# Patient Record
Sex: FEMALE | Race: WHITE | NOT HISPANIC OR LATINO | Employment: FULL TIME | URBAN - METROPOLITAN AREA
[De-identification: names, ages, dates, MRNs, and addresses within clinical notes are randomized per-mention and may not be internally consistent; named-entity substitution may affect disease eponyms.]

---

## 2017-01-02 ENCOUNTER — OFFICE VISIT (OUTPATIENT)
Dept: FAMILY MEDICINE | Facility: CLINIC | Age: 32
End: 2017-01-02
Payer: COMMERCIAL

## 2017-01-02 VITALS
HEART RATE: 83 BPM | DIASTOLIC BLOOD PRESSURE: 73 MMHG | SYSTOLIC BLOOD PRESSURE: 105 MMHG | WEIGHT: 170 LBS | OXYGEN SATURATION: 98 % | TEMPERATURE: 97.5 F | HEIGHT: 67 IN | BODY MASS INDEX: 26.68 KG/M2

## 2017-01-02 DIAGNOSIS — M54.41 ACUTE MIDLINE LOW BACK PAIN WITH RIGHT-SIDED SCIATICA: Primary | ICD-10-CM

## 2017-01-02 PROCEDURE — 99213 OFFICE O/P EST LOW 20 MIN: CPT | Performed by: FAMILY MEDICINE

## 2017-01-02 RX ORDER — CYCLOBENZAPRINE HCL 10 MG
5-10 TABLET ORAL 3 TIMES DAILY PRN
Qty: 30 TABLET | Refills: 1 | Status: SHIPPED | OUTPATIENT
Start: 2017-01-02 | End: 2019-05-31

## 2017-01-02 RX ORDER — TRAMADOL HYDROCHLORIDE 50 MG/1
50-100 TABLET ORAL EVERY 6 HOURS PRN
Qty: 30 TABLET | Refills: 0 | Status: SHIPPED | OUTPATIENT
Start: 2017-01-02 | End: 2018-10-26

## 2017-01-02 ASSESSMENT — PAIN SCALES - GENERAL: PAINLEVEL: MODERATE PAIN (5)

## 2017-01-02 NOTE — PROGRESS NOTES
"  SUBJECTIVE:                                                    Aleida Weinberg is a 31 year old female who presents to clinic today for the following health issues:    Back Pain      Duration: Approximately 2 weeks but acutely worse for 5 days        Specific cause: turning/bending    Description:   Location of pain: low back right  Character of pain: sharp, cramping and constant  Pain radiation:radiates into the right buttocks  New numbness or weakness in legs, not attributed to pain:  no     Intensity: Currently 8/10    History:   Pain interferes with job: Not applicable  History of back problems: recurrent self limited episodes of low back pain in the past  Any previous MRI or X-rays: Yes- at [facility].  Date 2009  Sees a specialist for back pain:  No  Therapies tried without relief: acetaminophen (Tylenol), activity and NSAIDs    Alleviating factors:   Improved by: heat      Precipitating factors:  Worsened by: Lifting and Bending    Functional and Psychosocial Screen (Sue STarT Back):      Not performed today   Accompanying Signs & Symptoms:  Risk of Fracture:  Corticosteroid use  Risk of Cauda Equina:  None  Risk of Infection:  None  Risk of Cancer:  None  Risk of Ankylosing Spondylitis:  Onset at age <35, male, AND morning back stiffness. no              First noticed acute flare of chronic back pain the weekend before Christmas; then last Thursday was bending and twisting and felt it \"seize up.\"   Takes PRN prednisone due to SLE, as prescribed by rheumatologist.     Problem list and histories reviewed & adjusted, as indicated.  Additional history: as documented    Patient Active Problem List   Diagnosis     Other kyphoscoliosis and scoliosis     Hypertrophic and atrophic condition of skin     Viral warts     Routine postpartum follow-up     CARDIOVASCULAR SCREENING; LDL GOAL LESS THAN 160     Intermittent asthma     Anxiety     Intractable menstrual migraine without status migrainosus     Vitamin " "D deficiency     Inflammatory polyarthropathy (H)     Chronic back pain     Raynaud's phenomenon without gangrene     Systemic lupus erythematosus (H)     High risk medications (not anticoagulants) long-term use (Plaquenil and MTX)     Dry eyes, bilateral     Past Surgical History   Procedure Laterality Date     Surgical history of -        PE Tubes      section         Social History   Substance Use Topics     Smoking status: Former Smoker -- 0.50 packs/day for 2.5 years     Types: Cigarettes     Quit date: 2005     Smokeless tobacco: Never Used     Alcohol Use: Yes      Comment: rarely - 1 monthly     Family History   Problem Relation Age of Onset     HEART DISEASE Maternal Uncle      MI's      HEART DISEASE Maternal Grandfather      Bypass, Heart Disease     Hypertension Paternal Grandmother      Respiratory Maternal Grandfather      asthma     Respiratory Other      cousin on mothers side, asthma     CANCER Paternal Grandmother      lymph nodes     Alcohol/Drug Maternal Uncle      Alcohol/Drug Other      bother great grandparents on mother's side     C.A.D. Paternal Grandfather      DIABETES No family hx of      Breast Cancer No family hx of      Cancer - colorectal No family hx of            ROS:  Constitutional, HEENT, cardiovascular, pulmonary, gi and gu systems are negative, except as otherwise noted.    OBJECTIVE:                                                    /73 mmHg  Pulse 83  Temp(Src) 97.5  F (36.4  C) (Oral)  Ht 5' 7\" (1.702 m)  Wt 170 lb (77.111 kg)  BMI 26.62 kg/m2  SpO2 98%  LMP 2016 (Approximate)  Breastfeeding? No  Body mass index is 26.62 kg/(m^2).  GENERAL: alert, no distress and over weight  EYES: Eyes grossly normal to inspection, PERRL and conjunctivae and sclerae normal  RESP: lungs clear to auscultation - no rales, rhonchi or wheezes  CV: regular rate and rhythm, normal S1 S2, no S3 or S4, no murmur, click or rub, no peripheral edema and peripheral " pulses strong  MS: no gross musculoskeletal defects noted, no edema  SKIN: no suspicious lesions or rashes  Comprehensive back pain exam:  Tenderness of right sided paraspinous muscles, Pain limits the following motions: flexion, right lateral bending, right twisting, Lower extremity strength functional and equal on both sides, Lower extremity reflexes within normal limits bilaterally, Lower extremity sensation normal and equal on both sides and Straight leg positive on  right, indicating possible ipsilateral radiculopathy  PSYCH: mentation appears normal, affect normal/bright    Diagnostic Test Results:  none      ASSESSMENT/PLAN:                                                        ICD-10-CM    1. Acute midline low back pain with right-sided sciatica M54.41 traMADol (ULTRAM) 50 MG tablet     cyclobenzaprine (FLEXERIL) 10 MG tablet     Discussed course of symptoms, and that it may take 10-14 days to feel better.   Gentle activity encouraged.   Will prescribe short course of tramadol and Flexeril for pain, told her they would likely make her tired, especially if taken together.   return to clinic in 10-14 days if not improving, or if there are any worsening symptoms in the interim.     See Patient Instructions    Lauren A. Engelmann, MD  [facility]

## 2017-01-02 NOTE — PATIENT INSTRUCTIONS
Understanding Sciatica    Sciatica is a type of leg pain. It is often due to pressure on a nerve in your low back that connects to the sciatic nerve. This pressure may be caused by a damaged disk, by abnormal bone growth, or other less common disorders. You may feel pain, burning, tingling, or numbness that shoots down your leg.    Pressure from a damaged disk  Constant wear and tear on a disk can cause it to weaken. Part of the disk may push outward (herniate). Part of the disk may then press on nearby nerves. If this nerve leads to the sciatic nerve, you may feel pain down your leg.    Pressure from bone  A disk can wear out and thin with age. This can cause the vertebrae above and below the disk to touch, putting pressure on a nerve. Abnormal bone growths called bone spurs can also form where the bones rub together. These can narrow the spinal canal and press on nerves.       7537-6661 [employer]. [address] [place] [address], [place] [address]. All rights reserved. This information is not intended as a substitute for professional medical care. Always follow your healthcare professional's instructions.

## 2017-01-02 NOTE — NURSING NOTE
"Chief Complaint   Patient presents with     Back Pain       Initial /73 mmHg  Pulse 83  Temp(Src) 97.5  F (36.4  C) (Oral)  Ht 5' 7\" (1.702 m)  Wt 170 lb (77.111 kg)  BMI 26.62 kg/m2  SpO2 98%  LMP 12/18/2016 (Approximate)  Breastfeeding? No Estimated body mass index is 26.62 kg/(m^2) as calculated from the following:    Height as of this encounter: 5' 7\" (1.702 m).    Weight as of this encounter: 170 lb (77.111 kg).  BP completed using cuff size: nicole Chahal MA      "

## 2017-01-02 NOTE — MR AVS SNAPSHOT
After Visit Summary   1/2/2017    Aleida Weinberg    MRN: [id number]           Patient Information     Date Of Birth          1985        Visit Information        Provider Department      1/2/2017 9:00 AM Engelmann, Lauren Anneliese, MD [facility]        Today's Diagnoses     Acute midline low back pain with right-sided sciatica    -  1       Care Instructions         Understanding Sciatica    Sciatica is a type of leg pain. It is often due to pressure on a nerve in your low back that connects to the sciatic nerve. This pressure may be caused by a damaged disk, by abnormal bone growth, or other less common disorders. You may feel pain, burning, tingling, or numbness that shoots down your leg.    Pressure from a damaged disk  Constant wear and tear on a disk can cause it to weaken. Part of the disk may push outward (herniate). Part of the disk may then press on nearby nerves. If this nerve leads to the sciatic nerve, you may feel pain down your leg.    Pressure from bone  A disk can wear out and thin with age. This can cause the vertebrae above and below the disk to touch, putting pressure on a nerve. Abnormal bone growths called bone spurs can also form where the bones rub together. These can narrow the spinal canal and press on nerves.       9886-3069 [employer]. [address] [place] [address]. All rights reserved. This information is not intended as a substitute for professional medical care. Always follow your healthcare professional's instructions.              Follow-ups after your visit        Your next 10 appointments already scheduled     Jose J 10, 2017 10:00 AM   Office Visit with Willow Roque MD   [facility] ([facility]    [address] [place] [id number] [phone]           Bring a current list of meds and any records pertaining to this visit.  For Physicals,  "please bring immunization records and any forms needing to be filled out.  Please arrive 10 minutes early to complete paperwork.            Jan 20, 2017  8:00 AM   Return Visit with Lavon Light MD   [facility] ([facility])    [address]  [place] [id number] [phone]              Who to contact     If you have questions or need follow up information about today's clinic visit or your schedule please contact [facility] directly at [phone].  Normal or non-critical lab and imaging results will be communicated to you by [employer], letter or phone within 4 business days after the clinic has received the results. If you do not hear from us within 7 days, please contact the clinic through [employer] or phone. If you have a critical or abnormal lab result, we will notify you by phone as soon as possible.  Submit refill requests through [employer] or call your pharmacy and they will forward the refill request to us. Please allow 3 business days for your refill to be completed.          Additional Information About Your Visit        [employer] Information     [employer] gives you secure access to your electronic health record. If you see a primary care provider, you can also send messages to your care team and make appointments. If you have questions, please call your primary care clinic.  If you do not have a primary care provider, please call [phone] and they will assist you.        Your Vitals Were     Pulse Temperature Height    83 97.5  F (36.4  C) (Oral) 5' 7\" (1.702 m)    BMI (Body Mass Index) Pulse Oximetry Last Period    26.62 kg/m2 98% 12/18/2016 (Approximate)    Breastfeeding?          No         Blood Pressure from Last 3 Encounters:   01/02/17 105/73   11/22/16 118/80   10/07/16 126/72    Weight from Last 3 Encounters:   01/02/17 170 lb (77.111 kg)   11/22/16 169 lb 3.2 oz (76.749 kg)   10/26/16 168 lb 12.8 oz (76.567 kg)              Today, you " had the following     No orders found for display         Today's Medication Changes          These changes are accurate as of: 1/2/17  9:23 AM.  If you have any questions, ask your nurse or doctor.               Start taking these medicines.        Dose/Directions    cyclobenzaprine 10 MG tablet   Commonly known as:  FLEXERIL   Used for:  Acute midline low back pain with right-sided sciatica   Started by:  Engelmann, Lauren Anneliese, MD        Dose:  5-10 mg   Take 0.5-1 tablets (5-10 mg) by mouth 3 times daily as needed for muscle spasms   Quantity:  30 tablet   Refills:  1       traMADol 50 MG tablet   Commonly known as:  ULTRAM   Used for:  Acute midline low back pain with right-sided sciatica   Started by:  Engelmann, Lauren Anneliese, MD        Dose:   mg   Take 1-2 tablets ( mg) by mouth every 6 hours as needed for pain maximum 8 tablet(s) per day   Quantity:  30 tablet   Refills:  0         These medicines have changed or have updated prescriptions.        Dose/Directions    omeprazole 40 MG capsule   Commonly known as:  priLOSEC   This may have changed:    - when to take this  - reasons to take this  - additional instructions   Used for:  Abdominal pain, epigastric        Dose:  40 mg   Take 1 capsule (40 mg) by mouth daily Take 30-60 minutes before a meal.   Quantity:  30 capsule   Refills:  0            Where to get your medicines      These medications were sent to [facility] - [place], [place] - [address]  [address], [place] [address]     Phone:  [phone]    - cyclobenzaprine 10 MG tablet      Some of these will need a paper prescription and others can be bought over the counter.  Ask your nurse if you have questions.     Bring a paper prescription for each of these medications    - traMADol 50 MG tablet             Primary Care Provider Office Phone # Fax #    Tatum Dove PA-C [phone]       [place]  "[address] [address]        Thank you!     Thank you for choosing [facility]  for your care. Our goal is always to provide you with excellent care. Hearing back from our patients is one way we can continue to improve our services. Please take a few minutes to complete the written survey that you may receive in the mail after your visit with us. Thank you!             Your Updated Medication List - Protect others around you: Learn how to safely use, store and throw away your medicines at www.disposemymeds.org.          This list is accurate as of: 1/2/17  9:23 AM.  Always use your most recent med list.                   Brand Name Dispense Instructions for use    albuterol 108 (90 BASE) MCG/ACT Inhaler    PROAIR HFA/PROVENTIL HFA/VENTOLIN HFA    1 Inhaler    Inhale 2 puffs into the lungs every 6 hours as needed for shortness of breath / dyspnea       amLODIPine 5 MG tablet    NORVASC    30 tablet    Take 1 tablet (5 mg) by mouth daily       cyclobenzaprine 10 MG tablet    FLEXERIL    30 tablet    Take 0.5-1 tablets (5-10 mg) by mouth 3 times daily as needed for muscle spasms       folic acid 1 MG tablet    FOLVITE    100 tablet    Take 1 tablet (1 mg) by mouth daily       hydroxychloroquine 200 MG tablet    PLAQUENIL    180 tablet    Take 2 tablets (400 mg) by mouth daily       Insulin Syringe-Needle U-100 27G X 1/2\" 1 ML Misc    BD insulin syringe    10 each    Syringe/needles for methotrexate administration; once weekly.       LORazepam 0.5 MG tablet    ATIVAN    10 tablet    Take 1 tablet (0.5 mg) by mouth every 8 hours as needed for anxiety       methotrexate 50 MG/2ML injection CHEMO     4 vial    Inject 0.8 mLs (20 mg) Subcutaneous every 7 days       norgestrel-ethinyl estradiol 0.3-30 MG-MCG per tablet    LO/OVRAL (28)    84 tablet    Take 1 tablet by mouth daily       omeprazole 40 MG capsule    priLOSEC    30 capsule    Take 1 capsule (40 mg) " by mouth daily Take 30-60 minutes before a meal.       predniSONE 5 MG tablet    DELTASONE    90 tablet    Take 1 tablet (5 mg) by mouth daily as needed for lupus       traMADol 50 MG tablet    ULTRAM    30 tablet    Take 1-2 tablets ( mg) by mouth every 6 hours as needed for pain maximum 8 tablet(s) per day       vitamin D 2000 UNITS tablet     100 tablet    Take 2,000 Units by mouth daily

## 2017-01-10 ENCOUNTER — OFFICE VISIT (OUTPATIENT)
Dept: OBGYN | Facility: CLINIC | Age: 32
End: 2017-01-10
Payer: COMMERCIAL

## 2017-01-10 VITALS
WEIGHT: 167.2 LBS | TEMPERATURE: 98.3 F | SYSTOLIC BLOOD PRESSURE: 108 MMHG | HEIGHT: 67 IN | DIASTOLIC BLOOD PRESSURE: 84 MMHG | BODY MASS INDEX: 26.24 KG/M2

## 2017-01-10 DIAGNOSIS — Z30.431 IUD CHECK UP: Primary | ICD-10-CM

## 2017-01-10 PROCEDURE — 99213 OFFICE O/P EST LOW 20 MIN: CPT | Performed by: OBSTETRICS & GYNECOLOGY

## 2017-01-10 NOTE — NURSING NOTE
"Chief Complaint   Patient presents with     IUD     placement check.        Initial Ht 5' 7\" (1.702 m)  LMP 2016 (Approximate) Estimated body mass index is 26.62 kg/(m^2) as calculated from the following:    Height as of this encounter: 5' 7\" (1.702 m).    Weight as of 17: 170 lb (77.111 kg).  BP completed using cuff size: regular        The following HM Due: NONE      The following patient reported/Care Every where data was sent to:  P ABSTRACT QUALITY INITIATIVES [[id number]]       n/a and patient has appointment for today               "

## 2017-01-10 NOTE — PROGRESS NOTES
"Aleida Weinberg is 32 year old female here for an IUD check.  She had a mirena  IUD placed about one -two months ago, without complication.  Since then she has been doing well. Had some irregular bleeding but not bad.   Thinks she is done having children and is considering a tubal ligation after this IUD.   Recent dx of lupus.      OBJECTIVE:  /84 mmHg  Temp(Src) 98.3  F (36.8  C) (Oral)  Ht 5' 7\" (1.702 m)  Wt 167 lb 3.2 oz (75.841 kg)  BMI 26.18 kg/m2  LMP 12/18/2016 (Approximate)  Breastfeeding? No   normal respiratory and cardiovascular effort     Pelvic:  EG - normal adult female.  BUS - within normal limits.  Vagina - well rugated, no discharge.  Cervix - no lesions, no CMT.  Uterus - firm, normal size and nontender.  Adnexae - no masses or tenderness.  RV - deferred.  IUD string noted at the cervix about 1 cm long.  One string noted.     ASSESSMENT:  IUD well placed    PLAN:  return to clinic when due for next annual physical exam  or with any concerns in regards to her IUD.  discussed bleeding pattern and what to expect with this IUD.     Willow Roque MD     "

## 2017-01-20 ENCOUNTER — OFFICE VISIT (OUTPATIENT)
Dept: RHEUMATOLOGY | Facility: CLINIC | Age: 32
End: 2017-01-20
Payer: COMMERCIAL

## 2017-01-20 VITALS
BODY MASS INDEX: 25.96 KG/M2 | WEIGHT: 165.4 LBS | HEART RATE: 89 BPM | DIASTOLIC BLOOD PRESSURE: 78 MMHG | OXYGEN SATURATION: 97 % | HEIGHT: 67 IN | SYSTOLIC BLOOD PRESSURE: 125 MMHG

## 2017-01-20 DIAGNOSIS — M32.9 SYSTEMIC LUPUS ERYTHEMATOSUS (H): Primary | ICD-10-CM

## 2017-01-20 DIAGNOSIS — I73.00 RAYNAUD'S PHENOMENON WITHOUT GANGRENE: ICD-10-CM

## 2017-01-20 DIAGNOSIS — E55.9 VITAMIN D DEFICIENCY: ICD-10-CM

## 2017-01-20 DIAGNOSIS — Z79.899 HIGH RISK MEDICATIONS (NOT ANTICOAGULANTS) LONG-TERM USE: ICD-10-CM

## 2017-01-20 LAB
ALBUMIN SERPL-MCNC: 4.2 G/DL (ref 3.4–5)
ALBUMIN UR-MCNC: ABNORMAL MG/DL
ALP SERPL-CCNC: 47 U/L (ref 40–150)
ALT SERPL W P-5'-P-CCNC: 28 U/L (ref 0–50)
ANION GAP SERPL CALCULATED.3IONS-SCNC: 7 MMOL/L (ref 3–14)
APPEARANCE UR: CLEAR
AST SERPL W P-5'-P-CCNC: 22 U/L (ref 0–45)
BACTERIA #/AREA URNS HPF: ABNORMAL /HPF
BASOPHILS # BLD AUTO: 0 10E9/L (ref 0–0.2)
BASOPHILS NFR BLD AUTO: 0.3 %
BILIRUB SERPL-MCNC: 0.8 MG/DL (ref 0.2–1.3)
BILIRUB UR QL STRIP: NEGATIVE
BUN SERPL-MCNC: 10 MG/DL (ref 7–30)
C3 SERPL-MCNC: 93 MG/DL (ref 76–169)
C4 SERPL-MCNC: 16 MG/DL (ref 15–50)
CALCIUM SERPL-MCNC: 9.3 MG/DL (ref 8.5–10.1)
CHLORIDE SERPL-SCNC: 107 MMOL/L (ref 94–109)
CK SERPL-CCNC: 67 U/L (ref 30–225)
CO2 SERPL-SCNC: 29 MMOL/L (ref 20–32)
COLOR UR AUTO: YELLOW
CREAT SERPL-MCNC: 0.8 MG/DL (ref 0.52–1.04)
CRP SERPL-MCNC: 3.6 MG/L (ref 0–8)
DIFFERENTIAL METHOD BLD: ABNORMAL
EOSINOPHIL # BLD AUTO: 0.1 10E9/L (ref 0–0.7)
EOSINOPHIL NFR BLD AUTO: 1.3 %
ERYTHROCYTE [DISTWIDTH] IN BLOOD BY AUTOMATED COUNT: 12.7 % (ref 10–15)
ERYTHROCYTE [SEDIMENTATION RATE] IN BLOOD BY WESTERGREN METHOD: 7 MM/H (ref 0–20)
GFR SERPL CREATININE-BSD FRML MDRD: 84 ML/MIN/1.7M2
GLUCOSE SERPL-MCNC: 79 MG/DL (ref 70–99)
GLUCOSE UR STRIP-MCNC: NEGATIVE MG/DL
HCT VFR BLD AUTO: 42.2 % (ref 35–47)
HGB BLD-MCNC: 14.5 G/DL (ref 11.7–15.7)
HGB UR QL STRIP: NEGATIVE
KETONES UR STRIP-MCNC: NEGATIVE MG/DL
LEUKOCYTE ESTERASE UR QL STRIP: NEGATIVE
LYMPHOCYTES # BLD AUTO: 1.1 10E9/L (ref 0.8–5.3)
LYMPHOCYTES NFR BLD AUTO: 27.6 %
MCH RBC QN AUTO: 32.2 PG (ref 26.5–33)
MCHC RBC AUTO-ENTMCNC: 34.4 G/DL (ref 31.5–36.5)
MCV RBC AUTO: 94 FL (ref 78–100)
MONOCYTES # BLD AUTO: 0.5 10E9/L (ref 0–1.3)
MONOCYTES NFR BLD AUTO: 13.4 %
MUCOUS THREADS #/AREA URNS LPF: PRESENT /LPF
NEUTROPHILS # BLD AUTO: 2.2 10E9/L (ref 1.6–8.3)
NEUTROPHILS NFR BLD AUTO: 57.4 %
NITRATE UR QL: NEGATIVE
NON-SQ EPI CELLS #/AREA URNS LPF: ABNORMAL /LPF
PH UR STRIP: 6 PH (ref 5–7)
PLATELET # BLD AUTO: 239 10E9/L (ref 150–450)
POTASSIUM SERPL-SCNC: 4.3 MMOL/L (ref 3.4–5.3)
PROT SERPL-MCNC: 7.5 G/DL (ref 6.8–8.8)
PROT UR-MCNC: 0.37 G/L
PROT/CREAT 24H UR: 0.13 G/G CR (ref 0–0.2)
RBC # BLD AUTO: 4.5 10E12/L (ref 3.8–5.2)
RBC #/AREA URNS AUTO: ABNORMAL /HPF (ref 0–2)
SODIUM SERPL-SCNC: 143 MMOL/L (ref 133–144)
SP GR UR STRIP: >1.03 (ref 1–1.03)
URN SPEC COLLECT METH UR: ABNORMAL
UROBILINOGEN UR STRIP-ACNC: 0.2 EU/DL (ref 0.2–1)
WBC # BLD AUTO: 3.9 10E9/L (ref 4–11)
WBC #/AREA URNS AUTO: ABNORMAL /HPF (ref 0–2)

## 2017-01-20 PROCEDURE — 80053 COMPREHEN METABOLIC PANEL: CPT | Performed by: INTERNAL MEDICINE

## 2017-01-20 PROCEDURE — 81001 URINALYSIS AUTO W/SCOPE: CPT | Performed by: INTERNAL MEDICINE

## 2017-01-20 PROCEDURE — 84156 ASSAY OF PROTEIN URINE: CPT | Performed by: INTERNAL MEDICINE

## 2017-01-20 PROCEDURE — 85025 COMPLETE CBC W/AUTO DIFF WBC: CPT | Performed by: INTERNAL MEDICINE

## 2017-01-20 PROCEDURE — 82550 ASSAY OF CK (CPK): CPT | Performed by: INTERNAL MEDICINE

## 2017-01-20 PROCEDURE — 86160 COMPLEMENT ANTIGEN: CPT | Performed by: INTERNAL MEDICINE

## 2017-01-20 PROCEDURE — 86140 C-REACTIVE PROTEIN: CPT | Performed by: INTERNAL MEDICINE

## 2017-01-20 PROCEDURE — 36415 COLL VENOUS BLD VENIPUNCTURE: CPT | Performed by: INTERNAL MEDICINE

## 2017-01-20 PROCEDURE — 99214 OFFICE O/P EST MOD 30 MIN: CPT | Performed by: INTERNAL MEDICINE

## 2017-01-20 PROCEDURE — 86225 DNA ANTIBODY NATIVE: CPT | Performed by: INTERNAL MEDICINE

## 2017-01-20 PROCEDURE — 85652 RBC SED RATE AUTOMATED: CPT | Performed by: INTERNAL MEDICINE

## 2017-01-20 RX ORDER — AMLODIPINE BESYLATE 10 MG/1
10 TABLET ORAL DAILY
Qty: 30 TABLET | Refills: 3 | Status: SHIPPED | OUTPATIENT
Start: 2017-01-20 | End: 2017-04-21

## 2017-01-20 RX ORDER — SULFASALAZINE 500 MG/1
TABLET, DELAYED RELEASE ORAL
Qty: 120 TABLET | Refills: 3 | Status: SHIPPED | OUTPATIENT
Start: 2017-01-20 | End: 2017-02-21

## 2017-01-20 NOTE — NURSING NOTE
"Chief Complaint   Patient presents with     Systemic Lupus Erythematous     Patient states her hands and feet still bother her. Has had back problems       Initial /78 mmHg  Pulse 89  Ht 1.702 m (5' 7\")  Wt 75.025 kg (165 lb 6.4 oz)  BMI 25.90 kg/m2  SpO2 97%  LMP 12/18/2016 (Approximate) Estimated body mass index is 25.9 kg/(m^2) as calculated from the following:    Height as of this encounter: 1.702 m (5' 7\").    Weight as of this encounter: 75.025 kg (165 lb 6.4 oz).  BP completed using cuff size: regular         RAPID3 (0-30) Cumulative Score  11.8          RAPID3 Weighted Score (divide #4 by 3 and that is the weighted score)  3.93           "

## 2017-01-20 NOTE — PROGRESS NOTES
Rheumatology Clinic Visit      Aleida Weinberg MRN# [id number]   YOB: 1985 Age: 32 year old      Date of visit: 1/20/17   PCP: Tatum Dove     Chief Complaint   Patient presents with:  Systemic Lupus Erythematous      Assessment and Plan   1. Systemic lupus erythematosus (CHANELL >[id number] speckled, RNP+, Sm+, Scl-70+, hypocomplementemia, photosensitivity, arthritis, fatigue, Raynaud's phenomenon):  Scl-70+ but no skin tightening at this time.  Symptoms are much improved with methotrexate (doses above 15 mg weekly cause stomach upset so changed PO to SQ) and Plaquenil, but still with synovitis so will add SSZ today.   - Continue methotrexate 20 mg SQ once weekly  - Continue folic acid 1 mg daily  - Continue hydroxychloroquine 400mg daily (last eye exam done 5/20/2016)  - Continue prednisone 5mg daily PRN (rarely used per patient)  - Start sulfasalazine 500 mg twice daily ×7 days, then 1000 mg twice daily thereafter  - Labs today and one week prior to her next rheumatology clinic visit: CBC, CMP, ESR, CRP, CK, C3, C4, dsDNA, UA, Uprotein:creatinine  - Labs monthly [id number]: CBC, Cr, Hepatic Panel     # Sulfasalazine Risks and Benefits: The risks and benefits of sulfasalazine were discussed in detail and the patient verbalized understanding.  The risks discussed include, but are not limited to, the risk for hypersensitivity, anaphylaxis, anaphylactoid reactions, infections, bone marrow suppression,  hepatotoxicity, nausea, vomiting, and GI upset.   I encouraged reviewing the package insert and asking any questions about the medication.      2. Raynaud's Phenomenon: Cold avoidance was insufficient for controlling her symptoms and therefore amlodipine 5mg daily was started with some improvement.  Therefore, will increase amlodipine to 10mg daily today. Risks and side effects of amlodipine were re-reviewed.   - Increase amlodipine 5 mg daily     3. Chronic Back Pain: History of scoliosis. Degenerative  findings on previously reviewed MRIs from 2000 and 2009. This has not changed for many years and does not worsen when her peripheral joint symptoms worsen. Had one episode of sciatica that resolved with chiropractic treatment. If worsening in the future, may consider repeat MRI.     4. Vitamin D deficiency: Currently on vitamin D 2000 units daily.    - Continue vitamin D 2000 units daily  - Labs 2-3 days prior to her next rheumatology clinic visit: Vitamin D    Ms. Weinberg verbalized agreement with and understanding of the rational for the diagnosis and treatment plan.  All questions were answered to best of my ability and the patient's satisfaction. Ms. Weinberg was advised to contact the clinic with any questions that may arise after the clinic visit.      Thank you for involving me in the care of the patient    Return to clinic: 3 months      HPI   Aleida Digna Weinberg is a 32 year old female with medical history significant for intermittent asthma, anxiety, migraines, vitamin D deficiency, and systemic lupus erythematosus who presents for follow-up of SLE.     Today, Ms. Weinberg reports that she continues to have joint pain and morning stiffness for about 90 minutes. Pain and stiffness improves with activity and worsen with inactivity. Positive gelling phenomenon. Joints primarily involved include her MCPs, PIPs, wrists, elbows. Some difficulty still with opening jars and turning doorknobs, but improved since the last clinic visit. Raynaud's phenomenon is improved since starting amlodipine 5 mg daily; no lower extremity edema or lightheadedness/dizziness associated with amlodipine. She still has significant Raynaud's phenomenon symptoms though. She had one episode of acute onset sciatica with radiation down her right leg that resolved with 1 dose of Flexeril and chiropractic manipulation. She has prednisone to use for her inflammatory arthritis but has not needed it since her previous clinic visit; she tells me  that she will only use it if her pain is severe.    Denies fevers, chills, nausea, vomiting, constipation, diarrhea. No abdominal pain. No chest pain/pressure, palpitations, or shortness of breath. No oral or nasal sores. No neck pain. No LE swelling.  Has photosensitivity but no photophobia.  Mild dry eyes and dry mouth; she uses artificial tears as needed.  No eye pain or redness. Denies history of serositis. Denies history of DVT, pulmonary embolism, or miscarriage.    Tobacco: Occasional cigarette  EtOH: Once monthly  Drugs: None  Occupation: Works at [employer], a lot of typing    ROS   GEN: No fevers, chills, night sweats, or weight change  SKIN: See HPI. No hair thinning.  HEENT: Has a history of migraines, but no headache today. No change in vision, taste, or hearing. No epistaxis. No oral or nasal ulcers.  CV: No chest pain, pressure, palpitations, or dyspnea on exertion.  PULM: No SOB, wheeze, cough.  GI: No change in appetite. No nausea, vomiting, constipation, diarrhea. No blood in stool. No abdominal pain.  : No blood in urine.  MSK: See HPI.  NEURO: See history of present illness  ENDO: No heat/cold intolerance.  EXT: See history of present illness    Active Problem List     Patient Active Problem List   Diagnosis     Other kyphoscoliosis and scoliosis     Hypertrophic and atrophic condition of skin     Viral warts     Routine postpartum follow-up     CARDIOVASCULAR SCREENING; LDL GOAL LESS THAN 160     Intermittent asthma     Anxiety     Intractable menstrual migraine without status migrainosus     Vitamin D deficiency     Inflammatory polyarthropathy (H)     Chronic back pain     Raynaud's phenomenon without gangrene     Systemic lupus erythematosus (H)     High risk medications (not anticoagulants) long-term use (Plaquenil and MTX)     Dry eyes, bilateral     Past Medical History     Past Medical History   Diagnosis Date     Mild intermittent asthma      Other kyphoscoliosis and scoliosis       "upper back curvature and disc degeneration in lower back.     Past Surgical History     Past Surgical History   Procedure Laterality Date     Surgical history of -        PE Tubes      section       Allergy     Allergies   Allergen Reactions     No Known Drug Allergies      Current Medication List     Current Outpatient Prescriptions   Medication Sig     traMADol (ULTRAM) 50 MG tablet Take 1-2 tablets ( mg) by mouth every 6 hours as needed for pain maximum 8 tablet(s) per day     cyclobenzaprine (FLEXERIL) 10 MG tablet Take 0.5-1 tablets (5-10 mg) by mouth 3 times daily as needed for muscle spasms     amLODIPine (NORVASC) 5 MG tablet Take 1 tablet (5 mg) by mouth daily     Cholecalciferol (VITAMIN D) 2000 UNITS tablet Take 2,000 Units by mouth daily     hydroxychloroquine (PLAQUENIL) 200 MG tablet Take 2 tablets (400 mg) by mouth daily     predniSONE (DELTASONE) 5 MG tablet Take 1 tablet (5 mg) by mouth daily as needed for lupus     Insulin Syringe-Needle U-100 (BD INSULIN SYRINGE) 27G X 1/2\" 1 ML MISC Syringe/needles for methotrexate administration; once weekly.     methotrexate 50 MG/2ML injection Inject 0.8 mLs (20 mg) Subcutaneous every 7 days     LORazepam (ATIVAN) 0.5 MG tablet Take 1 tablet (0.5 mg) by mouth every 8 hours as needed for anxiety     albuterol (PROAIR HFA, PROVENTIL HFA, VENTOLIN HFA) 108 (90 BASE) MCG/ACT inhaler Inhale 2 puffs into the lungs every 6 hours as needed for shortness of breath / dyspnea     norgestrel-ethinyl estradiol (LO/OVRAL, 28,) 0.3-30 MG-MCG per tablet Take 1 tablet by mouth daily     folic acid (FOLVITE) 1 MG tablet Take 1 tablet (1 mg) by mouth daily     omeprazole (PRILOSEC) 40 MG capsule Take 1 capsule (40 mg) by mouth daily Take 30-60 minutes before a meal. (Patient taking differently: Take 40 mg by mouth daily as needed Take 30-60 minutes before a meal.)     No current facility-administered medications for this visit.         Social History   See " "HPI    Family History     Family History   Problem Relation Age of Onset     HEART DISEASE Maternal Uncle      MI's      HEART DISEASE Maternal Grandfather      Bypass, Heart Disease     Hypertension Paternal Grandmother      Respiratory Maternal Grandfather      asthma     Respiratory Other      cousin on mothers side, asthma     CANCER Paternal Grandmother      lymph nodes     Alcohol/Drug Maternal Uncle      Alcohol/Drug Other      bother great grandparents on mother's side     C.A.D. Paternal Grandfather      DIABETES No family hx of      Breast Cancer No family hx of      Cancer - colorectal No family hx of      Denies family history of autoimmune disease    Physical Exam     Temp Readings from Last 3 Encounters:   01/10/17 98.3  F (36.8  C) Oral   01/02/17 97.5  F (36.4  C) Oral   11/22/16 97.9  F (36.6  C) Oral     BP Readings from Last 5 Encounters:   01/10/17 108/84   01/02/17 105/73   11/22/16 118/80   10/07/16 126/72   08/31/16 128/70     Pulse Readings from Last 1 Encounters:   01/02/17 83     Resp Readings from Last 1 Encounters:   04/22/16 16     Estimated body mass index is 26.18 kg/(m^2) as calculated from the following:    Height as of 1/10/17: 1.702 m (5' 7\").    Weight as of 1/10/17: 75.841 kg (167 lb 3.2 oz).    GEN: NAD  HEENT: MMM. No oral lesions. Anicteric, noninjected sclera  CV: S1, S2. RRR. No m/r/g.  PULM: CTA bilaterally. No w/c.  MSK: Tenderness palpation of the bilateral 2nd-5th PIPs with subtle synovial swelling.  MCPs, DIPs, wrists, and elbows without swelling or tenderness to palpation.  Bilateral shoulders nontender to palpation without swelling. Bilateral hips nontender to direct palpation. Knees, ankles, and feet without tenderness palpation or swelling. Negative MTP squeeze.   NEURO: UE and LE strengths 5/5 and equal bilaterally.   SKIN: No rash; normal fingertip pulp; no evidence of previous infarcts to the distal fingertips. Tattoos present  EXT: No LE edema  PSYCH: Alert. " Appropriate.    Labs / Imaging (select studies)   RF/CCP  Recent Labs   Lab Test  04/22/16   0902  04/01/16   0910   CCPIGG  1   --    RHF   --   <20     CHANELL/RNP/Sm/SSA/SSB/dsDNA  Recent Labs   Lab Test  10/26/16   0919  07/22/16   0916  04/22/16   0902  04/01/16   0910   VALERIE   --    --    --   12.4*   ANAIGG   --    --   >1:56464  Reference range: <1:40  (Note)  Speckled pattern.  INTERPRETIVE INFORMATION: CHANELL by IFA, IgG  Anti-nuclear antibodies (CHANELL) are seen in a variety of  systemic rheumatic diseases and are determined by indirect  fluorescence assay (IFA) using HEp-2 substrate with an  IgG-specific conjugate. CHANELL titers less than or equal to  1:80 have variable relevance while titers greater than or  equal to 1:160 are considered clinically significant. These  antibodies may precede clinical disease onset; however,  healthy individuals and those with advanced age have been  reported to be positive for CHANELL. When observed, one of the  five basic patterns is reported: homogeneous,  peripheral/rim, speckled, centromere, or nucleolar. If  cytoplasmic fluorescence is observed, it is noted. IFA  methodology is subjective and has occasionally been shown  to lack sensitivity for anti-SSA/Ro antibodies.  Negative results do not necessarily rule out the presence  of SSc. If clinical suspicion remains, consi vicki further  testing for U3-RNP, PM/Scl, or Th/To antibodies associated  with SSc.  Performed by [facility],  [address] [place] [id number] [phone]  [email or web address], Twin Rodriguez MD, Lab. Director     --    RNPIGG   --    --   >8.0  Positive   Antibody index (AI) values reflect qualitative changes in antibody   concentration that cannot be directly associated with clinical condition or   disease state.  *   --    SMIGG   --    --   1.5*   --    SSAIGG   --    --   <0.2  Negative   Antibody index (AI) values reflect qualitative changes in antibody   concentration that cannot be directly associated  with clinical condition or   disease state.     --    SSBIGG   --    --   <0.2  Negative   Antibody index (AI) values reflect qualitative changes in antibody   concentration that cannot be directly associated with clinical condition or   disease state.     --    SCLIGG   --    --   3.1*   --    F1XPCXD  110  115  94   --    X2BWGDA  16  17  13*   --      Recent Labs   Lab Test  10/26/16   0919  07/22/16   0916  04/22/16   0902   DNA  1  1  1     Antiphospholipid Antibodies  Recent Labs   Lab Test  04/22/16   0902   B2GPG  0.9   B2GPM  <0.9  Negative     CARG  <15.0  Interpretation:  Negative     JOANN  <12.5  Interpretation:  Negative     LUPINT  Negative  (Note)  COMMENTS:  The INR is normal.  APTT ratio is normal.  DRVVT Screen ratio is normal.  Thrombin time is normal.  NEGATIVE TEST; A LUPUS ANTICOAGULANT WAS NOT DETECTED IN THIS  SPECIMEN WITHIN THE LIMITS OF THE TESTING REPERTOIRE.  If the clinical picture is strongly suggestive of an antiphospholipid  syndrome, recommend anticardiolipin and beta-2-glycoprotein (IgG and  IgM) antibody tests.  Isabella Olson M.D.  [phone]  4/25/2016    INR =  1.05    Reference range: 0.86-1.14  Thrombin Time= 15.4    Reference range: 13.0-19.0 sec    APTT:       Ratio  Patient  =  0.92  1:2 Mix  =  N/A  Reference:  Negative: Less than or equal to 1.16  Positive: Greater than or equal to 1.17     DILUTE LISA VIPER VENOM TEST:  Screen Ratio = 0.95   Normal is less than 1.21         CBC  Recent Labs   Lab Test  12/27/16   1643  11/28/16   1655  10/26/16   0919   WBC  4.0  3.8*  5.1   RBC  4.40  4.50  4.78   HGB  14.4  14.7  15.4   HCT  40.0  41.1  44.5   MCV  91  91  93   RDW  12.8  12.6  12.7   PLT  258  283  246   MCH  32.7  32.7  32.2   MCHC  36.0  35.8  34.6   NEUTROPHIL  43.7  63.5  59.3   LYMPH  42.3  20.2  29.5   MONOCYTE  13.0  14.9  10.2   EOSINOPHIL  0.5  1.1  0.8   BASOPHIL  0.5  0.3  0.2   ANEU  1.8  2.4  3.0   ALYM  1.7  0.8  1.5   AMANDA  0.5  0.6  0.5    AEOS  0.0  0.0  0.0   ABAS  0.0  0.0  0.0     CMP  Recent Labs   Lab Test  12/27/16   1643  11/28/16   1655  10/26/16   0919  07/22/16   0916   01/29/16   0919   NA   --    --   139  138   --   139   POTASSIUM   --    --   4.3  4.2   --   4.0   CHLORIDE   --    --   109  107   --   108   CO2   --    --   24  24   --   25   ANIONGAP   --    --   6  7   --   6   GLC   --    --   85  76   --   82   BUN   --    --   8  10   --   10   CR  0.74  0.69  0.74  0.77   < >  0.78   GFRESTIMATED  >90  Non  GFR Calc    >90  Non  GFR Calc    >90  Non  GFR Calc    88   < >  87   GFRESTBLACK  >90   GFR Calc    >90   GFR Calc    >90   GFR Calc    >90   GFR Calc     < >  >90   GFR Calc     SRINIVAS   --    --   9.3  8.6   --   8.8   BILITOTAL  0.4  0.6  0.5  0.5   < >  0.4   ALBUMIN  4.2  4.2  4.0  3.8   < >  4.1   PROTTOTAL  7.7  7.9  7.5  7.6   < >  7.0   ALKPHOS  50  63  50  48   < >  50   AST  25  33  26  17   < >  17   ALT  33  45  29  21   < >  28    < > = values in this interval not displayed.     Calcium/VitaminD  Recent Labs   Lab Test  10/26/16   0919  07/22/16   0916  04/01/16   0910  01/29/16 0919   SRINIVAS  9.3  8.6   --   8.8   VITDT  23   --   <13  Season, race, dietary intake, and treatment affect the concentration of   25-hydroxy-Vitamin D. Values may decrease during winter months and increase   during summer months. Values 20-29 ug/L may indicate Vitamin D insufficiency   and values <20 ug/L may indicate Vitamin D deficiency.   Vitamin D determination is routinely performed by an immunoassay specific for   25 hydroxyvitamin D3.  If an individual is on vitamin D2 (ergocalciferol)   supplementation, please specify 25 OH vitamin D2 and D3 level determination by   LCMSMS test VITD23.  *   --      ESR/CRP  Recent Labs   Lab Test  10/26/16   0919  07/22/16   0916  04/01/16   0910   SED  7  8  12   CRP   9.5*  7.8  5.3     CK/Aldolase  Recent Labs   Lab Test  10/26/16   0919  07/22/16   0916  04/22/16   0902   CKT  88  62  45   ALDOLASE   --    --   4.5     TSH/T4  Recent Labs   Lab Test  04/01/16   0910  01/20/09   1035   TSH  1.57  0.82     Lipid Panel  Recent Labs   Lab Test  07/10/15   0814  09/13/13   0706  01/20/09   1035   CHOL  149  139  151   TRIG  45  57  47   HDL  48*  52  49*   LDL  92  76  93   VLDL  9  11  9   CHOLHDLRATIO  3.1  2.7  3.1     Hepatitis B  Recent Labs   Lab Test  04/22/16   0902   HBCAB  Nonreactive   HEPBANG  Nonreactive     Hepatitis C  Recent Labs   Lab Test  04/22/16   0902   HCVAB  Nonreactive   Assay performance characteristics have not been established for newborns,   infants, and children       Lyme ab screening  Recent Labs   Lab Test  04/01/16   0910   LYMEGM  0.12     HIV Screening  Recent Labs   Lab Test  04/22/16   0902   HIAGAB  Nonreactive   HIV-1 p24 Ag & HIV-1/HIV-2 Ab Not Detected       UA  Recent Labs   Lab Test  10/26/16   0904  07/22/16   0912  04/22/16   0852   COLOR  Yellow  Yellow  Yellow   APPEARANCE  Clear  Clear  Clear   URINEGLC  Negative  Negative  Negative   URINEBILI  Negative  Negative  Negative   SG  >1.030  >1.030  >1.030   URINEPH  6.0  6.0  5.5   PROTEIN  Negative  Negative  Negative   UROBILINOGEN  0.2  0.2  0.2   NITRITE  Negative  Negative  Negative   UBLD  Negative  Negative  Negative   LEUKEST  Negative  Negative  Negative   WBCU  2-5*  O - 2  O - 2   RBCU  O - 2  O - 2  O - 2   SQUAMOUSEPI  Moderate*  Few  Few   BACTERIA  Few*   --    --    MUCOUS   --   Present*   --      Urine Microscopic  Recent Labs   Lab Test  10/26/16   0904  07/22/16   0912  04/22/16   0852   WBCU  2-5*  O - 2  O - 2   RBCU  O - 2  O - 2  O - 2   SQUAMOUSEPI  Moderate*  Few  Few   BACTERIA  Few*   --    --    MUCOUS   --   Present*   --      Urine Protein  Recent Labs   Lab Test  10/26/16   0904  07/22/16   0912  04/22/16   0852   UTP  0.23  0.40  0.16   UTPG  0.09  0.18   0.06     Immunization History     Immunization History   Administered Date(s) Administered     DPT [birth year], 01/15/1988, 06/15/1991     Hepatitis B 06/13/1999, 08/20/1999, 02/05/2000     Human Papilloma Virus 02/25/2010, 02/04/2011     Influenza (IIV3) 11/07/2011     Influenza Vaccine IM 3yrs+ 4 Valent IIV4 10/11/2016     MMR 05/15/1987, 09/15/1991     Mantoux 07/15/1987, 01/19/2005     Meningococcal (Menomune ) 08/09/2004     OPV 1985, 1985, 1985, 01/15/1988, 09/15/1991     TDAP (ADACEL AGES 11-64) 01/21/2009, 02/25/2010          Chart documentation done in part with Dragon Voice recognition Software. Although reviewed after completion, some word and grammatical error may remain.    Lavon Light MD

## 2017-01-20 NOTE — MR AVS SNAPSHOT
After Visit Summary   1/20/2017    Aleida Weinberg    MRN: [id number]           Patient Information     Date Of Birth          1985        Visit Information        Provider Department      1/20/2017 8:00 AM Lavon Light MD [facility]        Today's Diagnoses     Systemic lupus erythematosus (H)    -  1     Raynaud's phenomenon without gangrene         High risk medications (not anticoagulants) long-term use            Follow-ups after your visit        Your next 10 appointments already scheduled     Feb 20, 2017  5:00 PM   LAB with NE LAB   [address] [address]   [phone]           Patient must bring picture ID.  Patient should be prepared to give a urine specimen  Please do not eat 10-12 hours before your appointment if you are coming in fasting for labs on lipids, cholesterol, or glucose (sugar).  Pregnant women should follow their Care Team instructions. Water with medications is okay. Do not drink coffee or other fluids.   If you have concerns about taking  your medications, please ask at office or if scheduling via Coupay, send a message by clicking on Secure Messaging, Message Your Care Team.            Mar 20, 2017  5:00 PM   LAB with NE LAB   [facility] ([address] [address]   [phone]           Patient must bring picture ID.  Patient should be prepared to give a urine specimen  Please do not eat 10-12 hours before your appointment if you are coming in fasting for labs on lipids, cholesterol, or glucose (sugar).  Pregnant women should follow their Care Team instructions. Water with medications is okay. Do not drink coffee or other fluids.   If you have concerns about taking  your medications, please ask at office or if scheduling via Coupay, send a message by clicking on Secure  Messaging, Message Your Care Team.            Apr 13, 2017  5:00 PM   LAB with NE LAB   [facility]    [address] [facility] [address] [phone]           Patient must bring picture ID.  Patient should be prepared to give a urine specimen  Please do not eat 10-12 hours before your appointment if you are coming in fasting for labs on lipids, cholesterol, or glucose (sugar).  Pregnant women should follow their Care Team instructions. Water with medications is okay. Do not drink coffee or other fluids.   If you have concerns about taking  your medications, please ask at office or if scheduling via [email or web address], send a message by clicking on Secure Messaging, Message Your Care Team.            Apr 21, 2017  7:40 AM   Return Visit with Lavon Light MD   [facility]    [address] [place] [address]  [place] [address]   [phone]              Future tests that were ordered for you today     Open Standing Orders        Priority Remaining Interval Expires Ordered    CBC with platelets differential Routine 2/2 Every 4 Weeks 7/19/2017 1/20/2017    Creatinine Routine 2/2 Every 4 Weeks 7/19/2017 1/20/2017    Hepatic panel Routine 2/2 Every 4 Weeks 7/19/2017 1/20/2017          Open Future Orders        Priority Expected Expires Ordered    CBC with platelets differential Routine 4/13/2017 5/6/2017 1/20/2017    CK total Routine 4/13/2017 5/6/2017 1/20/2017    Complement C3 Routine 4/13/2017 5/6/2017 1/20/2017    DNA double stranded antibodies Routine 4/13/2017 5/6/2017 1/20/2017    CRP inflammation Routine 4/13/2017 5/6/2017 1/20/2017    Comprehensive metabolic panel Routine 4/13/2017 5/6/2017 1/20/2017    Complement C4 Routine 4/13/2017 5/6/2017 1/20/2017    Erythrocyte sedimentation rate auto Routine 4/13/2017 5/6/2017 1/20/2017    Protein  random urine Routine 4/13/2017 5/6/2017 1/20/2017    UA with Microscopic reflex to  "Culture Routine 4/13/2017 5/6/2017 1/20/2017            Who to contact     If you have questions or need follow up information about today's clinic visit or your schedule please contact [facility] directly at [phone].  Normal or non-critical lab and imaging results will be communicated to you by MyChart, letter or phone within 4 business days after the clinic has received the results. If you do not hear from us within 7 days, please contact the clinic through Buttercoinhart or phone. If you have a critical or abnormal lab result, we will notify you by phone as soon as possible.  Submit refill requests through CircuitLab or call your pharmacy and they will forward the refill request to us. Please allow 3 business days for your refill to be completed.          Additional Information About Your Visit        Buttercoinhart Information     CircuitLab gives you secure access to your electronic health record. If you see a primary care provider, you can also send messages to your care team and make appointments. If you have questions, please call your primary care clinic.  If you do not have a primary care provider, please call [phone] and they will assist you.        Care EveryWhere ID     This is your Care EveryWhere ID. This could be used by other organizations to access your [facility] medical records  [id number]        Your Vitals Were     Pulse Height BMI (Body Mass Index) Pulse Oximetry Last Period       89 1.702 m (5' 7\") 25.90 kg/m2 97% 12/18/2016 (Approximate)        Blood Pressure from Last 3 Encounters:   01/20/17 125/78   01/10/17 108/84   01/02/17 105/73    Weight from Last 3 Encounters:   01/20/17 75.025 kg (165 lb 6.4 oz)   01/10/17 75.841 kg (167 lb 3.2 oz)   01/02/17 77.111 kg (170 lb)              We Performed the Following     CBC with platelets differential     CK total     Complement C3     Complement C4     Comprehensive metabolic panel     CRP inflammation     DNA double stranded antibodies     " Erythrocyte sedimentation rate auto     Protein  random urine     UA with Microscopic reflex to Culture          Today's Medication Changes          These changes are accurate as of: 1/20/17  8:27 AM.  If you have any questions, ask your nurse or doctor.               Start taking these medicines.        Dose/Directions    sulfaSALAzine  MG EC tablet   Commonly known as:  AZULFIDINE EN   Used for:  Systemic lupus erythematosus (H)   Started by:  Lavon Light MD        500mg BID for 7 days, then increase to 1000mg BID and continue 1000mg BID thereafter.   Quantity:  120 tablet   Refills:  3         These medicines have changed or have updated prescriptions.        Dose/Directions    amLODIPine 10 MG tablet   Commonly known as:  NORVASC   This may have changed:    - medication strength  - how much to take   Used for:  Raynaud's phenomenon without gangrene   Changed by:  Lavon Light MD        Dose:  10 mg   Take 1 tablet (10 mg) by mouth daily   Quantity:  30 tablet   Refills:  3       * methotrexate 50 MG/2ML injection CHEMO   This may have changed:  Another medication with the same name was added. Make sure you understand how and when to take each.   Used for:  Systemic lupus erythematosus (H), High risk medications (not anticoagulants) long-term use   Changed by:  Lavon Light MD        Dose:  20 mg   Inject 0.8 mLs (20 mg) Subcutaneous every 7 days   Quantity:  4 vial   Refills:  3       * methotrexate sodium 50 MG/2ML Soln   This may have changed:  You were already taking a medication with the same name, and this prescription was added. Make sure you understand how and when to take each.   Used for:  Systemic lupus erythematosus (H)   Changed by:  Lavon Light MD        Dose:  20 mg   Inject 0.8 mLs (20 mg) as directed every 7 days   Quantity:  4 vial   Refills:  3       omeprazole 40 MG capsule   Commonly known as:  priLOSEC   This may have changed:    - when to take this  - reasons to take  this  - additional instructions   Used for:  Abdominal pain, epigastric        Dose:  40 mg   Take 1 capsule (40 mg) by mouth daily Take 30-60 minutes before a meal.   Quantity:  30 capsule   Refills:  0       * Notice:  This list has 2 medication(s) that are the same as other medications prescribed for you. Read the directions carefully, and ask your doctor or other care provider to review them with you.         Where to get your medicines      These medications were sent to [facility] [place], [place] - [address] [place] [address].  [address] [place] [address]., [facility] [address]     Phone:  [phone]    - amLODIPine 10 MG tablet  - methotrexate sodium 50 MG/2ML Soln  - sulfaSALAzine  MG EC tablet             Primary Care Provider Office Phone # Fax #    Tatum Dove PA-C [phone]       [facility] [address] [place] [id number]        Thank you!     Thank you for choosing [facility]  for your care. Our goal is always to provide you with excellent care. Hearing back from our patients is one way we can continue to improve our services. Please take a few minutes to complete the written survey that you may receive in the mail after your visit with us. Thank you!             Your Updated Medication List - Protect others around you: Learn how to safely use, store and throw away your medicines at www.disposemymeds.org.          This list is accurate as of: 1/20/17  8:27 AM.  Always use your most recent med list.                   Brand Name Dispense Instructions for use    albuterol 108 (90 BASE) MCG/ACT Inhaler    PROAIR HFA/PROVENTIL HFA/VENTOLIN HFA    1 Inhaler    Inhale 2 puffs into the lungs every 6 hours as needed for shortness of breath / dyspnea       amLODIPine 10 MG tablet    NORVASC    30 tablet    Take 1 tablet (10 mg) by mouth daily       cyclobenzaprine 10 MG tablet    FLEXERIL    30 tablet    Take 0.5-1 tablets (5-10  "mg) by mouth 3 times daily as needed for muscle spasms       folic acid 1 MG tablet    FOLVITE    100 tablet    Take 1 tablet (1 mg) by mouth daily       hydroxychloroquine 200 MG tablet    PLAQUENIL    180 tablet    Take 2 tablets (400 mg) by mouth daily       Insulin Syringe-Needle U-100 27G X 1/2\" 1 ML Misc    BD insulin syringe    10 each    Syringe/needles for methotrexate administration; once weekly.       LORazepam 0.5 MG tablet    ATIVAN    10 tablet    Take 1 tablet (0.5 mg) by mouth every 8 hours as needed for anxiety       * methotrexate 50 MG/2ML injection CHEMO     4 vial    Inject 0.8 mLs (20 mg) Subcutaneous every 7 days       * methotrexate sodium 50 MG/2ML Soln     4 vial    Inject 0.8 mLs (20 mg) as directed every 7 days       omeprazole 40 MG capsule    priLOSEC    30 capsule    Take 1 capsule (40 mg) by mouth daily Take 30-60 minutes before a meal.       predniSONE 5 MG tablet    DELTASONE    90 tablet    Take 1 tablet (5 mg) by mouth daily as needed for lupus       sulfaSALAzine  MG EC tablet    AZULFIDINE EN    120 tablet    500mg BID for 7 days, then increase to 1000mg BID and continue 1000mg BID thereafter.       traMADol 50 MG tablet    ULTRAM    30 tablet    Take 1-2 tablets ( mg) by mouth every 6 hours as needed for pain maximum 8 tablet(s) per day       vitamin D 2000 UNITS tablet     100 tablet    Take 2,000 Units by mouth daily       * Notice:  This list has 2 medication(s) that are the same as other medications prescribed for you. Read the directions carefully, and ask your doctor or other care provider to review them with you.      "

## 2017-01-22 LAB — DSDNA AB SER-ACNC: 1 IU/ML

## 2017-02-09 ENCOUNTER — TELEPHONE (OUTPATIENT)
Dept: FAMILY MEDICINE | Facility: CLINIC | Age: 32
End: 2017-02-09

## 2017-02-09 ENCOUNTER — OFFICE VISIT (OUTPATIENT)
Dept: FAMILY MEDICINE | Facility: CLINIC | Age: 32
End: 2017-02-09
Payer: COMMERCIAL

## 2017-02-09 VITALS
TEMPERATURE: 98.1 F | DIASTOLIC BLOOD PRESSURE: 64 MMHG | WEIGHT: 166 LBS | BODY MASS INDEX: 26.06 KG/M2 | SYSTOLIC BLOOD PRESSURE: 98 MMHG | HEART RATE: 88 BPM | HEIGHT: 67 IN

## 2017-02-09 DIAGNOSIS — Z02.89 ENCOUNTER FOR COMPLETION OF FORM WITH PATIENT: Primary | ICD-10-CM

## 2017-02-09 LAB
CHOLEST SERPL-MCNC: 159 MG/DL
GLUCOSE SERPL-MCNC: 77 MG/DL (ref 70–99)
HDLC SERPL-MCNC: 42 MG/DL
LDLC SERPL CALC-MCNC: 106 MG/DL
NONHDLC SERPL-MCNC: 117 MG/DL
TRIGL SERPL-MCNC: 56 MG/DL

## 2017-02-09 PROCEDURE — 36415 COLL VENOUS BLD VENIPUNCTURE: CPT | Performed by: FAMILY MEDICINE

## 2017-02-09 PROCEDURE — 99213 OFFICE O/P EST LOW 20 MIN: CPT | Performed by: FAMILY MEDICINE

## 2017-02-09 PROCEDURE — 80061 LIPID PANEL: CPT | Performed by: FAMILY MEDICINE

## 2017-02-09 PROCEDURE — 82947 ASSAY GLUCOSE BLOOD QUANT: CPT | Performed by: FAMILY MEDICINE

## 2017-02-09 ASSESSMENT — ANXIETY QUESTIONNAIRES
3. WORRYING TOO MUCH ABOUT DIFFERENT THINGS: SEVERAL DAYS
5. BEING SO RESTLESS THAT IT IS HARD TO SIT STILL: NOT AT ALL
GAD7 TOTAL SCORE: 2
1. FEELING NERVOUS, ANXIOUS, OR ON EDGE: SEVERAL DAYS
6. BECOMING EASILY ANNOYED OR IRRITABLE: NOT AT ALL
2. NOT BEING ABLE TO STOP OR CONTROL WORRYING: NOT AT ALL
7. FEELING AFRAID AS IF SOMETHING AWFUL MIGHT HAPPEN: NOT AT ALL

## 2017-02-09 ASSESSMENT — PATIENT HEALTH QUESTIONNAIRE - PHQ9: 5. POOR APPETITE OR OVEREATING: NOT AT ALL

## 2017-02-09 NOTE — TELEPHONE ENCOUNTER
Patient was seen in clinic today for biometric screening.    Form is in MA folder.  Please watch for labs to be complete.    Nani Mea, Certified Medical Assistant (AAMA)

## 2017-02-09 NOTE — MR AVS SNAPSHOT
After Visit Summary   2/9/2017    Aleida Weinberg    MRN: [id number]           Patient Information     Date Of Birth          1985        Visit Information        Provider Department      2/9/2017 6:40 AM Latoya Miranda,  [facility]         Follow-ups after your visit        Your next 10 appointments already scheduled     Feb 20, 2017  5:00 PM   LAB with NE LAB   [facility] ([address] [address]   [phone]           Patient must bring picture ID.  Patient should be prepared to give a urine specimen  Please do not eat 10-12 hours before your appointment if you are coming in fasting for labs on lipids, cholesterol, or glucose (sugar).  Pregnant women should follow their Care Team instructions. Water with medications is okay. Do not drink coffee or other fluids.   If you have concerns about taking  your medications, please ask at office or if scheduling via BitLeap, send a message by clicking on Secure Messaging, Message Your Care Team.            Mar 20, 2017  5:00 PM   LAB with NE LAB   [facility] ([facility]    [address] [place] [address]   [phone]           Patient must bring picture ID.  Patient should be prepared to give a urine specimen  Please do not eat 10-12 hours before your appointment if you are coming in fasting for labs on lipids, cholesterol, or glucose (sugar).  Pregnant women should follow their Care Team instructions. Water with medications is okay. Do not drink coffee or other fluids.   If you have concerns about taking  your medications, please ask at office or if scheduling via BitLeap, send a message by clicking on Secure Messaging, Message Your Care Team.            Apr 13, 2017  5:00 PM   LAB with NE LAB   [facility] ([address]  [facility] [address] [phone]           Patient must bring picture ID.  Patient should be prepared to give a urine specimen  Please do not eat 10-12 hours before your appointment if you are coming in fasting for labs on lipids, cholesterol, or glucose (sugar).  Pregnant women should follow their Care Team instructions. Water with medications is okay. Do not drink coffee or other fluids.   If you have concerns about taking  your medications, please ask at office or if scheduling via Snootlab, send a message by clicking on Secure Messaging, Message Your Care Team.            Apr 21, 2017  7:40 AM   Return Visit with Lavon Light MD   [facility]    [address] [facility] [address] [phone]              Who to contact     If you have questions or need follow up information about today's clinic visit or your schedule please contact [facility] directly at [phone].  Normal or non-critical lab and imaging results will be communicated to you by Sporting Mouthhart, letter or phone within 4 business days after the clinic has received the results. If you do not hear from us within 7 days, please contact the clinic through Snootlab or phone. If you have a critical or abnormal lab result, we will notify you by phone as soon as possible.  Submit refill requests through Snootlab or call your pharmacy and they will forward the refill request to us. Please allow 3 business days for your refill to be completed.          Additional Information About Your Visit        Snootlab Information     Snootlab gives you secure access to your electronic health record. If you see a primary care provider, you can also send messages to your care team and make appointments. If you have questions, please call your primary care clinic.  If you do not have a primary care provider, please call [phone] and they will assist you.        Care EveryWhere ID     This is your Care  "EveryWhere ID. This could be used by other organizations to access your [facility] medical records  [id number]        Your Vitals Were     Pulse Temperature Height BMI (Body Mass Index) Last Period       88 98.1  F (36.7  C) (Oral) 5' 7\" (1.702 m) 25.99 kg/m2 12/20/2016        Blood Pressure from Last 3 Encounters:   02/09/17 98/64   01/20/17 125/78   01/10/17 108/84    Weight from Last 3 Encounters:   02/09/17 166 lb (75.297 kg)   01/20/17 165 lb 6.4 oz (75.025 kg)   01/10/17 167 lb 3.2 oz (75.841 kg)              Today, you had the following     No orders found for display         Today's Medication Changes          These changes are accurate as of: 2/9/17  7:19 AM.  If you have any questions, ask your nurse or doctor.               These medicines have changed or have updated prescriptions.        Dose/Directions    omeprazole 40 MG capsule   Commonly known as:  priLOSEC   This may have changed:    - when to take this  - reasons to take this  - additional instructions   Used for:  Abdominal pain, epigastric        Dose:  40 mg   Take 1 capsule (40 mg) by mouth daily Take 30-60 minutes before a meal.   Quantity:  30 capsule   Refills:  0                Primary Care Provider Office Phone # Fax #    Tatum Dove PA-C [phone]       [address] [address]        Thank you!     Thank you for choosing [facility]  for your care. Our goal is always to provide you with excellent care. Hearing back from our patients is one way we can continue to improve our services. Please take a few minutes to complete the written survey that you may receive in the mail after your visit with us. Thank you!             Your Updated Medication List - Protect others around you: Learn how to safely use, store and throw away your medicines at www.disposemymeds.org.          This list is accurate as of: 2/9/17  7:19 AM.  Always use your most recent med " "list.                   Brand Name Dispense Instructions for use    albuterol 108 (90 BASE) MCG/ACT Inhaler    PROAIR HFA/PROVENTIL HFA/VENTOLIN HFA    1 Inhaler    Inhale 2 puffs into the lungs every 6 hours as needed for shortness of breath / dyspnea       amLODIPine 10 MG tablet    NORVASC    30 tablet    Take 1 tablet (10 mg) by mouth daily       cyclobenzaprine 10 MG tablet    FLEXERIL    30 tablet    Take 0.5-1 tablets (5-10 mg) by mouth 3 times daily as needed for muscle spasms       folic acid 1 MG tablet    FOLVITE    100 tablet    Take 1 tablet (1 mg) by mouth daily       hydroxychloroquine 200 MG tablet    PLAQUENIL    180 tablet    Take 2 tablets (400 mg) by mouth daily       Insulin Syringe-Needle U-100 27G X 1/2\" 1 ML Misc    BD insulin syringe    10 each    Syringe/needles for methotrexate administration; once weekly.       LORazepam 0.5 MG tablet    ATIVAN    10 tablet    Take 1 tablet (0.5 mg) by mouth every 8 hours as needed for anxiety       * methotrexate 50 MG/2ML injection CHEMO     4 vial    Inject 0.8 mLs (20 mg) Subcutaneous every 7 days       * methotrexate sodium 50 MG/2ML Soln     4 vial    Inject 0.8 mLs (20 mg) as directed every 7 days       MIRENA (52 MG) 20 MCG/24HR IUD   Generic drug:  levonorgestrel      1 each by Intrauterine route once       omeprazole 40 MG capsule    priLOSEC    30 capsule    Take 1 capsule (40 mg) by mouth daily Take 30-60 minutes before a meal.       predniSONE 5 MG tablet    DELTASONE    90 tablet    Take 1 tablet (5 mg) by mouth daily as needed for lupus       sulfaSALAzine  MG EC tablet    AZULFIDINE EN    120 tablet    500mg BID for 7 days, then increase to 1000mg BID and continue 1000mg BID thereafter.       traMADol 50 MG tablet    ULTRAM    30 tablet    Take 1-2 tablets ( mg) by mouth every 6 hours as needed for pain maximum 8 tablet(s) per day       vitamin D 2000 UNITS tablet     100 tablet    Take 2,000 Units by mouth daily       * " Notice:  This list has 2 medication(s) that are the same as other medications prescribed for you. Read the directions carefully, and ask your doctor or other care provider to review them with you.

## 2017-02-09 NOTE — PROGRESS NOTES
"  SUBJECTIVE:                                                    Aleida Weinberg is a 32 year old female who presents to clinic today for the following health issues:      Patient is here to have biometric forms completed, and lab work.  She denies any concerns today.  She has inflamatory arthritis and sees a rheumatologist.  She says her HDL was too low last year.    Her BP is low due to being on norvasc for her raynauds.         Problem list and histories reviewed & adjusted, as indicated.  Additional history: as documented    BP Readings from Last 3 Encounters:   02/09/17 98/64   01/20/17 125/78   01/10/17 108/84    Wt Readings from Last 3 Encounters:   02/09/17 166 lb (75.297 kg)   01/20/17 165 lb 6.4 oz (75.025 kg)   01/10/17 167 lb 3.2 oz (75.841 kg)                    ROS:  Constitutional, HEENT, cardiovascular, pulmonary, gi and gu systems are negative, except as otherwise noted.    OBJECTIVE:                                                    BP 98/64 mmHg  Pulse 88  Temp(Src) 98.1  F (36.7  C) (Oral)  Ht 5' 7\" (1.702 m)  Wt 166 lb (75.297 kg)  BMI 25.99 kg/m2  LMP 12/20/2016  Body mass index is 25.99 kg/(m^2).  GENERAL: healthy, alert and no distress  HENT: normal cephalic/atraumatic, oropharynx clear and oral mucous membranes moist  NECK: no adenopathy, no asymmetry, masses, or scars and thyroid normal to palpation  RESP: lungs clear to auscultation - no rales, rhonchi or wheezes  CV: regular rate and rhythm, normal S1 S2, no S3 or S4, no murmur, click or rub, no peripheral edema and peripheral pulses strong  PSYCH: mentation appears normal, affect normal/bright    Diagnostic Test Results:  none      ASSESSMENT/PLAN:                                                        ICD-10-CM    1. Encounter for completion of form with patient Z02.89 Lipid Profile with reflex to direct LDL     Glucose     A telephone encounter was started and will be completed when we get the results from todays labs. "     FUTURE APPOINTMENTS:       - Follow-up for annual visit or as needed    Latoya Miranda DO  [facility]

## 2017-02-10 ASSESSMENT — ANXIETY QUESTIONNAIRES: GAD7 TOTAL SCORE: 2

## 2017-02-10 ASSESSMENT — PATIENT HEALTH QUESTIONNAIRE - PHQ9: SUM OF ALL RESPONSES TO PHQ QUESTIONS 1-9: 0

## 2017-02-10 ASSESSMENT — ASTHMA QUESTIONNAIRES: ACT_TOTALSCORE: 25

## 2017-02-13 ENCOUNTER — MYC MEDICAL ADVICE (OUTPATIENT)
Dept: FAMILY MEDICINE | Facility: CLINIC | Age: 32
End: 2017-02-13

## 2017-02-13 NOTE — TELEPHONE ENCOUNTER
I have this form and have completed it and faxed it just now to [employer], fax #[phone]    *I sent the patient a [employer] message also that this has been faxed.    Bee Daigle, CMA

## 2017-02-13 NOTE — TELEPHONE ENCOUNTER
See 2/9 TE, forms in MA folder.    No forms in folder, MA's any ideas where this form is?    Bernadine Caldwell,

## 2017-02-13 NOTE — TELEPHONE ENCOUNTER
I have the form.  I have completed this form and faxed it to:  [phone] ([employer]).    Bee Daigle, CMA

## 2017-02-20 DIAGNOSIS — Z79.899 HIGH RISK MEDICATIONS (NOT ANTICOAGULANTS) LONG-TERM USE: ICD-10-CM

## 2017-02-20 DIAGNOSIS — M32.9 SYSTEMIC LUPUS ERYTHEMATOSUS (H): ICD-10-CM

## 2017-02-20 DIAGNOSIS — E55.9 VITAMIN D DEFICIENCY: ICD-10-CM

## 2017-02-20 LAB
BASOPHILS # BLD AUTO: 0 10E9/L (ref 0–0.2)
BASOPHILS NFR BLD AUTO: 0.3 %
DIFFERENTIAL METHOD BLD: ABNORMAL
EOSINOPHIL # BLD AUTO: 0 10E9/L (ref 0–0.7)
EOSINOPHIL NFR BLD AUTO: 0.5 %
ERYTHROCYTE [DISTWIDTH] IN BLOOD BY AUTOMATED COUNT: 13.1 % (ref 10–15)
HCT VFR BLD AUTO: 38.8 % (ref 35–47)
HGB BLD-MCNC: 13.8 G/DL (ref 11.7–15.7)
LYMPHOCYTES # BLD AUTO: 0.7 10E9/L (ref 0.8–5.3)
LYMPHOCYTES NFR BLD AUTO: 17.1 %
MCH RBC QN AUTO: 32.9 PG (ref 26.5–33)
MCHC RBC AUTO-ENTMCNC: 35.6 G/DL (ref 31.5–36.5)
MCV RBC AUTO: 93 FL (ref 78–100)
MONOCYTES # BLD AUTO: 0.5 10E9/L (ref 0–1.3)
MONOCYTES NFR BLD AUTO: 11.7 %
NEUTROPHILS # BLD AUTO: 2.7 10E9/L (ref 1.6–8.3)
NEUTROPHILS NFR BLD AUTO: 70.4 %
PLATELET # BLD AUTO: 210 10E9/L (ref 150–450)
RBC # BLD AUTO: 4.19 10E12/L (ref 3.8–5.2)
WBC # BLD AUTO: 3.9 10E9/L (ref 4–11)

## 2017-02-20 PROCEDURE — 36415 COLL VENOUS BLD VENIPUNCTURE: CPT | Performed by: INTERNAL MEDICINE

## 2017-02-20 PROCEDURE — 82306 VITAMIN D 25 HYDROXY: CPT | Performed by: INTERNAL MEDICINE

## 2017-02-20 PROCEDURE — 85025 COMPLETE CBC W/AUTO DIFF WBC: CPT | Performed by: INTERNAL MEDICINE

## 2017-02-20 PROCEDURE — 82565 ASSAY OF CREATININE: CPT | Performed by: INTERNAL MEDICINE

## 2017-02-20 PROCEDURE — 80076 HEPATIC FUNCTION PANEL: CPT | Performed by: INTERNAL MEDICINE

## 2017-02-21 ENCOUNTER — MYC MEDICAL ADVICE (OUTPATIENT)
Dept: RHEUMATOLOGY | Facility: CLINIC | Age: 32
End: 2017-02-21

## 2017-02-21 DIAGNOSIS — M32.9 SYSTEMIC LUPUS ERYTHEMATOSUS (H): ICD-10-CM

## 2017-02-21 LAB
ALBUMIN SERPL-MCNC: 4.3 G/DL (ref 3.4–5)
ALP SERPL-CCNC: 55 U/L (ref 40–150)
ALT SERPL W P-5'-P-CCNC: 67 U/L (ref 0–50)
AST SERPL W P-5'-P-CCNC: 46 U/L (ref 0–45)
BILIRUB DIRECT SERPL-MCNC: <0.1 MG/DL (ref 0–0.2)
BILIRUB SERPL-MCNC: 0.4 MG/DL (ref 0.2–1.3)
CREAT SERPL-MCNC: 0.68 MG/DL (ref 0.52–1.04)
DEPRECATED CALCIDIOL+CALCIFEROL SERPL-MC: 33 UG/L (ref 20–75)
GFR SERPL CREATININE-BSD FRML MDRD: NORMAL ML/MIN/1.7M2
PROT SERPL-MCNC: 7.1 G/DL (ref 6.8–8.8)

## 2017-02-21 RX ORDER — SULFASALAZINE 500 MG/1
500 TABLET, DELAYED RELEASE ORAL 2 TIMES DAILY
Qty: 60 TABLET | Refills: 1 | Status: SHIPPED | OUTPATIENT
Start: 2017-02-21 | End: 2017-03-21

## 2017-02-21 NOTE — PROGRESS NOTES
"CapLinkedt message sent:  \"Ms. Weinberg,    Since the addition of sulfasalazine, your liver enzymes, ALT and AST, have increased. Therefore, reduce sulfasalazine to 500 mg twice daily.    Please let me know if you have any questions.    Sincerely,  Lavon Light MD  2/21/2017 5:13 PM\""

## 2017-02-23 ENCOUNTER — MYC MEDICAL ADVICE (OUTPATIENT)
Dept: RHEUMATOLOGY | Facility: CLINIC | Age: 32
End: 2017-02-23

## 2017-03-07 ENCOUNTER — MYC MEDICAL ADVICE (OUTPATIENT)
Dept: FAMILY MEDICINE | Facility: CLINIC | Age: 32
End: 2017-03-07

## 2017-03-07 NOTE — TELEPHONE ENCOUNTER
Forms were dropped off and placed in the doctors box behind the .    Ruth Vinson  Patient Representative

## 2017-03-08 NOTE — TELEPHONE ENCOUNTER
Forms received from patient for Tatum Dove PA-C.  Placed in Tatum Dove PA-C MA folder for review prior to being given to provider for signature.  Please fax forms to [phone] after completion.    Bernadine Caldwell,

## 2017-03-09 ENCOUNTER — MYC MEDICAL ADVICE (OUTPATIENT)
Dept: FAMILY MEDICINE | Facility: CLINIC | Age: 32
End: 2017-03-09

## 2017-03-09 NOTE — TELEPHONE ENCOUNTER
Printed out original form that we sent on 2/13/17 (patients information IS on the form) and refaxed it to [employer], fax #[phone] (twice).    Will send patient MyChart message also that it was refaxed.    Bee Daigle, CMA

## 2017-03-20 DIAGNOSIS — M32.9 SYSTEMIC LUPUS ERYTHEMATOSUS (H): ICD-10-CM

## 2017-03-20 DIAGNOSIS — Z79.899 HIGH RISK MEDICATIONS (NOT ANTICOAGULANTS) LONG-TERM USE: ICD-10-CM

## 2017-03-20 LAB
BASOPHILS # BLD AUTO: 0 10E9/L (ref 0–0.2)
BASOPHILS NFR BLD AUTO: 0.3 %
DIFFERENTIAL METHOD BLD: ABNORMAL
EOSINOPHIL # BLD AUTO: 0 10E9/L (ref 0–0.7)
EOSINOPHIL NFR BLD AUTO: 1 %
ERYTHROCYTE [DISTWIDTH] IN BLOOD BY AUTOMATED COUNT: 12.9 % (ref 10–15)
HCT VFR BLD AUTO: 38.9 % (ref 35–47)
HGB BLD-MCNC: 13.9 G/DL (ref 11.7–15.7)
LYMPHOCYTES # BLD AUTO: 0.7 10E9/L (ref 0.8–5.3)
LYMPHOCYTES NFR BLD AUTO: 18.2 %
MCH RBC QN AUTO: 33 PG (ref 26.5–33)
MCHC RBC AUTO-ENTMCNC: 35.7 G/DL (ref 31.5–36.5)
MCV RBC AUTO: 92 FL (ref 78–100)
MONOCYTES # BLD AUTO: 0.5 10E9/L (ref 0–1.3)
MONOCYTES NFR BLD AUTO: 12.1 %
NEUTROPHILS # BLD AUTO: 2.7 10E9/L (ref 1.6–8.3)
NEUTROPHILS NFR BLD AUTO: 68.4 %
PLATELET # BLD AUTO: 218 10E9/L (ref 150–450)
RBC # BLD AUTO: 4.21 10E12/L (ref 3.8–5.2)
WBC # BLD AUTO: 3.9 10E9/L (ref 4–11)

## 2017-03-20 PROCEDURE — 36415 COLL VENOUS BLD VENIPUNCTURE: CPT | Performed by: INTERNAL MEDICINE

## 2017-03-20 PROCEDURE — 85025 COMPLETE CBC W/AUTO DIFF WBC: CPT | Performed by: INTERNAL MEDICINE

## 2017-03-20 PROCEDURE — 82565 ASSAY OF CREATININE: CPT | Performed by: INTERNAL MEDICINE

## 2017-03-20 PROCEDURE — 80076 HEPATIC FUNCTION PANEL: CPT | Performed by: INTERNAL MEDICINE

## 2017-03-21 DIAGNOSIS — R79.89 LFT ELEVATION: Primary | ICD-10-CM

## 2017-03-21 LAB
ALBUMIN SERPL-MCNC: 4.6 G/DL (ref 3.4–5)
ALP SERPL-CCNC: 52 U/L (ref 40–150)
ALT SERPL W P-5'-P-CCNC: 53 U/L (ref 0–50)
AST SERPL W P-5'-P-CCNC: 38 U/L (ref 0–45)
BILIRUB DIRECT SERPL-MCNC: 0.1 MG/DL (ref 0–0.2)
BILIRUB SERPL-MCNC: 0.5 MG/DL (ref 0.2–1.3)
CREAT SERPL-MCNC: 0.69 MG/DL (ref 0.52–1.04)
GFR SERPL CREATININE-BSD FRML MDRD: NORMAL ML/MIN/1.7M2
PROT SERPL-MCNC: 7.6 G/DL (ref 6.8–8.8)

## 2017-03-21 NOTE — PROGRESS NOTES
"MyChart message sent:  \"Ms. Weinberg,    Your liver enzymes, ALT and AST, have improved but have not returned to your baseline yet. Therefore, please stop sulfasalazine. We can discuss alternative treatments at your scheduled follow-up clinic visit, but I could see you sooner if you would like.    Sincerely,  Lavon Light MD  3/21/2017 5:14 PM\""

## 2017-04-13 DIAGNOSIS — M32.9 SYSTEMIC LUPUS ERYTHEMATOSUS (H): ICD-10-CM

## 2017-04-13 LAB
ALBUMIN UR-MCNC: NEGATIVE MG/DL
APPEARANCE UR: CLEAR
BILIRUB UR QL STRIP: NEGATIVE
COLOR UR AUTO: YELLOW
ERYTHROCYTE [SEDIMENTATION RATE] IN BLOOD BY WESTERGREN METHOD: 8 MM/H (ref 0–20)
GLUCOSE UR STRIP-MCNC: NEGATIVE MG/DL
HGB UR QL STRIP: NEGATIVE
KETONES UR STRIP-MCNC: NEGATIVE MG/DL
LEUKOCYTE ESTERASE UR QL STRIP: NEGATIVE
NITRATE UR QL: NEGATIVE
NON-SQ EPI CELLS #/AREA URNS LPF: NORMAL /LPF
PH UR STRIP: 7 PH (ref 5–7)
RBC #/AREA URNS AUTO: NORMAL /HPF (ref 0–2)
SP GR UR STRIP: 1.01 (ref 1–1.03)
URN SPEC COLLECT METH UR: NORMAL
UROBILINOGEN UR STRIP-ACNC: 0.2 EU/DL (ref 0.2–1)
WBC #/AREA URNS AUTO: NORMAL /HPF (ref 0–2)

## 2017-04-13 PROCEDURE — 84156 ASSAY OF PROTEIN URINE: CPT | Performed by: INTERNAL MEDICINE

## 2017-04-13 PROCEDURE — 86160 COMPLEMENT ANTIGEN: CPT | Performed by: INTERNAL MEDICINE

## 2017-04-13 PROCEDURE — 80053 COMPREHEN METABOLIC PANEL: CPT | Performed by: INTERNAL MEDICINE

## 2017-04-13 PROCEDURE — 81001 URINALYSIS AUTO W/SCOPE: CPT | Performed by: INTERNAL MEDICINE

## 2017-04-13 PROCEDURE — 85652 RBC SED RATE AUTOMATED: CPT | Performed by: INTERNAL MEDICINE

## 2017-04-13 PROCEDURE — 86225 DNA ANTIBODY NATIVE: CPT | Performed by: INTERNAL MEDICINE

## 2017-04-13 PROCEDURE — 85025 COMPLETE CBC W/AUTO DIFF WBC: CPT | Performed by: INTERNAL MEDICINE

## 2017-04-13 PROCEDURE — 82550 ASSAY OF CK (CPK): CPT | Performed by: INTERNAL MEDICINE

## 2017-04-13 PROCEDURE — 86140 C-REACTIVE PROTEIN: CPT | Performed by: INTERNAL MEDICINE

## 2017-04-13 PROCEDURE — 36415 COLL VENOUS BLD VENIPUNCTURE: CPT | Performed by: INTERNAL MEDICINE

## 2017-04-14 LAB
ALBUMIN SERPL-MCNC: 4.7 G/DL (ref 3.4–5)
ALP SERPL-CCNC: 51 U/L (ref 40–150)
ALT SERPL W P-5'-P-CCNC: 49 U/L (ref 0–50)
ANION GAP SERPL CALCULATED.3IONS-SCNC: 10 MMOL/L (ref 3–14)
AST SERPL W P-5'-P-CCNC: 32 U/L (ref 0–45)
BASOPHILS # BLD AUTO: 0 10E9/L (ref 0–0.2)
BASOPHILS NFR BLD AUTO: 0.4 %
BILIRUB SERPL-MCNC: 0.6 MG/DL (ref 0.2–1.3)
BUN SERPL-MCNC: 12 MG/DL (ref 7–30)
CALCIUM SERPL-MCNC: 9.4 MG/DL (ref 8.5–10.1)
CHLORIDE SERPL-SCNC: 105 MMOL/L (ref 94–109)
CK SERPL-CCNC: 63 U/L (ref 30–225)
CO2 SERPL-SCNC: 23 MMOL/L (ref 20–32)
CREAT SERPL-MCNC: 0.69 MG/DL (ref 0.52–1.04)
CREAT UR-MCNC: 32 MG/DL
CRP SERPL-MCNC: <2.9 MG/L (ref 0–8)
DIFFERENTIAL METHOD BLD: ABNORMAL
EOSINOPHIL # BLD AUTO: 0 10E9/L (ref 0–0.7)
EOSINOPHIL NFR BLD AUTO: 0.8 %
ERYTHROCYTE [DISTWIDTH] IN BLOOD BY AUTOMATED COUNT: 12.5 % (ref 10–15)
GFR SERPL CREATININE-BSD FRML MDRD: NORMAL ML/MIN/1.7M2
GLUCOSE SERPL-MCNC: 76 MG/DL (ref 70–99)
HCT VFR BLD AUTO: 38.4 % (ref 35–47)
HGB BLD-MCNC: 13.8 G/DL (ref 11.7–15.7)
LYMPHOCYTES # BLD AUTO: 1.5 10E9/L (ref 0.8–5.3)
LYMPHOCYTES NFR BLD AUTO: 30.2 %
MCH RBC QN AUTO: 33.2 PG (ref 26.5–33)
MCHC RBC AUTO-ENTMCNC: 35.9 G/DL (ref 31.5–36.5)
MCV RBC AUTO: 92 FL (ref 78–100)
MONOCYTES # BLD AUTO: 0.6 10E9/L (ref 0–1.3)
MONOCYTES NFR BLD AUTO: 11.5 %
NEUTROPHILS # BLD AUTO: 2.8 10E9/L (ref 1.6–8.3)
NEUTROPHILS NFR BLD AUTO: 57.1 %
PLATELET # BLD AUTO: 243 10E9/L (ref 150–450)
POTASSIUM SERPL-SCNC: 3.9 MMOL/L (ref 3.4–5.3)
PROT SERPL-MCNC: 7.7 G/DL (ref 6.8–8.8)
PROT UR-MCNC: 0.08 G/L
PROT/CREAT 24H UR: 0.26 G/G CR (ref 0–0.2)
RBC # BLD AUTO: 4.16 10E12/L (ref 3.8–5.2)
SODIUM SERPL-SCNC: 138 MMOL/L (ref 133–144)
WBC # BLD AUTO: 5 10E9/L (ref 4–11)

## 2017-04-17 LAB
C3 SERPL-MCNC: 87 MG/DL (ref 76–169)
C4 SERPL-MCNC: 16 MG/DL (ref 15–50)

## 2017-04-18 LAB — DSDNA AB SER-ACNC: 2 IU/ML

## 2017-04-21 ENCOUNTER — TELEPHONE (OUTPATIENT)
Dept: LAB | Facility: CLINIC | Age: 32
End: 2017-04-21

## 2017-04-21 ENCOUNTER — OFFICE VISIT (OUTPATIENT)
Dept: RHEUMATOLOGY | Facility: CLINIC | Age: 32
End: 2017-04-21
Payer: COMMERCIAL

## 2017-04-21 VITALS
WEIGHT: 159.4 LBS | TEMPERATURE: 96.7 F | BODY MASS INDEX: 24.97 KG/M2 | SYSTOLIC BLOOD PRESSURE: 127 MMHG | HEART RATE: 82 BPM | OXYGEN SATURATION: 97 % | DIASTOLIC BLOOD PRESSURE: 57 MMHG

## 2017-04-21 DIAGNOSIS — M32.9 SYSTEMIC LUPUS ERYTHEMATOSUS (H): Primary | ICD-10-CM

## 2017-04-21 DIAGNOSIS — Z79.899 HIGH RISK MEDICATIONS (NOT ANTICOAGULANTS) LONG-TERM USE: ICD-10-CM

## 2017-04-21 DIAGNOSIS — I73.00 RAYNAUD'S PHENOMENON WITHOUT GANGRENE: ICD-10-CM

## 2017-04-21 LAB — TPMT ENZYME ACTIVITY PHENOTYPE: NORMAL

## 2017-04-21 PROCEDURE — 82542 COL CHROMOTOGRAPHY QUAL/QUAN: CPT | Mod: 90 | Performed by: INTERNAL MEDICINE

## 2017-04-21 PROCEDURE — 99214 OFFICE O/P EST MOD 30 MIN: CPT | Performed by: INTERNAL MEDICINE

## 2017-04-21 PROCEDURE — 82657 ENZYME CELL ACTIVITY: CPT | Mod: 90 | Performed by: INTERNAL MEDICINE

## 2017-04-21 PROCEDURE — 36415 COLL VENOUS BLD VENIPUNCTURE: CPT | Performed by: INTERNAL MEDICINE

## 2017-04-21 PROCEDURE — 99000 SPECIMEN HANDLING OFFICE-LAB: CPT | Performed by: INTERNAL MEDICINE

## 2017-04-21 RX ORDER — AMLODIPINE BESYLATE 10 MG/1
10 TABLET ORAL DAILY
Qty: 90 TABLET | Refills: 1 | Status: SHIPPED | OUTPATIENT
Start: 2017-04-21 | End: 2017-10-20

## 2017-04-21 RX ORDER — AZATHIOPRINE 50 MG/1
50 TABLET ORAL DAILY
Qty: 14 TABLET | Refills: 0 | Status: SHIPPED | OUTPATIENT
Start: 2017-04-21 | End: 2017-05-04

## 2017-04-21 NOTE — MR AVS SNAPSHOT
After Visit Summary   4/21/2017    Aleida Weinberg    MRN: [id number]           Patient Information     Date Of Birth          1985        Visit Information        Provider Department      4/21/2017 7:40 AM Lavon Light MD [facility] [place]        Today's Diagnoses     Systemic lupus erythematosus (H)    -  1    Raynaud's phenomenon without gangrene           Follow-ups after your visit        Your next 10 appointments already scheduled     Apr 28, 2017  3:45 PM CDT   LAB with [facility]   [address] [address]   [phone]           Patient must bring picture ID.  Patient should be prepared to give a urine specimen  Please do not eat 10-12 hours before your appointment if you are coming in fasting for labs on lipids, cholesterol, or glucose (sugar).  Pregnant women should follow their Care Team instructions. Water with medications is okay. Do not drink coffee or other fluids.   If you have concerns about taking  your medications, please ask at office or if scheduling via [employer], send a message by clicking on Secure Messaging, Message Your Care Team.            May 05, 2017  7:15 AM CDT   LAB with [facility]   [facility] ([address] [address]   [phone]           Patient must bring picture ID.  Patient should be prepared to give a urine specimen  Please do not eat 10-12 hours before your appointment if you are coming in fasting for labs on lipids, cholesterol, or glucose (sugar).  Pregnant women should follow their Care Team instructions. Water with medications is okay. Do not drink coffee or other fluids.   If you have concerns about taking  your medications, please ask at office or if scheduling via [employer], send a message by clicking on Secure Messaging, Message Your Care Team.            Jun 01, 2017  5:00  PM CDT   [facility] with [facility]   [facility]    [address] [place] [address]   [phone]           Patient must bring picture ID.  Patient should be prepared to give a urine specimen  Please do not eat 10-12 hours before your appointment if you are coming in fasting for labs on lipids, cholesterol, or glucose (sugar).  Pregnant women should follow their Care Team instructions. Water with medications is okay. Do not drink coffee or other fluids.   If you have concerns about taking  your medications, please ask at office or if scheduling via [employer], send a message by clicking on Secure Messaging, Message Your Care Team.            Jul 13, 2017  5:00 PM CDT   LAB with [facility]   [facility] ([address] [address]   [phone]           Patient must bring picture ID.  Patient should be prepared to give a urine specimen  Please do not eat 10-12 hours before your appointment if you are coming in fasting for labs on lipids, cholesterol, or glucose (sugar).  Pregnant women should follow their Care Team instructions. Water with medications is okay. Do not drink coffee or other fluids.   If you have concerns about taking  your medications, please ask at office or if scheduling via [employer], send a message by clicking on Secure Messaging, Message Your Care Team.            Jul 21, 2017  7:40 AM CDT   Return Visit with Lavon Light MD   [facility] [place] ([facility])    [address]  [place] [address]   [phone]              Future tests that were ordered for you today     Open Future Orders        Priority Expected Expires Ordered    CBC with platelets differential Routine 7/10/2017 8/11/2017 4/21/2017    CK total Routine 7/10/2017 8/11/2017 4/21/2017    Complement C3 Routine 7/10/2017 8/11/2017 4/21/2017    DNA double stranded antibodies  Routine 7/10/2017 8/11/2017 4/21/2017    CRP inflammation Routine 7/10/2017 8/11/2017 4/21/2017    Comprehensive metabolic panel Routine 7/10/2017 8/11/2017 4/21/2017    Complement C4 Routine 7/10/2017 8/11/2017 4/21/2017    Erythrocyte sedimentation rate auto Routine 7/10/2017 8/11/2017 4/21/2017    UA with Microscopic reflex to Culture Routine 7/10/2017 8/11/2017 4/21/2017    Protein  random urine Routine 7/10/2017 8/11/2017 4/21/2017            Who to contact     If you have questions or need follow up information about today's clinic visit or your schedule please contact [facility] directly at [phone].  Normal or non-critical lab and imaging results will be communicated to you by MyChart, letter or phone within 4 business days after the clinic has received the results. If you do not hear from us within 7 days, please contact the clinic through [employer] or phone. If you have a critical or abnormal lab result, we will notify you by phone as soon as possible.  Submit refill requests through [employer]ca or call your pharmacy and they will forward the refill request to us. Please allow 3 business days for your refill to be completed.          Additional Information About Your Visit        [employer]ca Information     [employer] gives you secure access to your electronic health record. If you see a primary care provider, you can also send messages to your care team and make appointments. If you have questions, please call your primary care clinic.  If you do not have a primary care provider, please call [phone] and they will assist you.        Care EveryWhere ID     This is your Care EveryWhere ID. This could be used by other organizations to access your [facility] medical records  [id number]        Your Vitals Were     Pulse Temperature Pulse Oximetry BMI (Body Mass Index)          82 96.7  F (35.9  C) (Oral) 97% 24.97 kg/m2         Blood Pressure from Last 3 Encounters:   04/21/17 127/57   02/09/17 98/64  "  01/20/17 125/78    Weight from Last 3 Encounters:   04/21/17 72.3 kg (159 lb 6.4 oz)   02/09/17 75.3 kg (166 lb)   01/20/17 75 kg (165 lb 6.4 oz)              We Performed the Following     TPMT enzyme and phenotype          Today's Medication Changes          These changes are accurate as of: 4/21/17  8:12 AM.  If you have any questions, ask your nurse or doctor.               Start taking these medicines.        Dose/Directions    azaTHIOprine 50 MG tablet   Commonly known as:  IMURAN   Used for:  Systemic lupus erythematosus (H)   Started by:  Lavon Light MD        Dose:  50 mg   Take 1 tablet (50 mg) by mouth daily   Quantity:  14 tablet   Refills:  0         These medicines have changed or have updated prescriptions.        Dose/Directions    omeprazole 40 MG capsule   Commonly known as:  priLOSEC   This may have changed:    - when to take this  - reasons to take this  - additional instructions   Used for:  Abdominal pain, epigastric        Dose:  40 mg   Take 1 capsule (40 mg) by mouth daily Take 30-60 minutes before a meal.   Quantity:  30 capsule   Refills:  0         Stop taking these medicines if you haven't already. Please contact your care team if you have questions.     folic acid 1 MG tablet   Commonly known as:  FOLVITE   Stopped by:  Lavon Light MD           Insulin Syringe-Needle U-100 27G X 1/2\" 1 ML Misc   Commonly known as:  BD insulin syringe   Stopped by:  Lavon Light MD           methotrexate 50 MG/2ML injection CHEMO   Stopped by:  Lavon Light MD           methotrexate sodium 50 MG/2ML Soln   Stopped by:  Lavon Light MD                Where to get your medicines      These medications were sent to [facility] - [place], [place] - [address]  [address] [facility] [address]     Phone:  [phone]     amLODIPine 10 MG tablet    azaTHIOprine 50 MG tablet                Primary Care Provider Office Phone # Fax #    Tatum Logan " ZAK Dove [phone]       [facility] [address] [address]        Thank you!     Thank you for choosing [facility]  for your care. Our goal is always to provide you with excellent care. Hearing back from our patients is one way we can continue to improve our services. Please take a few minutes to complete the written survey that you may receive in the mail after your visit with us. Thank you!             Your Updated Medication List - Protect others around you: Learn how to safely use, store and throw away your medicines at www.disposemymeds.org.          This list is accurate as of: 4/21/17  8:12 AM.  Always use your most recent med list.                   Brand Name Dispense Instructions for use    albuterol 108 (90 BASE) MCG/ACT Inhaler    PROAIR HFA/PROVENTIL HFA/VENTOLIN HFA    1 Inhaler    Inhale 2 puffs into the lungs every 6 hours as needed for shortness of breath / dyspnea       amLODIPine 10 MG tablet    NORVASC    90 tablet    Take 1 tablet (10 mg) by mouth daily       azaTHIOprine 50 MG tablet    IMURAN    14 tablet    Take 1 tablet (50 mg) by mouth daily       cyclobenzaprine 10 MG tablet    FLEXERIL    30 tablet    Take 0.5-1 tablets (5-10 mg) by mouth 3 times daily as needed for muscle spasms       hydroxychloroquine 200 MG tablet    PLAQUENIL    180 tablet    Take 2 tablets (400 mg) by mouth daily       LORazepam 0.5 MG tablet    ATIVAN    10 tablet    Take 1 tablet (0.5 mg) by mouth every 8 hours as needed for anxiety       MIRENA (52 MG) 20 MCG/24HR IUD   Generic drug:  levonorgestrel      1 each by Intrauterine route once       omeprazole 40 MG capsule    priLOSEC    30 capsule    Take 1 capsule (40 mg) by mouth daily Take 30-60 minutes before a meal.       predniSONE 5 MG tablet    DELTASONE    90 tablet    Take 1 tablet (5 mg) by mouth daily as needed for lupus       traMADol 50 MG tablet    ULTRAM    30 tablet    Take 1-2  tablets ( mg) by mouth every 6 hours as needed for pain maximum 8 tablet(s) per day       vitamin D 2000 UNITS tablet     100 tablet    Take 2,000 Units by mouth daily

## 2017-04-21 NOTE — TELEPHONE ENCOUNTER
This patient has a lab only appointment on 05/05/2017 for  labs from [place]... They are dated to be drawn around July 10th. Are these the labs we should be drawing in May and not waiting until July? Thanks for the clarification. gary

## 2017-04-21 NOTE — NURSING NOTE
"Chief Complaint   Patient presents with     Consult     pt states she is not sleeping very well, her hands and bodyache pain has been increasing over last couple of weeks.       Initial /57  Pulse 82  Temp 96.7  F (35.9  C) (Oral)  Wt 72.3 kg (159 lb 6.4 oz)  SpO2 97%  BMI 24.97 kg/m2 Estimated body mass index is 24.97 kg/(m^2) as calculated from the following:    Height as of 2/9/17: 1.702 m (5' 7\").    Weight as of this encounter: 72.3 kg (159 lb 6.4 oz).  BP completed using cuff size: regular  Deloris Tuttle MA           RAPID3 (0-30) Cumulative Score  17.3          RAPID3 Weighted Score (divide #4 by 3 and that is the weighted score)  5.8         "

## 2017-04-21 NOTE — Clinical Note
Tatum, SLE with arthritis active now.  Sulfasalazine caused LFT elevations, so trying azathioprine now.  She has trouble falling asleep and has worsening body aches; I recommended that she use the cyclobenzaprine 10mg qhs that may help with both; she plans to speak with you about her sleep as well. Thanks, Lavon

## 2017-04-21 NOTE — PROGRESS NOTES
Rheumatology Clinic Visit      Aleida Weinberg MRN# [id number]   YOB: 1985 Age: 32 year old      Date of visit: 4/21/17   PCP: Tatum Dove     Chief Complaint   Patient presents with:  Consult: pt states she is not sleeping very well, her hands and bodyache pain has been increasing over last couple of weeks.      Assessment and Plan   1. Systemic lupus erythematosus (CHANELL >1:44039 speckled, RNP+, Sm+, Scl-70+, hypocomplementemia, photosensitivity, arthritis, fatigue, Raynaud's phenomenon):  Scl-70+ but no skin tightening at this time.  Symptoms are much improved with methotrexate (doses above 15 mg weekly cause stomach upset so changed PO to SQ) and Plaquenil, but still with synovitis on exam.  SSZ was added last time but caused LFT elevations even when on 500mg BID (she never increased the dose as previously instructed, so even on the 500mg BID dose she had LFT elevations).  Given persistent symptoms, we discussed changing MTX to AZA or leflunomide, or adding Benlysta to MTX.  She prefers to avoid infusions at this time.  Will therefore stop MTX and start AZA.   - Discontinue methotrexate 20 mg SQ once weekly  - Discontinue folic acid 1 mg daily  - Continue hydroxychloroquine 400mg daily (last eye exam done 5/20/2016)  - Continue prednisone 5mg daily PRN (rarely used per patient)  - Remove sulfasalazine from the medication list (was stopped prior to appointment)  - Start azathioprine 50mg daily; plan to increase to 100mg daily depending on TPMT results.  - Labs today: TPMT  - Labs in 2 weeks, 4 weeks, and 8 weeks: CBC, Cr, Hepatic Panel  - Labs 1 week prior to the next rheumatology clinic visit: CBC, CMP, ESR, CRP, CK, C3, C4, dsDNA, UA, Uprotein:creatinine    # Azathioprine (Imuran) Risks and Benefits.  The risks and benefits of azathioprine were discussed in detail and the patient verbalized understanding.  The risks discussed include, but are not limited to, the risk for hypersensitivity,  anaphylaxis, anaphylactoid reactions, the increased risk for malignancy, leukopenia, thrombocytopenia, anemia, hepatotoxicity (including increases in alkaline phosphatase, bilirubin, and transaminases), myalgia, infection, and fever.  People with genetic deficiency of TPMT are more sensitive to the myelosuppressive effects.  Myelosuppressive effects may occur more often if concurrently taking a xanthine oxidase inhibitor such as allopurinol.  I encouraged reviewing the package insert and asking any questions about the medication.     2. Raynaud's Phenomenon: Cold avoidance was insufficient for controlling her symptoms and therefore amlodipine was started with partial improvement at 5mg daily and much better control at 10mg daily.    - Continue Amlodipine 10mg daily during colder months    3. Chronic Back Pain: History of scoliosis. Degenerative findings on previously reviewed MRIs from 2000 and 2009. This has not changed for many years and does not worsen when her peripheral joint symptoms worsen. Had one episode of sciatica that resolved with chiropractic treatment.     4. Vitamin D deficiency: Currently on vitamin D 2000 units daily.    - Continue vitamin D 2000 units daily    5. Poor sleep: difficulty falling asleep and worsening body aches.  I recommended that she discuss with her PCP.  She has cyclobenzaprine and could try taking 10mg qhs that may help with the achiness and help with sleep.    Ms. Weinberg verbalized agreement with and understanding of the rational for the diagnosis and treatment plan.  All questions were answered to best of my ability and the patient's satisfaction. Ms. Weinberg was advised to contact the clinic with any questions that may arise after the clinic visit.      Thank you for involving me in the care of the patient    Return to clinic: 3 months      HPI   Aleida Navarretevasquez Weinberg is a 32 year old female with medical history significant for intermittent asthma, anxiety, migraines,  vitamin D deficiency, and systemic lupus erythematosus who presents for follow-up of SLE.     Today, Ms. Weinberg reports that she stopped SSZ because of the LFT elevation but was only on 500mg BID at the highest dose because she forgot to increase it.  Issue right now is joint pain, similar to when presenting last time and was the reason for adding SSZ.  Some difficulty opening jars and turning doorknobs.  Joints primarily involved include her hands, wrists, and elbows.  Gelling phenomenon+.  Raynaud's well controlled with amlodipine but not requiring it in the warmer weather; she plans to restart next fall/winter.  Rarely uses prednisone.  Poor sleep because she has difficulty falling asleep.     Denies fevers, chills, nausea, vomiting, constipation, diarrhea. No abdominal pain. No chest pain/pressure, palpitations, or shortness of breath. No oral or nasal sores. No neck pain. No LE swelling.  Has photosensitivity but no photophobia.  Mild dry eyes and dry mouth; she uses artificial tears as needed.  No eye pain or redness. Denies history of serositis. Denies history of DVT, pulmonary embolism, or miscarriage.    Tobacco: Occasional cigarette  EtOH: Once monthly  Drugs: None  Occupation: Works at [employer], a lot of [employer]    ROS   GEN: No fevers, chills, night sweats, or weight change  SKIN: See HPI. No hair thinning.  HEENT: Has a history of migraines, but no headache today. No change in vision, taste, or hearing. No epistaxis. No oral or nasal ulcers.  CV: No chest pain, pressure, palpitations, or dyspnea on exertion.  PULM: No SOB, wheeze, cough.  GI: No change in appetite. No nausea, vomiting, constipation, diarrhea. No blood in stool. No abdominal pain.  : No blood in urine.  MSK: See HPI.  NEURO: See history of present illness  ENDO: No heat/cold intolerance.  EXT: See history of present illness    Active Problem List     Patient Active Problem List   Diagnosis     Other kyphoscoliosis and scoliosis      "Hypertrophic and atrophic condition of skin     Viral warts     Routine postpartum follow-up     CARDIOVASCULAR SCREENING; LDL GOAL LESS THAN 160     Intermittent asthma     Anxiety     Intractable menstrual migraine without status migrainosus     Vitamin D deficiency     Inflammatory polyarthropathy (H)     Chronic back pain     Raynaud's phenomenon without gangrene     Systemic lupus erythematosus (H)     High risk medications (not anticoagulants) long-term use (Plaquenil and MTX)     Dry eyes, bilateral     Past Medical History     Past Medical History:   Diagnosis Date     Mild intermittent asthma      Other kyphoscoliosis and scoliosis     upper back curvature and disc degeneration in lower back.     Past Surgical History     Past Surgical History:   Procedure Laterality Date      SECTION       SURGICAL HISTORY OF -       PE Tubes     Allergy     Allergies   Allergen Reactions     No Known Drug Allergies      Current Medication List     Current Outpatient Prescriptions   Medication Sig     levonorgestrel (MIRENA, 52 MG,) 20 MCG/24HR IUD 1 each by Intrauterine route once     Cholecalciferol (VITAMIN D) 2000 UNITS tablet Take 2,000 Units by mouth daily     amLODIPine (NORVASC) 10 MG tablet Take 1 tablet (10 mg) by mouth daily     methotrexate sodium 50 MG/2ML SOLN Inject 0.8 mLs (20 mg) as directed every 7 days     traMADol (ULTRAM) 50 MG tablet Take 1-2 tablets ( mg) by mouth every 6 hours as needed for pain maximum 8 tablet(s) per day     cyclobenzaprine (FLEXERIL) 10 MG tablet Take 0.5-1 tablets (5-10 mg) by mouth 3 times daily as needed for muscle spasms     hydroxychloroquine (PLAQUENIL) 200 MG tablet Take 2 tablets (400 mg) by mouth daily     predniSONE (DELTASONE) 5 MG tablet Take 1 tablet (5 mg) by mouth daily as needed for lupus     Insulin Syringe-Needle U-100 (BD INSULIN SYRINGE) 27G X 1/2\" 1 ML MISC Syringe/needles for methotrexate administration; once weekly.     methotrexate 50 " "MG/2ML injection Inject 0.8 mLs (20 mg) Subcutaneous every 7 days     LORazepam (ATIVAN) 0.5 MG tablet Take 1 tablet (0.5 mg) by mouth every 8 hours as needed for anxiety     albuterol (PROAIR HFA, PROVENTIL HFA, VENTOLIN HFA) 108 (90 BASE) MCG/ACT inhaler Inhale 2 puffs into the lungs every 6 hours as needed for shortness of breath / dyspnea     folic acid (FOLVITE) 1 MG tablet Take 1 tablet (1 mg) by mouth daily     omeprazole (PRILOSEC) 40 MG capsule Take 1 capsule (40 mg) by mouth daily Take 30-60 minutes before a meal. (Patient taking differently: Take 40 mg by mouth daily as needed Take 30-60 minutes before a meal.)     No current facility-administered medications for this visit.          Social History   See HPI    Family History     Family History   Problem Relation Age of Onset     HEART DISEASE Maternal Grandfather      Bypass, Heart Disease     Respiratory Maternal Grandfather      asthma     C.A.D. Paternal Grandfather      HEART DISEASE Maternal Uncle      MI's      Hypertension Paternal Grandmother      CANCER Paternal Grandmother      lymph nodes     Respiratory Other      cousin on mothers side, asthma     Alcohol/Drug Maternal Uncle      Alcohol/Drug Other      bother great grandparents on mother's side     DIABETES No family hx of      Breast Cancer No family hx of      Cancer - colorectal No family hx of      Denies family history of autoimmune disease    Physical Exam     Temp Readings from Last 3 Encounters:   04/21/17 96.7  F (35.9  C) (Oral)   02/09/17 98.1  F (36.7  C) (Oral)   01/10/17 98.3  F (36.8  C) (Oral)     BP Readings from Last 5 Encounters:   04/21/17 127/57   02/09/17 98/64   01/20/17 125/78   01/10/17 108/84   01/02/17 105/73     Pulse Readings from Last 1 Encounters:   04/21/17 82     Resp Readings from Last 1 Encounters:   04/22/16 16     Estimated body mass index is 24.97 kg/(m^2) as calculated from the following:    Height as of 2/9/17: 1.702 m (5' 7\").    Weight as of this " encounter: 72.3 kg (159 lb 6.4 oz).    GEN: NAD  HEENT: MMM. No oral lesions. Anicteric, noninjected sclera  CV: S1, S2. RRR. No m/r/g.  PULM: CTA bilaterally. No w/c.  MSK: Tenderness palpation of the bilateral 2nd-5th PIPs with subtle synovial swelling.  MCPs, DIPs, wrists, and elbows without swelling or tenderness to palpation.  Bilateral shoulders nontender to palpation without swelling. Bilateral hips nontender to direct palpation. Knees, ankles, and feet without tenderness palpation or swelling. Negative MTP squeeze.   NEURO: UE and LE strengths 5/5 and equal bilaterally.   SKIN: No rash; normal fingertip pulp; no evidence of previous infarcts to the distal fingertips. Tattoos present  EXT: No LE edema  PSYCH: Alert. Appropriate.    Labs / Imaging (select studies)   RF/CCP  Recent Labs   Lab Test  04/22/16   0902  04/01/16   0910   CCPIGG  1   --    RHF   --   <20     CHANELL/RNP/Sm/SSA/SSB/dsDNA  Recent Labs   Lab Test  04/13/17   1650  01/20/17   0828  10/26/16   0919   04/22/16   0902  04/01/16   0910   VALERIE   --    --    --    --    --   12.4*   ANAIGG   --    --    --    --   >1:60729  Reference range: <1:40  (Note)  Speckled pattern.  INTERPRETIVE INFORMATION: CHANELL by IFA, IgG  Anti-nuclear antibodies (CHANELL) are seen in a variety of  systemic rheumatic diseases and are determined by indirect  fluorescence assay (IFA) using HEp-2 substrate with an  IgG-specific conjugate. CHANELL titers less than or equal to  1:80 have variable relevance while titers greater than or  equal to 1:160 are considered clinically significant. These  antibodies may precede clinical disease onset; however,  healthy individuals and those with advanced age have been  reported to be positive for CHANELL. When observed, one of the  five basic patterns is reported: homogeneous,  peripheral/rim, speckled, centromere, or nucleolar. If  cytoplasmic fluorescence is observed, it is noted. IFA  methodology is subjective and has occasionally been  shown  to lack sensitivity for anti-SSA/Ro antibodies.  Negative results do not necessarily rule out the presence  of SSc. If clinical suspicion remains, consi vicki further  testing for U3-RNP, PM/Scl, or Th/To antibodies associated  with SSc.  Performed by [facility],  [facility] [place] [address] [phone]  [email or web address], Twin Rodriguez MD, Lab. Director     --    RNPIGG   --    --    --    --   >8.0  Positive   Antibody index (AI) values reflect qualitative changes in antibody   concentration that cannot be directly associated with clinical condition or   disease state.  *   --    SMIGG   --    --    --    --   1.5*   --    SSAIGG   --    --    --    --   <0.2  Negative   Antibody index (AI) values reflect qualitative changes in antibody   concentration that cannot be directly associated with clinical condition or   disease state.     --    SSBIGG   --    --    --    --   <0.2  Negative   Antibody index (AI) values reflect qualitative changes in antibody   concentration that cannot be directly associated with clinical condition or   disease state.     --    SCLIGG   --    --    --    --   3.1*   --    F2UGDUN  87  93  110   < >  94   --    [id number]  16  16  16   < >  13*   --     < > = values in this interval not displayed.     Recent Labs   Lab Test  04/13/17   1650  01/20/17   0828  10/26/16   0919  07/22/16   0916  04/22/16   0902   DNA  2  1  1  1  1     RNA Polymerase III Antibody  No results for input(s): RNAPG in the last 52876 hours.  Histone Antibody  No results for input(s): HSTO in the last 13382 hours.  Send-out Labs  No results for input(s): SRESLT, STSTNM, STSTCD, SSPTYP in the last 95023 hours.  Antiphospholipid Antibodies  Recent Labs   Lab Test  04/22/16   0902   B2GPG  0.9   B2GPM  <0.9  Negative     CARG  <15.0  Interpretation:  Negative     JOANN  <12.5  Interpretation:  Negative     LUPINT  Negative  (Note)  COMMENTS:  The INR is normal.  APTT ratio is normal.  DRVVT Screen ratio  is normal.  Thrombin time is normal.  NEGATIVE TEST; A LUPUS ANTICOAGULANT WAS NOT DETECTED IN THIS  SPECIMEN WITHIN THE LIMITS OF THE TESTING REPERTOIRE.  If the clinical picture is strongly suggestive of an antiphospholipid  syndrome, recommend anticardiolipin and beta-2-glycoprotein (IgG and  IgM) antibody tests.  Isabella Olson M.D.  [phone]  4/25/2016    INR =  1.05    Reference range: 0.86-1.14  Thrombin Time= 15.4    Reference range: 13.0-19.0 sec    APTT:       Ratio  Patient  =  0.92  1:2 Mix  =  N/A  Reference:  Negative: Less than or equal to 1.16  Positive: Greater than or equal to 1.17     DILUTE LISA VIPER VENOM TEST:  Screen Ratio = 0.95   Normal is less than 1.21         CBC  Recent Labs   Lab Test  04/13/17 1650 03/20/17   1636 02/20/17   1640   WBC  5.0  3.9*  3.9*   RBC  4.16  4.21  4.19   HGB  13.8  13.9  13.8   HCT  38.4  38.9  38.8   MCV  92  92  93   RDW  12.5  12.9  13.1   PLT  243  218  210   MCH  33.2*  33.0  32.9   MCHC  35.9  35.7  35.6   NEUTROPHIL  57.1  68.4  70.4   LYMPH  30.2  18.2  17.1   MONOCYTE  11.5  12.1  11.7   EOSINOPHIL  0.8  1.0  0.5   BASOPHIL  0.4  0.3  0.3   ANEU  2.8  2.7  2.7   ALYM  1.5  0.7*  0.7*   AMANDA  0.6  0.5  0.5   AEOS  0.0  0.0  0.0   ABAS  0.0  0.0  0.0     CMP  Recent Labs   Lab Test  04/13/17   1650 03/20/17   1636  02/20/17   1640  02/09/17   0721  01/20/17   0828   10/26/16   0919   NA  138   --    --    --   143   --   139   POTASSIUM  3.9   --    --    --   4.3   --   4.3   CHLORIDE  105   --    --    --   107   --   109   CO2  23   --    --    --   29   --   24   ANIONGAP  10   --    --    --   7   --   6   GLC  76   --    --   77  79   --   85   BUN  12   --    --    --   10   --   8   CR  0.69  0.69  0.68   --   0.80   < >  0.74   GFRESTIMATED  >90  Non  GFR Calc    >90  Non  GFR Calc    >90  Non  GFR Calc     --   84   < >  >90  Non  GFR Calc     GFRESTBLACK   >90   GFR Calc    >90   GFR Calc    >90   GFR Calc     --   >90   GFR Calc     < >  >90   GFR Calc     SRINIVAS  9.4   --    --    --   9.3   --   9.3   BILITOTAL  0.6  0.5  0.4   --   0.8   < >  0.5   ALBUMIN  4.7  4.6  4.3   --   4.2   < >  4.0   PROTTOTAL  7.7  7.6  7.1   --   7.5   < >  7.5   ALKPHOS  51  52  55   --   47   < >  50   AST  32  38  46*   --   22   < >  26   ALT  49  53*  67*   --   28   < >  29    < > = values in this interval not displayed.       Calcium/VitaminD  Recent Labs   Lab Test  04/13/17 1650 02/20/17   1640  01/20/17 0828  10/26/16   0919 04/01/16   0910   SRINIVAS  9.4   --   9.3  9.3   < >   --    VITDT   --   33   --   23   --   <13  Season, race, dietary intake, and treatment affect the concentration of   25-hydroxy-Vitamin D. Values may decrease during winter months and increase   during summer months. Values 20-29 ug/L may indicate Vitamin D insufficiency   and values <20 ug/L may indicate Vitamin D deficiency.   Vitamin D determination is routinely performed by an immunoassay specific for   25 hydroxyvitamin D3.  If an individual is on vitamin D2 (ergocalciferol)   supplementation, please specify 25 OH vitamin D2 and D3 level determination by   LCMSMS test VITD23.  *    < > = values in this interval not displayed.     ESR/CRP  Recent Labs   Lab Test  04/13/17 1650 01/20/17   0828  10/26/16   0919   SED  8  7  7   CRP  <2.9  3.6  9.5*     CK/Aldolase  Recent Labs   Lab Test  04/13/17 1650 01/20/17   0828  10/26/16   0919   04/22/16   0902   CKT  63  67  88   < >  45   ALDOLASE   --    --    --    --   4.5    < > = values in this interval not displayed.     TSH/T4  Recent Labs   Lab Test  04/01/16   0910   TSH  1.57     Lipid Panel  Recent Labs   Lab Test  02/09/17   0721  07/10/15   0814  09/13/13   0706   CHOL  159  149  139   TRIG  56  45  57   HDL  42*  48*  52   LDL  106*  92  76   VLDL   --   9   11   CHOLHDLRATIO   --   3.1  2.7   NHDL  117   --    --      Hepatitis B  Recent Labs   Lab Test  04/22/16   0902   HBCAB  Nonreactive   HEPBANG  Nonreactive     Hepatitis C  Recent Labs   Lab Test  04/22/16   0902   HCVAB  Nonreactive   Assay performance characteristics have not been established for newborns,   infants, and children       Lyme ab screening  Recent Labs   Lab Test  04/01/16   0910   LYMEGM  0.12     HIV Screening  Recent Labs   Lab Test  04/22/16   0902   HIAGAB  Nonreactive   HIV-1 p24 Ag & HIV-1/HIV-2 Ab Not Detected       UA  Recent Labs   Lab Test  04/13/17   1650  01/20/17   0827  10/26/16   0904  07/22/16   0912   COLOR  Yellow  Yellow  Yellow  Yellow   APPEARANCE  Clear  Clear  Clear  Clear   URINEGLC  Negative  Negative  Negative  Negative   URINEBILI  Negative  Negative  Negative  Negative   SG  1.015  >1.030  >1.030  >1.030   URINEPH  7.0  6.0  6.0  6.0   PROTEIN  Negative  Trace*  Negative  Negative   UROBILINOGEN  0.2  0.2  0.2  0.2   NITRITE  Negative  Negative  Negative  Negative   UBLD  Negative  Negative  Negative  Negative   LEUKEST  Negative  Negative  Negative  Negative   WBCU  O - 2  O - 2  2-5*  O - 2   RBCU  O - 2  O - 2  O - 2  O - 2   SQUAMOUSEPI  Few  Few  Moderate*  Few   BACTERIA   --   Few*  Few*   --    MUCOUS   --   Present*   --   Present*     Urine Microscopic  Recent Labs   Lab Test  04/13/17   1650  01/20/17   0827  10/26/16   0904  07/22/16   0912   WBCU  O - 2  O - 2  2-5*  O - 2   RBCU  O - 2  O - 2  O - 2  O - 2   SQUAMOUSEPI  Few  Few  Moderate*  Few   BACTERIA   --   Few*  Few*   --    MUCOUS   --   Present*   --   Present*     Urine Protein  Recent Labs   Lab Test  04/13/17   1650  01/20/17   0827  10/26/16   0904   UTP  0.08  0.37  0.23   UTPG  0.26*  0.13  0.09   UCRR  32   --    --      Immunization History     Immunization History   Administered Date(s) Administered     DPT [birth year], 01/15/1988, 06/15/1991     Hepatitis B  06/13/1999, 08/20/1999, 02/05/2000     Human Papilloma Virus 02/25/2010, 02/04/2011     Influenza (IIV3) 11/07/2011     Influenza Vaccine IM 3yrs+ 4 Valent IIV4 10/11/2016     MMR 05/15/1987, 09/15/1991     Mantoux 07/15/1987, 01/19/2005     Meningococcal (Menomune ) 08/09/2004     OPV 1985, 1985, 1985, 01/15/1988, 09/15/1991     TDAP Vaccine (Adacel) 01/21/2009, 02/25/2010          Chart documentation done in part with Dragon Voice recognition Software. Although reviewed after completion, some word and grammatical error may remain.    Lavon Light MD

## 2017-04-27 LAB
RESULT: NORMAL
SEND OUTS MISC TEST CODE: NORMAL
SEND OUTS MISC TEST SPECIMEN: NORMAL
TEST NAME: NORMAL

## 2017-05-01 DIAGNOSIS — Z79.899 HIGH RISK MEDICATIONS (NOT ANTICOAGULANTS) LONG-TERM USE: ICD-10-CM

## 2017-05-01 DIAGNOSIS — M32.9 SYSTEMIC LUPUS ERYTHEMATOSUS (H): ICD-10-CM

## 2017-05-01 LAB
BASOPHILS # BLD AUTO: 0 10E9/L (ref 0–0.2)
BASOPHILS NFR BLD AUTO: 0.4 %
DIFFERENTIAL METHOD BLD: ABNORMAL
EOSINOPHIL # BLD AUTO: 0 10E9/L (ref 0–0.7)
EOSINOPHIL NFR BLD AUTO: 0.8 %
ERYTHROCYTE [DISTWIDTH] IN BLOOD BY AUTOMATED COUNT: 12.4 % (ref 10–15)
HCT VFR BLD AUTO: 38.6 % (ref 35–47)
HGB BLD-MCNC: 13.8 G/DL (ref 11.7–15.7)
LYMPHOCYTES # BLD AUTO: 1.5 10E9/L (ref 0.8–5.3)
LYMPHOCYTES NFR BLD AUTO: 27.7 %
MCH RBC QN AUTO: 33.5 PG (ref 26.5–33)
MCHC RBC AUTO-ENTMCNC: 35.8 G/DL (ref 31.5–36.5)
MCV RBC AUTO: 94 FL (ref 78–100)
MONOCYTES # BLD AUTO: 0.6 10E9/L (ref 0–1.3)
MONOCYTES NFR BLD AUTO: 11.1 %
NEUTROPHILS # BLD AUTO: 3.2 10E9/L (ref 1.6–8.3)
NEUTROPHILS NFR BLD AUTO: 60 %
PLATELET # BLD AUTO: 242 10E9/L (ref 150–450)
RBC # BLD AUTO: 4.12 10E12/L (ref 3.8–5.2)
WBC # BLD AUTO: 5.2 10E9/L (ref 4–11)

## 2017-05-01 PROCEDURE — 85025 COMPLETE CBC W/AUTO DIFF WBC: CPT | Performed by: INTERNAL MEDICINE

## 2017-05-01 PROCEDURE — 36415 COLL VENOUS BLD VENIPUNCTURE: CPT | Performed by: INTERNAL MEDICINE

## 2017-05-01 PROCEDURE — 82565 ASSAY OF CREATININE: CPT | Performed by: INTERNAL MEDICINE

## 2017-05-01 PROCEDURE — 80076 HEPATIC FUNCTION PANEL: CPT | Performed by: INTERNAL MEDICINE

## 2017-05-02 LAB
ALBUMIN SERPL-MCNC: 4.4 G/DL (ref 3.4–5)
ALP SERPL-CCNC: 45 U/L (ref 40–150)
ALT SERPL W P-5'-P-CCNC: 23 U/L (ref 0–50)
AST SERPL W P-5'-P-CCNC: 19 U/L (ref 0–45)
BILIRUB DIRECT SERPL-MCNC: 0.2 MG/DL (ref 0–0.2)
BILIRUB SERPL-MCNC: 0.6 MG/DL (ref 0.2–1.3)
CREAT SERPL-MCNC: 0.62 MG/DL (ref 0.52–1.04)
GFR SERPL CREATININE-BSD FRML MDRD: NORMAL ML/MIN/1.7M2
PROT SERPL-MCNC: 7.3 G/DL (ref 6.8–8.8)

## 2017-05-04 DIAGNOSIS — M32.9 SYSTEMIC LUPUS ERYTHEMATOSUS (H): ICD-10-CM

## 2017-05-04 RX ORDER — AZATHIOPRINE 50 MG/1
100 TABLET ORAL DAILY
Qty: 60 TABLET | Refills: 2 | Status: SHIPPED | OUTPATIENT
Start: 2017-05-04 | End: 2017-07-21

## 2017-06-01 DIAGNOSIS — M32.9 SYSTEMIC LUPUS ERYTHEMATOSUS (H): ICD-10-CM

## 2017-06-01 DIAGNOSIS — Z79.899 HIGH RISK MEDICATIONS (NOT ANTICOAGULANTS) LONG-TERM USE: ICD-10-CM

## 2017-06-01 LAB
BASOPHILS # BLD AUTO: 0 10E9/L (ref 0–0.2)
BASOPHILS NFR BLD AUTO: 0.6 %
DIFFERENTIAL METHOD BLD: NORMAL
EOSINOPHIL # BLD AUTO: 0.1 10E9/L (ref 0–0.7)
EOSINOPHIL NFR BLD AUTO: 1.3 %
ERYTHROCYTE [DISTWIDTH] IN BLOOD BY AUTOMATED COUNT: 11.6 % (ref 10–15)
HCT VFR BLD AUTO: 39.1 % (ref 35–47)
HGB BLD-MCNC: 13.8 G/DL (ref 11.7–15.7)
LYMPHOCYTES # BLD AUTO: 1.4 10E9/L (ref 0.8–5.3)
LYMPHOCYTES NFR BLD AUTO: 30.4 %
MCH RBC QN AUTO: 33 PG (ref 26.5–33)
MCHC RBC AUTO-ENTMCNC: 35.3 G/DL (ref 31.5–36.5)
MCV RBC AUTO: 94 FL (ref 78–100)
MONOCYTES # BLD AUTO: 0.6 10E9/L (ref 0–1.3)
MONOCYTES NFR BLD AUTO: 11.6 %
NEUTROPHILS # BLD AUTO: 2.7 10E9/L (ref 1.6–8.3)
NEUTROPHILS NFR BLD AUTO: 56.1 %
PLATELET # BLD AUTO: 261 10E9/L (ref 150–450)
RBC # BLD AUTO: 4.18 10E12/L (ref 3.8–5.2)
WBC # BLD AUTO: 4.7 10E9/L (ref 4–11)

## 2017-06-01 PROCEDURE — 85025 COMPLETE CBC W/AUTO DIFF WBC: CPT | Performed by: INTERNAL MEDICINE

## 2017-06-01 PROCEDURE — 82565 ASSAY OF CREATININE: CPT | Performed by: INTERNAL MEDICINE

## 2017-06-01 PROCEDURE — 36415 COLL VENOUS BLD VENIPUNCTURE: CPT | Performed by: INTERNAL MEDICINE

## 2017-06-01 PROCEDURE — 80076 HEPATIC FUNCTION PANEL: CPT | Performed by: INTERNAL MEDICINE

## 2017-06-02 LAB
ALBUMIN SERPL-MCNC: 4.5 G/DL (ref 3.4–5)
ALP SERPL-CCNC: 44 U/L (ref 40–150)
ALT SERPL W P-5'-P-CCNC: 26 U/L (ref 0–50)
AST SERPL W P-5'-P-CCNC: 22 U/L (ref 0–45)
BILIRUB DIRECT SERPL-MCNC: 0.1 MG/DL (ref 0–0.2)
BILIRUB SERPL-MCNC: 0.5 MG/DL (ref 0.2–1.3)
CREAT SERPL-MCNC: 0.7 MG/DL (ref 0.52–1.04)
GFR SERPL CREATININE-BSD FRML MDRD: NORMAL ML/MIN/1.7M2
PROT SERPL-MCNC: 7.5 G/DL (ref 6.8–8.8)

## 2017-07-17 DIAGNOSIS — M32.9 SYSTEMIC LUPUS ERYTHEMATOSUS (H): ICD-10-CM

## 2017-07-17 LAB
ALBUMIN UR-MCNC: NEGATIVE MG/DL
APPEARANCE UR: CLEAR
BASOPHILS # BLD AUTO: 0 10E9/L (ref 0–0.2)
BASOPHILS NFR BLD AUTO: 0.4 %
BILIRUB UR QL STRIP: NEGATIVE
COLOR UR AUTO: YELLOW
DIFFERENTIAL METHOD BLD: NORMAL
EOSINOPHIL # BLD AUTO: 0 10E9/L (ref 0–0.7)
EOSINOPHIL NFR BLD AUTO: 0.8 %
ERYTHROCYTE [DISTWIDTH] IN BLOOD BY AUTOMATED COUNT: 12.4 % (ref 10–15)
ERYTHROCYTE [SEDIMENTATION RATE] IN BLOOD BY WESTERGREN METHOD: 9 MM/H (ref 0–20)
GLUCOSE UR STRIP-MCNC: NEGATIVE MG/DL
HCT VFR BLD AUTO: 39 % (ref 35–47)
HGB BLD-MCNC: 13.9 G/DL (ref 11.7–15.7)
HGB UR QL STRIP: NEGATIVE
KETONES UR STRIP-MCNC: NEGATIVE MG/DL
LEUKOCYTE ESTERASE UR QL STRIP: NEGATIVE
LYMPHOCYTES # BLD AUTO: 1.3 10E9/L (ref 0.8–5.3)
LYMPHOCYTES NFR BLD AUTO: 27.3 %
MCH RBC QN AUTO: 32.8 PG (ref 26.5–33)
MCHC RBC AUTO-ENTMCNC: 35.6 G/DL (ref 31.5–36.5)
MCV RBC AUTO: 92 FL (ref 78–100)
MONOCYTES # BLD AUTO: 0.5 10E9/L (ref 0–1.3)
MONOCYTES NFR BLD AUTO: 10.7 %
NEUTROPHILS # BLD AUTO: 2.9 10E9/L (ref 1.6–8.3)
NEUTROPHILS NFR BLD AUTO: 60.8 %
NITRATE UR QL: NEGATIVE
PH UR STRIP: 7 PH (ref 5–7)
PLATELET # BLD AUTO: 267 10E9/L (ref 150–450)
RBC # BLD AUTO: 4.24 10E12/L (ref 3.8–5.2)
RBC #/AREA URNS AUTO: NORMAL /HPF (ref 0–2)
SP GR UR STRIP: 1.01 (ref 1–1.03)
URN SPEC COLLECT METH UR: NORMAL
UROBILINOGEN UR STRIP-ACNC: 1 EU/DL (ref 0.2–1)
WBC # BLD AUTO: 4.8 10E9/L (ref 4–11)
WBC #/AREA URNS AUTO: NORMAL /HPF (ref 0–2)

## 2017-07-17 PROCEDURE — 80053 COMPREHEN METABOLIC PANEL: CPT | Performed by: INTERNAL MEDICINE

## 2017-07-17 PROCEDURE — 82550 ASSAY OF CK (CPK): CPT | Performed by: INTERNAL MEDICINE

## 2017-07-17 PROCEDURE — 86140 C-REACTIVE PROTEIN: CPT | Performed by: INTERNAL MEDICINE

## 2017-07-17 PROCEDURE — 36415 COLL VENOUS BLD VENIPUNCTURE: CPT | Performed by: INTERNAL MEDICINE

## 2017-07-17 PROCEDURE — 86225 DNA ANTIBODY NATIVE: CPT | Performed by: INTERNAL MEDICINE

## 2017-07-17 PROCEDURE — 85652 RBC SED RATE AUTOMATED: CPT | Performed by: INTERNAL MEDICINE

## 2017-07-17 PROCEDURE — 84156 ASSAY OF PROTEIN URINE: CPT | Performed by: INTERNAL MEDICINE

## 2017-07-17 PROCEDURE — 85025 COMPLETE CBC W/AUTO DIFF WBC: CPT | Performed by: INTERNAL MEDICINE

## 2017-07-17 PROCEDURE — 86160 COMPLEMENT ANTIGEN: CPT | Performed by: INTERNAL MEDICINE

## 2017-07-17 PROCEDURE — 81001 URINALYSIS AUTO W/SCOPE: CPT | Performed by: INTERNAL MEDICINE

## 2017-07-18 LAB
ALBUMIN SERPL-MCNC: 4.5 G/DL (ref 3.4–5)
ALP SERPL-CCNC: 43 U/L (ref 40–150)
ALT SERPL W P-5'-P-CCNC: 21 U/L (ref 0–50)
ANION GAP SERPL CALCULATED.3IONS-SCNC: 10 MMOL/L (ref 3–14)
AST SERPL W P-5'-P-CCNC: 22 U/L (ref 0–45)
BILIRUB SERPL-MCNC: 0.6 MG/DL (ref 0.2–1.3)
BUN SERPL-MCNC: 10 MG/DL (ref 7–30)
C3 SERPL-MCNC: 85 MG/DL (ref 76–169)
C4 SERPL-MCNC: 14 MG/DL (ref 15–50)
CALCIUM SERPL-MCNC: 9.3 MG/DL (ref 8.5–10.1)
CHLORIDE SERPL-SCNC: 107 MMOL/L (ref 94–109)
CK SERPL-CCNC: 82 U/L (ref 30–225)
CO2 SERPL-SCNC: 21 MMOL/L (ref 20–32)
CREAT SERPL-MCNC: 0.6 MG/DL (ref 0.52–1.04)
CREAT UR-MCNC: 54 MG/DL
CRP SERPL-MCNC: <2.9 MG/L (ref 0–8)
GFR SERPL CREATININE-BSD FRML MDRD: NORMAL ML/MIN/1.7M2
GLUCOSE SERPL-MCNC: 82 MG/DL (ref 70–99)
POTASSIUM SERPL-SCNC: 4 MMOL/L (ref 3.4–5.3)
PROT SERPL-MCNC: 7.6 G/DL (ref 6.8–8.8)
PROT UR-MCNC: 0.11 G/L
PROT/CREAT 24H UR: 0.2 G/G CR (ref 0–0.2)
SODIUM SERPL-SCNC: 138 MMOL/L (ref 133–144)

## 2017-07-19 LAB — DSDNA AB SER-ACNC: 1 IU/ML

## 2017-07-21 ENCOUNTER — OFFICE VISIT (OUTPATIENT)
Dept: RHEUMATOLOGY | Facility: CLINIC | Age: 32
End: 2017-07-21
Payer: COMMERCIAL

## 2017-07-21 VITALS
OXYGEN SATURATION: 96 % | WEIGHT: 157.4 LBS | DIASTOLIC BLOOD PRESSURE: 72 MMHG | SYSTOLIC BLOOD PRESSURE: 118 MMHG | HEIGHT: 67 IN | HEART RATE: 73 BPM | TEMPERATURE: 96.3 F | BODY MASS INDEX: 24.71 KG/M2

## 2017-07-21 DIAGNOSIS — Z79.899 HIGH RISK MEDICATIONS (NOT ANTICOAGULANTS) LONG-TERM USE: ICD-10-CM

## 2017-07-21 DIAGNOSIS — M32.19 OTHER SYSTEMIC LUPUS ERYTHEMATOSUS WITH OTHER ORGAN INVOLVEMENT (H): Primary | ICD-10-CM

## 2017-07-21 DIAGNOSIS — E55.9 VITAMIN D DEFICIENCY: ICD-10-CM

## 2017-07-21 PROCEDURE — 99213 OFFICE O/P EST LOW 20 MIN: CPT | Performed by: INTERNAL MEDICINE

## 2017-07-21 RX ORDER — HYDROXYCHLOROQUINE SULFATE 200 MG/1
400 TABLET, FILM COATED ORAL DAILY
Qty: 180 TABLET | Refills: 3 | Status: SHIPPED | OUTPATIENT
Start: 2017-07-21 | End: 2017-10-20

## 2017-07-21 RX ORDER — AZATHIOPRINE 50 MG/1
150 TABLET ORAL DAILY
Qty: 270 TABLET | Refills: 1 | Status: SHIPPED | OUTPATIENT
Start: 2017-07-21 | End: 2017-10-20

## 2017-07-21 NOTE — PATIENT INSTRUCTIONS
Dr. Light s [facility]  Locations Clinic Hours Telephone Number   Emily [place]  [address]  [place] [address]     Wednesday: 7:20AM - 4:00PM  Thursday:     7:20AM - 4:00PM     Friday:          7:20AM - 11:00AM       To schedule an appointment with  Dr. Light,  please contact  Specialty [phone]       Emily Delong  [address]  [place] [address]       Monday:       7:20AM - 4:00PM      Emily Diaz  [address]  [place] [address]       Tuesday:      7:20AM - 4:00PM          Thank you!    Carissa Frye CMA

## 2017-07-21 NOTE — MR AVS SNAPSHOT
After Visit Summary   2017    Aleida Weinberg    MRN: [id number]           Patient Information     Date Of Birth          1985        Visit Information        Provider Department      2017 7:40 AM Lavon Light MD [facility]        Today's Diagnoses     Other systemic lupus erythematosus with other organ involvement (H)    -  1    High risk medications (not anticoagulants) long-term use          Care Instructions      Dr. Light s Rheumatology Clinics  Locations Clinic Hours Telephone Number   [facility]  [address] [place] [address]     Wednesday: 7:20AM - 4:00PM  Thursday:     7:20AM - 4:00PM     Friday:          7:20AM - 11:00AM       To schedule an appointment with  Dr. Light,  please contact  Specialty [phone]       [facility] [place]  [address]  [place] [address]       Monday:       7:20AM - 4:00PM      [facility]  [address] [place] [address]       Tuesday:      7:20AM - 4:00PM          Thank you!    Carissa Frye CMA              Follow-ups after your visit        Your next 10 appointments already scheduled     Aug 21, 2017  5:00 PM CDT   LAB with NE LAB   [facility]    [address] [place] [id number] [phone]           Patient must bring picture ID.  Patient should be prepared to give a urine specimen  Please do not eat 10-12 hours before your appointment if you are coming in fasting for labs on lipids, cholesterol, or glucose (sugar).  Pregnant women should follow their Care Team instructions. Water with medications is okay. Do not drink coffee or other fluids.   If you have concerns about taking  your medications, please ask at office or if scheduling via Bilibot, send a message by clicking on Secure Messaging, Message Your Care Team.            Sep 21, 2017  5:00 PM CDT   LAB with NE LAB    [facility] ([facility])    [address] [place] [id number]   [phone]           Patient must bring picture ID.  Patient should be prepared to give a urine specimen  Please do not eat 10-12 hours before your appointment if you are coming in fasting for labs on lipids, cholesterol, or glucose (sugar).  Pregnant women should follow their Care Team instructions. Water with medications is okay. Do not drink coffee or other fluids.   If you have concerns about taking  your medications, please ask at office or if scheduling via [email or web address], send a message by clicking on Secure Messaging, Message Your Care Team.            Oct 12, 2017  5:00 PM CDT   LAB with NE LAB   [facility] ([address] [address]   [phone]           Patient must bring picture ID.  Patient should be prepared to give a urine specimen  Please do not eat 10-12 hours before your appointment if you are coming in fasting for labs on lipids, cholesterol, or glucose (sugar).  Pregnant women should follow their Care Team instructions. Water with medications is okay. Do not drink coffee or other fluids.   If you have concerns about taking  your medications, please ask at office or if scheduling via [email or web address], send a message by clicking on Secure Messaging, Message Your Care Team.            Oct 20, 2017  7:40 AM CDT   Return Visit with Lavon Light MD   [facility] [place] ([facility])    [address]  [place] [address]   [phone]              Future tests that were ordered for you today     Open Standing Orders        Priority Remaining Interval Expires Ordered    CBC with platelets differential Routine 2/2 Every 4 Weeks 1/17/2018 7/21/2017    Creatinine Routine 2/2 Every 4 Weeks 1/17/2018 7/21/2017    Hepatic panel Routine 2/2 Every 4 Weeks 1/17/2018 7/21/2017          Open Future  Orders        Priority Expected Expires Ordered    Thiopurine Methyltransferase RBC Routine 8/15/2017 10/19/2017 7/21/2017    CBC with platelets differential Routine 10/12/2017 11/11/2017 7/21/2017    CK total Routine 10/12/2017 11/11/2017 7/21/2017    Complement C3 Routine 10/12/2017 11/11/2017 7/21/2017    DNA double stranded antibodies Routine 10/12/2017 11/11/2017 7/21/2017    CRP inflammation Routine 10/12/2017 11/11/2017 7/21/2017    Comprehensive metabolic panel Routine 10/12/2017 11/11/2017 7/21/2017    Complement C4 Routine 10/12/2017 11/11/2017 7/21/2017    Erythrocyte sedimentation rate auto Routine 10/12/2017 11/11/2017 7/21/2017    Protein  random urine Routine 10/12/2017 11/11/2017 7/21/2017    UA with Microscopic reflex to Culture Routine 10/12/2017 11/11/2017 7/21/2017            Who to contact     If you have questions or need follow up information about today's clinic visit or your schedule please contact [facility] directly at [phone].  Normal or non-critical lab and imaging results will be communicated to you by [employer], letter or phone within 4 business days after the clinic has received the results. If you do not hear from us within 7 days, please contact the clinic through [employer] or phone. If you have a critical or abnormal lab result, we will notify you by phone as soon as possible.  Submit refill requests through [employer]aru or call your pharmacy and they will forward the refill request to us. Please allow 3 business days for your refill to be completed.          Additional Information About Your Visit        [employer] Information     [employer]maru gives you secure access to your electronic health record. If you see a primary care provider, you can also send messages to your care team and make appointments. If you have questions, please call your primary care clinic.  If you do not have a primary care provider, please call [phone] and they will assist you.        Care EveryWhere  "ID     This is your Care EveryWhere ID. This could be used by other organizations to access your [place] medical records  [id number]        Your Vitals Were     Pulse Temperature Height Pulse Oximetry BMI (Body Mass Index)       73 96.3  F (35.7  C) (Oral) 1.702 m (5' 7\") 96% 24.65 kg/m2        Blood Pressure from Last 3 Encounters:   04/21/17 127/57   02/09/17 98/64   01/20/17 125/78    Weight from Last 3 Encounters:   07/21/17 71.4 kg (157 lb 6.4 oz)   04/21/17 72.3 kg (159 lb 6.4 oz)   02/09/17 75.3 kg (166 lb)                 Today's Medication Changes          These changes are accurate as of: 7/21/17  7:55 AM.  If you have any questions, ask your nurse or doctor.               These medicines have changed or have updated prescriptions.        Dose/Directions    azaTHIOprine 50 MG tablet   Commonly known as:  IMURAN   This may have changed:  how much to take   Used for:  Other systemic lupus erythematosus with other organ involvement (H)   Changed by:  Lavon Light MD        Dose:  150 mg   Take 3 tablets (150 mg) by mouth daily   Quantity:  270 tablet   Refills:  1            Where to get your medicines      These medications were sent to [place] [facility] [place] [address]     Phone:  [phone]     azaTHIOprine 50 MG tablet    hydroxychloroquine 200 MG tablet                Primary Care Provider Office Phone # Fax #    Tatum Dove PA-C [phone]       [place] [id number]        Equal Access to Services     CACHORRO MADRIGAL AH: Hadii catalina dewey Soharjeet, waaxda luqadaha, qaybta kaalmada adeegyada, mira concepcion. So [facility] [phone].    ATENCIÓN: Si habla español, tiene a rey disposición servicios gratuitos de asistencia lingüística. Llame al [phone].    We comply with applicable federal civil rights laws and " [place] laws. We do not discriminate on the basis of race, color, national origin, age, disability sex, sexual orientation or gender identity.            Thank you!     Thank you for choosing [facility] [place]  for your care. Our goal is always to provide you with excellent care. Hearing back from our patients is one way we can continue to improve our services. Please take a few minutes to complete the written survey that you may receive in the mail after your visit with us. Thank you!             Your Updated Medication List - Protect others around you: Learn how to safely use, store and throw away your medicines at www.disposemymeds.org.          This list is accurate as of: 7/21/17  7:55 AM.  Always use your most recent med list.                   Brand Name Dispense Instructions for use Diagnosis    albuterol 108 (90 BASE) MCG/ACT Inhaler    PROAIR HFA/PROVENTIL HFA/VENTOLIN HFA    1 Inhaler    Inhale 2 puffs into the lungs every 6 hours as needed for shortness of breath / dyspnea    Intermittent asthma, uncomplicated       amLODIPine 10 MG tablet    NORVASC    90 tablet    Take 1 tablet (10 mg) by mouth daily    Raynaud's phenomenon without gangrene       azaTHIOprine 50 MG tablet    IMURAN    270 tablet    Take 3 tablets (150 mg) by mouth daily    Other systemic lupus erythematosus with other organ involvement (H)       cyclobenzaprine 10 MG tablet    FLEXERIL    30 tablet    Take 0.5-1 tablets (5-10 mg) by mouth 3 times daily as needed for muscle spasms    Acute midline low back pain with right-sided sciatica       hydroxychloroquine 200 MG tablet    PLAQUENIL    180 tablet    Take 2 tablets (400 mg) by mouth daily    Other systemic lupus erythematosus with other organ involvement (H)       LORazepam 0.5 MG tablet    ATIVAN    10 tablet    Take 1 tablet (0.5 mg) by mouth every 8 hours as needed for anxiety    Anxiety       MIRENA (52 MG) 20 MCG/24HR IUD   Generic drug:  levonorgestrel      1 each by  Intrauterine route once        omeprazole 40 MG capsule    priLOSEC    30 capsule    Take 1 capsule (40 mg) by mouth daily Take 30-60 minutes before a meal.    Abdominal pain, epigastric       predniSONE 5 MG tablet    DELTASONE    90 tablet    Take 1 tablet (5 mg) by mouth daily as needed for lupus    Systemic lupus erythematosus (H), High risk medications (not anticoagulants) long-term use       traMADol 50 MG tablet    ULTRAM    30 tablet    Take 1-2 tablets ( mg) by mouth every 6 hours as needed for pain maximum 8 tablet(s) per day    Acute midline low back pain with right-sided sciatica       vitamin D 2000 UNITS tablet     100 tablet    Take 2,000 Units by mouth daily    Vitamin D deficiency

## 2017-07-21 NOTE — PROGRESS NOTES
Rheumatology Clinic Visit      Aleida Weinberg MRN# [id number]   YOB: 1985 Age: 32 year old      Date of visit: 7/21/17   PCP: Tatum Dove     Chief Complaint   Patient presents with:  Systemic Lupus Erythematous: patient states she has some stiffness      Assessment and Plan   1. Systemic lupus erythematosus (CHANELL >1:19851 speckled, RNP+, Sm+, Scl-70+, hypocomplementemia, photosensitivity, arthritis, fatigue, Raynaud's phenomenon):  Scl-70+ she lacks features of scleroderma at this time. Previously on MTX 20mg SQ weekly (GI upset with PO doses above 15mg, partially effective) and SSZ (LFT elevations).  Currently on AZA 100mg daily and HCQ 400mg daily.  Given persistent synovitis on exam previously, MTX was changed to AZA.    TPMT intermediate, slightly hemolized. She is tolerating azathioprine well but still has mild synovitis and therefore will increase azathioprine today. Recheck TPMT.  May consider MMF or Benlysta in the future. She was not interested in Benlysta at this time because it is an infusion. Slight reduction of C3 and C4 since the previous labs. Labs reviewed with the patient today.  - Continue hydroxychloroquine 400mg daily (last eye exam done 5/20/2016)  - Continue prednisone 5mg daily PRN (rarely used per patient)  - Increase azathioprine to 150mg daily (2.1 mg/kg)  - Labs monthly ×2 months: CBC, creatinine, hepatic panel  - Lab with the next blood draw: TPMT  - Labs 1 week prior to the next rheumatology clinic visit: CBC, CMP, ESR, CRP, CK, C3, C4, dsDNA, UA, Uprotein:creatinine    2. Raynaud's Phenomenon: Cold avoidance was insufficient for controlling her symptoms and therefore amlodipine was started with partial improvement at 5mg daily and much better control at 10mg daily.    - Continue Amlodipine 10mg daily during colder months    3. Chronic Back Pain: History of scoliosis. Degenerative findings on previously reviewed MRIs from 2000 and 2009. This has not changed for  many years and does not worsen when her peripheral joint symptoms worsen. Had one episode of sciatica that resolved with chiropractic treatment.     4. Vitamin D deficiency: Currently on vitamin D 2000 units daily.    - Continue vitamin D 2000 units daily    Ms. Weinberg verbalized agreement with and understanding of the rational for the diagnosis and treatment plan.  All questions were answered to best of my ability and the patient's satisfaction. Ms. Weinberg was advised to contact the clinic with any questions that may arise after the clinic visit.      Thank you for involving me in the care of the patient    Return to clinic: 3 months      HPI   Aleida Digna Weinberg is a 32 year old female with medical history significant for intermittent asthma, anxiety, migraines, vitamin D deficiency, and systemic lupus erythematosus who presents for follow-up of SLE.     Today, Ms. Weinberg reports that she has not had much change in her symptoms since changing from methotrexate to azathioprine. She continues to have some pain in her hands that is worse in the morning and improves with activity. Positive gelling phenomenon that resolves quickly. Morning stiffness but no more than 10-20 minutes. She reports that she is able to do everything that she needs to do during the day. She also reports that she is way better on these medications than she was prior to treatment and is very happy with that. Raynaud's phenomenon is well controlled with amlodipine that is only used during colder weather. Poor sleep because of difficulty initiating sleep; she tried using cyclobenzaprine at night with some improvement but has not been doing it consistently.    Denies fevers, chills, nausea, vomiting, constipation, diarrhea. No abdominal pain. No chest pain/pressure, palpitations, or shortness of breath. No oral or nasal sores. No neck pain. No LE swelling.  Has photosensitivity but no photophobia.  Mild dry eyes and dry mouth; she uses  artificial tears as needed.  No eye pain or redness. Denies history of serositis. Denies history of DVT, pulmonary embolism, or miscarriage.    Tobacco: Occasional cigarette  EtOH: Once monthly  Drugs: None  Occupation: Works at [employer], a lot of typing per patient    ROS   GEN: No fevers, chills, night sweats, or weight change  SKIN: See HPI. No hair thinning.  HEENT: Has a history of migraines, but no headache today. No change in vision, taste, or hearing. No epistaxis. No oral or nasal ulcers.  CV: No chest pain, pressure, palpitations, or dyspnea on exertion.  PULM: No SOB, wheeze, cough.  GI: No change in appetite. No nausea, vomiting, constipation, diarrhea. No blood in stool. No abdominal pain.  : No blood in urine.  MSK: See HPI.  NEURO: See history of present illness  ENDO: No heat/cold intolerance.  EXT: See history of present illness    Active Problem List     Patient Active Problem List   Diagnosis     Other kyphoscoliosis and scoliosis     Hypertrophic and atrophic condition of skin     Viral warts     Routine postpartum follow-up     CARDIOVASCULAR SCREENING; LDL GOAL LESS THAN 160     Intermittent asthma     Anxiety     Intractable menstrual migraine without status migrainosus     Vitamin D deficiency     Inflammatory polyarthropathy (H)     Chronic back pain     Raynaud's phenomenon without gangrene     Systemic lupus erythematosus (H)     High risk medications (not anticoagulants) long-term use (Plaquenil and MTX)     Dry eyes, bilateral     Past Medical History     Past Medical History:   Diagnosis Date     Mild intermittent asthma      Other kyphoscoliosis and scoliosis     upper back curvature and disc degeneration in lower back.     Past Surgical History     Past Surgical History:   Procedure Laterality Date      SECTION       SURGICAL HISTORY OF -       PE Tubes     Allergy     Allergies   Allergen Reactions     No Known Drug Allergies      Current Medication List     Current  Outpatient Prescriptions   Medication Sig     azaTHIOprine (IMURAN) 50 MG tablet Take 3 tablets (150 mg) by mouth daily     hydroxychloroquine (PLAQUENIL) 200 MG tablet Take 2 tablets (400 mg) by mouth daily     levonorgestrel (MIRENA, 52 MG,) 20 MCG/24HR IUD 1 each by Intrauterine route once     Cholecalciferol (VITAMIN D) 2000 UNITS tablet Take 2,000 Units by mouth daily     traMADol (ULTRAM) 50 MG tablet Take 1-2 tablets ( mg) by mouth every 6 hours as needed for pain maximum 8 tablet(s) per day     cyclobenzaprine (FLEXERIL) 10 MG tablet Take 0.5-1 tablets (5-10 mg) by mouth 3 times daily as needed for muscle spasms     predniSONE (DELTASONE) 5 MG tablet Take 1 tablet (5 mg) by mouth daily as needed for lupus     LORazepam (ATIVAN) 0.5 MG tablet Take 1 tablet (0.5 mg) by mouth every 8 hours as needed for anxiety     albuterol (PROAIR HFA, PROVENTIL HFA, VENTOLIN HFA) 108 (90 BASE) MCG/ACT inhaler Inhale 2 puffs into the lungs every 6 hours as needed for shortness of breath / dyspnea     [DISCONTINUED] azaTHIOprine (IMURAN) 50 MG tablet Take 2 tablets (100 mg) by mouth daily     amLODIPine (NORVASC) 10 MG tablet Take 1 tablet (10 mg) by mouth daily (Patient not taking: Reported on 7/21/2017)     omeprazole (PRILOSEC) 40 MG capsule Take 1 capsule (40 mg) by mouth daily Take 30-60 minutes before a meal. (Patient not taking: Reported on 7/21/2017)     No current facility-administered medications for this visit.          Social History   See HPI    Family History     Family History   Problem Relation Age of Onset     HEART DISEASE Maternal Grandfather      Bypass, Heart Disease     Respiratory Maternal Grandfather      asthma     C.A.D. Paternal Grandfather      HEART DISEASE Maternal Uncle      MI's      Hypertension Paternal Grandmother      CANCER Paternal Grandmother      lymph nodes     Respiratory Other      cousin on mothers side, asthma     Alcohol/Drug Maternal Uncle      Alcohol/Drug Other      bother  "great grandparents on mother's side     DIABETES No family hx of      Breast Cancer No family hx of      Cancer - colorectal No family hx of      Denies family history of autoimmune disease    Physical Exam     Temp Readings from Last 3 Encounters:   07/21/17 96.3  F (35.7  C) (Oral)   04/21/17 96.7  F (35.9  C) (Oral)   02/09/17 98.1  F (36.7  C) (Oral)     BP Readings from Last 5 Encounters:   07/21/17 118/72   04/21/17 127/57   02/09/17 98/64   01/20/17 125/78   01/10/17 108/84     Pulse Readings from Last 1 Encounters:   07/21/17 73     Resp Readings from Last 1 Encounters:   04/22/16 16     Estimated body mass index is 24.65 kg/(m^2) as calculated from the following:    Height as of this encounter: 1.702 m (5' 7\").    Weight as of this encounter: 71.4 kg (157 lb 6.4 oz).    GEN: NAD  HEENT: MMM. No oral lesions. Anicteric, noninjected sclera  CV: S1, S2. RRR. No m/r/g.  PULM: CTA bilaterally. No w/c.  MSK: Tenderness palpation of the bilateral 2nd-5th PIPs with subtle synovial swelling.  MCPs, DIPs, wrists, and elbows without swelling or tenderness to palpation.  Bilateral shoulders nontender to palpation and without swelling. Bilateral hips nontender to direct palpation. Knees, ankles, and feet without tenderness palpation or swelling. Negative MTP squeeze.   NEURO: UE and LE strengths 5/5 and equal bilaterally.   SKIN: No rash; normal fingertip pulp; no evidence of previous infarcts to the distal fingertips. Tattoos present  EXT: No LE edema  PSYCH: Alert. Appropriate.    Labs / Imaging (select studies)   RF/CCP  Recent Labs   Lab Test  04/22/16   0902  04/01/16   0910   CCPIGG  1   --    RHF   --   <20     CHANELL/RNP/Sm/SSA/SSB  Recent Labs   Lab Test  04/22/16   0902  04/01/16   0910   VALERIE   --   12.4*   ANAIGG  >1:18279  Reference range: <1:40  (Note)  Speckled pattern.  INTERPRETIVE INFORMATION: CHANELL by IFA, IgG  Anti-nuclear antibodies (CHANELL) are seen in a variety of  systemic rheumatic diseases and are " determined by indirect  fluorescence assay (IFA) using HEp-2 substrate with an  IgG-specific conjugate. CHANELL titers less than or equal to  1:80 have variable relevance while titers greater than or  equal to 1:160 are considered clinically significant. These  antibodies may precede clinical disease onset; however,  healthy individuals and those with advanced age have been  reported to be positive for CHANELL. When observed, one of the  five basic patterns is reported: homogeneous,  peripheral/rim, speckled, centromere, or nucleolar. If  cytoplasmic fluorescence is observed, it is noted. IFA  methodology is subjective and has occasionally been shown  to lack sensitivity for anti-SSA/Ro antibodies.  Negative results do not necessarily rule out the presence  of SSc. If clinical suspicion remains, consi vicki further  testing for U3-RNP, PM/Scl, or Th/To antibodies associated  with SSc.  Performed by [facility],  [address] [place] [id number] [phone]  [email or web address], Twin Rodriguez MD, Lab. Director     --    RNPIGG  >8.0  Positive   Antibody index (AI) values reflect qualitative changes in antibody   concentration that cannot be directly associated with clinical condition or   disease state.  *   --    SMIGG  1.5*   --    SSAIGG  <0.2  Negative   Antibody index (AI) values reflect qualitative changes in antibody   concentration that cannot be directly associated with clinical condition or   disease state.     --    SSBIGG  <0.2  Negative   Antibody index (AI) values reflect qualitative changes in antibody   concentration that cannot be directly associated with clinical condition or   disease state.     --    SCLIGG  3.1*   --      dsDNA  Recent Labs   Lab Test  07/17/17 1707 04/13/17   1650  01/20/17   0828  10/26/16   0919  07/22/16   0916  04/22/16   0902   DNA  1  2  1  1  1  1     C3/C4  Recent Labs   Lab Test  07/17/17 1707 04/13/17   1650  01/20/17   0828  10/26/16   0919  07/22/16   0916   04/22/16   0902   T5AORIO  85  87  93  110  115  94   [id number]  14*  16  16  16  17  13*     Send-out Labs  Recent Labs   Lab Test  04/21/17   0813   SRESLT  SEE NOTE  (Note)  Test name                    Result Flag  Units  RefIntvl  ------------------------------------------------------------  Thiopurine Methyltransferase                                23.2 L      U/mL 24.0-44.0  Sample slightly hemolyzed. Please interpret the results  with caution.  INTERPRETIVE INFORMATION: Thiopurine Methyltransferase, RBC  Normal TPMT activity:  24.0-44.0 U/mL................Individuals are predicted to  be at low risk of bone marrow toxicity (myelosuppression)  as a consequence of standard thiopurine therapy; no dose  adjustment is recommended.  Intermediate TPMT activity:  17.0-23.9 U/mL................Individuals are predicted to  be at intermediate risk of bone marrow toxicity  (myelosuppression) as a consequence of standard thiopurine  therapy; a dose reduction and therapeutic drug management  is recommended.  Low TPMT activity:  less than 17.0 U/mL...........Individuals are predicted to  be at high risk of bone marrow toxicity (myelosuppression)   as a consequence of standard thiopurine dosing. It is  recommended to avoid the use of thiopurine drugs.  High TPMT activity:  greater than 44.0 U/mL........Individuals are not predicted  to be at risk for bone marrow toxicity (myelosuppression)  as a consequence of standard thiopurine dosing, but may be  at risk for therapeutic failure due to excessive  inactivation of thiopurine drugs. Individuals may require  higher than the normal standard dose. Therapeutic drug  management is recommended.  The TPMT, RBC assay is used as a screen to detect  individuals with low and intermediate TPMT activity who may  be at risk for myelosuppression when exposed to standard  doses of thiopurines, including azathioprine (Imuran) and  6-mercaptopurine (Purinethol). TPMT is the  primary  metabolic route for inactivation of thiopurine drugs in the  bone marrow. When TPMT activity is low, it is predicted  that proportionately more 6-mercaptopurine can be converted  into the cytotoxic 6-thioguanine nucle otides that  accumulate in the bone marrow causing excessive toxicity.  The activity of TPMT is measured by the nanomoles of  6-methylmercaptopurine (inactive metabolite) produced per 1  mL of packed red blood cells, (U/mL).    TPMT phenotype testing does not replace the need for  clinical monitoring of patients treated with thiopurine  drugs. Genotype for TPMT cannot be inferred from TPMT  activity (phenotype). Phenotype testing should not be  requested for patients currently treated with thiopurine  drugs. Current TPMT phenotype may not reflect future TPMT  phenotype, particularly in patients who received blood  transfusion within 30-60 days of testing.  TPMT enzyme  activity can be inhibited by several drugs such as:  naproxen (Aleve), ibuprofen (Advil, Motrin), ketoprofen  (Orudis), furosemide (Lasix), sulfasalazine (Azulfidine),  mesalamine (Asacol), olsalazine (Dipentum), mefenamic acid  (Ponstel), thiazide diuretics, and benzoic acid inhibitors.  TPMT inhibitors may contribute t o falsely low results;  patients should abstain from these drugs for at least 48  hours prior to TPMT testing. Falsely low results may also  occur as a result of inappropriate specimen handling and  hemolysis.  Test developed and characteristics determined by [facility]. See Compliance Statement B: www.[facility].com/CS  Performed by [facility],  [address] [place] [id number] [phone]  [email or web address], Lionel Ferreira MD, Lab. Director     STSTNM  Thiopurine Methyltransferase, RBC   STSTCD  52984   SSPTYP  Whole blood, EDTA anticoagulant     Antiphospholipid Antibodies  Recent Labs   Lab Test  04/22/16   0902   B2GPG  0.9   B2GPM  <0.9  Negative     CARG  <15.0  Interpretation:  Negative     JOANN   <12.5  Interpretation:  Negative     LUPINT  Negative  (Note)  COMMENTS:  The INR is normal.  APTT ratio is normal.  DRVVT Screen ratio is normal.  Thrombin time is normal.  NEGATIVE TEST; A LUPUS ANTICOAGULANT WAS NOT DETECTED IN THIS  SPECIMEN WITHIN THE LIMITS OF THE TESTING REPERTOIRE.  If the clinical picture is strongly suggestive of an antiphospholipid  syndrome, recommend anticardiolipin and beta-2-glycoprotein (IgG and  IgM) antibody tests.  Isabella Olson M.D.  [phone]  4/25/2016    INR =  1.05    Reference range: 0.86-1.14  Thrombin Time= 15.4    Reference range: 13.0-19.0 sec    APTT:       Ratio  Patient  =  0.92  1:2 Mix  =  N/A  Reference:  Negative: Less than or equal to 1.16  Positive: Greater than or equal to 1.17     DILUTE LISA VIPER VENOM TEST:  Screen Ratio = 0.95   Normal is less than 1.21         CBC  Recent Labs   Lab Test  07/17/17 1707 06/01/17 1651 05/01/17 1712   WBC  4.8  4.7  5.2   RBC  4.24  4.18  4.12   HGB  13.9  13.8  13.8   HCT  39.0  39.1  38.6   MCV  92  94  94   RDW  12.4  11.6  12.4   PLT  267  261  242   MCH  32.8  33.0  33.5*   MCHC  35.6  35.3  35.8   NEUTROPHIL  60.8  56.1  60.0   LYMPH  27.3  30.4  27.7   MONOCYTE  10.7  11.6  11.1   EOSINOPHIL  0.8  1.3  0.8   BASOPHIL  0.4  0.6  0.4   ANEU  2.9  2.7  3.2   ALYM  1.3  1.4  1.5   AMANDA  0.5  0.6  0.6   AEOS  0.0  0.1  0.0   ABAS  0.0  0.0  0.0     CMP  Recent Labs   Lab Test  07/17/17 1707 06/01/17 1651 05/01/17 1712 04/13/17   1650 02/09/17   0721  01/20/17   0828   10/26/16   0919  07/22/16   0916   NA  138   --    --   138   --    --   143   --   139  138   POTASSIUM  4.0   --    --   3.9   --    --   4.3   --   4.3  4.2   CHLORIDE  107   --    --   105   --    --   107   --   109  107   CO2  21   --    --   23   --    --   29   --   24  24   ANIONGAP  10   --    --   10   --    --   7   --   6  7   GLC  82   --    --   76   --   77  79   --   85  76   BUN  10   --    --   12   --    --    10   --   8  10   CR  0.60  0.70  0.62  0.69   < >   --   0.80   < >  0.74  0.77   GFRESTIMATED  >90  Non  GFR Calc    >90  Non  GFR Calc    >90  Non  GFR Calc    >90  Non  GFR Calc     < >   --   84   < >  >90  Non  GFR Calc    88   GFRESTBLACK  >90   GFR Calc    >90   GFR Calc    >90   GFR Calc    >90   GFR Calc     < >   --   >90   GFR Calc     < >  >90   GFR Calc    >90   GFR Calc     SRINIVAS  9.3   --    --   9.4   --    --   9.3   --   9.3  8.6   BILITOTAL  0.6  0.5  0.6  0.6   < >   --   0.8   < >  0.5  0.5   ALBUMIN  4.5  4.5  4.4  4.7   < >   --   4.2   < >  4.0  3.8   PROTTOTAL  7.6  7.5  7.3  7.7   < >   --   7.5   < >  7.5  7.6   ALKPHOS  43  44  45  51   < >   --   47   < >  50  48   AST  22  22  19  32   < >   --   22   < >  26  17   ALT  21  26  23  49   < >   --   28   < >  29  21    < > = values in this interval not displayed.     Calcium/VitaminD  Recent Labs   Lab Test  07/17/17   1707  04/13/17   1650  02/20/17   1640  01/20/17   0828  10/26/16   0919   04/01/16   0910   SRINIVAS  9.3  9.4   --   9.3  9.3   < >   --    VITDT   --    --   33   --   23   --   <13  Season, race, dietary intake, and treatment affect the concentration of   25-hydroxy-Vitamin D. Values may decrease during winter months and increase   during summer months. Values 20-29 ug/L may indicate Vitamin D insufficiency   and values <20 ug/L may indicate Vitamin D deficiency.   Vitamin D determination is routinely performed by an immunoassay specific for   25 hydroxyvitamin D3.  If an individual is on vitamin D2 (ergocalciferol)   supplementation, please specify 25 OH vitamin D2 and D3 level determination by   LCMSMS test VITD23.  *    < > = values in this interval not displayed.     ESR/CRP  Recent Labs   Lab Test  07/17/17   1707  04/13/17   1653   01/20/17   0828   SED  9  8  7   CRP  <2.9  <2.9  3.6     CK/Aldolase  Recent Labs   Lab Test  07/17/17   1707  04/13/17   1650  01/20/17   0828   04/22/16   0902   CKT  82  63  67   < >  45   ALDOLASE   --    --    --    --   4.5    < > = values in this interval not displayed.     TSH/T4  Recent Labs   Lab Test  04/01/16   0910   TSH  1.57     Lipid Panel  Recent Labs   Lab Test  02/09/17   0721  07/10/15   0814  09/13/13   0706   CHOL  159  149  139   TRIG  56  45  57   HDL  42*  48*  52   LDL  106*  92  76   VLDL   --   9  11   CHOLHDLRATIO   --   3.1  2.7   NHDL  117   --    --      Hepatitis B  Recent Labs   Lab Test  04/22/16   0902   HBCAB  Nonreactive   HEPBANG  Nonreactive     Hepatitis C  Recent Labs   Lab Test  04/22/16   0902   HCVAB  Nonreactive   Assay performance characteristics have not been established for newborns,   infants, and children       Lyme ab screening  Recent Labs   Lab Test  04/01/16   0910   LYMEGM  0.12     HIV Screening  Recent Labs   Lab Test  04/22/16   0902   HIAGAB  Nonreactive   HIV-1 p24 Ag & HIV-1/HIV-2 Ab Not Detected       UA  Recent Labs   Lab Test  07/17/17   1713  04/13/17   1650  01/20/17   0827  10/26/16   0904  07/22/16   0912   COLOR  Yellow  Yellow  Yellow  Yellow  Yellow   APPEARANCE  Clear  Clear  Clear  Clear  Clear   URINEGLC  Negative  Negative  Negative  Negative  Negative   URINEBILI  Negative  Negative  Negative  Negative  Negative   SG  1.015  1.015  >1.030  >1.030  >1.030   URINEPH  7.0  7.0  6.0  6.0  6.0   PROTEIN  Negative  Negative  Trace*  Negative  Negative   UROBILINOGEN  1.0  0.2  0.2  0.2  0.2   NITRITE  Negative  Negative  Negative  Negative  Negative   UBLD  Negative  Negative  Negative  Negative  Negative   LEUKEST  Negative  Negative  Negative  Negative  Negative   WBCU  O - 2  O - 2  O - 2  2-5*  O - 2   RBCU  O - 2  O - 2  O - 2  O - 2  O - 2   SQUAMOUSEPI   --   Few  Few  Moderate*  Few   BACTERIA   --    --   Few*  Few*   --    MUCOUS   --     --   Present*   --   Present*     Urine Microscopic  Recent Labs   Lab Test  07/17/17   1713  04/13/17   1650  01/20/17   0827  10/26/16   0904  07/22/16   0912   WBCU  O - 2  O - 2  O - 2  2-5*  O - 2   RBCU  O - 2  O - 2  O - 2  O - 2  O - 2   SQUAMOUSEPI   --   Few  Few  Moderate*  Few   BACTERIA   --    --   Few*  Few*   --    MUCOUS   --    --   Present*   --   Present*     Urine Protein  Recent Labs   Lab Test  07/17/17   1713  04/13/17   1650  01/20/17   0827   UTP  0.11  0.08  0.37   UTPG  0.20  0.26*  0.13   UCRR  54  32   --      Immunization History     Immunization History   Administered Date(s) Administered     DPT 1985, 1985, 1985, 01/15/1988, 06/15/1991     HPVQuadrivalent 02/25/2010, 02/04/2011     HepB-Peds 06/13/1999, 08/20/1999, 02/05/2000     Influenza (IIV3) 11/07/2011     Influenza Vaccine IM 3yrs+ 4 Valent IIV4 10/11/2016     MMR 05/15/1987, 09/15/1991     Mantoux 07/15/1987, 01/19/2005     Meningococcal (Menomune ) 08/09/2004     OPV 1985, 1985, 1985, 01/15/1988, 09/15/1991     TDAP Vaccine (Adacel) 01/21/2009, 02/25/2010          Chart documentation done in part with Dragon Voice recognition Software. Although reviewed after completion, some word and grammatical error may remain.    Lavon Light MD

## 2017-07-21 NOTE — NURSING NOTE
"Chief Complaint   Patient presents with     Systemic Lupus Erythematous     patient states she has some stiffness       Initial Pulse 73  Temp 96.3  F (35.7  C) (Oral)  Ht 1.702 m (5' 7\")  Wt 71.4 kg (157 lb 6.4 oz)  SpO2 96%  BMI 24.65 kg/m2 Estimated body mass index is 24.65 kg/(m^2) as calculated from the following:    Height as of this encounter: 1.702 m (5' 7\").    Weight as of this encounter: 71.4 kg (157 lb 6.4 oz).  BP completed using cuff size: regular         RAPID3 (0-30) Cumulative Score  12.5          RAPID3 Weighted Score (divide #4 by 3 and that is the weighted score)  4.17       Blood pressure done after visit 118/72.  Carissa Frye CMA  7/21/2017 7:59 AM    "

## 2017-08-21 DIAGNOSIS — M32.19 OTHER SYSTEMIC LUPUS ERYTHEMATOSUS WITH OTHER ORGAN INVOLVEMENT (H): ICD-10-CM

## 2017-08-21 DIAGNOSIS — Z79.899 HIGH RISK MEDICATIONS (NOT ANTICOAGULANTS) LONG-TERM USE: ICD-10-CM

## 2017-08-21 LAB
BASOPHILS # BLD AUTO: 0 10E9/L (ref 0–0.2)
BASOPHILS NFR BLD AUTO: 0.3 %
DIFFERENTIAL METHOD BLD: ABNORMAL
EOSINOPHIL # BLD AUTO: 0 10E9/L (ref 0–0.7)
EOSINOPHIL NFR BLD AUTO: 0.5 %
ERYTHROCYTE [DISTWIDTH] IN BLOOD BY AUTOMATED COUNT: 13.4 % (ref 10–15)
HCT VFR BLD AUTO: 37.6 % (ref 35–47)
HGB BLD-MCNC: 13.3 G/DL (ref 11.7–15.7)
LYMPHOCYTES # BLD AUTO: 1.1 10E9/L (ref 0.8–5.3)
LYMPHOCYTES NFR BLD AUTO: 26.8 %
MCH RBC QN AUTO: 34 PG (ref 26.5–33)
MCHC RBC AUTO-ENTMCNC: 35.4 G/DL (ref 31.5–36.5)
MCV RBC AUTO: 96 FL (ref 78–100)
MONOCYTES # BLD AUTO: 0.5 10E9/L (ref 0–1.3)
MONOCYTES NFR BLD AUTO: 11.9 %
NEUTROPHILS # BLD AUTO: 2.4 10E9/L (ref 1.6–8.3)
NEUTROPHILS NFR BLD AUTO: 60.5 %
PLATELET # BLD AUTO: 286 10E9/L (ref 150–450)
RBC # BLD AUTO: 3.91 10E12/L (ref 3.8–5.2)
WBC # BLD AUTO: 4 10E9/L (ref 4–11)

## 2017-08-21 PROCEDURE — 82657 ENZYME CELL ACTIVITY: CPT | Mod: 90 | Performed by: INTERNAL MEDICINE

## 2017-08-21 PROCEDURE — 82565 ASSAY OF CREATININE: CPT | Performed by: INTERNAL MEDICINE

## 2017-08-21 PROCEDURE — 85025 COMPLETE CBC W/AUTO DIFF WBC: CPT | Performed by: INTERNAL MEDICINE

## 2017-08-21 PROCEDURE — 80076 HEPATIC FUNCTION PANEL: CPT | Performed by: INTERNAL MEDICINE

## 2017-08-21 PROCEDURE — 36415 COLL VENOUS BLD VENIPUNCTURE: CPT | Performed by: INTERNAL MEDICINE

## 2017-08-21 PROCEDURE — 99000 SPECIMEN HANDLING OFFICE-LAB: CPT | Performed by: INTERNAL MEDICINE

## 2017-08-22 LAB
ALBUMIN SERPL-MCNC: 4.4 G/DL (ref 3.4–5)
ALP SERPL-CCNC: 40 U/L (ref 40–150)
ALT SERPL W P-5'-P-CCNC: 17 U/L (ref 0–50)
AST SERPL W P-5'-P-CCNC: 19 U/L (ref 0–45)
BILIRUB DIRECT SERPL-MCNC: 0.2 MG/DL (ref 0–0.2)
BILIRUB SERPL-MCNC: 0.9 MG/DL (ref 0.2–1.3)
CREAT SERPL-MCNC: 0.62 MG/DL (ref 0.52–1.04)
GFR SERPL CREATININE-BSD FRML MDRD: >90 ML/MIN/1.7M2
PROT SERPL-MCNC: 7.4 G/DL (ref 6.8–8.8)

## 2017-08-23 LAB — TPMT BLD-CCNC: 25.2 U/ML (ref 24–44)

## 2017-09-08 ENCOUNTER — OFFICE VISIT (OUTPATIENT)
Dept: OPHTHALMOLOGY | Facility: CLINIC | Age: 32
End: 2017-09-08
Payer: COMMERCIAL

## 2017-09-08 DIAGNOSIS — H04.123 DRY EYES, BILATERAL: ICD-10-CM

## 2017-09-08 DIAGNOSIS — M32.9 SYSTEMIC LUPUS ERYTHEMATOSUS, UNSPECIFIED SLE TYPE, UNSPECIFIED ORGAN INVOLVEMENT STATUS (H): ICD-10-CM

## 2017-09-08 DIAGNOSIS — Z79.899 HIGH RISK MEDICATIONS (NOT ANTICOAGULANTS) LONG-TERM USE: Primary | ICD-10-CM

## 2017-09-08 PROCEDURE — 92083 EXTENDED VISUAL FIELD XM: CPT | Performed by: STUDENT IN AN ORGANIZED HEALTH CARE EDUCATION/TRAINING PROGRAM

## 2017-09-08 PROCEDURE — 92012 INTRM OPH EXAM EST PATIENT: CPT | Performed by: STUDENT IN AN ORGANIZED HEALTH CARE EDUCATION/TRAINING PROGRAM

## 2017-09-08 ASSESSMENT — VISUAL ACUITY
CORRECTION_TYPE: CONTACTS
METHOD: SNELLEN - LINEAR
OD_CC: 20/20
OS_CC: 20/20
OS_CC+: -1

## 2017-09-08 NOTE — MR AVS SNAPSHOT
After Visit Summary   9/8/2017    Aleida Weinberg    MRN: [id number]           Patient Information     Date Of Birth          1985        Visit Information        Provider Department      9/8/2017 8:15 AM Anne Frias MD [facility]        Today's Diagnoses     High risk medications (not anticoagulants) long-term use (Plaquenil)    -  1    Systemic lupus erythematosus, unspecified SLE type, unspecified organ involvement status (H)        Dry eyes, bilateral          Care Instructions    Continue monitoring for Plaquenil retinal toxicity    Anne Frias MD  [phone]            Follow-ups after your visit        Follow-up notes from your care team     Return in about 1 year (around 9/8/2018) for Plaquenil 10-2 HVF and OCT.      Your next 10 appointments already scheduled     Sep 21, 2017  5:00 PM CDT   LAB with [facility]   [address] [address] [phone]           Patient must bring picture ID. Patient should be prepared to give a urine specimen  Please do not eat 10-12 hours before your appointment if you are coming in fasting for labs on lipids, cholesterol, or glucose (sugar). Pregnant women should follow their Care Team instructions. Water with medications is okay. Do not drink coffee or other fluids. If you have concerns about taking  your medications, please ask at office or if scheduling via [facility], send a message by clicking on Secure Messaging, Message Your Care Team.            Oct 12, 2017  5:00 PM CDT   LAB with [facility]   [facility] ([address] [id number] [phone]           Patient must bring picture ID. Patient should be prepared to give a urine specimen  Please do not eat 10-12 hours before your appointment if you are coming in fasting for labs on lipids, cholesterol,  or glucose (sugar). Pregnant women should follow their Care Team instructions. Water with medications is okay. Do not drink coffee or other fluids. If you have concerns about taking  your medications, please ask at office or if scheduling via [employer], send a message by clicking on Secure Messaging, Message Your Care Team.            Oct 13, 2017  7:40 AM CDT   [facility]    [address] [facility] [address] [phone]            Oct 20, 2017  7:40 AM CDT   Return Visit with Lavon Light MD   [facility] ([facility]    [address] [facility] [address] [phone]              Who to contact     If you have questions or need follow up information about today's clinic visit or your schedule please contact [facility] directly at [phone].  Normal or non-critical lab and imaging results will be communicated to you by [employer], letter or phone within 4 business days after the clinic has received the results. If you do not hear from us within 7 days, please contact the clinic through [email or web address] or phone. If you have a critical or abnormal lab result, we will notify you by phone as soon as possible.  Submit refill requests through [employer] or call your pharmacy and they will forward the refill request to us. Please allow 3 business days for your refill to be completed.          Additional Information About Your Visit        [employer] Information     [employer] gives you secure access to your electronic health record. If you see a primary care provider, you can also send messages to your care team and make appointments. If you have questions, please call your primary care clinic.  If you do not have a primary care provider, please call [phone] and they will assist you.        Care EveryWhere ID     This is your Care EveryWhere ID. This could be used by other  organizations to access your [facility] medical records  [id number]         Blood Pressure from Last 3 Encounters:   07/21/17 118/72   04/21/17 127/57   02/09/17 98/64    Weight from Last 3 Encounters:   07/21/17 71.4 kg (157 lb 6.4 oz)   04/21/17 72.3 kg (159 lb 6.4 oz)   02/09/17 75.3 kg (166 lb)              Today, you had the following     No orders found for display       Primary Care Provider Office Phone # Fax #    Tatum Dove PA-C [phone]       [address] [facility] [address]        Equal Access to Services     CACHORRO MADRIGAL : Dallas Bee, walinda shin, qajessicata melvaalmada jody, mira concepcion. So [facility] [phone].    ATENCIÓN: Si habla español, tiene a rey disposición servicios gratuitos de asistencia lingüística. Llame al [phone].    We comply with applicable federal civil rights laws and [place] laws. We do not discriminate on the basis of race, color, national origin, age, disability sex, sexual orientation or gender identity.            Thank you!     Thank you for choosing [facility]  for your care. Our goal is always to provide you with excellent care. Hearing back from our patients is one way we can continue to improve our services. Please take a few minutes to complete the written survey that you may receive in the mail after your visit with us. Thank you!             Your Updated Medication List - Protect others around you: Learn how to safely use, store and throw away your medicines at www.disposemymeds.org.          This list is accurate as of: 9/8/17  9:36 AM.  Always use your most recent med list.                   Brand Name Dispense Instructions for use Diagnosis    albuterol 108 (90 BASE) MCG/ACT Inhaler    PROAIR HFA/PROVENTIL HFA/VENTOLIN HFA    1 Inhaler    Inhale 2 puffs into the lungs every 6 hours as needed for shortness of breath / dyspnea    Intermittent asthma, uncomplicated        amLODIPine 10 MG tablet    NORVASC    90 tablet    Take 1 tablet (10 mg) by mouth daily    Raynaud's phenomenon without gangrene       azaTHIOprine 50 MG tablet    IMURAN    270 tablet    Take 3 tablets (150 mg) by mouth daily    Other systemic lupus erythematosus with other organ involvement (H)       cyclobenzaprine 10 MG tablet    FLEXERIL    30 tablet    Take 0.5-1 tablets (5-10 mg) by mouth 3 times daily as needed for muscle spasms    Acute midline low back pain with right-sided sciatica       hydroxychloroquine 200 MG tablet    PLAQUENIL    180 tablet    Take 2 tablets (400 mg) by mouth daily    Other systemic lupus erythematosus with other organ involvement (H)       LORazepam 0.5 MG tablet    ATIVAN    10 tablet    Take 1 tablet (0.5 mg) by mouth every 8 hours as needed for anxiety    Anxiety       MIRENA (52 MG) 20 MCG/24HR IUD   Generic drug:  levonorgestrel      1 each by Intrauterine route once        omeprazole 40 MG capsule    priLOSEC    30 capsule    Take 1 capsule (40 mg) by mouth daily Take 30-60 minutes before a meal.    Abdominal pain, epigastric       predniSONE 5 MG tablet    DELTASONE    90 tablet    Take 1 tablet (5 mg) by mouth daily as needed for lupus    Systemic lupus erythematosus (H), High risk medications (not anticoagulants) long-term use       traMADol 50 MG tablet    ULTRAM    30 tablet    Take 1-2 tablets ( mg) by mouth every 6 hours as needed for pain maximum 8 tablet(s) per day    Acute midline low back pain with right-sided sciatica       vitamin D 2000 UNITS tablet     100 tablet    Take 2,000 Units by mouth daily    Vitamin D deficiency

## 2017-09-08 NOTE — PROGRESS NOTES
Current Eye Medications:  none     Subjective:  Here for Plaquenil tests. No issues with vision or vision changes. No eye pain. Starting 2nd year of Plaquenil now.     Objective:  See Ophthalmology Exam.      Assessment:  Aleida Weinberg is a 32 year old female who presents with:   Encounter Diagnoses   Name Primary?     High risk medications (not anticoagulants) long-term use (Plaquenil) Inman visual field (HVF) and OCT within normal limits both eyes.      Systemic lupus erythematosus, unspecified SLE type, unspecified organ involvement status (H)      Dry eyes, bilateral        Plan:  Continue monitoring for Plaquenil retinal toxicity    Anne Frias MD  [phone]

## 2017-09-21 DIAGNOSIS — M32.19 OTHER SYSTEMIC LUPUS ERYTHEMATOSUS WITH OTHER ORGAN INVOLVEMENT (H): ICD-10-CM

## 2017-09-21 DIAGNOSIS — Z79.899 HIGH RISK MEDICATIONS (NOT ANTICOAGULANTS) LONG-TERM USE: ICD-10-CM

## 2017-09-21 LAB
BASOPHILS # BLD AUTO: 0 10E9/L (ref 0–0.2)
BASOPHILS NFR BLD AUTO: 0.8 %
DIFFERENTIAL METHOD BLD: ABNORMAL
EOSINOPHIL # BLD AUTO: 0 10E9/L (ref 0–0.7)
EOSINOPHIL NFR BLD AUTO: 0.8 %
ERYTHROCYTE [DISTWIDTH] IN BLOOD BY AUTOMATED COUNT: 13.5 % (ref 10–15)
HCT VFR BLD AUTO: 36.2 % (ref 35–47)
HGB BLD-MCNC: 12.8 G/DL (ref 11.7–15.7)
LYMPHOCYTES # BLD AUTO: 1.1 10E9/L (ref 0.8–5.3)
LYMPHOCYTES NFR BLD AUTO: 27.8 %
MCH RBC QN AUTO: 34.2 PG (ref 26.5–33)
MCHC RBC AUTO-ENTMCNC: 35.4 G/DL (ref 31.5–36.5)
MCV RBC AUTO: 97 FL (ref 78–100)
MONOCYTES # BLD AUTO: 0.5 10E9/L (ref 0–1.3)
MONOCYTES NFR BLD AUTO: 11.9 %
NEUTROPHILS # BLD AUTO: 2.3 10E9/L (ref 1.6–8.3)
NEUTROPHILS NFR BLD AUTO: 58.7 %
PLATELET # BLD AUTO: 306 10E9/L (ref 150–450)
RBC # BLD AUTO: 3.74 10E12/L (ref 3.8–5.2)
WBC # BLD AUTO: 3.9 10E9/L (ref 4–11)

## 2017-09-21 PROCEDURE — 36415 COLL VENOUS BLD VENIPUNCTURE: CPT | Performed by: INTERNAL MEDICINE

## 2017-09-21 PROCEDURE — 82565 ASSAY OF CREATININE: CPT | Performed by: INTERNAL MEDICINE

## 2017-09-21 PROCEDURE — 80076 HEPATIC FUNCTION PANEL: CPT | Performed by: INTERNAL MEDICINE

## 2017-09-21 PROCEDURE — 85025 COMPLETE CBC W/AUTO DIFF WBC: CPT | Performed by: INTERNAL MEDICINE

## 2017-09-21 ASSESSMENT — SLIT LAMP EXAM - LIDS
COMMENTS: NORMAL
COMMENTS: NORMAL

## 2017-09-21 ASSESSMENT — EXTERNAL EXAM - RIGHT EYE: OD_EXAM: NORMAL

## 2017-09-21 ASSESSMENT — EXTERNAL EXAM - LEFT EYE: OS_EXAM: NORMAL

## 2017-09-22 LAB
ALBUMIN SERPL-MCNC: 4.3 G/DL (ref 3.4–5)
ALP SERPL-CCNC: 45 U/L (ref 40–150)
ALT SERPL W P-5'-P-CCNC: 21 U/L (ref 0–50)
AST SERPL W P-5'-P-CCNC: 16 U/L (ref 0–45)
BILIRUB DIRECT SERPL-MCNC: 0.1 MG/DL (ref 0–0.2)
BILIRUB SERPL-MCNC: 0.5 MG/DL (ref 0.2–1.3)
CREAT SERPL-MCNC: 0.74 MG/DL (ref 0.52–1.04)
GFR SERPL CREATININE-BSD FRML MDRD: >90 ML/MIN/1.7M2
PROT SERPL-MCNC: 7.5 G/DL (ref 6.8–8.8)

## 2017-10-10 ENCOUNTER — OFFICE VISIT (OUTPATIENT)
Dept: FAMILY MEDICINE | Facility: CLINIC | Age: 32
End: 2017-10-10
Payer: COMMERCIAL

## 2017-10-10 ENCOUNTER — NURSE TRIAGE (OUTPATIENT)
Dept: NURSING | Facility: CLINIC | Age: 32
End: 2017-10-10

## 2017-10-10 VITALS
OXYGEN SATURATION: 99 % | DIASTOLIC BLOOD PRESSURE: 80 MMHG | HEIGHT: 67 IN | SYSTOLIC BLOOD PRESSURE: 132 MMHG | HEART RATE: 74 BPM | TEMPERATURE: 97.5 F | WEIGHT: 152.2 LBS | BODY MASS INDEX: 23.89 KG/M2

## 2017-10-10 DIAGNOSIS — B37.31 YEAST INFECTION OF THE VAGINA: Primary | ICD-10-CM

## 2017-10-10 DIAGNOSIS — J45.20 MILD INTERMITTENT ASTHMA WITHOUT COMPLICATION: ICD-10-CM

## 2017-10-10 LAB
SPECIMEN SOURCE: ABNORMAL
WET PREP SPEC: ABNORMAL

## 2017-10-10 PROCEDURE — 87210 SMEAR WET MOUNT SALINE/INK: CPT | Performed by: PHYSICIAN ASSISTANT

## 2017-10-10 PROCEDURE — 99213 OFFICE O/P EST LOW 20 MIN: CPT | Performed by: PHYSICIAN ASSISTANT

## 2017-10-10 RX ORDER — FLUCONAZOLE 150 MG/1
150 TABLET ORAL ONCE
Qty: 2 TABLET | Refills: 0 | Status: SHIPPED | OUTPATIENT
Start: 2017-10-10 | End: 2017-10-10

## 2017-10-10 NOTE — PROGRESS NOTES
SUBJECTIVE:   Aleida Weinberg is a 32 year old female who presents to clinic today for the following health issues:      Vaginal Symptoms  Onset:  night     Description:  Vaginal Discharge: white creamy   Itching (Pruritis): YES  Burning sensation:  no   Odor: no     Accompanying Signs & Symptoms:  Pain with Urination: YES  Abdominal Pain: no   Fever: no     History:   Sexually active: YES  New Partner: no   Possibility of Pregnancy:  No    Precipitating factors:   Recent Antibiotic Use: no     Alleviating factors:      Therapies Tried and outcome: None       Problem list and histories reviewed & adjusted, as indicated.  Additional history: as documented    Patient Active Problem List   Diagnosis     Other kyphoscoliosis and scoliosis     Hypertrophic and atrophic condition of skin     Viral warts     Routine postpartum follow-up     CARDIOVASCULAR SCREENING; LDL GOAL LESS THAN 160     Intermittent asthma     Anxiety     Intractable menstrual migraine without status migrainosus     Vitamin D deficiency     Inflammatory polyarthropathy (H)     Chronic back pain     Raynaud's phenomenon without gangrene     Systemic lupus erythematosus (H)     High risk medications (not anticoagulants) long-term use (Plaquenil and MTX)     Dry eyes, bilateral     Past Surgical History:   Procedure Laterality Date      SECTION       SURGICAL HISTORY OF -       PE Tubes       Social History   Substance Use Topics     Smoking status: Former Smoker     Packs/day: 0.50     Years: 2.50     Types: Cigarettes     Quit date: 2005     Smokeless tobacco: Never Used     Alcohol use 0.0 oz/week     0 Standard drinks or equivalent per week      Comment: rarely - 1 monthly     Family History   Problem Relation Age of Onset     HEART DISEASE Maternal Grandfather      Bypass, Heart Disease     Respiratory Maternal Grandfather      asthma     C.A.D. Paternal Grandfather      HEART DISEASE Maternal Uncle      MI's       Hypertension Paternal Grandmother      CANCER Paternal Grandmother      lymph nodes     Respiratory Other      cousin on mothers side, asthma     Alcohol/Drug Maternal Uncle      Alcohol/Drug Other      bother great grandparents on mother's side     DIABETES No family hx of      Breast Cancer No family hx of      Cancer - colorectal No family hx of          Current Outpatient Prescriptions   Medication Sig Dispense Refill     fluconazole (DIFLUCAN) 150 MG tablet Take 1 tablet (150 mg) by mouth once for 1 dose May repeat in 3 days as needed if symptoms are not fully resolved 2 tablet 0     azaTHIOprine (IMURAN) 50 MG tablet Take 3 tablets (150 mg) by mouth daily 270 tablet 1     hydroxychloroquine (PLAQUENIL) 200 MG tablet Take 2 tablets (400 mg) by mouth daily 180 tablet 3     amLODIPine (NORVASC) 10 MG tablet Take 1 tablet (10 mg) by mouth daily 90 tablet 1     levonorgestrel (MIRENA, 52 MG,) 20 MCG/24HR IUD 1 each by Intrauterine route once       Cholecalciferol (VITAMIN D) 2000 UNITS tablet Take 2,000 Units by mouth daily 100 tablet 3     traMADol (ULTRAM) 50 MG tablet Take 1-2 tablets ( mg) by mouth every 6 hours as needed for pain maximum 8 tablet(s) per day 30 tablet 0     cyclobenzaprine (FLEXERIL) 10 MG tablet Take 0.5-1 tablets (5-10 mg) by mouth 3 times daily as needed for muscle spasms 30 tablet 1     predniSONE (DELTASONE) 5 MG tablet Take 1 tablet (5 mg) by mouth daily as needed for lupus 90 tablet 0     LORazepam (ATIVAN) 0.5 MG tablet Take 1 tablet (0.5 mg) by mouth every 8 hours as needed for anxiety 10 tablet 0     albuterol (PROAIR HFA, PROVENTIL HFA, VENTOLIN HFA) 108 (90 BASE) MCG/ACT inhaler Inhale 2 puffs into the lungs every 6 hours as needed for shortness of breath / dyspnea 1 Inhaler 1     omeprazole (PRILOSEC) 40 MG capsule Take 1 capsule (40 mg) by mouth daily Take 30-60 minutes before a meal. (Patient not taking: Reported on 10/10/2017) 30 capsule 0     Allergies   Allergen  "Reactions     No Known Drug Allergies          Reviewed and updated as needed this visit by clinical staff  Tobacco  Allergies  Meds  Med Hx  Surg Hx  Fam Hx  Soc Hx      Reviewed and updated as needed this visit by Provider         ROS:  Constitutional, HEENT, cardiovascular, pulmonary, gi and gu systems are negative, except as otherwise noted.      OBJECTIVE:   /80 (BP Location: Left arm, Patient Position: Sitting, Cuff Size: Adult Regular)  Pulse 74  Temp 97.5  F (36.4  C) (Oral)  Ht 1.702 m (5' 7\")  Wt 69 kg (152 lb 3.2 oz)  LMP 09/25/2017  SpO2 99%  BMI 23.84 kg/m2  Body mass index is 23.84 kg/(m^2).  GENERAL: healthy, alert and no distress  EYES: Eyes grossly normal to inspection,  conjunctivae and sclerae normal  HENT: normal cephalic/atraumatic, face is symmetrical,   RESP: no difficulty breathing, no use of accessory muscles observed.   CV: no peripheral edema and color normal, no parasternal heaves visualized.   MS: no gross musculoskeletal defects noted, no edema  SKIN: no suspicious lesions or rashes  NEURO: Normal strength and tone, mentation intact and speech normal  PSYCH: mentation appears normal, affect normal/bright      Diagnostic Test Results:  Results for orders placed or performed in visit on 10/10/17 (from the past 24 hour(s))   Wet prep   Result Value Ref Range    Specimen Description Vagina     Wet Prep Yeast seen (A)     Wet Prep No clue cells seen     Wet Prep No Trichomonas seen        ASSESSMENT/PLAN:       ICD-10-CM    1. Yeast infection of the vagina B37.3 fluconazole (DIFLUCAN) 150 MG tablet   2. Mild intermittent asthma without complication J45.20       1.Diflucan 150 mg once, may repeat in 3 days as needed     2. Stable.   Continue Albuterol 2 puffs every 6 hours as needed     Ramandeep Reid PA-C  [facility]  "

## 2017-10-10 NOTE — PATIENT INSTRUCTIONS
Diflucan 150 mg once, may repeat in 3 days as needed   Vaginal Infection: Yeast (Candidiasis)  Yeast infection occurs when yeast in the vagina increase and attacks the vaginal tissues. Yeast is a type of fungus. These infections are often caused by a type of yeast called Candida albicans. Other species of yeast can also cause infections. Factors that may make infection more likely include recent antibiotic use, douching, or increased sex. Yeast infections are more common in women who have diabetes, or are obese or pregnant, or have a weak immune system.  Symptoms of yeast infection    Clumpy or thin, white discharge, which may look like cottage cheese    No odor or minimal odor    Severe vaginal itching or burning    Burning with urination    Swelling, redness of vulva    Pain during sex  Treating yeast infection  Yeast infection is treated with a vaginal antifungal cream. In some cases, antifungal pills are prescribed instead. During treatment:    Finish all of your medicine, even if your symptoms go away.    Apply the cream before going to bed. Lie flat after applying so that it doesn't drip out.    Do not douche or use tampons.    Don't rely on a diaphragm or condoms, since the cream may weaken them.    Avoid intercourse if advised by your healthcare provider.     Should I treat a yeast infection myself?  Discuss with your healthcare provider whether you should use over-the-counter medicines to treat a yeast infection. Self-treatment may depend on whether:    You've had a yeast infection in the past.    You're at risk for STDs.  Call your healthcare provider if symptoms do not go away or come back after treatment.   Date Last Reviewed: 3/1/2017    8401-2565 [employer] [address] [place] [address], [place] [address]. All rights reserved. This information is not intended as a substitute for professional medical care. Always follow your healthcare professional's instructions.

## 2017-10-10 NOTE — TELEPHONE ENCOUNTER
"Seen in clinic today, prescribed Diflucan. Took the medication 3 hours ago, now has an itchy face and red nose.   Denies any breathing difficulty, hives or any other symptoms.   Is going to take Benadryl now.   Also was around a cat this afternoon and is allergic to cats.  Has taken Diflucan in the past without difficulty    Protocol and care advise reviewed.  Advised to wash her face and hands.  Advised to call back if further questions.    Reason for Disposition    [1] Took antihistamine (e.g., Benadryl) by mouth AND [2] no symptoms now    Additional Information    Negative: Drug overdose and nurse unable to answer question    Negative: Caller requesting information not related to medicine    Negative: Caller requesting a prescription for Strep throat and has a positive culture result    Negative: Rash while taking a medication or within 3 days of stopping it    Negative: Immunization reaction suspected    Negative: [1] Asthma and [2] having symptoms of asthma (cough, wheezing, etc)    Negative: MORE THAN A DOUBLE DOSE of a prescription or over-the-counter (OTC) drug    Negative: [1] DOUBLE DOSE (an extra dose or lesser amount) of over-the-counter (OTC) drug AND [2] any symptoms (e.g., dizziness, nausea, pain, sleepiness)    Negative: [1] DOUBLE DOSE (an extra dose or lesser amount) of prescription drug AND [2] any symptoms (e.g., dizziness, nausea, pain, sleepiness)    Negative: Took another person's prescription drug    Negative: [1] DOUBLE DOSE (an extra dose or lesser amount) of prescription drug AND [2] NO symptoms (Exception: a double dose of antibiotics)    Negative: Diabetes drug error or overdose (e.g., insulin or extra dose)    Negative: [1] Request for URGENT new prescription or refill of \"essential\" medication (i.e., likelihood of harm to patient if not taken) AND [2] triager unable to fill per unit policy    Negative: [1] Prescription not at pharmacy AND [2] was prescribed today by PCP    Negative: " Pharmacy calling with prescription questions and triager unable to answer question    Negative: Caller has URGENT medication question about med that PCP prescribed and triager unable to answer question    Negative: [1] Life-threatening reaction in the past to similar substance (e.g., food, insect bite/sting, medication, etc.) AND [2] < 2 hours since exposure    Negative: Wheezing, stridor, hoarseness, or difficulty breathing    Negative: [1] Tightness in the chest or throat AND [2] begins within 2 hours of exposure to allergic substance    Negative: Difficulty swallowing, drooling or slurred speech    Negative: Difficult to awaken or acting confused (disoriented, slurred speech)    Negative: Unresponsive, passed out or very weak    Negative: Other symptom of severe allergic reaction (Exception: Hives or facial swelling alone)    Negative: Sounds like a life-threatening emergency to the triager    Negative: [1] Widespread hives AND [2] onset > 2 hours after exposure to high-risk allergen (e.g., sting, nuts, 1st dose of antibiotic)    Negative: [1] Widespread itching AND [2] onset > 2 hours after exposure to high-risk allergen (e.g., sting, nuts, 1st dose of antibiotic)    Negative: [1] Face swelling AND [2] onset > 2 hours after exposure to high-risk allergen (e.g., sting, nuts, 1st dose of antibiotic)    Negative: [1] Widespread hives, itching or facial swelling AND [2] onset < 2 hours of exposure to high-risk allergen (e.g., sting, nuts, 1st dose of antibiotic)    Negative: [1] Vomiting or abdominal cramps AND [2] onset < 2 hours of exposure to high-risk allergen  (e.g., sting, nuts, 1st dose of antibiotic)    Negative: [1] Gave epinephrine shot AND [2] no symptoms now    Negative: [1] Gave asthma inhaler or neb AND [2] no symptoms now    Protocols used: ANAPHYLAXIS-ADULT-, MEDICATION QUESTION CALL-ADULT-

## 2017-10-10 NOTE — NURSING NOTE
"Chief Complaint   Patient presents with     Vaginal Problem       Initial /80 (BP Location: Left arm, Patient Position: Sitting, Cuff Size: Adult Regular)  Pulse 74  Temp 97.5  F (36.4  C) (Oral)  Ht 1.702 m (5' 7\")  Wt 69 kg (152 lb 3.2 oz)  LMP 09/25/2017  SpO2 99%  BMI 23.84 kg/m2 Estimated body mass index is 23.84 kg/(m^2) as calculated from the following:    Height as of this encounter: 1.702 m (5' 7\").    Weight as of this encounter: 69 kg (152 lb 3.2 oz).  Medication Reconciliation: complete   Julee Blas MA      "

## 2017-10-10 NOTE — LETTER
My Asthma Action Plan  Name: Aleida Weinberg   YOB: 1985  Date: 10/10/2017   My doctor: Ramandeep Reid PA-C   My clinic: [facility]        My Control Medicine: None  My Rescue Medicine: Albuterol (Proair/Ventolin/Proventil) inhaler 2 puffs q 6 hrs prn   My Asthma Severity: intermittent  Avoid your asthma triggers: upper respiratory infections               GREEN ZONE   Good Control    I feel good    No cough or wheeze    Can work, sleep and play without asthma symptoms       Take your asthma control medicine every day.     1. If exercise triggers your asthma, take your rescue medication    15 minutes before exercise or sports, and    During exercise if you have asthma symptoms  2. Spacer to use with inhaler: If you have a spacer, make sure to use it with your inhaler             YELLOW ZONE Getting Worse  I have ANY of these:    I do not feel good    Cough or wheeze    Chest feels tight    Wake up at night   1. Keep taking your Green Zone medications  2. Start taking your rescue medicine:    every 20 minutes for up to 1 hour. Then every 4 hours for 24-48 hours.  3. If you stay in the Yellow Zone for more than 12-24 hours, contact your doctor.  4. If you do not return to the Green Zone in 12-24 hours or you get worse, start taking your oral steroid medicine if prescribed by your provider.           RED ZONE Medical Alert - Get Help  I have ANY of these:    I feel awful    Medicine is not helping    Breathing getting harder    Trouble walking or talking    Nose opens wide to breathe       1. Take your rescue medicine NOW  2. If your provider has prescribed an oral steroid medicine, start taking it NOW  3. Call your doctor NOW  4. If you are still in the Red Zone after 20 minutes and you have not reached your doctor:    Take your rescue medicine again and    Call 911 or go to the emergency room right away    See your regular doctor within 2 weeks of an Emergency  Room or Urgent Care visit for follow-up treatment.        Electronically signed by: Ramandeep Reid, October 10, 2017    Annual Reminders:  Meet with Asthma Educator,  Flu Shot in the Fall, consider Pneumonia Vaccination for patients with asthma (aged 19 and older).    Pharmacy:    [facility] [place], [place] - [address] [facility] - [place], [place] - [address]                    Asthma Triggers  How To Control Things That Make Your Asthma Worse    Triggers are things that make your asthma worse.  Look at the list below to help you find your triggers and what you can do about them.  You can help prevent asthma flare-ups by staying away from your triggers.      Trigger                                                          What you can do   Cigarette Smoke  Tobacco smoke can make asthma worse. Do not allow smoking in your home, car or around you.  Be sure no one smokes at a child s day care or school.  If you smoke, ask your health care provider for ways to help you quit.  Ask family members to quit too.  Ask your health care provider for a referral to Quit Plan to help you quit smoking, or call 0-996-443-PLAN.     Colds, Flu, Bronchitis  These are common triggers of asthma. Wash your hands often.  Don t touch your eyes, nose or mouth.  Get a flu shot every year.     Dust Mites  These are tiny bugs that live in cloth or carpet. They are too small to see. Wash sheets and blankets in hot water every week.   Encase pillows and mattress in dust mite proof covers.  Avoid having carpet if you can. If you have carpet, vacuum weekly.   Use a dust mask and HEPA vacuum.   Pollen and Outdoor Mold  Some people are allergic to trees, grass, or weed pollen, or molds. Try to keep your windows closed.  Limit time out doors when pollen count is high.   Ask you health care provider about taking medicine during allergy season.     Animal Dander  Some  people are allergic to skin flakes, urine or saliva from pets with fur or feathers. Keep pets with fur or feathers out of your home.    If you can t keep the pet outdoors, then keep the pet out of your bedroom.  Keep the bedroom door closed.  Keep pets off cloth furniture and away from stuffed toys.     Mice, Rats, and Cockroaches  Some people are allergic to the waste from these pests.   Cover food and garbage.  Clean up spills and food crumbs.  Store grease in the refrigerator.   Keep food out of the bedroom.   Indoor Mold  This can be a trigger if your home has high moisture. Fix leaking faucets, pipes, or other sources of water.   Clean moldy surfaces.  Dehumidify basement if it is damp and smelly.   Smoke, Strong Odors, and Sprays  These can reduce air quality. Stay away from strong odors and sprays, such as perfume, powder, hair spray, paints, smoke incense, paint, cleaning products, candles and new carpet.   Exercise or Sports  Some people with asthma have this trigger. Be active!  Ask your doctor about taking medicine before sports or exercise to prevent symptoms.    Warm up for 5-10 minutes before and after sports or exercise.     Other Triggers of Asthma  Cold air:  Cover your nose and mouth with a scarf.  Sometimes laughing or crying can be a trigger.  Some medicines and food can trigger asthma.

## 2017-10-10 NOTE — MR AVS SNAPSHOT
After Visit Summary   10/10/2017    Aleida Weinberg    MRN: [id number]           Patient Information     Date Of Birth          1985        Visit Information        Provider Department      10/10/2017 9:20 AM Ramandeep Reid PA-C [facility]        Today's Diagnoses     Vaginal discharge    -  1    Need for prophylactic vaccination and inoculation against influenza        Yeast infection of the vagina          Care Instructions    Diflucan 150 mg once, may repeat in 3 days as needed   Vaginal Infection: Yeast (Candidiasis)  Yeast infection occurs when yeast in the vagina increase and attacks the vaginal tissues. Yeast is a type of fungus. These infections are often caused by a type of yeast called Candida albicans. Other species of yeast can also cause infections. Factors that may make infection more likely include recent antibiotic use, douching, or increased sex. Yeast infections are more common in women who have diabetes, or are obese or pregnant, or have a weak immune system.  Symptoms of yeast infection    Clumpy or thin, white discharge, which may look like cottage cheese    No odor or minimal odor    Severe vaginal itching or burning    Burning with urination    Swelling, redness of vulva    Pain during sex  Treating yeast infection  Yeast infection is treated with a vaginal antifungal cream. In some cases, antifungal pills are prescribed instead. During treatment:    Finish all of your medicine, even if your symptoms go away.    Apply the cream before going to bed. Lie flat after applying so that it doesn't drip out.    Do not douche or use tampons.    Don't rely on a diaphragm or condoms, since the cream may weaken them.    Avoid intercourse if advised by your healthcare provider.     Should I treat a yeast infection myself?  Discuss with your healthcare provider whether you should use over-the-counter medicines to treat a yeast infection.  What drug   Self-treatment may depend on whether:    You've had a yeast infection in the past.    You're at risk for STDs.  Call your healthcare provider if symptoms do not go away or come back after treatment.   Date Last Reviewed: 3/1/2017    0799-1412 [employer]. [address] [place] [address]. All rights reserved. This information is not intended as a substitute for professional medical care. Always follow your healthcare professional's instructions.                Follow-ups after your visit        Your next 10 appointments already scheduled     Oct 12, 2017  5:00 PM CDT   [facility]   [facility]    [address] [place] [id number] [phone]           Patient must bring picture ID. Patient should be prepared to give a urine specimen  Please do not eat 10-12 hours before your appointment if you are coming in fasting for labs on lipids, cholesterol, or glucose (sugar). Pregnant women should follow their Care Team instructions. Water with medications is okay. Do not drink coffee or other fluids. If you have concerns about taking  your medications, please ask at office or if scheduling via [employer], send a message by clicking on Secure Messaging, Message Your Care Team.            Oct 13, 2017  7:40 AM CDT   PHYSICAL with Tatum Dove PA-C   [facility]    [address] [place] [id number] [phone]            Oct 20, 2017  7:40 AM CDT   Return Visit with Lavon Light MD   [facility] [place] ([facility] [place]    [address] [facility] [address]  [place] [id number] [phone]              Who to contact     If you have questions or need follow up information about today's clinic visit or your schedule please contact [facility] directly at [phone].  Normal or non-critical lab and imaging results will be  "communicated to you by [employer], letter or phone within 4 business days after the clinic has received the results. If you do not hear from us within 7 days, please contact the clinic through dineout or phone. If you have a critical or abnormal lab result, we will notify you by phone as soon as possible.  Submit refill requests through dineout or call your pharmacy and they will forward the refill request to us. Please allow 3 business days for your refill to be completed.          Additional Information About Your Visit        dineout Information     dineout gives you secure access to your electronic health record. If you see a primary care provider, you can also send messages to your care team and make appointments. If you have questions, please call your primary care clinic.  If you do not have a primary care provider, please call [phone] and they will assist you.        Care EveryWhere ID     This is your Care EveryWhere ID. This could be used by other organizations to access your [facility] medical records  [id number]        Your Vitals Were     Pulse Temperature Height Last Period Pulse Oximetry BMI (Body Mass Index)    74 97.5  F (36.4  C) (Oral) 1.702 m (5' 7\") 09/25/2017 99% 23.84 kg/m2       Blood Pressure from Last 3 Encounters:   10/10/17 132/80   07/21/17 118/72   04/21/17 127/57    Weight from Last 3 Encounters:   10/10/17 69 kg (152 lb 3.2 oz)   07/21/17 71.4 kg (157 lb 6.4 oz)   04/21/17 72.3 kg (159 lb 6.4 oz)              We Performed the Following     Wet prep          Today's Medication Changes          These changes are accurate as of: 10/10/17 10:02 AM.  If you have any questions, ask your nurse or doctor.               Start taking these medicines.        Dose/Directions    fluconazole 150 MG tablet   Commonly known as:  DIFLUCAN   Used for:  Yeast infection of the vagina   Started by:  Ramandeep Reid PA-C        Dose:  150 mg   Take 1 tablet (150 mg) by mouth once for 1 " dose May repeat in 3 days as needed if symptoms are not fully resolved   Quantity:  2 tablet   Refills:  0            Where to get your medicines      These medications were sent to [facility] - [place], [place] - [address]  [address], [place] [address]     Phone:  [phone]     fluconazole 150 MG tablet                Primary Care Provider Office Phone # Fax #    Tatum Doreen Dove PA-C [phone]       [facility] [address]        Equal Access to Services     [facility]: Hadii aad ku hadasho Soomaali, waaxda luqadaha, qaybta kaalmada adeegyada, waxay idiin hayaan zully hartleyararyan newell . So [facility] [phone].    ATENCIÓN: Si habla español, tiene a rey disposición servicios gratuitos de asistencia lingüística. [facility] [phone].    We comply with applicable federal civil rights laws and [place] laws. We do not discriminate on the basis of race, color, national origin, age, disability, sex, sexual orientation, or gender identity.            Thank you!     Thank you for choosing [facility]  for your care. Our goal is always to provide you with excellent care. Hearing back from our patients is one way we can continue to improve our services. Please take a few minutes to complete the written survey that you may receive in the mail after your visit with us. Thank you!             Your Updated Medication List - Protect others around you: Learn how to safely use, store and throw away your medicines at www.disposemymeds.org.          This list is accurate as of: 10/10/17 10:02 AM.  Always use your most recent med list.                   Brand Name Dispense Instructions for use Diagnosis    albuterol 108 (90 BASE) MCG/ACT Inhaler    PROAIR HFA/PROVENTIL HFA/VENTOLIN HFA    1 Inhaler    Inhale 2 puffs into the lungs every 6 hours as needed for shortness of breath / dyspnea    Intermittent asthma, uncomplicated        amLODIPine 10 MG tablet    NORVASC    90 tablet    Take 1 tablet (10 mg) by mouth daily    Raynaud's phenomenon without gangrene       azaTHIOprine 50 MG tablet    IMURAN    270 tablet    Take 3 tablets (150 mg) by mouth daily    Other systemic lupus erythematosus with other organ involvement (H)       cyclobenzaprine 10 MG tablet    FLEXERIL    30 tablet    Take 0.5-1 tablets (5-10 mg) by mouth 3 times daily as needed for muscle spasms    Acute midline low back pain with right-sided sciatica       fluconazole 150 MG tablet    DIFLUCAN    2 tablet    Take 1 tablet (150 mg) by mouth once for 1 dose May repeat in 3 days as needed if symptoms are not fully resolved    Yeast infection of the vagina       hydroxychloroquine 200 MG tablet    PLAQUENIL    180 tablet    Take 2 tablets (400 mg) by mouth daily    Other systemic lupus erythematosus with other organ involvement (H)       LORazepam 0.5 MG tablet    ATIVAN    10 tablet    Take 1 tablet (0.5 mg) by mouth every 8 hours as needed for anxiety    Anxiety       MIRENA (52 MG) 20 MCG/24HR IUD   Generic drug:  levonorgestrel      1 each by Intrauterine route once        omeprazole 40 MG capsule    priLOSEC    30 capsule    Take 1 capsule (40 mg) by mouth daily Take 30-60 minutes before a meal.    Abdominal pain, epigastric       predniSONE 5 MG tablet    DELTASONE    90 tablet    Take 1 tablet (5 mg) by mouth daily as needed for lupus    Systemic lupus erythematosus (H), High risk medications (not anticoagulants) long-term use       traMADol 50 MG tablet    ULTRAM    30 tablet    Take 1-2 tablets ( mg) by mouth every 6 hours as needed for pain maximum 8 tablet(s) per day    Acute midline low back pain with right-sided sciatica       vitamin D 2000 UNITS tablet     100 tablet    Take 2,000 Units by mouth daily    Vitamin D deficiency

## 2017-10-11 ENCOUNTER — MYC MEDICAL ADVICE (OUTPATIENT)
Dept: FAMILY MEDICINE | Facility: CLINIC | Age: 32
End: 2017-10-11

## 2017-10-11 ENCOUNTER — NURSE TRIAGE (OUTPATIENT)
Dept: NURSING | Facility: CLINIC | Age: 32
End: 2017-10-11

## 2017-10-11 ENCOUNTER — TELEPHONE (OUTPATIENT)
Dept: FAMILY MEDICINE | Facility: CLINIC | Age: 32
End: 2017-10-11

## 2017-10-11 ASSESSMENT — ASTHMA QUESTIONNAIRES: ACT_TOTALSCORE: 25

## 2017-10-11 NOTE — TELEPHONE ENCOUNTER
Spoke with patient - pt states she was told by [facility]NA RN to take Benedryl for hives on upper chest and face.    No swelling noted to lips, mouth, tongue or throat. Pt denies any difficulty swallowing or breathing.  As far as vaginal bleeding noted, pt states it is more of spotting, she only notices when wiping. Pt has Mirena so has not gotten her period for a while.   Pt states she is seeing her PCP on Friday 10/13 for annual physical.    Nursing Advice: Continue taking Benedryl every 4-6 hours or can take Claritin or Zyrtec once a day until sees PCP in 2 days.  If however her symptoms worsen or affect her mouth, throat, or breathing, pt should seek prompt medical attention.    For the vaginal spotting, does not seem worrisome but if bleeding worsens, see medical provider.  For vaginal discharge, Diflucan can take 3 days to see resolution of symptoms so because only 1 day since treatment, should give more time. Pt can discuss options on Friday if still having vaginal discharge  / if needs to add Diflucan to allergy list.    Offered to consult with provider when she is in clinic tomorrow but patient states she will speak with her PCP on Friday.  Advised If further questions/concerns or if symptoms worsen or new symptoms develop, call back clinic as soon as possible. After hours, there is a 24 hr nurse line available by calling main clinic phone #.    Pt verbalized understanding.      Natanael Meza RN

## 2017-10-11 NOTE — TELEPHONE ENCOUNTER
Aleida took Diflucan one dose.  Yesterday hives developed on face.  Aleida is taking Benadryl.  Today noticed vaginal bleeding.  Aleida still has vaginal discharge.    Aleida is requesting to speak with Ramandeep Hadley regarding this issue.  Please phone Aleida within two hours  At [phone].

## 2017-10-11 NOTE — TELEPHONE ENCOUNTER
Clinic Action Needed:  Yes, callback  [facility]NA Triage Call  Presenting Problem:    Aleida took Diflucan one dose.  Yesterday hives developed on face.  Aleida is taking Benadryl.  Today noticed vaginal bleeding.  Aleida still has vaginal discharge.    Aleida is requesting to speak with Ramandeep Hadley regarding this issue.  Please phone Aleida within two hours  At [phone].      Routed to:  RN Pool  Please be sure to close this encounter once this patient's issue/question has been addressed.    Aisha Lucas RN/[facility]

## 2017-10-12 DIAGNOSIS — M32.19 OTHER SYSTEMIC LUPUS ERYTHEMATOSUS WITH OTHER ORGAN INVOLVEMENT (H): ICD-10-CM

## 2017-10-12 DIAGNOSIS — Z79.899 HIGH RISK MEDICATIONS (NOT ANTICOAGULANTS) LONG-TERM USE: ICD-10-CM

## 2017-10-12 LAB
ALBUMIN UR-MCNC: NEGATIVE MG/DL
APPEARANCE UR: CLEAR
BACTERIA #/AREA URNS HPF: ABNORMAL /HPF
BASOPHILS # BLD AUTO: 0 10E9/L (ref 0–0.2)
BASOPHILS NFR BLD AUTO: 0.3 %
BILIRUB UR QL STRIP: NEGATIVE
COLOR UR AUTO: YELLOW
DIFFERENTIAL METHOD BLD: ABNORMAL
EOSINOPHIL # BLD AUTO: 0 10E9/L (ref 0–0.7)
EOSINOPHIL NFR BLD AUTO: 0.6 %
ERYTHROCYTE [DISTWIDTH] IN BLOOD BY AUTOMATED COUNT: 13.5 % (ref 10–15)
ERYTHROCYTE [SEDIMENTATION RATE] IN BLOOD BY WESTERGREN METHOD: 10 MM/H (ref 0–20)
GLUCOSE UR STRIP-MCNC: NEGATIVE MG/DL
HCT VFR BLD AUTO: 37.8 % (ref 35–47)
HGB BLD-MCNC: 13.5 G/DL (ref 11.7–15.7)
HGB UR QL STRIP: ABNORMAL
KETONES UR STRIP-MCNC: NEGATIVE MG/DL
LEUKOCYTE ESTERASE UR QL STRIP: ABNORMAL
LYMPHOCYTES # BLD AUTO: 1.5 10E9/L (ref 0.8–5.3)
LYMPHOCYTES NFR BLD AUTO: 22.8 %
MCH RBC QN AUTO: 35 PG (ref 26.5–33)
MCHC RBC AUTO-ENTMCNC: 35.7 G/DL (ref 31.5–36.5)
MCV RBC AUTO: 98 FL (ref 78–100)
MONOCYTES # BLD AUTO: 0.5 10E9/L (ref 0–1.3)
MONOCYTES NFR BLD AUTO: 7.4 %
NEUTROPHILS # BLD AUTO: 4.5 10E9/L (ref 1.6–8.3)
NEUTROPHILS NFR BLD AUTO: 68.9 %
NITRATE UR QL: NEGATIVE
NON-SQ EPI CELLS #/AREA URNS LPF: ABNORMAL /LPF
PH UR STRIP: 6.5 PH (ref 5–7)
PLATELET # BLD AUTO: 311 10E9/L (ref 150–450)
RBC # BLD AUTO: 3.86 10E12/L (ref 3.8–5.2)
RBC #/AREA URNS AUTO: ABNORMAL /HPF
SOURCE: ABNORMAL
SP GR UR STRIP: 1.01 (ref 1–1.03)
UROBILINOGEN UR STRIP-ACNC: 0.2 EU/DL (ref 0.2–1)
WBC # BLD AUTO: 6.5 10E9/L (ref 4–11)
WBC #/AREA URNS AUTO: ABNORMAL /HPF

## 2017-10-12 PROCEDURE — 85652 RBC SED RATE AUTOMATED: CPT | Performed by: INTERNAL MEDICINE

## 2017-10-12 PROCEDURE — 80053 COMPREHEN METABOLIC PANEL: CPT | Performed by: INTERNAL MEDICINE

## 2017-10-12 PROCEDURE — 36415 COLL VENOUS BLD VENIPUNCTURE: CPT | Performed by: INTERNAL MEDICINE

## 2017-10-12 PROCEDURE — 86225 DNA ANTIBODY NATIVE: CPT | Performed by: INTERNAL MEDICINE

## 2017-10-12 PROCEDURE — 81001 URINALYSIS AUTO W/SCOPE: CPT | Performed by: INTERNAL MEDICINE

## 2017-10-12 PROCEDURE — 86140 C-REACTIVE PROTEIN: CPT | Performed by: INTERNAL MEDICINE

## 2017-10-12 PROCEDURE — 82550 ASSAY OF CK (CPK): CPT | Performed by: INTERNAL MEDICINE

## 2017-10-12 PROCEDURE — 84156 ASSAY OF PROTEIN URINE: CPT | Performed by: INTERNAL MEDICINE

## 2017-10-12 PROCEDURE — 86160 COMPLEMENT ANTIGEN: CPT | Performed by: INTERNAL MEDICINE

## 2017-10-12 PROCEDURE — 85025 COMPLETE CBC W/AUTO DIFF WBC: CPT | Performed by: INTERNAL MEDICINE

## 2017-10-12 NOTE — TELEPHONE ENCOUNTER
Continue taking Benadryl until hives go away. May take 1-3 weeks. May stop benadryl once hives are resolved.   Use over the counter Monistat 7 days treatment for yeast infection now and in the future as needed .    Jill Reid PAC

## 2017-10-13 ENCOUNTER — OFFICE VISIT (OUTPATIENT)
Dept: FAMILY MEDICINE | Facility: CLINIC | Age: 32
End: 2017-10-13
Payer: COMMERCIAL

## 2017-10-13 VITALS
TEMPERATURE: 98.2 F | HEIGHT: 67 IN | DIASTOLIC BLOOD PRESSURE: 62 MMHG | SYSTOLIC BLOOD PRESSURE: 118 MMHG | HEART RATE: 68 BPM | BODY MASS INDEX: 23.86 KG/M2 | WEIGHT: 152 LBS

## 2017-10-13 DIAGNOSIS — M32.9 SYSTEMIC LUPUS ERYTHEMATOSUS, UNSPECIFIED SLE TYPE, UNSPECIFIED ORGAN INVOLVEMENT STATUS (H): ICD-10-CM

## 2017-10-13 DIAGNOSIS — G47.09 OTHER INSOMNIA: ICD-10-CM

## 2017-10-13 DIAGNOSIS — F41.9 ANXIETY: ICD-10-CM

## 2017-10-13 DIAGNOSIS — B37.31 YEAST INFECTION OF THE VAGINA: ICD-10-CM

## 2017-10-13 DIAGNOSIS — Z23 NEED FOR PROPHYLACTIC VACCINATION AND INOCULATION AGAINST INFLUENZA: ICD-10-CM

## 2017-10-13 DIAGNOSIS — L50.9 HIVES: ICD-10-CM

## 2017-10-13 DIAGNOSIS — Z00.01 ENCOUNTER FOR ROUTINE ADULT MEDICAL EXAM WITH ABNORMAL FINDINGS: Primary | ICD-10-CM

## 2017-10-13 DIAGNOSIS — J45.20 MILD INTERMITTENT ASTHMA WITHOUT COMPLICATION: ICD-10-CM

## 2017-10-13 LAB
ALBUMIN SERPL-MCNC: 4.5 G/DL (ref 3.4–5)
ALP SERPL-CCNC: 48 U/L (ref 40–150)
ALT SERPL W P-5'-P-CCNC: 23 U/L (ref 0–50)
ANION GAP SERPL CALCULATED.3IONS-SCNC: 11 MMOL/L (ref 3–14)
AST SERPL W P-5'-P-CCNC: 16 U/L (ref 0–45)
BILIRUB SERPL-MCNC: 0.7 MG/DL (ref 0.2–1.3)
BUN SERPL-MCNC: 10 MG/DL (ref 7–30)
CALCIUM SERPL-MCNC: 9.6 MG/DL (ref 8.5–10.1)
CHLORIDE SERPL-SCNC: 105 MMOL/L (ref 94–109)
CK SERPL-CCNC: 42 U/L (ref 30–225)
CO2 SERPL-SCNC: 22 MMOL/L (ref 20–32)
CREAT SERPL-MCNC: 0.62 MG/DL (ref 0.52–1.04)
CREAT UR-MCNC: 27 MG/DL
CRP SERPL-MCNC: <2.9 MG/L (ref 0–8)
GFR SERPL CREATININE-BSD FRML MDRD: >90 ML/MIN/1.7M2
GLUCOSE SERPL-MCNC: 74 MG/DL (ref 70–99)
POTASSIUM SERPL-SCNC: 3.8 MMOL/L (ref 3.4–5.3)
PROT SERPL-MCNC: 7.6 G/DL (ref 6.8–8.8)
PROT UR-MCNC: <0.05 G/L
PROT/CREAT 24H UR: NORMAL G/G CR (ref 0–0.2)
SODIUM SERPL-SCNC: 138 MMOL/L (ref 133–144)
SPECIMEN SOURCE: ABNORMAL
WET PREP SPEC: ABNORMAL

## 2017-10-13 PROCEDURE — 99395 PREV VISIT EST AGE 18-39: CPT | Mod: 25 | Performed by: PHYSICIAN ASSISTANT

## 2017-10-13 PROCEDURE — 90471 IMMUNIZATION ADMIN: CPT | Performed by: PHYSICIAN ASSISTANT

## 2017-10-13 PROCEDURE — 99213 OFFICE O/P EST LOW 20 MIN: CPT | Mod: 25 | Performed by: PHYSICIAN ASSISTANT

## 2017-10-13 PROCEDURE — 90686 IIV4 VACC NO PRSV 0.5 ML IM: CPT | Performed by: PHYSICIAN ASSISTANT

## 2017-10-13 PROCEDURE — 87210 SMEAR WET MOUNT SALINE/INK: CPT | Performed by: PHYSICIAN ASSISTANT

## 2017-10-13 RX ORDER — LORAZEPAM 0.5 MG/1
0.5 TABLET ORAL EVERY 8 HOURS PRN
Qty: 10 TABLET | Refills: 0 | Status: SHIPPED | OUTPATIENT
Start: 2017-10-13 | End: 2018-10-26

## 2017-10-13 RX ORDER — TRAZODONE HYDROCHLORIDE 50 MG/1
25 TABLET, FILM COATED ORAL
Qty: 30 TABLET | Refills: 0 | Status: SHIPPED | OUTPATIENT
Start: 2017-10-13 | End: 2019-05-31

## 2017-10-13 NOTE — PROGRESS NOTES
Injectable Influenza Immunization Documentation    1.  Is the person to be vaccinated sick today?   No    2. Does the person to be vaccinated have an allergy to a component   of the vaccine?   No    3. Has the person to be vaccinated ever had a serious reaction   to influenza vaccine in the past?   No    4. Has the person to be vaccinated ever had Guillain-Barré syndrome?   No    Form completed by Nani Mae, Certified Medical Assistant (AAMA)

## 2017-10-13 NOTE — PROGRESS NOTES
SUBJECTIVE:   CC: Aleida Weinberg is an 32 year old woman who presents for preventive health visit.     Physical   Annual:     Getting at least 3 servings of Calcium per day::  Yes    Bi-annual eye exam::  Yes    Dental care twice a year::  Yes    Sleep apnea or symptoms of sleep apnea::  None    Diet::  Regular (no restrictions)    Frequency of exercise::  None    Taking medications regularly::  Yes    Medication side effects::  None    Additional concerns today::  YES (allergic reaction to Diflucan - hives )      Had recent reaction to Diflucan. Was seen for a vaginal yeast infection earlier this week.  Was treated with Diflucan and developed hives to face and arms.  This has resolved with Benadryl but still having facial redness and skin is now peeling.  Vaginal symptoms do seem to be improved.  Has had some mild vaginal spotting.  Has Mirena in place. Would like this checked today.    Having problems falling asleep. Feels this is related to being uncomfortable, pain at times.  Denies anxiety.  Once she falls asleep, is able to stay asleep. Flexeril helpful times. Inquiring about Trazodone.    Has improved her sleep hygiene, has done meditation, etc.    Asthma Follow-Up    Was ACT completed today?  No      Respiratory symptoms:   Cough: No   Wheezing: No   Shortness of breath: No    Use of short- acting(rescue) inhaler: as needed    Taking controlled (daily) meds as prescribed: Yes    ER/UC visits or hospital admissions since last visit: none     Recent asthma triggers that patient is dealing with: None             Today's PHQ-2 Score:   PHQ-2 ( 1999 Pfizer) 10/13/2017   Q1: Little interest or pleasure in doing things 0   Q2: Feeling down, depressed or hopeless 0   PHQ-2 Score 0   Q1: Little interest or pleasure in doing things Not at all   Q2: Feeling down, depressed or hopeless Not at all   PHQ-2 Score 0       Abuse: Current or Past(Physical, Sexual or Emotional)- No  Do you feel safe in your  "environment - Yes    Social History   Substance Use Topics     Smoking status: Former Smoker     Packs/day: 0.50     Years: 2.50     Types: Cigarettes     Quit date: 11/11/2005     Smokeless tobacco: Never Used     Alcohol use 0.0 oz/week     0 Standard drinks or equivalent per week      Comment: rarely - 1 monthly     The patient does not drink >3 drinks per day nor >7 drinks per week.    Reviewed orders with patient.  Reviewed health maintenance and updated orders accordingly - Yes    Mammogram not appropriate for this patient based on age.    Pertinent mammograms are reviewed under the imaging tab.  History of abnormal Pap smear: NO - age 30-65 PAP every 5 years with negative HPV co-testing recommended    Reviewed and updated as needed this visit by clinical staff  Tobacco  Allergies  Meds  Med Hx  Surg Hx  Fam Hx  Soc Hx        Reviewed and updated as needed this visit by Provider              ROS:  C: NEGATIVE for fever, chills, change in weight  INTEGUMENTARY/SKIN: as above  E: NEGATIVE for vision changes or irritation  ENT: NEGATIVE for ear, mouth and throat problems  R: NEGATIVE for significant cough or SOB  B: NEGATIVE for masses, tenderness or discharge  CV: NEGATIVE for chest pain, palpitations or peripheral edema  GI: NEGATIVE for nausea, abdominal pain, heartburn, or change in bowel habits  : as above  M: NEGATIVE for significant arthralgias or myalgia  N: NEGATIVE for weakness, dizziness or paresthesias  P: NEGATIVE for changes in mood or affect     OBJECTIVE:   /62 (BP Location: Right arm, Cuff Size: Adult Regular)  Pulse 68  Temp 98.2  F (36.8  C) (Oral)  Ht 5' 7.25\" (1.708 m)  Wt 152 lb (68.9 kg)  LMP 09/25/2017  BMI 23.63 kg/m2  EXAM:  GENERAL: healthy, alert and no distress  EYES: Eyes grossly normal to inspection, PERRL and conjunctivae and sclerae normal  HENT: ear canals and TM's normal, nose and mouth without ulcers or lesions  NECK: no adenopathy, no asymmetry, masses, or " scars and thyroid normal to palpation  RESP: lungs clear to auscultation - no rales, rhonchi or wheezes  BREAST: normal without masses, tenderness or nipple discharge and no palpable axillary masses or adenopathy  CV: regular rate and rhythm, normal S1 S2, no S3 or S4, no murmur, click or rub, no peripheral edema and peripheral pulses strong  ABDOMEN: soft, nontender, no hepatosplenomegaly, no masses and bowel sounds normal   (female): normal female external genitalia, normal urethral meatus, vaginal mucosa pink, moist, well rugated, vaginal and cervical inflammation and erythema, white thick discharge present. Unable to locate IUD strings.  MS: no gross musculoskeletal defects noted, no edema  SKIN: no suspicious lesions or rashes  NEURO: Normal strength and tone, mentation intact and speech normal  PSYCH: mentation appears normal, affect normal/bright    ASSESSMENT/PLAN:   1. Encounter for routine adult medical exam with abnormal findings  Follow up pending above results. Encouraged healthy diet and regular exercise, monthy SBE, and yearly exams.    2. Hives  Resolving.  Pt will continue to monitor for now. Will follow up with Rheumatology to discuss whether or not this was related to her rheumatologic drugs or SLE.    3. Yeast infection of the vagina  Repeat wet prep.  Symptoms are improving.  Unable to locate IUD strings. Pt will return when less inflammation for recheck.  If unable to locate will order US.  - Wet prep    4. Systemic lupus erythematosus, unspecified SLE type, unspecified organ involvement status (H)  Stable, followed by Rheumatology    5. Other insomnia  Multifactorial.  Discussed good sleep hygiene.  Trial of below medication as directed with full discussion of risks, benefits, and possible adverse effects.  - traZODone (DESYREL) 50 MG tablet; Take 0.5 tablets (25 mg) by mouth nightly as needed for sleep  Dispense: 30 tablet; Refill: 0    6. Anxiety  Refills of below medications for stable  "chronic conditions.  - LORazepam (ATIVAN) 0.5 MG tablet; Take 1 tablet (0.5 mg) by mouth every 8 hours as needed for anxiety  Dispense: 10 tablet; Refill: 0    7. Mild intermittent asthma without complication  Stable, well controlled.    8. Need for prophylactic vaccination and inoculation against influenza  - FLU VAC, SPLIT VIRUS IM > 3 YO (QUADRIVALENT) [06448]  - Vaccine Administration, Initial [47741]      COUNSELING:  Reviewed preventive health counseling, as reflected in patient instructions         reports that she quit smoking about 11 years ago. Her smoking use included Cigarettes. She has a 1.25 pack-year smoking history. She has never used smokeless tobacco.    Estimated body mass index is 23.63 kg/(m^2) as calculated from the following:    Height as of this encounter: 5' 7.25\" (1.708 m).    Weight as of this encounter: 152 lb (68.9 kg).         Counseling Resources:  ATP IV Guidelines  Pooled Cohorts Equation Calculator  Breast Cancer Risk Calculator  FRAX Risk Assessment  ICSI Preventive Guidelines  Dietary Guidelines for Americans, 2010  USDA's MyPlate  ASA Prophylaxis  Lung CA Screening    Tatum Dove PA-C  [facility] for HPI/ROS submitted by the patient on 10/13/2017   PHQ-2 Score: 0    "

## 2017-10-13 NOTE — MR AVS SNAPSHOT
After Visit Summary   10/13/2017    Aleida Weinberg    MRN: [id number]           Patient Information     Date Of Birth          1985        Visit Information        Provider Department      10/13/2017 7:40 AM Tatum Dove PA-C [facility]        Today's Diagnoses     Encounter for routine adult medical exam with abnormal findings    -  1    Hives        Yeast infection of the vagina        Mild intermittent asthma without complication        Anxiety        Other insomnia        Need for prophylactic vaccination and inoculation against influenza          Care Instructions    Tatum or her team will be in touch with you with results of today's wet prep.  Trial of trazodone 25 mg (1/2 tablet) at night.  May need to try topical yeast medication - 3 or 7 day.      JJ Heller PA-C    Preventive Health Recommendations  Female Ages 26 - 39  Yearly exam:   See your health care provider every year in order to    Review health changes.     Discuss preventive care.      Review your medicines if you your doctor has prescribed any.    Until age 30: Get a Pap test every three years (more often if you have had an abnormal result).    After age 30: Talk to your doctor about whether you should have a Pap test every 3 years or have a Pap test with HPV screening every 5 years.   You do not need a Pap test if your uterus was removed (hysterectomy) and you have not had cancer.  You should be tested each year for STDs (sexually transmitted diseases), if you're at risk.   Talk to your provider about how often to have your cholesterol checked.  If you are at risk for diabetes, you should have a diabetes test (fasting glucose).  Shots: Get a flu shot each year. Get a tetanus shot every 10 years.   Nutrition:     Eat at least 5 servings of fruits and vegetables each day.    Eat whole-grain bread, whole-wheat pasta and brown rice instead of white grains and rice.    Talk to your  provider about Calcium and Vitamin D.     Lifestyle    Exercise at least 150 minutes a week (30 minutes a day, 5 days of the week). This will help you control your weight and prevent disease.    Limit alcohol to one drink per day.    No smoking.     Wear sunscreen to prevent skin cancer.    See your dentist every six months for an exam and cleaning.    [facility]   Discharged by : Nani GAUTHIER Certified Medical Assistant (AAMA)   Paper scripts provided to patient : 1   If you have any questions regarding to your visit please contact your care team:   [facility] Hours Telephone Number   ZAK [address] Dr., PA-C    7am-7pm Monday - Thursday   7am-5pm Fridays  [phone]   (Appointment scheduling available 24/7)   RN Line   [phone] option 2       What options do I have for visits at the clinic other than the traditional office visit?   To expand how we care for you, many of our providers are utilizing electronic visits (e-visits) and telephone visits, when medically appropriate, for interactions with their patients rather than a visit in the clinic. We also offer nurse visits for many medical concerns. Just like any other service, we will bill your insurance company for this type of visit based on time spent on the phone with your provider. Not all insurance companies cover these visits. Please check with your medical insurance if this type of visit is covered. You will be responsible for any charges that are not paid by your insurance.     E-visits via Certess: generally incur a $35.00 fee.     Telephone visits:   Time spent on the phone: *charged based on time that is spent on the phone in increments of 10 minutes. Estimated cost:   5-10 mins $30.00   11-20 mins. $59.00   21-30 mins. $85.00   Use Certess (secure email communication and access to your chart) to send your primary care provider a message or make an appointment. Ask  someone on your Team how to sign up for piSociety.   For a Price Quote for your services, please call our Consumer Price Line at [phone].   As always, Thank you for trusting us with your health care needs!                [facility]:    Scheduling Appointments  [place]  Call: [phone]    Jessica Marceloshila, [facility]  Call: [phone]    [facility]  Call: [phone]                    Follow-ups after your visit        Your next 10 appointments already scheduled     Oct 20, 2017  7:40 AM CDT   Return Visit with Lavon Light MD   [facility] ([facility]    [address] [facility] [address] [phone]              Who to contact     If you have questions or need follow up information about today's clinic visit or your schedule please contact [facility] directly at [phone].  Normal or non-critical lab and imaging results will be communicated to you by Playtohart, letter or phone within 4 business days after the clinic has received the results. If you do not hear from us within 7 days, please contact the clinic through Playtohart or phone. If you have a critical or abnormal lab result, we will notify you by phone as soon as possible.  Submit refill requests through piSociety or call your pharmacy and they will forward the refill request to us. Please allow 3 business days for your refill to be completed.          Additional Information About Your Visit        piSociety Information     piSociety gives you secure access to your electronic health record. If you see a primary care provider, you can also send messages to your care team and make appointments. If you have questions, please call your primary care clinic.  If you do not have a primary care provider, please call [phone] and they will assist you.        Care EveryWhere ID     This is your Care EveryWhere ID. This  "could be used by other organizations to access your [place] medical records  [id number]        Your Vitals Were     Pulse Temperature Height Last Period BMI (Body Mass Index)       68 98.2  F (36.8  C) (Oral) 5' 7.25\" (1.708 m) 09/25/2017 23.63 kg/m2        Blood Pressure from Last 3 Encounters:   10/13/17 118/62   10/10/17 132/80   07/21/17 118/72    Weight from Last 3 Encounters:   10/13/17 152 lb (68.9 kg)   10/10/17 152 lb 3.2 oz (69 kg)   07/21/17 157 lb 6.4 oz (71.4 kg)              We Performed the Following     Wet prep          Today's Medication Changes          These changes are accurate as of: 10/13/17  8:33 AM.  If you have any questions, ask your nurse or doctor.               Start taking these medicines.        Dose/Directions    traZODone 50 MG tablet   Commonly known as:  DESYREL   Used for:  Other insomnia   Started by:  Tatum Dove PA-C        Dose:  25 mg   Take 0.5 tablets (25 mg) by mouth nightly as needed for sleep   Quantity:  30 tablet   Refills:  0            Where to get your medicines      These medications were sent to [place] [facility] [address].  [address] [place] [address]     Phone:  [phone]     traZODone 50 MG tablet         Some of these will need a paper prescription and others can be bought over the counter.  Ask your nurse if you have questions.     Bring a paper prescription for each of these medications     LORazepam 0.5 MG tablet                Primary Care Provider Office Phone # Fax #    Tatum Dove PA-C [phone]       [address] [place], [place] [id number]        Equal Access to Services     CACHORRO MADRIGAL : Dallas Bee, walinda luqadaha, qaybta kaalmada adeegyada, mira concepcion. So [facility] [phone].    ATENCIÓN: Si habla español, tiene a rey disposición servicios gratuitos de asistencia lingüística. Llame al " [phone].    We comply with applicable federal civil rights laws and [place] laws. We do not discriminate on the basis of race, color, national origin, age, disability, sex, sexual orientation, or gender identity.            Thank you!     Thank you for choosing [facility]  for your care. Our goal is always to provide you with excellent care. Hearing back from our patients is one way we can continue to improve our services. Please take a few minutes to complete the written survey that you may receive in the mail after your visit with us. Thank you!             Your Updated Medication List - Protect others around you: Learn how to safely use, store and throw away your medicines at www.disposemymeds.org.          This list is accurate as of: 10/13/17  8:33 AM.  Always use your most recent med list.                   Brand Name Dispense Instructions for use Diagnosis    albuterol 108 (90 BASE) MCG/ACT Inhaler    PROAIR HFA/PROVENTIL HFA/VENTOLIN HFA    1 Inhaler    Inhale 2 puffs into the lungs every 6 hours as needed for shortness of breath / dyspnea    Intermittent asthma, uncomplicated       amLODIPine 10 MG tablet    NORVASC    90 tablet    Take 1 tablet (10 mg) by mouth daily    Raynaud's phenomenon without gangrene       azaTHIOprine 50 MG tablet    IMURAN    270 tablet    Take 3 tablets (150 mg) by mouth daily    Other systemic lupus erythematosus with other organ involvement (H)       cyclobenzaprine 10 MG tablet    FLEXERIL    30 tablet    Take 0.5-1 tablets (5-10 mg) by mouth 3 times daily as needed for muscle spasms    Acute midline low back pain with right-sided sciatica       hydroxychloroquine 200 MG tablet    PLAQUENIL    180 tablet    Take 2 tablets (400 mg) by mouth daily    Other systemic lupus erythematosus with other organ involvement (H)       LORazepam 0.5 MG tablet    ATIVAN    10 tablet    Take 1 tablet (0.5 mg) by mouth every 8 hours as needed for anxiety    Anxiety        MIRENA (52 MG) 20 MCG/24HR IUD   Generic drug:  levonorgestrel      1 each by Intrauterine route once        omeprazole 40 MG capsule    priLOSEC    30 capsule    Take 1 capsule (40 mg) by mouth daily Take 30-60 minutes before a meal.    Abdominal pain, epigastric       predniSONE 5 MG tablet    DELTASONE    90 tablet    Take 1 tablet (5 mg) by mouth daily as needed for lupus    Systemic lupus erythematosus (H), High risk medications (not anticoagulants) long-term use       traMADol 50 MG tablet    ULTRAM    30 tablet    Take 1-2 tablets ( mg) by mouth every 6 hours as needed for pain maximum 8 tablet(s) per day    Acute midline low back pain with right-sided sciatica       traZODone 50 MG tablet    DESYREL    30 tablet    Take 0.5 tablets (25 mg) by mouth nightly as needed for sleep    Other insomnia       vitamin D 2000 UNITS tablet     100 tablet    Take 2,000 Units by mouth daily    Vitamin D deficiency

## 2017-10-13 NOTE — NURSING NOTE
"Chief Complaint   Patient presents with     Physical     Flu Shot       Initial /62 (BP Location: Right arm, Cuff Size: Adult Regular)  Pulse 68  Temp 98.2  F (36.8  C) (Oral)  Ht 5' 7.25\" (1.708 m)  Wt 152 lb (68.9 kg)  LMP 09/25/2017  BMI 23.63 kg/m2 Estimated body mass index is 23.63 kg/(m^2) as calculated from the following:    Height as of this encounter: 5' 7.25\" (1.708 m).    Weight as of this encounter: 152 lb (68.9 kg).  Medication Reconciliation: complete     Grace Can CMA (AAMA)      "

## 2017-10-13 NOTE — PATIENT INSTRUCTIONS
Tatum or her team will be in touch with you with results of today's wet prep.  Trial of trazodone 25 mg (1/2 tablet) at night.  May need to try topical yeast medication - 3 or 7 day.    JJ Heller, PADesireeC    Preventive Health Recommendations  Female Ages 26 - 39  Yearly exam:   See your health care provider every year in order to    Review health changes.     Discuss preventive care.      Review your medicines if you your doctor has prescribed any.    Until age 30: Get a Pap test every three years (more often if you have had an abnormal result).    After age 30: Talk to your doctor about whether you should have a Pap test every 3 years or have a Pap test with HPV screening every 5 years.   You do not need a Pap test if your uterus was removed (hysterectomy) and you have not had cancer.  You should be tested each year for STDs (sexually transmitted diseases), if you're at risk.   Talk to your provider about how often to have your cholesterol checked.  If you are at risk for diabetes, you should have a diabetes test (fasting glucose).  Shots: Get a flu shot each year. Get a tetanus shot every 10 years.   Nutrition:     Eat at least 5 servings of fruits and vegetables each day.    Eat whole-grain bread, whole-wheat pasta and brown rice instead of white grains and rice.    Talk to your provider about Calcium and Vitamin D.     Lifestyle    Exercise at least 150 minutes a week (30 minutes a day, 5 days of the week). This will help you control your weight and prevent disease.    Limit alcohol to one drink per day.    No smoking.     Wear sunscreen to prevent skin cancer.    See your dentist every six months for an exam and cleaning.    [facility]   Discharged by : Nani GAUTHIER Certified Medical Assistant (AAMA)   Paper scripts provided to patient : 1   If you have any questions regarding to your visit please contact your care team:   [facility] [place] [facility] Hours Telephone Number   Dr. Latoya Miranda Dr.  ZAK Duenas PA-C    7am-7pm Monday - Thursday   7am-5pm Fridays  [phone]   (Appointment scheduling available 24/7)   RN Line   [phone] option 2       What options do I have for visits at the clinic other than the traditional office visit?   To expand how we care for you, many of our providers are utilizing electronic visits (e-visits) and telephone visits, when medically appropriate, for interactions with their patients rather than a visit in the clinic. We also offer nurse visits for many medical concerns. Just like any other service, we will bill your insurance company for this type of visit based on time spent on the phone with your provider. Not all insurance companies cover these visits. Please check with your medical insurance if this type of visit is covered. You will be responsible for any charges that are not paid by your insurance.     E-visits via Tapjoy: generally incur a $35.00 fee.     Telephone visits:   Time spent on the phone: *charged based on time that is spent on the phone in increments of 10 minutes. Estimated cost:   5-10 mins $30.00   11-20 mins. $59.00   21-30 mins. $85.00   Use Advanced Op[employer]mic Pharmahart (secure email communication and access to your chart) to send your primary care provider a message or make an appointment. Ask someone on your Team how to sign up for Tapjoy.   For a Price Quote for your services, please call our Consumer Price Line at [phone].   As always, Thank you for trusting us with your health care needs!                [facility]:    Scheduling Appointments  Prakash Delong [place]  Call: [phone]    Yudith Lopez, [facility]  Call: [phone]    [facility]  Call: [phone]

## 2017-10-16 LAB
C3 SERPL-MCNC: 70 MG/DL (ref 76–169)
C4 SERPL-MCNC: 14 MG/DL (ref 15–50)
DSDNA AB SER-ACNC: 2 IU/ML

## 2017-10-20 ENCOUNTER — OFFICE VISIT (OUTPATIENT)
Dept: RHEUMATOLOGY | Facility: CLINIC | Age: 32
End: 2017-10-20
Payer: COMMERCIAL

## 2017-10-20 VITALS
OXYGEN SATURATION: 98 % | HEART RATE: 80 BPM | RESPIRATION RATE: 16 BRPM | WEIGHT: 154 LBS | HEIGHT: 67 IN | TEMPERATURE: 97.6 F | BODY MASS INDEX: 24.17 KG/M2

## 2017-10-20 DIAGNOSIS — I73.00 RAYNAUD'S PHENOMENON WITHOUT GANGRENE: ICD-10-CM

## 2017-10-20 DIAGNOSIS — M32.19 OTHER SYSTEMIC LUPUS ERYTHEMATOSUS WITH OTHER ORGAN INVOLVEMENT (H): Primary | ICD-10-CM

## 2017-10-20 PROCEDURE — 99213 OFFICE O/P EST LOW 20 MIN: CPT | Performed by: INTERNAL MEDICINE

## 2017-10-20 RX ORDER — HYDROXYCHLOROQUINE SULFATE 200 MG/1
TABLET, FILM COATED ORAL
Qty: 135 TABLET | Refills: 1 | Status: SHIPPED | OUTPATIENT
Start: 2017-10-20 | End: 2018-01-25

## 2017-10-20 RX ORDER — AZATHIOPRINE 50 MG/1
150 TABLET ORAL DAILY
Qty: 270 TABLET | Refills: 1 | Status: SHIPPED | OUTPATIENT
Start: 2017-10-20 | End: 2018-01-25

## 2017-10-20 RX ORDER — AMLODIPINE BESYLATE 10 MG/1
10 TABLET ORAL DAILY
Qty: 90 TABLET | Refills: 1 | Status: SHIPPED | OUTPATIENT
Start: 2017-10-20 | End: 2019-04-12

## 2017-10-20 ASSESSMENT — PAIN SCALES - GENERAL: PAINLEVEL: MODERATE PAIN (5)

## 2017-10-20 NOTE — MR AVS SNAPSHOT
After Visit Summary   10/20/2017    Aleida Weinberg    MRN: [id number]           Patient Information     Date Of Birth          1985        Visit Information        Provider Department      10/20/2017 7:40 AM Lavon Light MD [facility] [place]        Today's Diagnoses     Other systemic lupus erythematosus with other organ involvement (H)    -  1    Raynaud's phenomenon without gangrene           Follow-ups after your visit        Your next 10 appointments already scheduled     Oct 25, 2017  1:40 PM CDT   [facility]   [facility] ([facility])    [address] [place] [id number] [phone]            Jan 18, 2018  5:00 PM CST   LAB with [place] [facility]   [facility] ([facility])    [address] [place] [id number] [phone]           Patient must bring picture ID. Patient should be prepared to give a urine specimen  Please do not eat 10-12 hours before your appointment if you are coming in fasting for labs on lipids, cholesterol, or glucose (sugar). Pregnant women should follow their Care Team instructions. Water with medications is okay. Do not drink coffee or other fluids. If you have concerns about taking  your medications, please ask at office or if scheduling via [facility], send a message by clicking on Secure Messaging, Message Your Care Team.            Jan 26, 2018  8:20 AM CST   Return Visit with Lavon Light MD   [facility] ([facility])    [address]  [place] [address]   [phone]              Future tests that were ordered for you today     Open Future Orders        Priority Expected Expires Ordered    CBC with platelets differential Routine 1/12/2018 2/10/2018 10/20/2017    CK total Routine 1/12/2018 2/10/2018 10/20/2017    Complement C3 Routine 1/12/2018 2/10/2018 10/20/2017  "   DNA double stranded antibodies Routine 1/12/2018 2/10/2018 10/20/2017    CRP inflammation Routine 1/12/2018 2/10/2018 10/20/2017    Comprehensive metabolic panel Routine 1/12/2018 2/10/2018 10/20/2017    Complement C4 Routine 1/12/2018 2/10/2018 10/20/2017    Erythrocyte sedimentation rate auto Routine 1/12/2018 2/10/2018 10/20/2017    Protein  random urine with Creat Ratio Routine 1/12/2018 2/10/2018 10/20/2017    UA with Microscopic reflex to Culture Routine 1/12/2018 2/10/2018 10/20/2017            Who to contact     If you have questions or need follow up information about today's clinic visit or your schedule please contact [facility] directly at [phone].  Normal or non-critical lab and imaging results will be communicated to you by MyChart, letter or phone within 4 business days after the clinic has received the results. If you do not hear from us within 7 days, please contact the clinic through [employer] or phone. If you have a critical or abnormal lab result, we will notify you by phone as soon as possible.  Submit refill requests through [employer] or call your pharmacy and they will forward the refill request to us. Please allow 3 business days for your refill to be completed.          Additional Information About Your Visit        [employer] Information     [employer] gives you secure access to your electronic health record. If you see a primary care provider, you can also send messages to your care team and make appointments. If you have questions, please call your primary care clinic.  If you do not have a primary care provider, please call [phone] and they will assist you.        Care EveryWhere ID     This is your Care EveryWhere ID. This could be used by other organizations to access your [facility] medical records  [id number]        Your Vitals Were     Pulse Temperature Respirations Height Last Period Pulse Oximetry    80 97.6  F (36.4  C) (Oral) 16 1.708 m (5' 7.25\") 09/25/2017 98% "    BMI (Body Mass Index)                   23.94 kg/m2            Blood Pressure from Last 3 Encounters:   10/13/17 118/62   10/10/17 132/80   07/21/17 118/72    Weight from Last 3 Encounters:   10/20/17 69.9 kg (154 lb)   10/13/17 68.9 kg (152 lb)   10/10/17 69 kg (152 lb 3.2 oz)                 Today's Medication Changes          These changes are accurate as of: 10/20/17  8:12 AM.  If you have any questions, ask your nurse or doctor.               These medicines have changed or have updated prescriptions.        Dose/Directions    hydroxychloroquine 200 MG tablet   Commonly known as:  PLAQUENIL   This may have changed:    - how much to take  - how to take this  - when to take this  - additional instructions   Used for:  Other systemic lupus erythematosus with other organ involvement (H)   Changed by:  Lavon Light MD        Hydroxychloroquine 200mg in the morning and 100mg in the evening.   Quantity:  135 tablet   Refills:  1            Where to get your medicines      These medications were sent to [facility] [address].  [address] [place] [address], [place] [address]     Phone:  [phone]     amLODIPine 10 MG tablet    azaTHIOprine 50 MG tablet    hydroxychloroquine 200 MG tablet                Primary Care Provider Office Phone # Fax #    Tatum Dove PA-C [phone]       [address] [place] [address]        Equal Access to Services     CACHORRO MADRIGAL AH: Hadii catalina silverioo Soharjeet, waaxda luqadaha, qaybta kaalmada adeegyada, mira concepcion. So [facility] [phone].    ATENCIÓN: Si habla español, tiene a rey disposición servicios gratuitos de asistencia lingüística. Llame al [phone].    We comply with applicable federal civil rights laws and [place] laws. We do not discriminate on the basis of race, color, national origin, age, disability, sex, sexual orientation, or gender identity.             Thank you!     Thank you for choosing [facility]  for your care. Our goal is always to provide you with excellent care. Hearing back from our patients is one way we can continue to improve our services. Please take a few minutes to complete the written survey that you may receive in the mail after your visit with us. Thank you!             Your Updated Medication List - Protect others around you: Learn how to safely use, store and throw away your medicines at www.disposemymeds.org.          This list is accurate as of: 10/20/17  8:12 AM.  Always use your most recent med list.                   Brand Name Dispense Instructions for use Diagnosis    albuterol 108 (90 BASE) MCG/ACT Inhaler    PROAIR HFA/PROVENTIL HFA/VENTOLIN HFA    1 Inhaler    Inhale 2 puffs into the lungs every 6 hours as needed for shortness of breath / dyspnea    Intermittent asthma, uncomplicated       amLODIPine 10 MG tablet    NORVASC    90 tablet    Take 1 tablet (10 mg) by mouth daily    Raynaud's phenomenon without gangrene       azaTHIOprine 50 MG tablet    IMURAN    270 tablet    Take 3 tablets (150 mg) by mouth daily    Other systemic lupus erythematosus with other organ involvement (H)       cyclobenzaprine 10 MG tablet    FLEXERIL    30 tablet    Take 0.5-1 tablets (5-10 mg) by mouth 3 times daily as needed for muscle spasms    Acute midline low back pain with right-sided sciatica       hydroxychloroquine 200 MG tablet    PLAQUENIL    135 tablet    Hydroxychloroquine 200mg in the morning and 100mg in the evening.    Other systemic lupus erythematosus with other organ involvement (H)       LORazepam 0.5 MG tablet    ATIVAN    10 tablet    Take 1 tablet (0.5 mg) by mouth every 8 hours as needed for anxiety    Anxiety       MIRENA (52 MG) 20 MCG/24HR IUD   Generic drug:  levonorgestrel      1 each by Intrauterine route once        omeprazole 40 MG capsule    priLOSEC    30 capsule    Take 1 capsule (40 mg) by mouth daily  Take 30-60 minutes before a meal.    Abdominal pain, epigastric       predniSONE 5 MG tablet    DELTASONE    90 tablet    Take 1 tablet (5 mg) by mouth daily as needed for lupus    Systemic lupus erythematosus (H), High risk medications (not anticoagulants) long-term use       traMADol 50 MG tablet    ULTRAM    30 tablet    Take 1-2 tablets ( mg) by mouth every 6 hours as needed for pain maximum 8 tablet(s) per day    Acute midline low back pain with right-sided sciatica       traZODone 50 MG tablet    DESYREL    30 tablet    Take 0.5 tablets (25 mg) by mouth nightly as needed for sleep    Other insomnia       vitamin D 2000 UNITS tablet     100 tablet    Take 2,000 Units by mouth daily    Vitamin D deficiency

## 2017-10-20 NOTE — PROGRESS NOTES
Rheumatology Clinic Visit      Aleida Weinberg MRN# [id number]   YOB: 1985 Age: 32 year old      Date of visit: 10/20/17   PCP: Tatum Dove     Chief Complaint   Patient presents with:  RECHECK: Systemic lupus erythematosus - 3 month f/u. Patient states she is doing ok. She has had some reactions to a couple medications that she has questions about.       Assessment and Plan   1. Systemic lupus erythematosus (CHANELL >1:01198 speckled, RNP+, Sm+, Scl-70+, hypocomplementemia, photosensitivity, arthritis, fatigue, Raynaud's phenomenon):  Dx'd 4/2016. Scl-70+; she lacks features of scleroderma at this time. Previously on MTX 20mg SQ weekly (GI upset with PO doses above 15mg, partially effective) and SSZ (LFT elevations).  Currently on AZA 150mg daily (synovitis when on 100mg daily) and HCQ 400mg daily. TPMT normal on 8/21/2017. Doing well on her current medication regimen.  May consider MMF or Benlysta in the future if needed. Labs reviewed with the patient today.  - Reduce hydroxychloroquine to 200mg in the morning and 100mg in the evening (last eye exam done 9/8/2017)  - Continue prednisone 5mg daily PRN (rarely used per patient)  - Continue azathioprine to 150mg daily   - Labs 1 week prior to the next rheumatology clinic visit: CBC, CMP, ESR, CRP, CK, C3, C4, dsDNA, UA, Uprotein:creatinine    2. Raynaud's Phenomenon: Cold avoidance was insufficient for controlling her symptoms and therefore amlodipine was started with partial improvement at 5mg daily and much better control at 10mg daily.  Not using now because of the warmer weather but she plans to start again now that we are entering the the cooler months  - Continue Amlodipine 10mg daily during colder months    3. Chronic Back Pain: History of scoliosis. Degenerative findings on previously reviewed MRIs from 2000 and 2009. This has not changed for many years and does not worsen when her peripheral joint symptoms worsen. Had one episode of  sciatica that resolved with chiropractic treatment.      4. Vitamin D deficiency: Currently on vitamin D 2000 units daily.    - Continue vitamin D 2000 units daily    5. Vaccinations: Vaccinations reviewed with Ms. Weinberg.  Risks and benefits of vaccinations were discussed.  - Influenza: up to date  - [id number]: patient plans to check with insurance about coverage, and plans to receive if covered  - [id number]: to receive at least 8 weeks after [id number] is administered    Ms. Weinberg verbalized agreement with and understanding of the rational for the diagnosis and treatment plan.  All questions were answered to best of my ability and the patient's satisfaction. Ms. Weinberg was advised to contact the clinic with any questions that may arise after the clinic visit.      Thank you for involving me in the care of the patient    Return to clinic: 3 months      HPI   Aleida Weinberg is a 32 year old female with medical history significant for intermittent asthma, anxiety, migraines, vitamin D deficiency, and systemic lupus erythematosus who presents for follow-up of SLE.     Today, Ms. Weinberg reports that she is doing much better with the AZA dose increase from 100mg daily to 150mg daily.  No more inflammatory arthritis symptoms.  Morning stiffness only occurs with weather changes, and is never more than 20 minutes when it does occur.  Some fatigue that still persists; she took a sleeping aide from her PCP that she says was too strong so she doesn't use it now.  Used prednisone twice since last seen.  Raynaud's phenomenon is well controlled with amlodipine in the colder months and she is thinking of restarting it now.      Denies fevers, chills, nausea, vomiting, constipation, diarrhea. No abdominal pain. No chest pain/pressure, palpitations, or shortness of breath. No oral or nasal sores. No neck pain. No LE swelling.  Has photosensitivity but no photophobia.  Mild dry eyes and dry mouth; she uses artificial  tears as needed.  No eye pain or redness. Denies history of serositis. Denies history of DVT, pulmonary embolism, or miscarriage.    Tobacco: Former Smoker  EtOH: Once monthly  Drugs: None  Occupation: Works at [employer], a lot of typing per patient    ROS   GEN: No fevers, chills, night sweats, or weight change  SKIN: See HPI.   HEENT: Has a history of migraines, but no headache today. No change in vision, taste, or hearing. No epistaxis. No oral or nasal ulcers.  CV: No chest pain, pressure, palpitations, or dyspnea on exertion.  PULM: No SOB, wheeze, cough.  GI:  No nausea, vomiting, constipation, diarrhea. No blood in stool. No abdominal pain.  : No blood in urine.  MSK: See HPI.  NEURO: See history of present illness  ENDO: No heat/cold intolerance.  EXT: See history of present illness    Active Problem List     Patient Active Problem List   Diagnosis     Other kyphoscoliosis and scoliosis     Hypertrophic and atrophic condition of skin     Viral warts     Routine postpartum follow-up     CARDIOVASCULAR SCREENING; LDL GOAL LESS THAN 160     Intermittent asthma     Anxiety     Intractable menstrual migraine without status migrainosus     Vitamin D deficiency     Inflammatory polyarthropathy (H)     Chronic back pain     Raynaud's phenomenon without gangrene     Systemic lupus erythematosus (H)     High risk medications (not anticoagulants) long-term use (Plaquenil and MTX)     Dry eyes, bilateral     Past Medical History     Past Medical History:   Diagnosis Date     Mild intermittent asthma      Other kyphoscoliosis and scoliosis     upper back curvature and disc degeneration in lower back.     Past Surgical History     Past Surgical History:   Procedure Laterality Date      SECTION       SURGICAL HISTORY OF -       PE Tubes     Allergy     Allergies   Allergen Reactions     No Known Drug Allergies      Current Medication List     Current Outpatient Prescriptions   Medication Sig     LORazepam  (ATIVAN) 0.5 MG tablet Take 1 tablet (0.5 mg) by mouth every 8 hours as needed for anxiety     traZODone (DESYREL) 50 MG tablet Take 0.5 tablets (25 mg) by mouth nightly as needed for sleep     azaTHIOprine (IMURAN) 50 MG tablet Take 3 tablets (150 mg) by mouth daily     hydroxychloroquine (PLAQUENIL) 200 MG tablet Take 2 tablets (400 mg) by mouth daily     amLODIPine (NORVASC) 10 MG tablet Take 1 tablet (10 mg) by mouth daily     levonorgestrel (MIRENA, 52 MG,) 20 MCG/24HR IUD 1 each by Intrauterine route once     Cholecalciferol (VITAMIN D) 2000 UNITS tablet Take 2,000 Units by mouth daily     traMADol (ULTRAM) 50 MG tablet Take 1-2 tablets ( mg) by mouth every 6 hours as needed for pain maximum 8 tablet(s) per day     cyclobenzaprine (FLEXERIL) 10 MG tablet Take 0.5-1 tablets (5-10 mg) by mouth 3 times daily as needed for muscle spasms     predniSONE (DELTASONE) 5 MG tablet Take 1 tablet (5 mg) by mouth daily as needed for lupus     albuterol (PROAIR HFA, PROVENTIL HFA, VENTOLIN HFA) 108 (90 BASE) MCG/ACT inhaler Inhale 2 puffs into the lungs every 6 hours as needed for shortness of breath / dyspnea     omeprazole (PRILOSEC) 40 MG capsule Take 1 capsule (40 mg) by mouth daily Take 30-60 minutes before a meal.     No current facility-administered medications for this visit.          Social History   See HPI    Family History     Family History   Problem Relation Age of Onset     HEART DISEASE Maternal Grandfather      Bypass, Heart Disease     Respiratory Maternal Grandfather      asthma     C.A.D. Paternal Grandfather      HEART DISEASE Maternal Uncle      MI's      Hypertension Paternal Grandmother      CANCER Paternal Grandmother      lymph nodes     Respiratory Other      cousin on mothers side, asthma     Alcohol/Drug Maternal Uncle      Alcohol/Drug Other      bother great grandparents on mother's side     DIABETES No family hx of      Breast Cancer No family hx of      Cancer - colorectal No family hx  "of      Denies family history of autoimmune disease    Physical Exam     Temp Readings from Last 3 Encounters:   10/20/17 97.6  F (36.4  C) (Oral)   10/13/17 98.2  F (36.8  C) (Oral)   10/10/17 97.5  F (36.4  C) (Oral)     BP Readings from Last 5 Encounters:   10/13/17 118/62   10/10/17 132/80   07/21/17 118/72   04/21/17 127/57   02/09/17 98/64     Pulse Readings from Last 1 Encounters:   10/20/17 80     Resp Readings from Last 1 Encounters:   10/20/17 16     Estimated body mass index is 23.94 kg/(m^2) as calculated from the following:    Height as of this encounter: 1.708 m (5' 7.25\").    Weight as of this encounter: 69.9 kg (154 lb).    GEN: NAD  HEENT: MMM. No oral lesions. Anicteric, noninjected sclera  CV: S1, S2. RRR. No m/r/g.  PULM: CTA bilaterally. No w/c.  MSK: MCPs, PIPs, DIPs, wrists, and elbows without swelling or tenderness to palpation.  Bilateral shoulders nontender to palpation and without swelling. Bilateral hips nontender to direct palpation. Knees, ankles, and feet without tenderness palpation or swelling. Negative MTP squeeze.   NEURO: UE and LE strengths 5/5 and equal bilaterally.   SKIN: No rash; normal fingertip pulp; no evidence of current or previous infarcts to the distal fingertips by visual inspection. Tattoos present  EXT: No LE edema  PSYCH: Alert. Appropriate.    Labs / Imaging (select studies)   RF/CCP  Recent Labs   Lab Test  04/22/16   0902  04/01/16   0910   CCPIGG  1   --    RHF   --   <20     CHANELL  Recent Labs   Lab Test  04/22/16   0902  04/01/16   0910   VALERIE   --   12.4*   ANAIGG  >1:95750  Reference range: <1:40  (Note)  Speckled pattern.  INTERPRETIVE INFORMATION: CHANELL by IFA, IgG  Anti-nuclear antibodies (CHANELL) are seen in a variety of  systemic rheumatic diseases and are determined by indirect  fluorescence assay (IFA) using HEp-2 substrate with an  IgG-specific conjugate. CHANELL titers less than or equal to  1:80 have variable relevance while titers greater than or  equal to " 1:160 are considered clinically significant. These  antibodies may precede clinical disease onset; however,  healthy individuals and those with advanced age have been  reported to be positive for CHANELL. When observed, one of the  five basic patterns is reported: homogeneous,  peripheral/rim, speckled, centromere, or nucleolar. If  cytoplasmic fluorescence is observed, it is noted. IFA  methodology is subjective and has occasionally been shown  to lack sensitivity for anti-SSA/Ro antibodies.  Negative results do not necessarily rule out the presence  of SSc. If clinical suspicion remains, consi vicki further  testing for U3-RNP, PM/Scl, or Th/To antibodies associated  with SSc.  Performed by [facility],  [address] [place] [id number] [phone]  [email or web address], Twin Rodriguez MD, Lab. Director     --      RNP/Sm/SSA/SSB  Recent Labs   Lab Test  04/22/16   0902   RNPIGG  >8.0  Positive   Antibody index (AI) values reflect qualitative changes in antibody   concentration that cannot be directly associated with clinical condition or   disease state.  *   SMIGG  1.5*   SSAIGG  <0.2  Negative   Antibody index (AI) values reflect qualitative changes in antibody   concentration that cannot be directly associated with clinical condition or   disease state.     SSBIGG  <0.2  Negative   Antibody index (AI) values reflect qualitative changes in antibody   concentration that cannot be directly associated with clinical condition or   disease state.     SCLIGG  3.1*     dsDNA  Recent Labs   Lab Test  10/12/17   1642  07/17/17   1707  04/13/17   1650  01/20/17   0828  10/26/16   0919  07/22/16   0916  04/22/16   0902   DNA  2  1  2  1  1  1  1     C3/C4  Recent Labs   Lab Test  10/12/17   1642 07/17/17   1707  04/13/17   1650  01/20/17   0828  10/26/16   0919  07/22/16   0916  04/22/16   0902   [id number]  70*  85  87  93  110  115  94   [id number]  14*  14*  16  16  16  17  13*     Send-out Labs  Recent Labs   Lab Test   04/21/17   0813   [facility]  SEE NOTE  (Note)  Test name                    Result Flag  Units  RefIntvl  ------------------------------------------------------------  Thiopurine Methyltransferase                                23.2 L      U/mL 24.0-44.0  Sample slightly hemolyzed. Please interpret the results  with caution.  INTERPRETIVE INFORMATION: Thiopurine Methyltransferase, RBC  Normal TPMT activity:  24.0-44.0 U/mL................Individuals are predicted to  be at low risk of bone marrow toxicity (myelosuppression)  as a consequence of standard thiopurine therapy; no dose  adjustment is recommended.  Intermediate TPMT activity:  17.0-23.9 U/mL................Individuals are predicted to  be at intermediate risk of bone marrow toxicity  (myelosuppression) as a consequence of standard thiopurine  therapy; a dose reduction and therapeutic drug management  is recommended.  Low TPMT activity:  less than 17.0 U/mL...........Individuals are predicted to  be at high risk of bone marrow toxicity (myelosuppression)   as a consequence of standard thiopurine dosing. It is  recommended to avoid the use of thiopurine drugs.  High TPMT activity:  greater than 44.0 U/mL........Individuals are not predicted  to be at risk for bone marrow toxicity (myelosuppression)  as a consequence of standard thiopurine dosing, but may be  at risk for therapeutic failure due to excessive  inactivation of thiopurine drugs. Individuals may require  higher than the normal standard dose. Therapeutic drug  management is recommended.  The TPMT, RBC assay is used as a screen to detect  individuals with low and intermediate TPMT activity who may  be at risk for myelosuppression when exposed to standard  doses of thiopurines, including azathioprine (Imuran) and  6-mercaptopurine (Purinethol). TPMT is the primary  metabolic route for inactivation of thiopurine drugs in the  bone marrow. When TPMT activity is low, it is predicted  that proportionately  more 6-mercaptopurine can be converted  into the cytotoxic 6-thioguanine nucle otides that  accumulate in the bone marrow causing excessive toxicity.  The activity of TPMT is measured by the nanomoles of  6-methylmercaptopurine (inactive metabolite) produced per 1  mL of packed red blood cells, (U/mL).    TPMT phenotype testing does not replace the need for  clinical monitoring of patients treated with thiopurine  drugs. Genotype for TPMT cannot be inferred from TPMT  activity (phenotype). Phenotype testing should not be  requested for patients currently treated with thiopurine  drugs. Current TPMT phenotype may not reflect future TPMT  phenotype, particularly in patients who received blood  transfusion within 30-60 days of testing.  TPMT enzyme  activity can be inhibited by several drugs such as:  naproxen (Aleve), ibuprofen (Advil, Motrin), ketoprofen  (Orudis), furosemide (Lasix), sulfasalazine (Azulfidine),  mesalamine (Asacol), olsalazine (Dipentum), mefenamic acid  (Ponstel), thiazide diuretics, and benzoic acid inhibitors.  TPMT inhibitors may contribute t o falsely low results;  patients should abstain from these drugs for at least 48  hours prior to TPMT testing. Falsely low results may also  occur as a result of inappropriate specimen handling and  hemolysis.  Test developed and characteristics determined by [facility]. See Compliance Statement B: [email or web address]  Performed by [facility],  [address] [place] [id number] [phone]  [email or web address], Lionel Ferreira MD, Lab. Director     STSTNM  Thiopurine Methyltransferase, RBC   STSTCD  45167   SSPTYP  Whole blood, EDTA anticoagulant     Antiphospholipid Antibodies  Recent Labs   Lab Test  04/22/16   0902   B2GPG  0.9   B2GPM  <0.9  Negative     CARG  <15.0  Interpretation:  Negative     JOANN  <12.5  Interpretation:  Negative     LUPINT  Negative  (Note)  COMMENTS:  The INR is normal.  APTT ratio is normal.  DRVVT Screen ratio is  normal.  Thrombin time is normal.  NEGATIVE TEST; A LUPUS ANTICOAGULANT WAS NOT DETECTED IN THIS  SPECIMEN WITHIN THE LIMITS OF THE TESTING REPERTOIRE.  If the clinical picture is strongly suggestive of an antiphospholipid  syndrome, recommend anticardiolipin and beta-2-glycoprotein (IgG and  IgM) antibody tests.  Isabella Olson M.D.  [phone]  4/25/2016    INR =  1.05    Reference range: 0.86-1.14  Thrombin Time= 15.4    Reference range: 13.0-19.0 sec    APTT:       Ratio  Patient  =  0.92  1:2 Mix  =  N/A  Reference:  Negative: Less than or equal to 1.16  Positive: Greater than or equal to 1.17     DILUTE LISA [facility] VENOM TEST:  Screen Ratio = 0.95   Normal is less than 1.21         CBC  Recent Labs   Lab Test  10/12/17   1642  09/21/17   1701  08/21/17   1654   WBC  6.5  3.9*  4.0   RBC  3.86  3.74*  3.91   HGB  13.5  12.8  13.3   HCT  37.8  36.2  37.6   MCV  98  97  96   RDW  13.5  13.5  13.4   PLT  311  306  286   MCH  35.0*  34.2*  34.0*   MCHC  35.7  35.4  35.4   NEUTROPHIL  68.9  58.7  60.5   LYMPH  22.8  27.8  26.8   MONOCYTE  7.4  11.9  11.9   EOSINOPHIL  0.6  0.8  0.5   BASOPHIL  0.3  0.8  0.3   ANEU  4.5  2.3  2.4   ALYM  1.5  1.1  1.1   AMANDA  0.5  0.5  0.5   AEOS  0.0  0.0  0.0   ABAS  0.0  0.0  0.0     CMP  Recent Labs   Lab Test  10/12/17   1642 09/21/17   1701 08/21/17   1654  07/17/17   1707 04/13/17   1650   02/09/17   0721  01/20/17   0828   10/26/16   0919   NA  138   --    --   138   --   138   --    --   143   --   139   POTASSIUM  3.8   --    --   4.0   --   3.9   --    --   4.3   --   4.3   CHLORIDE  105   --    --   107   --   105   --    --   107   --   109   CO2  22   --    --   21   --   23   --    --   29   --   24   ANIONGAP  11   --    --   10   --   10   --    --   7   --   6   GLC  74   --    --   82   --   76   --   77  79   --   85   BUN  10   --    --   10   --   12   --    --   10   --   8   CR  0.62  0.74  0.62  0.60   < >  0.69   < >   --   0.80   < >  0.74    GFRESTIMATED  >90  >90  >90  >90  Non African American GFR Calc     < >  >90  Non  GFR Calc     < >   --   84   < >  >90  Non  GFR Calc     GFRESTBLACK  >90  >90  >90  >90  African American GFR Calc     < >  >90   GFR Calc     < >   --   >90   GFR Calc     < >  >90   GFR Calc     SRINIVAS  9.6   --    --   9.3   --   9.4   --    --   9.3   --   9.3   BILITOTAL  0.7  0.5  0.9  0.6   < >  0.6   < >   --   0.8   < >  0.5   ALBUMIN  4.5  4.3  4.4  4.5   < >  4.7   < >   --   4.2   < >  4.0   PROTTOTAL  7.6  7.5  7.4  7.6   < >  7.7   < >   --   7.5   < >  7.5   ALKPHOS  48  45  40  43   < >  51   < >   --   47   < >  50   AST  16  16  19  22   < >  32   < >   --   22   < >  26   ALT  23  21  17  21   < >  49   < >   --   28   < >  29    < > = values in this interval not displayed.     Calcium/VitaminD  Recent Labs   Lab Test  10/12/17   1642  07/17/17   1707 04/13/17   1650  02/20/17   [facility]   10/26/16   0919   04/01/16   0910   SRINIVAS  9.6  9.3  9.4   --    < >  9.3   < >   --    VITDT   --    --    --   33   --   23   --   <13  Season, race, dietary intake, and treatment affect the concentration of   25-hydroxy-Vitamin D. Values may decrease during winter months and increase   during summer months. Values 20-29 ug/L may indicate Vitamin D insufficiency   and values <20 ug/L may indicate Vitamin D deficiency.   Vitamin D determination is routinely performed by an immunoassay specific for   25 hydroxyvitamin D3.  If an individual is on vitamin D2 (ergocalciferol)   supplementation, please specify 25 OH vitamin D2 and D3 level determination by   LCMSMS test VITD23.  *    < > = values in this interval not displayed.     ESR/CRP  Recent Labs   Lab Test  10/12/17   1642  07/17/17   1707 04/13/17   1650   SED  10  9  8   CRP  <2.9  <2.9  <2.9     CK/Aldolase  Recent Labs   Lab Test  10/12/17   1642  07/17/17   1707  04/13/17   1650   04/22/16   0902    CKT  42  82  63   < >  45   ALDOLASE   --    --    --    --   4.5    < > = values in this interval not displayed.     TSH/T4  Recent Labs   Lab Test  04/01/16   0910   TSH  1.57     Lipid Panel  Recent Labs   Lab Test  02/09/17   0721  07/10/15   0814  09/13/13   0706   CHOL  159  149  139   TRIG  56  45  57   HDL  42*  48*  52   LDL  106*  92  76   VLDL   --   9  11   CHOLHDLRATIO   --   3.1  2.7   NHDL  117   --    --      Hepatitis B  Recent Labs   Lab Test  04/22/16   0902   HBCAB  Nonreactive   HEPBANG  Nonreactive     Hepatitis C  Recent Labs   Lab Test  04/22/16   0902   HCVAB  Nonreactive   Assay performance characteristics have not been established for newborns,   infants, and children       Lyme ab screening  Recent Labs   Lab Test  04/01/16   0910   LYMEGM  0.12     HIV Screening  Recent Labs   Lab Test  04/22/16   0902   HIAGAB  Nonreactive   HIV-1 p24 Ag & HIV-1/HIV-2 Ab Not Detected       UA  Recent Labs   Lab Test  10/12/17   1643  07/17/17   1713  04/13/17   1650  01/20/17   0827  10/26/16   0904  07/22/16   0912   COLOR  Yellow  Yellow  Yellow  Yellow  Yellow  Yellow   APPEARANCE  Clear  Clear  Clear  Clear  Clear  Clear   URINEGLC  Negative  Negative  Negative  Negative  Negative  Negative   URINEBILI  Negative  Negative  Negative  Negative  Negative  Negative   SG  1.010  1.015  1.015  >1.030  >1.030  >1.030   URINEPH  6.5  7.0  7.0  6.0  6.0  6.0   PROTEIN  Negative  Negative  Negative  Trace*  Negative  Negative   UROBILINOGEN  0.2  1.0  0.2  0.2  0.2  0.2   NITRITE  Negative  Negative  Negative  Negative  Negative  Negative   UBLD  Trace*  Negative  Negative  Negative  Negative  Negative   LEUKEST  Trace*  Negative  Negative  Negative  Negative  Negative   WBCU  O - 2  O - 2  O - 2  O - 2  2-5*  O - 2   RBCU  O - 2  O - 2  O - 2  O - 2  O - 2  O - 2   SQUAMOUSEPI  Few   --   Few  Few  Moderate*  Few   BACTERIA  Few*   --    --   Few*  Few*   --    MUCOUS   --    --    --   Present*   --    Present*     Urine Microscopic  Recent Labs   Lab Test  10/12/17   1643  07/17/17   1713  04/13/17   1650  01/20/17   0827  10/26/16   0904  07/22/16   0912   WBCU  O - 2  O - 2  O - 2  O - 2  2-5*  O - 2   RBCU  O - 2  O - 2  O - 2  O - 2  O - 2  O - 2   SQUAMOUSEPI  Few   --   Few  Few  Moderate*  Few   BACTERIA  Few*   --    --   Few*  Few*   --    MUCOUS   --    --    --   Present*   --   Present*     Urine Protein  Recent Labs   Lab Test  10/12/17   1643  07/17/17   1713  04/13/17   1650   UTP  <0.05  0.11  0.08   UTPG  Unable to calculate due to low value  0.20  0.26*   UCRR  27  54  32     Immunization History     Immunization History   Administered Date(s) Administered     DPT 1985, 1985, 1985, 01/15/1988, 06/15/1991     HPV 02/25/2010, 02/04/2011     HepB 06/13/1999, 08/20/1999, 02/05/2000     Influenza (IIV3) 11/07/2011     Influenza Vaccine IM 3yrs+ 4 Valent IIV4 10/11/2016, 10/13/2017     MMR 05/15/1987, 09/15/1991     Mantoux 07/15/1987, 01/19/2005     Meningococcal (Menomune ) 08/09/2004     OPV, trivalent, live 1986, 1986, 1986, 01/15/1988, 09/15/1991     TDAP Vaccine (Adacel) 01/21/2009, 02/25/2010          Chart documentation done in part with Dragon Voice recognition Software. Although reviewed after completion, some word and grammatical error may remain.    Lavon Light MD

## 2017-10-20 NOTE — NURSING NOTE
"Chief Complaint   Patient presents with     RECHECK     Systemic lupus erythematosus - 3 month f/u. Patient states she is doing ok. She has had some reactions to a couple medications that she has questions about.        Initial Pulse 80  Temp 97.6  F (36.4  C) (Oral)  Resp 16  Ht 1.708 m (5' 7.25\")  Wt 69.9 kg (154 lb)  LMP 09/25/2017  SpO2 98%  BMI 23.94 kg/m2 Estimated body mass index is 23.94 kg/(m^2) as calculated from the following:    Height as of this encounter: 1.708 m (5' 7.25\").    Weight as of this encounter: 69.9 kg (154 lb).  Medication Reconciliation: complete   RAPID3 (0-30) Cumulative Score  13          RAPID3 Weighted Score (divide #4 by 3 and that is the weighted score)  4.33         "

## 2017-10-25 ENCOUNTER — OFFICE VISIT (OUTPATIENT)
Dept: FAMILY MEDICINE | Facility: CLINIC | Age: 32
End: 2017-10-25
Payer: COMMERCIAL

## 2017-10-25 VITALS
HEIGHT: 67 IN | SYSTOLIC BLOOD PRESSURE: 104 MMHG | BODY MASS INDEX: 24.01 KG/M2 | HEART RATE: 72 BPM | TEMPERATURE: 98.7 F | DIASTOLIC BLOOD PRESSURE: 76 MMHG | WEIGHT: 153 LBS

## 2017-10-25 DIAGNOSIS — T50.905D ADVERSE EFFECT OF DRUG, SUBSEQUENT ENCOUNTER: ICD-10-CM

## 2017-10-25 DIAGNOSIS — Z97.5 PRESENCE OF INTRAUTERINE CONTRACEPTIVE DEVICE: Primary | ICD-10-CM

## 2017-10-25 PROCEDURE — 99213 OFFICE O/P EST LOW 20 MIN: CPT | Performed by: PHYSICIAN ASSISTANT

## 2017-10-25 NOTE — NURSING NOTE
"Chief Complaint   Patient presents with     Follow Up For     yeast infection     RECHECK     IUD strings       Initial /76 (Cuff Size: Adult Regular)  Pulse 72  Temp 98.7  F (37.1  C) (Oral)  Ht 5' 7.25\" (1.708 m)  Wt 153 lb (69.4 kg)  LMP 10/22/2017 (Exact Date)  BMI 23.79 kg/m2 Estimated body mass index is 23.79 kg/(m^2) as calculated from the following:    Height as of this encounter: 5' 7.25\" (1.708 m).    Weight as of this encounter: 153 lb (69.4 kg).  Medication Reconciliation: complete   Nani Mae, Certified Medical Assistant (AAMA)     "

## 2017-10-25 NOTE — MR AVS SNAPSHOT
After Visit Summary   10/25/2017    Aleida Weinberg    MRN: [id number]           Patient Information     Date Of Birth          1985        Visit Information        Provider Department      10/25/2017 1:40 PM Tatum Dove PA-C [facility]        Today's Diagnoses     Presence of intrauterine contraceptive device    -  1    Adverse effect of drug, subsequent encounter          Care Instructions    IUD strings are visible!  Call to schedule an appointment with Allergist.    JJ Heller PA-C            Follow-ups after your visit        Additional Services     ALLERGY/ASTHMA ADULT REFERRAL       Your provider has referred you to: [facility]: [facility] [phone]  [email or web address]  Dr. Han or Dr. Cortez    Please be aware that coverage of these services is subject to the terms and limitations of your health insurance plan.  Call member services at your health plan with any benefit or coverage questions.      Please bring the following with you to your appointment:    (1) Any X-Rays, CTs or MRIs which have been performed.  Contact the facility where they were done to arrange for  prior to your scheduled appointment.    (2) List of current medications  (3) This referral request   (4) Any documents/labs given to you for this referral                  Your next 10 appointments already scheduled     Jan 18, 2018  5:00 PM CST   LAB with NE LAB   [facility] ([facility])    [address] [place] [id number] [phone]           Patient must bring picture ID. Patient should be prepared to give a urine specimen  Please do not eat 10-12 hours before your appointment if you are coming in fasting for labs on lipids, cholesterol, or glucose (sugar). Pregnant women should follow their Care Team instructions. Water with medications is okay. Do not drink coffee or  "other fluids. If you have concerns about taking  your medications, please ask at office or if scheduling via [employer], send a message by clicking on Secure Messaging, Message Your Care Team.            Jan 26, 2018  8:20 AM CST   Return Visit with Lavon Light MD   [facility] ([facility])    [address]  [place] [id number] [phone]              Who to contact     If you have questions or need follow up information about today's clinic visit or your schedule please contact [facility] directly at [phone].  Normal or non-critical lab and imaging results will be communicated to you by MyChart, letter or phone within 4 business days after the clinic has received the results. If you do not hear from us within 7 days, please contact the clinic through [employer] or phone. If you have a critical or abnormal lab result, we will notify you by phone as soon as possible.  Submit refill requests through [employer] or call your pharmacy and they will forward the refill request to us. Please allow 3 business days for your refill to be completed.          Additional Information About Your Visit        [employer] Information     [employer] gives you secure access to your electronic health record. If you see a primary care provider, you can also send messages to your care team and make appointments. If you have questions, please call your primary care clinic.  If you do not have a primary care provider, please call [phone] and they will assist you.        Care EveryWhere ID     This is your Care EveryWhere ID. This could be used by other organizations to access your [facility] medical records  [id number]        Your Vitals Were     Pulse Temperature Height Last Period BMI (Body Mass Index)       72 98.7  F (37.1  C) (Oral) 5' 7.25\" (1.708 m) 10/22/2017 (Exact Date) 23.79 kg/m2        Blood Pressure from Last 3 Encounters:   10/25/17 104/76   10/13/17 118/62   10/10/17 " 132/80    Weight from Last 3 Encounters:   10/25/17 153 lb (69.4 kg)   10/20/17 154 lb (69.9 kg)   10/13/17 152 lb (68.9 kg)              We Performed the Following     ALLERGY/ASTHMA ADULT REFERRAL        Primary Care Provider Office Phone # Fax #    Tatum Dove PA-C [phone] [address] [facility] [address]        Equal Access to Services     CACHORRO MADRIGAL : Hadii aad ku hadasho Soomaali, waaxda luqadaha, qaybta kaalmada adeegyada, waxay idiin hayaan adeeg kharash la'aan . So [facility] [place] [phone].    ATENCIÓN: Si habla español, tiene a rey disposición servicios gratuitos de asistencia lingüística. [facility] [phone].    We comply with applicable federal civil rights laws and [place] laws. We do not discriminate on the basis of race, color, national origin, age, disability, sex, sexual orientation, or gender identity.            Thank you!     Thank you for choosing [facility]  for your care. Our goal is always to provide you with excellent care. Hearing back from our patients is one way we can continue to improve our services. Please take a few minutes to complete the written survey that you may receive in the mail after your visit with us. Thank you!             Your Updated Medication List - Protect others around you: Learn how to safely use, store and throw away your medicines at www.disposemymeds.org.          This list is accurate as of: 10/25/17  2:40 PM.  Always use your most recent med list.                   Brand Name Dispense Instructions for use Diagnosis    albuterol 108 (90 BASE) MCG/ACT Inhaler    PROAIR HFA/PROVENTIL HFA/VENTOLIN HFA    1 Inhaler    Inhale 2 puffs into the lungs every 6 hours as needed for shortness of breath / dyspnea    Intermittent asthma, uncomplicated       amLODIPine 10 MG tablet    NORVASC    90 tablet    Take 1 tablet (10 mg) by mouth daily    Raynaud's phenomenon without gangrene       azaTHIOprine 50 MG tablet     IMURAN    270 tablet    Take 3 tablets (150 mg) by mouth daily    Other systemic lupus erythematosus with other organ involvement (H)       cyclobenzaprine 10 MG tablet    FLEXERIL    30 tablet    Take 0.5-1 tablets (5-10 mg) by mouth 3 times daily as needed for muscle spasms    Acute midline low back pain with right-sided sciatica       hydroxychloroquine 200 MG tablet    PLAQUENIL    135 tablet    Hydroxychloroquine 200mg in the morning and 100mg in the evening.    Other systemic lupus erythematosus with other organ involvement (H)       LORazepam 0.5 MG tablet    ATIVAN    10 tablet    Take 1 tablet (0.5 mg) by mouth every 8 hours as needed for anxiety    Anxiety       MIRENA (52 MG) 20 MCG/24HR IUD   Generic drug:  levonorgestrel      1 each by Intrauterine route once        omeprazole 40 MG capsule    priLOSEC    30 capsule    Take 1 capsule (40 mg) by mouth daily Take 30-60 minutes before a meal.    Abdominal pain, epigastric       predniSONE 5 MG tablet    DELTASONE    90 tablet    Take 1 tablet (5 mg) by mouth daily as needed for lupus    Systemic lupus erythematosus (H), High risk medications (not anticoagulants) long-term use       traMADol 50 MG tablet    ULTRAM    30 tablet    Take 1-2 tablets ( mg) by mouth every 6 hours as needed for pain maximum 8 tablet(s) per day    Acute midline low back pain with right-sided sciatica       traZODone 50 MG tablet    DESYREL    30 tablet    Take 0.5 tablets (25 mg) by mouth nightly as needed for sleep    Other insomnia       vitamin D 2000 UNITS tablet     100 tablet    Take 2,000 Units by mouth daily    Vitamin D deficiency

## 2017-10-25 NOTE — PROGRESS NOTES
"  SUBJECTIVE:   Aleida Weinberg is a 32 year old female who presents to clinic today for the following health issues:    Aleida was seen 2 weeks ago while being treated for a yeast infection.  She had a reaction to the Diflucan, but symptoms did resolve.  At the time of her visit, her cervix was quite inflamed and irritated and therefore her IUD strings were not visible.  She was asked to return after her symptoms resolved to see if strings were visible.  She is without any symptoms today.  She did f/u with Rheumatology to see if any of her new meds couldn't have interacted with the Diflucan.  He did not think so and recommended that she see Allergist.  Needs referral for this today.     -------------------------------------    Problem list and histories reviewed & adjusted, as indicated.  Additional history: as documented    Reviewed and updated as needed this visit by clinical staff  Tobacco  Allergies  Med Hx  Surg Hx  Fam Hx  Soc Hx      Reviewed and updated as needed this visit by Provider         ROS:  Constitutional, HEENT, cardiovascular, pulmonary, gi and gu systems are negative, except as otherwise noted.      OBJECTIVE:   /76 (Cuff Size: Adult Regular)  Pulse 72  Temp 98.7  F (37.1  C) (Oral)  Ht 5' 7.25\" (1.708 m)  Wt 153 lb (69.4 kg)  LMP 10/22/2017 (Exact Date)  BMI 23.79 kg/m2  Body mass index is 23.79 kg/(m^2).  GENERAL: healthy, alert and no distress  RESP: lungs clear to auscultation - no rales, rhonchi or wheezes  CV: regular rate and rhythm, normal S1 S2, no S3 or S4, no murmur, click or rub, no peripheral edema and peripheral pulses strong  ABDOMEN: soft, nontender, no hepatosplenomegaly, no masses and bowel sounds normal   (female): normal female external genitalia, normal urethral meatus, vaginal mucosa, normal cervix/adnexa/uterus without masses or discharge.  IUD strings are visualized.    Diagnostic Test Results:  none     ASSESSMENT/PLAN:   1. Presence of " intrauterine contraceptive device  Yeast infection resolved, strings visualized    2. Adverse effect of drug, subsequent encounter  Possible allergic reaction to fluconazole.  Referral provided.  - ALLERGY/ASTHMA ADULT REFERRAL    Patient Instructions   IUD strings are visible!  Call to schedule an appointment with Allergist.    JJ Heller PA-C Lindsey Kay Locken, PA-C  [facility]

## 2017-11-02 ENCOUNTER — OFFICE VISIT (OUTPATIENT)
Dept: ALLERGY | Facility: CLINIC | Age: 32
End: 2017-11-02
Payer: COMMERCIAL

## 2017-11-02 VITALS
BODY MASS INDEX: 23.82 KG/M2 | OXYGEN SATURATION: 99 % | HEART RATE: 84 BPM | DIASTOLIC BLOOD PRESSURE: 80 MMHG | WEIGHT: 153.2 LBS | SYSTOLIC BLOOD PRESSURE: 114 MMHG

## 2017-11-02 DIAGNOSIS — T50.905A ADVERSE EFFECT OF DRUG, INITIAL ENCOUNTER: Primary | ICD-10-CM

## 2017-11-02 PROCEDURE — 99203 OFFICE O/P NEW LOW 30 MIN: CPT | Performed by: ALLERGY & IMMUNOLOGY

## 2017-11-02 NOTE — PROGRESS NOTES
"Dear Tatum Dove PA-C,    Thank you for referring your patient Aleida Weinberg to the Allergy/Immunology Clinic. Aleida Weinberg was seen in the Allergy Clinic at [facility]. The following are my recommendations regarding her Adverse Reaction to Drug    1. Continue to avoid fluconazole  2. Will consider skin testing and/or oral challenge if documented procedure can be found in the medical literature with documentation of patient consent regarding potential risks of this procedure      Aleida Weinberg is a 32 year old White female being seen today in consultation for possible drug allergy. She was diagnosed with a vaginal yeast infection on 10/10/17 and treated with oral fluconazole. Aleida states she has taken this medication in the past without any problem. Within an hour of taking the medication Aleida reports that her face began to itch. Her symptoms intensified over the next 2 to 3 hours and she eventually took benadryl about 3.5 hours after taking the fluconazole. She reports developing hives the following day and describes the lesions as \"big, flat, and red.\" Aleida states the hives were present on her arms and lasted for about 1 week and then developed very dry skin. She does not have any current rash or pictures documenting her symptoms. Aleida denies other associated symptoms including lip, tongue, or throat swelling, difficulty swallowing, cough, difficulty breathing, chest pain, palpitations, nausea, vomiting, diarrhea, dizziness, or lightheadedness. She had no oral lesions or areas of skin desquamation. Aleida had not started any new medications around the time she took the fluconazole. About 2 weeks prior to taking the  Medication she had a cold but her acute URI symptoms had since resolved. Aleida reports that she was seen in urgent care in early September and was prescribed diflucan for a yeast infection at that time and had no problem with the " medication. She has taken it several times over the last few years with no adverse reaction.      Past Medical History:   Diagnosis Date     Mild intermittent asthma      Other kyphoscoliosis and scoliosis     upper back curvature and disc degeneration in lower back.     Family History   Problem Relation Age of Onset     HEART DISEASE Maternal Grandfather      Bypass, Heart Disease     Respiratory Maternal Grandfather      asthma     C.A.D. Paternal Grandfather      HEART DISEASE Maternal Uncle      MI's      Hypertension Paternal Grandmother      CANCER Paternal Grandmother      lymph nodes     Respiratory Other      cousin on mothers side, asthma     Alcohol/Drug Maternal Uncle      Alcohol/Drug Other      bother great grandparents on mother's side     DIABETES No family hx of      Breast Cancer No family hx of      Cancer - colorectal No family hx of      Past Surgical History:   Procedure Laterality Date      SECTION  2006     SURGICAL HISTORY OF -       PE Tubes       ENVIRONMENTAL HISTORY: [place] The home is heated with a forced air. They does have central air conditioning. The patient's bedroom is furnished with hard trice in bedroom and fabric window coverings.  Pets inside the house include None. There is not history of cockroach or mice infestation. There is/are 0 smokers in the house.  The house does not have a damp basement.     SOCIAL HISTORY:   Aleida is employed as a . She has missed 0 days of school/work. She lives with her daughter.    REVIEW OF SYSTEMS:  General: negative for weight gain. negative for weight loss. negative for changes in sleep.   Eyes: negative for itching. negative for redness. negative for tearing/watering.  Ears: negative for fullness. negative for hearing loss. negative for dizziness.   Nose: negative for snoring.negative for changes in smell. negative for drainage.   Throat: negative for hoarseness.  negative for sore throat. negative for trouble swallowing.   Lungs: negative for shortness of breath.negative for wheezing. negative for sputum production.   Cardiovascular: negative for chest pain. negative for swelling of ankles. negative for fast or irregular heartbeat.   Gastrointestinal: negative for nausea. negative for heartburn. negative for acid reflux.   Musculoskeletal: negative for joint pain. negative for joint stiffness. negative for joint swelling.   Neurologic: negative for seizures. negative for fainting. negative for weakness.   Psychiatric: negative for changes in mood. negative for anxiety.   Endocrine: positive  for cold intolerance. positive  for heat intolerance. negative for tremors.   Hematologic: positive  for easy bruising. positive  for easy bleeding.  Integumentary: positive  for rash. negative for scaling. negative for nail changes.       Current Outpatient Prescriptions:      hydroxychloroquine (PLAQUENIL) 200 MG tablet, Hydroxychloroquine 200mg in the morning and 100mg in the evening., Disp: 135 tablet, Rfl: 1     azaTHIOprine (IMURAN) 50 MG tablet, Take 3 tablets (150 mg) by mouth daily, Disp: 270 tablet, Rfl: 1     amLODIPine (NORVASC) 10 MG tablet, Take 1 tablet (10 mg) by mouth daily, Disp: 90 tablet, Rfl: 1     LORazepam (ATIVAN) 0.5 MG tablet, Take 1 tablet (0.5 mg) by mouth every 8 hours as needed for anxiety, Disp: 10 tablet, Rfl: 0     traZODone (DESYREL) 50 MG tablet, Take 0.5 tablets (25 mg) by mouth nightly as needed for sleep, Disp: 30 tablet, Rfl: 0     levonorgestrel (MIRENA, 52 MG,) 20 MCG/24HR IUD, 1 each by Intrauterine route once, Disp: , Rfl:      Cholecalciferol (VITAMIN D) 2000 UNITS tablet, Take 2,000 Units by mouth daily, Disp: 100 tablet, Rfl: 3     traMADol (ULTRAM) 50 MG tablet, Take 1-2 tablets ( mg) by mouth every 6 hours as needed for pain maximum 8 tablet(s) per day, Disp: 30 tablet, Rfl: 0     cyclobenzaprine (FLEXERIL) 10 MG tablet, Take 0.5-1  tablets (5-10 mg) by mouth 3 times daily as needed for muscle spasms, Disp: 30 tablet, Rfl: 1     predniSONE (DELTASONE) 5 MG tablet, Take 1 tablet (5 mg) by mouth daily as needed for lupus, Disp: 90 tablet, Rfl: 0     albuterol (PROAIR HFA, PROVENTIL HFA, VENTOLIN HFA) 108 (90 BASE) MCG/ACT inhaler, Inhale 2 puffs into the lungs every 6 hours as needed for shortness of breath / dyspnea, Disp: 1 Inhaler, Rfl: 1     omeprazole (PRILOSEC) 40 MG capsule, Take 1 capsule (40 mg) by mouth daily Take 30-60 minutes before a meal., Disp: 30 capsule, Rfl: 0  Immunization History   Administered Date(s) Administered     DPT [birth year], 01/15/1988, 06/15/1991     HPV 02/25/2010, 02/04/2011     HepB 06/13/1999, 08/20/1999, 02/05/2000     Influenza (IIV3) 11/07/2011     Influenza Vaccine IM 3yrs+ 4 Valent IIV4 10/11/2016, 10/13/2017     MMR 05/15/1987, 09/15/1991     Mantoux 07/15/1987, 01/19/2005     Meningococcal (Menomune ) 08/09/2004     OPV, trivalent, live 1985, 1985, 1985, 01/15/1988, 09/15/1991     TDAP Vaccine (Adacel) 01/21/2009, 02/25/2010     Allergies   Allergen Reactions     No Known Drug Allergies          EXAM:   /80  Pulse 84  Wt 69.5 kg (153 lb 3.2 oz)  LMP 10/22/2017 (Exact Date)  SpO2 99%  BMI 23.82 kg/m2  GENERAL APPEARANCE: alert, cooperative and not in distress  SKIN: no rashes, no lesions  HEAD: atraumatic, normocephalic  EYES: lids and lashes normal, conjunctivae and sclerae clear, pupils equal, round, reactive to light, EOM full and intact  ENT: no scars or lesions, nasal exam showed no discharge, swelling or lesions noted, otoscopy showed external auditory canals clear, tympanic membranes normal, tongue midline and normal, soft palate, uvula, and tonsils normal  NECK: no asymmetry, masses, or scars, supple without significant adenopathy  LUNGS: unlabored respirations, no intercostal retractions or accessory muscle use, clear to auscultation without  rales or wheezes  HEART: regular rate and rhythm without murmurs and normal S1 and S2  MUSCULOSKELETAL: no musculoskeletal defects are noted  NEURO: no focal deficits noted  PSYCH: does not appear depressed or anxious    WORKUP: None    ASSESSMENT/PLAN:  Aleida Weinberg is a 32 year old female here for evaluation of possible allergic reaction to fluconazole. She developed acute onset of itching followed by a rash after taking this medication 3 weeks ago. Aleida has previously tolerated this medication without any adverse reactions. The timing of her symptoms are consistent with an acute allergic reaction however features such as persistent, non-migratory rash are not consistent with an IgE mediated allergic reaction. Aleida was counseled that the safest course of action is avoidance of fluconazole as an allergic reaction cannot be ruled out. There are currently no available standardized protocols for testing or oral challenge to fluconazole. Aleida was counseled that for the vast majority of drugs, with the exception of penicillin, skin testing does not offer validated negative or positive predictive values. She expressed understanding of this however would like to be able to continue to use fluconazole in the future.    1. Continue to avoid fluconazole  2. Will consider skin testing and/or oral challenge if documented procedure can be found in the medical literature with documentation of patient consent regarding potential risks of this procedure      Ilia Han MD  Allergy/Immunology  [place], [place]      Chart documentation done in part with Dragon Voice Recognition Software. Although reviewed after completion, some word and grammatical errors may remain.

## 2017-11-02 NOTE — LETTER
"    11/2/2017        RE: Aleida Weinberg  [address] [place] [address]        Dear Tatum Dove PA-C,    Thank you for referring your patient Aleida Weinberg to the Allergy/Immunology Clinic. Aleida Weinberg was seen in the Allergy Clinic at [facility]. The following are my recommendations regarding her Adverse Reaction to Drug    1. Continue to avoid fluconazole  2. Will consider skin testing and/or oral challenge if documented procedure can be found in the medical literature with documentation of patient consent regarding potential risks of this procedure      Aleida Weinberg is a 32 year old White female being seen today in consultation for possible drug allergy. She was diagnosed with a vaginal yeast infection on 10/10/17 and treated with oral fluconazole. Aleida states she has taken this medication in the past without any problem. Within an hour of taking the medication Aleida reports that her face began to itch. Her symptoms intensified over the next 2 to 3 hours and she eventually took benadryl about 3.5 hours after taking the fluconazole. She reports developing hives the following day and describes the lesions as \"big, flat, and red.\" Aleida states the hives were present on her arms and lasted for about 1 week and then developed very dry skin. She does not have any current rash or pictures documenting her symptoms. Aleida denies other associated symptoms including lip, tongue, or throat swelling, difficulty swallowing, cough, difficulty breathing, chest pain, palpitations, nausea, vomiting, diarrhea, dizziness, or lightheadedness. She had no oral lesions or areas of skin desquamation. Aleida had not started any new medications around the time she took the fluconazole. About 2 weeks prior to taking the  Medication she had a cold but her acute URI symptoms had since resolved. Aleida reports that she was seen in urgent care in early " September and was prescribed diflucan for a yeast infection at that time and had no problem with the medication. She has taken it several times over the last few years with no adverse reaction.      Past Medical History:   Diagnosis Date     Mild intermittent asthma      Other kyphoscoliosis and scoliosis     upper back curvature and disc degeneration in lower back.     Family History   Problem Relation Age of Onset     HEART DISEASE Maternal Grandfather      Bypass, Heart Disease     Respiratory Maternal Grandfather      asthma     C.A.D. Paternal Grandfather      HEART DISEASE Maternal Uncle      MI's      Hypertension Paternal Grandmother      CANCER Paternal Grandmother      lymph nodes     Respiratory Other      cousin on mothers side, asthma     Alcohol/Drug Maternal Uncle      Alcohol/Drug Other      bother great grandparents on mother's side     DIABETES No family hx of      Breast Cancer No family hx of      Cancer - colorectal No family hx of      Past Surgical History:   Procedure Laterality Date      SECTION       SURGICAL HISTORY OF -       PE Tubes       ENVIRONMENTAL HISTORY: [place] The home is heated with a forced air. They does have central air conditioning. The patient's bedroom is furnished with hard trice in bedroom and fabric window coverings.  Pets inside the house include None. There is not history of cockroach or mice infestation. There is/are 0 smokers in the house.  The house does not have a damp basement.     SOCIAL HISTORY:   Aleida is employed as a . She has missed 0 days of school/work. She lives with her daughter.    REVIEW OF SYSTEMS:  General: negative for weight gain. negative for weight loss. negative for changes in sleep.   Eyes: negative for itching. negative for redness. negative for tearing/watering.  Ears: negative for fullness. negative for hearing loss. negative for dizziness.   Nose: negative for  snoring.negative for changes in smell. negative for drainage.   Throat: negative for hoarseness. negative for sore throat. negative for trouble swallowing.   Lungs: negative for shortness of breath.negative for wheezing. negative for sputum production.   Cardiovascular: negative for chest pain. negative for swelling of ankles. negative for fast or irregular heartbeat.   Gastrointestinal: negative for nausea. negative for heartburn. negative for acid reflux.   Musculoskeletal: negative for joint pain. negative for joint stiffness. negative for joint swelling.   Neurologic: negative for seizures. negative for fainting. negative for weakness.   Psychiatric: negative for changes in mood. negative for anxiety.   Endocrine: positive  for cold intolerance. positive  for heat intolerance. negative for tremors.   Hematologic: positive  for easy bruising. positive  for easy bleeding.  Integumentary: positive  for rash. negative for scaling. negative for nail changes.       Current Outpatient Prescriptions:      hydroxychloroquine (PLAQUENIL) 200 MG tablet, Hydroxychloroquine 200mg in the morning and 100mg in the evening., Disp: 135 tablet, Rfl: 1     azaTHIOprine (IMURAN) 50 MG tablet, Take 3 tablets (150 mg) by mouth daily, Disp: 270 tablet, Rfl: 1     amLODIPine (NORVASC) 10 MG tablet, Take 1 tablet (10 mg) by mouth daily, Disp: 90 tablet, Rfl: 1     LORazepam (ATIVAN) 0.5 MG tablet, Take 1 tablet (0.5 mg) by mouth every 8 hours as needed for anxiety, Disp: 10 tablet, Rfl: 0     traZODone (DESYREL) 50 MG tablet, Take 0.5 tablets (25 mg) by mouth nightly as needed for sleep, Disp: 30 tablet, Rfl: 0     levonorgestrel (MIRENA, 52 MG,) 20 MCG/24HR IUD, 1 each by Intrauterine route once, Disp: , Rfl:      Cholecalciferol (VITAMIN D) 2000 UNITS tablet, Take 2,000 Units by mouth daily, Disp: 100 tablet, Rfl: 3     traMADol (ULTRAM) 50 MG tablet, Take 1-2 tablets ( mg) by mouth every 6 hours as needed for pain maximum 8  tablet(s) per day, Disp: 30 tablet, Rfl: 0     cyclobenzaprine (FLEXERIL) 10 MG tablet, Take 0.5-1 tablets (5-10 mg) by mouth 3 times daily as needed for muscle spasms, Disp: 30 tablet, Rfl: 1     predniSONE (DELTASONE) 5 MG tablet, Take 1 tablet (5 mg) by mouth daily as needed for lupus, Disp: 90 tablet, Rfl: 0     albuterol (PROAIR HFA, PROVENTIL HFA, VENTOLIN HFA) 108 (90 BASE) MCG/ACT inhaler, Inhale 2 puffs into the lungs every 6 hours as needed for shortness of breath / dyspnea, Disp: 1 Inhaler, Rfl: 1     omeprazole (PRILOSEC) 40 MG capsule, Take 1 capsule (40 mg) by mouth daily Take 30-60 minutes before a meal., Disp: 30 capsule, Rfl: 0  Immunization History   Administered Date(s) Administered     DPT 1985, [birth year], 01/15/1988, 06/15/1991     HPV 02/25/2010, 02/04/2011     HepB 06/13/1999, 08/20/1999, 02/05/2000     Influenza (IIV3) 11/07/2011     Influenza Vaccine IM 3yrs+ 4 Valent IIV4 10/11/2016, 10/13/2017     MMR 05/15/1987, 09/15/1991     Mantoux 07/15/1987, 01/19/2005     Meningococcal (Menomune ) 08/09/2004     OPV, trivalent, live 1986, 1986, 1986, 01/15/1988, 09/15/1991     TDAP Vaccine (Adacel) 01/21/2009, 02/25/2010     Allergies   Allergen Reactions     No Known Drug Allergies          EXAM:   /80  Pulse 84  Wt 69.5 kg (153 lb 3.2 oz)  LMP 10/22/2017 (Exact Date)  SpO2 99%  BMI 23.82 kg/m2  GENERAL APPEARANCE: alert, cooperative and not in distress  SKIN: no rashes, no lesions  HEAD: atraumatic, normocephalic  EYES: lids and lashes normal, conjunctivae and sclerae clear, pupils equal, round, reactive to light, EOM full and intact  ENT: no scars or lesions, nasal exam showed no discharge, swelling or lesions noted, otoscopy showed external auditory canals clear, tympanic membranes normal, tongue midline and normal, soft palate, uvula, and tonsils normal  NECK: no asymmetry, masses, or scars, supple without significant adenopathy  LUNGS: unlabored  respirations, no intercostal retractions or accessory muscle use, clear to auscultation without rales or wheezes  HEART: regular rate and rhythm without murmurs and normal S1 and S2  MUSCULOSKELETAL: no musculoskeletal defects are noted  NEURO: no focal deficits noted  PSYCH: does not appear depressed or anxious    WORKUP: None    ASSESSMENT/PLAN:  Aleida Weinberg is a 32 year old female here for evaluation of possible allergic reaction to fluconazole. She developed acute onset of itching followed by a rash after taking this medication 3 weeks ago. Aleida has previously tolerated this medication without any adverse reactions. The timing of her symptoms are consistent with an acute allergic reaction however features such as persistent, non-migratory rash are not consistent with an IgE mediated allergic reaction. Aleida was counseled that the safest course of action is avoidance of fluconazole as an allergic reaction cannot be ruled out. There are currently no available standardized protocols for testing or oral challenge to fluconazole. Aleida was counseled that for the vast majority of drugs, with the exception of penicillin, skin testing does not offer validated negative or positive predictive values. She expressed understanding of this however would like to be able to continue to use fluconazole in the future.    1. Continue to avoid fluconazole  2. Will consider skin testing and/or oral challenge if documented procedure can be found in the medical literature with documentation of patient consent regarding potential risks of this procedure      Ilia Han MD  Allergy/Immunology  [place], [place]      Chart documentation done in part with Dragon Voice Recognition Software. Although reviewed after completion, some word and grammatical errors may remain.        Sincerely,        Ilia Han MD

## 2017-11-02 NOTE — NURSING NOTE
"Chief Complaint   Patient presents with     Consult     allergic reaction to Fluconazole. Hives on face that lasted days.       Initial /80  Pulse 84  Wt 69.5 kg (153 lb 3.2 oz)  LMP 10/22/2017 (Exact Date)  SpO2 99%  BMI 23.82 kg/m2 Estimated body mass index is 23.82 kg/(m^2) as calculated from the following:    Height as of 10/25/17: 1.708 m (5' 7.25\").    Weight as of this encounter: 69.5 kg (153 lb 3.2 oz).  Medication Reconciliation: complete   Deloris Tuttle MA.... 11:09 AM....11/2/2017      "

## 2017-11-02 NOTE — PATIENT INSTRUCTIONS
If you have any questions regarding your allergies, asthma, or what we discussed during your visit today please call the allergy clinic or contact us via Glance App.    [facility] ([place], [place]): [phone]  [facility]: [phone]

## 2017-11-02 NOTE — MR AVS SNAPSHOT
After Visit Summary   11/2/2017    Aleida Weinberg    MRN: [id number]           Patient Information     Date Of Birth          1985        Visit Information        Provider Department      11/2/2017 11:00 AM Ilia Han MD [facility] [place]        Today's Diagnoses     Adverse effect of drug, initial encounter    -  1      Care Instructions    If you have any questions regarding your allergies, asthma, or what we discussed during your visit today please call the allergy clinic or contact us via [employer] Marketh[employer].    [facility] ([place], [place]): [phone]  [facility] All[facility]: [phone]            Follow-ups after your visit        Your next 10 appointments already scheduled     Jan 18, 2018  5:00 PM CST   [facility] with [place] [facility]   [facility]    [address] [place] [id number] [phone]           Please do not eat 10-12 hours before your appointment if you are coming in fasting for labs on lipids, cholesterol, or glucose (sugar). This does not apply to pregnant women. Water, hot tea and black coffee (with nothing added) are okay. Do not drink other fluids, diet soda or chew gum.            Jan 26, 2018  8:20 AM CST   Return Visit with Lavon Light MD   [facility] [place] ([facility] [place])    [address] [place] [id number] [phone]              Who to contact     If you have questions or need follow up information about today's clinic visit or your schedule please contact [facility] directly at [phone].  Normal or non-critical lab and imaging results will be communicated to you by MyChart, letter or phone within 4 business days after the clinic has received the results. If you do not hear from us within 7 days, please contact the clinic through MyChart or phone. If you have a critical or abnormal lab result, we will notify you by phone as soon as  possible.  Submit refill requests through [employer]) or call your pharmacy and they will forward the refill request to us. Please allow 3 business days for your refill to be completed.          Additional Information About Your Visit        Tubishart Information     [employer] gives you secure access to your electronic health record. If you see a primary care provider, you can also send messages to your care team and make appointments. If you have questions, please call your primary care clinic.  If you do not have a primary care provider, please call [phone] and they will assist you.        Care EveryWhere ID     This is your Care EveryWhere ID. This could be used by other organizations to access your [facility] medical records  [id number]        Your Vitals Were     Pulse Last Period Pulse Oximetry BMI (Body Mass Index)          84 10/22/2017 (Exact Date) 99% 23.82 kg/m2         Blood Pressure from Last 3 Encounters:   11/02/17 114/80   10/25/17 104/76   10/13/17 118/62    Weight from Last 3 Encounters:   11/02/17 69.5 kg (153 lb 3.2 oz)   10/25/17 69.4 kg (153 lb)   10/20/17 69.9 kg (154 lb)              Today, you had the following     No orders found for display       Primary Care Provider Office Phone # Fax #    Tatum Dove PA-C [phone]       [facility] [facility] [address]        Equal Access to Services     GLOR[facility] : Hadii catalina ku hadasho Soomaali, waaxda luqadaha, qaybta kaalmada adeegyada, mira newell . So [facility] [phone].    ATENCIÓN: Si habla español, tiene a rey disposición servicios gratuitos de asistencia lingüística. Llame al [phone].    We comply with applicable federal civil rights laws and [place] laws. We do not discriminate on the basis of race, color, national origin, age, disability, sex, sexual orientation, or gender identity.            Thank you!     Thank you for choosing [facility]  for your care. Our  goal is always to provide you with excellent care. Hearing back from our patients is one way we can continue to improve our services. Please take a few minutes to complete the written survey that you may receive in the mail after your visit with us. Thank you!             Your Updated Medication List - Protect others around you: Learn how to safely use, store and throw away your medicines at www.disposemymeds.org.          This list is accurate as of: 11/2/17 11:59 PM.  Always use your most recent med list.                   Brand Name Dispense Instructions for use Diagnosis    albuterol 108 (90 BASE) MCG/ACT Inhaler    PROAIR HFA/PROVENTIL HFA/VENTOLIN HFA    1 Inhaler    Inhale 2 puffs into the lungs every 6 hours as needed for shortness of breath / dyspnea    Intermittent asthma, uncomplicated       amLODIPine 10 MG tablet    NORVASC    90 tablet    Take 1 tablet (10 mg) by mouth daily    Raynaud's phenomenon without gangrene       azaTHIOprine 50 MG tablet    IMURAN    270 tablet    Take 3 tablets (150 mg) by mouth daily    Other systemic lupus erythematosus with other organ involvement (H)       cyclobenzaprine 10 MG tablet    FLEXERIL    30 tablet    Take 0.5-1 tablets (5-10 mg) by mouth 3 times daily as needed for muscle spasms    Acute midline low back pain with right-sided sciatica       hydroxychloroquine 200 MG tablet    PLAQUENIL    135 tablet    Hydroxychloroquine 200mg in the morning and 100mg in the evening.    Other systemic lupus erythematosus with other organ involvement (H)       LORazepam 0.5 MG tablet    ATIVAN    10 tablet    Take 1 tablet (0.5 mg) by mouth every 8 hours as needed for anxiety    Anxiety       MIRENA (52 MG) 20 MCG/24HR IUD   Generic drug:  levonorgestrel      1 each by Intrauterine route once        omeprazole 40 MG capsule    priLOSEC    30 capsule    Take 1 capsule (40 mg) by mouth daily Take 30-60 minutes before a meal.    Abdominal pain, epigastric       predniSONE 5 MG  tablet    DELTASONE    90 tablet    Take 1 tablet (5 mg) by mouth daily as needed for lupus    Systemic lupus erythematosus (H), High risk medications (not anticoagulants) long-term use       traMADol 50 MG tablet    ULTRAM    30 tablet    Take 1-2 tablets ( mg) by mouth every 6 hours as needed for pain maximum 8 tablet(s) per day    Acute midline low back pain with right-sided sciatica       traZODone 50 MG tablet    DESYREL    30 tablet    Take 0.5 tablets (25 mg) by mouth nightly as needed for sleep    Other insomnia       vitamin D 2000 UNITS tablet     100 tablet    Take 2,000 Units by mouth daily    Vitamin D deficiency

## 2017-11-03 ASSESSMENT — ASTHMA QUESTIONNAIRES: ACT_TOTALSCORE: 25

## 2017-12-11 ENCOUNTER — OFFICE VISIT (OUTPATIENT)
Dept: FAMILY MEDICINE | Facility: CLINIC | Age: 32
End: 2017-12-11
Payer: COMMERCIAL

## 2017-12-11 VITALS
HEART RATE: 80 BPM | TEMPERATURE: 98.7 F | WEIGHT: 156 LBS | BODY MASS INDEX: 24.48 KG/M2 | DIASTOLIC BLOOD PRESSURE: 72 MMHG | SYSTOLIC BLOOD PRESSURE: 110 MMHG | HEIGHT: 67 IN

## 2017-12-11 DIAGNOSIS — B07.8 COMMON WART: Primary | ICD-10-CM

## 2017-12-11 PROCEDURE — 17110 DESTRUCTION B9 LES UP TO 14: CPT | Performed by: PHYSICIAN ASSISTANT

## 2017-12-11 NOTE — PROGRESS NOTES
"  SUBJECTIVE:   Aleida Weinberg is a 32 year old female who presents to clinic today for the following health issues:      Patient is here today to have a small bump that she has on her left knee evaluated.  Patient said that she cut herself shaving in July and it hasn't gone completely away yet. It is not painful, but she would like to ensure it isn't something that will be getting bigger. It is a raised area - so she is forced to shave around this now.   Has tried Vitamin E, antibacterial cream without improvement.  Hasn't had an issue with scars healing in the past. She has lupus and is on two immunosuppressants - azathioprine and hydroxychloroquine.  H/o plantar warts - as a child.     Problem list and histories reviewed & adjusted, as indicated.  Additional history: as documented    Reviewed and updated as needed this visit by clinical staff       Reviewed and updated as needed this visit by Provider         ROS:  Constitutional, HEENT, cardiovascular, pulmonary, gi and gu systems are negative, except as otherwise noted.      OBJECTIVE:   /72 (BP Location: Right arm, Cuff Size: Adult Regular)  Pulse 80  Temp 98.7  F (37.1  C) (Oral)  Ht 5' 7.25\" (1.708 m)  Wt 156 lb (70.8 kg)  BMI 24.25 kg/m2  Body mass index is 24.25 kg/(m^2).  GENERAL: healthy, alert and no distress  SKIN: 8 mm non erythematous papule.      Diagnostic Test Results:  none     ASSESSMENT/PLAN:       1. Common wart  Likely due to being on immunosuppressants. Treated with 3 freeze-thaw cycles of liquid nitrogen. Patient tolerated the procedure well. Informed of the typical healing process seen after the treatment and instructed to return to the clinic in two weeks if the wart has not completely gone away. She agrees with the plan.     RICCI PAREDES PA-C  [facility]  "

## 2017-12-11 NOTE — MR AVS SNAPSHOT
After Visit Summary   12/11/2017    Aleida Weinberg    MRN: [id number]           Patient Information     Date Of Birth          1985        Visit Information        Provider Department      12/11/2017 8:20 AM Foster Gupta PA-C [facility]         Follow-ups after your visit        Your next 10 appointments already scheduled     Jan 18, 2018  5:00 PM CST   LAB with NE LAB   [facility] ([facility]    [address] [place] [id number] [phone]           Please do not eat 10-12 hours before your appointment if you are coming in fasting for labs on lipids, cholesterol, or glucose (sugar). This does not apply to pregnant women. Water, hot tea and black coffee (with nothing added) are okay. Do not drink other fluids, diet soda or chew gum.            Jan 26, 2018  8:20 AM CST   Return Visit with Lavon Light MD   [facility] ([facility]    [address] [facility] [id number] [phone]              Who to contact     If you have questions or need follow up information about today's clinic visit or your schedule please contact [facility] directly at [phone].  Normal or non-critical lab and imaging results will be communicated to you by MyChart, letter or phone within 4 business days after the clinic has received the results. If you do not hear from us within 7 days, please contact the clinic through MyChart or phone. If you have a critical or abnormal lab result, we will notify you by phone as soon as possible.  Submit refill requests through [employer] or call your pharmacy and they will forward the refill request to us. Please allow 3 business days for your refill to be completed.          Additional Information About Your Visit        [employer] Information     [employer] gives you secure access to your electronic health record. If you see a primary  "care provider, you can also send messages to your care team and make appointments. If you have questions, please call your primary care clinic.  If you do not have a primary care provider, please call [phone] and they will assist you.        Care EveryWhere ID     This is your Care EveryWhere ID. This could be used by other organizations to access your [facility] medical records  [id number]        Your Vitals Were     Pulse Temperature Height BMI (Body Mass Index)          80 98.7  F (37.1  C) (Oral) 5' 7.25\" (1.708 m) 24.25 kg/m2         Blood Pressure from Last 3 Encounters:   12/11/17 110/72   11/02/17 114/80   10/25/17 104/76    Weight from Last 3 Encounters:   12/11/17 156 lb (70.8 kg)   11/02/17 153 lb 3.2 oz (69.5 kg)   10/25/17 153 lb (69.4 kg)              Today, you had the following     No orders found for display       Primary Care Provider Office Phone # Fax #    Tatum Dove PA-C [phone]       [address] [place] [facility] [address]        Equal Access to Services     CACHORRO MADRIGAL AH: Hadii aad ku hadasho Soomaali, waaxda luqadaha, qaybta kaalmada adeegyada, waxay lida haymauricion zully ivey la'daysi ah. So [facility] [phone].    ATENCIÓN: Si habla español, tiene a rey disposición servicios gratuitos de asistencia lingüística. Tiagoame al [phone].    We comply with applicable federal civil rights laws and [place] laws. We do not discriminate on the basis of race, color, national origin, age, disability, sex, sexual orientation, or gender identity.            Thank you!     Thank you for choosing [facility]  for your care. Our goal is always to provide you with excellent care. Hearing back from our patients is one way we can continue to improve our services. Please take a few minutes to complete the written survey that you may receive in the mail after your visit with us. Thank you!             Your Updated Medication List - Protect others around you: " Learn how to safely use, store and throw away your medicines at www.disposemymeds.org.          This list is accurate as of: 12/11/17  8:38 AM.  Always use your most recent med list.                   Brand Name Dispense Instructions for use Diagnosis    albuterol 108 (90 BASE) MCG/ACT Inhaler    PROAIR HFA/PROVENTIL HFA/VENTOLIN HFA    1 Inhaler    Inhale 2 puffs into the lungs every 6 hours as needed for shortness of breath / dyspnea    Intermittent asthma, uncomplicated       amLODIPine 10 MG tablet    NORVASC    90 tablet    Take 1 tablet (10 mg) by mouth daily    Raynaud's phenomenon without gangrene       azaTHIOprine 50 MG tablet    IMURAN    270 tablet    Take 3 tablets (150 mg) by mouth daily    Other systemic lupus erythematosus with other organ involvement (H)       cyclobenzaprine 10 MG tablet    FLEXERIL    30 tablet    Take 0.5-1 tablets (5-10 mg) by mouth 3 times daily as needed for muscle spasms    Acute midline low back pain with right-sided sciatica       hydroxychloroquine 200 MG tablet    PLAQUENIL    135 tablet    Hydroxychloroquine 200mg in the morning and 100mg in the evening.    Other systemic lupus erythematosus with other organ involvement (H)       LORazepam 0.5 MG tablet    ATIVAN    10 tablet    Take 1 tablet (0.5 mg) by mouth every 8 hours as needed for anxiety    Anxiety       MIRENA (52 MG) 20 MCG/24HR IUD   Generic drug:  levonorgestrel      1 each by Intrauterine route once        omeprazole 40 MG capsule    priLOSEC    30 capsule    Take 1 capsule (40 mg) by mouth daily Take 30-60 minutes before a meal.    Abdominal pain, epigastric       predniSONE 5 MG tablet    DELTASONE    90 tablet    Take 1 tablet (5 mg) by mouth daily as needed for lupus    Systemic lupus erythematosus (H), High risk medications (not anticoagulants) long-term use       traMADol 50 MG tablet    ULTRAM    30 tablet    Take 1-2 tablets ( mg) by mouth every 6 hours as needed for pain maximum 8 tablet(s)  per day    Acute midline low back pain with right-sided sciatica       traZODone 50 MG tablet    DESYREL    30 tablet    Take 0.5 tablets (25 mg) by mouth nightly as needed for sleep    Other insomnia       vitamin D 2000 UNITS tablet     100 tablet    Take 2,000 Units by mouth daily    Vitamin D deficiency

## 2017-12-27 ENCOUNTER — TRANSFERRED RECORDS (OUTPATIENT)
Dept: HEALTH INFORMATION MANAGEMENT | Facility: CLINIC | Age: 32
End: 2017-12-27

## 2017-12-28 ENCOUNTER — OFFICE VISIT (OUTPATIENT)
Dept: FAMILY MEDICINE | Facility: CLINIC | Age: 32
End: 2017-12-28
Payer: COMMERCIAL

## 2017-12-28 ENCOUNTER — RADIANT APPOINTMENT (OUTPATIENT)
Dept: GENERAL RADIOLOGY | Facility: CLINIC | Age: 32
End: 2017-12-28
Attending: FAMILY MEDICINE
Payer: COMMERCIAL

## 2017-12-28 VITALS
HEART RATE: 114 BPM | TEMPERATURE: 99.9 F | SYSTOLIC BLOOD PRESSURE: 113 MMHG | OXYGEN SATURATION: 98 % | BODY MASS INDEX: 24.01 KG/M2 | DIASTOLIC BLOOD PRESSURE: 71 MMHG | WEIGHT: 153 LBS | HEIGHT: 67 IN

## 2017-12-28 DIAGNOSIS — R82.90 ABNORMAL FINDING IN URINE: ICD-10-CM

## 2017-12-28 DIAGNOSIS — R10.11 ABDOMINAL PAIN, RIGHT UPPER QUADRANT: Primary | ICD-10-CM

## 2017-12-28 DIAGNOSIS — R10.11 ABDOMINAL PAIN, RIGHT UPPER QUADRANT: ICD-10-CM

## 2017-12-28 DIAGNOSIS — G44.209 ACUTE NON INTRACTABLE TENSION-TYPE HEADACHE: ICD-10-CM

## 2017-12-28 LAB
ALBUMIN UR-MCNC: 100 MG/DL
APPEARANCE UR: CLEAR
BACTERIA #/AREA URNS HPF: ABNORMAL /HPF
BASOPHILS # BLD AUTO: 0 10E9/L (ref 0–0.2)
BASOPHILS NFR BLD AUTO: 0 %
BILIRUB UR QL STRIP: ABNORMAL
COLOR UR AUTO: YELLOW
DIFFERENTIAL METHOD BLD: ABNORMAL
EOSINOPHIL # BLD AUTO: 0 10E9/L (ref 0–0.7)
EOSINOPHIL NFR BLD AUTO: 0 %
ERYTHROCYTE [DISTWIDTH] IN BLOOD BY AUTOMATED COUNT: 12.9 % (ref 10–15)
GLUCOSE UR STRIP-MCNC: NEGATIVE MG/DL
HCT VFR BLD AUTO: 35.7 % (ref 35–47)
HGB BLD-MCNC: 12.8 G/DL (ref 11.7–15.7)
HGB UR QL STRIP: NEGATIVE
KETONES UR STRIP-MCNC: >160 MG/DL
LEUKOCYTE ESTERASE UR QL STRIP: NEGATIVE
LYMPHOCYTES # BLD AUTO: 0.4 10E9/L (ref 0.8–5.3)
LYMPHOCYTES NFR BLD AUTO: 6 %
MCH RBC QN AUTO: 35.8 PG (ref 26.5–33)
MCHC RBC AUTO-ENTMCNC: 35.9 G/DL (ref 31.5–36.5)
MCV RBC AUTO: 100 FL (ref 78–100)
MONOCYTES # BLD AUTO: 0.3 10E9/L (ref 0–1.3)
MONOCYTES NFR BLD AUTO: 5.4 %
MUCOUS THREADS #/AREA URNS LPF: PRESENT /LPF
NEUTROPHILS # BLD AUTO: 5.4 10E9/L (ref 1.6–8.3)
NEUTROPHILS NFR BLD AUTO: 88.6 %
NITRATE UR QL: POSITIVE
NON-SQ EPI CELLS #/AREA URNS LPF: ABNORMAL /LPF
PH UR STRIP: 6 PH (ref 5–7)
PLATELET # BLD AUTO: 220 10E9/L (ref 150–450)
RBC # BLD AUTO: 3.58 10E12/L (ref 3.8–5.2)
RBC #/AREA URNS AUTO: ABNORMAL /HPF
SOURCE: ABNORMAL
SP GR UR STRIP: >1.03 (ref 1–1.03)
UROBILINOGEN UR STRIP-ACNC: 4 EU/DL (ref 0.2–1)
WBC # BLD AUTO: 6.1 10E9/L (ref 4–11)
WBC #/AREA URNS AUTO: ABNORMAL /HPF

## 2017-12-28 PROCEDURE — 81001 URINALYSIS AUTO W/SCOPE: CPT | Performed by: FAMILY MEDICINE

## 2017-12-28 PROCEDURE — 85025 COMPLETE CBC W/AUTO DIFF WBC: CPT | Performed by: FAMILY MEDICINE

## 2017-12-28 PROCEDURE — 99214 OFFICE O/P EST MOD 30 MIN: CPT | Performed by: FAMILY MEDICINE

## 2017-12-28 PROCEDURE — 80053 COMPREHEN METABOLIC PANEL: CPT | Performed by: FAMILY MEDICINE

## 2017-12-28 PROCEDURE — 36415 COLL VENOUS BLD VENIPUNCTURE: CPT | Performed by: FAMILY MEDICINE

## 2017-12-28 PROCEDURE — 87086 URINE CULTURE/COLONY COUNT: CPT | Performed by: FAMILY MEDICINE

## 2017-12-28 PROCEDURE — 74020 XR ABDOMEN 2 VW: CPT

## 2017-12-28 RX ORDER — CIPROFLOXACIN 500 MG/1
500 TABLET, FILM COATED ORAL 2 TIMES DAILY
Qty: 20 TABLET | Refills: 0 | Status: SHIPPED | OUTPATIENT
Start: 2017-12-28 | End: 2018-01-07

## 2017-12-28 RX ORDER — OXYCODONE AND ACETAMINOPHEN 5; 325 MG/1; MG/1
1 TABLET ORAL EVERY 6 HOURS PRN
COMMUNITY
Start: 2017-12-27 | End: 2018-10-26

## 2017-12-28 RX ORDER — PREDNISONE 20 MG/1
20 TABLET ORAL 2 TIMES DAILY
Qty: 10 TABLET | Refills: 0 | Status: SHIPPED | OUTPATIENT
Start: 2017-12-28 | End: 2018-01-02

## 2017-12-28 NOTE — MR AVS SNAPSHOT
After Visit Summary   12/28/2017    Aleida Weinberg    MRN: [id number]           Patient Information     Date Of Birth          1985        Visit Information        Provider Department      12/28/2017 1:00 PM Moisés Paiz MD [facility]        Today's Diagnoses     Abdominal pain, right upper quadrant    -  1    Abnormal finding in urine        Acute non intractable tension-type headache           Follow-ups after your visit        Your next 10 appointments already scheduled     Jan 18, 2018  5:00 PM CST   LAB with NE LAB   [facility] ([facility])    [address] [place] [id number] [phone]           Please do not eat 10-12 hours before your appointment if you are coming in fasting for labs on lipids, cholesterol, or glucose (sugar). This does not apply to pregnant women. Water, hot tea and black coffee (with nothing added) are okay. Do not drink other fluids, diet soda or chew gum.            Jan 25, 2018  8:00 AM CST   Return Visit with Lavon Light MD   [facility] ([facility])    [address] [place] [id number] [phone]              Who to contact     If you have questions or need follow up information about today's clinic visit or your schedule please contact [facility] directly at [phone].  Normal or non-critical lab and imaging results will be communicated to you by MyChart, letter or phone within 4 business days after the clinic has received the results. If you do not hear from us within 7 days, please contact the clinic through MyChart or phone. If you have a critical or abnormal lab result, we will notify you by phone as soon as possible.  Submit refill requests through [employer] or call your pharmacy and they will forward the refill request to us. Please allow 3 business days for your refill to be completed.           "Additional Information About Your Visit        [employer] Information     [employer] gives you secure access to your electronic health record. If you see a primary care provider, you can also send messages to your care team and make appointments. If you have questions, please call your primary care clinic.  If you do not have a primary care provider, please call [phone] and they will assist you.        Care EveryWhere ID     This is your Care EveryWhere ID. This could be used by other organizations to access your [facility] medical records  [id number]        Your Vitals Were     Pulse Temperature Height Pulse Oximetry Breastfeeding? BMI (Body Mass Index)    114 99.9  F (37.7  C) (Oral) 5' 7.25\" (1.708 m) 98% No 23.79 kg/m2       Blood Pressure from Last 3 Encounters:   12/28/17 113/71   12/11/17 110/72   11/02/17 114/80    Weight from Last 3 Encounters:   12/28/17 153 lb (69.4 kg)   12/11/17 156 lb (70.8 kg)   11/02/17 153 lb 3.2 oz (69.5 kg)              We Performed the Following     CBC with platelets and differential     Comprehensive metabolic panel (BMP + Alb, Alk Phos, ALT, AST, Total. Bili, TP)     UA with Microscopic reflex to Culture     Urine Culture Aerobic Bacterial          Today's Medication Changes          These changes are accurate as of: 12/28/17  2:34 PM.  If you have any questions, ask your nurse or doctor.               Start taking these medicines.        Dose/Directions    ciprofloxacin 500 MG tablet   Commonly known as:  CIPRO   Used for:  Abdominal pain, right upper quadrant   Started by:  Moisés Paiz MD        Dose:  500 mg   Take 1 tablet (500 mg) by mouth 2 times daily for 10 days   Quantity:  20 tablet   Refills:  0         These medicines have changed or have updated prescriptions.        Dose/Directions    * predniSONE 5 MG tablet   Commonly known as:  DELTASONE   This may have changed:  Another medication with the same name was added. Make sure you understand how and " when to take each.   Used for:  Systemic lupus erythematosus (H), High risk medications (not anticoagulants) long-term use   Changed by:  Lavon Light MD        Dose:  5 mg   Take 1 tablet (5 mg) by mouth daily as needed for lupus   Quantity:  90 tablet   Refills:  0       * predniSONE 20 MG tablet   Commonly known as:  DELTASONE   This may have changed:  You were already taking a medication with the same name, and this prescription was added. Make sure you understand how and when to take each.   Used for:  Acute non intractable tension-type headache   Changed by:  Mosiés Paiz MD        Dose:  20 mg   Take 1 tablet (20 mg) by mouth 2 times daily for 5 days   Quantity:  10 tablet   Refills:  0       * Notice:  This list has 2 medication(s) that are the same as other medications prescribed for you. Read the directions carefully, and ask your doctor or other care provider to review them with you.         Where to get your medicines      These medications were sent to [facility] [address].  [address] [place] [address]., [place] [address]     Phone:  [phone]     ciprofloxacin 500 MG tablet    predniSONE 20 MG tablet                Primary Care Provider Office Phone # Fax #    Tatum Dove PA-C [phone]       [address] [place] [address]        Equal Access to Services     CACHORRO MADRIGAL AH: [place] silverioo Soomaali, waaxda luqadaha, qaybta kaalmada adeegyada, mira concepcion. So [facility] [phone].    ATENCIÓN: Si habla español, tiene a rey disposición servicios gratuitos de asistencia lingüística. Llame al [phone].    We comply with applicable federal civil rights laws and [place] laws. We do not discriminate on the basis of race, color, national origin, age, disability, sex, sexual orientation, or gender identity.            Thank you!     Thank you for choosing [facility]  [facility]  for your care. Our goal is always to provide you with excellent care. Hearing back from our patients is one way we can continue to improve our services. Please take a few minutes to complete the written survey that you may receive in the mail after your visit with us. Thank you!             Your Updated Medication List - Protect others around you: Learn how to safely use, store and throw away your medicines at www.disposemymeds.org.          This list is accurate as of: 12/28/17  2:34 PM.  Always use your most recent med list.                   Brand Name Dispense Instructions for use Diagnosis    albuterol 108 (90 BASE) MCG/ACT Inhaler    PROAIR HFA/PROVENTIL HFA/VENTOLIN HFA    1 Inhaler    Inhale 2 puffs into the lungs every 6 hours as needed for shortness of breath / dyspnea    Intermittent asthma, uncomplicated       amLODIPine 10 MG tablet    NORVASC    90 tablet    Take 1 tablet (10 mg) by mouth daily    Raynaud's phenomenon without gangrene       azaTHIOprine 50 MG tablet    IMURAN    270 tablet    Take 3 tablets (150 mg) by mouth daily    Other systemic lupus erythematosus with other organ involvement (H)       ciprofloxacin 500 MG tablet    CIPRO    20 tablet    Take 1 tablet (500 mg) by mouth 2 times daily for 10 days    Abdominal pain, right upper quadrant       cyclobenzaprine 10 MG tablet    FLEXERIL    30 tablet    Take 0.5-1 tablets (5-10 mg) by mouth 3 times daily as needed for muscle spasms    Acute midline low back pain with right-sided sciatica       hydroxychloroquine 200 MG tablet    PLAQUENIL    135 tablet    Hydroxychloroquine 200mg in the morning and 100mg in the evening.    Other systemic lupus erythematosus with other organ involvement (H)       LORazepam 0.5 MG tablet    ATIVAN    10 tablet    Take 1 tablet (0.5 mg) by mouth every 8 hours as needed for anxiety    Anxiety       MIRENA (52 MG) 20 MCG/24HR IUD   Generic drug:  levonorgestrel      1 each by Intrauterine route  once        * omeprazole 40 MG capsule    priLOSEC    30 capsule    Take 1 capsule (40 mg) by mouth daily Take 30-60 minutes before a meal.    Abdominal pain, epigastric       * omeprazole 20 MG CR capsule    priLOSEC     Take 20 mg by mouth daily        oxyCODONE-acetaminophen 5-325 MG per tablet    PERCOCET     Take 1 tablet by mouth every 6 hours as needed        * predniSONE 5 MG tablet    DELTASONE    90 tablet    Take 1 tablet (5 mg) by mouth daily as needed for lupus    Systemic lupus erythematosus (H), High risk medications (not anticoagulants) long-term use       * predniSONE 20 MG tablet    DELTASONE    10 tablet    Take 1 tablet (20 mg) by mouth 2 times daily for 5 days    Acute non intractable tension-type headache       traMADol 50 MG tablet    ULTRAM    30 tablet    Take 1-2 tablets ( mg) by mouth every 6 hours as needed for pain maximum 8 tablet(s) per day    Acute midline low back pain with right-sided sciatica       traZODone 50 MG tablet    DESYREL    30 tablet    Take 0.5 tablets (25 mg) by mouth nightly as needed for sleep    Other insomnia       vitamin D 2000 UNITS tablet     100 tablet    Take 2,000 Units by mouth daily    Vitamin D deficiency       * Notice:  This list has 4 medication(s) that are the same as other medications prescribed for you. Read the directions carefully, and ask your doctor or other care provider to review them with you.

## 2017-12-28 NOTE — PROGRESS NOTES
SUBJECTIVE:   Aleida Weinberg is a 32 year old female who presents to clinic today for the following health issues:      ED/UC Followup:    Facility:  [facility]  Date of visit: 12/27/2017  Reason for visit: abdominal pain  Current Status: fever, headaches, body aches and abdominal pain      Patient is here today for emergency room follow-up.  She went to [facility] for abdominal pain that started on Sunday, December 24.  She went in on the 26th and spent the night in the emergency room and was discharged yesterday morning to home with potential diagnoses of gastritis.  Onset of her symptoms was on December 24.  She noted the onset of kind of a burning epigastric type pain.  It radiates towards the right upper and lower abdomen.  It does not change in character or severity with eating or drinking.  She has taken Prilosec in the past but not on a regular basis.  She has taken 1 or 2 doses since the onset of the discomfort and has not noticed any improvement.  Yesterday she had some nausea but no vomiting.  There was an ER notes about a chalky colored stool but she had a normal stool yesterday evening.  She continues to pass gas today.  She has had no vomiting.  Yesterday she woke up with a significant bifrontal headache.  Onset was gradual.  She has some associated sensitivity to light and sound but also reports a history of migraine and says the current headache is different.  She says it is very severe.  The abdominal discomfort continues and yesterday she also had the onset of fever up to 101 .  She is here today in the clinic with her mother.  Laboratory studies performed in the emergency room included a liver panel, lipase, basic metabolic panel, and CBC which were all essentially normal except for slight left shift noted on the CBC differential.  She had a right upper quadrant ultrasound done that she reports was quite tender but was read as normal.  CT scan of the abdomen and pelvis performed  with IV contrast was significant only for some adenopathy in the right iliac area.  Urine pregnancy test was negative and she has an IUD in place.    Patient has a history of lupus and inflammatory arthropathies.  She does endorse some joint pain at this time.  Otherwise her condition around this has been stable and she has not previously had bowel involvement with the lupus.  Laboratory studies and recent rheumatology note from October were reviewed and her course has been stable since that time.  She is on Plaquenil, prednisone, and azathioprine.        Problem list and histories reviewed & adjusted, as indicated.  Additional history: as documented    Patient Active Problem List   Diagnosis     Other kyphoscoliosis and scoliosis     Hypertrophic and atrophic condition of skin     Viral warts     Routine postpartum follow-up     CARDIOVASCULAR SCREENING; LDL GOAL LESS THAN 160     Intermittent asthma     Anxiety     Intractable menstrual migraine without status migrainosus     Vitamin D deficiency     Inflammatory polyarthropathy (H)     Chronic back pain     Raynaud's phenomenon without gangrene     Systemic lupus erythematosus (H)     High risk medications (not anticoagulants) long-term use (Plaquenil and MTX)     Dry eyes, bilateral     Past Surgical History:   Procedure Laterality Date      SECTION       SURGICAL HISTORY OF -       PE Tubes       Social History   Substance Use Topics     Smoking status: Former Smoker     Packs/day: 0.50     Years: 2.50     Types: Cigarettes     Quit date: 2005     Smokeless tobacco: Never Used     Alcohol use 0.0 oz/week     0 Standard drinks or equivalent per week      Comment: rarely - 1 monthly     Family History   Problem Relation Age of Onset     HEART DISEASE Maternal Grandfather      Bypass, Heart Disease     Respiratory Maternal Grandfather      asthma     C.A.D. Paternal Grandfather      HEART DISEASE Maternal Uncle      MI's      Hypertension  Paternal Grandmother      CANCER Paternal Grandmother      lymph nodes     Respiratory Other      cousin on mothers side, asthma     Alcohol/Drug Maternal Uncle      Alcohol/Drug Other      bother great grandparents on mother's side     DIABETES No family hx of      Breast Cancer No family hx of      Cancer - colorectal No family hx of          Current Outpatient Prescriptions   Medication Sig Dispense Refill     omeprazole (PRILOSEC) 20 MG CR capsule Take 20 mg by mouth daily       oxyCODONE-acetaminophen (PERCOCET) 5-325 MG per tablet Take 1 tablet by mouth every 6 hours as needed       hydroxychloroquine (PLAQUENIL) 200 MG tablet Hydroxychloroquine 200mg in the morning and 100mg in the evening. 135 tablet 1     azaTHIOprine (IMURAN) 50 MG tablet Take 3 tablets (150 mg) by mouth daily 270 tablet 1     amLODIPine (NORVASC) 10 MG tablet Take 1 tablet (10 mg) by mouth daily 90 tablet 1     LORazepam (ATIVAN) 0.5 MG tablet Take 1 tablet (0.5 mg) by mouth every 8 hours as needed for anxiety 10 tablet 0     traZODone (DESYREL) 50 MG tablet Take 0.5 tablets (25 mg) by mouth nightly as needed for sleep 30 tablet 0     levonorgestrel (MIRENA, 52 MG,) 20 MCG/24HR IUD 1 each by Intrauterine route once       Cholecalciferol (VITAMIN D) 2000 UNITS tablet Take 2,000 Units by mouth daily 100 tablet 3     traMADol (ULTRAM) 50 MG tablet Take 1-2 tablets ( mg) by mouth every 6 hours as needed for pain maximum 8 tablet(s) per day 30 tablet 0     cyclobenzaprine (FLEXERIL) 10 MG tablet Take 0.5-1 tablets (5-10 mg) by mouth 3 times daily as needed for muscle spasms 30 tablet 1     predniSONE (DELTASONE) 5 MG tablet Take 1 tablet (5 mg) by mouth daily as needed for lupus 90 tablet 0     albuterol (PROAIR HFA, PROVENTIL HFA, VENTOLIN HFA) 108 (90 BASE) MCG/ACT inhaler Inhale 2 puffs into the lungs every 6 hours as needed for shortness of breath / dyspnea 1 Inhaler 1     omeprazole (PRILOSEC) 40 MG capsule Take 1 capsule (40 mg) by  "mouth daily Take 30-60 minutes before a meal. 30 capsule 0     Allergies   Allergen Reactions     Fluconazole Rash     BP Readings from Last 3 Encounters:   12/28/17 113/71   12/11/17 110/72   11/02/17 114/80    Wt Readings from Last 3 Encounters:   12/28/17 153 lb (69.4 kg)   12/11/17 156 lb (70.8 kg)   11/02/17 153 lb 3.2 oz (69.5 kg)                        Reviewed and updated as needed this visit by clinical staffTobacco  Allergies  Meds  Problems  Med Hx  Surg Hx  Fam Hx  Soc Hx        Reviewed and updated as needed this visit by Provider  Tobacco  Meds  Problems  Med Hx  Surg Hx  Fam Hx  Soc Hx        ROS:  Constitutional, HEENT, cardiovascular, pulmonary, gi and gu systems are negative, except as otherwise noted.      OBJECTIVE:   /71  Pulse 114  Temp 99.9  F (37.7  C) (Oral)  Ht 5' 7.25\" (1.708 m)  Wt 153 lb (69.4 kg)  SpO2 98%  Breastfeeding? No  BMI 23.79 kg/m2  Body mass index is 23.79 kg/(m^2).  GENERAL: healthy, alert and uncomfortable  EYES: Eyes grossly normal to inspection, PERRL and conjunctivae and sclerae normal  EYES: fundi-normal  HENT: ear canals and TM's normal, nose and mouth without ulcers or lesions  NECK: no adenopathy, no asymmetry, masses, or scars and thyroid normal to palpation  RESP: lungs clear to auscultation - no rales, rhonchi or wheezes  CV: regular rate and rhythm, normal S1 S2, no S3 or S4, no murmur, click or rub, no peripheral edema and peripheral pulses strong  ABDOMEN: tenderness epigastric, RUQ and RLQ, no organomegaly or masses, bowel sounds normal, umbilicus normal and no scars, striae, dilated veins, rashes, or lesions  MS: no gross musculoskeletal defects noted, no edema  SKIN: no suspicious lesions or rashes  NEURO: Normal strength and tone, mentation intact and speech normal  PSYCH: mentation appears normal, affect normal/bright    Diagnostic Test Results:  Results for orders placed or performed in visit on 12/28/17 (from the past 24 " hour(s))   UA with Microscopic reflex to Culture   Result Value Ref Range    Color Urine Yellow     Appearance Urine Clear     Glucose Urine Negative NEG^Negative mg/dL    Bilirubin Urine Moderate (A) NEG^Negative    Ketones Urine >160 (A) NEG^Negative mg/dL    Specific Gravity Urine >1.030 1.003 - 1.035    pH Urine 6.0 5.0 - 7.0 pH    Protein Albumin Urine 100 (A) NEG^Negative mg/dL    Urobilinogen Urine 4.0 (H) 0.2 - 1.0 EU/dL    Nitrite Urine Positive (A) NEG^Negative    Blood Urine Negative NEG^Negative    Leukocyte Esterase Urine Negative NEG^Negative    Source Midstream Urine     WBC Urine O - 2 OTO2^O - 2 /HPF    RBC Urine O - 2 OTO2^O - 2 /HPF    Squamous Epithelial /LPF Urine Moderate (A) FEW^Few /LPF    Bacteria Urine Moderate (A) NEG^Negative /HPF    Mucous Urine Present (A) NEG^Negative /LPF   CBC with platelets and differential   Result Value Ref Range    WBC 6.1 4.0 - 11.0 10e9/L    RBC Count 3.58 (L) 3.8 - 5.2 10e12/L    Hemoglobin 12.8 11.7 - 15.7 g/dL    Hematocrit 35.7 35.0 - 47.0 %     78 - 100 fl    MCH 35.8 (H) 26.5 - 33.0 pg    MCHC 35.9 31.5 - 36.5 g/dL    RDW 12.9 10.0 - 15.0 %    Platelet Count 220 150 - 450 10e9/L    Diff Method Automated Method     % Neutrophils 88.6 %    % Lymphocytes 6.0 %    % Monocytes 5.4 %    % Eosinophils 0.0 %    % Basophils 0.0 %    Absolute Neutrophil 5.4 1.6 - 8.3 10e9/L    Absolute Lymphocytes 0.4 (L) 0.8 - 5.3 10e9/L    Absolute Monocytes 0.3 0.0 - 1.3 10e9/L    Absolute Eosinophils 0.0 0.0 - 0.7 10e9/L    Absolute Basophils 0.0 0.0 - 0.2 10e9/L     X ray - flat and upright films were done of the abdomen.  No free air noted.  Non specific bowel gas pattern noted.  IUD noted.    ASSESSMENT/PLAN:             1. Abdominal pain, right upper quadrant  Based on her constellation of symptoms at this time and findings on the urinalysis I am suspicious of a kidney infection on the right side.  I reviewed these concerns with the patient and her mother and they  are agreeable to treatment at this time.  Indications for short-term follow-up were discussed and the urine will be set up for a culture.  If she fails to improve with this treatment regimen she may need to seek emergency care again over the weekend.  - UA with Microscopic reflex to Culture  - CBC with platelets and differential  - Comprehensive metabolic panel (BMP + Alb, Alk Phos, ALT, AST, Total. Bili, TP)  - XR Abdomen 2 Views; Future  - ciprofloxacin (CIPRO) 500 MG tablet; Take 1 tablet (500 mg) by mouth 2 times daily for 10 days  Dispense: 20 tablet; Refill: 0    2. Abnormal finding in urine  As above.  - Urine Culture Aerobic Bacterial    3. Acute non intractable tension-type headache  She asked about something for the headache.  Her symptoms are migrainous in nature and she has a normal neurologic examination at this time so I issued a prescription for prednisone 20 mg daily for 5 days.  She can continue with as needed use of the Percocet at this time.  No refill was needed on that medication.  - predniSONE (DELTASONE) 20 MG tablet; Take 1 tablet (20 mg) by mouth 2 times daily for 5 days  Dispense: 10 tablet; Refill: 0    FUTURE APPOINTMENTS:       - Follow-up visit in 2-5 days if not improving.    Moisés Paiz MD  [facility]

## 2017-12-29 LAB
ALBUMIN SERPL-MCNC: 4 G/DL (ref 3.4–5)
ALP SERPL-CCNC: 52 U/L (ref 40–150)
ALT SERPL W P-5'-P-CCNC: 15 U/L (ref 0–50)
ANION GAP SERPL CALCULATED.3IONS-SCNC: 10 MMOL/L (ref 3–14)
AST SERPL W P-5'-P-CCNC: 14 U/L (ref 0–45)
BACTERIA SPEC CULT: NORMAL
BILIRUB SERPL-MCNC: 1 MG/DL (ref 0.2–1.3)
BUN SERPL-MCNC: 11 MG/DL (ref 7–30)
CALCIUM SERPL-MCNC: 9.4 MG/DL (ref 8.5–10.1)
CHLORIDE SERPL-SCNC: 99 MMOL/L (ref 94–109)
CO2 SERPL-SCNC: 24 MMOL/L (ref 20–32)
CREAT SERPL-MCNC: 0.57 MG/DL (ref 0.52–1.04)
GFR SERPL CREATININE-BSD FRML MDRD: >90 ML/MIN/1.7M2
GLUCOSE SERPL-MCNC: 96 MG/DL (ref 70–99)
POTASSIUM SERPL-SCNC: 4.2 MMOL/L (ref 3.4–5.3)
PROT SERPL-MCNC: 7.7 G/DL (ref 6.8–8.8)
SODIUM SERPL-SCNC: 133 MMOL/L (ref 133–144)
SPECIMEN SOURCE: NORMAL

## 2018-01-12 ENCOUNTER — TELEPHONE (OUTPATIENT)
Dept: FAMILY MEDICINE | Facility: CLINIC | Age: 33
End: 2018-01-12

## 2018-01-12 ENCOUNTER — OFFICE VISIT (OUTPATIENT)
Dept: FAMILY MEDICINE | Facility: CLINIC | Age: 33
End: 2018-01-12
Payer: COMMERCIAL

## 2018-01-12 VITALS
HEART RATE: 88 BPM | HEIGHT: 67 IN | TEMPERATURE: 98.2 F | BODY MASS INDEX: 23.86 KG/M2 | WEIGHT: 152 LBS | DIASTOLIC BLOOD PRESSURE: 64 MMHG | SYSTOLIC BLOOD PRESSURE: 110 MMHG

## 2018-01-12 DIAGNOSIS — R10.31 ABDOMINAL PAIN, RIGHT LOWER QUADRANT: Primary | ICD-10-CM

## 2018-01-12 DIAGNOSIS — J45.20 INTERMITTENT ASTHMA, UNCOMPLICATED: ICD-10-CM

## 2018-01-12 DIAGNOSIS — Z11.3 SCREEN FOR STD (SEXUALLY TRANSMITTED DISEASE): ICD-10-CM

## 2018-01-12 DIAGNOSIS — Z13.6 CARDIOVASCULAR SCREENING; LDL GOAL LESS THAN 160: ICD-10-CM

## 2018-01-12 DIAGNOSIS — Z13.1 SCREENING FOR DIABETES MELLITUS: ICD-10-CM

## 2018-01-12 LAB
ALBUMIN UR-MCNC: ABNORMAL MG/DL
APPEARANCE UR: CLEAR
BACTERIA #/AREA URNS HPF: ABNORMAL /HPF
BILIRUB UR QL STRIP: NEGATIVE
CHOLEST SERPL-MCNC: 157 MG/DL
COLOR UR AUTO: YELLOW
GLUCOSE SERPL-MCNC: 73 MG/DL (ref 70–99)
GLUCOSE UR STRIP-MCNC: NEGATIVE MG/DL
HCV AB SERPL QL IA: NONREACTIVE
HDLC SERPL-MCNC: 59 MG/DL
HGB UR QL STRIP: NEGATIVE
HIV 1+2 AB+HIV1 P24 AG SERPL QL IA: NONREACTIVE
KETONES UR STRIP-MCNC: NEGATIVE MG/DL
LDLC SERPL CALC-MCNC: 91 MG/DL
LEUKOCYTE ESTERASE UR QL STRIP: NEGATIVE
MUCOUS THREADS #/AREA URNS LPF: PRESENT /LPF
NITRATE UR QL: NEGATIVE
NON-SQ EPI CELLS #/AREA URNS LPF: ABNORMAL /LPF
NONHDLC SERPL-MCNC: 98 MG/DL
PH UR STRIP: 6.5 PH (ref 5–7)
RBC #/AREA URNS AUTO: ABNORMAL /HPF
SOURCE: ABNORMAL
SP GR UR STRIP: 1.02 (ref 1–1.03)
TRIGL SERPL-MCNC: 34 MG/DL
UROBILINOGEN UR STRIP-ACNC: 0.2 EU/DL (ref 0.2–1)
WBC #/AREA URNS AUTO: ABNORMAL /HPF

## 2018-01-12 PROCEDURE — 36415 COLL VENOUS BLD VENIPUNCTURE: CPT | Performed by: PHYSICIAN ASSISTANT

## 2018-01-12 PROCEDURE — 87389 HIV-1 AG W/HIV-1&-2 AB AG IA: CPT | Performed by: PHYSICIAN ASSISTANT

## 2018-01-12 PROCEDURE — 80061 LIPID PANEL: CPT | Performed by: PHYSICIAN ASSISTANT

## 2018-01-12 PROCEDURE — 99214 OFFICE O/P EST MOD 30 MIN: CPT | Performed by: PHYSICIAN ASSISTANT

## 2018-01-12 PROCEDURE — 81001 URINALYSIS AUTO W/SCOPE: CPT | Performed by: PHYSICIAN ASSISTANT

## 2018-01-12 PROCEDURE — 86803 HEPATITIS C AB TEST: CPT | Performed by: PHYSICIAN ASSISTANT

## 2018-01-12 PROCEDURE — 82947 ASSAY GLUCOSE BLOOD QUANT: CPT | Performed by: PHYSICIAN ASSISTANT

## 2018-01-12 PROCEDURE — 86780 TREPONEMA PALLIDUM: CPT | Performed by: PHYSICIAN ASSISTANT

## 2018-01-12 RX ORDER — ALBUTEROL SULFATE 90 UG/1
2 AEROSOL, METERED RESPIRATORY (INHALATION) EVERY 6 HOURS PRN
Qty: 1 INHALER | Refills: 1 | Status: SHIPPED | OUTPATIENT
Start: 2018-01-12 | End: 2019-05-31

## 2018-01-12 NOTE — PROGRESS NOTES
"   SUBJECTIVE:   CC: Aleida Weinberg is an 33 year old woman who presents for preventive health visit.     Physical   Annual:     Getting at least 3 servings of Calcium per day::  Yes    Bi-annual eye exam::  Yes    Dental care twice a year::  Yes    Sleep apnea or symptoms of sleep apnea::  None    Diet::  Regular (no restrictions)    Taking medications regularly::  Yes    Medication side effects::  None    Additional concerns today::  YES                  {additional problems to add (Optional):664072}    Today's PHQ-2 Score: PHQ-2 ( 1999 Pfizer) 1/12/2018   Q1: Little interest or pleasure in doing things 0   Q2: Feeling down, depressed or hopeless 0   PHQ-2 Score 0   Q1: Little interest or pleasure in doing things Not at all   Q2: Feeling down, depressed or hopeless Not at all   PHQ-2 Score 0       Abuse: Current or Past(Physical, Sexual or Emotional)- {YES/NO/NA:506196}  Do you feel safe in your environment - {YES/NO/NA:694436}    Social History   Substance Use Topics     Smoking status: Former Smoker     Packs/day: 0.50     Years: 2.50     Types: Cigarettes     Quit date: 11/11/2005     Smokeless tobacco: Never Used     Alcohol use 0.0 oz/week     0 Standard drinks or equivalent per week      Comment: rarely - 1 monthly     Alcohol Use 1/12/2018   If you drink alcohol, do you typically have greater than 3 drinks per day OR greater than 7 drinks per week?   No   No flowsheet data found.      Reviewed orders with patient.  Reviewed health maintenance and updated orders accordingly - {Yes/No:711614::\"Yes\"}  {Chronicprobdata (Optional):662857}    {Mammo Decision Support (Optional):361525}    Pertinent mammograms are reviewed under the imaging tab.  History of abnormal Pap smear: {PAP HX:926979}    Reviewed and updated as needed this visit by clinical staff       Reviewed and updated as needed this visit by Provider        {HISTORY OPTIONS (Optional):754431}    Review of Systems  {FEMALE PREVENTATIVE " "ROS:986897}     OBJECTIVE:   There were no vitals taken for this visit.  Physical Exam  {Exam Choices:045607}    ASSESSMENT/PLAN:   {Diag Picklist:875595}    COUNSELING:  {FEMALE COUNSELING MESSAGES:250555::\"Reviewed preventive health counseling, as reflected in patient instructions\"}    {BP Counseling- Complete if BP >= 120/80  (Optional):506348}     reports that she quit smoking about 12 years ago. Her smoking use included Cigarettes. She has a 1.25 pack-year smoking history. She has never used smokeless tobacco.  {Tobacco Cessation -- Complete if patient is a smoker (Optional):050917}  Estimated body mass index is 23.79 kg/(m^2) as calculated from the following:    Height as of 12/28/17: 5' 7.25\" (1.708 m).    Weight as of 12/28/17: 153 lb (69.4 kg).   {Weight Management Plan (ACO) Complete if BMI is abnormal-  Ages 18-64  BMI >24.9.  Age 65+ with BMI <23 or >30 (Optional):266346}    Counseling Resources:  ATP IV Guidelines  Pooled Cohorts Equation Calculator  Breast Cancer Risk Calculator  FRAX Risk Assessment  ICSI Preventive Guidelines  Dietary Guidelines for Americans, 2010  USDA's MyPlate  ASA Prophylaxis  Lung CA Screening    Tatum Dove PA-C  [facility]  Answers for HPI/ROS submitted by the patient on 1/12/2018   PHQ-2 Score: 0    "

## 2018-01-12 NOTE — PROGRESS NOTES
"  SUBJECTIVE:   Aleida Weinberg is a 33 year old female who presents to clinic today for the following health issues:    Patient is here to complete her biometric screening form for work. She was in for physical in October, but cholesterol was not drawn at that time.  Needs fasting cholesterol and glucose drawn.    Follow up on severe abdominal pain 2 weeks ago.  Was seen in ED, but symptoms persisted.  When in for follow up, was treated for suspected pyelonephritis and symptoms have resolved.  She is requesting repeat UA to make sure this is resolved. She is not having any symptoms.    Also requesting STD screening for HIV, syphilis and hepatitis.      -------------------------------------    Problem list and histories reviewed & adjusted, as indicated.  Additional history: as documented    Reviewed and updated as needed this visit by clinical staff       Reviewed and updated as needed this visit by Provider         ROS:  Constitutional, HEENT, cardiovascular, pulmonary, gi and gu systems are negative, except as otherwise noted.      OBJECTIVE:   /64 (BP Location: Right arm, Cuff Size: Adult Regular)  Pulse 88  Temp 98.2  F (36.8  C) (Oral)  Ht 5' 7.25\" (1.708 m)  Wt 152 lb (68.9 kg)  BMI 23.63 kg/m2  Body mass index is 23.63 kg/(m^2).  GENERAL: healthy, alert and no distress  RESP: lungs clear to auscultation - no rales, rhonchi or wheezes  CV: regular rate and rhythm, normal S1 S2, no S3 or S4, no murmur, click or rub, no peripheral edema and peripheral pulses strong  ABDOMEN: soft, nontender, no hepatosplenomegaly, no masses and bowel sounds normal  MS: no gross musculoskeletal defects noted, no edema  BACK: no CVA tenderness, no paralumbar tenderness    Diagnostic Test Results:  none     ASSESSMENT/PLAN:   1. Abdominal pain, right lower quadrant  Resolved.  Repeat UA to check for resolution  - UA with Microscopic reflex to Culture    2. Screen for STD (sexually transmitted disease)  - UA with " Microscopic reflex to Culture  - HIV Antigen Antibody Combo  - Hepatitis C antibody  - Anti Treponema    3. Intermittent asthma, uncomplicated  Refills of below medications for stable chronic conditions.  - albuterol (PROAIR HFA/PROVENTIL HFA/VENTOLIN HFA) 108 (90 BASE) MCG/ACT Inhaler; Inhale 2 puffs into the lungs every 6 hours as needed for shortness of breath / dyspnea  Dispense: 1 Inhaler; Refill: 1    4. Screening for diabetes mellitus  - Glucose    5. CARDIOVASCULAR SCREENING; LDL GOAL LESS THAN 160  - Lipid panel reflex to direct LDL Fasting      Patient Instructions   Tatum or her team will be in touch with you with results.  Forms will be faxed.    Have a great weekend!    JJ Heller, PADesireeC        Tatum Dove PA-C  [facility]

## 2018-01-12 NOTE — TELEPHONE ENCOUNTER
Patient brought in forms for biometric screening.      Forms placed in [facility]'s MA folder waiting for labs to be complete.    Nani Mae, Certified Medical Assistant (AAMA)

## 2018-01-12 NOTE — PATIENT INSTRUCTIONS
Tatum or her team will be in touch with you with results.  Forms will be faxed.    Have a great weekend!    JJ Heller, PADesireeC

## 2018-01-12 NOTE — MR AVS SNAPSHOT
After Visit Summary   1/12/2018    Aleida Weinberg    MRN: [id number]           Patient Information     Date Of Birth          1985        Visit Information        Provider Department      1/12/2018 7:40 AM Tatum Dove PA-C [facility]        Today's Diagnoses     Screen for STD (sexually transmitted disease)    -  1    Intermittent asthma, uncomplicated        CARDIOVASCULAR SCREENING; LDL GOAL LESS THAN 160        Screening for diabetes mellitus          Care Instructions    Tatum or her team will be in touch with you with results.  Forms will be faxed.    Have a great weekend!    JJ Heller PA-C            Follow-ups after your visit        Your next 10 appointments already scheduled     Jan 18, 2018  5:00 PM CST   LAB with NE LAB   [facility] ([facility]    [address] [place] [id number] [phone]           Please do not eat 10-12 hours before your appointment if you are coming in fasting for labs on lipids, cholesterol, or glucose (sugar). This does not apply to pregnant women. Water, hot tea and black coffee (with nothing added) are okay. Do not drink other fluids, diet soda or chew gum.            Jan 25, 2018  8:00 AM CST   Return Visit with Lavon Light MD   [facility] ([facility])    [address] [place] [address]  [place] [id number] [phone]              Who to contact     If you have questions or need follow up information about today's clinic visit or your schedule please contact [facility] directly at [phone].  Normal or non-critical lab and imaging results will be communicated to you by MyChart, letter or phone within 4 business days after the clinic has received the results. If you do not hear from us within 7 days, please contact the clinic through MyChart or phone. If you have a critical or abnormal lab result, we will  "notify you by phone as soon as possible.  Submit refill requests through [employer] or call your pharmacy and they will forward the refill request to us. Please allow 3 business days for your refill to be completed.          Additional Information About Your Visit        [employer] Information     [employer] gives you secure access to your electronic health record. If you see a primary care provider, you can also send messages to your care team and make appointments. If you have questions, please call your primary care clinic.  If you do not have a primary care provider, please call [phone] and they will assist you.        Care EveryWhere ID     This is your Care EveryWhere ID. This could be used by other organizations to access your [facility] medical records  [id number]        Your Vitals Were     Pulse Temperature Height BMI (Body Mass Index)          88 98.2  F (36.8  C) (Oral) 5' 7.25\" (1.708 m) 23.63 kg/m2         Blood Pressure from Last 3 Encounters:   01/12/18 110/64   12/28/17 113/71   12/11/17 110/72    Weight from Last 3 Encounters:   01/12/18 152 lb (68.9 kg)   12/28/17 153 lb (69.4 kg)   12/11/17 156 lb (70.8 kg)              We Performed the Following     Anti Treponema     Glucose     Hepatitis C antibody     HIV Antigen Antibody Combo     Lipid panel reflex to direct LDL Fasting     UA with Microscopic reflex to Culture          Where to get your medicines      These medications were sent to [facility] [address] [place] [address].  [address] [facility] [place] [id number]     Phone:  [phone]     albuterol 108 (90 BASE) MCG/ACT Inhaler          Primary Care Provider Office Phone # Fax #    Tatum Dove PA-C [phone]       [address] [facility] [address]        Equal Access to Services     CACHORRO MADRIGAL AH: Hadii aad ku hadasho Soomaali, waaxda luqadaha, qaybta kaalmada adeegyada, mira concepcion. So wa " [phone].    ATENCIÓN: Si paige lovelace, tiene a rey disposición servicios gratuitos de asistencia lingüística. Gabriela conde [phone].    We comply with applicable federal civil rights laws and [place] laws. We do not discriminate on the basis of race, color, national origin, age, disability, sex, sexual orientation, or gender identity.            Thank you!     Thank you for choosing [facility]  for your care. Our goal is always to provide you with excellent care. Hearing back from our patients is one way we can continue to improve our services. Please take a few minutes to complete the written survey that you may receive in the mail after your visit with us. Thank you!             Your Updated Medication List - Protect others around you: Learn how to safely use, store and throw away your medicines at www.disposemymeds.org.          This list is accurate as of: 1/12/18  8:16 AM.  Always use your most recent med list.                   Brand Name Dispense Instructions for use Diagnosis    albuterol 108 (90 BASE) MCG/ACT Inhaler    PROAIR HFA/PROVENTIL HFA/VENTOLIN HFA    1 Inhaler    Inhale 2 puffs into the lungs every 6 hours as needed for shortness of breath / dyspnea    Intermittent asthma, uncomplicated       amLODIPine 10 MG tablet    NORVASC    90 tablet    Take 1 tablet (10 mg) by mouth daily    Raynaud's phenomenon without gangrene       azaTHIOprine 50 MG tablet    IMURAN    270 tablet    Take 3 tablets (150 mg) by mouth daily    Other systemic lupus erythematosus with other organ involvement (H)       cyclobenzaprine 10 MG tablet    FLEXERIL    30 tablet    Take 0.5-1 tablets (5-10 mg) by mouth 3 times daily as needed for muscle spasms    Acute midline low back pain with right-sided sciatica       hydroxychloroquine 200 MG tablet    PLAQUENIL    135 tablet    Hydroxychloroquine 200mg in the morning and 100mg in the evening.    Other systemic lupus erythematosus with other organ  involvement (H)       LORazepam 0.5 MG tablet    ATIVAN    10 tablet    Take 1 tablet (0.5 mg) by mouth every 8 hours as needed for anxiety    Anxiety       MIRENA (52 MG) 20 MCG/24HR IUD   Generic drug:  levonorgestrel      1 each by Intrauterine route once        omeprazole 20 MG CR capsule    priLOSEC     Take 20 mg by mouth daily        oxyCODONE-acetaminophen 5-325 MG per tablet    PERCOCET     Take 1 tablet by mouth every 6 hours as needed        predniSONE 5 MG tablet    DELTASONE    90 tablet    Take 1 tablet (5 mg) by mouth daily as needed for lupus    Systemic lupus erythematosus (H), High risk medications (not anticoagulants) long-term use       traMADol 50 MG tablet    ULTRAM    30 tablet    Take 1-2 tablets ( mg) by mouth every 6 hours as needed for pain maximum 8 tablet(s) per day    Acute midline low back pain with right-sided sciatica       traZODone 50 MG tablet    DESYREL    30 tablet    Take 0.5 tablets (25 mg) by mouth nightly as needed for sleep    Other insomnia       vitamin D 2000 UNITS tablet     100 tablet    Take 2,000 Units by mouth daily    Vitamin D deficiency

## 2018-01-13 LAB — T PALLIDUM IGG+IGM SER QL: NEGATIVE

## 2018-01-17 ENCOUNTER — MYC MEDICAL ADVICE (OUTPATIENT)
Dept: FAMILY MEDICINE | Facility: CLINIC | Age: 33
End: 2018-01-17

## 2018-01-18 DIAGNOSIS — M32.19 OTHER SYSTEMIC LUPUS ERYTHEMATOSUS WITH OTHER ORGAN INVOLVEMENT (H): ICD-10-CM

## 2018-01-18 LAB
ALBUMIN UR-MCNC: NEGATIVE MG/DL
APPEARANCE UR: CLEAR
BASOPHILS # BLD AUTO: 0 10E9/L (ref 0–0.2)
BASOPHILS NFR BLD AUTO: 0.7 %
BILIRUB UR QL STRIP: NEGATIVE
COLOR UR AUTO: YELLOW
CREAT UR-MCNC: 50 MG/DL
DIFFERENTIAL METHOD BLD: ABNORMAL
EOSINOPHIL # BLD AUTO: 0 10E9/L (ref 0–0.7)
EOSINOPHIL NFR BLD AUTO: 1 %
ERYTHROCYTE [DISTWIDTH] IN BLOOD BY AUTOMATED COUNT: 13.2 % (ref 10–15)
ERYTHROCYTE [SEDIMENTATION RATE] IN BLOOD BY WESTERGREN METHOD: 15 MM/H (ref 0–20)
GLUCOSE UR STRIP-MCNC: NEGATIVE MG/DL
HCT VFR BLD AUTO: 36.8 % (ref 35–47)
HGB BLD-MCNC: 12.9 G/DL (ref 11.7–15.7)
HGB UR QL STRIP: NEGATIVE
KETONES UR STRIP-MCNC: NEGATIVE MG/DL
LEUKOCYTE ESTERASE UR QL STRIP: NEGATIVE
LYMPHOCYTES # BLD AUTO: 1.1 10E9/L (ref 0.8–5.3)
LYMPHOCYTES NFR BLD AUTO: 36.8 %
MCH RBC QN AUTO: 35.3 PG (ref 26.5–33)
MCHC RBC AUTO-ENTMCNC: 35.1 G/DL (ref 31.5–36.5)
MCV RBC AUTO: 101 FL (ref 78–100)
MONOCYTES # BLD AUTO: 0.4 10E9/L (ref 0–1.3)
MONOCYTES NFR BLD AUTO: 13.9 %
NEUTROPHILS # BLD AUTO: 1.4 10E9/L (ref 1.6–8.3)
NEUTROPHILS NFR BLD AUTO: 47.6 %
NITRATE UR QL: NEGATIVE
NON-SQ EPI CELLS #/AREA URNS LPF: NORMAL /LPF
PH UR STRIP: 7 PH (ref 5–7)
PLATELET # BLD AUTO: 255 10E9/L (ref 150–450)
PROT UR-MCNC: 0.12 G/L
PROT/CREAT 24H UR: 0.23 G/G CR (ref 0–0.2)
RBC # BLD AUTO: 3.65 10E12/L (ref 3.8–5.2)
RBC #/AREA URNS AUTO: NORMAL /HPF
SOURCE: NORMAL
SP GR UR STRIP: 1.02 (ref 1–1.03)
UROBILINOGEN UR STRIP-ACNC: 0.2 EU/DL (ref 0.2–1)
WBC # BLD AUTO: 3 10E9/L (ref 4–11)
WBC #/AREA URNS AUTO: NORMAL /HPF

## 2018-01-18 PROCEDURE — 86160 COMPLEMENT ANTIGEN: CPT | Performed by: INTERNAL MEDICINE

## 2018-01-18 PROCEDURE — 84156 ASSAY OF PROTEIN URINE: CPT | Performed by: INTERNAL MEDICINE

## 2018-01-18 PROCEDURE — 85025 COMPLETE CBC W/AUTO DIFF WBC: CPT | Performed by: INTERNAL MEDICINE

## 2018-01-18 PROCEDURE — 85652 RBC SED RATE AUTOMATED: CPT | Performed by: INTERNAL MEDICINE

## 2018-01-18 PROCEDURE — 86225 DNA ANTIBODY NATIVE: CPT | Performed by: INTERNAL MEDICINE

## 2018-01-18 PROCEDURE — 80053 COMPREHEN METABOLIC PANEL: CPT | Performed by: INTERNAL MEDICINE

## 2018-01-18 PROCEDURE — 36415 COLL VENOUS BLD VENIPUNCTURE: CPT | Performed by: INTERNAL MEDICINE

## 2018-01-18 PROCEDURE — 82550 ASSAY OF CK (CPK): CPT | Performed by: INTERNAL MEDICINE

## 2018-01-18 PROCEDURE — 81001 URINALYSIS AUTO W/SCOPE: CPT | Performed by: INTERNAL MEDICINE

## 2018-01-18 PROCEDURE — 86140 C-REACTIVE PROTEIN: CPT | Performed by: INTERNAL MEDICINE

## 2018-01-19 LAB
ALBUMIN SERPL-MCNC: 4.5 G/DL (ref 3.4–5)
ALP SERPL-CCNC: 45 U/L (ref 40–150)
ALT SERPL W P-5'-P-CCNC: 26 U/L (ref 0–50)
ANION GAP SERPL CALCULATED.3IONS-SCNC: 8 MMOL/L (ref 3–14)
AST SERPL W P-5'-P-CCNC: 24 U/L (ref 0–45)
BILIRUB SERPL-MCNC: 0.5 MG/DL (ref 0.2–1.3)
BUN SERPL-MCNC: 9 MG/DL (ref 7–30)
CALCIUM SERPL-MCNC: 8.9 MG/DL (ref 8.5–10.1)
CHLORIDE SERPL-SCNC: 107 MMOL/L (ref 94–109)
CK SERPL-CCNC: 47 U/L (ref 30–225)
CO2 SERPL-SCNC: 25 MMOL/L (ref 20–32)
CREAT SERPL-MCNC: 0.58 MG/DL (ref 0.52–1.04)
CRP SERPL-MCNC: <2.9 MG/L (ref 0–8)
GFR SERPL CREATININE-BSD FRML MDRD: >90 ML/MIN/1.7M2
GLUCOSE SERPL-MCNC: 74 MG/DL (ref 70–99)
POTASSIUM SERPL-SCNC: 3.8 MMOL/L (ref 3.4–5.3)
PROT SERPL-MCNC: 7.6 G/DL (ref 6.8–8.8)
SODIUM SERPL-SCNC: 140 MMOL/L (ref 133–144)

## 2018-01-21 LAB
C3 SERPL-MCNC: 76 MG/DL (ref 76–169)
C4 SERPL-MCNC: 14 MG/DL (ref 15–50)

## 2018-01-22 LAB — DSDNA AB SER-ACNC: 2 IU/ML

## 2018-01-25 ENCOUNTER — OFFICE VISIT (OUTPATIENT)
Dept: RHEUMATOLOGY | Facility: CLINIC | Age: 33
End: 2018-01-25
Payer: COMMERCIAL

## 2018-01-25 VITALS
WEIGHT: 152 LBS | RESPIRATION RATE: 16 BRPM | HEART RATE: 76 BPM | BODY MASS INDEX: 23.86 KG/M2 | TEMPERATURE: 97.4 F | DIASTOLIC BLOOD PRESSURE: 62 MMHG | HEIGHT: 67 IN | OXYGEN SATURATION: 97 % | SYSTOLIC BLOOD PRESSURE: 100 MMHG

## 2018-01-25 DIAGNOSIS — Z79.899 HIGH RISK MEDICATIONS (NOT ANTICOAGULANTS) LONG-TERM USE: ICD-10-CM

## 2018-01-25 DIAGNOSIS — I73.00 RAYNAUD'S PHENOMENON WITHOUT GANGRENE: ICD-10-CM

## 2018-01-25 DIAGNOSIS — M32.19 OTHER SYSTEMIC LUPUS ERYTHEMATOSUS WITH OTHER ORGAN INVOLVEMENT (H): Primary | ICD-10-CM

## 2018-01-25 DIAGNOSIS — E55.9 VITAMIN D DEFICIENCY: ICD-10-CM

## 2018-01-25 PROCEDURE — 99213 OFFICE O/P EST LOW 20 MIN: CPT | Performed by: INTERNAL MEDICINE

## 2018-01-25 RX ORDER — HYDROXYCHLOROQUINE SULFATE 200 MG/1
TABLET, FILM COATED ORAL
Qty: 135 TABLET | Refills: 2 | Status: SHIPPED | OUTPATIENT
Start: 2018-01-25 | End: 2018-08-30

## 2018-01-25 RX ORDER — CHOLECALCIFEROL (VITAMIN D3) 50 MCG
2000 TABLET ORAL DAILY
Qty: 100 TABLET | Refills: 3 | Status: SHIPPED | OUTPATIENT
Start: 2018-01-25 | End: 2020-04-17

## 2018-01-25 RX ORDER — AZATHIOPRINE 50 MG/1
150 TABLET ORAL DAILY
Qty: 270 TABLET | Refills: 2 | Status: SHIPPED | OUTPATIENT
Start: 2018-01-25 | End: 2018-08-30

## 2018-01-25 ASSESSMENT — PAIN SCALES - GENERAL: PAINLEVEL: MODERATE PAIN (4)

## 2018-01-25 NOTE — NURSING NOTE
"Chief Complaint   Patient presents with     RECHECK     pain in left foot       Initial /62  Pulse 76  Temp 97.4  F (36.3  C) (Oral)  Resp 16  Ht 1.705 m (5' 7.13\")  Wt 68.9 kg (152 lb)  SpO2 97%  BMI 23.72 kg/m2 Estimated body mass index is 23.72 kg/(m^2) as calculated from the following:    Height as of this encounter: 1.705 m (5' 7.13\").    Weight as of this encounter: 68.9 kg (152 lb).           RAPID3 (0-30) Cumulative Score  14.7          RAPID3 Weighted Score (divide #4 by 3 and that is the weighted score)  4.9         "

## 2018-01-25 NOTE — MR AVS SNAPSHOT
After Visit Summary   2018    Aleida Weinberg    MRN: [id number]           Patient Information     Date Of Birth          1985        Visit Information        Provider Department      2018 8:00 AM Lavon Light MD [facility] [place]        Today's Diagnoses     Other systemic lupus erythematosus with other organ involvement (H)    -  1    High risk medications (not anticoagulants) long-term use (Plaquenil and AZA)        Raynaud's phenomenon without gangrene        Vitamin D deficiency          Care Instructions    Dr. Light s Rheumat[facility] Clinics  Locations Clinic Hours Telephone Number     [place]  [address] [facility] [address]  [place] [address]     Wednesday: 7:20AM - 4:00PM  Thursday:     7:20AM - 4:00PM     Friday:          7:20AM - 11:00AM       To schedule an appointment with  Dr. Light,  please contact  Specialty [phone]       [facility]  [address]  [place] [address]       Monday:       7:20AM - 4:00PM        [facility]  [address] [facility] [place] [address]       Tuesday:      7:20AM - 4:00PM          Thank you!    Carissa Frye CMA              Follow-ups after your visit        Your next 10 appointments already scheduled     Feb 15, 2018  5:00 PM CST   LAB with NE LAB   [address] [id number] [phone]           Please do not eat 10-12 hours before your appointment if you are coming in fasting for labs on lipids, cholesterol, or glucose (sugar). This does not apply to pregnant women. Water, hot tea and black coffee (with nothing added) are okay. Do not drink other fluids, diet soda or chew gum.            2018  5:00 PM CDT   LAB with NE LAB   [facility]    [address] [place] [address] [phone]           Please do  not eat 10-12 hours before your appointment if you are coming in fasting for labs on lipids, cholesterol, or glucose (sugar). This does not apply to pregnant women. Water, hot tea and black coffee (with nothing added) are okay. Do not drink other fluids, diet soda or chew gum.            May 04, 2018  8:00 AM CDT   Return Visit with Lavon Light MD   [facility] [place] ([facility])    [address] [place] [address]  [place] [address]   [phone]              Future tests that were ordered for you today     Open Future Orders        Priority Expected Expires Ordered    CBC with platelets differential Routine 4/18/2018 5/4/2018 1/25/2018    CK total Routine 4/18/2018 5/4/2018 1/25/2018    Complement C3 Routine 4/18/2018 5/4/2018 1/25/2018    DNA double stranded antibodies Routine 4/18/2018 5/4/2018 1/25/2018    CRP inflammation Routine 4/18/2018 5/4/2018 1/25/2018    Comprehensive metabolic panel Routine 4/18/2018 5/4/2018 1/25/2018    Complement C4 Routine 4/18/2018 5/4/2018 1/25/2018    Erythrocyte sedimentation rate auto Routine 4/18/2018 5/4/2018 1/25/2018    Protein  random urine with Creat Ratio Routine 4/18/2018 5/4/2018 1/25/2018    UA with Microscopic reflex to Culture Routine 4/18/2018 5/4/2018 1/25/2018    CBC with platelets differential Routine 2/15/2018 2/23/2018 1/25/2018            Who to contact     If you have questions or need follow up information about today's clinic visit or your schedule please contact [facility] directly at [phone].  Normal or non-critical lab and imaging results will be communicated to you by MyChart, letter or phone within 4 business days after the clinic has received the results. If you do not hear from us within 7 days, please contact the clinic through MyChart or phone. If you have a critical or abnormal lab result, we will notify you by phone as soon as possible.  Submit refill requests through [employer] or call your pharmacy and they  "will forward the refill request to us. Please allow 3 business days for your refill to be completed.          Additional Information About Your Visit        [employer] Information     [employer] gives you secure access to your electronic health record. If you see a primary care provider, you can also send messages to your care team and make appointments. If you have questions, please call your primary care clinic.  If you do not have a primary care provider, please call [phone] and they will assist you.        Care EveryWhere ID     This is your Care EveryWhere ID. This could be used by other organizations to access your [place] medical records  [id number]        Your Vitals Were     Pulse Temperature Respirations Height Pulse Oximetry BMI (Body Mass Index)    76 97.4  F (36.3  C) (Oral) 16 1.705 m (5' 7.13\") 97% 23.72 kg/m2       Blood Pressure from Last 3 Encounters:   01/25/18 100/62   01/12/18 110/64   12/28/17 113/71    Weight from Last 3 Encounters:   01/25/18 68.9 kg (152 lb)   01/12/18 68.9 kg (152 lb)   12/28/17 69.4 kg (153 lb)                 Where to get your medicines      These medications were sent to [facility] [address].  [address] [place] [address]     Phone:  [phone]     azaTHIOprine 50 MG tablet    hydroxychloroquine 200 MG tablet    vitamin D 2000 UNITS tablet          Primary Care Provider Office Phone # Fax #    Tatum Dove PA-C [phone]       [address] [place] [address]        Equal Access to Services     CACHORRO MADRIGAL AH: Hadcony Bee, walinda shin, qacharline fryealmira pantoja. So [facility] [phone].    ATENCIÓN: Si habla español, tiene a rey disposición servicios gratuitos de asistencia lingüística. Gabriela conde [phone].    We comply with applicable federal civil rights laws and [place] laws. We do not discriminate on " the basis of race, color, national origin, age, disability, sex, sexual orientation, or gender identity.            Thank you!     Thank you for choosing [facility]  for your care. Our goal is always to provide you with excellent care. Hearing back from our patients is one way we can continue to improve our services. Please take a few minutes to complete the written survey that you may receive in the mail after your visit with us. Thank you!             Your Updated Medication List - Protect others around you: Learn how to safely use, store and throw away your medicines at www.disposemymeds.org.          This list is accurate as of 1/25/18  8:41 AM.  Always use your most recent med list.                   Brand Name Dispense Instructions for use Diagnosis    albuterol 108 (90 BASE) MCG/ACT Inhaler    PROAIR HFA/PROVENTIL HFA/VENTOLIN HFA    1 Inhaler    Inhale 2 puffs into the lungs every 6 hours as needed for shortness of breath / dyspnea    Intermittent asthma, uncomplicated       amLODIPine 10 MG tablet    NORVASC    90 tablet    Take 1 tablet (10 mg) by mouth daily    Raynaud's phenomenon without gangrene       azaTHIOprine 50 MG tablet    IMURAN    270 tablet    Take 3 tablets (150 mg) by mouth daily    Other systemic lupus erythematosus with other organ involvement (H), Vitamin D deficiency       cyclobenzaprine 10 MG tablet    FLEXERIL    30 tablet    Take 0.5-1 tablets (5-10 mg) by mouth 3 times daily as needed for muscle spasms    Acute midline low back pain with right-sided sciatica       hydroxychloroquine 200 MG tablet    PLAQUENIL    135 tablet    Hydroxychloroquine 200mg in the morning and 100mg in the evening.    Other systemic lupus erythematosus with other organ involvement (H)       LORazepam 0.5 MG tablet    ATIVAN    10 tablet    Take 1 tablet (0.5 mg) by mouth every 8 hours as needed for anxiety    Anxiety       MIRENA (52 MG) 20 MCG/24HR IUD   Generic drug:  levonorgestrel      1  each by Intrauterine route once        omeprazole 20 MG CR capsule    priLOSEC     Take 20 mg by mouth daily        oxyCODONE-acetaminophen 5-325 MG per tablet    PERCOCET     Take 1 tablet by mouth every 6 hours as needed        predniSONE 5 MG tablet    DELTASONE    90 tablet    Take 1 tablet (5 mg) by mouth daily as needed for lupus    Systemic lupus erythematosus (H), High risk medications (not anticoagulants) long-term use       traMADol 50 MG tablet    ULTRAM    30 tablet    Take 1-2 tablets ( mg) by mouth every 6 hours as needed for pain maximum 8 tablet(s) per day    Acute midline low back pain with right-sided sciatica       traZODone 50 MG tablet    DESYREL    30 tablet    Take 0.5 tablets (25 mg) by mouth nightly as needed for sleep    Other insomnia       vitamin D 2000 UNITS tablet     100 tablet    Take 2,000 Units by mouth daily    Vitamin D deficiency, Other systemic lupus erythematosus with other organ involvement (H)

## 2018-01-25 NOTE — PROGRESS NOTES
Rheumatology Clinic Visit      Aleida Weinberg MRN# [id number]   YOB: 1985 Age: 33 year old      Date of visit: 1/25/18   PCP: Tatum Dove     Chief Complaint   Patient presents with:  RECHECK: pain in left foot    Assessment and Plan   1. Systemic lupus erythematosus (CHANELL >1:29991 speckled, RNP+, Sm+, Scl-70+, hypocomplementemia, photosensitivity, arthritis, fatigue, Raynaud's phenomenon):  Dx'd 4/2016. Scl-70+; she lacks features of scleroderma at this time. Previously on MTX 20mg SQ weekly (GI upset with PO doses above 15mg, partially effective) and SSZ (LFT elevations).  Currently on AZA 150mg daily (synovitis when on 100mg daily) and HCQ 300mg daily. TPMT normal on 8/21/2017. Doing well on her current medication regimen.  May consider MMF or Benlysta in the future if needed. Labs reviewed with the patient today; WBC reduced and will recheck in 3-4 weeks. Not using prednisone.   - Continue hydroxychloroquine 200mg in the morning and 100mg in the evening (last eye exam done 9/8/2017)  - Continue azathioprine 150mg daily   - Labs 1 week prior to the next rheumatology clinic visit: CBC, CMP, ESR, CRP, CK, C3, C4, dsDNA, UA, Uprotein:creatinine  - Lab in 3-4 weeks: CBC     2. Raynaud's Phenomenon: Cold avoidance was insufficient for controlling her symptoms and therefore amlodipine was started. Currently on amlodipine 10mg daily and doing well; uses amlodipine during colder months.  with partial improvement at 5mg daily and much better control at 10mg daily.  Not using now because of the warmer weather but she plans to start again now that we are entering the the cooler months  - Continue Amlodipine 10mg daily during colder months    3. Chronic Back Pain: History of scoliosis. Degenerative findings on previously reviewed MRIs from 2000 and 2009. This has not changed for many years and does not worsen when her peripheral joint symptoms worsen. Had one episode of sciatica that resolved with  chiropractic treatment.      4. Vitamin D deficiency: Currently on vitamin D 2000 units daily.    - Continue vitamin D 2000 units daily    5. Vaccinations: Vaccinations reviewed with Ms. Weinberg.  Risks and benefits of vaccinations were discussed.  - Influenza: up to date  - [id number]: plans to receive at her PCP's office   - [id number]: to receive at least 8 weeks after [id number] is administered     6. Left metatarsalgia: we discussed appropriate shoes. Mechanical in nature.     Ms. Weinberg verbalized agreement with and understanding of the rational for the diagnosis and treatment plan.  All questions were answered to best of my ability and the patient's satisfaction. Ms. Weinberg was advised to contact the clinic with any questions that may arise after the clinic visit.      Thank you for involving me in the care of the patient    Return to clinic: 3 months      HPI   Aleida Digna Weinberg is a 33 year old female with medical history significant for intermittent asthma, anxiety, migraines, vitamin D deficiency, and systemic lupus erythematosus who presents for follow-up of SLE.     Today, Ms. Weinberg reports that she is doing well.  No morning stiffness.  Some fatigue.  Raynaud's phenomenon is well controlled.  Some pain on the plantar aspect of the left MTPs there is worse with more activity, improves with rest.      Denies fevers, chills, nausea, vomiting, constipation, diarrhea. No abdominal pain. No chest pain/pressure, palpitations, or shortness of breath. No oral or nasal sores. No neck pain. No LE swelling.  Has photosensitivity but no photophobia.  Mild dry eyes and dry mouth; she uses artificial tears as needed.  No eye pain or redness. Denies history of serositis. Denies history of DVT, pulmonary embolism, or miscarriage.    Tobacco: Former Smoker  EtOH: Once monthly  Drugs: None  Occupation: Works at [employer], a lot of typing per patient    ROS   GEN: No fevers, chills, night sweats, or weight  change  SKIN: See HPI.   HEENT: Has a history of migraines, but no headache today. No change in vision, taste, or hearing. No epistaxis. No oral or nasal ulcers.  CV: No chest pain, pressure, palpitations, or dyspnea on exertion.  PULM: No SOB, wheeze, cough.  GI:  No nausea, vomiting, constipation, diarrhea. No blood in stool. No abdominal pain.  : No blood in urine.  MSK: See HPI.  NEURO: See history of present illness  ENDO: No heat/cold intolerance.  EXT: See history of present illness    Active Problem List     Patient Active Problem List   Diagnosis     Other kyphoscoliosis and scoliosis     Hypertrophic and atrophic condition of skin     Viral warts     Routine postpartum follow-up     CARDIOVASCULAR SCREENING; LDL GOAL LESS THAN 160     Intermittent asthma     Anxiety     Intractable menstrual migraine without status migrainosus     Vitamin D deficiency     Inflammatory polyarthropathy (H)     Chronic back pain     Raynaud's phenomenon without gangrene     Systemic lupus erythematosus (H)     High risk medications (not anticoagulants) long-term use (Plaquenil and AZA)     Dry eyes, bilateral     Past Medical History     Past Medical History:   Diagnosis Date     Mild intermittent asthma      Other kyphoscoliosis and scoliosis     upper back curvature and disc degeneration in lower back.     Past Surgical History     Past Surgical History:   Procedure Laterality Date      SECTION  2006     SURGICAL HISTORY OF -       PE Tubes     Allergy     Allergies   Allergen Reactions     Fluconazole Rash     Current Medication List     Current Outpatient Prescriptions   Medication Sig     albuterol (PROAIR HFA/PROVENTIL HFA/VENTOLIN HFA) 108 (90 BASE) MCG/ACT Inhaler Inhale 2 puffs into the lungs every 6 hours as needed for shortness of breath / dyspnea     omeprazole (PRILOSEC) 20 MG CR capsule Take 20 mg by mouth daily     oxyCODONE-acetaminophen (PERCOCET) 5-325 MG per tablet Take 1 tablet by mouth every 6  hours as needed     hydroxychloroquine (PLAQUENIL) 200 MG tablet Hydroxychloroquine 200mg in the morning and 100mg in the evening.     azaTHIOprine (IMURAN) 50 MG tablet Take 3 tablets (150 mg) by mouth daily     amLODIPine (NORVASC) 10 MG tablet Take 1 tablet (10 mg) by mouth daily     LORazepam (ATIVAN) 0.5 MG tablet Take 1 tablet (0.5 mg) by mouth every 8 hours as needed for anxiety     traZODone (DESYREL) 50 MG tablet Take 0.5 tablets (25 mg) by mouth nightly as needed for sleep     levonorgestrel (MIRENA, 52 MG,) 20 MCG/24HR IUD 1 each by Intrauterine route once     Cholecalciferol (VITAMIN D) 2000 UNITS tablet Take 2,000 Units by mouth daily     traMADol (ULTRAM) 50 MG tablet Take 1-2 tablets ( mg) by mouth every 6 hours as needed for pain maximum 8 tablet(s) per day     cyclobenzaprine (FLEXERIL) 10 MG tablet Take 0.5-1 tablets (5-10 mg) by mouth 3 times daily as needed for muscle spasms     predniSONE (DELTASONE) 5 MG tablet Take 1 tablet (5 mg) by mouth daily as needed for lupus     No current facility-administered medications for this visit.          Social History   See HPI    Family History     Family History   Problem Relation Age of Onset     HEART DISEASE Maternal Grandfather      Bypass, Heart Disease     Respiratory Maternal Grandfather      asthma     C.A.D. Paternal Grandfather      HEART DISEASE Maternal Uncle      MI's      Hypertension Paternal Grandmother      CANCER Paternal Grandmother      lymph nodes     Respiratory Other      cousin on mothers side, asthma     Alcohol/Drug Maternal Uncle      Alcohol/Drug Other      bother great grandparents on mother's side     DIABETES No family hx of      Breast Cancer No family hx of      Cancer - colorectal No family hx of      Denies family history of autoimmune disease    Physical Exam     Temp Readings from Last 3 Encounters:   01/25/18 97.4  F (36.3  C) (Oral)   01/12/18 98.2  F (36.8  C) (Oral)   12/28/17 99.9  F (37.7  C) (Oral)     BP  "Readings from Last 5 Encounters:   01/25/18 100/62   01/12/18 110/64   12/28/17 113/71   12/11/17 110/72   11/02/17 114/80     Pulse Readings from Last 1 Encounters:   01/25/18 76     Resp Readings from Last 1 Encounters:   01/25/18 16     Estimated body mass index is 23.72 kg/(m^2) as calculated from the following:    Height as of this encounter: 1.705 m (5' 7.13\").    Weight as of this encounter: 68.9 kg (152 lb).    GEN: NAD  HEENT: MMM. No oral lesions. Anicteric, noninjected sclera  CV: S1, S2. RRR. No m/r/g.  PULM: CTA bilaterally. No w/c.  MSK: MCPs, PIPs, DIPs, wrists, and elbows without swelling or tenderness to palpation.  Bilateral shoulders nontender to palpation and without swelling. Bilateral hips nontender to direct palpation. Knees, ankles, and feet without tenderness palpation or swelling. Negative MTP squeeze.   NEURO: UE and LE strengths 5/5 and equal bilaterally.   SKIN: No rash; normal fingertip pulp; no evidence of current or previous infarcts to the distal fingertips by visual inspection. Tattoos present  EXT: No LE edema  PSYCH: Alert. Appropriate.    Labs / Imaging (select studies)   RF/CCP  Recent Labs   Lab Test  04/22/16   0902  04/01/16   0910   CCPIGG  1   --    RHF   --   <20     CHANELL  Recent Labs   Lab Test  04/22/16   0902  04/01/16   0910   VALERIE   --   12.4*   ANAIGG  >1:31733  Reference range: <1:40  (Note)  Speckled pattern.  INTERPRETIVE INFORMATION: CHANELL by IFA, IgG  Anti-nuclear antibodies (CHANELL) are seen in a variety of  systemic rheumatic diseases and are determined by indirect  fluorescence assay (IFA) using HEp-2 substrate with an  IgG-specific conjugate. CHANELL titers less than or equal to  1:80 have variable relevance while titers greater than or  equal to 1:160 are considered clinically significant. These  antibodies may precede clinical disease onset; however,  healthy individuals and those with advanced age have been  reported to be positive for CHANELL. When observed, one of " the  five basic patterns is reported: homogeneous,  peripheral/rim, speckled, centromere, or nucleolar. If  cytoplasmic fluorescence is observed, it is noted. IFA  methodology is subjective and has occasionally been shown  to lack sensitivity for anti-SSA/Ro antibodies.  Negative results do not necessarily rule out the presence  of SSc. If clinical suspicion remains, consi vicki further  testing for U3-RNP, PM/Scl, or Th/To antibodies associated  with SSc.  Performed by [facility],  [address] [place] [address],[place] [address] [phone]  [email or web address], Twin Rodriguez MD, Lab. Director     --      RNP/Sm/SSA/SSB  Recent Labs   Lab Test  01/12/18   0828  04/22/16   0902   RNPIGG   --   >8.0  Positive   Antibody index (AI) values reflect qualitative changes in antibody   concentration that cannot be directly associated with clinical condition or   disease state.  *   SMIGG   --   1.5*   SSAIGG   --   <0.2  Negative   Antibody index (AI) values reflect qualitative changes in antibody   concentration that cannot be directly associated with clinical condition or   disease state.     SSBIGG   --   <0.2  Negative   Antibody index (AI) values reflect qualitative changes in antibody   concentration that cannot be directly associated with clinical condition or   disease state.     SCLIGG   --   3.1*   TREPAB  Negative   --      dsDNA  Recent Labs   Lab Test  01/18/18   1639  10/12/17   1642  07/17/17   1707  04/13/17   1650  01/20/17   0828  10/26/16   0919  07/22/16   0916  04/22/16   0902   DNA  2  2  1  2  1  1  1  1     C3/C4  Recent Labs   Lab Test  01/18/18   1639  10/12/17   1642  07/17/17   1707  04/13/17   1650  01/20/17   0828  10/26/16   0919  07/22/16   0916  04/22/16   0902   B6IAITT  76  70*  85  87  93  110  115  94   [id number]  14*  14*  14*  16  16  16  17  13*     Send-out Labs  Recent Labs   Lab Test  04/21/17   0813   SRESLT  SEE NOTE  (Note)  Test name                    Result Flag  Units   RefIntvl  ------------------------------------------------------------  Thiopurine Methyltransferase                                23.2 L      U/mL 24.0-44.0  Sample slightly hemolyzed. Please interpret the results  with caution.  INTERPRETIVE INFORMATION: Thiopurine Methyltransferase, RBC  Normal TPMT activity:  24.0-44.0 U/mL................Individuals are predicted to  be at low risk of bone marrow toxicity (myelosuppression)  as a consequence of standard thiopurine therapy; no dose  adjustment is recommended.  Intermediate TPMT activity:  17.0-23.9 U/mL................Individuals are predicted to  be at intermediate risk of bone marrow toxicity  (myelosuppression) as a consequence of standard thiopurine  therapy; a dose reduction and therapeutic drug management  is recommended.  Low TPMT activity:  less than 17.0 U/mL...........Individuals are predicted to  be at high risk of bone marrow toxicity (myelosuppression)   as a consequence of standard thiopurine dosing. It is  recommended to avoid the use of thiopurine drugs.  High TPMT activity:  greater than 44.0 U/mL........Individuals are not predicted  to be at risk for bone marrow toxicity (myelosuppression)  as a consequence of standard thiopurine dosing, but may be  at risk for therapeutic failure due to excessive  inactivation of thiopurine drugs. Individuals may require  higher than the normal standard dose. Therapeutic drug  management is recommended.  The TPMT, RBC assay is used as a screen to detect  individuals with low and intermediate TPMT activity who may  be at risk for myelosuppression when exposed to standard  doses of thiopurines, including azathioprine (Imuran) and  6-mercaptopurine (Purinethol). TPMT is the primary  metabolic route for inactivation of thiopurine drugs in the  bone marrow. When TPMT activity is low, it is predicted  that proportionately more 6-mercaptopurine can be converted  into the cytotoxic 6-thioguanine nucle otides  that  accumulate in the bone marrow causing excessive toxicity.  The activity of TPMT is measured by the nanomoles of  6-methylmercaptopurine (inactive metabolite) produced per 1  mL of packed red blood cells, (U/mL).    TPMT phenotype testing does not replace the need for  clinical monitoring of patients treated with thiopurine  drugs. Genotype for TPMT cannot be inferred from TPMT  activity (phenotype). Phenotype testing should not be  requested for patients currently treated with thiopurine  drugs. Current TPMT phenotype may not reflect future TPMT  phenotype, particularly in patients who received blood  transfusion within 30-60 days of testing.  TPMT enzyme  activity can be inhibited by several drugs such as:  naproxen (Aleve), ibuprofen (Advil, Motrin), ketoprofen  (Orudis), furosemide (Lasix), sulfasalazine (Azulfidine),  mesalamine (Asacol), olsalazine (Dipentum), mefenamic acid  (Ponstel), thiazide diuretics, and benzoic acid inhibitors.  TPMT inhibitors may contribute t o falsely low results;  patients should abstain from these drugs for at least 48  hours prior to TPMT testing. Falsely low results may also  occur as a result of inappropriate specimen handling and  hemolysis.  Test developed and characteristics determined by [facility]. See Compliance Statement B: [email or web address].[email or web address]  Performed by [facility],  [address] [place] [id number] [phone]  [email or web address], Lionel Ferreira MD, Lab. Director     STSTNM  Thiopurine Methyltransferase, RBC   STSTCD  09036   SSPTYP  Whole blood, EDTA anticoagulant     Antiphospholipid Antibodies  Recent Labs   Lab Test  04/22/16   0902   B2GPG  0.9   B2GPM  <0.9  Negative     CARG  <15.0  Interpretation:  Negative     JOANN  <12.5  Interpretation:  Negative     LUPINT  Negative  (Note)  COMMENTS:  The INR is normal.  APTT ratio is normal.  DRVVT Screen ratio is normal.  Thrombin time is normal.  NEGATIVE TEST; A LUPUS ANTICOAGULANT WAS NOT DETECTED IN  THIS  SPECIMEN WITHIN THE LIMITS OF THE TESTING REPERTOIRE.  If the clinical picture is strongly suggestive of an antiphospholipid  syndrome, recommend anticardiolipin and beta-2-glycoprotein (IgG and  IgM) antibody tests.  Isabella Olson M.D.  [phone]  4/25/2016    INR =  1.05    Reference range: 0.86-1.14  Thrombin Time= 15.4    Reference range: 13.0-19.0 sec    APTT:       Ratio  Patient  =  0.92  1:2 Mix  =  N/A  Reference:  Negative: Less than or equal to 1.16  Positive: Greater than or equal to 1.17     DILUTE LISA VIPER VENOM TEST:  Screen Ratio = 0.95   Normal is less than 1.21         CBC  Recent Labs   Lab Test  01/18/18   1639 12/28/17   1330  10/12/17   1642   WBC  3.0*  6.1  6.5   RBC  3.65*  3.58*  3.86   HGB  12.9  12.8  13.5   HCT  36.8  35.7  37.8   MCV  101*  100  98   RDW  13.2  12.9  13.5   PLT  255  220  311   MCH  35.3*  35.8*  35.0*   MCHC  35.1  35.9  35.7   NEUTROPHIL  47.6  88.6  68.9   LYMPH  36.8  6.0  22.8   MONOCYTE  13.9  5.4  7.4   EOSINOPHIL  1.0  0.0  0.6   BASOPHIL  0.7  0.0  0.3   ANEU  1.4*  5.4  4.5   ALYM  1.1  0.4*  1.5   AMANDA  0.4  0.3  0.5   AEOS  0.0  0.0  0.0   ABAS  0.0  0.0  0.0     CMP  Recent Labs   Lab Test  01/18/18   1639  01/12/18   0828  12/28/17   1330  10/12/17   1642  09/21/17   1701   07/17/17   1707   04/13/17   1650   NA  140   --   133  138   --    --   138   --   138   POTASSIUM  3.8   --   4.2  3.8   --    --   4.0   --   3.9   CHLORIDE  107   --   99  105   --    --   107   --   105   CO2  25   --   24  22   --    --   21   --   23   ANIONGAP  8   --   10  11   --    --   10   --   10   GLC  74  73  96  74   --    --   82   --   76   BUN  9   --   11  10   --    --   10   --   12   CR  0.58   --   0.57  0.62  0.74   < >  0.60   < >  0.69   GFRESTIMATED  >90   --   >90  >90  >90   < >  >90  Non  GFR Calc     < >  >90  Non  GFR Calc     GFRESTBLACK  >90   --   >90  >90  >90   < >  >90   GFR  Calc     < >  >90   GFR Calc     SRINIVAS  8.9   --   9.4  9.6   --    --   9.3   --   9.4   BILITOTAL  0.5   --   1.0  0.7  0.5   < >  0.6   < >  0.6   ALBUMIN  4.5   --   4.0  4.5  4.3   < >  4.5   < >  4.7   PROTTOTAL  7.6   --   7.7  7.6  7.5   < >  7.6   < >  7.7   ALKPHOS  45   --   52  48  45   < >  43   < >  51   AST  24   --   14  16  16   < >  22   < >  32   ALT  26   --   15  23  21   < >  21   < >  49    < > = values in this interval not displayed.     Calcium/VitaminD  Recent Labs   Lab Test  01/18/18   1639  12/28/17   1330  10/12/17   1642   02/20/17   1640   10/26/16   0919   04/01/16   0910   SRINIVAS  8.9  9.4  9.6   < >   --    < >  9.3   < >   --    VITDT   --    --    --    --   33   --   23   --   <13  Season, race, dietary intake, and treatment affect the concentration of   25-hydroxy-Vitamin D. Values may decrease during winter months and increase   during summer months. Values 20-29 ug/L may indicate Vitamin D insufficiency   and values <20 ug/L may indicate Vitamin D deficiency.   Vitamin D determination is routinely performed by an immunoassay specific for   25 hydroxyvitamin D3.  If an individual is on vitamin D2 (ergocalciferol)   supplementation, please specify 25 OH vitamin D2 and D3 level determination by   LCMSMS test VITD23.  *    < > = values in this interval not displayed.     ESR/CRP  Recent Labs   Lab Test  01/18/18   1639  10/12/17   1642  07/17/17   1707   SED  15  10  9   CRP  <2.9  <2.9  <2.9     CK/Aldolase  Recent Labs   Lab Test  01/18/18   1639  10/12/17   1642  07/17/17   1707   04/22/16   0902   CKT  47  42  82   < >  45   ALDOLASE   --    --    --    --   4.5    < > = values in this interval not displayed.     TSH/T4  Recent Labs   Lab Test  04/01/16   0910   TSH  1.57     Hepatitis B  Recent Labs   Lab Test  04/22/16   0902   HBCAB  Nonreactive   HEPBANG  Nonreactive     Hepatitis C  Recent Labs   Lab Test  01/12/18   0828  04/22/16   0902   HCVAB  Nonreactive   Nonreactive   Assay performance characteristics have not been established for newborns,   infants, and children       Lyme ab screening  Recent Labs   Lab Test  04/01/16   0910   LYMEGM  0.12     HIV Screening  Recent Labs   Lab Test  01/12/18   0828  04/22/16   0902   HIAGAB  Nonreactive  Nonreactive   HIV-1 p24 Ag & HIV-1/HIV-2 Ab Not Detected       UA  Recent Labs   Lab Test  01/18/18   1640  01/12/18   0836  12/28/17   0145  10/12/17   1643   01/20/17   0827   COLOR  Yellow  Yellow  Yellow  Yellow   < >  Yellow   APPEARANCE  Clear  Clear  Clear  Clear   < >  Clear   URINEGLC  Negative  Negative  Negative  Negative   < >  Negative   URINEBILI  Negative  Negative  Moderate*  Negative   < >  Negative   SG  1.020  1.025  >1.030  1.010   < >  >1.030   URINEPH  7.0  6.5  6.0  6.5   < >  6.0   PROTEIN  Negative  Trace*  100*  Negative   < >  Trace*   UROBILINOGEN  0.2  0.2  4.0*  0.2   < >  0.2   NITRITE  Negative  Negative  Positive*  Negative   < >  Negative   UBLD  Negative  Negative  Negative  Trace*   < >  Negative   LEUKEST  Negative  Negative  Negative  Trace*   < >  Negative   WBCU  O - 2  O - 2  O - 2  O - 2   < >  O - 2   RBCU  O - 2  O - 2  O - 2  O - 2   < >  O - 2   SQUAMOUSEPI  Few  Moderate*  Moderate*  Few   < >  Few   BACTERIA   --   Moderate*  Moderate*  Few*   --   Few*   MUCOUS   --   Present*  Present*   --    --   Present*    < > = values in this interval not displayed.     Urine Microscopic  Recent Labs   Lab Test  01/18/18   1640  01/12/18   0836  12/28/17   0145  10/12/17   1643   01/20/17   0827   WBCU  O - 2  O - 2  O - 2  O - 2   < >  O - 2   RBCU  O - 2  O - 2  O - 2  O - 2   < >  O - 2   SQUAMOUSEPI  Few  Moderate*  Moderate*  Few   < >  Few   BACTERIA   --   Moderate*  Moderate*  Few*   --   Few*   MUCOUS   --   Present*  Present*   --    --   Present*    < > = values in this interval not displayed.     Urine Protein  Recent Labs   Lab Test  01/18/18   1640  10/12/17   1643  07/17/17    1713   UTP  0.12  <0.05  0.11   UTPG  0.23*  Unable to calculate due to low value  0.20   UCRR  50  27  54     Immunization History     Immunization History   Administered Date(s) Administered     HPV 02/25/2010, 02/04/2011     HepB 06/13/1999, 08/20/1999, 02/05/2000     Historical DTP/aP 1985, 1985, 1985, 01/15/1988, 06/15/1991     Influenza (IIV3) PF 11/07/2011     Influenza Vaccine IM 3yrs+ 4 Valent IIV4 10/11/2016, 10/13/2017     MMR 05/15/1987, 09/15/1991     Mantoux Tuberculin Skin Test 07/15/1987, 01/19/2005     Meningococcal (Menomune ) 08/09/2004     OPV, trivalent, live 1986, 1986, 1986, 01/15/1988, 09/15/1991     TDAP Vaccine (Adacel) 01/21/2009, 02/25/2010          Chart documentation done in part with Dragon Voice recognition Software. Although reviewed after completion, some word and grammatical error may remain.    Lavon Light MD

## 2018-01-25 NOTE — Clinical Note
Tatum Ms. Weinberg plans to get [id number] from your office - she didn't want to get it today because she was going to an event but would like to get it with your clinic that is closer to her home.  Usually the pharmacy is unable to give because of age.  Thanks, Lavon

## 2018-01-25 NOTE — PATIENT INSTRUCTIONS
Dr. Light s [facility] Clin[facility]  Locations Clinic Hours Telephone Number     [facility]  [address]  [place] [address]     Wednesday: 7:20AM - 4:00PM  Thursday:     7:20AM - 4:00PM     Friday:          7:20AM - 11:00AM       To schedule an appointment with  Dr. Light,  please contact  Specialty [phone]       [facility] [place]  [address]  [place] [address]       Monday:       7:20AM - 4:00PM        [facility]  [address]  [place] [address]       Tuesday:      7:20AM - 4:00PM          Thank you!    Carissa Frye CMA

## 2018-02-12 ENCOUNTER — MYC MEDICAL ADVICE (OUTPATIENT)
Dept: FAMILY MEDICINE | Facility: CLINIC | Age: 33
End: 2018-02-12

## 2018-02-12 DIAGNOSIS — M32.19 OTHER SYSTEMIC LUPUS ERYTHEMATOSUS WITH OTHER ORGAN INVOLVEMENT (H): Primary | ICD-10-CM

## 2018-02-13 NOTE — TELEPHONE ENCOUNTER
MyChart sent to patient.    Routed to [place] to place order for Pneumonia immunzation.    Nani Mae, Certified Medical Assistant (AAMA)

## 2018-02-15 DIAGNOSIS — M32.19 OTHER SYSTEMIC LUPUS ERYTHEMATOSUS WITH OTHER ORGAN INVOLVEMENT (H): ICD-10-CM

## 2018-02-15 LAB
BASOPHILS # BLD AUTO: 0 10E9/L (ref 0–0.2)
BASOPHILS NFR BLD AUTO: 0.5 %
DIFFERENTIAL METHOD BLD: ABNORMAL
EOSINOPHIL # BLD AUTO: 0 10E9/L (ref 0–0.7)
EOSINOPHIL NFR BLD AUTO: 0.5 %
ERYTHROCYTE [DISTWIDTH] IN BLOOD BY AUTOMATED COUNT: 13.3 % (ref 10–15)
HCT VFR BLD AUTO: 37.7 % (ref 35–47)
HGB BLD-MCNC: 13.5 G/DL (ref 11.7–15.7)
LYMPHOCYTES # BLD AUTO: 1.2 10E9/L (ref 0.8–5.3)
LYMPHOCYTES NFR BLD AUTO: 29.9 %
MCH RBC QN AUTO: 36.3 PG (ref 26.5–33)
MCHC RBC AUTO-ENTMCNC: 35.8 G/DL (ref 31.5–36.5)
MCV RBC AUTO: 101 FL (ref 78–100)
MONOCYTES # BLD AUTO: 0.4 10E9/L (ref 0–1.3)
MONOCYTES NFR BLD AUTO: 10.3 %
NEUTROPHILS # BLD AUTO: 2.4 10E9/L (ref 1.6–8.3)
NEUTROPHILS NFR BLD AUTO: 58.8 %
PLATELET # BLD AUTO: 243 10E9/L (ref 150–450)
RBC # BLD AUTO: 3.72 10E12/L (ref 3.8–5.2)
WBC # BLD AUTO: 4.1 10E9/L (ref 4–11)

## 2018-02-15 PROCEDURE — 36415 COLL VENOUS BLD VENIPUNCTURE: CPT | Performed by: INTERNAL MEDICINE

## 2018-02-15 PROCEDURE — 85025 COMPLETE CBC W/AUTO DIFF WBC: CPT | Performed by: INTERNAL MEDICINE

## 2018-02-20 NOTE — PROGRESS NOTES
"MyChart message sent:  \"Ms. Weinberg,    The white blood cell count (an neutrophil count) were improved and within the normal range on the repeat labs.    Sincerely,  Lavon Light MD  2/19/2018 9:22 PM\""

## 2018-04-26 DIAGNOSIS — M32.19 OTHER SYSTEMIC LUPUS ERYTHEMATOSUS WITH OTHER ORGAN INVOLVEMENT (H): ICD-10-CM

## 2018-04-26 LAB
ALBUMIN UR-MCNC: NEGATIVE MG/DL
APPEARANCE UR: CLEAR
BASOPHILS # BLD AUTO: 0 10E9/L (ref 0–0.2)
BASOPHILS NFR BLD AUTO: 0.3 %
BILIRUB UR QL STRIP: NEGATIVE
COLOR UR AUTO: YELLOW
CREAT UR-MCNC: 14 MG/DL
DIFFERENTIAL METHOD BLD: ABNORMAL
EOSINOPHIL # BLD AUTO: 0 10E9/L (ref 0–0.7)
EOSINOPHIL NFR BLD AUTO: 1.3 %
ERYTHROCYTE [DISTWIDTH] IN BLOOD BY AUTOMATED COUNT: 13.2 % (ref 10–15)
ERYTHROCYTE [SEDIMENTATION RATE] IN BLOOD BY WESTERGREN METHOD: 13 MM/H (ref 0–20)
GLUCOSE UR STRIP-MCNC: NEGATIVE MG/DL
HCT VFR BLD AUTO: 36.1 % (ref 35–47)
HGB BLD-MCNC: 13.7 G/DL (ref 11.7–15.7)
HGB UR QL STRIP: NEGATIVE
KETONES UR STRIP-MCNC: NEGATIVE MG/DL
LEUKOCYTE ESTERASE UR QL STRIP: NEGATIVE
LYMPHOCYTES # BLD AUTO: 0.8 10E9/L (ref 0.8–5.3)
LYMPHOCYTES NFR BLD AUTO: 26.4 %
MCH RBC QN AUTO: 37 PG (ref 26.5–33)
MCHC RBC AUTO-ENTMCNC: 38 G/DL (ref 31.5–36.5)
MCV RBC AUTO: 98 FL (ref 78–100)
MONOCYTES # BLD AUTO: 0.5 10E9/L (ref 0–1.3)
MONOCYTES NFR BLD AUTO: 14.2 %
NEUTROPHILS # BLD AUTO: 1.8 10E9/L (ref 1.6–8.3)
NEUTROPHILS NFR BLD AUTO: 57.8 %
NITRATE UR QL: NEGATIVE
PH UR STRIP: 7 PH (ref 5–7)
PLATELET # BLD AUTO: 222 10E9/L (ref 150–450)
PROT UR-MCNC: <0.05 G/L
PROT/CREAT 24H UR: NORMAL G/G CR (ref 0–0.2)
RBC # BLD AUTO: 3.7 10E12/L (ref 3.8–5.2)
RBC #/AREA URNS AUTO: NORMAL /HPF
SOURCE: NORMAL
SP GR UR STRIP: 1.01 (ref 1–1.03)
UROBILINOGEN UR STRIP-ACNC: 0.2 EU/DL (ref 0.2–1)
WBC # BLD AUTO: 3.2 10E9/L (ref 4–11)
WBC #/AREA URNS AUTO: NORMAL /HPF

## 2018-04-26 PROCEDURE — 80053 COMPREHEN METABOLIC PANEL: CPT | Performed by: INTERNAL MEDICINE

## 2018-04-26 PROCEDURE — 85652 RBC SED RATE AUTOMATED: CPT | Performed by: INTERNAL MEDICINE

## 2018-04-26 PROCEDURE — 81001 URINALYSIS AUTO W/SCOPE: CPT | Performed by: INTERNAL MEDICINE

## 2018-04-26 PROCEDURE — 86160 COMPLEMENT ANTIGEN: CPT | Performed by: INTERNAL MEDICINE

## 2018-04-26 PROCEDURE — 86225 DNA ANTIBODY NATIVE: CPT | Performed by: INTERNAL MEDICINE

## 2018-04-26 PROCEDURE — 36415 COLL VENOUS BLD VENIPUNCTURE: CPT | Performed by: INTERNAL MEDICINE

## 2018-04-26 PROCEDURE — 86140 C-REACTIVE PROTEIN: CPT | Performed by: INTERNAL MEDICINE

## 2018-04-26 PROCEDURE — 82550 ASSAY OF CK (CPK): CPT | Performed by: INTERNAL MEDICINE

## 2018-04-26 PROCEDURE — 85025 COMPLETE CBC W/AUTO DIFF WBC: CPT | Performed by: INTERNAL MEDICINE

## 2018-04-26 PROCEDURE — 84156 ASSAY OF PROTEIN URINE: CPT | Performed by: INTERNAL MEDICINE

## 2018-04-27 LAB
ALBUMIN SERPL-MCNC: 4.6 G/DL (ref 3.4–5)
ALP SERPL-CCNC: 42 U/L (ref 40–150)
ALT SERPL W P-5'-P-CCNC: 27 U/L (ref 0–50)
ANION GAP SERPL CALCULATED.3IONS-SCNC: 9 MMOL/L (ref 3–14)
AST SERPL W P-5'-P-CCNC: 21 U/L (ref 0–45)
BILIRUB SERPL-MCNC: 1.1 MG/DL (ref 0.2–1.3)
BUN SERPL-MCNC: 10 MG/DL (ref 7–30)
CALCIUM SERPL-MCNC: 9.3 MG/DL (ref 8.5–10.1)
CHLORIDE SERPL-SCNC: 106 MMOL/L (ref 94–109)
CK SERPL-CCNC: 51 U/L (ref 30–225)
CO2 SERPL-SCNC: 22 MMOL/L (ref 20–32)
CREAT SERPL-MCNC: 0.53 MG/DL (ref 0.52–1.04)
CRP SERPL-MCNC: <2.9 MG/L (ref 0–8)
GFR SERPL CREATININE-BSD FRML MDRD: >90 ML/MIN/1.7M2
GLUCOSE SERPL-MCNC: 67 MG/DL (ref 70–99)
POTASSIUM SERPL-SCNC: 3.9 MMOL/L (ref 3.4–5.3)
PROT SERPL-MCNC: 8 G/DL (ref 6.8–8.8)
SODIUM SERPL-SCNC: 137 MMOL/L (ref 133–144)

## 2018-04-30 LAB
C3 SERPL-MCNC: 71 MG/DL (ref 76–169)
C4 SERPL-MCNC: 14 MG/DL (ref 15–50)

## 2018-05-01 LAB — DSDNA AB SER-ACNC: 2 IU/ML

## 2018-05-04 ENCOUNTER — OFFICE VISIT (OUTPATIENT)
Dept: RHEUMATOLOGY | Facility: CLINIC | Age: 33
End: 2018-05-04
Payer: COMMERCIAL

## 2018-05-04 VITALS
HEIGHT: 67 IN | HEART RATE: 82 BPM | DIASTOLIC BLOOD PRESSURE: 77 MMHG | OXYGEN SATURATION: 95 % | WEIGHT: 153.4 LBS | RESPIRATION RATE: 14 BRPM | SYSTOLIC BLOOD PRESSURE: 115 MMHG | BODY MASS INDEX: 24.08 KG/M2

## 2018-05-04 DIAGNOSIS — R76.8 SCL-70 ANTIBODY POSITIVE: ICD-10-CM

## 2018-05-04 DIAGNOSIS — R76.8 ANTI-RNP ANTIBODIES PRESENT: ICD-10-CM

## 2018-05-04 DIAGNOSIS — M32.9 SYSTEMIC LUPUS ERYTHEMATOSUS, UNSPECIFIED SLE TYPE, UNSPECIFIED ORGAN INVOLVEMENT STATUS (H): Primary | ICD-10-CM

## 2018-05-04 DIAGNOSIS — Z23 NEED FOR PROPHYLACTIC VACCINATION AGAINST STREPTOCOCCUS PNEUMONIAE (PNEUMOCOCCUS): ICD-10-CM

## 2018-05-04 PROCEDURE — 90471 IMMUNIZATION ADMIN: CPT | Performed by: INTERNAL MEDICINE

## 2018-05-04 PROCEDURE — 99213 OFFICE O/P EST LOW 20 MIN: CPT | Mod: 25 | Performed by: INTERNAL MEDICINE

## 2018-05-04 PROCEDURE — 90670 PCV13 VACCINE IM: CPT | Performed by: INTERNAL MEDICINE

## 2018-05-04 NOTE — PROGRESS NOTES
Rheumatology Clinic Visit      Aleida Weinberg MRN# [id number]   YOB: 1985 Age: 33 year old      Date of visit: 5/04/18   PCP: Tatum Dove     Chief Complaint   Patient presents with:  Systemic Lupus Erythematous: Patient states she is doing ok.    Assessment and Plan   1. Systemic lupus erythematosus (CHANELL >1:59383 speckled, RNP+, Sm+, Scl-70+, hypocomplementemia, photosensitivity, malar rash, arthritis, fatigue, Raynaud's phenomenon):  Dx'd 4/2016. Scl-70+; she lacks features of scleroderma at this time; no myopathy by hx of labs. Previously on MTX 20mg SQ weekly (GI upset with PO doses above 15mg, partially effective) and SSZ (LFT elevations).  Currently on AZA 150mg daily (synovitis when on 100mg daily) and HCQ 300mg daily. TPMT normal on 8/21/2017. Doing well on her current medication regimen.  May consider MMF or Benlysta in the future if needed. Labs reviewed with the patient today; WBC reduced similar to previous labs; not getting frequent infections and ANC and ALC are okay.  Given positive antibodies will check echocardiogram for pulmonary hypertension screening.   - Continue hydroxychloroquine 300mg daily (last eye exam done 9/8/2017)  - Continue azathioprine 150mg daily   - Echocardiogram ordered  - Labs 1 week prior to the next rheumatology clinic visit: CBC, CMP, ESR, CRP, CK, C3, C4, dsDNA, UA, Uprotein:creatinine    2. Raynaud's Phenomenon: Cold avoidance was insufficient for controlling her symptoms and therefore amlodipine was started. Uses amlodipine 10mg daily during cold months only.   - Continue Amlodipine 10mg daily during colder months    3. Chronic Back Pain: History of scoliosis. Degenerative findings on previously reviewed MRIs from 2000 and 2009. This has not changed for many years and does not worsen when her peripheral joint symptoms worsen. Had one episode of sciatica that resolved with chiropractic treatment.      4. Vitamin D deficiency: Currently on vitamin D  2000 units daily.    - Continue vitamin D 2000 units daily    5.  Secondary Sjogren's syndrome: Mild dry and dry mouth that are treated infrequently with artificial tears and frequent sips of water.     6. Vaccinations: Vaccinations reviewed with Ms. Weinberg.  Risks and benefits of vaccinations were discussed.  - Influenza: encouraged yearly vaccination  - [id number]: will receive today  - [id number]: to receive at least 8 weeks after [id number] is administered     Ms. Weinberg verbalized agreement with and understanding of the rational for the diagnosis and treatment plan.  All questions were answered to best of my ability and the patient's satisfaction. Ms. Weinberg was advised to contact the clinic with any questions that may arise after the clinic visit.      Thank you for involving me in the care of the patient    Return to clinic: 3 months      HPI   Aleida Digna Weinberg is a 33 year old female with medical history significant for intermittent asthma, anxiety, migraines, vitamin D deficiency, and systemic lupus erythematosus who presents for follow-up of SLE.     Today, Ms. Weinberg reports that she is doing well.  Minimal joint pain in her PIPs only on occasion.  No morning stiffness.  No gelling phenomenon.  Raynaud's phenomenon is well controlled and she is not taking amlodipine now that it is warmer.  Some fatigue that she says is due to the stress at work; her boss and other coworkers are noted in the office so she is taking on more responsibilities at this time.  She went on vacation in [place] and did cover up to prevent sun exposure but did have periods of increased fatigue and feeling warm or cold.      Denies fevers, chills, nausea, vomiting, constipation, diarrhea. No abdominal pain. No chest pain/pressure, palpitations, or shortness of breath. No oral or nasal sores. No neck pain. No LE swelling.  Has photosensitivity but no photophobia.  Mild dry eyes and dry mouth; she uses artificial tears as needed.   No eye pain or redness. Denies history of serositis. Denies history of DVT, pulmonary embolism, or miscarriage.    Tobacco: Former Smoker  EtOH: Once monthly  Drugs: None  Occupation: Works at [employer], a lot of typing per patient    ROS   GEN: No fevers, chills, night sweats, or weight change  SKIN: See HPI.   HEENT: Has a history of migraines, but no headache today. No change in vision, taste, or hearing. No epistaxis. No oral or nasal ulcers.  CV: No chest pain, pressure, palpitations, or dyspnea on exertion.  PULM: No SOB, wheeze, cough.  GI:  No nausea, vomiting, constipation, diarrhea. No blood in stool. No abdominal pain.  : No blood in urine.  MSK: See HPI.  NEURO: See history of present illness  ENDO: No heat/cold intolerance.  EXT: See history of present illness    Active Problem List     Patient Active Problem List   Diagnosis     Other kyphoscoliosis and scoliosis     Hypertrophic and atrophic condition of skin     Viral warts     Routine postpartum follow-up     CARDIOVASCULAR SCREENING; LDL GOAL LESS THAN 160     Intermittent asthma     Anxiety     Intractable menstrual migraine without status migrainosus     Vitamin D deficiency     Inflammatory polyarthropathy (H)     Chronic back pain     Raynaud's phenomenon without gangrene     Systemic lupus erythematosus (H)     High risk medications (not anticoagulants) long-term use (Plaquenil and AZA)     Dry eyes, bilateral     Past Medical History     Past Medical History:   Diagnosis Date     Mild intermittent asthma      Other kyphoscoliosis and scoliosis     upper back curvature and disc degeneration in lower back.     Past Surgical History     Past Surgical History:   Procedure Laterality Date      SECTION  2006     SURGICAL HISTORY OF -       PE Tubes     Allergy     Allergies   Allergen Reactions     Fluconazole Rash     Current Medication List     Current Outpatient Prescriptions   Medication Sig     albuterol (PROAIR HFA/PROVENTIL  HFA/VENTOLIN HFA) 108 (90 BASE) MCG/ACT Inhaler Inhale 2 puffs into the lungs every 6 hours as needed for shortness of breath / dyspnea     amLODIPine (NORVASC) 10 MG tablet Take 1 tablet (10 mg) by mouth daily     azaTHIOprine (IMURAN) 50 MG tablet Take 3 tablets (150 mg) by mouth daily     Cholecalciferol (VITAMIN D) 2000 UNITS tablet Take 2,000 Units by mouth daily     cyclobenzaprine (FLEXERIL) 10 MG tablet Take 0.5-1 tablets (5-10 mg) by mouth 3 times daily as needed for muscle spasms     hydroxychloroquine (PLAQUENIL) 200 MG tablet Hydroxychloroquine 200mg in the morning and 100mg in the evening.     levonorgestrel (MIRENA, 52 MG,) 20 MCG/24HR IUD 1 each by Intrauterine route once     LORazepam (ATIVAN) 0.5 MG tablet Take 1 tablet (0.5 mg) by mouth every 8 hours as needed for anxiety     omeprazole (PRILOSEC) 20 MG CR capsule Take 20 mg by mouth daily     oxyCODONE-acetaminophen (PERCOCET) 5-325 MG per tablet Take 1 tablet by mouth every 6 hours as needed     predniSONE (DELTASONE) 5 MG tablet Take 1 tablet (5 mg) by mouth daily as needed for lupus     traMADol (ULTRAM) 50 MG tablet Take 1-2 tablets ( mg) by mouth every 6 hours as needed for pain maximum 8 tablet(s) per day     traZODone (DESYREL) 50 MG tablet Take 0.5 tablets (25 mg) by mouth nightly as needed for sleep     No current facility-administered medications for this visit.          Social History   See HPI    Family History     Family History   Problem Relation Age of Onset     HEART DISEASE Maternal Grandfather      Bypass, Heart Disease     Respiratory Maternal Grandfather      asthma     C.A.D. Paternal Grandfather      HEART DISEASE Maternal Uncle      MI's      Hypertension Paternal Grandmother      CANCER Paternal Grandmother      lymph nodes     Respiratory Other      cousin on mothers side, asthma     Alcohol/Drug Maternal Uncle      Alcohol/Drug Other      bother great grandparents on mother's side     DIABETES No family hx of       "Breast Cancer No family hx of      Cancer - colorectal No family hx of      Denies family history of autoimmune disease    Physical Exam     Temp Readings from Last 3 Encounters:   01/25/18 97.4  F (36.3  C) (Oral)   01/12/18 98.2  F (36.8  C) (Oral)   12/28/17 99.9  F (37.7  C) (Oral)     BP Readings from Last 5 Encounters:   05/04/18 115/77   01/25/18 100/62   01/12/18 110/64   12/28/17 113/71   12/11/17 110/72     Pulse Readings from Last 1 Encounters:   05/04/18 82     Resp Readings from Last 1 Encounters:   05/04/18 14     Estimated body mass index is 23.93 kg/(m^2) as calculated from the following:    Height as of this encounter: 1.705 m (5' 7.13\").    Weight as of this encounter: 69.6 kg (153 lb 6.4 oz).    GEN: NAD  HEENT: MMM. No oral lesions. Anicteric, noninjected sclera  CV: S1, S2. RRR. No m/r/g.  PULM: CTA bilaterally. No w/c.  MSK: MCPs, PIPs, DIPs, wrists, elbows, shoulders, knees, ankles, and MTPs without swelling or tenderness to palpation.  Bilateral hips nontender to palpation.   NEURO: UE and LE strengths 5/5 and equal bilaterally.   SKIN: Mild facial erythema in a malar distribution; normal fingertip pulp; no evidence of current or previous infarcts to the distal fingertips by visual inspection. Tattoos present  EXT: No LE edema  PSYCH: Alert. Appropriate.    Labs / Imaging (select studies)   RF/CCP  Recent Labs   Lab Test  04/22/16   0902  04/01/16   0910   CCPIGG  1   --    RHF   --   <20     CHANELL  Recent Labs   Lab Test  04/22/16   0902  04/01/16   0910   VALERIE   --   12.4*   ANAIGG  >1:95602  Reference range: <1:40  (Note)  Speckled pattern.  INTERPRETIVE INFORMATION: CHANELL by IFA, IgG  Anti-nuclear antibodies (CHANELL) are seen in a variety of  systemic rheumatic diseases and are determined by indirect  fluorescence assay (IFA) using HEp-2 substrate with an  IgG-specific conjugate. CHANELL titers less than or equal to  1:80 have variable relevance while titers greater than or  equal to 1:160 are " considered clinically significant. These  antibodies may precede clinical disease onset; however,  healthy individuals and those with advanced age have been  reported to be positive for CHANELL. When observed, one of the  five basic patterns is reported: homogeneous,  peripheral/rim, speckled, centromere, or nucleolar. If  cytoplasmic fluorescence is observed, it is noted. IFA  methodology is subjective and has occasionally been shown  to lack sensitivity for anti-SSA/Ro antibodies.  Negative results do not necessarily rule out the presence  of SSc. If clinical suspicion remains, consi vicki further  testing for U3-RNP, PM/Scl, or Th/To antibodies associated  with SSc.  Performed by [facility],  [address] [place] [id number] [phone]  [email or web address], Twin Rodriguez MD, Lab. Director     --      RNP/Sm/SSA/SSB  Recent Labs   Lab Test  01/12/18   0828 04/22/16   0902   RNPIGG   --   >8.0  Positive   Antibody index (AI) values reflect qualitative changes in antibody   concentration that cannot be directly associated with clinical condition or   disease state.  *   SMIGG   --   1.5*   SSAIGG   --   <0.2  Negative   Antibody index (AI) values reflect qualitative changes in antibody   concentration that cannot be directly associated with clinical condition or   disease state.     SSBIGG   --   <0.2  Negative   Antibody index (AI) values reflect qualitative changes in antibody   concentration that cannot be directly associated with clinical condition or   disease state.     SCLIGG   --   3.1*   TREPAB  Negative   --      dsDNA  Recent Labs   Lab Test  04/26/18 1648 01/18/18   1639  10/12/17   1642 07/17/17   1707 04/13/17   1650  01/20/17   0828  10/26/16   0919  07/22/16   0916  04/22/16   0902   DNA  2  2  2  1  2  1  1  1  1     C3/C4  Recent Labs   Lab Test  04/26/18 1648 01/18/18   1639  10/12/17   1642  07/17/17   1707 04/13/17   1650  01/20/17   0828  10/26/16   0919  07/22/16   0916  04/22/16    0902   M1XCJVU  71*  76  70*  85  87  93  110  115  94   D2XMCXH  14*  14*  14*  14*  16  16  16  17  13*     Send-out Labs  Recent Labs   Lab Test  04/21/17   0813   [facility]  SEE NOTE  (Note)  Test name                    Result Flag  Units  RefIntvl  ------------------------------------------------------------  Thiopurine Methyltransferase                                23.2 L      U/mL 24.0-44.0  Sample slightly hemolyzed. Please interpret the results  with caution.  INTERPRETIVE INFORMATION: Thiopurine Methyltransferase, RBC  Normal TPMT activity:  24.0-44.0 U/mL................Individuals are predicted to  be at low risk of bone marrow toxicity (myelosuppression)  as a consequence of standard thiopurine therapy; no dose  adjustment is recommended.  Intermediate TPMT activity:  17.0-23.9 U/mL................Individuals are predicted to  be at intermediate risk of bone marrow toxicity  (myelosuppression) as a consequence of standard thiopurine  therapy; a dose reduction and therapeutic drug management  is recommended.  Low TPMT activity:  less than 17.0 U/mL...........Individuals are predicted to  be at high risk of bone marrow toxicity (myelosuppression)   as a consequence of standard thiopurine dosing. It is  recommended to avoid the use of thiopurine drugs.  High TPMT activity:  greater than 44.0 U/mL........Individuals are not predicted  to be at risk for bone marrow toxicity (myelosuppression)  as a consequence of standard thiopurine dosing, but may be  at risk for therapeutic failure due to excessive  inactivation of thiopurine drugs. Individuals may require  higher than the normal standard dose. Therapeutic drug  management is recommended.  The TPMT, RBC assay is used as a screen to detect  individuals with low and intermediate TPMT activity who may  be at risk for myelosuppression when exposed to standard  doses of thiopurines, including azathioprine (Imuran) and  6-mercaptopurine (Purinethol). TPMT is the  primary  metabolic route for inactivation of thiopurine drugs in the  bone marrow. When TPMT activity is low, it is predicted  that proportionately more 6-mercaptopurine can be converted  into the cytotoxic 6-thioguanine nucle otides that  accumulate in the bone marrow causing excessive toxicity.  The activity of TPMT is measured by the nanomoles of  6-methylmercaptopurine (inactive metabolite) produced per 1  mL of packed red blood cells, (U/mL).    TPMT phenotype testing does not replace the need for  clinical monitoring of patients treated with thiopurine  drugs. Genotype for TPMT cannot be inferred from TPMT  activity (phenotype). Phenotype testing should not be  requested for patients currently treated with thiopurine  drugs. Current TPMT phenotype may not reflect future TPMT  phenotype, particularly in patients who received blood  transfusion within 30-60 days of testing.  TPMT enzyme  activity can be inhibited by several drugs such as:  naproxen (Aleve), ibuprofen (Advil, Motrin), ketoprofen  (Orudis), furosemide (Lasix), sulfasalazine (Azulfidine),  mesalamine (Asacol), olsalazine (Dipentum), mefenamic acid  (Ponstel), thiazide diuretics, and benzoic acid inhibitors.  TPMT inhibitors may contribute t o falsely low results;  patients should abstain from these drugs for at least 48  hours prior to TPMT testing. Falsely low results may also  occur as a result of inappropriate specimen handling and  hemolysis.  Test developed and characteristics determined by [facility]. See Compliance Statement B: [email or web address]  Performed by [facility],  [address] [place] [id number] [phone]  [email or web address], Lionel Ferreira MD, Lab. Director     STSTNM  Thiopurine Methyltransferase, RBC   STSTCD  55959   SSPTYP  Whole blood, EDTA anticoagulant     Antiphospholipid Antibodies  Recent Labs   Lab Test  04/22/16   0902   B2GPG  0.9   B2GPM  <0.9  Negative     CARG  <15.0  Interpretation:  Negative     JOANN   <12.5  Interpretation:  Negative     LUPINT  Negative  (Note)  COMMENTS:  The INR is normal.  APTT ratio is normal.  DRVVT Screen ratio is normal.  Thrombin time is normal.  NEGATIVE TEST; A LUPUS ANTICOAGULANT WAS NOT DETECTED IN THIS  SPECIMEN WITHIN THE LIMITS OF THE TESTING REPERTOIRE.  If the clinical picture is strongly suggestive of an antiphospholipid  syndrome, recommend anticardiolipin and beta-2-glycoprotein (IgG and  IgM) antibody tests.  Isabella Olson M.D.  [phone]  4/25/2016    INR =  1.05    Reference range: 0.86-1.14  Thrombin Time= 15.4    Reference range: 13.0-19.0 sec    APTT:       Ratio  Patient  =  0.92  1:2 Mix  =  N/A  Reference:  Negative: Less than or equal to 1.16  Positive: Greater than or equal to 1.17     DILUTE LISA VIPER VENOM TEST:  Screen Ratio = 0.95   Normal is less than 1.21         CBC  Recent Labs   Lab Test  04/26/18   1648  02/15/18   1653  01/18/18   1639   WBC  3.2*  4.1  3.0*   RBC  3.70*  3.72*  3.65*   HGB  13.7  13.5  12.9   HCT  36.1  37.7  36.8   MCV  98  101*  101*   RDW  13.2  13.3  13.2   PLT  222  243  255   MCH  37.0*  36.3*  35.3*   MCHC  38.0*  35.8  35.1   NEUTROPHIL  57.8  58.8  47.6   LYMPH  26.4  29.9  36.8   MONOCYTE  14.2  10.3  13.9   EOSINOPHIL  1.3  0.5  1.0   BASOPHIL  0.3  0.5  0.7   ANEU  1.8  2.4  1.4*   ALYM  0.8  1.2  1.1   AMANDA  0.5  0.4  0.4   AEOS  0.0  0.0  0.0   ABAS  0.0  0.0  0.0     CMP  Recent Labs   Lab Test  04/26/18   1648  01/18/18   1639  01/12/18   0828  12/28/17   1330  10/12/17   1642   07/17/17   1707   NA  137  140   --   133  138   --   138   POTASSIUM  3.9  3.8   --   4.2  3.8   --   4.0   CHLORIDE  106  107   --   99  105   --   107   CO2  22  25   --   24  22   --   21   ANIONGAP  9  8   --   10  11   --   10   GLC  67*  74  73  96  74   --   82   BUN  10  9   --   11  10   --   10   CR  0.53  0.58   --   0.57  0.62   < >  0.60   GFRESTIMATED  >90  >90   --   >90  >90   < >  >90  Non  GFR  Calc     GFRESTBLACK  >90  >90   --   >90  >90   < >  >90   GFR Calc     SRINIVAS  9.3  8.9   --   9.4  9.6   --   9.3   BILITOTAL  1.1  0.5   --   1.0  0.7   < >  0.6   ALBUMIN  4.6  4.5   --   4.0  4.5   < >  4.5   PROTTOTAL  8.0  7.6   --   7.7  7.6   < >  7.6   ALKPHOS  42  45   --   52  48   < >  43   AST  21  24   --   14  16   < >  22   ALT  27  26   --   15  23   < >  21    < > = values in this interval not displayed.     Calcium/VitaminD  Recent Labs   Lab Test  04/26/18   1648  01/18/18   1639  12/28/17   1330   02/20/17   1640   10/26/16   0919   04/01/16   0910   SRINIVAS  9.3  8.9  9.4   < >   --    < >  9.3   < >   --    VITDT   --    --    --    --   33   --   23   --   <13  Season, race, dietary intake, and treatment affect the concentration of   25-hydroxy-Vitamin D. Values may decrease during winter months and increase   during summer months. Values 20-29 ug/L may indicate Vitamin D insufficiency   and values <20 ug/L may indicate Vitamin D deficiency.   Vitamin D determination is routinely performed by an immunoassay specific for   25 hydroxyvitamin D3.  If an individual is on vitamin D2 (ergocalciferol)   supplementation, please specify 25 OH vitamin D2 and D3 level determination by   LCMSMS test VITD23.  *    < > = values in this interval not displayed.     ESR/CRP  Recent Labs   Lab Test  04/26/18   1648  01/18/18   1639  10/12/17   1642   SED  13  15  10   CRP  <2.9  <2.9  <2.9     CK/Aldolase  Recent Labs   Lab Test  04/26/18   1648  01/18/18   1639  10/12/17   1642 04/22/16   0902   CKT  51  47  42   < >  45   ALDOLASE   --    --    --    --   4.5    < > = values in this interval not displayed.     TSH/T4  Recent Labs   Lab Test  04/01/16   0910   TSH  1.57     Hepatitis B  Recent Labs   Lab Test  04/22/16   0902   HBCAB  Nonreactive   HEPBANG  Nonreactive     Hepatitis C  Recent Labs   Lab Test  01/12/18   0828  04/22/16   0902   HCVAB  Nonreactive  Nonreactive   Assay performance  characteristics have not been established for newborns,   infants, and children       Lyme ab screening  Recent Labs   Lab Test  04/01/16   0910   LYMEGM  0.12     HIV Screening  Recent Labs   Lab Test  01/12/18   0828  04/22/16   0902   HIAGAB  Nonreactive  Nonreactive   HIV-1 p24 Ag & HIV-1/HIV-2 Ab Not Detected       UA  Recent Labs   Lab Test  04/26/18   1648  01/18/18   1640  01/12/18   0836  12/28/17   0145  10/12/17   1643   01/20/17   0827   COLOR  Yellow  Yellow  Yellow  Yellow  Yellow   < >  Yellow   APPEARANCE  Clear  Clear  Clear  Clear  Clear   < >  Clear   URINEGLC  Negative  Negative  Negative  Negative  Negative   < >  Negative   URINEBILI  Negative  Negative  Negative  Moderate*  Negative   < >  Negative   SG  1.010  1.020  1.025  >1.030  1.010   < >  >1.030   URINEPH  7.0  7.0  6.5  6.0  6.5   < >  6.0   PROTEIN  Negative  Negative  Trace*  100*  Negative   < >  Trace*   UROBILINOGEN  0.2  0.2  0.2  4.0*  0.2   < >  0.2   NITRITE  Negative  Negative  Negative  Positive*  Negative   < >  Negative   UBLD  Negative  Negative  Negative  Negative  Trace*   < >  Negative   LEUKEST  Negative  Negative  Negative  Negative  Trace*   < >  Negative   WBCU  0 - 5  O - 2  O - 2  O - 2  O - 2   < >  O - 2   RBCU  O - 2  O - 2  O - 2  O - 2  O - 2   < >  O - 2   SQUAMOUSEPI   --   Few  Moderate*  Moderate*  Few   < >  Few   BACTERIA   --    --   Moderate*  Moderate*  Few*   --   Few*   MUCOUS   --    --   Present*  Present*   --    --   Present*    < > = values in this interval not displayed.     Urine Microscopic  Recent Labs   Lab Test  04/26/18 1648 01/18/18   1640  01/12/18   0836  12/28/17   0145  10/12/17   1643   01/20/17   0827   WBCU  0 - 5  O - 2  O - 2  O - 2  O - 2   < >  O - 2   RBCU  O - 2  O - 2  O - 2  O - 2  O - 2   < >  O - 2   SQUAMOUSEPI   --   Few  Moderate*  Moderate*  Few   < >  Few   BACTERIA   --    --   Moderate*  Moderate*  Few*   --   Few*   MUCOUS   --    --   Present*  Present*    --    --   Present*    < > = values in this interval not displayed.     Urine Protein  Recent Labs   Lab Test  04/26/18   1648  01/18/18   1640  10/12/17   1643   UTP  <0.05  0.12  <0.05   UTPG  Unable to calculate due to low value  0.23*  Unable to calculate due to low value   UCRR  14  50  27     Immunization History     Immunization History   Administered Date(s) Administered     HPV 02/25/2010, 02/04/2011     HepB 06/13/1999, 08/20/1999, 02/05/2000     Historical DTP/aP 1985, 1985, 1985, 01/15/1988, 06/15/1991     Influenza (IIV3) PF 11/07/2011     Influenza Vaccine IM 3yrs+ 4 Valent IIV4 10/11/2016, 10/13/2017     MMR 05/15/1987, 09/15/1991     Mantoux Tuberculin Skin Test 07/15/1987, 01/19/2005     Meningococcal (Menomune ) 08/09/2004     OPV, trivalent, live 1986, 1986, 1986, 01/15/1988, 09/15/1991     TDAP Vaccine (Adacel) 01/21/2009, 02/25/2010          Chart documentation done in part with Dragon Voice recognition Software. Although reviewed after completion, some word and grammatical error may remain.    Lavon Light MD

## 2018-05-04 NOTE — MR AVS SNAPSHOT
After Visit Summary   2018    Aleida Weinberg    MRN: [id number]           Patient Information     Date Of Birth          1985        Visit Information        Provider Department      2018 8:00 AM Lavon Light MD [facility]        Today's Diagnoses     Systemic lupus erythematosus, unspecified SLE type, unspecified organ involvement status (H)    -  1    Anti-RNP antibodies present        SCL-70 antibody positive        Need for prophylactic vaccination against Streptococcus pneumoniae (pneumococcus)          Care Instructions    Clinics that do Echocardiograms    [facility]: [phone]  [facility]: [phone]  [facility]: [phone]  [facility] ([place]): [phone]  [facility]: [phone]  [facility]: [phone]  [facility]): [phone]  [facility] [phone]    Rheumatology    Dr. Lavon Delong [facility]   (Monday)  [address]  [place] [address]       [facility]   (Tuesday)  [address] [place] [address]    [facility]   (Wed., Thurs., and Friday)  [address] [facility] [place] [address]    Phone number: [phone]  Thank you for choosing [place].  Carissa Frye CMA            Follow-ups after your visit        Your next 10 appointments already scheduled     Aug 23, 2018  4:30 PM CDT   LAB with NE LAB   [facility] ([facility])    [address] [place] [id number] [phone]           Please do not eat 10-12 hours before your appointment if you are coming in fasting for labs on lipids, cholesterol, or glucose (sugar). This does not apply to pregnant women. Water, hot tea and black coffee (with nothing added) are okay. Do not drink other fluids, diet soda or chew gum.            Aug 30, 2018  7:20 AM CDT   Return Visit with MD Eyad Pr[facility] [facility] ([place]  "[facility] [place]    [address] [facility]  [place] [id number] [phone]              Future tests that were ordered for you today     Open Future Orders        Priority Expected Expires Ordered    Echocardiogram Routine  5/4/2019 5/4/2018            Who to contact     If you have questions or need follow up information about today's clinic visit or your schedule please contact [facility] [place] [facility] directly at [phone].  Normal or non-critical lab and imaging results will be communicated to you by [employer] labshart, letter or phone within 4 business days after the clinic has received the results. If you do not hear from us within 7 days, please contact the clinic through [employer] or phone. If you have a critical or abnormal lab result, we will notify you by phone as soon as possible.  Submit refill requests through [employer] or call your pharmacy and they will forward the refill request to us. Please allow 3 business days for your refill to be completed.          Additional Information About Your Visit        [employer] Information     [employer] gives you secure access to your electronic health record. If you see a primary care provider, you can also send messages to your care team and make appointments. If you have questions, please call your primary care clinic.  If you do not have a primary care provider, please call [phone] and they will assist you.        Care EveryWhere ID     This is your Care EveryWhere ID. This could be used by other organizations to access your [place] medical records  [id number]        Your Vitals Were     Pulse Respirations Height Pulse Oximetry BMI (Body Mass Index)       82 14 1.705 m (5' 7.13\") 95% 23.93 kg/m2        Blood Pressure from Last 3 Encounters:   05/04/18 115/77   01/25/18 100/62   01/12/18 110/64    Weight from Last 3 Encounters:   05/04/18 69.6 kg (153 lb 6.4 oz)   01/25/18 68.9 kg (152 lb)   01/12/18 68.9 kg (152 lb)              We Performed the Following     " PNEUMOCOCCAL CONJ VACCINE 13 VALENT IM     VACCINE ADMINISTRATION, INITIAL        Primary Care Provider Office Phone # Fax #    Tatum Doreen Dove PA-C [phone]       [facility] [address]        Equal Access to Services     CACHORRO MADRIGAL : Hadii catalina dickerson hadmaryo Soomaali, waaxda luqadaha, qaybta kaalmada adeegyada, mira zuriin haymauricion zully sharlaryan concepcion. So [facility] [phone].    ATENCIÓN: Si habla español, tiene a rey disposición servicios gratuitos de asistencia lingüística. Llame al [phone].    We comply with applicable federal civil rights laws and [place] laws. We do not discriminate on the basis of race, color, national origin, age, disability, sex, sexual orientation, or gender identity.            Thank you!     Thank you for choosing [facility]  for your care. Our goal is always to provide you with excellent care. Hearing back from our patients is one way we can continue to improve our services. Please take a few minutes to complete the written survey that you may receive in the mail after your visit with us. Thank you!             Your Updated Medication List - Protect others around you: Learn how to safely use, store and throw away your medicines at www.disposemymeds.org.          This list is accurate as of 5/4/18  8:21 AM.  Always use your most recent med list.                   Brand Name Dispense Instructions for use Diagnosis    albuterol 108 (90 Base) MCG/ACT Inhaler    PROAIR HFA/PROVENTIL HFA/VENTOLIN HFA    1 Inhaler    Inhale 2 puffs into the lungs every 6 hours as needed for shortness of breath / dyspnea    Intermittent asthma, uncomplicated       amLODIPine 10 MG tablet    NORVASC    90 tablet    Take 1 tablet (10 mg) by mouth daily    Raynaud's phenomenon without gangrene       azaTHIOprine 50 MG tablet    IMURAN    270 tablet    Take 3 tablets (150 mg) by mouth daily    Other systemic lupus erythematosus with other organ involvement  (H), Vitamin D deficiency       cyclobenzaprine 10 MG tablet    FLEXERIL    30 tablet    Take 0.5-1 tablets (5-10 mg) by mouth 3 times daily as needed for muscle spasms    Acute midline low back pain with right-sided sciatica       hydroxychloroquine 200 MG tablet    PLAQUENIL    135 tablet    Hydroxychloroquine 200mg in the morning and 100mg in the evening.    Other systemic lupus erythematosus with other organ involvement (H)       LORazepam 0.5 MG tablet    ATIVAN    10 tablet    Take 1 tablet (0.5 mg) by mouth every 8 hours as needed for anxiety    Anxiety       MIRENA (52 MG) 20 MCG/24HR IUD   Generic drug:  levonorgestrel      1 each by Intrauterine route once        omeprazole 20 MG CR capsule    priLOSEC     Take 20 mg by mouth daily        oxyCODONE-acetaminophen 5-325 MG per tablet    PERCOCET     Take 1 tablet by mouth every 6 hours as needed        predniSONE 5 MG tablet    DELTASONE    90 tablet    Take 1 tablet (5 mg) by mouth daily as needed for lupus    Systemic lupus erythematosus (H), High risk medications (not anticoagulants) long-term use       traMADol 50 MG tablet    ULTRAM    30 tablet    Take 1-2 tablets ( mg) by mouth every 6 hours as needed for pain maximum 8 tablet(s) per day    Acute midline low back pain with right-sided sciatica       traZODone 50 MG tablet    DESYREL    30 tablet    Take 0.5 tablets (25 mg) by mouth nightly as needed for sleep    Other insomnia       vitamin D 2000 units tablet     100 tablet    Take 2,000 Units by mouth daily    Vitamin D deficiency, Other systemic lupus erythematosus with other organ involvement (H)

## 2018-05-04 NOTE — NURSING NOTE
Screening Questionnaire for Adult Immunization    Are you sick today?   No   Do you have allergies to medications, food, a vaccine component or latex?   No   Have you ever had a serious reaction after receiving a vaccination?   No   Do you have a long-term health problem with heart disease, lung disease, asthma, kidney disease, metabolic disease (e.g. diabetes), anemia, or other blood disorder?   No   Do you have cancer, leukemia, HIV/AIDS, or any other immune system problem?   No   In the past 3 months, have you taken medications that affect  your immune system, such as prednisone, other steroids, or anticancer drugs; drugs for the treatment of rheumatoid arthritis, Crohn s disease, or psoriasis; or have you had radiation treatments?   No   Have you had a seizure, or a brain or other nervous system problem?   No   During the past year, have you received a transfusion of blood or blood     products, or been given immune (gamma) globulin or antiviral drug?   No   For women: Are you pregnant or is there a chance you could become        pregnant during the next month?   No   Have you received any vaccinations in the past 4 weeks?   No     Immunization questionnaire answers were all negative.        Per orders of Dr. Light, injection of Prevnar 13 given by Carissa Frye. Patient instructed to remain in clinic for 15 minutes afterwards, and to report any adverse reaction to me immediately.       Screening performed by Carissa Frye on 5/4/2018 at 8:15 AM.

## 2018-05-04 NOTE — NURSING NOTE
"Chief Complaint   Patient presents with     Systemic Lupus Erythematous     Patient states she is doing ok.       Initial /77  Pulse 82  Resp 14  Ht 1.705 m (5' 7.13\")  Wt 69.6 kg (153 lb 6.4 oz)  SpO2 95%  BMI 23.93 kg/m2 Estimated body mass index is 23.93 kg/(m^2) as calculated from the following:    Height as of this encounter: 1.705 m (5' 7.13\").    Weight as of this encounter: 69.6 kg (153 lb 6.4 oz).  BP completed using cuff size: regular         RAPID3 (0-30) Cumulative Score  14.2          RAPID3 Weighted Score (divide #4 by 3 and that is the weighted score)  4.73         "

## 2018-05-11 ENCOUNTER — MYC MEDICAL ADVICE (OUTPATIENT)
Dept: RHEUMATOLOGY | Facility: CLINIC | Age: 33
End: 2018-05-11

## 2018-05-11 ENCOUNTER — RADIANT APPOINTMENT (OUTPATIENT)
Dept: CARDIOLOGY | Facility: CLINIC | Age: 33
End: 2018-05-11
Attending: INTERNAL MEDICINE
Payer: COMMERCIAL

## 2018-05-11 DIAGNOSIS — M32.9 SYSTEMIC LUPUS ERYTHEMATOSUS, UNSPECIFIED SLE TYPE, UNSPECIFIED ORGAN INVOLVEMENT STATUS (H): ICD-10-CM

## 2018-05-11 DIAGNOSIS — R76.8 SCL-70 ANTIBODY POSITIVE: ICD-10-CM

## 2018-05-11 DIAGNOSIS — R76.8 ANTI-RNP ANTIBODIES PRESENT: ICD-10-CM

## 2018-05-11 PROCEDURE — 93306 TTE W/DOPPLER COMPLETE: CPT | Performed by: INTERNAL MEDICINE

## 2018-05-11 NOTE — NURSING NOTE
Patient presents today for resting echo ordered by MD.   Echo Tech provided patient education.    Echo technician completed resting echo. Data transferred to [facility] for final interpretation through [employer].   Patient education provided about cardiology interpretation and primary provider will be notified of results.    Negin Funez RDCS

## 2018-08-23 DIAGNOSIS — M32.9 SYSTEMIC LUPUS ERYTHEMATOSUS, UNSPECIFIED SLE TYPE, UNSPECIFIED ORGAN INVOLVEMENT STATUS (H): ICD-10-CM

## 2018-08-23 LAB
ALBUMIN UR-MCNC: NEGATIVE MG/DL
APPEARANCE UR: CLEAR
BASOPHILS # BLD AUTO: 0 10E9/L (ref 0–0.2)
BASOPHILS NFR BLD AUTO: 0.4 %
BILIRUB UR QL STRIP: NEGATIVE
COLOR UR AUTO: YELLOW
CREAT UR-MCNC: 23 MG/DL
DIFFERENTIAL METHOD BLD: ABNORMAL
EOSINOPHIL # BLD AUTO: 0 10E9/L (ref 0–0.7)
EOSINOPHIL NFR BLD AUTO: 0.7 %
ERYTHROCYTE [DISTWIDTH] IN BLOOD BY AUTOMATED COUNT: 13 % (ref 10–15)
ERYTHROCYTE [SEDIMENTATION RATE] IN BLOOD BY WESTERGREN METHOD: 11 MM/H (ref 0–20)
GLUCOSE UR STRIP-MCNC: NEGATIVE MG/DL
HCT VFR BLD AUTO: 36.5 % (ref 35–47)
HGB BLD-MCNC: 13.1 G/DL (ref 11.7–15.7)
HGB UR QL STRIP: NEGATIVE
KETONES UR STRIP-MCNC: NEGATIVE MG/DL
LEUKOCYTE ESTERASE UR QL STRIP: NEGATIVE
LYMPHOCYTES # BLD AUTO: 0.7 10E9/L (ref 0.8–5.3)
LYMPHOCYTES NFR BLD AUTO: 24.9 %
MCH RBC QN AUTO: 36.7 PG (ref 26.5–33)
MCHC RBC AUTO-ENTMCNC: 35.9 G/DL (ref 31.5–36.5)
MCV RBC AUTO: 102 FL (ref 78–100)
MONOCYTES # BLD AUTO: 0.3 10E9/L (ref 0–1.3)
MONOCYTES NFR BLD AUTO: 10.1 %
NEUTROPHILS # BLD AUTO: 1.8 10E9/L (ref 1.6–8.3)
NEUTROPHILS NFR BLD AUTO: 63.9 %
NITRATE UR QL: NEGATIVE
PH UR STRIP: 7.5 PH (ref 5–7)
PLATELET # BLD AUTO: 235 10E9/L (ref 150–450)
PROT UR-MCNC: <0.05 G/L
PROT/CREAT 24H UR: NORMAL G/G CR (ref 0–0.2)
RBC # BLD AUTO: 3.57 10E12/L (ref 3.8–5.2)
SOURCE: ABNORMAL
SP GR UR STRIP: 1.01 (ref 1–1.03)
UROBILINOGEN UR STRIP-ACNC: 0.2 EU/DL (ref 0.2–1)
WBC # BLD AUTO: 2.8 10E9/L (ref 4–11)

## 2018-08-23 PROCEDURE — 84156 ASSAY OF PROTEIN URINE: CPT | Performed by: INTERNAL MEDICINE

## 2018-08-23 PROCEDURE — 86140 C-REACTIVE PROTEIN: CPT | Performed by: INTERNAL MEDICINE

## 2018-08-23 PROCEDURE — 86225 DNA ANTIBODY NATIVE: CPT | Performed by: INTERNAL MEDICINE

## 2018-08-23 PROCEDURE — 82550 ASSAY OF CK (CPK): CPT | Performed by: INTERNAL MEDICINE

## 2018-08-23 PROCEDURE — 81003 URINALYSIS AUTO W/O SCOPE: CPT | Performed by: INTERNAL MEDICINE

## 2018-08-23 PROCEDURE — 36415 COLL VENOUS BLD VENIPUNCTURE: CPT | Performed by: INTERNAL MEDICINE

## 2018-08-23 PROCEDURE — 80053 COMPREHEN METABOLIC PANEL: CPT | Performed by: INTERNAL MEDICINE

## 2018-08-23 PROCEDURE — 85652 RBC SED RATE AUTOMATED: CPT | Performed by: INTERNAL MEDICINE

## 2018-08-23 PROCEDURE — 86160 COMPLEMENT ANTIGEN: CPT | Performed by: INTERNAL MEDICINE

## 2018-08-23 PROCEDURE — 85025 COMPLETE CBC W/AUTO DIFF WBC: CPT | Performed by: INTERNAL MEDICINE

## 2018-08-24 LAB
ALBUMIN SERPL-MCNC: 4.6 G/DL (ref 3.4–5)
ALP SERPL-CCNC: 45 U/L (ref 40–150)
ALT SERPL W P-5'-P-CCNC: 31 U/L (ref 0–50)
ANION GAP SERPL CALCULATED.3IONS-SCNC: 8 MMOL/L (ref 3–14)
AST SERPL W P-5'-P-CCNC: 24 U/L (ref 0–45)
BILIRUB SERPL-MCNC: 1.2 MG/DL (ref 0.2–1.3)
BUN SERPL-MCNC: 8 MG/DL (ref 7–30)
CALCIUM SERPL-MCNC: 9 MG/DL (ref 8.5–10.1)
CHLORIDE SERPL-SCNC: 104 MMOL/L (ref 94–109)
CK SERPL-CCNC: 54 U/L (ref 30–225)
CO2 SERPL-SCNC: 25 MMOL/L (ref 20–32)
CREAT SERPL-MCNC: 0.65 MG/DL (ref 0.52–1.04)
CRP SERPL-MCNC: <2.9 MG/L (ref 0–8)
GFR SERPL CREATININE-BSD FRML MDRD: >90 ML/MIN/1.7M2
GLUCOSE SERPL-MCNC: 79 MG/DL (ref 70–99)
POTASSIUM SERPL-SCNC: 3.9 MMOL/L (ref 3.4–5.3)
PROT SERPL-MCNC: 7.5 G/DL (ref 6.8–8.8)
SODIUM SERPL-SCNC: 137 MMOL/L (ref 133–144)

## 2018-08-27 LAB
C3 SERPL-MCNC: 66 MG/DL (ref 76–169)
C4 SERPL-MCNC: 13 MG/DL (ref 15–50)
DSDNA AB SER-ACNC: 2 IU/ML

## 2018-08-30 ENCOUNTER — OFFICE VISIT (OUTPATIENT)
Dept: RHEUMATOLOGY | Facility: CLINIC | Age: 33
End: 2018-08-30
Payer: COMMERCIAL

## 2018-08-30 VITALS
HEART RATE: 84 BPM | BODY MASS INDEX: 24.52 KG/M2 | WEIGHT: 156.2 LBS | DIASTOLIC BLOOD PRESSURE: 74 MMHG | RESPIRATION RATE: 16 BRPM | HEIGHT: 67 IN | OXYGEN SATURATION: 99 % | SYSTOLIC BLOOD PRESSURE: 114 MMHG

## 2018-08-30 DIAGNOSIS — M32.19 OTHER SYSTEMIC LUPUS ERYTHEMATOSUS WITH OTHER ORGAN INVOLVEMENT (H): Primary | ICD-10-CM

## 2018-08-30 DIAGNOSIS — Z23 NEED FOR PROPHYLACTIC VACCINATION AGAINST STREPTOCOCCUS PNEUMONIAE (PNEUMOCOCCUS): ICD-10-CM

## 2018-08-30 DIAGNOSIS — E55.9 VITAMIN D DEFICIENCY: ICD-10-CM

## 2018-08-30 PROCEDURE — 99213 OFFICE O/P EST LOW 20 MIN: CPT | Performed by: INTERNAL MEDICINE

## 2018-08-30 PROCEDURE — 90732 PPSV23 VACC 2 YRS+ SUBQ/IM: CPT | Performed by: INTERNAL MEDICINE

## 2018-08-30 PROCEDURE — 90471 IMMUNIZATION ADMIN: CPT | Performed by: INTERNAL MEDICINE

## 2018-08-30 RX ORDER — HYDROXYCHLOROQUINE SULFATE 200 MG/1
TABLET, FILM COATED ORAL
Qty: 135 TABLET | Refills: 1 | Status: SHIPPED | OUTPATIENT
Start: 2018-08-30 | End: 2018-12-07

## 2018-08-30 RX ORDER — METRONIDAZOLE 7.5 MG/G
GEL VAGINAL
COMMUNITY
Start: 2018-08-26 | End: 2018-10-26

## 2018-08-30 RX ORDER — AZATHIOPRINE 50 MG/1
100 TABLET ORAL DAILY
Qty: 180 TABLET | Refills: 1 | Status: SHIPPED | OUTPATIENT
Start: 2018-08-30 | End: 2018-12-07

## 2018-08-30 NOTE — PATIENT INSTRUCTIONS
Rheumatology    Dr. Lavon Light         [place] [facility]   (Monday)  [address]  [place] [address]       [facility]   (Tuesday)  [address]  [place] [address]    [facility]   (Wed., Thurs., and Friday)  [address] [place] [address]    Phone number: [phone]  Thank you for choosing [place].  Carissa Frye CMA

## 2018-08-30 NOTE — MR AVS SNAPSHOT
After Visit Summary   8/30/2018    Aleida Weinberg    MRN: [id number]           Patient Information     Date Of Birth          1985        Visit Information        Provider Department      8/30/2018 7:20 AM [facility]        Today's Diagnoses     Other systemic lupus erythematosus with other organ involvement (H)    -  1    Vitamin D deficiency          Care Instructions    Rheumatology    Dr. Lavon Delong [facility]   (Monday)  [address]  [place] [address]       [facility]   (Tuesday)  [address] [place] [address]    [facility]   (Wed., Thurs., and Friday)  [address] [facility]  [place] [address]    Phone number: [phone]  Thank you for choosing [place].  Carissa Frye CMA          Follow-ups after your visit        Your next 10 appointments already scheduled     Oct 26, 2018  7:40 AM CDT   PHYSICAL with Tatum Dove PA-C   [facility]    [address] [place] [id number] [phone]            Nov 30, 2018  7:15 AM CST   LAB with NE LAB   [facility] ([facility])    [address] [place] [id number] [phone]           Please do not eat 10-12 hours before your appointment if you are coming in fasting for labs on lipids, cholesterol, or glucose (sugar). This does not apply to pregnant women. Water, hot tea and black coffee (with nothing added) are okay. Do not drink other fluids, diet soda or chew gum.            Jan 11, 2019  8:00 AM CST   Return Visit with Lavon Light MD   [facility])    [address] [facility] [id number] [phone]              Future tests that were ordered for you today     Open Future Orders        Priority Expected Expires Ordered    Complement C3 Routine 11/23/2018 12/21/2018  8/30/2018    DNA double stranded antibodies Routine 11/23/2018 12/21/2018 8/30/2018    CRP inflammation Routine 11/23/2018 12/21/2018 8/30/2018    Comprehensive metabolic panel Routine 11/23/2018 12/21/2018 8/30/2018    Complement C4 Routine 11/23/2018 12/21/2018 8/30/2018    Erythrocyte sedimentation rate auto Routine 11/23/2018 12/21/2018 8/30/2018    Protein  random urine with Creat Ratio Routine 11/23/2018 12/21/2018 8/30/2018    UA reflex to Microscopic and Culture Routine 11/23/2018 12/21/2018 8/30/2018    CBC with platelets differential Routine 11/23/2018 12/21/2018 8/30/2018    CK total Routine 11/23/2018 12/21/2018 8/30/2018            Who to contact     If you have questions or need follow up information about today's clinic visit or your schedule please contact [facility] directly at [phone].  Normal or non-critical lab and imaging results will be communicated to you by [employer], letter or phone within 4 business days after the clinic has received the results. If you do not hear from us within 7 days, please contact the clinic through [employer] or phone. If you have a critical or abnormal lab result, we will notify you by phone as soon as possible.  Submit refill requests through [employer] or call your pharmacy and they will forward the refill request to us. Please allow 3 business days for your refill to be completed.          Additional Information About Your Visit        [employer] Information     [employer] gives you secure access to your electronic health record. If you see a primary care provider, you can also send messages to your care team and make appointments. If you have questions, please call your primary care clinic.  If you do not have a primary care provider, please call [phone] and they will assist you.        Care EveryWhere ID     This is your Care EveryWhere ID. This could be used by other organizations to access your [place] medical records  [id number]        Your  "Vitals Were     Pulse Respirations Height Pulse Oximetry BMI (Body Mass Index)       84 16 1.705 m (5' 7.13\") 99% 24.37 kg/m2        Blood Pressure from Last 3 Encounters:   08/30/18 114/74   05/04/18 115/77   01/25/18 100/62    Weight from Last 3 Encounters:   08/30/18 70.9 kg (156 lb 3.2 oz)   05/04/18 69.6 kg (153 lb 6.4 oz)   01/25/18 68.9 kg (152 lb)                 Today's Medication Changes          These changes are accurate as of 8/30/18  7:56 AM.  If you have any questions, ask your nurse or doctor.               These medicines have changed or have updated prescriptions.        Dose/Directions    azaTHIOprine 50 MG tablet   Commonly known as:  IMURAN   This may have changed:  how much to take   Used for:  Other systemic lupus erythematosus with other organ involvement (H)   Changed by:  Lavon Light MD        Dose:  100 mg   Take 2 tablets (100 mg) by mouth daily   Quantity:  180 tablet   Refills:  1       hydroxychloroquine 200 MG tablet   Commonly known as:  PLAQUENIL   This may have changed:  additional instructions   Used for:  Other systemic lupus erythematosus with other organ involvement (H)   Changed by:  Lavon Light MD        Hydroxychloroquine 200mg daily; and an additional 200mg every other day.   Quantity:  135 tablet   Refills:  1            Where to get your medicines      These medications were sent to [facility] [address].  [address] [place] [address], [place] [id number]     Phone:  [phone]     azaTHIOprine 50 MG tablet    hydroxychloroquine 200 MG tablet                Primary Care Provider Office Phone # Fax #    Tatum Dove PA-C [phone]       [address] [place] [address]        Equal Access to Services     CACHORRO MADRIGAL AH: Dallas Bee, dalia shin, qaybta kaalmira pantoja. So [facility] [phone].    ATENCIÓN: Si paige lovelace, " tiene a rey disposición servicios gratuitos de asistencia lingüística. Gabriela conde [phone].    We comply with applicable federal civil rights laws and [place] laws. We do not discriminate on the basis of race, color, national origin, age, disability, sex, sexual orientation, or gender identity.            Thank you!     Thank you for choosing [facility]  for your care. Our goal is always to provide you with excellent care. Hearing back from our patients is one way we can continue to improve our services. Please take a few minutes to complete the written survey that you may receive in the mail after your visit with us. Thank you!             Your Updated Medication List - Protect others around you: Learn how to safely use, store and throw away your medicines at www.disposemymeds.org.          This list is accurate as of 8/30/18  7:56 AM.  Always use your most recent med list.                   Brand Name Dispense Instructions for use Diagnosis    albuterol 108 (90 Base) MCG/ACT inhaler    PROAIR HFA/PROVENTIL HFA/VENTOLIN HFA    1 Inhaler    Inhale 2 puffs into the lungs every 6 hours as needed for shortness of breath / dyspnea    Intermittent asthma, uncomplicated       amLODIPine 10 MG tablet    NORVASC    90 tablet    Take 1 tablet (10 mg) by mouth daily    Raynaud's phenomenon without gangrene       azaTHIOprine 50 MG tablet    IMURAN    180 tablet    Take 2 tablets (100 mg) by mouth daily    Other systemic lupus erythematosus with other organ involvement (H)       cyclobenzaprine 10 MG tablet    FLEXERIL    30 tablet    Take 0.5-1 tablets (5-10 mg) by mouth 3 times daily as needed for muscle spasms    Acute midline low back pain with right-sided sciatica       hydroxychloroquine 200 MG tablet    PLAQUENIL    135 tablet    Hydroxychloroquine 200mg daily; and an additional 200mg every other day.    Other systemic lupus erythematosus with other organ involvement (H)       LORazepam 0.5 MG tablet     ATIVAN    10 tablet    Take 1 tablet (0.5 mg) by mouth every 8 hours as needed for anxiety    Anxiety       metroNIDAZOLE 0.75 % vaginal gel    METROGEL          MIRENA (52 MG) 20 MCG/24HR IUD   Generic drug:  levonorgestrel      1 each by Intrauterine route once        omeprazole 20 MG CR capsule    priLOSEC     Take 20 mg by mouth daily        oxyCODONE-acetaminophen 5-325 MG per tablet    PERCOCET     Take 1 tablet by mouth every 6 hours as needed        predniSONE 5 MG tablet    DELTASONE    90 tablet    Take 1 tablet (5 mg) by mouth daily as needed for lupus    Systemic lupus erythematosus (H), High risk medications (not anticoagulants) long-term use       traMADol 50 MG tablet    ULTRAM    30 tablet    Take 1-2 tablets ( mg) by mouth every 6 hours as needed for pain maximum 8 tablet(s) per day    Acute midline low back pain with right-sided sciatica       traZODone 50 MG tablet    DESYREL    30 tablet    Take 0.5 tablets (25 mg) by mouth nightly as needed for sleep    Other insomnia       vitamin D 2000 units tablet     100 tablet    Take 2,000 Units by mouth daily    Vitamin D deficiency, Other systemic lupus erythematosus with other organ involvement (H)

## 2018-08-30 NOTE — PROGRESS NOTES
Rheumatology Clinic Visit      Aleida Weinberg MRN# [id number]   YOB: 1985 Age: 33 year old      Date of visit: 8/30/18   PCP: Tatum Dove     Chief Complaint   Patient presents with:  Systemic Lupus Erythematous: patient has been very tired, has had 2 infections lately.    Assessment and Plan   1. Systemic lupus erythematosus (CHANELL >1:69814 speckled, RNP+, Sm+, Scl-70+, hypocomplementemia, photosensitivity, malar rash, arthritis, fatigue, Raynaud's phenomenon):  Dx'd 4/2016. Scl-70+; she lacks features of scleroderma at this time; no myopathy by hx of labs. 5/11/2018 echo without evidence of pulmonary hypertension. Previously on MTX 20mg SQ weekly (GI upset with PO doses above 15mg, partially effective) and SSZ (LFT elevations).  Currently on AZA 150mg daily (synovitis when on 100mg daily) and HCQ 300mg daily. TPMT normal on 8/21/2017. Bacterial vaginosis and blepharitis and reduced WBC so will reduce AZA.  If arthritis symptoms return then will change AZA to MMF.  Also discussed option of benlysta.    - Continue hydroxychloroquine 300mg daily (last eye exam done 9/8/2017)  - Reduce azathioprine to 100mg daily   - Labs in 3months: CBC, CMP, ESR, CRP, CK, C3, C4, dsDNA, UA, Uprotein:creatinine    # Mycophenolate Mofetil and Mycophenolic Acid Risks and Benefits: The risks and benefits were discussed in detail and the patient verbalized understanding.  The risks discussed include, but are not limited to, the risk for hypersensitivity, anaphylaxis, anaphylactoid reactions, infections, bone marrow suppression, renal toxicity, hepatotoxicity, malignancy, nausea, vomiting, and GI upset.  Pregnancy prevention and planning was discussed; it is recommended that women of childbearing potential use reliable contraception during therapy. I encouraged reviewing the package insert and asking any questions about the medication.      2. Raynaud's Phenomenon: Cold avoidance was insufficient for controlling  her symptoms and therefore amlodipine was started. Uses amlodipine 10mg daily during cold months only.   - Continue Amlodipine 10mg daily during colder months    3. Chronic Back Pain: History of scoliosis. Degenerative findings on previously reviewed MRIs from 2000 and 2009. This has not changed for many years and does not worsen when her peripheral joint symptoms worsen. Had one episode of sciatica that resolved with chiropractic treatment.      4. Vitamin D deficiency: Currently on vitamin D 2000 units daily.    - Continue vitamin D 2000 units daily    5.  Secondary Sjogren's syndrome: Mild dry and dry mouth that are treated infrequently with artificial tears and frequent sips of water.     6. Vaccinations: Vaccinations reviewed with Ms. Weinberg.  Risks and benefits of vaccinations were discussed.  - Influenza: encouraged yearly vaccination  - [id number]: up to date  - [id number]: will receive today     Ms. Weinberg verbalized agreement with and understanding of the rational for the diagnosis and treatment plan.  All questions were answered to best of my ability and the patient's satisfaction. Ms. Weinberg was advised to contact the clinic with any questions that may arise after the clinic visit.      Thank you for involving me in the care of the patient    Return to clinic: 3-4 months      HPI   Aleida Digna Weinberg is a 33 year old female with medical history significant for intermittent asthma, anxiety, migraines, vitamin D deficiency, and systemic lupus erythematosus who presents for follow-up of SLE.     Today, Ms. Weinberg reports that she's had 2 infections since last seen - bacterial vaginosis and blepharitis.  Fatigue persists; previously though to be from stress at work where she is taking on more responsibilities.  Raynaud's phenomenon is well controlled and she is not taking amlodipine now that it is warmer. No joint pain or swelling.     Denies fevers, chills, nausea, vomiting, constipation, diarrhea. No  abdominal pain. No chest pain/pressure, palpitations, or shortness of breath. No oral or nasal sores. No neck pain. No LE swelling.  Has photosensitivity but no photophobia.  Mild dry eyes and dry mouth; she uses artificial tears as needed.  No eye pain or redness. Denies history of serositis. Denies history of DVT, pulmonary embolism, or miscarriage.    Tobacco: quit smoking in 2005  EtOH: Once monthly  Drugs: None  Occupation: Works at [employer], a lot of typing per patient    ROS   GEN: No fevers, chills, night sweats, or weight change  SKIN: See HPI.   HEENT: Has a history of migraines, but no headache today. No change in vision, taste, or hearing. No epistaxis. No oral or nasal ulcers.  CV: No chest pain, pressure, palpitations, or dyspnea on exertion.  PULM: No SOB, wheeze, cough.  GI:  No nausea, vomiting, constipation, diarrhea. No blood in stool. No abdominal pain.  : No blood in urine.  MSK: See HPI.  NEURO: See history of present illness  ENDO: No heat/cold intolerance.  EXT: See history of present illness    Active Problem List     Patient Active Problem List   Diagnosis     Other kyphoscoliosis and scoliosis     Hypertrophic and atrophic condition of skin     Viral warts     Routine postpartum follow-up     CARDIOVASCULAR SCREENING; LDL GOAL LESS THAN 160     Intermittent asthma     Anxiety     Intractable menstrual migraine without status migrainosus     Vitamin D deficiency     Inflammatory polyarthropathy (H)     Chronic back pain     Raynaud's phenomenon without gangrene     Systemic lupus erythematosus (H)     High risk medications (not anticoagulants) long-term use (Plaquenil and AZA)     Dry eyes, bilateral     Past Medical History     Past Medical History:   Diagnosis Date     Mild intermittent asthma      Other kyphoscoliosis and scoliosis     upper back curvature and disc degeneration in lower back.     Past Surgical History     Past Surgical History:   Procedure Laterality Date       SECTION  2006     SURGICAL HISTORY OF -       PE Tubes     Allergy     Allergies   Allergen Reactions     Fluconazole Rash     Current Medication List     Current Outpatient Prescriptions   Medication Sig     albuterol (PROAIR HFA/PROVENTIL HFA/VENTOLIN HFA) 108 (90 BASE) MCG/ACT Inhaler Inhale 2 puffs into the lungs every 6 hours as needed for shortness of breath / dyspnea     amLODIPine (NORVASC) 10 MG tablet Take 1 tablet (10 mg) by mouth daily     azaTHIOprine (IMURAN) 50 MG tablet Take 3 tablets (150 mg) by mouth daily     Cholecalciferol (VITAMIN D) 2000 UNITS tablet Take 2,000 Units by mouth daily     cyclobenzaprine (FLEXERIL) 10 MG tablet Take 0.5-1 tablets (5-10 mg) by mouth 3 times daily as needed for muscle spasms     hydroxychloroquine (PLAQUENIL) 200 MG tablet Hydroxychloroquine 200mg in the morning and 100mg in the evening.     levonorgestrel (MIRENA, 52 MG,) 20 MCG/24HR IUD 1 each by Intrauterine route once     LORazepam (ATIVAN) 0.5 MG tablet Take 1 tablet (0.5 mg) by mouth every 8 hours as needed for anxiety     metroNIDAZOLE (METROGEL) 0.75 % vaginal gel      traZODone (DESYREL) 50 MG tablet Take 0.5 tablets (25 mg) by mouth nightly as needed for sleep     omeprazole (PRILOSEC) 20 MG CR capsule Take 20 mg by mouth daily     oxyCODONE-acetaminophen (PERCOCET) 5-325 MG per tablet Take 1 tablet by mouth every 6 hours as needed     predniSONE (DELTASONE) 5 MG tablet Take 1 tablet (5 mg) by mouth daily as needed for lupus (Patient not taking: Reported on 2018)     traMADol (ULTRAM) 50 MG tablet Take 1-2 tablets ( mg) by mouth every 6 hours as needed for pain maximum 8 tablet(s) per day (Patient not taking: Reported on 2018)     No current facility-administered medications for this visit.          Social History   See HPI    Family History     Family History   Problem Relation Age of Onset     HEART DISEASE Maternal Grandfather      Bypass, Heart Disease     Respiratory Maternal  "Grandfather      asthma     C.A.D. Paternal Grandfather      HEART DISEASE Maternal Uncle      MI's      Hypertension Paternal Grandmother      Cancer Paternal Grandmother      lymph nodes     Respiratory Other      cousin on mothers side, asthma     Alcohol/Drug Maternal Uncle      Alcohol/Drug Other      bother great grandparents on mother's side     Diabetes No family hx of      Breast Cancer No family hx of      Cancer - colorectal No family hx of      Denies family history of autoimmune disease    Physical Exam     Temp Readings from Last 3 Encounters:   01/25/18 97.4  F (36.3  C) (Oral)   01/12/18 98.2  F (36.8  C) (Oral)   12/28/17 99.9  F (37.7  C) (Oral)     BP Readings from Last 5 Encounters:   08/30/18 114/74   05/04/18 115/77   01/25/18 100/62   01/12/18 110/64   12/28/17 113/71     Pulse Readings from Last 1 Encounters:   08/30/18 84     Resp Readings from Last 1 Encounters:   08/30/18 16     Estimated body mass index is 24.37 kg/(m^2) as calculated from the following:    Height as of this encounter: 1.705 m (5' 7.13\").    Weight as of this encounter: 70.9 kg (156 lb 3.2 oz).    GEN: NAD  HEENT: MMM. No oral lesions. Anicteric, noninjected sclera  CV: S1, S2. RRR. No m/r/g.  PULM: CTA bilaterally. No w/c.  MSK: MCPs, PIPs, DIPs, wrists, elbows, shoulders, knees, ankles, and MTPs without swelling or tenderness to palpation.  Hips nontender to palpation.   NEURO: UE and LE strengths 5/5 and equal bilaterally.   SKIN: Normal fingertip pulp; no evidence of current or previous infarcts to the distal fingertips by visual inspection. Tattoos present  EXT: No LE edema  PSYCH: Alert. Appropriate.    Labs / Imaging (select studies)   RF/CCP  Recent Labs   Lab Test  04/22/16   0902  04/01/16   0910   CCPIGG  1   --    RHF   --   <20     CHANELL  Recent Labs   Lab Test  04/22/16   0902  04/01/16   0910   VALERIE   --   12.4*   ANAIGG  >1:45570  Reference range: <1:40  (Note)  Speckled pattern.  INTERPRETIVE INFORMATION: " CHANELL by IFA, IgG  Anti-nuclear antibodies (CHANELL) are seen in a variety of  systemic rheumatic diseases and are determined by indirect  fluorescence assay (IFA) using HEp-2 substrate with an  IgG-specific conjugate. CHANELL titers less than or equal to  1:80 have variable relevance while titers greater than or  equal to 1:160 are considered clinically significant. These  antibodies may precede clinical disease onset; however,  healthy individuals and those with advanced age have been  reported to be positive for CHANELL. When observed, one of the  five basic patterns is reported: homogeneous,  peripheral/rim, speckled, centromere, or nucleolar. If  cytoplasmic fluorescence is observed, it is noted. IFA  methodology is subjective and has occasionally been shown  to lack sensitivity for anti-SSA/Ro antibodies.  Negative results do not necessarily rule out the presence  of SSc. If clinical suspicion remains, consi vicki further  testing for U3-RNP, PM/Scl, or Th/To antibodies associated  with SSc.  Performed by [facility],  [address] [place] [id number] [phone]  [email or web address], Twin Rodriguez MD, Lab. Director     --      RNP/Sm/SSA/SSB  Recent Labs   Lab Test  01/12/18   0828  04/22/16   0902   RNPIGG   --   >8.0  Positive   Antibody index (AI) values reflect qualitative changes in antibody   concentration that cannot be directly associated with clinical condition or   disease state.  *   SMIGG   --   1.5*   SSAIGG   --   <0.2  Negative   Antibody index (AI) values reflect qualitative changes in antibody   concentration that cannot be directly associated with clinical condition or   disease state.     SSBIGG   --   <0.2  Negative   Antibody index (AI) values reflect qualitative changes in antibody   concentration that cannot be directly associated with clinical condition or   disease state.     SCLIGG   --   3.1*   TREPAB  Negative   --      dsDNA  Recent Labs   Lab Test  08/23/18   1637  04/26/18   1648   01/18/18   1639  10/12/17   1642 07/17/17   1707 04/13/17   1650 01/20/17   0828  10/26/16   0919  07/22/16   0916   DNA  2  2  2  2  1  2  1  1  1     C3/C4  Recent Labs   Lab Test  08/23/18   1637 04/26/18   1648  01/18/18   1639  10/12/17   1642 07/17/17   1707 04/13/17 1650 01/20/17   0828  10/26/16   0919  07/22/16   0916   G9URSEH  66*  71*  76  70*  85  87  93  110  115   [id number]  13*  14*  14*  14*  14*  16  16  16  17     Send-out Labs  Recent Labs   Lab Test  04/21/17 0813   SRESLT  SEE NOTE  (Note)  Test name                    Result Flag  Units  RefIntvl  ------------------------------------------------------------  Thiopurine Methyltransferase                                23.2 L      U/mL 24.0-44.0  Sample slightly hemolyzed. Please interpret the results  with caution.  INTERPRETIVE INFORMATION: Thiopurine Methyltransferase, RBC  Normal TPMT activity:  24.0-44.0 U/mL................Individuals are predicted to  be at low risk of bone marrow toxicity (myelosuppression)  as a consequence of standard thiopurine therapy; no dose  adjustment is recommended.  Intermediate TPMT activity:  17.0-23.9 U/mL................Individuals are predicted to  be at intermediate risk of bone marrow toxicity  (myelosuppression) as a consequence of standard thiopurine  therapy; a dose reduction and therapeutic drug management  is recommended.  Low TPMT activity:  less than 17.0 U/mL...........Individuals are predicted to  be at high risk of bone marrow toxicity (myelosuppression)   as a consequence of standard thiopurine dosing. It is  recommended to avoid the use of thiopurine drugs.  High TPMT activity:  greater than 44.0 U/mL........Individuals are not predicted  to be at risk for bone marrow toxicity (myelosuppression)  as a consequence of standard thiopurine dosing, but may be  at risk for therapeutic failure due to excessive  inactivation of thiopurine drugs. Individuals may require  higher than the  normal standard dose. Therapeutic drug  management is recommended.  The TPMT, RBC assay is used as a screen to detect  individuals with low and intermediate TPMT activity who may  be at risk for myelosuppression when exposed to standard  doses of thiopurines, including azathioprine (Imuran) and  6-mercaptopurine (Purinethol). TPMT is the primary  metabolic route for inactivation of thiopurine drugs in the  bone marrow. When TPMT activity is low, it is predicted  that proportionately more 6-mercaptopurine can be converted  into the cytotoxic 6-thioguanine nucle otides that  accumulate in the bone marrow causing excessive toxicity.  The activity of TPMT is measured by the nanomoles of  6-methylmercaptopurine (inactive metabolite) produced per 1  mL of packed red blood cells, (U/mL).    TPMT phenotype testing does not replace the need for  clinical monitoring of patients treated with thiopurine  drugs. Genotype for TPMT cannot be inferred from TPMT  activity (phenotype). Phenotype testing should not be  requested for patients currently treated with thiopurine  drugs. Current TPMT phenotype may not reflect future TPMT  phenotype, particularly in patients who received blood  transfusion within 30-60 days of testing.  TPMT enzyme  activity can be inhibited by several drugs such as:  naproxen (Aleve), ibuprofen (Advil, Motrin), ketoprofen  (Orudis), furosemide (Lasix), sulfasalazine (Azulfidine),  mesalamine (Asacol), olsalazine (Dipentum), mefenamic acid  (Ponstel), thiazide diuretics, and benzoic acid inhibitors.  TPMT inhibitors may contribute t o falsely low results;  patients should abstain from these drugs for at least 48  hours prior to TPMT testing. Falsely low results may also  occur as a result of inappropriate specimen handling and  hemolysis.  Test developed and characteristics determined by [facility]. See Compliance Statement B: [email or web address]  Performed by [facility],  [facility] [place] [address] [phone]  [email or web address], Lionel Ferreira MD, Lab. Director     [facility]  Thiopurine Methyltransferase, RBC   STSTCD  28903   SSPTYP  Whole blood, EDTA anticoagulant     Antiphospholipid Antibodies  Recent Labs   Lab Test  04/22/16   0902   B2GPG  0.9   B2GPM  <0.9  Negative     CARG  <15.0  Interpretation:  Negative     JOANN  <12.5  Interpretation:  Negative     LUPINT  Negative  (Note)  COMMENTS:  The INR is normal.  APTT ratio is normal.  DRVVT Screen ratio is normal.  Thrombin time is normal.  NEGATIVE TEST; A LUPUS ANTICOAGULANT WAS NOT DETECTED IN THIS  SPECIMEN WITHIN THE LIMITS OF THE TESTING REPERTOIRE.  If the clinical picture is strongly suggestive of an antiphospholipid  syndrome, recommend anticardiolipin and beta-2-glycoprotein (IgG and  IgM) antibody tests.  Isabella Olson M.D.  [phone]  4/25/2016    INR =  1.05    Reference range: 0.86-1.14  Thrombin Time= 15.4    Reference range: 13.0-19.0 sec    APTT:       Ratio  Patient  =  0.92  1:2 Mix  =  N/A  Reference:  Negative: Less than or equal to 1.16  Positive: Greater than or equal to 1.17     DILUTE LISA VIPER VENOM TEST:  Screen Ratio = 0.95   Normal is less than 1.21         CBC  Recent Labs   Lab Test  08/23/18   1637  04/26/18   1648  02/15/18   1653   WBC  2.8*  3.2*  4.1   RBC  3.57*  3.70*  3.72*   HGB  13.1  13.7  13.5   HCT  36.5  36.1  37.7   MCV  102*  98  101*   RDW  13.0  13.2  13.3   PLT  235  222  243   MCH  36.7*  37.0*  36.3*   MCHC  35.9  38.0*  35.8   NEUTROPHIL  63.9  57.8  58.8   LYMPH  24.9  26.4  29.9   MONOCYTE  10.1  14.2  10.3   EOSINOPHIL  0.7  1.3  0.5   BASOPHIL  0.4  0.3  0.5   ANEU  1.8  1.8  2.4   ALYM  0.7*  0.8  1.2   AMANDA  0.3  0.5  0.4   AEOS  0.0  0.0  0.0   ABAS  0.0  0.0  0.0     CMP  Recent Labs   Lab Test  08/23/18   1637  04/26/18   1648  01/18/18   1639  01/12/18   0828  12/28/17   1330  10/12/17   1642   NA  137  137  140   --   133  138   POTASSIUM  3.9  3.9  3.8   --   4.2   3.8   CHLORIDE  104  106  107   --   99  105   CO2  25  22  25   --   24  22   ANIONGAP  8  9  8   --   10  11   GLC  79  67*  74  73  96  74   BUN  8  10  9   --   11  10   CR  0.65  0.53  0.58   --   0.57  0.62   GFRESTIMATED  >90  >90  >90   --   >90  >90   GFRESTBLACK  >90  >90  >90   --   >90  >90   SRINIVAS  9.0  9.3  8.9   --   9.4  9.6   BILITOTAL  1.2  1.1  0.5   --   1.0  0.7   ALBUMIN  4.6  4.6  4.5   --   4.0  4.5   PROTTOTAL  7.5  8.0  7.6   --   7.7  7.6   ALKPHOS  45  42  45   --   52  48   AST  24  21  24   --   14  16   ALT  31  27  26   --   15  23       Calcium/VitaminD  Recent Labs   Lab Test  08/23/18 1637 04/26/18 1648 01/18/18   1639 02/20/17   1640   10/26/16   0919   04/01/16   0910   SRINVIAS  9.0  9.3  8.9   < >   --    < >  9.3   < >   --    VITDT   --    --    --    --   33   --   23   --   <13  Season, race, dietary intake, and treatment affect the concentration of   25-hydroxy-Vitamin D. Values may decrease during winter months and increase   during summer months. Values 20-29 ug/L may indicate Vitamin D insufficiency   and values <20 ug/L may indicate Vitamin D deficiency.   Vitamin D determination is routinely performed by an immunoassay specific for   25 hydroxyvitamin D3.  If an individual is on vitamin D2 (ergocalciferol)   supplementation, please specify 25 OH vitamin D2 and D3 level determination by   LCMSMS test VITD23.  *    < > = values in this interval not displayed.     ESR/CRP  Recent Labs   Lab Test  08/23/18 1637 04/26/18   1648  01/18/18   1639   SED  11  13  15   CRP  <2.9  <2.9  <2.9     CK/Aldolase  Recent Labs   Lab Test  08/23/18 1637 04/26/18   1648 01/18/18   1639   04/22/16   0902   CKT  54  51  47   < >  45   ALDOLASE   --    --    --    --   4.5    < > = values in this interval not displayed.     TSH/T4  Recent Labs   Lab Test  04/01/16   0910   TSH  1.57     Lipid Panel  Recent Labs   Lab Test  01/12/18   0828  02/09/17   0721  07/10/15   0814   09/13/13   0706   CHOL  157  159  149  139   TRIG  34  56  45  57   HDL  59  42*  48*  52   LDL  91  106*  92  76   VLDL   --    --   9  11   CHOLHDLRATIO   --    --   3.1  2.7   NHDL  98  117   --    --      Hepatitis B  Recent Labs   Lab Test  04/22/16   0902   HBCAB  Nonreactive   HEPBANG  Nonreactive     Hepatitis C  Recent Labs   Lab Test  01/12/18   0828  04/22/16   0902   HCVAB  Nonreactive  Nonreactive   Assay performance characteristics have not been established for newborns,   infants, and children       Lyme ab screening  Recent Labs   Lab Test  04/01/16   0910   LYMEGM  0.12     HIV Screening  Recent Labs   Lab Test  01/12/18   0828  04/22/16   0902   HIAGAB  Nonreactive  Nonreactive   HIV-1 p24 Ag & HIV-1/HIV-2 Ab Not Detected       UA  Recent Labs   Lab Test  08/23/18   1638  04/26/18   1648  01/18/18   1640  01/12/18   0836  12/28/17   0145  10/12/17   1643   01/20/17   0827   COLOR  Yellow  Yellow  Yellow  Yellow  Yellow  Yellow   < >  Yellow   APPEARANCE  Clear  Clear  Clear  Clear  Clear  Clear   < >  Clear   URINEGLC  Negative  Negative  Negative  Negative  Negative  Negative   < >  Negative   URINEBILI  Negative  Negative  Negative  Negative  Moderate*  Negative   < >  Negative   SG  1.010  1.010  1.020  1.025  >1.030  1.010   < >  >1.030   URINEPH  7.5*  7.0  7.0  6.5  6.0  6.5   < >  6.0   PROTEIN  Negative  Negative  Negative  Trace*  100*  Negative   < >  Trace*   UROBILINOGEN  0.2  0.2  0.2  0.2  4.0*  0.2   < >  0.2   NITRITE  Negative  Negative  Negative  Negative  Positive*  Negative   < >  Negative   UBLD  Negative  Negative  Negative  Negative  Negative  Trace*   < >  Negative   LEUKEST  Negative  Negative  Negative  Negative  Negative  Trace*   < >  Negative   WBCU   --   0 - 5  O - 2  O - 2  O - 2  O - 2   < >  O - 2   RBCU   --   O - 2  O - 2  O - 2  O - 2  O - 2   < >  O - 2   SQUAMOUSEPI   --    --   Few  Moderate*  Moderate*  Few   < >  Few   BACTERIA   --    --    --   Moderate*   Moderate*  Few*   --   Few*   MUCOUS   --    --    --   Present*  Present*   --    --   Present*    < > = values in this interval not displayed.     Urine Microscopic  Recent Labs   Lab Test  04/26/18   1648  01/18/18   1640  01/12/18   0836  12/28/17   0145  10/12/17   1643   01/20/17   0827   WBCU  0 - 5  O - 2  O - 2  O - 2  O - 2   < >  O - 2   RBCU  O - 2  O - 2  O - 2  O - 2  O - 2   < >  O - 2   SQUAMOUSEPI   --   Few  Moderate*  Moderate*  Few   < >  Few   BACTERIA   --    --   Moderate*  Moderate*  Few*   --   Few*   MUCOUS   --    --   Present*  Present*   --    --   Present*    < > = values in this interval not displayed.     Urine Protein  Recent Labs   Lab Test  08/23/18   1638  04/26/18   1648  01/18/18   1640   UTP  <0.05  <0.05  0.12   UTPG  Unable to calculate due to low value  Unable to calculate due to low value  0.23*   UCRR  23  14  50     Immunization History     Immunization History   Administered Date(s) Administered     HPV 02/25/2010, 02/04/2011     HepB 06/13/1999, 08/20/1999, 02/05/2000     Historical DTP/aP 1986, 1985, 1986, 01/15/1988, 06/15/1991     Influenza (IIV3) PF 11/07/2011     Influenza Vaccine IM 3yrs+ 4 Valent IIV4 10/11/2016, 10/13/2017     MMR 05/15/1987, 09/15/1991     Mantoux Tuberculin Skin Test 07/15/1987, 01/19/2005     Meningococcal (Menomune ) 08/09/2004     OPV, trivalent, live 1986, 1986, 1986, 01/15/1988, 09/15/1991     Pneumo Conj 13-V (2010&after) 05/04/2018     TDAP Vaccine (Adacel) 01/21/2009, 02/25/2010          Chart documentation done in part with Dragon Voice recognition Software. Although reviewed after completion, some word and grammatical error may remain.    Lavon Light MD

## 2018-08-30 NOTE — NURSING NOTE
Screening Questionnaire for Adult Immunization    Are you sick today?   No   Do you have allergies to medications, food, a vaccine component or latex?   Yes   Have you ever had a serious reaction after receiving a vaccination?   No   Do you have a long-term health problem with heart disease, lung disease, asthma, kidney disease, metabolic disease (e.g. diabetes), anemia, or other blood disorder?   No   Do you have cancer, leukemia, HIV/AIDS, or any other immune system problem?   No   In the past 3 months, have you taken medications that affect  your immune system, such as prednisone, other steroids, or anticancer drugs; drugs for the treatment of rheumatoid arthritis, Crohn s disease, or psoriasis; or have you had radiation treatments?   Yes   Have you had a seizure, or a brain or other nervous system problem?   No   During the past year, have you received a transfusion of blood or blood     products, or been given immune (gamma) globulin or antiviral drug?   No   For women: Are you pregnant or is there a chance you could become        pregnant during the next month?   No   Have you received any vaccinations in the past 4 weeks?   No     Immunization questionnaire was positive for at least one answer.  Notified Dr. Light.        Per orders of Dr. Light, injection of Pneumovax 23 given by Carissa Frye. Patient instructed to remain in clinic for 15 minutes afterwards, and to report any adverse reaction to me immediately.       Screening performed by Carissa Frye on 8/30/2018 at 8:08 AM.

## 2018-10-26 ENCOUNTER — OFFICE VISIT (OUTPATIENT)
Dept: FAMILY MEDICINE | Facility: CLINIC | Age: 33
End: 2018-10-26
Payer: COMMERCIAL

## 2018-10-26 VITALS
HEART RATE: 80 BPM | DIASTOLIC BLOOD PRESSURE: 68 MMHG | HEIGHT: 67 IN | TEMPERATURE: 98.2 F | BODY MASS INDEX: 24.33 KG/M2 | WEIGHT: 155 LBS | SYSTOLIC BLOOD PRESSURE: 106 MMHG

## 2018-10-26 DIAGNOSIS — M41.9 SCOLIOSIS OF THORACIC SPINE, UNSPECIFIED SCOLIOSIS TYPE: ICD-10-CM

## 2018-10-26 DIAGNOSIS — G89.29 CHRONIC MIDLINE LOW BACK PAIN WITH RIGHT-SIDED SCIATICA: ICD-10-CM

## 2018-10-26 DIAGNOSIS — F41.9 ANXIETY: ICD-10-CM

## 2018-10-26 DIAGNOSIS — M32.19 OTHER SYSTEMIC LUPUS ERYTHEMATOSUS WITH OTHER ORGAN INVOLVEMENT (H): ICD-10-CM

## 2018-10-26 DIAGNOSIS — M54.41 CHRONIC MIDLINE LOW BACK PAIN WITH RIGHT-SIDED SCIATICA: ICD-10-CM

## 2018-10-26 DIAGNOSIS — Z23 NEED FOR PROPHYLACTIC VACCINATION AND INOCULATION AGAINST INFLUENZA: ICD-10-CM

## 2018-10-26 DIAGNOSIS — Z00.01 ENCOUNTER FOR ROUTINE ADULT MEDICAL EXAM WITH ABNORMAL FINDINGS: Primary | ICD-10-CM

## 2018-10-26 DIAGNOSIS — Z11.3 SCREEN FOR STD (SEXUALLY TRANSMITTED DISEASE): ICD-10-CM

## 2018-10-26 LAB — T PALLIDUM AB SER QL: NONREACTIVE

## 2018-10-26 PROCEDURE — 90686 IIV4 VACC NO PRSV 0.5 ML IM: CPT | Performed by: PHYSICIAN ASSISTANT

## 2018-10-26 PROCEDURE — 99214 OFFICE O/P EST MOD 30 MIN: CPT | Mod: 25 | Performed by: PHYSICIAN ASSISTANT

## 2018-10-26 PROCEDURE — 99395 PREV VISIT EST AGE 18-39: CPT | Mod: 25 | Performed by: PHYSICIAN ASSISTANT

## 2018-10-26 PROCEDURE — 87389 HIV-1 AG W/HIV-1&-2 AB AG IA: CPT | Performed by: PHYSICIAN ASSISTANT

## 2018-10-26 PROCEDURE — 86780 TREPONEMA PALLIDUM: CPT | Performed by: PHYSICIAN ASSISTANT

## 2018-10-26 PROCEDURE — 36415 COLL VENOUS BLD VENIPUNCTURE: CPT | Performed by: PHYSICIAN ASSISTANT

## 2018-10-26 PROCEDURE — 90471 IMMUNIZATION ADMIN: CPT | Performed by: PHYSICIAN ASSISTANT

## 2018-10-26 PROCEDURE — 86803 HEPATITIS C AB TEST: CPT | Performed by: PHYSICIAN ASSISTANT

## 2018-10-26 RX ORDER — HYDROXYZINE HYDROCHLORIDE 25 MG/1
12.5-5 TABLET, FILM COATED ORAL EVERY 6 HOURS PRN
Qty: 30 TABLET | Refills: 1 | Status: SHIPPED | OUTPATIENT
Start: 2018-10-26 | End: 2019-05-31

## 2018-10-26 RX ORDER — LORAZEPAM 0.5 MG/1
0.5 TABLET ORAL EVERY 8 HOURS PRN
Qty: 15 TABLET | Refills: 0 | Status: SHIPPED | OUTPATIENT
Start: 2018-10-26 | End: 2019-05-31

## 2018-10-26 ASSESSMENT — ENCOUNTER SYMPTOMS
CONSTIPATION: 0
NERVOUS/ANXIOUS: 1
FREQUENCY: 0
COUGH: 0
DIZZINESS: 0
CHILLS: 0
DIARRHEA: 0
HEMATURIA: 0
FEVER: 0
EYE PAIN: 0
ABDOMINAL PAIN: 0
HEMATOCHEZIA: 0

## 2018-10-26 ASSESSMENT — ANXIETY QUESTIONNAIRES
7. FEELING AFRAID AS IF SOMETHING AWFUL MIGHT HAPPEN: SEVERAL DAYS
GAD7 TOTAL SCORE: 7
6. BECOMING EASILY ANNOYED OR IRRITABLE: SEVERAL DAYS
1. FEELING NERVOUS, ANXIOUS, OR ON EDGE: SEVERAL DAYS
2. NOT BEING ABLE TO STOP OR CONTROL WORRYING: SEVERAL DAYS
3. WORRYING TOO MUCH ABOUT DIFFERENT THINGS: MORE THAN HALF THE DAYS
5. BEING SO RESTLESS THAT IT IS HARD TO SIT STILL: NOT AT ALL

## 2018-10-26 ASSESSMENT — PATIENT HEALTH QUESTIONNAIRE - PHQ9
SUM OF ALL RESPONSES TO PHQ QUESTIONS 1-9: 1
5. POOR APPETITE OR OVEREATING: SEVERAL DAYS

## 2018-10-26 NOTE — PROGRESS NOTES
SUBJECTIVE:   CC: Aleida Weinberg is an 33 year old woman who presents for preventive health visit.     Physical   Annual:     Getting at least 3 servings of Calcium per day:  Yes    Bi-annual eye exam:  Yes    Dental care twice a year:  Yes    Sleep apnea or symptoms of sleep apnea:  None    Diet:  Regular (no restrictions)    Frequency of exercise:  2-3 days/week    Duration of exercise:  15-30 minutes    Taking medications regularly:  Yes    Medication side effects:  None    Additional concerns today:  YES (Back pain )  =  Anxiety Follow-Up    Status since last visit: No change stable overall-stress at work but otherwise doing well.  Rare use of Lorazepam when flying and severe anxiety. Inquiring about alternate medication with less risks to use for less severe anxiety/panic where she may not have to use the Lorazepam and it's risks.    Other associated symptoms:None    Complicating factors:   Significant life event: No, but notes stress at work.  Current substance abuse: None  Depression symptoms: No]    Aleida has a history of chronic mid and low back pain. She has known thoracic scoliosis and was followed by spine when she was younger. This did not require any surgical intervention.  She continues to have R mid and lower back pain. Pain radiates in to her R buttock and with some positions can radiate down past her knee.      Her pain has been worsening as she has been trying to be more active.  Denies any numbness, tingling or weakness.  No bowel or bladder changes. Pain is described as a dull ache and at time can be stabbing.  Denies any spasms.    Has been seen by chiropractor in the past. Has not done PT recently.    TONE-7 SCORE 10/16/2015 2/9/2017 10/26/2018   Total Score 10 2 7       TONE-7    Today's PHQ-2 Score:   PHQ-2 ( 1999 Pfizer) 10/26/2018   Q1: Little interest or pleasure in doing things 0   Q2: Feeling down, depressed or hopeless 0   PHQ-2 Score 0   Q1: Little interest or pleasure in  doing things Not at all   Q2: Feeling down, depressed or hopeless Not at all   PHQ-2 Score 0       Abuse: Current or Past(Physical, Sexual or Emotional)- No  Do you feel safe in your environment - Yes    Social History   Substance Use Topics     Smoking status: Former Smoker     Packs/day: 0.50     Years: 2.50     Types: Cigarettes     Quit date: 11/11/2005     Smokeless tobacco: Never Used     Alcohol use 0.0 oz/week     0 Standard drinks or equivalent per week      Comment: rarely - 1 monthly     Alcohol Use 10/26/2018   If you drink alcohol do you typically have greater than 3 drinks per day OR greater than 7 drinks per week? No   No flowsheet data found.    Reviewed orders with patient.  Reviewed health maintenance and updated orders accordingly - Yes    Mammogram not appropriate for this patient based on age.    Pertinent mammograms are reviewed under the imaging tab.  History of abnormal Pap smear: NO - age 30-65 PAP every 5 years with negative HPV co-testing recommended  PAP / HPV Latest Ref Rng & Units 10/7/2016 9/20/2013 2/25/2010   PAP - NIL NIL NIL   HPV 16 DNA NEG Negative - -   HPV 18 DNA NEG Negative - -   OTHER HR HPV NEG Negative - -     Reviewed and updated as needed this visit by clinical staff  Tobacco  Allergies  Meds  Med Hx  Surg Hx  Fam Hx  Soc Hx        Reviewed and updated as needed this visit by Provider            Review of Systems   Constitutional: Negative for chills and fever.   HENT: Negative for congestion and ear pain.    Eyes: Negative for pain.   Respiratory: Negative for cough.    Cardiovascular: Negative for chest pain.   Gastrointestinal: Negative for abdominal pain, constipation, diarrhea and hematochezia.   Genitourinary: Negative for frequency, genital sores and hematuria.   Neurological: Negative for dizziness.   Psychiatric/Behavioral: The patient is nervous/anxious.      OBJECTIVE:   /68 (Cuff Size: Adult Regular)  Pulse 80  Temp 98.2  F (36.8  C) (Oral)  Ht  "5' 7.13\" (1.705 m)  Wt 155 lb (70.3 kg)  BMI 24.18 kg/m2  Physical Exam  GENERAL: healthy, alert and no distress  EYES: Eyes grossly normal to inspection, PERRL and conjunctivae and sclerae normal  HENT: ear canals and TM's normal, nose and mouth without ulcers or lesions  NECK: no adenopathy, no asymmetry, masses, or scars and thyroid normal to palpation  RESP: lungs clear to auscultation - no rales, rhonchi or wheezes  BREAST: normal without masses, tenderness or nipple discharge and no palpable axillary masses or adenopathy  CV: regular rate and rhythm, normal S1 S2, no S3 or S4, no murmur, click or rub, no peripheral edema and peripheral pulses strong  ABDOMEN: soft, nontender, no hepatosplenomegaly, no masses and bowel sounds normal  MS: no gross musculoskeletal defects noted, no edema  SKIN: no suspicious lesions or rashes  NEURO: Normal strength and tone, mentation intact and speech normal  PSYCH: mentation appears normal, affect normal/bright  Back examination: Back symmetric, mild pain and inflammation with palpation of R lumbar musculature.  Straight leg raise test: negative bilaterally but does have tightness that radiates to her R posterior calf.      Diagnostic Test Results:  none     ASSESSMENT/PLAN:   1. Encounter for routine adult medical exam with abnormal findings  Follow up pending above results. Encouraged healthy diet and regular exercise, monthy SBE, and yearly exams.    2. Anxiety  Stable, overall well controlled  Rare, as needed, appropriate use of below medication.  Uses #10 yearly.  She will try hydroxyzine in it's place to see if this is just as helpful and with low risk  - LORazepam (ATIVAN) 0.5 MG tablet; Take 1 tablet (0.5 mg) by mouth every 8 hours as needed for anxiety  Dispense: 15 tablet; Refill: 0  - hydrOXYzine (ATARAX) 25 MG tablet; Take 0.5-2 tablets (12.5-50 mg) by mouth every 6 hours as needed for anxiety  Dispense: 30 tablet; Refill: 1    3. Chronic midline low back pain " "with right-sided sciatica  4. Scoliosis of thoracic spine, unspecified scoliosis type  Chronic low back pain with ongoing radicular symptoms, worse with activity.  Has not had imaging for 10 years  Recommended update of imaging and referral to [facility] for PT.  - MR Thoracic Spine w/o Contrast; Future  - MR Lumbar Spine w/o Contrast; Future    5. Other systemic lupus erythematosus with other organ involvement (H)  Followed by Rheumatology    6. Screen for STD (sexually transmitted disease)  - HIV Antigen Antibody Combo  - Treponema Abs w Reflex to RPR and Titer  - Hepatitis C antibody    7. Need for prophylactic vaccination and inoculation against influenza  - FLU VACCINE, SPLIT VIRUS, IM (QUADRIVALENT) [36804]- >3 YRS  - Vaccine Administration, Initial [16431]    COUNSELING:  Reviewed preventive health counseling, as reflected in patient instructions    BP Readings from Last 1 Encounters:   10/26/18 106/68     Estimated body mass index is 24.18 kg/(m^2) as calculated from the following:    Height as of this encounter: 5' 7.13\" (1.705 m).    Weight as of this encounter: 155 lb (70.3 kg).           reports that she quit smoking about 12 years ago. Her smoking use included Cigarettes. She has a 1.25 pack-year smoking history. She has never used smokeless tobacco.      Counseling Resources:  ATP IV Guidelines  Pooled Cohorts Equation Calculator  Breast Cancer Risk Calculator  FRAX Risk Assessment  ICSI Preventive Guidelines  Dietary Guidelines for Americans, 2010  USDA's MyPlate  ASA Prophylaxis  Lung CA Screening    Tatum Dove PA-C  [facility]  Answers for HPI/ROS submitted by the patient on 10/26/2018   PHQ-2 Score: 0      Injectable Influenza Immunization Documentation    1.  Is the person to be vaccinated sick today?   No    2. Does the person to be vaccinated have an allergy to a component   of the vaccine?   No  Egg Allergy Algorithm Link    3. Has the person to be vaccinated ever had a " serious reaction   to influenza vaccine in the past?   No    4. Has the person to be vaccinated ever had Guillain-Barré syndrome?   No    Form completed by Grace Can CMA ([facility])

## 2018-10-26 NOTE — MR AVS SNAPSHOT
After Visit Summary   10/26/2018    Aleida Weinberg    MRN: [id number]           Patient Information     Date Of Birth          1985        Visit Information        Provider Department      10/26/2018 7:40 AM Tatum Dove PA-C [facility]        Today's Diagnoses     Need for prophylactic vaccination and inoculation against influenza    -  1    Anxiety        Other systemic lupus erythematosus with other organ involvement (H)        Chronic midline low back pain with right-sided sciatica        Scoliosis of thoracic spine, unspecified scoliosis type        Screen for STD (sexually transmitted disease)          Care Instructions    Call [facility] to schedule appointment for imaging study-[phone].  Tatum or her team will be in touch with you with results.  Try Hydroxyzine as needed for anxiety, will cause drowsiness.    Tatum Dove PA-C    Preventive Health Recommendations  Female Ages 26 - 39  Yearly exam:   See your health care provider every year in order to    Review health changes.     Discuss preventive care.      Review your medicines if you your doctor has prescribed any.    Until age 30: Get a Pap test every three years (more often if you have had an abnormal result).    After age 30: Talk to your doctor about whether you should have a Pap test every 3 years or have a Pap test with HPV screening every 5 years.   You do not need a Pap test if your uterus was removed (hysterectomy) and you have not had cancer.  You should be tested each year for STDs (sexually transmitted diseases), if you're at risk.   Talk to your provider about how often to have your cholesterol checked.  If you are at risk for diabetes, you should have a diabetes test (fasting glucose).  Shots: Get a flu shot each year. Get a tetanus shot every 10 years.   Nutrition:     Eat at least 5 servings of fruits and vegetables each day.    Eat whole-grain bread, whole-wheat  pasta and brown rice instead of white grains and rice.    Get adequate Calcium and Vitamin D.     Lifestyle    Exercise at least 150 minutes a week (30 minutes a day, 5 days of the week). This will help you control your weight and prevent disease.    Limit alcohol to one drink per day.    No smoking.     Wear sunscreen to prevent skin cancer.    See your dentist every six months for an exam and cleaning.    [facility]   Discharged by : Nani GAUTHIER Certified Medical Assistant (AAMA)   Paper scripts provided to patient : 1   If you have any questions regarding to your visit please contact your care team:     Team Silver              Clinic Hours Telephone Number     Dr. Raphael Dove PA-C   7am-7pm  Monday - Thursday   7am-5pm  Fridays  [phone]   (Appointment scheduling available 24/7)     RN Line  [phone] option 2     Urgent Care - [place] [facility] - 11am-9pm Monday-Friday Saturday-Sunday- 9am-5pm     [place] -   5pm-9pm Monday-Friday Saturday-Sunday- 9am-5pm    [phone] - [place]    [phone] - [place]     For a Price Quote for your services, please call our Consumer Price Line at [phone].     What options do I have for visits at the clinic other than the traditional office visit?     To expand how we care for you, many of our providers are utilizing electronic visits (e-visits) and telephone visits, when medically appropriate, for interactions with their patients rather than a visit in the clinic. We also offer nurse visits for many medical concerns. Just like any other service, we will bill your insurance company for this type of visit based on time spent on the phone with your provider. Not all insurance companies cover these visits. Please check with your medical insurance if this type of visit is covered. You will be responsible for any charges that are not paid by your insurance.    E-visits via [employer]: generally incur a $35.00 fee.     Telephone visits:   Time spent on the phone: *charged based on time that is spent on the phone in increments of 10 minutes. Estimated cost:   5-10 mins $30.00   11-20 mins. $59.00   21-30 mins. $85.00     Use [employer] (secure email communication and access to your chart) to send your primary care provider a message or make an appointment. Ask someone on your Team how to sign up for [employer].     As always, Thank you for trusting us with your health care needs!      [facility]:    Scheduling Appointments  [facility]  Call: [phone]    [facility]  Call: [phone]    [facility]  Call: [phone]    For Gastroenterology referrals   [facility]   Clinics and Surgery Center, 4th Floor   [address] [place] [address]   Appointments: [phone]    WHERE TO GO FOR CARE?  Clinic    Make an appointment if you:       Are sick (cold, cough, flu, sore throat, earache or in pain).       Have a small injury (sprain, small cut, burn or broken bone).       Need a physical exam, Pap smear, vaccine or prescription refill.       Have questions about your health or medicines.    To reach us:      Call [phone] ([phone]). Open 24 hours every day. (For counseling services, call [phone].)    Log into USEREADY at [email or web address]. (Visit [email or web address] to create an account.) Hospital emergency room    An emergency is a serious or life- threatening problem that must be treated right away.    Call 483 or get to the hospital if you have:      Very bad or sudden:            - Chest pain or pressure         - Bleeding         - Head or belly pain         - Dizziness or trouble seeing, walking or                          Speaking      Problems breathing      Blood in your vomit or you are coughing up blood      A major injury (knocked  out, loss of a finger or limb, rape, broken bone protruding from skin)    A mental health crisis. (Or call the Mental Health Crisis line at 1-701.718.9244 or Suicide Prevention Hotline at 1-351.648.1103.)    Open 24 hours every day. You don't need an appointment.     Urgent care    Visit urgent care for sickness or small injuries when the clinic is closed. You don't need an appointment. To check hours or find an urgent care near you, visit [email or web address]. Online care    Get online care from [employer] for more than 70 common problems, like colds, allergies and infections. Open 24 hours every day at:   [email or web address]   Need help deciding?    For advice about where to be seen, you may call your clinic and ask to speak with a nurse. We're here for you 24 hours every day.         If you are deaf or hard of hearing, please let us know. We provide many free services including sign language interpreters, oral interpreters, TTYs, telephone amplifiers, note takers and written materials.               Follow-ups after your visit        Your next 10 appointments already scheduled     Nov 30, 2018  7:15 AM CST   [facility]    [address] [place] [id number] [phone]           Please do not eat 10-12 hours before your appointment if you are coming in fasting for labs on lipids, cholesterol, or glucose (sugar). This does not apply to pregnant women. Water, hot tea and black coffee (with nothing added) are okay. Do not drink other fluids, diet soda or chew gum.            Jan 11, 2019  8:00 AM CST   Return Visit with Lavon Light MD   [facility] ([facility]    [address] [place] [address]  [place] [address]   [phone]              Future tests that were ordered for you today     Open Future Orders        Priority Expected Expires Ordered    MR Thoracic Spine w/o Contrast Routine  10/26/2019 10/26/2018    MR  "Lumbar Spine w/o Contrast Routine  10/26/2019 10/26/2018            Who to contact     If you have questions or need follow up information about today's clinic visit or your schedule please contact [facility] directly at [phone].  Normal or non-critical lab and imaging results will be communicated to you by MyChart, letter or phone within 4 business days after the clinic has received the results. If you do not hear from us within 7 days, please contact the clinic through [employer] or phone. If you have a critical or abnormal lab result, we will notify you by phone as soon as possible.  Submit refill requests through [employer] or call your pharmacy and they will forward the refill request to us. Please allow 3 business days for your refill to be completed.          Additional Information About Your Visit        Expert[employer]er[employer] Information     [employer] gives you secure access to your electronic health record. If you see a primary care provider, you can also send messages to your care team and make appointments. If you have questions, please call your primary care clinic.  If you do not have a primary care provider, please call [phone] and they will assist you.        Care EveryWhere ID     This is your Care EveryWhere ID. This could be used by other organizations to access your [place] medical records  [id number]        Your Vitals Were     Pulse Temperature Height BMI (Body Mass Index)          80 98.2  F (36.8  C) (Oral) 5' 7.13\" (1.705 m) 24.18 kg/m2         Blood Pressure from Last 3 Encounters:   10/26/18 106/68   08/30/18 114/74   05/04/18 115/77    Weight from Last 3 Encounters:   10/26/18 155 lb (70.3 kg)   08/30/18 156 lb 3.2 oz (70.9 kg)   05/04/18 153 lb 6.4 oz (69.6 kg)              We Performed the Following     FLU VACCINE, SPLIT VIRUS, IM (QUADRIVALENT) [69102]- >3 YRS     Hepatitis C antibody     HIV Antigen Antibody Combo     Treponema Abs w Reflex to RPR and Titer     Vaccine " Administration, Initial [38153]          Today's Medication Changes          These changes are accurate as of 10/26/18  8:33 AM.  If you have any questions, ask your nurse or doctor.               Start taking these medicines.        Dose/Directions    hydrOXYzine 25 MG tablet   Commonly known as:  ATARAX   Used for:  Anxiety   Started by:  Tatum Dove PA-C        Dose:  12.5-50 mg   Take 0.5-2 tablets (12.5-50 mg) by mouth every 6 hours as needed for anxiety   Quantity:  30 tablet   Refills:  1            Where to get your medicines      These medications were sent to [facility] [address] [place] [address].  [address] [place] [address]., [place] [address]     Phone:  [phone]     hydrOXYzine 25 MG tablet         Some of these will need a paper prescription and others can be bought over the counter.  Ask your nurse if you have questions.     Bring a paper prescription for each of these medications     LORazepam 0.5 MG tablet                Primary Care Provider Office Phone # Fax #    Tatum Dove PA-C [phone]       [address] [place] [address]        Equal Access to Services     CACHORRO MADRIGAL AH: Hadii catalina ku hadasho Soomaali, waaxda luqadaha, qaybta kaalmada adeegyada, waxay zuriin haymaruicion zully concepcion. So [facility] [phone].    ATENCIÓN: Si habla español, tiene a rey disposición servicios gratuitos de asistencia lingüística. Gabriela al [phone].    We comply with applicable federal civil rights laws and [place] laws. We do not discriminate on the basis of race, color, national origin, age, disability, sex, sexual orientation, or gender identity.            Thank you!     Thank you for choosing [facility]  for your care. Our goal is always to provide you with excellent care. Hearing back from our patients is one way we can continue to improve our services. Please take a few minutes to complete the written  survey that you may receive in the mail after your visit with us. Thank you!             Your Updated Medication List - Protect others around you: Learn how to safely use, store and throw away your medicines at www.disposemymeds.org.          This list is accurate as of 10/26/18  8:33 AM.  Always use your most recent med list.                   Brand Name Dispense Instructions for use Diagnosis    albuterol 108 (90 Base) MCG/ACT inhaler    PROAIR HFA/PROVENTIL HFA/VENTOLIN HFA    1 Inhaler    Inhale 2 puffs into the lungs every 6 hours as needed for shortness of breath / dyspnea    Intermittent asthma, uncomplicated       amLODIPine 10 MG tablet    NORVASC    90 tablet    Take 1 tablet (10 mg) by mouth daily    Raynaud's phenomenon without gangrene       azaTHIOprine 50 MG tablet    IMURAN    180 tablet    Take 2 tablets (100 mg) by mouth daily    Other systemic lupus erythematosus with other organ involvement (H)       cyclobenzaprine 10 MG tablet    FLEXERIL    30 tablet    Take 0.5-1 tablets (5-10 mg) by mouth 3 times daily as needed for muscle spasms    Acute midline low back pain with right-sided sciatica       hydroxychloroquine 200 MG tablet    PLAQUENIL    135 tablet    Hydroxychloroquine 200mg daily; and an additional 200mg every other day.    Other systemic lupus erythematosus with other organ involvement (H)       hydrOXYzine 25 MG tablet    ATARAX    30 tablet    Take 0.5-2 tablets (12.5-50 mg) by mouth every 6 hours as needed for anxiety    Anxiety       LORazepam 0.5 MG tablet    ATIVAN    15 tablet    Take 1 tablet (0.5 mg) by mouth every 8 hours as needed for anxiety    Anxiety       MIRENA (52 MG) 20 MCG/24HR IUD   Generic drug:  levonorgestrel      1 each by Intrauterine route once        omeprazole 20 MG CR capsule    priLOSEC     Take 20 mg by mouth daily        predniSONE 5 MG tablet    DELTASONE    90 tablet    Take 1 tablet (5 mg) by mouth daily as needed for lupus    Systemic lupus  erythematosus (H), High risk medications (not anticoagulants) long-term use       traZODone 50 MG tablet    DESYREL    30 tablet    Take 0.5 tablets (25 mg) by mouth nightly as needed for sleep    Other insomnia       vitamin D 2000 units tablet     100 tablet    Take 2,000 Units by mouth daily    Vitamin D deficiency, Other systemic lupus erythematosus with other organ involvement (H)

## 2018-10-26 NOTE — NURSING NOTE
Prior to injection verified patient identity using patient's name and date of birth.  Due to injection administration, patient instructed to remain in clinic for 15 minutes  afterwards, and to report any adverse reaction to me immediately.    Grace Can CMA ([facility])

## 2018-10-26 NOTE — PATIENT INSTRUCTIONS
Call [facility] to schedule appointment for imaging study-[phone].  Tatum or her team will be in touch with you with results.  Try Hydroxyzine as needed for anxiety, will cause drowsiness.    Tatum Dove PA-C    Preventive Health Recommendations  Female Ages 26 - 39  Yearly exam:   See your health care provider every year in order to    Review health changes.     Discuss preventive care.      Review your medicines if you your doctor has prescribed any.    Until age 30: Get a Pap test every three years (more often if you have had an abnormal result).    After age 30: Talk to your doctor about whether you should have a Pap test every 3 years or have a Pap test with HPV screening every 5 years.   You do not need a Pap test if your uterus was removed (hysterectomy) and you have not had cancer.  You should be tested each year for STDs (sexually transmitted diseases), if you're at risk.   Talk to your provider about how often to have your cholesterol checked.  If you are at risk for diabetes, you should have a diabetes test (fasting glucose).  Shots: Get a flu shot each year. Get a tetanus shot every 10 years.   Nutrition:     Eat at least 5 servings of fruits and vegetables each day.    Eat whole-grain bread, whole-wheat pasta and brown rice instead of white grains and rice.    Get adequate Calcium and Vitamin D.     Lifestyle    Exercise at least 150 minutes a week (30 minutes a day, 5 days of the week). This will help you control your weight and prevent disease.    Limit alcohol to one drink per day.    No smoking.     Wear sunscreen to prevent skin cancer.    See your dentist every six months for an exam and cleaning.    [facility]   Discharged by : Nani GAUTHIER Certified Medical Assistant (AAMA)   Paper scripts provided to patient : 1   If you have any questions regarding to your visit please contact your care team:     [facility]              Clinic Hours Telephone Number      Dr. Raphael Dove PA-C   7am-7pm  Monday - Thursday   7am-5pm  Fridays  [phone]   (Appointment scheduling available 24/7)     RN Line  [phone] option 2     Urgent Care - [place] - 11am-9pm Monday-Friday Saturday-Sunday- 9am-5pm     [place] -   5pm-9pm Monday-Friday Saturday-Sunday- 9am-5pm    [phone] - [place]    [phone] - [place]     For a Price Quote for your services, please call our Consumer Price Line at [phone].     What options do I have for visits at the clinic other than the traditional office visit?     To expand how we care for you, many of our providers are utilizing electronic visits (e-visits) and telephone visits, when medically appropriate, for interactions with their patients rather than a visit in the clinic. We also offer nurse visits for many medical concerns. Just like any other service, we will bill your insurance company for this type of visit based on time spent on the phone with your provider. Not all insurance companies cover these visits. Please check with your medical insurance if this type of visit is covered. You will be responsible for any charges that are not paid by your insurance.   E-visits via [employer]: generally incur a $35.00 fee.     Telephone visits:   Time spent on the phone: *charged based on time that is spent on the phone in increments of 10 minutes. Estimated cost:   5-10 mins $30.00   11-20 mins. $59.00   21-30 mins. $85.00     Use [employer] (secure email communication and access to your chart) to send your primary care provider a message or make an appointment. Ask someone on your Team how to sign up for [employer].     As always, Thank you for trusting us with your health care needs!      [facility]:    Scheduling Appointments  [facility] [place]  Call: [phone]    [facility]  Call:  [phone]    [facility]  Call: [phone]    For Gastroenterology referrals   [facility]   Clinics and Surgery Center, 4th Floor   [address] [place] [address]   Appointments: [phone]    WHERE TO GO FOR CARE?  Clinic    Make an appointment if you:       Are sick (cold, cough, flu, sore throat, earache or in pain).       Have a small injury (sprain, small cut, burn or broken bone).       Need a physical exam, Pap smear, vaccine or prescription refill.       Have questions about your health or medicines.    To reach us:      Call [phone] ([phone]). Open 24 hours every day. (For counseling services, call [phone].)    Log into [employer] at [email or web address]. (Visit [email or web address] to create an account.) Hospital emergency room    An emergency is a serious or life- threatening problem that must be treated right away.    Call 986 or get to the hospital if you have:      Very bad or sudden:            - Chest pain or pressure         - Bleeding         - Head or belly pain         - Dizziness or trouble seeing, walking or                          Speaking      Problems breathing      Blood in your vomit or you are coughing up blood      A major injury (knocked out, loss of a finger or limb, rape, broken bone protruding from skin)    A mental health crisis. (Or call the Mental Health Crisis line at 1-687.863.1305 or Suicide Prevention Hotline at 1-851.932.3513.)    Open 24 hours every day. You don't need an appointment.     Urgent care    Visit urgent care for sickness or small injuries when the clinic is closed. You don't need an appointment. To check hours or find an urgent care near you, visit [email or web address]. Online care    Get online care from [facility] for more than 70 common problems, like colds, allergies and infections. Open 24 hours every day at:   [email or web address]   Need help deciding?    For advice about where to be seen,  you may call your clinic and ask to speak with a nurse. We're here for you 24 hours every day.         If you are deaf or hard of hearing, please let us know. We provide many free services including sign language interpreters, oral interpreters, TTYs, telephone amplifiers, note takers and written materials.

## 2018-10-27 LAB
HCV AB SERPL QL IA: NONREACTIVE
HIV 1+2 AB+HIV1 P24 AG SERPL QL IA: NONREACTIVE

## 2018-10-27 ASSESSMENT — ASTHMA QUESTIONNAIRES: ACT_TOTALSCORE: 25

## 2018-10-27 ASSESSMENT — ANXIETY QUESTIONNAIRES: GAD7 TOTAL SCORE: 7

## 2018-11-08 ENCOUNTER — RADIANT APPOINTMENT (OUTPATIENT)
Dept: MRI IMAGING | Facility: CLINIC | Age: 33
End: 2018-11-08
Attending: PHYSICIAN ASSISTANT
Payer: COMMERCIAL

## 2018-11-08 DIAGNOSIS — M41.9 SCOLIOSIS OF THORACIC SPINE, UNSPECIFIED SCOLIOSIS TYPE: ICD-10-CM

## 2018-11-08 DIAGNOSIS — G89.29 CHRONIC MIDLINE LOW BACK PAIN WITH RIGHT-SIDED SCIATICA: ICD-10-CM

## 2018-11-08 DIAGNOSIS — M54.41 CHRONIC MIDLINE LOW BACK PAIN WITH RIGHT-SIDED SCIATICA: ICD-10-CM

## 2018-11-08 PROCEDURE — 72148 MRI LUMBAR SPINE W/O DYE: CPT | Performed by: RADIOLOGY

## 2018-11-08 PROCEDURE — 72146 MRI CHEST SPINE W/O DYE: CPT | Performed by: RADIOLOGY

## 2018-11-16 ENCOUNTER — NURSE TRIAGE (OUTPATIENT)
Dept: NURSING | Facility: CLINIC | Age: 33
End: 2018-11-16

## 2018-11-17 NOTE — TELEPHONE ENCOUNTER
"Call transferred from [facility] nurse. She triaged patient and with patient history of Lupus thought it be best to review symptoms with on call provider. Patient re-triaged by this writer, no guideline to use. Patient reports a \"pulsating\" sensation in left buttocks near tailbone since yesterday. No pain, swelling, or warmth to area. Was still able to sleep and perform ADLs. Reviewed reported symptoms with Dr. Boyer, on call with clinic and he states patients with autoimmune disease will sometimes have sensations that are unexplained and sometimes nerve related. They often go away on own. He did not think she needed to be seen at this time, to watch for changes and anything concerning to go in for otherwise f/u with primary physicians. This message was given to caller, she verbalized an understanding.  "

## 2018-11-17 NOTE — TELEPHONE ENCOUNTER
Reason for Disposition    Nursing judgment or information in reference    Additional Information    Negative: Nursing judgment, per information in Reference    Negative: Information only call about a Well Adult (no illness or injury)    Negative: Nursing judgment or information in reference    Negative: Nursing judgment or information in reference    Negative: Nursing judgment or information in reference    Negative: Nursing judgment or information in reference    Negative: Nursing judgment or information in reference    Protocols used: NO GUIDELINE AVAILABLE - SICK ADULT CALL-ADULT-

## 2018-11-29 DIAGNOSIS — M32.19 OTHER SYSTEMIC LUPUS ERYTHEMATOSUS WITH OTHER ORGAN INVOLVEMENT (H): ICD-10-CM

## 2018-11-29 LAB
ALBUMIN UR-MCNC: NEGATIVE MG/DL
APPEARANCE UR: CLEAR
BASOPHILS # BLD AUTO: 0 10E9/L (ref 0–0.2)
BASOPHILS NFR BLD AUTO: 0.8 %
BILIRUB UR QL STRIP: NEGATIVE
COLOR UR AUTO: YELLOW
DIFFERENTIAL METHOD BLD: ABNORMAL
EOSINOPHIL # BLD AUTO: 0 10E9/L (ref 0–0.7)
EOSINOPHIL NFR BLD AUTO: 0.8 %
ERYTHROCYTE [DISTWIDTH] IN BLOOD BY AUTOMATED COUNT: 11.8 % (ref 10–15)
ERYTHROCYTE [SEDIMENTATION RATE] IN BLOOD BY WESTERGREN METHOD: 9 MM/H (ref 0–20)
GLUCOSE UR STRIP-MCNC: NEGATIVE MG/DL
HCT VFR BLD AUTO: 39.5 % (ref 35–47)
HGB BLD-MCNC: 13.9 G/DL (ref 11.7–15.7)
HGB UR QL STRIP: NEGATIVE
KETONES UR STRIP-MCNC: NEGATIVE MG/DL
LEUKOCYTE ESTERASE UR QL STRIP: NEGATIVE
LYMPHOCYTES # BLD AUTO: 0.8 10E9/L (ref 0.8–5.3)
LYMPHOCYTES NFR BLD AUTO: 23.8 %
MCH RBC QN AUTO: 34.8 PG (ref 26.5–33)
MCHC RBC AUTO-ENTMCNC: 35.2 G/DL (ref 31.5–36.5)
MCV RBC AUTO: 99 FL (ref 78–100)
MONOCYTES # BLD AUTO: 0.5 10E9/L (ref 0–1.3)
MONOCYTES NFR BLD AUTO: 13.9 %
NEUTROPHILS # BLD AUTO: 2.1 10E9/L (ref 1.6–8.3)
NEUTROPHILS NFR BLD AUTO: 60.7 %
NITRATE UR QL: NEGATIVE
PH UR STRIP: 7 PH (ref 5–7)
PLATELET # BLD AUTO: 204 10E9/L (ref 150–450)
RBC # BLD AUTO: 4 10E12/L (ref 3.8–5.2)
SOURCE: NORMAL
SP GR UR STRIP: 1.01 (ref 1–1.03)
UROBILINOGEN UR STRIP-ACNC: 0.2 EU/DL (ref 0.2–1)
WBC # BLD AUTO: 3.5 10E9/L (ref 4–11)

## 2018-11-29 PROCEDURE — 86160 COMPLEMENT ANTIGEN: CPT | Performed by: INTERNAL MEDICINE

## 2018-11-29 PROCEDURE — 81003 URINALYSIS AUTO W/O SCOPE: CPT | Performed by: INTERNAL MEDICINE

## 2018-11-29 PROCEDURE — 85025 COMPLETE CBC W/AUTO DIFF WBC: CPT | Performed by: INTERNAL MEDICINE

## 2018-11-29 PROCEDURE — 84156 ASSAY OF PROTEIN URINE: CPT | Performed by: INTERNAL MEDICINE

## 2018-11-29 PROCEDURE — 85652 RBC SED RATE AUTOMATED: CPT | Performed by: INTERNAL MEDICINE

## 2018-11-29 PROCEDURE — 82550 ASSAY OF CK (CPK): CPT | Performed by: INTERNAL MEDICINE

## 2018-11-29 PROCEDURE — 86140 C-REACTIVE PROTEIN: CPT | Performed by: INTERNAL MEDICINE

## 2018-11-29 PROCEDURE — 86225 DNA ANTIBODY NATIVE: CPT | Performed by: INTERNAL MEDICINE

## 2018-11-29 PROCEDURE — 80053 COMPREHEN METABOLIC PANEL: CPT | Performed by: INTERNAL MEDICINE

## 2018-11-29 PROCEDURE — 36415 COLL VENOUS BLD VENIPUNCTURE: CPT | Performed by: INTERNAL MEDICINE

## 2018-11-30 LAB
ALBUMIN SERPL-MCNC: 4.6 G/DL (ref 3.4–5)
ALP SERPL-CCNC: 40 U/L (ref 40–150)
ALT SERPL W P-5'-P-CCNC: 32 U/L (ref 0–50)
ANION GAP SERPL CALCULATED.3IONS-SCNC: 8 MMOL/L (ref 3–14)
AST SERPL W P-5'-P-CCNC: 22 U/L (ref 0–45)
BILIRUB SERPL-MCNC: 0.6 MG/DL (ref 0.2–1.3)
BUN SERPL-MCNC: 10 MG/DL (ref 7–30)
CALCIUM SERPL-MCNC: 9.2 MG/DL (ref 8.5–10.1)
CHLORIDE SERPL-SCNC: 104 MMOL/L (ref 94–109)
CK SERPL-CCNC: 61 U/L (ref 30–225)
CO2 SERPL-SCNC: 25 MMOL/L (ref 20–32)
CREAT SERPL-MCNC: 0.75 MG/DL (ref 0.52–1.04)
CREAT UR-MCNC: 51 MG/DL
CRP SERPL-MCNC: <2.9 MG/L (ref 0–8)
GFR SERPL CREATININE-BSD FRML MDRD: 89 ML/MIN/1.7M2
GLUCOSE SERPL-MCNC: 82 MG/DL (ref 70–99)
POTASSIUM SERPL-SCNC: 3.9 MMOL/L (ref 3.4–5.3)
PROT SERPL-MCNC: 7.9 G/DL (ref 6.8–8.8)
PROT UR-MCNC: 0.06 G/L
PROT/CREAT 24H UR: 0.12 G/G CR (ref 0–0.2)
SODIUM SERPL-SCNC: 137 MMOL/L (ref 133–144)

## 2018-12-03 LAB
C3 SERPL-MCNC: 82 MG/DL (ref 76–169)
C4 SERPL-MCNC: 13 MG/DL (ref 15–50)
DSDNA AB SER-ACNC: 2 IU/ML

## 2018-12-07 ENCOUNTER — OFFICE VISIT (OUTPATIENT)
Dept: RHEUMATOLOGY | Facility: CLINIC | Age: 33
End: 2018-12-07
Payer: COMMERCIAL

## 2018-12-07 VITALS
OXYGEN SATURATION: 99 % | DIASTOLIC BLOOD PRESSURE: 62 MMHG | HEART RATE: 80 BPM | TEMPERATURE: 98.6 F | SYSTOLIC BLOOD PRESSURE: 100 MMHG | BODY MASS INDEX: 24.71 KG/M2 | WEIGHT: 158.4 LBS

## 2018-12-07 DIAGNOSIS — M32.19 OTHER SYSTEMIC LUPUS ERYTHEMATOSUS WITH OTHER ORGAN INVOLVEMENT (H): ICD-10-CM

## 2018-12-07 PROCEDURE — 99213 OFFICE O/P EST LOW 20 MIN: CPT | Performed by: INTERNAL MEDICINE

## 2018-12-07 RX ORDER — HYDROXYCHLOROQUINE SULFATE 200 MG/1
TABLET, FILM COATED ORAL
Qty: 135 TABLET | Refills: 1 | Status: SHIPPED | OUTPATIENT
Start: 2018-12-07 | End: 2019-04-12

## 2018-12-07 RX ORDER — AZATHIOPRINE 50 MG/1
100 TABLET ORAL DAILY
Qty: 180 TABLET | Refills: 1 | Status: SHIPPED | OUTPATIENT
Start: 2018-12-07 | End: 2019-04-12

## 2018-12-07 ASSESSMENT — PAIN SCALES - GENERAL: PAINLEVEL: MODERATE PAIN (5)

## 2018-12-07 NOTE — PROGRESS NOTES
Rheumatology Clinic Visit      Aleida Weinberg MRN# [id number]   YOB: 1985 Age: 33 year old      Date of visit: 12/07/18   PCP: Tatum Dove     Chief Complaint   Patient presents with:  RECHECK: Lupus-pulsing sensation in left leg, More bodyaches following exercise.     Assessment and Plan   1. Systemic lupus erythematosus (CHANELL >1:03774 speckled, RNP+, Sm+, Scl-70+, hypocomplementemia, photosensitivity, malar rash, arthritis, fatigue, Raynaud's phenomenon):  Dx'd 4/2016. Scl-70+; she lacks features of scleroderma at this time; no myopathy by hx of labs. 5/11/2018 echo without evidence of pulmonary hypertension. Previously on MTX 20mg SQ weekly (GI upset with PO doses above 15mg, partially effective) and SSZ (LFT elevations).  Currently on AZA 100mg daily (synovitis when on 100mg daily but after developing bacterial vaginosis and blepharitis the dose was reduced; she has not had those issues again synovitis did not come back) and HCQ 300mg daily. TPMT normal on 8/21/2017.   - Continue hydroxychloroquine 300mg daily (last eye exam done 9/8/2017)  - Reduce azathioprine to 100mg daily   - Labs in 3months: CBC, CMP, ESR, CRP, CK, UA, Uprotein:creatinine    2. Raynaud's Phenomenon: Cold avoidance was insufficient for controlling her symptoms and therefore amlodipine was started. Uses amlodipine 10mg daily during cold months only.  Has not restarted amlodipine because of a recall on certain brands of amlodipine  - Continue Amlodipine 10mg daily during colder months    3. Chronic Back Pain: History of scoliosis. Degenerative findings on previously reviewed MRIs from 2000 and 2009. This has not changed for many years and does not worsen when her peripheral joint symptoms worsen. Had one episode of sciatica that resolved with chiropractic treatment.      4. Vitamin D deficiency: Currently on vitamin D 2000 units daily.    - Continue vitamin D 2000 units daily    5.  Secondary Sjogren's syndrome: Mild  dry and dry mouth that are treated infrequently with artificial tears and frequent sips of water.     6.  Vaccinations: Vaccinations reviewed with Ms. Weinberg.  Risks and benefits of vaccinations were discussed. Data and lack of data for shingrix reviewed.  CDC stance on shingrix when on moderate to high immunosuppression reviewed.  I explained that Shingrix is used off label when under 50 years old and that the safety and efficacy of the vaccine has not been tested in people younger than 50 years old.   - Influenza: up to date  - [id number]: up to date  - [id number]: up to date   - Shingrix: Prescription called to the [facility] in [place]    Ms. Weinberg verbalized agreement with and understanding of the rational for the diagnosis and treatment plan.  All questions were answered to best of my ability and the patient's satisfaction. Ms. Weinberg was advised to contact the clinic with any questions that may arise after the clinic visit.      Thank you for involving me in the care of the patient    Return to clinic: 3-4 months      HPI   Aleida Digna Weinberg is a 33 year old female with medical history significant for intermittent asthma, anxiety, migraines, vitamin D deficiency, and systemic lupus erythematosus who presents for follow-up of SLE.     Today, Ms. Weinberg reports that she had a few days of a pulsating sensation in the left buttock that was not worse or better with standing or laying down.  It occurred for about 3 days and then resolved on its own.  No pain.  She says that she called the clinic and was told that she may be hypersensitive because of her lupus diagnosis.  I explained today that it is unlikely lupus related.  Otherwise doing well.  No joint pain.  Is not using amlodipine yet because amlodipine was recalled but she is not sure if she had the branch that was recalled.  Increased stress at work.    Denies fevers, chills, nausea, vomiting, constipation, diarrhea. No abdominal pain. No  chest pain/pressure, palpitations, or shortness of breath. No oral or nasal sores. No neck pain. No LE swelling.  Has photosensitivity but no photophobia.  Mild dry eyes and dry mouth; she uses artificial tears as needed.  No eye pain or redness. Denies history of serositis. Denies history of DVT, pulmonary embolism, or miscarriage.    Tobacco: quit smoking in   EtOH: Once monthly  Drugs: None  Occupation: Works at [employer], a lot of typing per patient    ROS   GEN: No fevers, chills, night sweats, or weight change  SKIN: See HPI.   HEENT: Has a history of migraines, but no headache today. No change in vision, taste, or hearing. No epistaxis. No oral or nasal ulcers.  CV: No chest pain, pressure, palpitations, or dyspnea on exertion.  PULM: No SOB, wheeze, cough.  GI:  No nausea, vomiting, constipation, diarrhea. No blood in stool. No abdominal pain.  : No blood in urine.  MSK: See HPI.  NEURO: See history of present illness  ENDO: No heat/cold intolerance.  EXT: See history of present illness    Active Problem List     Patient Active Problem List   Diagnosis     Other kyphoscoliosis and scoliosis     Hypertrophic and atrophic condition of skin     Viral warts     Routine postpartum follow-up     CARDIOVASCULAR SCREENING; LDL GOAL LESS THAN 160     Intermittent asthma     Anxiety     Intractable menstrual migraine without status migrainosus     Vitamin D deficiency     Inflammatory polyarthropathy (H)     Chronic back pain     Raynaud's phenomenon without gangrene     Systemic lupus erythematosus (H)     High risk medications (not anticoagulants) long-term use (Plaquenil and AZA)     Dry eyes, bilateral     Past Medical History     Past Medical History:   Diagnosis Date     Mild intermittent asthma      Other kyphoscoliosis and scoliosis     upper back curvature and disc degeneration in lower back.     Past Surgical History     Past Surgical History:   Procedure Laterality Date      SECTION        SURGICAL HISTORY OF -       PE Tubes     Allergy     Allergies   Allergen Reactions     Fluconazole Rash     Current Medication List     Current Outpatient Prescriptions   Medication Sig     albuterol (PROAIR HFA/PROVENTIL HFA/VENTOLIN HFA) 108 (90 BASE) MCG/ACT Inhaler Inhale 2 puffs into the lungs every 6 hours as needed for shortness of breath / dyspnea     azaTHIOprine (IMURAN) 50 MG tablet Take 2 tablets (100 mg) by mouth daily     Cholecalciferol (VITAMIN D) 2000 UNITS tablet Take 2,000 Units by mouth daily     cyclobenzaprine (FLEXERIL) 10 MG tablet Take 0.5-1 tablets (5-10 mg) by mouth 3 times daily as needed for muscle spasms     hydroxychloroquine (PLAQUENIL) 200 MG tablet Hydroxychloroquine 200mg daily; and an additional 200mg every other day.     hydrOXYzine (ATARAX) 25 MG tablet Take 0.5-2 tablets (12.5-50 mg) by mouth every 6 hours as needed for anxiety     levonorgestrel (MIRENA, 52 MG,) 20 MCG/24HR IUD 1 each by Intrauterine route once     LORazepam (ATIVAN) 0.5 MG tablet Take 1 tablet (0.5 mg) by mouth every 8 hours as needed for anxiety     omeprazole (PRILOSEC) 20 MG CR capsule Take 20 mg by mouth daily     traZODone (DESYREL) 50 MG tablet Take 0.5 tablets (25 mg) by mouth nightly as needed for sleep     amLODIPine (NORVASC) 10 MG tablet Take 1 tablet (10 mg) by mouth daily (Patient not taking: Reported on 12/7/2018)     predniSONE (DELTASONE) 5 MG tablet Take 1 tablet (5 mg) by mouth daily as needed for lupus (Patient not taking: Reported on 12/7/2018.)     No current facility-administered medications for this visit.          Social History   See HPI    Family History     Family History   Problem Relation Age of Onset     Heart Disease Maternal Grandfather      Bypass, Heart Disease     Respiratory Maternal Grandfather      asthma     C.A.D. Paternal Grandfather      Heart Disease Maternal Uncle      MI's      Hypertension Paternal Grandmother      Cancer Paternal Grandmother      lymph nodes  "    Respiratory Other      cousin on mothers side, asthma     Alcohol/Drug Maternal Uncle      Alcohol/Drug Other      bother great grandparents on mother's side     Diabetes No family hx of      Breast Cancer No family hx of      Cancer - colorectal No family hx of      Denies family history of autoimmune disease    Physical Exam     Temp Readings from Last 3 Encounters:   12/07/18 98.6  F (37  C) (Oral)   10/26/18 98.2  F (36.8  C) (Oral)   01/25/18 97.4  F (36.3  C) (Oral)     BP Readings from Last 5 Encounters:   12/07/18 100/62   10/26/18 106/68   08/30/18 114/74   05/04/18 115/77   01/25/18 100/62     Pulse Readings from Last 1 Encounters:   12/07/18 80     Resp Readings from Last 1 Encounters:   08/30/18 16     Estimated body mass index is 24.71 kg/(m^2) as calculated from the following:    Height as of 10/26/18: 1.705 m (5' 7.13\").    Weight as of this encounter: 71.8 kg (158 lb 6.4 oz).    GEN: NAD  HEENT: MMM. No oral lesions. Anicteric, noninjected sclera  CV: S1, S2. RRR. No m/r/g.  PULM: CTA bilaterally. No w/c.  MSK: MCPs, PIPs, DIPs, wrists, elbows, shoulders, knees, ankles, and MTPs without swelling or tenderness to palpation.  Hips nontender to palpation.   NEURO: UE and LE strengths 5/5 and equal bilaterally.   SKIN: Normal fingertip pulp; no evidence of current or previous infarcts to the distal fingertips by visual inspection. Tattoos present.  No rash.  EXT: No LE edema  PSYCH: Alert. Appropriate.    Labs / Imaging (select studies)   RF/CCP  Recent Labs   Lab Test  04/22/16   0902  04/01/16   0910   CCPIGG  1   --    RHF   --   <20     CHANELL  Recent Labs   Lab Test  04/22/16   0902  04/01/16   0910   VALERIE   --   12.4*   ANAIGG  >1:61170  Reference range: <1:40  (Note)  Speckled pattern.  INTERPRETIVE INFORMATION: CHANELL by IFA, IgG  Anti-nuclear antibodies (CHANELL) are seen in a variety of  systemic rheumatic diseases and are determined by indirect  fluorescence assay (IFA) using HEp-2 substrate with " an  IgG-specific conjugate. CHANELL titers less than or equal to  1:80 have variable relevance while titers greater than or  equal to 1:160 are considered clinically significant. These  antibodies may precede clinical disease onset; however,  healthy individuals and those with advanced age have been  reported to be positive for CHANELL. When observed, one of the  five basic patterns is reported: homogeneous,  peripheral/rim, speckled, centromere, or nucleolar. If  cytoplasmic fluorescence is observed, it is noted. IFA  methodology is subjective and has occasionally been shown  to lack sensitivity for anti-SSA/Ro antibodies.  Negative results do not necessarily rule out the presence  of SSc. If clinical suspicion remains, consi vicki further  testing for U3-RNP, PM/Scl, or Th/To antibodies associated  with SSc.  Performed by [email or web address],  [address] [place] [id number] [phone]  [email or web address] Twin Rodriguez MD, Lab. Director     --      RNP/Sm/SSA/SSB  Recent Labs   Lab Test  01/12/18   0828  04/22/16   0902   RNPIGG   --   >8.0  Positive   Antibody index (AI) values reflect qualitative changes in antibody   concentration that cannot be directly associated with clinical condition or   disease state.  *   SMIGG   --   1.5*   SSAIGG   --   <0.2  Negative   Antibody index (AI) values reflect qualitative changes in antibody   concentration that cannot be directly associated with clinical condition or   disease state.     SSBIGG   --   <0.2  Negative   Antibody index (AI) values reflect qualitative changes in antibody   concentration that cannot be directly associated with clinical condition or   disease state.     SCLIGG   --   3.1*   TREPAB  Negative   --      dsDNA  Recent Labs   Lab Test  11/29/18   1616  08/23/18   1637  04/26/18   1648  01/18/18   1639  10/12/17   1642  07/17/17   1707  04/13/17   1650  01/20/17   0828  10/26/16   0919   DNA  2  2  2  2  2  1  2  1  1     C3/C4  Recent Labs   Lab Test   11/29/18   1616  08/23/18   1637  04/26/18   1648  01/18/18   1639  10/12/17   1642  07/17/17   1707  04/13/17   1650  01/20/17   0828  10/26/16   0919   Z1HMSNA  82  66*  71*  76  70*  85  87  93  110   [id number]  13*  13*  14*  14*  14*  14*  16  16  16     Send-out Labs  Recent Labs   Lab Test  04/21/17   0813   [facility]  SEE NOTE  (Note)  Test name                    Result Flag  Units  RefIntvl  ------------------------------------------------------------  Thiopurine Methyltransferase                                23.2 L      U/mL 24.0-44.0  Sample slightly hemolyzed. Please interpret the results  with caution.  INTERPRETIVE INFORMATION: Thiopurine Methyltransferase, RBC  Normal TPMT activity:  24.0-44.0 U/mL................Individuals are predicted to  be at low risk of bone marrow toxicity (myelosuppression)  as a consequence of standard thiopurine therapy; no dose  adjustment is recommended.  Intermediate TPMT activity:  17.0-23.9 U/mL................Individuals are predicted to  be at intermediate risk of bone marrow toxicity  (myelosuppression) as a consequence of standard thiopurine  therapy; a dose reduction and therapeutic drug management  is recommended.  Low TPMT activity:  less than 17.0 U/mL...........Individuals are predicted to  be at high risk of bone marrow toxicity (myelosuppression)   as a consequence of standard thiopurine dosing. It is  recommended to avoid the use of thiopurine drugs.  High TPMT activity:  greater than 44.0 U/mL........Individuals are not predicted  to be at risk for bone marrow toxicity (myelosuppression)  as a consequence of standard thiopurine dosing, but may be  at risk for therapeutic failure due to excessive  inactivation of thiopurine drugs. Individuals may require  higher than the normal standard dose. Therapeutic drug  management is recommended.  The TPMT, RBC assay is used as a screen to detect  individuals with low and intermediate TPMT activity who may  be at risk  for myelosuppression when exposed to standard  doses of thiopurines, including azathioprine (Imuran) and  6-mercaptopurine (Purinethol). TPMT is the primary  metabolic route for inactivation of thiopurine drugs in the  bone marrow. When TPMT activity is low, it is predicted  that proportionately more 6-mercaptopurine can be converted  into the cytotoxic 6-thioguanine nucle otides that  accumulate in the bone marrow causing excessive toxicity.  The activity of TPMT is measured by the nanomoles of  6-methylmercaptopurine (inactive metabolite) produced per 1  mL of packed red blood cells, (U/mL).    TPMT phenotype testing does not replace the need for  clinical monitoring of patients treated with thiopurine  drugs. Genotype for TPMT cannot be inferred from TPMT  activity (phenotype). Phenotype testing should not be  requested for patients currently treated with thiopurine  drugs. Current TPMT phenotype may not reflect future TPMT  phenotype, particularly in patients who received blood  transfusion within 30-60 days of testing.  TPMT enzyme  activity can be inhibited by several drugs such as:  naproxen (Aleve), ibuprofen (Advil, Motrin), ketoprofen  (Orudis), furosemide (Lasix), sulfasalazine (Azulfidine),  mesalamine (Asacol), olsalazine (Dipentum), mefenamic acid  (Ponstel), thiazide diuretics, and benzoic acid inhibitors.  TPMT inhibitors may contribute t o falsely low results;  patients should abstain from these drugs for at least 48  hours prior to TPMT testing. Falsely low results may also  occur as a result of inappropriate specimen handling and  hemolysis.  Test developed and characteristics determined by [email or web address]. See Compliance Statement B: [email or web address]  Performed by [email or web address],  [address] [place] [address] [phone]  [email or web address], Lionel Ferreira MD, Lab. Director     STSTNM  Thiopurine Methyltransferase, RBC   STSTCD  73917   SSPTYP  Whole blood, EDTA anticoagulant      Antiphospholipid Antibodies  Recent Labs   Lab Test  04/22/16   0902   B2GPG  0.9   B2GPM  <0.9  Negative     CARG  <15.0  Interpretation:  Negative     JOANN  <12.5  Interpretation:  Negative     LUPINT  Negative  (Note)  COMMENTS:  The INR is normal.  APTT ratio is normal.  DRVVT Screen ratio is normal.  Thrombin time is normal.  NEGATIVE TEST; A LUPUS ANTICOAGULANT WAS NOT DETECTED IN THIS  SPECIMEN WITHIN THE LIMITS OF THE TESTING REPERTOIRE.  If the clinical picture is strongly suggestive of an antiphospholipid  syndrome, recommend anticardiolipin and beta-2-glycoprotein (IgG and  IgM) antibody tests.  Isabella Olson M.D.  [phone]  4/25/2016    INR =  1.05    Reference range: 0.86-1.14  Thrombin Time= 15.4    Reference range: 13.0-19.0 sec    APTT:       Ratio  Patient  =  0.92  1:2 Mix  =  N/A  Reference:  Negative: Less than or equal to 1.16  Positive: Greater than or equal to 1.17     DILUTE LISA VIPER VENOM TEST:  Screen Ratio = 0.95   Normal is less than 1.21         CBC  Recent Labs   Lab Test  11/29/18 1616 08/23/18   1637 04/26/18   1648   WBC  3.5*  2.8*  3.2*   RBC  4.00  3.57*  3.70*   HGB  13.9  13.1  13.7   HCT  39.5  36.5  36.1   MCV  99  102*  98   RDW  11.8  13.0  13.2   PLT  204  235  222   MCH  34.8*  36.7*  37.0*   MCHC  35.2  35.9  38.0*   NEUTROPHIL  60.7  63.9  57.8   LYMPH  23.8  24.9  26.4   MONOCYTE  13.9  10.1  14.2   EOSINOPHIL  0.8  0.7  1.3   BASOPHIL  0.8  0.4  0.3   ANEU  2.1  1.8  1.8   ALYM  0.8  0.7*  0.8   AMANDA  0.5  0.3  0.5   AEOS  0.0  0.0  0.0   ABAS  0.0  0.0  0.0     CMP  Recent Labs   Lab Test  11/29/18   1616 08/23/18   1637 04/26/18   1648  01/18/18   1639  01/12/18   0828  12/28/17   1330   NA  137  137  137  140   --   133   POTASSIUM  3.9  3.9  3.9  3.8   --   4.2   CHLORIDE  104  104  106  107   --   99   CO2  25  25  22  25   --   24   ANIONGAP  8  8  9  8   --   10   GLC  82  79  67*  74  73  96   BUN  10  8  10  9   --   11   CR  0.75   0.65  0.53  0.58   --   0.57   GFRESTIMATED  89  >90  >90  >90   --   >90   GFRESTBLACK  >90  >90  >90  >90   --   >90   SRINIVAS  9.2  9.0  9.3  8.9   --   9.4   BILITOTAL  0.6  1.2  1.1  0.5   --   1.0   ALBUMIN  4.6  4.6  4.6  4.5   --   4.0   PROTTOTAL  7.9  7.5  8.0  7.6   --   7.7   ALKPHOS  40  45  42  45   --   52   AST  22  24  21  24   --   14   ALT  32  31  27  26   --   15     Calcium/VitaminD  Recent Labs   Lab Test  11/29/18   1616 08/23/18   1637  04/26/18   1648   02/20/17   1640   10/26/16   0919   04/01/16   0910   SRINIVAS  9.2  9.0  9.3   < >   --    < >  9.3   < >   --    VITDT   --    --    --    --   33   --   23   --   <13  Season, race, dietary intake, and treatment affect the concentration of   25-hydroxy-Vitamin D. Values may decrease during winter months and increase   during summer months. Values 20-29 ug/L may indicate Vitamin D insufficiency   and values <20 ug/L may indicate Vitamin D deficiency.   Vitamin D determination is routinely performed by an immunoassay specific for   25 hydroxyvitamin D3.  If an individual is on vitamin D2 (ergocalciferol)   supplementation, please specify 25 OH vitamin D2 and D3 level determination by   LCMSMS test VITD23.  *    < > = values in this interval not displayed.     ESR/CRP  Recent Labs   Lab Test  11/29/18 1616 08/23/18   1637  04/26/18   1648   SED  9  11  13   CRP  <2.9  <2.9  <2.9     CK/Aldolase  Recent Labs   Lab Test  11/29/18 1616 08/23/18   1637  04/26/18   1648   04/22/16   0902   CKT  61  54  51   < >  45   ALDOLASE   --    --    --    --   4.5    < > = values in this interval not displayed.     TSH/T4  Recent Labs   Lab Test  04/01/16   0910   TSH  1.57     Hepatitis B  Recent Labs   Lab Test  04/22/16   0902   HBCAB  Nonreactive   HEPBANG  Nonreactive     Hepatitis C  Recent Labs   Lab Test  10/26/18   0836  01/12/18   0828  04/22/16   0902   HCVAB  Nonreactive  Nonreactive  Nonreactive   Assay performance characteristics have not been  established for newborns,   infants, and children       Lyme ab screening  Recent Labs   Lab Test  04/01/16   0910   LYMEGM  0.12     HIV Screening  Recent Labs   Lab Test  10/26/18   0836  01/12/18   0828  04/22/16   0902   HIAGAB  Nonreactive  Nonreactive  Nonreactive   HIV-1 p24 Ag & HIV-1/HIV-2 Ab Not Detected       UA  Recent Labs   Lab Test  11/29/18   1617  08/23/18   1638  04/26/18   1648  01/18/18   1640  01/12/18 0836  12/28/17   0145  10/12/17   1643   01/20/17   0827   COLOR  Yellow  Yellow  Yellow  Yellow  Yellow  Yellow  Yellow   < >  Yellow   APPEARANCE  Clear  Clear  Clear  Clear  Clear  Clear  Clear   < >  Clear   URINEGLC  Negative  Negative  Negative  Negative  Negative  Negative  Negative   < >  Negative   URINEBILI  Negative  Negative  Negative  Negative  Negative  Moderate*  Negative   < >  Negative   SG  1.010  1.010  1.010  1.020  1.025  >1.030  1.010   < >  >1.030   URINEPH  7.0  7.5*  7.0  7.0  6.5  6.0  6.5   < >  6.0   PROTEIN  Negative  Negative  Negative  Negative  Trace*  100*  Negative   < >  Trace*   UROBILINOGEN  0.2  0.2  0.2  0.2  0.2  4.0*  0.2   < >  0.2   NITRITE  Negative  Negative  Negative  Negative  Negative  Positive*  Negative   < >  Negative   UBLD  Negative  Negative  Negative  Negative  Negative  Negative  Trace*   < >  Negative   LEUKEST  Negative  Negative  Negative  Negative  Negative  Negative  Trace*   < >  Negative   WBCU   --    --   0 - 5  O - 2  O - 2  O - 2  O - 2   < >  O - 2   RBCU   --    --   O - 2  O - 2  O - 2  O - 2  O - 2   < >  O - 2   SQUAMOUSEPI   --    --    --   Few  Moderate*  Moderate*  Few   < >  Few   BACTERIA   --    --    --    --   Moderate*  Moderate*  Few*   --   Few*   MUCOUS   --    --    --    --   Present*  Present*   --    --   Present*    < > = values in this interval not displayed.     Urine Microscopic  Recent Labs   Lab Test  04/26/18   1648  01/18/18   1640  01/12/18   0836  12/28/17   0145  10/12/17   1643   01/20/17   0827    WBCU  0 - 5  O - 2  O - 2  O - 2  O - 2   < >  O - 2   RBCU  O - 2  O - 2  O - 2  O - 2  O - 2   < >  O - 2   SQUAMOUSEPI   --   Few  Moderate*  Moderate*  Few   < >  Few   BACTERIA   --    --   Moderate*  Moderate*  Few*   --   Few*   MUCOUS   --    --   Present*  Present*   --    --   Present*    < > = values in this interval not displayed.     Urine Protein  Recent Labs   Lab Test  11/29/18   1617  08/23/18   1638  04/26/18   1648   UTP  0.06  <0.05  <0.05   UTPG  0.12  Unable to calculate due to low value  Unable to calculate due to low value   UCRR  51  23  14     Immunization History     Immunization History   Administered Date(s) Administered     HPV 02/25/2010, 02/04/2011     HepB 06/13/1999, 08/20/1999, 02/05/2000     Historical DTP/aP 1986, 1985, 1986, 01/15/1988, 06/15/1991     Influenza (IIV3) PF 11/07/2011     Influenza Vaccine IM 3yrs+ 4 Valent IIV4 10/11/2016, 10/13/2017, 10/26/2018     MMR 05/15/1987, 09/15/1991     Mantoux Tuberculin Skin Test 07/15/1987, 01/19/2005     Meningococcal (Menomune ) 08/09/2004     OPV, trivalent, live 1986, 1986, 1986, 01/15/1988, 09/15/1991     Pneumo Conj 13-V (2010&after) 05/04/2018     Pneumococcal 23 valent 08/30/2018     TDAP Vaccine (Adacel) 01/21/2009, 02/25/2010          Chart documentation done in part with Dragon Voice recognition Software. Although reviewed after completion, some word and grammatical error may remain.    Lavon Light MD

## 2018-12-07 NOTE — MR AVS SNAPSHOT
After Visit Summary   12/7/2018    Aleida Weinberg    MRN: [id number]           Patient Information     Date Of Birth          1985        Visit Information        Provider Department      12/7/2018 10:00 AM Lavon Light MD [facility]        Today's Diagnoses     Other systemic lupus erythematosus with other organ involvement (H)           Follow-ups after your visit        Your next 10 appointments already scheduled     Apr 04, 2019  4:30 PM CDT   LAB with [facility]   [facility] ([facility])    [address] [place] [id number] [phone]           Please do not eat 10-12 hours before your appointment if you are coming in fasting for labs on lipids, cholesterol, or glucose (sugar). This does not apply to pregnant women. Water, hot tea and black coffee (with nothing added) are okay. Do not drink other fluids, diet soda or chew gum.            Apr 12, 2019  8:00 AM CDT   Return Visit with Lavon Light MD   [facility] ([facility] [place])    [address] [place] [address]  [place] [address]   [phone]              Future tests that were ordered for you today     Open Future Orders        Priority Expected Expires Ordered    CBC with platelets differential Routine 2/27/2019 3/29/2019 12/7/2018    Comprehensive metabolic panel Routine 2/27/2019 3/29/2019 12/7/2018    CRP inflammation Routine 2/27/2019 3/29/2019 12/7/2018    Erythrocyte sedimentation rate auto Routine 2/27/2019 3/29/2019 12/7/2018    UA reflex to Microscopic and Culture Routine 2/27/2019 3/29/2019 12/7/2018    Protein  random urine with Creat Ratio Routine 2/27/2019 3/29/2019 12/7/2018    CK total Routine 2/27/2019 3/29/2019 12/7/2018            Who to contact     If you have questions or need follow up information about today's clinic visit or your schedule please contact [facility] directly at [phone].  Normal or  non-critical lab and imaging results will be communicated to you by MyChart, letter or phone within 4 business days after the clinic has received the results. If you do not hear from us within 7 days, please contact the clinic through [employer] or phone. If you have a critical or abnormal lab result, we will notify you by phone as soon as possible.  Submit refill requests through [employer] or call your pharmacy and they will forward the refill request to us. Please allow 3 business days for your refill to be completed.          Additional Information About Your Visit        [employer] Information     [employer] gives you secure access to your electronic health record. If you see a primary care provider, you can also send messages to your care team and make appointments. If you have questions, please call your primary care clinic.  If you do not have a primary care provider, please call [phone] and they will assist you.        Care EveryWhere ID     This is your Care EveryWhere ID. This could be used by other organizations to access your [facility] medical records  [id number]        Your Vitals Were     Pulse Temperature Pulse Oximetry BMI (Body Mass Index)          80 98.6  F (37  C) (Oral) 99% 24.71 kg/m2         Blood Pressure from Last 3 Encounters:   12/07/18 100/62   10/26/18 106/68   08/30/18 114/74    Weight from Last 3 Encounters:   12/07/18 71.8 kg (158 lb 6.4 oz)   10/26/18 70.3 kg (155 lb)   08/30/18 70.9 kg (156 lb 3.2 oz)                 Where to get your medicines      These medications were sent to [facility] [address] [place] [address].  [address] [place] [id number]     Phone:  [phone]     azaTHIOprine 50 MG tablet    hydroxychloroquine 200 MG tablet          Primary Care Provider Office Phone # Fax #    Tatum Dove PA-C [phone]       [address] [place] [address]        Equal Access to Services     CACHORRO MADRIGAL AH:  Hadii aad tuan goodwinmaryo Somaryali, waaxda luqadaha, qaybta kaalmada jody, mira zuriin hayaan maritamarie ivey laamarjittyrone jamey. So [facility] [phone].    ATENCIÓN: Si paige lovelace, tiene a rey disposición servicios gratuitos de asistencia lingüística. Tiagoame al [phone].    We comply with applicable federal civil rights laws and [place] laws. We do not discriminate on the basis of race, color, national origin, age, disability, sex, sexual orientation, or gender identity.            Thank you!     [facility] you for [facility]  [facility] your [facility]. Our goal is always to provide you with excellent care. Hearing back from our patients is one way we can continue to improve our services. Please take a few minutes to complete the written survey that you may receive in the mail after your visit with us. Thank you!             Your Updated Medication List - Protect others around you: Learn how to safely use, store and throw away your medicines at www.disposemymeds.org.          This list is accurate as of 12/7/18 10:42 AM.  Always use your most recent med list.                   Brand Name Dispense Instructions for use Diagnosis    albuterol 108 (90 Base) MCG/ACT inhaler    PROAIR HFA/PROVENTIL HFA/VENTOLIN HFA    1 Inhaler    Inhale 2 puffs into the lungs every 6 hours as needed for shortness of breath / dyspnea    Intermittent asthma, uncomplicated       amLODIPine 10 MG tablet    NORVASC    90 tablet    Take 1 tablet (10 mg) by mouth daily    Raynaud's phenomenon without gangrene       azaTHIOprine 50 MG tablet    IMURAN    180 tablet    Take 2 tablets (100 mg) by mouth daily    Other systemic lupus erythematosus with other organ involvement (H)       cyclobenzaprine 10 MG tablet    FLEXERIL    30 tablet    Take 0.5-1 tablets (5-10 mg) by mouth 3 times daily as needed for muscle spasms    Acute midline low back pain with right-sided sciatica       hydroxychloroquine 200 MG tablet    PLAQUENIL    135 tablet     Hydroxychloroquine 200mg daily; and an additional 200mg every other day.    Other systemic lupus erythematosus with other organ involvement (H)       hydrOXYzine 25 MG tablet    ATARAX    30 tablet    Take 0.5-2 tablets (12.5-50 mg) by mouth every 6 hours as needed for anxiety    Anxiety       LORazepam 0.5 MG tablet    ATIVAN    15 tablet    Take 1 tablet (0.5 mg) by mouth every 8 hours as needed for anxiety    Anxiety       MIRENA (52 MG) 20 MCG/24HR IUD   Generic drug:  levonorgestrel      1 each by Intrauterine route once        omeprazole 20 MG DR capsule    priLOSEC     Take 20 mg by mouth daily        predniSONE 5 MG tablet    DELTASONE    90 tablet    Take 1 tablet (5 mg) by mouth daily as needed for lupus    Systemic lupus erythematosus (H), High risk medications (not anticoagulants) long-term use       traZODone 50 MG tablet    DESYREL    30 tablet    Take 0.5 tablets (25 mg) by mouth nightly as needed for sleep    Other insomnia       vitamin D3 2000 units tablet    CHOLECALCIFEROL    100 tablet    Take 2,000 Units by mouth daily    Vitamin D deficiency, Other systemic lupus erythematosus with other organ involvement (H)

## 2018-12-07 NOTE — NURSING NOTE
"Chief Complaint   Patient presents with     RECHECK     Lupus-pulsing sensation in left leg, More bodyaches following exercise.        Initial /62  Pulse 80  Temp 98.6  F (37  C) (Oral)  Wt 71.8 kg (158 lb 6.4 oz)  SpO2 99%  BMI 24.71 kg/m2 Estimated body mass index is 24.71 kg/(m^2) as calculated from the following:    Height as of 10/26/18: 1.705 m (5' 7.13\").    Weight as of this encounter: 71.8 kg (158 lb 6.4 oz).  BP completed using cuff size: regular         RAPID3 (0-30) Cumulative Score  15.7          RAPID3 Weighted Score (divide #4 by 3 and that is the weighted score)  5.23         "

## 2019-01-22 ENCOUNTER — MYC MEDICAL ADVICE (OUTPATIENT)
Dept: FAMILY MEDICINE | Facility: CLINIC | Age: 34
End: 2019-01-22

## 2019-01-22 DIAGNOSIS — Z13.220 SCREENING CHOLESTEROL LEVEL: ICD-10-CM

## 2019-01-22 DIAGNOSIS — Z13.1 SCREENING FOR DIABETES MELLITUS: Primary | ICD-10-CM

## 2019-01-22 NOTE — TELEPHONE ENCOUNTER
[employer] message sent to patient regarding sending forms to us to review.    Patient is up to date with physical, may need lab only visit for form to be completed.    Bernadine Caldwell,

## 2019-01-24 NOTE — TELEPHONE ENCOUNTER
Form placed in Tatum Dove's 'MA' folder.    Flagging for RN to order cholesterol and glucose tests. Flag for TC to contact patient to schedule lab appt once orders are placed.    Thanks!  Eddie Pina

## 2019-01-25 NOTE — TELEPHONE ENCOUNTER
Pended labs, Route to PCP to sign. Will need to let patient know these were ordered.    Eddie Manley RN

## 2019-01-29 ENCOUNTER — TELEPHONE (OUTPATIENT)
Dept: FAMILY MEDICINE | Facility: CLINIC | Age: 34
End: 2019-01-29

## 2019-01-29 NOTE — TELEPHONE ENCOUNTER
Patient left [facility] [employer] form to be completed after labs are completed.    Patient has yet to make a lab appointment.    Forms placed in Tatum's MA folder.    Nani Mae, Certified Medical Assistant (AAMA)

## 2019-02-08 DIAGNOSIS — Z13.1 SCREENING FOR DIABETES MELLITUS: ICD-10-CM

## 2019-02-08 DIAGNOSIS — Z13.220 SCREENING CHOLESTEROL LEVEL: ICD-10-CM

## 2019-02-08 LAB
CHOLEST SERPL-MCNC: 140 MG/DL
GLUCOSE SERPL-MCNC: 84 MG/DL (ref 70–99)
HDLC SERPL-MCNC: 44 MG/DL
LDLC SERPL CALC-MCNC: 86 MG/DL
NONHDLC SERPL-MCNC: 96 MG/DL
TRIGL SERPL-MCNC: 52 MG/DL

## 2019-02-08 PROCEDURE — 36415 COLL VENOUS BLD VENIPUNCTURE: CPT | Performed by: PHYSICIAN ASSISTANT

## 2019-02-08 PROCEDURE — 82947 ASSAY GLUCOSE BLOOD QUANT: CPT | Performed by: PHYSICIAN ASSISTANT

## 2019-02-08 PROCEDURE — 80061 LIPID PANEL: CPT | Performed by: PHYSICIAN ASSISTANT

## 2019-02-11 ENCOUNTER — MYC MEDICAL ADVICE (OUTPATIENT)
Dept: FAMILY MEDICINE | Facility: CLINIC | Age: 34
End: 2019-02-11

## 2019-02-12 NOTE — TELEPHONE ENCOUNTER
"Routing to PCP.  Patient is wanting you to complete her biometric form with blood work she had completed on 02/08/2019. Patient had completed her physical on 10/26/18.  On her biometric form it ask for blood pressure, height and weight. On form it states \"testing and measurements must be collected between 01/01/19 - 11/15/19.  Do you want patient to come in to have her BP and height and weight done??  Please advise??    Sunshine Noble      "

## 2019-02-13 NOTE — TELEPHONE ENCOUNTER
Waiting on patient to do ancillary appointment to get her blood pressure, weight and height.    Forms received from [employer] - physician results form for Tatum Dove PA-C.  Forms placed in provider 'sign me' folder.  Please fax forms to [phone] after completion.    Sunshine Noble  Patient Representative

## 2019-02-18 NOTE — TELEPHONE ENCOUNTER
Labs entered, patient needs to sign form before faxed.    Form placed in Tatum's sign me folder for her signature.    Nani Mae, Certified Medical Assistant (AAMA)

## 2019-04-04 DIAGNOSIS — M32.19 OTHER SYSTEMIC LUPUS ERYTHEMATOSUS WITH OTHER ORGAN INVOLVEMENT (H): ICD-10-CM

## 2019-04-04 LAB
ALBUMIN UR-MCNC: NEGATIVE MG/DL
APPEARANCE UR: CLEAR
BASOPHILS # BLD AUTO: 0 10E9/L (ref 0–0.2)
BASOPHILS NFR BLD AUTO: 0.6 %
BILIRUB UR QL STRIP: NEGATIVE
COLOR UR AUTO: YELLOW
CREAT UR-MCNC: 14 MG/DL
CRP SERPL-MCNC: <2.9 MG/L (ref 0–8)
DIFFERENTIAL METHOD BLD: ABNORMAL
EOSINOPHIL # BLD AUTO: 0 10E9/L (ref 0–0.7)
EOSINOPHIL NFR BLD AUTO: 1.3 %
ERYTHROCYTE [DISTWIDTH] IN BLOOD BY AUTOMATED COUNT: 12.1 % (ref 10–15)
ERYTHROCYTE [SEDIMENTATION RATE] IN BLOOD BY WESTERGREN METHOD: 8 MM/H (ref 0–20)
GLUCOSE UR STRIP-MCNC: NEGATIVE MG/DL
HCT VFR BLD AUTO: 39.2 % (ref 35–47)
HGB BLD-MCNC: 13.8 G/DL (ref 11.7–15.7)
HGB UR QL STRIP: NEGATIVE
KETONES UR STRIP-MCNC: NEGATIVE MG/DL
LEUKOCYTE ESTERASE UR QL STRIP: NEGATIVE
LYMPHOCYTES # BLD AUTO: 0.8 10E9/L (ref 0.8–5.3)
LYMPHOCYTES NFR BLD AUTO: 24 %
MCH RBC QN AUTO: 34.2 PG (ref 26.5–33)
MCHC RBC AUTO-ENTMCNC: 35.2 G/DL (ref 31.5–36.5)
MCV RBC AUTO: 97 FL (ref 78–100)
MONOCYTES # BLD AUTO: 0.4 10E9/L (ref 0–1.3)
MONOCYTES NFR BLD AUTO: 13.5 %
NEUTROPHILS # BLD AUTO: 1.9 10E9/L (ref 1.6–8.3)
NEUTROPHILS NFR BLD AUTO: 60.6 %
NITRATE UR QL: NEGATIVE
PH UR STRIP: 6.5 PH (ref 5–7)
PLATELET # BLD AUTO: 215 10E9/L (ref 150–450)
PROT UR-MCNC: <0.05 G/L
PROT/CREAT 24H UR: NORMAL G/G CR (ref 0–0.2)
RBC # BLD AUTO: 4.03 10E12/L (ref 3.8–5.2)
SOURCE: NORMAL
SP GR UR STRIP: <=1.005 (ref 1–1.03)
UROBILINOGEN UR STRIP-ACNC: 0.2 EU/DL (ref 0.2–1)
WBC # BLD AUTO: 3.1 10E9/L (ref 4–11)

## 2019-04-04 PROCEDURE — 86140 C-REACTIVE PROTEIN: CPT | Performed by: INTERNAL MEDICINE

## 2019-04-04 PROCEDURE — 81003 URINALYSIS AUTO W/O SCOPE: CPT | Performed by: INTERNAL MEDICINE

## 2019-04-04 PROCEDURE — 82550 ASSAY OF CK (CPK): CPT | Performed by: INTERNAL MEDICINE

## 2019-04-04 PROCEDURE — 85025 COMPLETE CBC W/AUTO DIFF WBC: CPT | Performed by: INTERNAL MEDICINE

## 2019-04-04 PROCEDURE — 80053 COMPREHEN METABOLIC PANEL: CPT | Performed by: INTERNAL MEDICINE

## 2019-04-04 PROCEDURE — 85652 RBC SED RATE AUTOMATED: CPT | Performed by: INTERNAL MEDICINE

## 2019-04-04 PROCEDURE — 36415 COLL VENOUS BLD VENIPUNCTURE: CPT | Performed by: INTERNAL MEDICINE

## 2019-04-04 PROCEDURE — 84156 ASSAY OF PROTEIN URINE: CPT | Performed by: INTERNAL MEDICINE

## 2019-04-05 LAB
ALBUMIN SERPL-MCNC: 4.5 G/DL (ref 3.4–5)
ALP SERPL-CCNC: 44 U/L (ref 40–150)
ALT SERPL W P-5'-P-CCNC: 21 U/L (ref 0–50)
ANION GAP SERPL CALCULATED.3IONS-SCNC: 7 MMOL/L (ref 3–14)
AST SERPL W P-5'-P-CCNC: 20 U/L (ref 0–45)
BILIRUB SERPL-MCNC: 0.5 MG/DL (ref 0.2–1.3)
BUN SERPL-MCNC: 9 MG/DL (ref 7–30)
CALCIUM SERPL-MCNC: 8.9 MG/DL (ref 8.5–10.1)
CHLORIDE SERPL-SCNC: 105 MMOL/L (ref 94–109)
CK SERPL-CCNC: 62 U/L (ref 30–225)
CO2 SERPL-SCNC: 25 MMOL/L (ref 20–32)
CREAT SERPL-MCNC: 0.79 MG/DL (ref 0.52–1.04)
GFR SERPL CREATININE-BSD FRML MDRD: >90 ML/MIN/{1.73_M2}
GLUCOSE SERPL-MCNC: 79 MG/DL (ref 70–99)
POTASSIUM SERPL-SCNC: 3.7 MMOL/L (ref 3.4–5.3)
PROT SERPL-MCNC: 7.6 G/DL (ref 6.8–8.8)
SODIUM SERPL-SCNC: 137 MMOL/L (ref 133–144)

## 2019-04-12 ENCOUNTER — OFFICE VISIT (OUTPATIENT)
Dept: RHEUMATOLOGY | Facility: CLINIC | Age: 34
End: 2019-04-12
Payer: COMMERCIAL

## 2019-04-12 VITALS
SYSTOLIC BLOOD PRESSURE: 102 MMHG | OXYGEN SATURATION: 94 % | HEIGHT: 67 IN | WEIGHT: 151.2 LBS | HEART RATE: 81 BPM | DIASTOLIC BLOOD PRESSURE: 71 MMHG | BODY MASS INDEX: 23.73 KG/M2

## 2019-04-12 DIAGNOSIS — M32.19 OTHER SYSTEMIC LUPUS ERYTHEMATOSUS WITH OTHER ORGAN INVOLVEMENT (H): ICD-10-CM

## 2019-04-12 DIAGNOSIS — I73.00 RAYNAUD'S PHENOMENON WITHOUT GANGRENE: ICD-10-CM

## 2019-04-12 PROCEDURE — 99213 OFFICE O/P EST LOW 20 MIN: CPT | Performed by: INTERNAL MEDICINE

## 2019-04-12 RX ORDER — HYDROXYCHLOROQUINE SULFATE 200 MG/1
TABLET, FILM COATED ORAL
Qty: 135 TABLET | Refills: 1 | Status: SHIPPED | OUTPATIENT
Start: 2019-04-12 | End: 2019-08-23

## 2019-04-12 RX ORDER — AMLODIPINE BESYLATE 10 MG/1
10 TABLET ORAL DAILY
Qty: 90 TABLET | Refills: 1 | Status: SHIPPED | OUTPATIENT
Start: 2019-04-12 | End: 2019-08-23

## 2019-04-12 RX ORDER — AZATHIOPRINE 50 MG/1
75 TABLET ORAL DAILY
Qty: 135 TABLET | Refills: 1 | Status: SHIPPED | OUTPATIENT
Start: 2019-04-12 | End: 2019-08-23

## 2019-04-12 ASSESSMENT — MIFFLIN-ST. JEOR: SCORE: 1425.53

## 2019-04-12 NOTE — PROGRESS NOTES
Rheumatology Clinic Visit      Aleida Weinberg MRN# [id number]   YOB: 1985 Age: 34 year old      Date of visit: 4/12/19   PCP: Tatum Dove     Chief Complaint   Patient presents with:  Systemic Lupus Erythematous: Patient is doing ok, still tired    Assessment and Plan   1. Systemic lupus erythematosus (CHANELL >1:[id number] speckled, RNP+, Sm+, Scl-70+, hypocomplementemia, photosensitivity, malar rash, arthritis, fatigue, Raynaud's phenomenon):  Dx'd 4/2016. Scl-70+; she lacks features of scleroderma at this time; no myopathy by hx of labs. 5/11/2018 echo without evidence of pulmonary hypertension. Previously on MTX 20mg SQ weekly (GI upset with PO doses above 15mg, partially effective) and SSZ (LFT elevations).  Currently on AZA 100mg daily (synovitis when on 100mg daily but after developing bacterial vaginosis and blepharitis the dose was reduced; she has not had those issues again synovitis did not come back) and HCQ 300mg daily. TPMT normal on 8/21/2017.  Doing well today so will reduce azathioprine again.  - Continue hydroxychloroquine 300mg daily (last eye exam done 9/8/2017)  - Reduce azathioprine to 75mg daily   - Labs 1 week prior to the next rheumatology clinic visit: CBC, CMP, ESR, CRP, CK, C3, C4, dsDNA, UA, Uprotein:creatinine    2. Raynaud's Phenomenon: Cold avoidance was insufficient for controlling her symptoms and therefore amlodipine was started. Uses amlodipine 10mg daily during cold months only.  Has not restarted amlodipine because of a recall on certain brands of amlodipine  - Continue Amlodipine 10mg daily during colder months    3. Chronic Back Pain: History of scoliosis. Degenerative findings on previously reviewed MRIs from 2000 and 2009. This has not changed for many years and does not worsen when her peripheral joint symptoms worsen. Had one episode of sciatica that resolved with chiropractic treatment.      4. Vitamin D deficiency: Currently on vitamin D 2000 units  daily.    - Continue vitamin D 2000 units daily    5.  Secondary Sjogren's syndrome: Mild dry and dry mouth that are treated infrequently with artificial tears and frequent sips of water.     6.  Fatigue: Worse during the workweek and better on weekends and plan on vacation.  She sleeps more when not working.  She also reports having a very stressful job.  She is going to try to identify things that she can change with regard to her work-related stress and if not better then I advised she see a mental health provider.    7.  Vaccinations: Vaccinations reviewed with Ms. Weinberg.     - Influenza: encouraged yearly vaccination  - Kvladjl21: up to date  - [id number]: up to date   - Shingrix: up to date per pt and [place] Immunization Information Connection    8.  Pulsating sensation in her left buttock: Advised that she follow-up with her PCP for evaluation of this.    Ms. Weinberg verbalized agreement with and understanding of the rational for the diagnosis and treatment plan.  All questions were answered to best of my ability and the patient's satisfaction. Ms. Weinberg was advised to contact the clinic with any questions that may arise after the clinic visit.      Thank you for involving me in the care of the patient    Return to clinic: 3-4 months      HPI   Aleida Digna Weinberg is a 34 year old female with medical history significant for intermittent asthma, anxiety, migraines, vitamin D deficiency, and systemic lupus erythematosus who presents for follow-up of SLE.     Today, Ms. Weinberg reports that she has been doing okay.  She is also pulsating sensation in her left buttock that correlates with her heartbeats.  Fatigue is worse during the week and she has a stressful job; she does much better on the weekend when she sleeps more and when she is on vacation as she was in [place] and felt great.  She does not we have want to see a mental health provider regarding the stress but will try to identify on her own things that  she can change to reduce her stress    Denies fevers, chills, nausea, vomiting, constipation, diarrhea. No abdominal pain. No chest pain/pressure, palpitations, or shortness of breath. No oral or nasal sores. No neck pain. No LE swelling.  Has photosensitivity but no photophobia.  Mild dry eyes and dry mouth; she uses artificial tears as needed.  No eye pain or redness. Denies history of serositis. Denies history of DVT, pulmonary embolism, or miscarriage.    Tobacco: quit smoking in 2005  EtOH: Once monthly  Drugs: None  Occupation: Works at [employer], a lot of typing per patient    ROS   GEN: No fevers, chills, night sweats, or weight change  SKIN: See HPI.   HEENT: Has a history of migraines, but no headache today. No change in vision, taste, or hearing. No epistaxis. No oral or nasal ulcers.  CV: No chest pain, pressure, palpitations, or dyspnea on exertion.  PULM: No SOB, wheeze, cough.  GI:  No nausea, vomiting, constipation, diarrhea. No blood in stool. No abdominal pain.  : No blood in urine.  MSK: See HPI.  NEURO: See history of present illness  ENDO: No heat/cold intolerance.  EXT: See history of present illness    Active Problem List     Patient Active Problem List   Diagnosis     Other kyphoscoliosis and scoliosis     Hypertrophic and atrophic condition of skin     Viral warts     Routine postpartum follow-up     CARDIOVASCULAR SCREENING; LDL GOAL LESS THAN 160     Intermittent asthma     Anxiety     Intractable menstrual migraine without status migrainosus     Vitamin D deficiency     Inflammatory polyarthropathy (H)     Chronic back pain     Raynaud's phenomenon without gangrene     Systemic lupus erythematosus (H)     High risk medications (not anticoagulants) long-term use (Plaquenil and AZA)     Dry eyes, bilateral     Past Medical History     Past Medical History:   Diagnosis Date     Mild intermittent asthma      Other kyphoscoliosis and scoliosis     upper back curvature and disc degeneration  in lower back.     Past Surgical History     Past Surgical History:   Procedure Laterality Date      SECTION  2006     SURGICAL HISTORY OF -       PE Tubes     Allergy     Allergies   Allergen Reactions     Fluconazole Rash     Current Medication List     Current Outpatient Medications   Medication Sig     albuterol (PROAIR HFA/PROVENTIL HFA/VENTOLIN HFA) 108 (90 BASE) MCG/ACT Inhaler Inhale 2 puffs into the lungs every 6 hours as needed for shortness of breath / dyspnea     azaTHIOprine (IMURAN) 50 MG tablet Take 2 tablets (100 mg) by mouth daily     Cholecalciferol (VITAMIN D) 2000 UNITS tablet Take 2,000 Units by mouth daily     cyclobenzaprine (FLEXERIL) 10 MG tablet Take 0.5-1 tablets (5-10 mg) by mouth 3 times daily as needed for muscle spasms     hydroxychloroquine (PLAQUENIL) 200 MG tablet Hydroxychloroquine 200mg daily; and an additional 200mg every other day.     hydrOXYzine (ATARAX) 25 MG tablet Take 0.5-2 tablets (12.5-50 mg) by mouth every 6 hours as needed for anxiety     levonorgestrel (MIRENA, 52 MG,) 20 MCG/24HR IUD 1 each by Intrauterine route once     LORazepam (ATIVAN) 0.5 MG tablet Take 1 tablet (0.5 mg) by mouth every 8 hours as needed for anxiety     amLODIPine (NORVASC) 10 MG tablet Take 1 tablet (10 mg) by mouth daily (Patient not taking: Reported on 2018)     omeprazole (PRILOSEC) 20 MG CR capsule Take 20 mg by mouth daily     predniSONE (DELTASONE) 5 MG tablet Take 1 tablet (5 mg) by mouth daily as needed for lupus (Patient not taking: Reported on 2018.)     traZODone (DESYREL) 50 MG tablet Take 0.5 tablets (25 mg) by mouth nightly as needed for sleep (Patient not taking: Reported on 2019)     No current facility-administered medications for this visit.          Social History   See HPI    Family History     Family History   Problem Relation Age of Onset     Heart Disease Maternal Grandfather         Bypass, Heart Disease     Respiratory Maternal Grandfather          "asthma     C.A.D. Paternal Grandfather      Heart Disease Maternal Uncle         MI's      Hypertension Paternal Grandmother      Cancer Paternal Grandmother         lymph nodes     Respiratory Other         cousin on mothers side, asthma     Alcohol/Drug Maternal Uncle      Alcohol/Drug Other         bother great grandparents on mother's side     Diabetes No family hx of      Breast Cancer No family hx of      Cancer - colorectal No family hx of      Denies family history of autoimmune disease    Physical Exam     Temp Readings from Last 3 Encounters:   12/07/18 98.6  F (37  C) (Oral)   10/26/18 98.2  F (36.8  C) (Oral)   01/25/18 97.4  F (36.3  C) (Oral)     BP Readings from Last 5 Encounters:   04/12/19 102/71   12/07/18 100/62   10/26/18 106/68   08/30/18 114/74   05/04/18 115/77     Pulse Readings from Last 1 Encounters:   04/12/19 81     Resp Readings from Last 1 Encounters:   08/30/18 16     Estimated body mass index is 23.59 kg/m  as calculated from the following:    Height as of this encounter: 1.705 m (5' 7.13\").    Weight as of this encounter: 68.6 kg (151 lb 3.2 oz).    GEN: NAD  HEENT: MMM. No oral lesions. Anicteric, noninjected sclera  CV: S1, S2. RRR. No m/r/g.  PULM: CTA bilaterally. No w/c.  MSK: MCPs, PIPs, DIPs, wrists, elbows, shoulders, knees, ankles, and MTPs without swelling or tenderness to palpation.  Hips nontender to palpation.   NEURO: UE and LE strengths 5/5 and equal bilaterally.   SKIN: Normal fingertip pulp; no evidence of current or previous infarcts to the distal fingertips by visual inspection. Tattoos present.  No rash.  No nail pitting.  EXT: No LE edema  PSYCH: Alert. Appropriate.    Labs / Imaging (select studies)   RF/CCP  Recent Labs   Lab Test 04/22/16  0902 04/01/16  0910   CCPIGG 1  --    RHF  --  <20     CHANELL  Recent Labs   Lab Test 04/22/16  0902 04/01/16  0910   VALERIE  --  12.4*   ANAIGG >1:72775  Reference range: <1:40  (Note)  Speckled pattern.  INTERPRETIVE " INFORMATION: CHANELL by IFA, IgG  Anti-nuclear antibodies (CHANELL) are seen in a variety of  systemic rheumatic diseases and are determined by indirect  fluorescence assay (IFA) using HEp-2 substrate with an  IgG-specific conjugate. CHANELL titers less than or equal to  1:80 have variable relevance while titers greater than or  equal to 1:160 are considered clinically significant. These  antibodies may precede clinical disease onset; however,  healthy individuals and those with advanced age have been  reported to be positive for CHANELL. When observed, one of the  five basic patterns is reported: homogeneous,  peripheral/rim, speckled, centromere, or nucleolar. If  cytoplasmic fluorescence is observed, it is noted. IFA  methodology is subjective and has occasionally been shown  to lack sensitivity for anti-SSA/Ro antibodies.  Negative results do not necessarily rule out the presence  of SSc. If clinical suspicion remains, consi vicki further  testing for U3-RNP, PM/Scl, or Th/To antibodies associated  with SSc.  Performed by [facility],  [address] [place] [address] [phone]  www.[email or web address], Twin Rodriguez MD, Lab. Director    --      RNP/Sm/SSA/SSB  Recent Labs   Lab Test 01/12/18  0828 04/22/16  0902   RNPIGG  --  >8.0  Positive   Antibody index (AI) values reflect qualitative changes in antibody   concentration that cannot be directly associated with clinical condition or   disease state.  *   SMIGG  --  1.5*   SSAIGG  --  <0.2  Negative   Antibody index (AI) values reflect qualitative changes in antibody   concentration that cannot be directly associated with clinical condition or   disease state.     SSBIGG  --  <0.2  Negative   Antibody index (AI) values reflect qualitative changes in antibody   concentration that cannot be directly associated with clinical condition or   disease state.     SCLIGG  --  3.1*   TREPAB Negative  --      dsDNA  Recent Labs   Lab Test 11/29/18  1616 08/23/18  1637 04/26/18  1648  01/18/18  1639 10/12/17  1642 07/17/17  1707 04/13/17  1650 01/20/17  0828 10/26/16  0919   DNA 2 2 2 2 2 1 2 1 1     C3/C4  Recent Labs   Lab Test 11/29/18  1616 08/23/18  1637 04/26/18  1648 01/18/18  1639 10/12/17  1642 07/17/17  1707 04/13/17  1650 01/20/17  0828 10/26/16  0919   O1YYKSL 82 66* 71* 76 70* 85 87 93 110   R5IDCOJ 13* 13* 14* 14* 14* 14* 16 16 16     RNA Polymerase III Antibody  No results for input(s): RNAPG in the last 57451 hours.  Histone Antibody  No results for input(s): HSTO in the last 81119 hours.  Send-out Labs  Recent Labs   Lab Test 04/21/17  0813   SRESLT SEE NOTE  (Note)  Test name                    Result Flag  Units  RefIntvl  ------------------------------------------------------------  Thiopurine Methyltransferase                                23.2 L      U/mL 24.0-44.0  Sample slightly hemolyzed. Please interpret the results  with caution.  INTERPRETIVE INFORMATION: Thiopurine Methyltransferase, RBC  Normal TPMT activity:  24.0-44.0 U/mL................Individuals are predicted to  be at low risk of bone marrow toxicity (myelosuppression)  as a consequence of standard thiopurine therapy; no dose  adjustment is recommended.  Intermediate TPMT activity:  17.0-23.9 U/mL................Individuals are predicted to  be at intermediate risk of bone marrow toxicity  (myelosuppression) as a consequence of standard thiopurine  therapy; a dose reduction and therapeutic drug management  is recommended.  Low TPMT activity:  less than 17.0 U/mL...........Individuals are predicted to  be at high risk of bone marrow toxicity (myelosuppression)   as a consequence of standard thiopurine dosing. It is  recommended to avoid the use of thiopurine drugs.  High TPMT activity:  greater than 44.0 U/mL........Individuals are not predicted  to be at risk for bone marrow toxicity (myelosuppression)  as a consequence of standard thiopurine dosing, but may be  at risk for therapeutic failure due to  excessive  inactivation of thiopurine drugs. Individuals may require  higher than the normal standard dose. Therapeutic drug  management is recommended.  The TPMT, RBC assay is used as a screen to detect  individuals with low and intermediate TPMT activity who may  be at risk for myelosuppression when exposed to standard  doses of thiopurines, including azathioprine (Imuran) and  6-mercaptopurine (Purinethol). TPMT is the primary  metabolic route for inactivation of thiopurine drugs in the  bone marrow. When TPMT activity is low, it is predicted  that proportionately more 6-mercaptopurine can be converted  into the cytotoxic 6-thioguanine nucle otides that  accumulate in the bone marrow causing excessive toxicity.  The activity of TPMT is measured by the nanomoles of  6-methylmercaptopurine (inactive metabolite) produced per 1  mL of packed red blood cells, (U/mL).    TPMT phenotype testing does not replace the need for  clinical monitoring of patients treated with thiopurine  drugs. Genotype for TPMT cannot be inferred from TPMT  activity (phenotype). Phenotype testing should not be  requested for patients currently treated with thiopurine  drugs. Current TPMT phenotype may not reflect future TPMT  phenotype, particularly in patients who received blood  transfusion within 30-60 days of testing.  TPMT enzyme  activity can be inhibited by several drugs such as:  naproxen (Aleve), ibuprofen (Advil, Motrin), ketoprofen  (Orudis), furosemide (Lasix), sulfasalazine (Azulfidine),  mesalamine (Asacol), olsalazine (Dipentum), mefenamic acid  (Ponstel), thiazide diuretics, and benzoic acid inhibitors.  TPMT inhibitors may contribute t o falsely low results;  patients should abstain from these drugs for at least 48  hours prior to TPMT testing. Falsely low results may also  occur as a result of inappropriate specimen handling and  hemolysis.  Test developed and characteristics determined by [facility]. See Compliance  Statement B: www.[facility].com/CS  Performed by [facility],  [address] [place] [address] [phone]  [email or web address], Lionel Ferreira MD, Lab. Director     STSTNM Thiopurine Methyltransferase, RBC   STSTCD 92,066   SSPTYP Whole blood, EDTA anticoagulant     Antiphospholipid Antibodies  Recent Labs   Lab Test 04/22/16  0902   B2GPG 0.9   B2GPM <0.9  Negative     CARG <15.0  Interpretation:  Negative     JOANN <12.5  Interpretation:  Negative     LUPINT Negative  (Note)  COMMENTS:  The INR is normal.  APTT ratio is normal.  DRVVT Screen ratio is normal.  Thrombin time is normal.  NEGATIVE TEST; A LUPUS ANTICOAGULANT WAS NOT DETECTED IN THIS  SPECIMEN WITHIN THE LIMITS OF THE TESTING REPERTOIRE.  If the clinical picture is strongly suggestive of an antiphospholipid  syndrome, recommend anticardiolipin and beta-2-glycoprotein (IgG and  IgM) antibody tests.  Isabella Olson M.D.  [phone]  4/25/2016    INR =  1.05    Reference range: 0.86-1.14  Thrombin Time= 15.4    Reference range: 13.0-19.0 sec    APTT:       Ratio  Patient  =  0.92  1:2 Mix  =  N/A  Reference:  Negative: Less than or equal to 1.16  Positive: Greater than or equal to 1.17     DILUTE LSIA VIPER VENOM TEST:  Screen Ratio = 0.95   Normal is less than 1.21         CBC  Recent Labs   Lab Test 04/04/19  1629 11/29/18  1616 08/23/18  1637   WBC 3.1* 3.5* 2.8*   RBC 4.03 4.00 3.57*   HGB 13.8 13.9 13.1   HCT 39.2 39.5 36.5   MCV 97 99 102*   RDW 12.1 11.8 13.0    204 235   MCH 34.2* 34.8* 36.7*   MCHC 35.2 35.2 35.9   NEUTROPHIL 60.6 60.7 63.9   LYMPH 24.0 23.8 24.9   MONOCYTE 13.5 13.9 10.1   EOSINOPHIL 1.3 0.8 0.7   BASOPHIL 0.6 0.8 0.4   ANEU 1.9 2.1 1.8   ALYM 0.8 0.8 0.7*   AMANDA 0.4 0.5 0.3   AEOS 0.0 0.0 0.0   ABAS 0.0 0.0 0.0     CMP  Recent Labs   Lab Test 04/04/19  1629 02/08/19  0713 11/29/18  1616 08/23/18  1637 04/26/18  1648 01/18/18  1639     --  137 137 137 140   POTASSIUM 3.7  --  3.9 3.9 3.9 3.8   CHLORIDE 105   --  104 104 106 107   CO2 25  --  25 25 22 25   ANIONGAP 7  --  8 8 9 8   GLC 79 84 82 79 67* 74   BUN 9  --  10 8 10 9   CR 0.79  --  0.75 0.65 0.53 0.58   GFRESTIMATED >90  --  89 >90 >90 >90   GFRESTBLACK >90  --  >90 >90 >90 >90   SRINIVAS 8.9  --  9.2 9.0 9.3 8.9   BILITOTAL 0.5  --  0.6 1.2 1.1 0.5   ALBUMIN 4.5  --  4.6 4.6 4.6 4.5   PROTTOTAL 7.6  --  7.9 7.5 8.0 7.6   ALKPHOS 44  --  40 45 42 45   AST 20  --  22 24 21 24   ALT 21  --  32 31 27 26     Calcium/VitaminD  Recent Labs   Lab Test 04/04/19 1629 11/29/18  1616 08/23/18  1637  02/20/17  1640  10/26/16  0919  04/01/16  0910   SRINIVAS 8.9 9.2 9.0   < >  --    < > 9.3   < >  --    VITDT  --   --   --   --  33  --  23  --  <13  Season, race, dietary intake, and treatment affect the concentration of   25-hydroxy-Vitamin D. Values may decrease during winter months and increase   during summer months. Values 20-29 ug/L may indicate Vitamin D insufficiency   and values <20 ug/L may indicate Vitamin D deficiency.   Vitamin D determination is routinely performed by an immunoassay specific for   25 hydroxyvitamin D3.  If an individual is on vitamin D2 (ergocalciferol)   supplementation, please specify 25 OH vitamin D2 and D3 level determination by   LCMSMS test VITD23.  *    < > = values in this interval not displayed.     ESR/CRP  Recent Labs   Lab Test 04/04/19 1629 11/29/18  1616 08/23/18  1637   SED 8 9 11   CRP <2.9 <2.9 <2.9     CK/Aldolase  Recent Labs   Lab Test 04/04/19 1629 11/29/18  1616 08/23/18  1637  04/22/16  0902   CKT 62 61 54   < > 45   ALDOLASE  --   --   --   --  4.5    < > = values in this interval not displayed.     TSH/T4  Recent Labs   Lab Test 04/01/16  0910   TSH 1.57     Lipid Panel  Recent Labs   Lab Test 02/08/19  0713 01/12/18  0828 02/09/17  0721 07/10/15  0814 09/13/13  0706   CHOL 140 157 159 149 139   TRIG 52 34 56 45 57   HDL 44* 59 42* 48* 52   LDL 86 91 106* 92 76   VLDL  --   --   --  9 11   CHOLHDLRATIO  --   --   --  3.1 2.7    [facility] 96 98 117  --   --      Hepatitis B  Recent Labs   Lab Test 04/22/16  0902   HBCAB Nonreactive   HEPBANG Nonreactive     Hepatitis C  Recent Labs   Lab Test 10/26/18  0836 01/12/18  0828 04/22/16  0902   HCVAB Nonreactive Nonreactive Nonreactive   Assay performance characteristics have not been established for newborns,   infants, and children       Lyme ab screening  Recent Labs   Lab Test 04/01/16  0910   LYMEGM 0.12     HIV Screening  Recent Labs   Lab Test 10/26/18  0836 01/12/18  0828 04/22/16  0902   HIAGAB Nonreactive Nonreactive Nonreactive   HIV-1 p24 Ag & HIV-1/HIV-2 Ab Not Detected       UA  Recent Labs   Lab Test 04/04/19  1638 11/29/18  1617 08/23/18  1638 04/26/18  1648 01/18/18  1640 01/12/18  0836 12/28/17  0145 10/12/17  1643  01/20/17  0827   COLOR Yellow Yellow Yellow Yellow Yellow Yellow Yellow Yellow   < > Yellow   APPEARANCE Clear Clear Clear Clear Clear Clear Clear Clear   < > Clear   URINEGLC Negative Negative Negative Negative Negative Negative Negative Negative   < > Negative   URINEBILI Negative Negative Negative Negative Negative Negative Moderate* Negative   < > Negative   SG <=1.005 1.010 1.010 1.010 1.020 1.025 >1.030 1.010   < > >1.030   URINEPH 6.5 7.0 7.5* 7.0 7.0 6.5 6.0 6.5   < > 6.0   PROTEIN Negative Negative Negative Negative Negative Trace* 100* Negative   < > Trace*   UROBILINOGEN 0.2 0.2 0.2 0.2 0.2 0.2 4.0* 0.2   < > 0.2   NITRITE Negative Negative Negative Negative Negative Negative Positive* Negative   < > Negative   UBLD Negative Negative Negative Negative Negative Negative Negative Trace*   < > Negative   LEUKEST Negative Negative Negative Negative Negative Negative Negative Trace*   < > Negative   WBCU  --   --   --  0 - 5 O - 2 O - 2 O - 2 O - 2   < > O - 2   RBCU  --   --   --  O - 2 O - 2 O - 2 O - 2 O - 2   < > O - 2   SQUAMOUSEPI  --   --   --   --  Few Moderate* Moderate* Few   < > Few   BACTERIA  --   --   --   --   --  Moderate* Moderate* Few*  --  Few*    MUCOUS  --   --   --   --   --  Present* Present*  --   --  Present*    < > = values in this interval not displayed.     Urine Microscopic  Recent Labs   Lab Test 04/26/18  1648 01/18/18  1640 01/12/18  0836 12/28/17  0145 10/12/17  1643  01/20/17  0827   WBCU 0 - 5 O - 2 O - 2 O - 2 O - 2   < > O - 2   RBCU O - 2 O - 2 O - 2 O - 2 O - 2   < > O - 2   SQUAMOUSEPI  --  Few Moderate* Moderate* Few   < > Few   BACTERIA  --   --  Moderate* Moderate* Few*  --  Few*   MUCOUS  --   --  Present* Present*  --   --  Present*    < > = values in this interval not displayed.     Urine Protein  Recent Labs   Lab Test 04/04/19  1638 11/29/18  1617 08/23/18  1638   UTP <0.05 0.06 <0.05   UTPG Unable to calculate due to low value 0.12 Unable to calculate due to low value   UCRR 14 51 23     Immunization History     Immunization History   Administered Date(s) Administered     HPV 02/25/2010, 02/04/2011     HepB 06/13/1999, 08/20/1999, 02/05/2000     Historical DTP/aP 1986, 1985, 1986, 01/15/1988, 06/15/1991     Influenza (IIV3) PF 11/07/2011     Influenza Vaccine IM 3yrs+ 4 Valent IIV4 10/11/2016, 10/13/2017, 10/26/2018     MMR 05/15/1987, 09/15/1991     Mantoux Tuberculin Skin Test 07/15/1987, 01/19/2005     Meningococcal (Menomune ) 08/09/2004     OPV, trivalent, live 1986, 1986, 1986, 01/15/1988, 09/15/1991     Pneumo Conj 13-V (2010&after) 05/04/2018     Pneumococcal 23 valent 08/30/2018     TDAP Vaccine (Adacel) 01/21/2009, 02/25/2010     Zoster vaccine recombinant adjuvanted (SHINGRIX) 12/13/2018, 03/04/2019          Chart documentation done in part with Dragon Voice recognition Software. Although reviewed after completion, some word and grammatical error may remain.    Lavon Light MD

## 2019-04-12 NOTE — PATIENT INSTRUCTIONS
Rheumatology    Dr. Lavon Light         [facility]   (Monday)  [address]  [place] [address]       [facility]   (Tuesday)  [address]  [place] [address]    [facility]   (Wed., Thurs., and Friday)  [address] [place] [address]    Phone number: [phone]  Thank you for choosing [facility].  Carissa Frye CMA

## 2019-04-12 NOTE — NURSING NOTE
RAPID3 (0-30) Cumulative Score  17.0          RAPID3 Weighted Score (divide #4 by 3 and that is the weighted score)  5.6       Carissa Frye [facility] Rheumatology  4/12/2019 7:58 AM

## 2019-04-29 ENCOUNTER — OFFICE VISIT (OUTPATIENT)
Dept: OPHTHALMOLOGY | Facility: CLINIC | Age: 34
End: 2019-04-29
Attending: INTERNAL MEDICINE
Payer: COMMERCIAL

## 2019-04-29 DIAGNOSIS — H52.13 MYOPIA OF BOTH EYES: ICD-10-CM

## 2019-04-29 DIAGNOSIS — Z79.899 HIGH RISK MEDICATION USE: Primary | ICD-10-CM

## 2019-04-29 PROCEDURE — 92083 EXTENDED VISUAL FIELD XM: CPT | Performed by: OPHTHALMOLOGY

## 2019-04-29 PROCEDURE — 92134 CPTRZ OPH DX IMG PST SGM RTA: CPT | Performed by: OPHTHALMOLOGY

## 2019-04-29 PROCEDURE — 92004 COMPRE OPH EXAM NEW PT 1/>: CPT | Performed by: OPHTHALMOLOGY

## 2019-04-29 ASSESSMENT — VISUAL ACUITY
METHOD: SNELLEN - LINEAR
CORRECTION_TYPE: CONTACTS
OD_CC+: -1
OS_CC: 20/20
OD_CC: 20/20
OD_CC: 20/20
OS_CC: 20/15

## 2019-04-29 ASSESSMENT — CONF VISUAL FIELD
OD_NORMAL: 1
OS_NORMAL: 1

## 2019-04-29 ASSESSMENT — REFRACTION_WEARINGRX
OS_SPHERE: -3.50
OD_SPHERE: -2.75
SPECS_TYPE: SVL
OD_CYLINDER: +0.50
OD_AXIS: 081
OS_CYLINDER: +0.50
OS_AXIS: 095

## 2019-04-29 ASSESSMENT — TONOMETRY
OD_IOP_MMHG: 17
OS_IOP_MMHG: 14
IOP_METHOD: TONOPEN

## 2019-04-29 ASSESSMENT — REFRACTION_MANIFEST
OD_AXIS: 090
OD_SPHERE: -3.25
OS_AXIS: 090
OS_SPHERE: -3.50
OD_CYLINDER: +0.50
OS_CYLINDER: +0.50

## 2019-04-29 ASSESSMENT — ENCOUNTER SYMPTOMS: JOINT SWELLING: 1

## 2019-04-30 ASSESSMENT — SLIT LAMP EXAM - LIDS
COMMENTS: NORMAL
COMMENTS: NORMAL

## 2019-04-30 ASSESSMENT — EXTERNAL EXAM - LEFT EYE: OS_EXAM: NORMAL

## 2019-04-30 ASSESSMENT — CUP TO DISC RATIO
OS_RATIO: 0.3
OD_RATIO: 0.3

## 2019-04-30 ASSESSMENT — EXTERNAL EXAM - RIGHT EYE: OD_EXAM: NORMAL

## 2019-04-30 NOTE — PROGRESS NOTES
Progress Notes         Current Eye Medications:  none      Subjective:  Here for Plaquenil tests. No issues with vision or vision changes. No eye pain. Starting 2nd year of Plaquenil now.      Objective:  See Ophthalmology Exam.       Assessment:  Aleida Weinberg is a 32 year old female who presents with:        Encounter Diagnoses   Name Primary?     High risk medications (not anticoagulants) long-term use (Plaquenil) Inman visual field (HVF) and OCT within normal limits both eyes.      Systemic lupus erythematosus, unspecified SLE type, unspecified organ involvement status (H)         Dry eyes, bilateral  Myopia        Plan:  Continue monitoring for Plaquenil retinal toxicity  Rx per MR (glasses)

## 2019-05-31 ENCOUNTER — OFFICE VISIT (OUTPATIENT)
Dept: FAMILY MEDICINE | Facility: CLINIC | Age: 34
End: 2019-05-31
Payer: COMMERCIAL

## 2019-05-31 VITALS
DIASTOLIC BLOOD PRESSURE: 62 MMHG | WEIGHT: 150.8 LBS | HEART RATE: 72 BPM | TEMPERATURE: 97.6 F | BODY MASS INDEX: 23.53 KG/M2 | SYSTOLIC BLOOD PRESSURE: 104 MMHG

## 2019-05-31 DIAGNOSIS — M32.19 OTHER SYSTEMIC LUPUS ERYTHEMATOSUS WITH OTHER ORGAN INVOLVEMENT (H): ICD-10-CM

## 2019-05-31 DIAGNOSIS — F41.9 ANXIETY: ICD-10-CM

## 2019-05-31 DIAGNOSIS — G47.09 OTHER INSOMNIA: ICD-10-CM

## 2019-05-31 DIAGNOSIS — J45.20 INTERMITTENT ASTHMA, UNCOMPLICATED: ICD-10-CM

## 2019-05-31 DIAGNOSIS — M54.41 ACUTE MIDLINE LOW BACK PAIN WITH RIGHT-SIDED SCIATICA: ICD-10-CM

## 2019-05-31 DIAGNOSIS — Z00.00 ENCOUNTER FOR ROUTINE ADULT HEALTH EXAMINATION WITHOUT ABNORMAL FINDINGS: Primary | ICD-10-CM

## 2019-05-31 PROCEDURE — 99395 PREV VISIT EST AGE 18-39: CPT | Performed by: PHYSICIAN ASSISTANT

## 2019-05-31 RX ORDER — LORAZEPAM 0.5 MG/1
0.5 TABLET ORAL EVERY 8 HOURS PRN
Qty: 15 TABLET | Refills: 0 | Status: SHIPPED | OUTPATIENT
Start: 2019-05-31 | End: 2020-10-16

## 2019-05-31 RX ORDER — HYDROXYZINE HYDROCHLORIDE 25 MG/1
12.5-5 TABLET, FILM COATED ORAL EVERY 6 HOURS PRN
Qty: 30 TABLET | Refills: 1 | Status: SHIPPED | OUTPATIENT
Start: 2019-05-31 | End: 2020-01-17

## 2019-05-31 RX ORDER — CYCLOBENZAPRINE HCL 10 MG
5-10 TABLET ORAL 3 TIMES DAILY PRN
Qty: 30 TABLET | Refills: 1 | Status: SHIPPED | OUTPATIENT
Start: 2019-05-31 | End: 2020-05-07

## 2019-05-31 RX ORDER — ALBUTEROL SULFATE 90 UG/1
2 AEROSOL, METERED RESPIRATORY (INHALATION) EVERY 6 HOURS PRN
Qty: 18 G | Refills: 1 | Status: SHIPPED | OUTPATIENT
Start: 2019-05-31 | End: 2021-10-15

## 2019-05-31 RX ORDER — TRAZODONE HYDROCHLORIDE 50 MG/1
25 TABLET, FILM COATED ORAL
Qty: 30 TABLET | Refills: 0 | Status: SHIPPED | OUTPATIENT
Start: 2019-05-31

## 2019-05-31 ASSESSMENT — ANXIETY QUESTIONNAIRES
7. FEELING AFRAID AS IF SOMETHING AWFUL MIGHT HAPPEN: SEVERAL DAYS
6. BECOMING EASILY ANNOYED OR IRRITABLE: NOT AT ALL
GAD7 TOTAL SCORE: 3
5. BEING SO RESTLESS THAT IT IS HARD TO SIT STILL: NOT AT ALL
IF YOU CHECKED OFF ANY PROBLEMS ON THIS QUESTIONNAIRE, HOW DIFFICULT HAVE THESE PROBLEMS MADE IT FOR YOU TO DO YOUR WORK, TAKE CARE OF THINGS AT HOME, OR GET ALONG WITH OTHER PEOPLE: SOMEWHAT DIFFICULT
1. FEELING NERVOUS, ANXIOUS, OR ON EDGE: SEVERAL DAYS
3. WORRYING TOO MUCH ABOUT DIFFERENT THINGS: NOT AT ALL
2. NOT BEING ABLE TO STOP OR CONTROL WORRYING: NOT AT ALL

## 2019-05-31 ASSESSMENT — ENCOUNTER SYMPTOMS
COUGH: 0
MYALGIAS: 0
DIZZINESS: 0
SHORTNESS OF BREATH: 0
HEMATOCHEZIA: 0
JOINT SWELLING: 0
NAUSEA: 0
HEMATURIA: 0
WEAKNESS: 0
ABDOMINAL PAIN: 0
FREQUENCY: 0
PARESTHESIAS: 0
CONSTIPATION: 0
NERVOUS/ANXIOUS: 0
PALPITATIONS: 0
BREAST MASS: 0
HEADACHES: 0
HEARTBURN: 0
DYSURIA: 0
CHILLS: 0
DIARRHEA: 0
SORE THROAT: 0
FEVER: 0
ARTHRALGIAS: 0
EYE PAIN: 0

## 2019-05-31 ASSESSMENT — PATIENT HEALTH QUESTIONNAIRE - PHQ9
5. POOR APPETITE OR OVEREATING: SEVERAL DAYS
SUM OF ALL RESPONSES TO PHQ QUESTIONS 1-9: 0

## 2019-05-31 NOTE — PATIENT INSTRUCTIONS
Sirisha Chirinos NP or Milagro Bryson NP    Thank you for trusting me with your care over the years!!      All the best,  Tatum Dove PA-C    Preventive Health Recommendations  Female Ages 26 - 39  Yearly exam:   See your health care provider every year in order to    Review health changes.     Discuss preventive care.      Review your medicines if you your doctor has prescribed any.    Until age 30: Get a Pap test every three years (more often if you have had an abnormal result).    After age 30: Talk to your doctor about whether you should have a Pap test every 3 years or have a Pap test with HPV screening every 5 years.   You do not need a Pap test if your uterus was removed (hysterectomy) and you have not had cancer.  You should be tested each year for STDs (sexually transmitted diseases), if you're at risk.   Talk to your provider about how often to have your cholesterol checked.  If you are at risk for diabetes, you should have a diabetes test (fasting glucose).  Shots: Get a flu shot each year. Get a tetanus shot every 10 years.   Nutrition:     Eat at least 5 servings of fruits and vegetables each day.    Eat whole-grain bread, whole-wheat pasta and brown rice instead of white grains and rice.    Get adequate Calcium and Vitamin D.     Lifestyle    Exercise at least 150 minutes a week (30 minutes a day, 5 days of the week). This will help you control your weight and prevent disease.    Limit alcohol to one drink per day.    No smoking.     Wear sunscreen to prevent skin cancer.    See your dentist every six months for an exam and cleaning.    [facility]   Discharged by : Nani GAUTHIER Certified Medical Assistant (AAMA)   Paper scripts provided to patient : 1   If you have any questions regarding to your visit please contact your care team:     Team Silver              Clinic Hours Telephone Number     Dr. Latoya Dove PA-C   7am-7pm  Monday - Thursday    7am-5pm  Fridays  [phone]   (Appointment scheduling available 24/7)     RN Line  [phone] option 2     Urgent Care - [place] and [place] - 11am-9pm Monday-Friday Saturday-Sunday- 9am-5pm     [place] -   5pm-9pm Monday-Friday Saturday-Sunday- 9am-5pm    [phone] - [place]    [phone] - [place]     For a Price Quote for your services, please call our Consumer Price Line at [phone].     What options do I have for visits at the clinic other than the traditional office visit?     To expand how we care for you, many of our providers are utilizing electronic visits (e-visits) and telephone visits, when medically appropriate, for interactions with their patients rather than a visit in the clinic. We also offer nurse visits for many medical concerns. Just like any other service, we will bill your insurance company for this type of visit based on time spent on the phone with your provider. Not all insurance companies cover these visits. Please check with your medical insurance if this type of visit is covered. You will be responsible for any charges that are not paid by your insurance.   E-visits via [facility]: generally incur a $45.00 fee.     Telephone visits:  Time spent on the phone: *charged based on time that is spent on the phone in increments of 10 minutes. Estimated cost:   5-10 mins $30.00   11-20 mins. $59.00   21-30 mins. $85.00       Use [employer] (secure email communication and access to your chart) to send your primary care provider a message or make an appointment. Ask someone on your Team how to sign up for [facility].   As always, Thank you for trusting us with your health care needs!    [facility]:    Scheduling Appointments  [facility]  Call: [phone]    [facility]  Call: [phone]    [facility]  Call: [phone]    For Gastroenterology referrals   M  [facility]   Clinics and Surgery Center, 4th Floor   [address] [place] [address]   Appointments: [phone]    WHERE TO GO FOR CARE?  Clinic    Make an appointment if you:       Are sick (cold, cough, flu, sore throat, earache or in pain).       Have a small injury (sprain, small cut, burn or broken bone).       Need a physical exam, Pap smear, vaccine or prescription refill.       Have questions about your health or medicines.    To reach us:      Call [phone] ([phone]). Open 24 hours every day. (For counseling services, call [phone].)    Log into [employer] at [employer]. (Visit [employer] to create an account.) Hospital emergency room    An emergency is a serious or life- threatening problem that must be treated right away.    Call 911 or get to the hospital if you have:      Very bad or sudden:            - Chest pain or pressure         - Bleeding         - Head or belly pain         - Dizziness or trouble seeing, walking or                          Speaking      Problems breathing      Blood in your vomit or you are coughing up blood      A major injury (knocked out, loss of a finger or limb, rape, broken bone protruding from skin)    A mental health crisis. (Or call the Mental Health Crisis line at 1-197.980.8045 or Suicide Prevention Hotline at 1-506.506.1250.)    Open 24 hours every day. You don't need an appointment.     Urgent care    Visit urgent care for sickness or small injuries when the clinic is closed. You don't need an appointment. To check hours or find an urgent care near you, visit [email or web address]. Online care    Get online care from [facility] for more than 70 common problems, like colds, allergies and infections. Open 24 hours every day at:   [email or web address]   Need help deciding?    For advice about where to be seen, you may call your clinic and ask to speak with a nurse. We're here for you 24 hours every day.         If you are  deaf or hard of hearing, please let us know. We provide many free services including sign language interpreters, oral interpreters, TTYs, telephone amplifiers, note takers and written materials.

## 2019-05-31 NOTE — PROGRESS NOTES
SUBJECTIVE:   CC: Aleida Weinberg is an 34 year old woman who presents for preventive health visit.     Healthy Habits:     Getting at least 3 servings of Calcium per day:  Yes    Bi-annual eye exam:  Yes    Dental care twice a year:  Yes    Sleep apnea or symptoms of sleep apnea:  None    Diet:  Regular (no restrictions)    Frequency of exercise:  2-3 days/week    Duration of exercise:  15-30 minutes    Taking medications regularly:  No    Barriers to taking medications:  Not applicable    Medication side effects:  None    PHQ-2 Total Score: 0    Additional concerns today:  No    A few weeks ago had a low back issue where she could feel pulsating in her lower back. Her Rheumatologist recommended consulting with PCP. This has since resolved and has not recurred.    Needs updated physical and refills of chronic medications.  She uses the below medications rarely and for specific situations.   Lorazepam-only when she is traveling-flying  Hydroxyzine- as needed for anxiety-no side effects  Trazodone-rare use when not able to sleep.     Patient would like to discuss medications    Today's PHQ-2 Score:   PHQ-2 (  [employer]) 2019   Q1: Little interest or pleasure in doing things 0   Q2: Feeling down, depressed or hopeless 0   PHQ-2 Score 0   Q1: Little interest or pleasure in doing things Not at all   Q2: Feeling down, depressed or hopeless Not at all   PHQ-2 Score 0       Abuse: Current or Past(Physical, Sexual or Emotional)- No  Do you feel safe in your environment? YES    Social History     Tobacco Use     Smoking status: Former Smoker     Packs/day: 0.50     Years: 2.50     Pack years: 1.25     Types: Cigarettes     Last attempt to quit: 2005     Years since quittin.5     Smokeless tobacco: Never Used   Substance Use Topics     Alcohol use: Yes     Alcohol/week: 0.0 oz     Comment: rarely - 1 monthly         Alcohol Use 2019   Prescreen: >3 drinks/day or >7 drinks/week? No   Prescreen:  >3 drinks/day or >7 drinks/week? -       Reviewed orders with patient.  Reviewed health maintenance and updated orders accordingly - Yes    Mammogram not appropriate for this patient based on age.    Pertinent mammograms are reviewed under the imaging tab.  History of abnormal Pap smear: NO - age 30-65 PAP every 5 years with negative HPV co-testing recommended  PAP / HPV Latest Ref Rng & Units 10/7/2016 9/20/2013 2/25/2010   PAP - NIL NIL NIL   HPV 16 DNA NEG Negative - -   HPV 18 DNA NEG Negative - -   OTHER HR HPV NEG Negative - -     Reviewed and updated as needed this visit by clinical staff  Tobacco  Allergies  Meds  Med Hx  Surg Hx  Fam Hx  Soc Hx        Reviewed and updated as needed this visit by Provider            Review of Systems   Constitutional: Negative for chills and fever.   HENT: Negative for congestion, ear pain, hearing loss and sore throat.    Eyes: Negative for pain and visual disturbance.   Respiratory: Negative for cough and shortness of breath.    Cardiovascular: Negative for chest pain, palpitations and peripheral edema.   Gastrointestinal: Negative for abdominal pain, constipation, diarrhea, heartburn, hematochezia and nausea.   Breasts:  Negative for tenderness, breast mass and discharge.   Genitourinary: Negative for dysuria, frequency, genital sores, hematuria, pelvic pain, urgency, vaginal bleeding and vaginal discharge.   Musculoskeletal: Negative for arthralgias, joint swelling and myalgias.   Skin: Negative for rash.   Neurological: Negative for dizziness, weakness, headaches and paresthesias.   Psychiatric/Behavioral: Negative for mood changes. The patient is not nervous/anxious.         OBJECTIVE:   /62 (BP Location: Right arm, Patient Position: Sitting, Cuff Size: Adult Large)   Pulse 72   Temp 97.6  F (36.4  C) (Oral)   Wt 68.4 kg (150 lb 12.8 oz)   BMI 23.53 kg/m    Physical Exam  GENERAL: healthy, alert and no distress  EYES: Eyes grossly normal to inspection,  PERRL and conjunctivae and sclerae normal  HENT: ear canals and TM's normal, nose and mouth without ulcers or lesions  NECK: no adenopathy, no asymmetry, masses, or scars and thyroid normal to palpation  RESP: lungs clear to auscultation - no rales, rhonchi or wheezes  BREAST: normal without masses, tenderness or nipple discharge and no palpable axillary masses or adenopathy  CV: regular rate and rhythm, normal S1 S2, no S3 or S4, no murmur, click or rub, no peripheral edema and peripheral pulses strong  ABDOMEN: soft, nontender, no hepatosplenomegaly, no masses and bowel sounds normal  MS: no gross musculoskeletal defects noted, no edema  SKIN: no suspicious lesions or rashes  NEURO: Normal strength and tone, mentation intact and speech normal  PSYCH: mentation appears normal, affect normal/bright    Diagnostic Test Results:  Labs reviewed in Epic    ASSESSMENT/PLAN:   1. Encounter for routine adult health examination without abnormal findings  Follow up pending above results. Encouraged healthy diet and regular exercise, monthy SBE, and yearly exams.    2. Anxiety  Stable, overall well controlled  Uses lorazepam for traveling, hydroxyzine.   - hydrOXYzine (ATARAX) 25 MG tablet; Take 0.5-2 tablets (12.5-50 mg) by mouth every 6 hours as needed for anxiety  Dispense: 30 tablet; Refill: 1  - LORazepam (ATIVAN) 0.5 MG tablet; Take 1 tablet (0.5 mg) by mouth every 8 hours as needed for anxiety  Dispense: 15 tablet; Refill: 0    3. Intermittent asthma, uncomplicated  Stable, well controlled with current regimen.  - albuterol (PROAIR HFA/PROVENTIL HFA/VENTOLIN HFA) 108 (90 Base) MCG/ACT inhaler; Inhale 2 puffs into the lungs every 6 hours as needed for shortness of breath / dyspnea  Dispense: 18 g; Refill: 1    4. Other insomnia  Rare use for sleep, doesn't not combine with any other medications.  - traZODone (DESYREL) 50 MG tablet; Take 0.5 tablets (25 mg) by mouth nightly as needed for sleep  Dispense: 30 tablet;  "Refill: 0    5. Acute midline low back pain with right-sided sciatica  Refills of below medications for stable chronic conditions.  - cyclobenzaprine (FLEXERIL) 10 MG tablet; Take 0.5-1 tablets (5-10 mg) by mouth 3 times daily as needed for muscle spasms  Dispense: 30 tablet; Refill: 1    6. Other systemic lupus erythematosus with other organ involvement (H)  Continue to follow with Rheumatology/    COUNSELING:  Reviewed preventive health counseling, as reflected in patient instructions    Estimated body mass index is 23.53 kg/m  as calculated from the following:    Height as of 4/12/19: 1.705 m (5' 7.13\").    Weight as of this encounter: 68.4 kg (150 lb 12.8 oz).       reports that she quit smoking about 13 years ago. Her smoking use included cigarettes. She has a 1.25 pack-year smoking history. She has never used smokeless tobacco.    Counseling Resources:  ATP IV Guidelines  Pooled Cohorts Equation Calculator  Breast Cancer Risk Calculator  FRAX Risk Assessment  ICSI Preventive Guidelines  Dietary Guidelines for Americans, 2010  USDA's MyPlate  ASA Prophylaxis  Lung CA Screening    Tatum Dove PA-C  [facility]  "

## 2019-06-01 ASSESSMENT — ANXIETY QUESTIONNAIRES: GAD7 TOTAL SCORE: 3

## 2019-06-01 ASSESSMENT — ASTHMA QUESTIONNAIRES: ACT_TOTALSCORE: 24

## 2019-07-11 DIAGNOSIS — M32.19 OTHER SYSTEMIC LUPUS ERYTHEMATOSUS WITH OTHER ORGAN INVOLVEMENT (H): ICD-10-CM

## 2019-07-11 LAB
ALBUMIN UR-MCNC: NEGATIVE MG/DL
APPEARANCE UR: CLEAR
BASOPHILS # BLD AUTO: 0 10E9/L (ref 0–0.2)
BASOPHILS NFR BLD AUTO: 0.4 %
BILIRUB UR QL STRIP: NEGATIVE
COLOR UR AUTO: YELLOW
CREAT UR-MCNC: 9 MG/DL
CRP SERPL-MCNC: <2.9 MG/L (ref 0–8)
DIFFERENTIAL METHOD BLD: ABNORMAL
EOSINOPHIL # BLD AUTO: 0 10E9/L (ref 0–0.7)
EOSINOPHIL NFR BLD AUTO: 0.9 %
ERYTHROCYTE [DISTWIDTH] IN BLOOD BY AUTOMATED COUNT: 11.8 % (ref 10–15)
ERYTHROCYTE [SEDIMENTATION RATE] IN BLOOD BY WESTERGREN METHOD: 9 MM/H (ref 0–20)
GLUCOSE UR STRIP-MCNC: NEGATIVE MG/DL
HCT VFR BLD AUTO: 38.8 % (ref 35–47)
HGB BLD-MCNC: 13.9 G/DL (ref 11.7–15.7)
HGB UR QL STRIP: NEGATIVE
KETONES UR STRIP-MCNC: NEGATIVE MG/DL
LEUKOCYTE ESTERASE UR QL STRIP: NEGATIVE
LYMPHOCYTES # BLD AUTO: 0.8 10E9/L (ref 0.8–5.3)
LYMPHOCYTES NFR BLD AUTO: 18.1 %
MCH RBC QN AUTO: 34.8 PG (ref 26.5–33)
MCHC RBC AUTO-ENTMCNC: 35.8 G/DL (ref 31.5–36.5)
MCV RBC AUTO: 97 FL (ref 78–100)
MONOCYTES # BLD AUTO: 0.7 10E9/L (ref 0–1.3)
MONOCYTES NFR BLD AUTO: 15.3 %
NEUTROPHILS # BLD AUTO: 3 10E9/L (ref 1.6–8.3)
NEUTROPHILS NFR BLD AUTO: 65.3 %
NITRATE UR QL: NEGATIVE
PH UR STRIP: 7 PH (ref 5–7)
PLATELET # BLD AUTO: 204 10E9/L (ref 150–450)
PROT UR-MCNC: <0.05 G/L
PROT/CREAT 24H UR: NORMAL G/G CR (ref 0–0.2)
RBC # BLD AUTO: 4 10E12/L (ref 3.8–5.2)
SOURCE: NORMAL
SP GR UR STRIP: <=1.005 (ref 1–1.03)
UROBILINOGEN UR STRIP-ACNC: 0.2 EU/DL (ref 0.2–1)
WBC # BLD AUTO: 4.6 10E9/L (ref 4–11)

## 2019-07-11 PROCEDURE — 86225 DNA ANTIBODY NATIVE: CPT | Performed by: INTERNAL MEDICINE

## 2019-07-11 PROCEDURE — 85025 COMPLETE CBC W/AUTO DIFF WBC: CPT | Performed by: INTERNAL MEDICINE

## 2019-07-11 PROCEDURE — 84156 ASSAY OF PROTEIN URINE: CPT | Performed by: INTERNAL MEDICINE

## 2019-07-11 PROCEDURE — 82550 ASSAY OF CK (CPK): CPT | Performed by: INTERNAL MEDICINE

## 2019-07-11 PROCEDURE — 85652 RBC SED RATE AUTOMATED: CPT | Performed by: INTERNAL MEDICINE

## 2019-07-11 PROCEDURE — 81003 URINALYSIS AUTO W/O SCOPE: CPT | Performed by: INTERNAL MEDICINE

## 2019-07-11 PROCEDURE — 86160 COMPLEMENT ANTIGEN: CPT | Performed by: INTERNAL MEDICINE

## 2019-07-11 PROCEDURE — 80053 COMPREHEN METABOLIC PANEL: CPT | Performed by: INTERNAL MEDICINE

## 2019-07-11 PROCEDURE — 36415 COLL VENOUS BLD VENIPUNCTURE: CPT | Performed by: INTERNAL MEDICINE

## 2019-07-11 PROCEDURE — 86140 C-REACTIVE PROTEIN: CPT | Performed by: INTERNAL MEDICINE

## 2019-07-12 LAB
ALBUMIN SERPL-MCNC: 4.5 G/DL (ref 3.4–5)
ALP SERPL-CCNC: 41 U/L (ref 40–150)
ALT SERPL W P-5'-P-CCNC: 24 U/L (ref 0–50)
ANION GAP SERPL CALCULATED.3IONS-SCNC: 5 MMOL/L (ref 3–14)
AST SERPL W P-5'-P-CCNC: 18 U/L (ref 0–45)
BILIRUB SERPL-MCNC: 0.5 MG/DL (ref 0.2–1.3)
BUN SERPL-MCNC: 12 MG/DL (ref 7–30)
C3 SERPL-MCNC: 75 MG/DL (ref 76–169)
C4 SERPL-MCNC: 14 MG/DL (ref 15–50)
CALCIUM SERPL-MCNC: 8.6 MG/DL (ref 8.5–10.1)
CHLORIDE SERPL-SCNC: 107 MMOL/L (ref 94–109)
CK SERPL-CCNC: 52 U/L (ref 30–225)
CO2 SERPL-SCNC: 25 MMOL/L (ref 20–32)
CREAT SERPL-MCNC: 0.66 MG/DL (ref 0.52–1.04)
DSDNA AB SER-ACNC: 2 IU/ML
GFR SERPL CREATININE-BSD FRML MDRD: >90 ML/MIN/{1.73_M2}
GLUCOSE SERPL-MCNC: 78 MG/DL (ref 70–99)
POTASSIUM SERPL-SCNC: 3.7 MMOL/L (ref 3.4–5.3)
PROT SERPL-MCNC: 7.3 G/DL (ref 6.8–8.8)
SODIUM SERPL-SCNC: 137 MMOL/L (ref 133–144)

## 2019-08-23 ENCOUNTER — OFFICE VISIT (OUTPATIENT)
Dept: RHEUMATOLOGY | Facility: CLINIC | Age: 34
End: 2019-08-23
Payer: COMMERCIAL

## 2019-08-23 VITALS
HEIGHT: 67 IN | BODY MASS INDEX: 24.36 KG/M2 | HEART RATE: 77 BPM | DIASTOLIC BLOOD PRESSURE: 66 MMHG | WEIGHT: 155.2 LBS | OXYGEN SATURATION: 97 % | SYSTOLIC BLOOD PRESSURE: 104 MMHG

## 2019-08-23 DIAGNOSIS — M32.19 OTHER SYSTEMIC LUPUS ERYTHEMATOSUS WITH OTHER ORGAN INVOLVEMENT (H): Primary | ICD-10-CM

## 2019-08-23 DIAGNOSIS — Z79.899 HIGH RISK MEDICATIONS (NOT ANTICOAGULANTS) LONG-TERM USE: ICD-10-CM

## 2019-08-23 DIAGNOSIS — I73.00 RAYNAUD'S PHENOMENON WITHOUT GANGRENE: ICD-10-CM

## 2019-08-23 PROCEDURE — 99214 OFFICE O/P EST MOD 30 MIN: CPT | Performed by: INTERNAL MEDICINE

## 2019-08-23 RX ORDER — AMLODIPINE BESYLATE 10 MG/1
10 TABLET ORAL DAILY
Qty: 90 TABLET | Refills: 1 | Status: SHIPPED | OUTPATIENT
Start: 2019-08-23 | End: 2019-12-13

## 2019-08-23 RX ORDER — AZATHIOPRINE 50 MG/1
75 TABLET ORAL DAILY
Qty: 135 TABLET | Refills: 1 | Status: SHIPPED | OUTPATIENT
Start: 2019-08-23 | End: 2019-12-13

## 2019-08-23 RX ORDER — HYDROXYCHLOROQUINE SULFATE 200 MG/1
TABLET, FILM COATED ORAL
Qty: 135 TABLET | Refills: 1 | Status: SHIPPED | OUTPATIENT
Start: 2019-08-23 | End: 2019-12-13

## 2019-08-23 ASSESSMENT — MIFFLIN-ST. JEOR: SCORE: 1443.67

## 2019-08-23 NOTE — PATIENT INSTRUCTIONS
Rheumatology    Dr. Lavon Light         [facility]   (Monday)  [address]  [place] [address]       [facility]   (Tuesday)  [address]  [place] [address]    [facility]   (Wed., Thurs., and Friday)  [address] [place] [address]    Phone number: [phone]  Thank you for choosing [facility].  Carissa Frye CMA

## 2019-08-23 NOTE — PROGRESS NOTES
Rheumatology Clinic Visit      Aleida Weinberg MRN# [id number]   YOB: 1985 Age: 34 year old      Date of visit: 8/23/19   PCP: Tatum Dove     Chief Complaint   Patient presents with:  Systemic Lupus Erythematous: hands give her trouble but overall good    Assessment and Plan   1. Systemic lupus erythematosus (CHANELL >1:79167 speckled, RNP+, Sm+, Scl-70+, hypocomplementemia, photosensitivity, malar rash, arthritis, fatigue, Raynaud's phenomenon):  Dx'd 4/2016. Scl-70+; she lacks features of scleroderma at this time; no myopathy by hx of labs. 5/11/2018 echo without evidence of pulmonary hypertension. Previously on MTX 20mg SQ weekly (GI upset with PO doses above 15mg, partially effective) and SSZ (LFT elevations).  Currently on AZA 100mg daily (synovitis when on 100mg daily but after developing bacterial vaginosis and blepharitis the dose was reduced; she has not had those issues again synovitis did not come back) and HCQ 300mg daily. TPMT normal on 8/21/2017.  Intermittent arthralgias in the hands and feet; no synovitis on exam today.  We discussed increasing AZA again but she associated this with increase infections, so we discussed MMF as an alternative, or adding Benlysta; she will consider benlysta and if needed then will need to screen for infections before hand (such as TB with Quantiferon TB gold plus and CXR). Provided information on benlysta from the ACR website.  Maintain on current meds for now. Risks and side effects of benlysta were reviewed in detail, including risk for infection, death, anaphylactic reaction, injection site reaction, etc; advised that if benlysta is to be used that she read the package insert and ask any question about the medications she may have.   - Continue hydroxychloroquine 300mg daily (last eye exam done 9/8/2017)  - Continue azathioprine 75mg daily   - Labs in mid-Oct: CBC, CMP, ESR, CRP, C3, C4, dsDNA, UA, Uprotein:creatinine    2. Raynaud's Phenomenon:  Cold avoidance was insufficient for controlling her symptoms and therefore amlodipine was started. Uses amlodipine 10mg daily during cold months only.  - Continue Amlodipine 10mg daily during colder months    3. Chronic Back Pain: History of scoliosis. Degenerative findings on previously reviewed MRIs from 2000 and 2009. This has not changed for many years and does not worsen when her peripheral joint symptoms worsen. Had one episode of sciatica that resolved with chiropractic treatment.      4. Vitamin D deficiency: Currently on vitamin D 2000 units daily.    - Continue vitamin D 2000 units daily    5.  Secondary Sjogren's syndrome: Mild dry and dry mouth that are treated infrequently with artificial tears and frequent sips of water.     6.  Fatigue: Worse during the workweek and better on weekends and when on vacation.  She sleeps more when not working.  She also reports having a very stressful job.  She was going to try to identify things that she can change with regard to her work-related stress and if not better then I advised she see a mental health provider.    7.  Vaccinations: Vaccinations reviewed with Ms. Weinberg.     - Influenza: encouraged yearly vaccination  - [id number]: up to date  - [id number]: up to date   - Shingrix: up to date per pt and [place] Immunization Information Connection    Ms. Weinberg verbalized agreement with and understanding of the rational for the diagnosis and treatment plan.  All questions were answered to best of my ability and the patient's satisfaction. Ms. Weinberg was advised to contact the clinic with any questions that may arise after the clinic visit.      Thank you for involving me in the care of the patient    Return to clinic: 3-4 months      HPI   Aleida Digna Weinberg is a 34 year old female with medical history significant for intermittent asthma, anxiety, migraines, vitamin D deficiency, and systemic lupus erythematosus who presents for follow-up of SLE.     Today, Ms.  Lenard reports that she has been doing okay. Pain in her MCPs and PIPs that is intermittent, typically will occur in the AM or PM for a few hours at a time; not an issue during the day.  Still with a stressful job and fatigue.  Would like to stay on her current medications for now because she is tolerating her symptoms she says.     Denies fevers, chills, nausea, vomiting, constipation, diarrhea. No abdominal pain. No chest pain/pressure, palpitations, or shortness of breath. No oral or nasal sores. No neck pain. No LE swelling.  Has photosensitivity but no photophobia.  Mild dry eyes and dry mouth; she uses artificial tears as needed.  No eye pain or redness. Denies history of serositis. Denies history of DVT, pulmonary embolism, or miscarriage.    Tobacco: quit smoking in 2005  EtOH: Once monthly  Drugs: None  Occupation: Works at [employer], a lot of typing per patient    ROS   GEN: No fevers, chills, night sweats, or weight change  SKIN: See HPI.   HEENT: Has a history of migraines, but no headache today. No change in vision, taste, or hearing. No epistaxis. No oral or nasal ulcers.  CV: No chest pain, pressure, palpitations, or dyspnea on exertion.  PULM: No SOB, wheeze, cough.  GI:  No nausea, vomiting, constipation, diarrhea. No blood in stool. No abdominal pain.  : No blood in urine.  MSK: See HPI.  NEURO: See history of present illness  ENDO: No heat/cold intolerance.  EXT: See history of present illness    Active Problem List     Patient Active Problem List   Diagnosis     Other kyphoscoliosis and scoliosis     Hypertrophic and atrophic condition of skin     Viral warts     Routine postpartum follow-up     CARDIOVASCULAR SCREENING; LDL GOAL LESS THAN 160     Intermittent asthma     Anxiety     Intractable menstrual migraine without status migrainosus     Vitamin D deficiency     Inflammatory polyarthropathy (H)     Chronic back pain     Raynaud's phenomenon without gangrene     Systemic lupus  erythematosus (H)     High risk medications (not anticoagulants) long-term use (Plaquenil and AZA)     Dry eyes, bilateral     Past Medical History     Past Medical History:   Diagnosis Date     Mild intermittent asthma      Other kyphoscoliosis and scoliosis     upper back curvature and disc degeneration in lower back.     Past Surgical History     Past Surgical History:   Procedure Laterality Date      SECTION       SURGICAL HISTORY OF -       PE Tubes     Allergy     Allergies   Allergen Reactions     Fluconazole Rash     Current Medication List     Current Outpatient Medications   Medication Sig     albuterol (PROAIR HFA/PROVENTIL HFA/VENTOLIN HFA) 108 (90 Base) MCG/ACT inhaler Inhale 2 puffs into the lungs every 6 hours as needed for shortness of breath / dyspnea     amLODIPine (NORVASC) 10 MG tablet Take 1 tablet (10 mg) by mouth daily     azaTHIOprine (IMURAN) 50 MG tablet Take 1.5 tablets (75 mg) by mouth daily     Cholecalciferol (VITAMIN D) 2000 UNITS tablet Take 2,000 Units by mouth daily     cyclobenzaprine (FLEXERIL) 10 MG tablet Take 0.5-1 tablets (5-10 mg) by mouth 3 times daily as needed for muscle spasms     hydroxychloroquine (PLAQUENIL) 200 MG tablet Hydroxychloroquine 200mg daily; and an additional 200mg every other day.     hydrOXYzine (ATARAX) 25 MG tablet Take 0.5-2 tablets (12.5-50 mg) by mouth every 6 hours as needed for anxiety     levonorgestrel (MIRENA, 52 MG,) 20 MCG/24HR IUD 1 each by Intrauterine route once     LORazepam (ATIVAN) 0.5 MG tablet Take 1 tablet (0.5 mg) by mouth every 8 hours as needed for anxiety     predniSONE (DELTASONE) 5 MG tablet Take 1 tablet (5 mg) by mouth daily as needed for lupus     traZODone (DESYREL) 50 MG tablet Take 0.5 tablets (25 mg) by mouth nightly as needed for sleep     No current facility-administered medications for this visit.          Social History   See HPI    Family History     Family History   Problem Relation Age of Onset      "Heart Disease Maternal Grandfather         Bypass, Heart Disease     Respiratory Maternal Grandfather         asthma     Macular Degeneration Maternal Grandfather      C.A.D. Paternal Grandfather      Heart Disease Maternal Uncle         MI's      Hypertension Paternal Grandmother      Cancer Paternal Grandmother         lymph nodes     Cataracts Paternal Grandmother      Respiratory Other         cousin on mothers side, asthma     Alcohol/Drug Maternal Uncle      Alcohol/Drug Other         bother great grandparents on mother's side     Diabetes No family hx of      Breast Cancer No family hx of      Cancer - colorectal No family hx of      Denies family history of autoimmune disease    Physical Exam     Temp Readings from Last 3 Encounters:   05/31/19 97.6  F (36.4  C) (Oral)   12/07/18 98.6  F (37  C) (Oral)   10/26/18 98.2  F (36.8  C) (Oral)     BP Readings from Last 5 Encounters:   08/23/19 104/66   05/31/19 104/62   04/12/19 102/71   12/07/18 100/62   10/26/18 106/68     Pulse Readings from Last 1 Encounters:   08/23/19 77     Resp Readings from Last 1 Encounters:   08/30/18 16     Estimated body mass index is 24.21 kg/m  as calculated from the following:    Height as of this encounter: 1.705 m (5' 7.13\").    Weight as of this encounter: 70.4 kg (155 lb 3.2 oz).    GEN: NAD  HEENT: MMM. No oral lesions. Anicteric, noninjected sclera  CV: S1, S2. RRR. No m/r/g.  PULM: CTA bilaterally. No w/c.  MSK: MCPs, PIPs, DIPs, wrists, elbows, shoulders, knees, ankles, and MTPs without swelling or tenderness to palpation.  Hips nontender to palpation.   NEURO: UE and LE strengths 5/5 and equal bilaterally.   SKIN: Normal fingertip pulp; no evidence of current or previous infarcts to the distal fingertips by visual inspection. Tattoos present.  No rash.  No nail pitting.  EXT: No LE edema  PSYCH: Alert. Appropriate.    Labs / Imaging (select studies)   RF/CCP  Recent Labs   Lab Test 04/22/16  0902 04/01/16  0910   CCPIGG 1  " --    RHF  --  <20     CHANELL  Recent Labs   Lab Test 04/22/16  0902 04/01/16  0910   VALERIE  --  12.4*   ANAIGG >1:16477  Reference range: <1:40  (Note)  Speckled pattern.  INTERPRETIVE INFORMATION: CHANELL by IFA, IgG  Anti-nuclear antibodies (CHANELL) are seen in a variety of  systemic rheumatic diseases and are determined by indirect  fluorescence assay (IFA) using HEp-2 substrate with an  IgG-specific conjugate. CHANELL titers less than or equal to  1:80 have variable relevance while titers greater than or  equal to 1:160 are considered clinically significant. These  antibodies may precede clinical disease onset; however,  healthy individuals and those with advanced age have been  reported to be positive for CHANELL. When observed, one of the  five basic patterns is reported: homogeneous,  peripheral/rim, speckled, centromere, or nucleolar. If  cytoplasmic fluorescence is observed, it is noted. IFA  methodology is subjective and has occasionally been shown  to lack sensitivity for anti-SSA/Ro antibodies.  Negative results do not necessarily rule out the presence  of SSc. If clinical suspicion remains, consi vicki further  testing for U3-RNP, PM/Scl, or Th/To antibodies associated  with SSc.  Performed by [facility],  [address] [place] [id number] [phone]  [email or web address], Twin Rodriguez MD, Lab. Director    --      RNP/Sm/SSA/SSB  Recent Labs   Lab Test 01/12/18  0828 04/22/16  0902   RNPIGG  --  >8.0  Positive   Antibody index (AI) values reflect qualitative changes in antibody   concentration that cannot be directly associated with clinical condition or   disease state.  *   SMIGG  --  1.5*   SSAIGG  --  <0.2  Negative   Antibody index (AI) values reflect qualitative changes in antibody   concentration that cannot be directly associated with clinical condition or   disease state.     SSBIGG  --  <0.2  Negative   Antibody index (AI) values reflect qualitative changes in antibody   concentration that cannot be directly  associated with clinical condition or   disease state.     SCLIGG  --  3.1*   TREPAB Negative  --      dsDNA  Recent Labs   Lab Test 07/11/19  1529 11/29/18  1616 08/23/18  1637 04/26/18  1648 01/18/18  1639 10/12/17  1642 07/17/17  1707 04/13/17  1650 01/20/17  0828   DNA 2 2 2 2 2 2 1 2 1     C3/C4  Recent Labs   Lab Test 07/11/19  1529 11/29/18  1616 08/23/18  1637 04/26/18  1648 01/18/18  1639 10/12/17  1642 07/17/17  1707 04/13/17  1650 01/20/17  0828   [id number] 75* 82 66* 71* 76 70* 85 87 93   [id number] 14* 13* 13* 14* 14* 14* 14* 16 16       Send-out Labs  Recent Labs   Lab Test 04/21/17  0813   SRESLT SEE NOTE  (Note)  Test name                    Result Flag  Units  RefIntvl  ------------------------------------------------------------  Thiopurine Methyltransferase                                23.2 L      U/mL 24.0-44.0  Sample slightly hemolyzed. Please interpret the results  with caution.  INTERPRETIVE INFORMATION: Thiopurine Methyltransferase, RBC  Normal TPMT activity:  24.0-44.0 U/mL................Individuals are predicted to  be at low risk of bone marrow toxicity (myelosuppression)  as a consequence of standard thiopurine therapy; no dose  adjustment is recommended.  Intermediate TPMT activity:  17.0-23.9 U/mL................Individuals are predicted to  be at intermediate risk of bone marrow toxicity  (myelosuppression) as a consequence of standard thiopurine  therapy; a dose reduction and therapeutic drug management  is recommended.  Low TPMT activity:  less than 17.0 U/mL...........Individuals are predicted to  be at high risk of bone marrow toxicity (myelosuppression)   as a consequence of standard thiopurine dosing. It is  recommended to avoid the use of thiopurine drugs.  High TPMT activity:  greater than 44.0 U/mL........Individuals are not predicted  to be at risk for bone marrow toxicity (myelosuppression)  as a consequence of standard thiopurine dosing, but may be  at risk for therapeutic  failure due to excessive  inactivation of thiopurine drugs. Individuals may require  higher than the normal standard dose. Therapeutic drug  management is recommended.  The TPMT, RBC assay is used as a screen to detect  individuals with low and intermediate TPMT activity who may  be at risk for myelosuppression when exposed to standard  doses of thiopurines, including azathioprine (Imuran) and  6-mercaptopurine (Purinethol). TPMT is the primary  metabolic route for inactivation of thiopurine drugs in the  bone marrow. When TPMT activity is low, it is predicted  that proportionately more 6-mercaptopurine can be converted  into the cytotoxic 6-thioguanine nucle otides that  accumulate in the bone marrow causing excessive toxicity.  The activity of TPMT is measured by the nanomoles of  6-methylmercaptopurine (inactive metabolite) produced per 1  mL of packed red blood cells, (U/mL).    TPMT phenotype testing does not replace the need for  clinical monitoring of patients treated with thiopurine  drugs. Genotype for TPMT cannot be inferred from TPMT  activity (phenotype). Phenotype testing should not be  requested for patients currently treated with thiopurine  drugs. Current TPMT phenotype may not reflect future TPMT  phenotype, particularly in patients who received blood  transfusion within 30-60 days of testing.  TPMT enzyme  activity can be inhibited by several drugs such as:  naproxen (Aleve), ibuprofen (Advil, Motrin), ketoprofen  (Orudis), furosemide (Lasix), sulfasalazine (Azulfidine),  mesalamine (Asacol), olsalazine (Dipentum), mefenamic acid  (Ponstel), thiazide diuretics, and benzoic acid inhibitors.  TPMT inhibitors may contribute t o falsely low results;  patients should abstain from these drugs for at least 48  hours prior to TPMT testing. Falsely low results may also  occur as a result of inappropriate specimen handling and  hemolysis.  Test developed and characteristics determined by [facility].  See Compliance Statement B: www.[facility].com/CS  Performed by [facility],  [address] [place] [address] [phone]  [email or web address], Lionel Ferreira MD, Lab. Director     STSTNM Thiopurine Methyltransferase, RBC   STSTCD 92,066   SSPTYP Whole blood, EDTA anticoagulant     Antiphospholipid Antibodies  Recent Labs   Lab Test 04/22/16  0902   B2GPG 0.9   B2GPM <0.9  Negative     CARG <15.0  Interpretation:  Negative     JOANN <12.5  Interpretation:  Negative     LUPINT Negative  (Note)  COMMENTS:  The INR is normal.  APTT ratio is normal.  DRVVT Screen ratio is normal.  Thrombin time is normal.  NEGATIVE TEST; A LUPUS ANTICOAGULANT WAS NOT DETECTED IN THIS  SPECIMEN WITHIN THE LIMITS OF THE TESTING REPERTOIRE.  If the clinical picture is strongly suggestive of an antiphospholipid  syndrome, recommend anticardiolipin and beta-2-glycoprotein (IgG and  IgM) antibody tests.  Isabella Olson M.D.  [phone]  4/25/2016    INR =  1.05    Reference range: 0.86-1.14  Thrombin Time= 15.4    Reference range: 13.0-19.0 sec    APTT:       Ratio  Patient  =  0.92  1:2 Mix  =  N/A  Reference:  Negative: Less than or equal to 1.16  Positive: Greater than or equal to 1.17     DILUTE LISA VIPER VENOM TEST:  Screen Ratio = 0.95   Normal is less than 1.21         CBC  Recent Labs   Lab Test 07/11/19  1529 04/04/19  1629 11/29/18  1616   WBC 4.6 3.1* 3.5*   RBC 4.00 4.03 4.00   HGB 13.9 13.8 13.9   HCT 38.8 39.2 39.5   MCV 97 97 99   RDW 11.8 12.1 11.8    215 204   MCH 34.8* 34.2* 34.8*   MCHC 35.8 35.2 35.2   NEUTROPHIL 65.3 60.6 60.7   LYMPH 18.1 24.0 23.8   MONOCYTE 15.3 13.5 13.9   EOSINOPHIL 0.9 1.3 0.8   BASOPHIL 0.4 0.6 0.8   ANEU 3.0 1.9 2.1   ALYM 0.8 0.8 0.8   AMANDA 0.7 0.4 0.5   AEOS 0.0 0.0 0.0   ABAS 0.0 0.0 0.0     CMP  Recent Labs   Lab Test 07/11/19  1529 04/04/19  1629 02/08/19  0713 11/29/18  1616 08/23/18  1637 04/26/18  1648    137  --  137 137 137   POTASSIUM 3.7 3.7  --  3.9 3.9 3.9    CHLORIDE 107 105  --  104 104 106   CO2 25 25  --  25 25 22   ANIONGAP 5 7  --  8 8 9   GLC 78 79 84 82 79 67*   BUN 12 9  --  10 8 10   CR 0.66 0.79  --  0.75 0.65 0.53   GFRESTIMATED >90 >90  --  89 >90 >90   GFRESTBLACK >90 >90  --  >90 >90 >90   SRINIVAS 8.6 8.9  --  9.2 9.0 9.3   BILITOTAL 0.5 0.5  --  0.6 1.2 1.1   ALBUMIN 4.5 4.5  --  4.6 4.6 4.6   PROTTOTAL 7.3 7.6  --  7.9 7.5 8.0   ALKPHOS 41 44  --  40 45 42   AST 18 20  --  22 24 21   ALT 24 21  --  32 31 27     Calcium/VitaminD  Recent Labs   Lab Test 07/11/19  1529 04/04/19  1629 11/29/18  1616  02/20/17  1640  10/26/16  0919  04/01/16  0910   SRINIVAS 8.6 8.9 9.2   < >  --    < > 9.3   < >  --    VITDT  --   --   --   --  33  --  23  --  <13  Season, race, dietary intake, and treatment affect the concentration of   25-hydroxy-Vitamin D. Values may decrease during winter months and increase   during summer months. Values 20-29 ug/L may indicate Vitamin D insufficiency   and values <20 ug/L may indicate Vitamin D deficiency.   Vitamin D determination is routinely performed by an immunoassay specific for   25 hydroxyvitamin D3.  If an individual is on vitamin D2 (ergocalciferol)   supplementation, please specify 25 OH vitamin D2 and D3 level determination by   LCMSMS test VITD23.  *    < > = values in this interval not displayed.     ESR/CRP  Recent Labs   Lab Test 07/11/19  1529 04/04/19  1629 11/29/18  1616   SED 9 8 9   CRP <2.9 <2.9 <2.9     CK/Aldolase  Recent Labs   Lab Test 07/11/19  1529 04/04/19  1629 11/29/18  1616  04/22/16  0902   CKT 52 62 61   < > 45   ALDOLASE  --   --   --   --  4.5    < > = values in this interval not displayed.     TSH/T4  Recent Labs   Lab Test 04/01/16  0910   TSH 1.57     Lipid Panel  Recent Labs   Lab Test 02/08/19  0713 01/12/18  0828 02/09/17  0721 07/10/15  0814 09/13/13  0706   CHOL 140 157 159 149 139   TRIG 52 34 56 45 57   HDL 44* 59 42* 48* 52   LDL 86 91 106* 92 76   VLDL  --   --   --  9 11   CHOLHDLRATIO  --   --    --  3.1 2.7   [facility] 96 98 117  --   --      Hepatitis B  Recent Labs   Lab Test 04/22/16  0902   HBCAB Nonreactive   HEPBANG Nonreactive     Hepatitis C  Recent Labs   Lab Test 10/26/18  0836 01/12/18  0828 04/22/16  0902   HCVAB Nonreactive Nonreactive Nonreactive   Assay performance characteristics have not been established for newborns,   infants, and children       Lyme ab screening  Recent Labs   Lab Test 04/01/16  0910   LYMEGM 0.12     HIV Screening  Recent Labs   Lab Test 10/26/18  0836 01/12/18  0828 04/22/16  0902   HIAGAB Nonreactive Nonreactive Nonreactive   HIV-1 p24 Ag & HIV-1/HIV-2 Ab Not Detected       UA  Recent Labs   Lab Test 07/11/19  1536 04/04/19  1638 11/29/18  1617  04/26/18  1648 01/18/18  1640 01/12/18  0836 12/28/17  0145 10/12/17  1643  01/20/17  0827   COLOR Yellow Yellow Yellow   < > Yellow Yellow Yellow Yellow Yellow   < > Yellow   APPEARANCE Clear Clear Clear   < > Clear Clear Clear Clear Clear   < > Clear   URINEGLC Negative Negative Negative   < > Negative Negative Negative Negative Negative   < > Negative   URINEBILI Negative Negative Negative   < > Negative Negative Negative Moderate* Negative   < > Negative   SG <=1.005 <=1.005 1.010   < > 1.010 1.020 1.025 >1.030 1.010   < > >1.030   URINEPH 7.0 6.5 7.0   < > 7.0 7.0 6.5 6.0 6.5   < > 6.0   PROTEIN Negative Negative Negative   < > Negative Negative Trace* 100* Negative   < > Trace*   UROBILINOGEN 0.2 0.2 0.2   < > 0.2 0.2 0.2 4.0* 0.2   < > 0.2   NITRITE Negative Negative Negative   < > Negative Negative Negative Positive* Negative   < > Negative   UBLD Negative Negative Negative   < > Negative Negative Negative Negative Trace*   < > Negative   LEUKEST Negative Negative Negative   < > Negative Negative Negative Negative Trace*   < > Negative   WBCU  --   --   --   --  0 - 5 O - 2 O - 2 O - 2 O - 2   < > O - 2   RBCU  --   --   --   --  O - 2 O - 2 O - 2 O - 2 O - 2   < > O - 2   SQUAMOUSEPI  --   --   --   --   --  Few  Moderate* Moderate* Few   < > Few   BACTERIA  --   --   --   --   --   --  Moderate* Moderate* Few*  --  Few*   MUCOUS  --   --   --   --   --   --  Present* Present*  --   --  Present*    < > = values in this interval not displayed.     Urine Microscopic  Recent Labs   Lab Test 04/26/18  1648 01/18/18  1640 01/12/18  0836 12/28/17  0145 10/12/17  1643  01/20/17  0827   WBCU 0 - 5 O - 2 O - 2 O - 2 O - 2   < > O - 2   RBCU O - 2 O - 2 O - 2 O - 2 O - 2   < > O - 2   SQUAMOUSEPI  --  Few Moderate* Moderate* Few   < > Few   BACTERIA  --   --  Moderate* Moderate* Few*  --  Few*   MUCOUS  --   --  Present* Present*  --   --  Present*    < > = values in this interval not displayed.     Urine Protein  Recent Labs   Lab Test 07/11/19  1536 04/04/19  1638 11/29/18  1617   UTP <0.05 <0.05 0.06   UTPG Unable to calculate due to low value Unable to calculate due to low value 0.12   UCRR 9 14 51     Immunization History     Immunization History   Administered Date(s) Administered     HPV 02/25/2010, 02/04/2011     HepB 06/13/1999, 08/20/1999, 02/05/2000     Historical DTP/aP 1985, 1985, 1986, 01/15/1988, 06/15/1991     Influenza (IIV3) PF 11/07/2011     Influenza Vaccine IM 3yrs+ 4 Valent IIV4 10/11/2016, 10/13/2017, 10/26/2018     MMR 05/15/1987, 09/15/1991     Mantoux Tuberculin Skin Test 07/15/1987, 01/19/2005     Meningococcal (Menomune ) 08/09/2004     OPV, trivalent, live 1986, 1986, 1986, 01/15/1988, 09/15/1991     Pneumo Conj 13-V (2010&after) 05/04/2018     Pneumococcal 23 valent 08/30/2018     TDAP Vaccine (Adacel) 01/21/2009, 02/25/2010     Zoster vaccine recombinant adjuvanted (SHINGRIX) 12/13/2018, 03/04/2019          Chart documentation done in part with Dragon Voice recognition Software. Although reviewed after completion, some word and grammatical error may remain.    Lavon Light MD

## 2019-08-23 NOTE — NURSING NOTE
RAPID3 (0-30) Cumulative Score  13.0          RAPID3 Weighted Score (divide #4 by 3 and that is the weighted score)  4.3     Carissa Frye [facility]  8/23/2019 8:08 AM

## 2019-10-17 ENCOUNTER — IMMUNIZATION (OUTPATIENT)
Dept: NURSING | Facility: CLINIC | Age: 34
End: 2019-10-17
Payer: COMMERCIAL

## 2019-10-17 PROCEDURE — 90471 IMMUNIZATION ADMIN: CPT

## 2019-10-17 PROCEDURE — 90686 IIV4 VACC NO PRSV 0.5 ML IM: CPT

## 2019-10-18 DIAGNOSIS — M32.19 OTHER SYSTEMIC LUPUS ERYTHEMATOSUS WITH OTHER ORGAN INVOLVEMENT (H): ICD-10-CM

## 2019-10-18 LAB
ALBUMIN SERPL-MCNC: 4.5 G/DL (ref 3.4–5)
ALBUMIN UR-MCNC: NEGATIVE MG/DL
ALP SERPL-CCNC: 44 U/L (ref 40–150)
ALT SERPL W P-5'-P-CCNC: 24 U/L (ref 0–50)
ANION GAP SERPL CALCULATED.3IONS-SCNC: 5 MMOL/L (ref 3–14)
APPEARANCE UR: CLEAR
AST SERPL W P-5'-P-CCNC: 15 U/L (ref 0–45)
BASOPHILS # BLD AUTO: 0 10E9/L (ref 0–0.2)
BASOPHILS NFR BLD AUTO: 0.2 %
BILIRUB SERPL-MCNC: 0.4 MG/DL (ref 0.2–1.3)
BILIRUB UR QL STRIP: NEGATIVE
BUN SERPL-MCNC: 12 MG/DL (ref 7–30)
CALCIUM SERPL-MCNC: 8.8 MG/DL (ref 8.5–10.1)
CHLORIDE SERPL-SCNC: 105 MMOL/L (ref 94–109)
CO2 SERPL-SCNC: 26 MMOL/L (ref 20–32)
COLOR UR AUTO: YELLOW
CREAT SERPL-MCNC: 0.63 MG/DL (ref 0.52–1.04)
CREAT UR-MCNC: 18 MG/DL
CRP SERPL-MCNC: <2.9 MG/L (ref 0–8)
DIFFERENTIAL METHOD BLD: ABNORMAL
EOSINOPHIL # BLD AUTO: 0 10E9/L (ref 0–0.7)
EOSINOPHIL NFR BLD AUTO: 0.9 %
ERYTHROCYTE [DISTWIDTH] IN BLOOD BY AUTOMATED COUNT: 11.7 % (ref 10–15)
ERYTHROCYTE [SEDIMENTATION RATE] IN BLOOD BY WESTERGREN METHOD: 7 MM/H (ref 0–20)
GFR SERPL CREATININE-BSD FRML MDRD: >90 ML/MIN/{1.73_M2}
GLUCOSE SERPL-MCNC: 75 MG/DL (ref 70–99)
GLUCOSE UR STRIP-MCNC: NEGATIVE MG/DL
HCT VFR BLD AUTO: 39 % (ref 35–47)
HGB BLD-MCNC: 13.9 G/DL (ref 11.7–15.7)
HGB UR QL STRIP: NEGATIVE
KETONES UR STRIP-MCNC: NEGATIVE MG/DL
LEUKOCYTE ESTERASE UR QL STRIP: NEGATIVE
LYMPHOCYTES # BLD AUTO: 0.7 10E9/L (ref 0.8–5.3)
LYMPHOCYTES NFR BLD AUTO: 16.3 %
MCH RBC QN AUTO: 34.2 PG (ref 26.5–33)
MCHC RBC AUTO-ENTMCNC: 35.6 G/DL (ref 31.5–36.5)
MCV RBC AUTO: 96 FL (ref 78–100)
MONOCYTES # BLD AUTO: 0.7 10E9/L (ref 0–1.3)
MONOCYTES NFR BLD AUTO: 14.7 %
NEUTROPHILS # BLD AUTO: 3.1 10E9/L (ref 1.6–8.3)
NEUTROPHILS NFR BLD AUTO: 67.9 %
NITRATE UR QL: NEGATIVE
PH UR STRIP: 7 PH (ref 5–7)
PLATELET # BLD AUTO: 212 10E9/L (ref 150–450)
POTASSIUM SERPL-SCNC: 3.8 MMOL/L (ref 3.4–5.3)
PROT SERPL-MCNC: 7.3 G/DL (ref 6.8–8.8)
PROT UR-MCNC: <0.05 G/L
PROT/CREAT 24H UR: NORMAL G/G CR (ref 0–0.2)
RBC # BLD AUTO: 4.07 10E12/L (ref 3.8–5.2)
SODIUM SERPL-SCNC: 136 MMOL/L (ref 133–144)
SOURCE: NORMAL
SP GR UR STRIP: 1.01 (ref 1–1.03)
UROBILINOGEN UR STRIP-ACNC: 0.2 EU/DL (ref 0.2–1)
WBC # BLD AUTO: 4.6 10E9/L (ref 4–11)

## 2019-10-18 PROCEDURE — 36415 COLL VENOUS BLD VENIPUNCTURE: CPT | Performed by: INTERNAL MEDICINE

## 2019-10-18 PROCEDURE — 86140 C-REACTIVE PROTEIN: CPT | Performed by: INTERNAL MEDICINE

## 2019-10-18 PROCEDURE — 80053 COMPREHEN METABOLIC PANEL: CPT | Performed by: INTERNAL MEDICINE

## 2019-10-18 PROCEDURE — 81003 URINALYSIS AUTO W/O SCOPE: CPT | Performed by: INTERNAL MEDICINE

## 2019-10-18 PROCEDURE — 86160 COMPLEMENT ANTIGEN: CPT | Performed by: INTERNAL MEDICINE

## 2019-10-18 PROCEDURE — 85025 COMPLETE CBC W/AUTO DIFF WBC: CPT | Performed by: INTERNAL MEDICINE

## 2019-10-18 PROCEDURE — 84156 ASSAY OF PROTEIN URINE: CPT | Performed by: INTERNAL MEDICINE

## 2019-10-18 PROCEDURE — 85652 RBC SED RATE AUTOMATED: CPT | Performed by: INTERNAL MEDICINE

## 2019-10-21 LAB
C3 SERPL-MCNC: 74 MG/DL (ref 76–169)
C4 SERPL-MCNC: 14 MG/DL (ref 15–50)

## 2019-10-24 NOTE — RESULT ENCOUNTER NOTE
"[employer] message sent:  \"Ms. Weinberg,    Labs are stable.  No change to the treatment plan based on these lab results.     Sincerely,  Lavon Light MD  10/24/2019 5:07 PM\""

## 2019-10-27 ENCOUNTER — HEALTH MAINTENANCE LETTER (OUTPATIENT)
Age: 34
End: 2019-10-27

## 2019-12-13 ENCOUNTER — OFFICE VISIT (OUTPATIENT)
Dept: RHEUMATOLOGY | Facility: CLINIC | Age: 34
End: 2019-12-13
Payer: COMMERCIAL

## 2019-12-13 VITALS
DIASTOLIC BLOOD PRESSURE: 72 MMHG | BODY MASS INDEX: 23.79 KG/M2 | HEIGHT: 67 IN | WEIGHT: 151.6 LBS | OXYGEN SATURATION: 97 % | SYSTOLIC BLOOD PRESSURE: 102 MMHG | HEART RATE: 80 BPM

## 2019-12-13 DIAGNOSIS — I73.00 RAYNAUD'S PHENOMENON WITHOUT GANGRENE: ICD-10-CM

## 2019-12-13 DIAGNOSIS — M32.19 OTHER SYSTEMIC LUPUS ERYTHEMATOSUS WITH OTHER ORGAN INVOLVEMENT (H): Primary | ICD-10-CM

## 2019-12-13 PROCEDURE — 99213 OFFICE O/P EST LOW 20 MIN: CPT | Performed by: INTERNAL MEDICINE

## 2019-12-13 RX ORDER — AZATHIOPRINE 50 MG/1
75 TABLET ORAL DAILY
Qty: 135 TABLET | Refills: 1 | Status: SHIPPED | OUTPATIENT
Start: 2019-12-13 | End: 2020-04-17

## 2019-12-13 RX ORDER — AMLODIPINE BESYLATE 10 MG/1
10 TABLET ORAL DAILY
Qty: 90 TABLET | Refills: 1 | Status: SHIPPED | OUTPATIENT
Start: 2019-12-13 | End: 2020-04-17

## 2019-12-13 RX ORDER — HYDROXYCHLOROQUINE SULFATE 200 MG/1
TABLET, FILM COATED ORAL
Qty: 135 TABLET | Refills: 1 | Status: SHIPPED | OUTPATIENT
Start: 2019-12-13 | End: 2020-04-17

## 2019-12-13 ASSESSMENT — MIFFLIN-ST. JEOR: SCORE: 1427.34

## 2019-12-13 NOTE — NURSING NOTE
RAPID3 (0-30) Cumulative Score  13.2          RAPID3 Weighted Score (divide #4 by 3 and that is the weighted score)  4.4       Carissa Frye [facility] Rheumatology  12/13/2019 8:10 AM

## 2019-12-13 NOTE — PROGRESS NOTES
Rheumatology Clinic Visit      Aleida Weinberg MRN# [id number]   YOB: 1985 Age: 34 year old      Date of visit: 12/13/19   PCP: Tatum Dove     Chief Complaint   Patient presents with:  Systemic Lupus Erythematous    Assessment and Plan   1. Systemic lupus erythematosus (CHANELL >1:[id number] speckled, RNP+, Sm+, Scl-70+, hypocomplementemia, photosensitivity, malar rash, arthritis, fatigue, Raynaud's phenomenon):  Dx'd 4/2016. Scl-70+; she lacks features of scleroderma at this time; no myopathy by hx of labs. 5/11/2018 echo without evidence of pulmonary hypertension. Previously on MTX 20mg SQ weekly (GI upset with PO doses above 15mg, partially effective) and SSZ (LFT elevations).  Currently on AZA 100mg daily (synovitis when on 100mg daily but after developing bacterial vaginosis and blepharitis the dose was reduced; she has not had those issues again synovitis did not come back) and HCQ 300mg daily. TPMT normal on 8/21/2017.  Intermittent arthralgias in the hands and feet; no synovitis on exam today. Doing well at this time. In the past we have discussed benlysta and MMF; not needed at this time.   - Continue hydroxychloroquine 300mg daily on average (last eye exam done  4/29/2019)  - Continue azathioprine 75mg daily   - Labs in Jan: CBC, CMP, UA, Uprotein:creatinine  - Labs in April:  CBC, CMP, ESR, CRP, CK, C3, C4, dsDNA, UA, Uprotein:creatinine    2. Raynaud's Phenomenon: Cold avoidance was insufficient for controlling her symptoms and therefore amlodipine was started. Uses amlodipine 10mg daily during cold months only. Plans to restart amlodipine now.   - Amlodipine 10mg daily during colder months    3. Chronic Back Pain: History of scoliosis. Degenerative findings on previously reviewed MRIs from 2000 and 2009. This has not changed for many years and does not worsen when her peripheral joint symptoms worsen. Had one episode of sciatica that resolved with chiropractic treatment.      4. Vitamin  D deficiency: Currently on vitamin D 2000 units daily.    - Continue vitamin D 2000 units daily    5.  Secondary Sjogren's syndrome: Mild dry and dry mouth that are treated infrequently with artificial tears and frequent sips of water.     6.  Vaccinations: Vaccinations reviewed with Ms. Weinberg.     - Influenza: encouraged yearly vaccination  - [id number]: up to date  - [id number]: up to date   - Shingrix: up to date per pt and [place] Immunization Information Connection    Ms. Weinberg verbalized agreement with and understanding of the rational for the diagnosis and treatment plan.  All questions were answered to best of my ability and the patient's satisfaction. Ms. Weinberg was advised to contact the clinic with any questions that may arise after the clinic visit.      Thank you for involving me in the care of the patient    Return to clinic: 3-4 months      HPI   Aleida Digna Weinberg is a 34 year old female with medical history significant for intermittent asthma, anxiety, migraines, vitamin D deficiency, and systemic lupus erythematosus who presents for follow-up of SLE.     Today, Ms. Weinberg reports that she is doing well.  No joint pain or morning stiffness.  No gelling phenomenon.  Raynaud's phenomenon starting to become more active so she is going to start amlodipine as she does during colder weather.    Denies fevers, chills, nausea, vomiting, constipation, diarrhea. No abdominal pain. No chest pain/pressure, palpitations, or shortness of breath. No oral or nasal sores. No neck pain. No LE swelling.  Has photosensitivity but no photophobia.  Mild dry eyes and dry mouth; she uses artificial tears as needed.  No eye pain or redness. Denies history of serositis. Denies history of DVT, pulmonary embolism, or miscarriage.    Tobacco: quit smoking in 2005  EtOH: Once monthly  Drugs: None  Occupation: Works at [employer], a lot of typing per patient    ROS   GEN: No fevers, chills, night sweats, or weight  change  SKIN: See HPI.   HEENT: Has a history of migraines, but no headache today. No change in vision, taste, or hearing. No epistaxis. No oral or nasal ulcers.  CV: No chest pain, pressure, palpitations, or dyspnea on exertion.  PULM: No SOB, wheeze, cough.  GI:  No nausea, vomiting, constipation, diarrhea. No blood in stool. No abdominal pain.  : No blood in urine.  MSK: See HPI.  NEURO: See history of present illness  ENDO: No heat/cold intolerance.  EXT: See history of present illness    Active Problem List     Patient Active Problem List   Diagnosis     Other kyphoscoliosis and scoliosis     Hypertrophic and atrophic condition of skin     Viral warts     Routine postpartum follow-up     CARDIOVASCULAR SCREENING; LDL GOAL LESS THAN 160     Intermittent asthma     Anxiety     Intractable menstrual migraine without status migrainosus     Vitamin D deficiency     Inflammatory polyarthropathy (H)     Chronic back pain     Raynaud's phenomenon without gangrene     Systemic lupus erythematosus (H)     High risk medications (not anticoagulants) long-term use (Plaquenil and AZA)     Dry eyes, bilateral     Past Medical History     Past Medical History:   Diagnosis Date     Mild intermittent asthma      Other kyphoscoliosis and scoliosis     upper back curvature and disc degeneration in lower back.     Past Surgical History     Past Surgical History:   Procedure Laterality Date      SECTION  2006     SURGICAL HISTORY OF -       PE Tubes     Allergy     Allergies   Allergen Reactions     Fluconazole Rash     Current Medication List     Current Outpatient Medications   Medication Sig     albuterol (PROAIR HFA/PROVENTIL HFA/VENTOLIN HFA) 108 (90 Base) MCG/ACT inhaler Inhale 2 puffs into the lungs every 6 hours as needed for shortness of breath / dyspnea     amLODIPine (NORVASC) 10 MG tablet Take 1 tablet (10 mg) by mouth daily     azaTHIOprine (IMURAN) 50 MG tablet Take 1.5 tablets (75 mg) by mouth daily      Cholecalciferol (VITAMIN D) 2000 UNITS tablet Take 2,000 Units by mouth daily     cyclobenzaprine (FLEXERIL) 10 MG tablet Take 0.5-1 tablets (5-10 mg) by mouth 3 times daily as needed for muscle spasms     hydroxychloroquine (PLAQUENIL) 200 MG tablet Hydroxychloroquine 200mg daily; and an additional 200mg every other day.     hydrOXYzine (ATARAX) 25 MG tablet Take 0.5-2 tablets (12.5-50 mg) by mouth every 6 hours as needed for anxiety     levonorgestrel (MIRENA, 52 MG,) 20 MCG/24HR IUD 1 each by Intrauterine route once     LORazepam (ATIVAN) 0.5 MG tablet Take 1 tablet (0.5 mg) by mouth every 8 hours as needed for anxiety     predniSONE (DELTASONE) 5 MG tablet Take 1 tablet (5 mg) by mouth daily as needed for lupus     traZODone (DESYREL) 50 MG tablet Take 0.5 tablets (25 mg) by mouth nightly as needed for sleep     No current facility-administered medications for this visit.          Social History   See HPI    Family History     Family History   Problem Relation Age of Onset     Heart Disease Maternal Grandfather         Bypass, Heart Disease     Respiratory Maternal Grandfather         asthma     Macular Degeneration Maternal Grandfather      C.A.D. Paternal Grandfather      Heart Disease Maternal Uncle         MI's      Hypertension Paternal Grandmother      Cancer Paternal Grandmother         lymph nodes     Cataracts Paternal Grandmother      Respiratory Other         cousin on mothers side, asthma     Alcohol/Drug Maternal Uncle      Alcohol/Drug Other         bother great grandparents on mother's side     Diabetes No family hx of      Breast Cancer No family hx of      Cancer - colorectal No family hx of      Denies family history of autoimmune disease    Physical Exam     Temp Readings from Last 3 Encounters:   05/31/19 97.6  F (36.4  C) (Oral)   12/07/18 98.6  F (37  C) (Oral)   10/26/18 98.2  F (36.8  C) (Oral)     BP Readings from Last 5 Encounters:   08/23/19 104/66   05/31/19 104/62   04/12/19 102/71  "  12/07/18 100/62   10/26/18 106/68     Pulse Readings from Last 1 Encounters:   08/23/19 77     Resp Readings from Last 1 Encounters:   08/30/18 16     Estimated body mass index is 24.21 kg/m  as calculated from the following:    Height as of 8/23/19: 1.705 m (5' 7.13\").    Weight as of 8/23/19: 70.4 kg (155 lb 3.2 oz).    GEN: NAD  HEENT: MMM. No oral lesions. Anicteric, noninjected sclera  CV: S1, S2. RRR. No m/r/g.  PULM: CTA bilaterally. No w/c.  MSK: MCPs, PIPs, DIPs, wrists, elbows, shoulders, knees, ankles, and MTPs without swelling or tenderness to palpation.  Hips nontender to palpation.   NEURO: UE and LE strengths 5/5 and equal bilaterally.   SKIN: Normal fingertip pulp; no evidence of current or previous infarcts to the distal fingertips by visual inspection. Tattoos present.  No rash.  No nail pitting.  EXT: No LE edema  PSYCH: Alert. Appropriate.    Labs / Imaging (select studies)   RF/CCP  Recent Labs   Lab Test 04/22/16  0902 04/01/16  0910   CCPIGG 1  --    RHF  --  <20     CHANELL  Recent Labs   Lab Test 04/22/16  0902 04/01/16  0910   VALERIE  --  12.4*   ANAIGG >1:52736  Reference range: <1:40  (Note)  Speckled pattern.  INTERPRETIVE INFORMATION: CHANELL by IFA, IgG  Anti-nuclear antibodies (CHANELL) are seen in a variety of  systemic rheumatic diseases and are determined by indirect  fluorescence assay (IFA) using HEp-2 substrate with an  IgG-specific conjugate. CHANELL titers less than or equal to  1:80 have variable relevance while titers greater than or  equal to 1:160 are considered clinically significant. These  antibodies may precede clinical disease onset; however,  healthy individuals and those with advanced age have been  reported to be positive for CHANELL. When observed, one of the  five basic patterns is reported: homogeneous,  peripheral/rim, speckled, centromere, or nucleolar. If  cytoplasmic fluorescence is observed, it is noted. IFA  methodology is subjective and has occasionally been shown  to lack " sensitivity for anti-SSA/Ro antibodies.  Negative results do not necessarily rule out the presence  of SSc. If clinical suspicion remains, consi vicki further  testing for U3-RNP, PM/Scl, or Th/To antibodies associated  with SSc.  Performed by [facility],  [facility] [place] [address] [phone]  [email or web address] Twin Rodriguez MD, Lab. Director    --      RNP/Sm/SSA/SSB  Recent Labs   Lab Test 01/12/18  0828 04/22/16  0902   RNPIGG  --  >8.0  Positive   Antibody index (AI) values reflect qualitative changes in antibody   concentration that cannot be directly associated with clinical condition or   disease state.  *   SMIGG  --  1.5*   SSAIGG  --  <0.2  Negative   Antibody index (AI) values reflect qualitative changes in antibody   concentration that cannot be directly associated with clinical condition or   disease state.     SSBIGG  --  <0.2  Negative   Antibody index (AI) values reflect qualitative changes in antibody   concentration that cannot be directly associated with clinical condition or   disease state.     SCLIGG  --  3.1*   TREPAB Negative  --      dsDNA  Recent Labs   Lab Test 07/11/19  1529 11/29/18  1616 08/23/18  1637 04/26/18  1648 01/18/18  1639 10/12/17  1642 07/17/17  1707 04/13/17  1650 01/20/17  0828   DNA 2 2 2 2 2 2 1 2 1     C3/C4  Recent Labs   Lab Test 10/18/19  1542 07/11/19  1529 11/29/18  1616 08/23/18  1637 04/26/18  1648 01/18/18  1639 10/12/17  1642 07/17/17  1707 04/13/17  1650   N3ZMRCT 74* 75* 82 66* 71* 76 70* 85 87   J5ZPTBO 14* 14* 13* 13* 14* 14* 14* 14* 16     Send-out Labs  Recent Labs   Lab Test 04/21/17  0813   SRESLT SEE NOTE  (Note)  Test name                    Result Flag  Units  RefIntvl  ------------------------------------------------------------  Thiopurine Methyltransferase                                23.2 L      U/mL 24.0-44.0  Sample slightly hemolyzed. Please interpret the results  with caution.  INTERPRETIVE INFORMATION: Thiopurine  Methyltransferase, RBC  Normal TPMT activity:  24.0-44.0 U/mL................Individuals are predicted to  be at low risk of bone marrow toxicity (myelosuppression)  as a consequence of standard thiopurine therapy; no dose  adjustment is recommended.  Intermediate TPMT activity:  17.0-23.9 U/mL................Individuals are predicted to  be at intermediate risk of bone marrow toxicity  (myelosuppression) as a consequence of standard thiopurine  therapy; a dose reduction and therapeutic drug management  is recommended.  Low TPMT activity:  less than 17.0 U/mL...........Individuals are predicted to  be at high risk of bone marrow toxicity (myelosuppression)   as a consequence of standard thiopurine dosing. It is  recommended to avoid the use of thiopurine drugs.  High TPMT activity:  greater than 44.0 U/mL........Individuals are not predicted  to be at risk for bone marrow toxicity (myelosuppression)  as a consequence of standard thiopurine dosing, but may be  at risk for therapeutic failure due to excessive  inactivation of thiopurine drugs. Individuals may require  higher than the normal standard dose. Therapeutic drug  management is recommended.  The TPMT, RBC assay is used as a screen to detect  individuals with low and intermediate TPMT activity who may  be at risk for myelosuppression when exposed to standard  doses of thiopurines, including azathioprine (Imuran) and  6-mercaptopurine (Purinethol). TPMT is the primary  metabolic route for inactivation of thiopurine drugs in the  bone marrow. When TPMT activity is low, it is predicted  that proportionately more 6-mercaptopurine can be converted  into the cytotoxic 6-thioguanine nucle otides that  accumulate in the bone marrow causing excessive toxicity.  The activity of TPMT is measured by the nanomoles of  6-methylmercaptopurine (inactive metabolite) produced per 1  mL of packed red blood cells, (U/mL).    TPMT phenotype testing does not replace the need  for  clinical monitoring of patients treated with thiopurine  drugs. Genotype for TPMT cannot be inferred from TPMT  activity (phenotype). Phenotype testing should not be  requested for patients currently treated with thiopurine  drugs. Current TPMT phenotype may not reflect future TPMT  phenotype, particularly in patients who received blood  transfusion within 30-60 days of testing.  TPMT enzyme  activity can be inhibited by several drugs such as:  naproxen (Aleve), ibuprofen (Advil, Motrin), ketoprofen  (Orudis), furosemide (Lasix), sulfasalazine (Azulfidine),  mesalamine (Asacol), olsalazine (Dipentum), mefenamic acid  (Ponstel), thiazide diuretics, and benzoic acid inhibitors.  TPMT inhibitors may contribute t o falsely low results;  patients should abstain from these drugs for at least 48  hours prior to TPMT testing. Falsely low results may also  occur as a result of inappropriate specimen handling and  hemolysis.  Test developed and characteristics determined by [facility]. See Compliance Statement B: www.[email or web address].com/CS  Performed by [facility],  [address] [place] [id number] [phone]  [email or web address], Lionel Ferreira MD, Lab. Director     STSTNM Thiopurine Methyltransferase, RBC   STSTCD 92,066   SSPTYP Whole blood, EDTA anticoagulant     Antiphospholipid Antibodies  Recent Labs   Lab Test 04/22/16  0902   B2GPG 0.9   B2GPM <0.9  Negative     CARG <15.0  Interpretation:  Negative     JOANN <12.5  Interpretation:  Negative     LUPINT Negative  (Note)  COMMENTS:  The INR is normal.  APTT ratio is normal.  DRVVT Screen ratio is normal.  Thrombin time is normal.  NEGATIVE TEST; A LUPUS ANTICOAGULANT WAS NOT DETECTED IN THIS  SPECIMEN WITHIN THE LIMITS OF THE TESTING REPERTOIRE.  If the clinical picture is strongly suggestive of an antiphospholipid  syndrome, recommend anticardiolipin and beta-2-glycoprotein (IgG and  IgM) antibody tests.  Isabella Olson M.D.  [phone]  4/25/2016    INR  =  1.05    Reference range: 0.86-1.14  Thrombin Time= 15.4    Reference range: 13.0-19.0 sec    APTT:       Ratio  Patient  =  0.92  1:2 Mix  =  N/A  Reference:  Negative: Less than or equal to 1.16  Positive: Greater than or equal to 1.17     DILUTE LISA VIPER VENOM TEST:  Screen Ratio = 0.95   Normal is less than 1.21         CBC  Recent Labs   Lab Test 10/18/19  1542 07/11/19  1529 04/04/19  1629   WBC 4.6 4.6 3.1*   RBC 4.07 4.00 4.03   HGB 13.9 13.9 13.8   HCT 39.0 38.8 39.2   MCV 96 97 97   RDW 11.7 11.8 12.1    204 215   MCH 34.2* 34.8* 34.2*   MCHC 35.6 35.8 35.2   NEUTROPHIL 67.9 65.3 60.6   LYMPH 16.3 18.1 24.0   MONOCYTE 14.7 15.3 13.5   EOSINOPHIL 0.9 0.9 1.3   BASOPHIL 0.2 0.4 0.6   ANEU 3.1 3.0 1.9   ALYM 0.7* 0.8 0.8   AMANDA 0.7 0.7 0.4   AEOS 0.0 0.0 0.0   ABAS 0.0 0.0 0.0     CMP  Recent Labs   Lab Test 10/18/19  1542 07/11/19  1529 04/04/19  1629 02/08/19  0713 11/29/18  1616 08/23/18  1637    137 137  --  137 137   POTASSIUM 3.8 3.7 3.7  --  3.9 3.9   CHLORIDE 105 107 105  --  104 104   CO2 26 25 25  --  25 25   ANIONGAP 5 5 7  --  8 8   GLC 75 78 79 84 82 79   BUN 12 12 9  --  10 8   CR 0.63 0.66 0.79  --  0.75 0.65   GFRESTIMATED >90 >90 >90  --  89 >90   GFRESTBLACK >90 >90 >90  --  >90 >90   SRINIVAS 8.8 8.6 8.9  --  9.2 9.0   BILITOTAL 0.4 0.5 0.5  --  0.6 1.2   ALBUMIN 4.5 4.5 4.5  --  4.6 4.6   PROTTOTAL 7.3 7.3 7.6  --  7.9 7.5   ALKPHOS 44 41 44  --  40 45   AST 15 18 20  --  22 24   ALT 24 24 21  --  32 31     Calcium/VitaminD  Recent Labs   Lab Test 10/18/19  1542 07/11/19  1529 04/04/19  1629  02/20/17  1640  10/26/16  0919  04/01/16  0910   SRINIVAS 8.8 8.6 8.9   < >  --    < > 9.3   < >  --    VITDT  --   --   --   --  33  --  23  --  <13  Season, race, dietary intake, and treatment affect the concentration of   25-hydroxy-Vitamin D. Values may decrease during winter months and increase   during summer months. Values 20-29 ug/L may indicate Vitamin D insufficiency   and values  <20 ug/L may indicate Vitamin D deficiency.   Vitamin D determination is routinely performed by an immunoassay specific for   25 hydroxyvitamin D3.  If an individual is on vitamin D2 (ergocalciferol)   supplementation, please specify 25 OH vitamin D2 and D3 level determination by   LCMSMS test VITD23.  *    < > = values in this interval not displayed.     ESR/CRP  Recent Labs   Lab Test 10/18/19  1542 07/11/19  1529 04/04/19  1629   SED 7 9 8   CRP <2.9 <2.9 <2.9     CK/Aldolase  Recent Labs   Lab Test 07/11/19  1529 04/04/19  1629 11/29/18  1616  04/22/16  0902   CKT 52 62 61   < > 45   ALDOLASE  --   --   --   --  4.5    < > = values in this interval not displayed.     TSH/T4  Recent Labs   Lab Test 04/01/16  0910   TSH 1.57     Lipid Panel  Recent Labs   Lab Test 02/08/19  0713 01/12/18  0828 02/09/17  0721 07/10/15  0814 09/13/13  0706   CHOL 140 157 159 149 139   TRIG 52 34 56 45 57   HDL 44* 59 42* 48* 52   LDL 86 91 106* 92 76   VLDL  --   --   --  9 11   CHOLHDLRATIO  --   --   --  3.1 2.7   NHDL 96 98 117  --   --      Hepatitis B  Recent Labs   Lab Test 04/22/16  0902   HBCAB Nonreactive   HEPBANG Nonreactive     Hepatitis C  Recent Labs   Lab Test 10/26/18  0836 01/12/18  0828 04/22/16  0902   HCVAB Nonreactive Nonreactive Nonreactive   Assay performance characteristics have not been established for newborns,   infants, and children       Lyme ab screening  Recent Labs   Lab Test 04/01/16  0910   LYMEGM 0.12     HIV Screening  Recent Labs   Lab Test 10/26/18  0836 01/12/18  0828 04/22/16  0902   HIAGAB Nonreactive Nonreactive Nonreactive   HIV-1 p24 Ag & HIV-1/HIV-2 Ab Not Detected       UA  Recent Labs   Lab Test 10/18/19  1550 07/11/19  1536 04/04/19  1638  04/26/18  1648 01/18/18  1640 01/12/18  0836 12/28/17  0145 10/12/17  1643  01/20/17  0827   COLOR Yellow Yellow Yellow   < > Yellow Yellow Yellow Yellow Yellow   < > Yellow   APPEARANCE Clear Clear Clear   < > Clear Clear Clear Clear Clear   < >  Clear   URINEGLC Negative Negative Negative   < > Negative Negative Negative Negative Negative   < > Negative   URINEBILI Negative Negative Negative   < > Negative Negative Negative Moderate* Negative   < > Negative   SG 1.010 <=1.005 <=1.005   < > 1.010 1.020 1.025 >1.030 1.010   < > >1.030   URINEPH 7.0 7.0 6.5   < > 7.0 7.0 6.5 6.0 6.5   < > 6.0   PROTEIN Negative Negative Negative   < > Negative Negative Trace* 100* Negative   < > Trace*   UROBILINOGEN 0.2 0.2 0.2   < > 0.2 0.2 0.2 4.0* 0.2   < > 0.2   NITRITE Negative Negative Negative   < > Negative Negative Negative Positive* Negative   < > Negative   UBLD Negative Negative Negative   < > Negative Negative Negative Negative Trace*   < > Negative   LEUKEST Negative Negative Negative   < > Negative Negative Negative Negative Trace*   < > Negative   WBCU  --   --   --   --  0 - 5 O - 2 O - 2 O - 2 O - 2   < > O - 2   RBCU  --   --   --   --  O - 2 O - 2 O - 2 O - 2 O - 2   < > O - 2   SQUAMOUSEPI  --   --   --   --   --  Few Moderate* Moderate* Few   < > Few   BACTERIA  --   --   --   --   --   --  Moderate* Moderate* Few*  --  Few*   MUCOUS  --   --   --   --   --   --  Present* Present*  --   --  Present*    < > = values in this interval not displayed.     Urine Microscopic  Recent Labs   Lab Test 04/26/18  1648 01/18/18  1640 01/12/18  0836 12/28/17  0145 10/12/17  1643  01/20/17  0827   WBCU 0 - 5 O - 2 O - 2 O - 2 O - 2   < > O - 2   RBCU O - 2 O - 2 O - 2 O - 2 O - 2   < > O - 2   SQUAMOUSEPI  --  Few Moderate* Moderate* Few   < > Few   BACTERIA  --   --  Moderate* Moderate* Few*  --  Few*   MUCOUS  --   --  Present* Present*  --   --  Present*    < > = values in this interval not displayed.     Urine Protein  Recent Labs   Lab Test 10/18/19  1550 07/11/19  1536 04/04/19  1638   UTP <0.05 <0.05 <0.05   UTPG Unable to calculate due to low value Unable to calculate due to low value Unable to calculate due to low value   UCRR 18 9 14     Immunization History      Immunization History   Administered Date(s) Administered     HPV 02/25/2010, 02/04/2011     HepB 06/13/1999, 08/20/1999, 02/05/2000     Historical DTP/aP 1986, 1986, 1986, 01/15/1988, 06/15/1991     Influenza (IIV3) PF 11/07/2011     Influenza Vaccine IM > 6 months Valent IIV4 10/11/2016, 10/13/2017, 10/26/2018, 10/17/2019     MMR 05/15/1987, 09/15/1991     Mantoux Tuberculin Skin Test 07/15/1987, 01/19/2005     Meningococcal (Menomune ) 08/09/2004     OPV, trivalent, live 1986, 1986, 1986, 01/15/1988, 09/15/1991     Pneumo Conj 13-V (2010&after) 05/04/2018     Pneumococcal 23 valent 08/30/2018     TDAP Vaccine (Adacel) 01/21/2009, 02/25/2010     Zoster vaccine recombinant adjuvanted (SHINGRIX) 12/13/2018, 03/04/2019          Chart documentation done in part with Dragon Voice recognition Software. Although reviewed after completion, some word and grammatical error may remain.    Lavon Light MD

## 2019-12-13 NOTE — PATIENT INSTRUCTIONS
Rheumatology    Dr. Lavon Light       M [facility] in [place]   (Monday)  [address]  [place] [address]       M [facility] in [place]   (Tuesday)  [address] [place] [address]    [facility] in [place]   (Wed., Thurs., and Friday)  [address] [place] [address]    Phone number: [phone]  Thank you for choosing [facility].  Carissa Frye CMA

## 2020-01-14 ENCOUNTER — MYC MEDICAL ADVICE (OUTPATIENT)
Dept: FAMILY MEDICINE | Facility: CLINIC | Age: 35
End: 2020-01-14

## 2020-01-14 NOTE — TELEPHONE ENCOUNTER
MyChart sent. Will leave encounter open for a day in case of response. If no response may close.    Patricia Ibrahim RN

## 2020-01-16 DIAGNOSIS — M32.19 OTHER SYSTEMIC LUPUS ERYTHEMATOSUS WITH OTHER ORGAN INVOLVEMENT (H): ICD-10-CM

## 2020-01-16 LAB
ALBUMIN UR-MCNC: NEGATIVE MG/DL
APPEARANCE UR: CLEAR
BASOPHILS # BLD AUTO: 0 10E9/L (ref 0–0.2)
BASOPHILS NFR BLD AUTO: 0.3 %
BILIRUB UR QL STRIP: NEGATIVE
COLOR UR AUTO: YELLOW
DIFFERENTIAL METHOD BLD: ABNORMAL
EOSINOPHIL # BLD AUTO: 0.1 10E9/L (ref 0–0.7)
EOSINOPHIL NFR BLD AUTO: 1.9 %
ERYTHROCYTE [DISTWIDTH] IN BLOOD BY AUTOMATED COUNT: 11.6 % (ref 10–15)
GLUCOSE UR STRIP-MCNC: NEGATIVE MG/DL
HCT VFR BLD AUTO: 41.2 % (ref 35–47)
HGB BLD-MCNC: 14.6 G/DL (ref 11.7–15.7)
HGB UR QL STRIP: NEGATIVE
KETONES UR STRIP-MCNC: NEGATIVE MG/DL
LEUKOCYTE ESTERASE UR QL STRIP: NEGATIVE
LYMPHOCYTES # BLD AUTO: 0.8 10E9/L (ref 0.8–5.3)
LYMPHOCYTES NFR BLD AUTO: 20.6 %
MCH RBC QN AUTO: 32.9 PG (ref 26.5–33)
MCHC RBC AUTO-ENTMCNC: 35.4 G/DL (ref 31.5–36.5)
MCV RBC AUTO: 93 FL (ref 78–100)
MONOCYTES # BLD AUTO: 0.5 10E9/L (ref 0–1.3)
MONOCYTES NFR BLD AUTO: 12.6 %
NEUTROPHILS # BLD AUTO: 2.4 10E9/L (ref 1.6–8.3)
NEUTROPHILS NFR BLD AUTO: 64.6 %
NITRATE UR QL: NEGATIVE
PH UR STRIP: 6.5 PH (ref 5–7)
PLATELET # BLD AUTO: 192 10E9/L (ref 150–450)
RBC # BLD AUTO: 4.44 10E12/L (ref 3.8–5.2)
SOURCE: NORMAL
SP GR UR STRIP: <=1.005 (ref 1–1.03)
UROBILINOGEN UR STRIP-ACNC: 0.2 EU/DL (ref 0.2–1)
WBC # BLD AUTO: 3.6 10E9/L (ref 4–11)

## 2020-01-16 PROCEDURE — 81003 URINALYSIS AUTO W/O SCOPE: CPT | Performed by: INTERNAL MEDICINE

## 2020-01-16 PROCEDURE — 85025 COMPLETE CBC W/AUTO DIFF WBC: CPT | Performed by: INTERNAL MEDICINE

## 2020-01-16 PROCEDURE — 80053 COMPREHEN METABOLIC PANEL: CPT | Performed by: INTERNAL MEDICINE

## 2020-01-16 PROCEDURE — 84156 ASSAY OF PROTEIN URINE: CPT | Performed by: INTERNAL MEDICINE

## 2020-01-16 PROCEDURE — 36415 COLL VENOUS BLD VENIPUNCTURE: CPT | Performed by: INTERNAL MEDICINE

## 2020-01-17 ENCOUNTER — OFFICE VISIT (OUTPATIENT)
Dept: FAMILY MEDICINE | Facility: CLINIC | Age: 35
End: 2020-01-17
Payer: COMMERCIAL

## 2020-01-17 DIAGNOSIS — F41.9 ANXIETY: Primary | ICD-10-CM

## 2020-01-17 DIAGNOSIS — M32.19 OTHER SYSTEMIC LUPUS ERYTHEMATOSUS WITH OTHER ORGAN INVOLVEMENT (H): ICD-10-CM

## 2020-01-17 DIAGNOSIS — Z13.1 SCREENING FOR DIABETES MELLITUS: ICD-10-CM

## 2020-01-17 DIAGNOSIS — Z13.6 CARDIOVASCULAR SCREENING; LDL GOAL LESS THAN 160: ICD-10-CM

## 2020-01-17 DIAGNOSIS — J45.20 MILD INTERMITTENT ASTHMA WITHOUT COMPLICATION: ICD-10-CM

## 2020-01-17 LAB
ALBUMIN SERPL-MCNC: 4.3 G/DL (ref 3.4–5)
ALP SERPL-CCNC: 42 U/L (ref 40–150)
ALT SERPL W P-5'-P-CCNC: 29 U/L (ref 0–50)
ANION GAP SERPL CALCULATED.3IONS-SCNC: 4 MMOL/L (ref 3–14)
AST SERPL W P-5'-P-CCNC: 21 U/L (ref 0–45)
BILIRUB SERPL-MCNC: 0.4 MG/DL (ref 0.2–1.3)
BUN SERPL-MCNC: 16 MG/DL (ref 7–30)
CALCIUM SERPL-MCNC: 9.2 MG/DL (ref 8.5–10.1)
CHLORIDE SERPL-SCNC: 106 MMOL/L (ref 94–109)
CHOLEST SERPL-MCNC: 144 MG/DL
CO2 SERPL-SCNC: 27 MMOL/L (ref 20–32)
CREAT SERPL-MCNC: 0.72 MG/DL (ref 0.52–1.04)
CREAT UR-MCNC: 19 MG/DL
GFR SERPL CREATININE-BSD FRML MDRD: >90 ML/MIN/{1.73_M2}
GLUCOSE BLD-MCNC: 78 MG/DL (ref 70–99)
GLUCOSE SERPL-MCNC: 67 MG/DL (ref 70–99)
HDLC SERPL-MCNC: 50 MG/DL
LDLC SERPL CALC-MCNC: 86 MG/DL
NONHDLC SERPL-MCNC: 94 MG/DL
POTASSIUM SERPL-SCNC: 4.1 MMOL/L (ref 3.4–5.3)
PROT SERPL-MCNC: 7.4 G/DL (ref 6.8–8.8)
PROT UR-MCNC: <0.05 G/L
PROT/CREAT 24H UR: NORMAL G/G CR (ref 0–0.2)
SODIUM SERPL-SCNC: 137 MMOL/L (ref 133–144)
TRIGL SERPL-MCNC: 42 MG/DL

## 2020-01-17 PROCEDURE — 36415 COLL VENOUS BLD VENIPUNCTURE: CPT | Performed by: NURSE PRACTITIONER

## 2020-01-17 PROCEDURE — 82947 ASSAY GLUCOSE BLOOD QUANT: CPT | Performed by: NURSE PRACTITIONER

## 2020-01-17 PROCEDURE — 99214 OFFICE O/P EST MOD 30 MIN: CPT | Performed by: NURSE PRACTITIONER

## 2020-01-17 PROCEDURE — 80061 LIPID PANEL: CPT | Performed by: NURSE PRACTITIONER

## 2020-01-17 RX ORDER — HYDROXYZINE HYDROCHLORIDE 25 MG/1
12.5-5 TABLET, FILM COATED ORAL EVERY 6 HOURS PRN
Qty: 30 TABLET | Refills: 1 | Status: SHIPPED | OUTPATIENT
Start: 2020-01-17 | End: 2020-10-16

## 2020-01-17 ASSESSMENT — ANXIETY QUESTIONNAIRES
5. BEING SO RESTLESS THAT IT IS HARD TO SIT STILL: NOT AT ALL
IF YOU CHECKED OFF ANY PROBLEMS ON THIS QUESTIONNAIRE, HOW DIFFICULT HAVE THESE PROBLEMS MADE IT FOR YOU TO DO YOUR WORK, TAKE CARE OF THINGS AT HOME, OR GET ALONG WITH OTHER PEOPLE: NOT DIFFICULT AT ALL
6. BECOMING EASILY ANNOYED OR IRRITABLE: NOT AT ALL
1. FEELING NERVOUS, ANXIOUS, OR ON EDGE: SEVERAL DAYS
3. WORRYING TOO MUCH ABOUT DIFFERENT THINGS: MORE THAN HALF THE DAYS
GAD7 TOTAL SCORE: 4
2. NOT BEING ABLE TO STOP OR CONTROL WORRYING: NOT AT ALL
7. FEELING AFRAID AS IF SOMETHING AWFUL MIGHT HAPPEN: NOT AT ALL

## 2020-01-17 ASSESSMENT — ENCOUNTER SYMPTOMS
PALPITATIONS: 0
FEVER: 0
ARTHRALGIAS: 0
CONSTIPATION: 0
BREAST MASS: 0
SHORTNESS OF BREATH: 0
WEAKNESS: 0
FREQUENCY: 0
HEADACHES: 0
ABDOMINAL PAIN: 0
DYSURIA: 0
NERVOUS/ANXIOUS: 0
HEARTBURN: 0
HEMATOCHEZIA: 0
SORE THROAT: 0
PARESTHESIAS: 0
MYALGIAS: 0
CHILLS: 0
DIARRHEA: 0
NAUSEA: 0
EYE PAIN: 0
HEMATURIA: 0
JOINT SWELLING: 0
DIZZINESS: 0
COUGH: 0

## 2020-01-17 ASSESSMENT — PATIENT HEALTH QUESTIONNAIRE - PHQ9
SUM OF ALL RESPONSES TO PHQ QUESTIONS 1-9: 0
5. POOR APPETITE OR OVEREATING: SEVERAL DAYS

## 2020-01-17 NOTE — PROGRESS NOTES
Subjective     Aleida Weinberg is a 35 year old female who presents to clinic today for the following health issues:    HPI   Anxiety Follow-Up    How are you doing with your anxiety since your last visit? stable    Are you having other symptoms that might be associated with anxiety? No    Have you had a significant life event? No     Are you feeling depressed? No    Do you have any concerns with your use of alcohol or other drugs? No  Takes hydroxyzine a few times a month.     Social History     Tobacco Use     Smoking status: Former Smoker     Packs/day: 0.50     Years: 2.50     Pack years: 1.25     Types: Cigarettes     Last attempt to quit: 2005     Years since quittin.1     Smokeless tobacco: Never Used   Substance Use Topics     Alcohol use: Yes     Alcohol/week: 0.0 standard drinks     Comment: rarely - 1 monthly     Drug use: No     TONE-7 SCORE 2017 10/26/2018 2019   Total Score 2 7 3     PHQ 2017 10/26/2018 2019   PHQ-9 Total Score 0 1 0   Q9: Thoughts of better off dead/self-harm past 2 weeks Not at all Not at all Not at all     Last PHQ-9 2019   1.  Little interest or pleasure in doing things 0   2.  Feeling down, depressed, or hopeless 0   3.  Trouble falling or staying asleep, or sleeping too much 0   4.  Feeling tired or having little energy 0   5.  Poor appetite or overeating 0   6.  Feeling bad about yourself 0   7.  Trouble concentrating 0   8.  Moving slowly or restless 0   Q9: Thoughts of better off dead/self-harm past 2 weeks 0   PHQ-9 Total Score 0   Difficulty at work, home, or with people Not difficult at all     TONE-7  2019   1. Feeling nervous, anxious, or on edge 1   2. Not being able to stop or control worrying 0   3. Worrying too much about different things 0   4. Trouble relaxing 1   5. Being so restless that it is hard to sit still 0   6. Becoming easily annoyed or irritable 0   7. Feeling afraid, as if something awful might happen 1    TONE-7 Total Score 3   If you checked any problems, how difficult have they made it for you to do your work, take care of things at home, or get along with other people? Somewhat difficult       Asthma Follow-Up    Was ACT completed today?    Yes    ACT Total Scores 5/31/2019   ACT TOTAL SCORE -   ASTHMA ER VISITS -   ASTHMA HOSPITALIZATIONS -   ACT TOTAL SCORE (Goal Greater than or Equal to 20) 24   In the past 12 months, how many times did you visit the emergency room for your asthma without being admitted to the hospital? 0   In the past 12 months, how many times were you hospitalized overnight because of your asthma? 0       How many days per week do you miss taking your asthma controller medication?  I do not have an asthma controller medication    Please describe any recent triggers for your asthma: None    Have you had any Emergency Room Visits, Urgent Care Visits, or Hospital Admissions since your last office visit?  No      How many servings of fruits and vegetables do you eat daily?  2-3    On average, how many sweetened beverages do you drink each day (Examples: soda, juice, sweet tea, etc.  Do NOT count diet or artificially sweetened beverages)?   1    How many days per week do you exercise enough to make your heart beat faster? 3 or less    How many minutes a day do you exercise enough to make your heart beat faster? 30 - 60    How many days per week do you miss taking your medication? 0    Needs biomedtric form completed.     Has Lupus - was diagnosed 2 years.  On Plaquenil and Imuran.  Sees Dr. Light  Raynaud's disease-on amlodipine 10 mg      Patient Active Problem List   Diagnosis     Other kyphoscoliosis and scoliosis     Hypertrophic and atrophic condition of skin     Viral warts     Routine postpartum follow-up     CARDIOVASCULAR SCREENING; LDL GOAL LESS THAN 160     Intermittent asthma     Anxiety     Intractable menstrual migraine without status migrainosus     Vitamin D deficiency      Inflammatory polyarthropathy (H)     Chronic back pain     Raynaud's phenomenon without gangrene     Systemic lupus erythematosus (H)     High risk medications (not anticoagulants) long-term use (Plaquenil and AZA)     Dry eyes, bilateral     Past Surgical History:   Procedure Laterality Date      SECTION       SURGICAL HISTORY OF -       PE Tubes       Social History     Tobacco Use     Smoking status: Former Smoker     Packs/day: 0.50     Years: 2.50     Pack years: 1.25     Types: Cigarettes     Last attempt to quit: 2005     Years since quittin.1     Smokeless tobacco: Never Used   Substance Use Topics     Alcohol use: Yes     Alcohol/week: 0.0 standard drinks     Comment: rarely - 1 monthly     Family History   Problem Relation Age of Onset     Heart Disease Maternal Grandfather         Bypass, Heart Disease     Respiratory Maternal Grandfather         asthma     Macular Degeneration Maternal Grandfather      C.A.D. Paternal Grandfather      Heart Disease Maternal Uncle         MI's      Hypertension Paternal Grandmother      Cancer Paternal Grandmother         lymph nodes     Cataracts Paternal Grandmother      Respiratory Other         cousin on mothers side, asthma     Alcohol/Drug Maternal Uncle      Alcohol/Drug Other         bother great grandparents on mother's side     Diabetes No family hx of      Breast Cancer No family hx of      Cancer - colorectal No family hx of            Reviewed and updated as needed this visit by Provider         Review of Systems   Constitutional: Negative for chills and fever.   HENT: Negative for congestion, ear pain, hearing loss and sore throat.    Eyes: Negative for pain and visual disturbance.   Respiratory: Negative for cough and shortness of breath.    Cardiovascular: Negative for chest pain, palpitations and peripheral edema.   Gastrointestinal: Negative for abdominal pain, constipation, diarrhea, heartburn, hematochezia and nausea.   Breasts:   Negative for tenderness, breast mass and discharge.   Genitourinary: Negative for dysuria, frequency, genital sores, hematuria, pelvic pain, urgency, vaginal bleeding and vaginal discharge.   Musculoskeletal: Negative for arthralgias, joint swelling and myalgias.   Skin: Negative for rash.   Neurological: Negative for dizziness, weakness, headaches and paresthesias.   Psychiatric/Behavioral: Negative for mood changes. The patient is not nervous/anxious.       ROS COMP: Constitutional, HEENT, cardiovascular, pulmonary, GI, , musculoskeletal, neuro, skin, endocrine and psych systems are negative, except as otherwise noted.      Objective    There were no vitals taken for this visit.  There is no height or weight on file to calculate BMI.  Physical Exam   GENERAL: healthy, alert and no distress  EYES: Eyes grossly normal to inspection, PERRL and conjunctivae and sclerae normal  HENT: ear canals and TM's normal, nose and mouth without ulcers or lesions  NECK: no adenopathy, no asymmetry, masses, or scars and thyroid normal to palpation  RESP: lungs clear to auscultation - no rales, rhonchi or wheezes  CV: regular rate and rhythm, normal S1 S2, no S3 or S4, no murmur, click or rub, no peripheral edema and peripheral pulses strong  ABDOMEN: soft, nontender, no hepatosplenomegaly, no masses and bowel sounds normal  MS: no gross musculoskeletal defects noted, no edema  SKIN: hands are erythematic   PSYCH: mentation appears normal, affect normal/bright  LYMPH: no cervical, supraclavicular, axillary, or inguinal adenopathy    Diagnostic Test Results:  Labs reviewed in Epic  Results for orders placed or performed in visit on 01/16/20 (from the past 24 hour(s))   CBC with platelets differential   Result Value Ref Range    WBC 3.6 (L) 4.0 - 11.0 10e9/L    RBC Count 4.44 3.8 - 5.2 10e12/L    Hemoglobin 14.6 11.7 - 15.7 g/dL    Hematocrit 41.2 35.0 - 47.0 %    MCV 93 78 - 100 fl    MCH 32.9 26.5 - 33.0 pg    MCHC 35.4 31.5 -  36.5 g/dL    RDW 11.6 10.0 - 15.0 %    Platelet Count 192 150 - 450 10e9/L    % Neutrophils 64.6 %    % Lymphocytes 20.6 %    % Monocytes 12.6 %    % Eosinophils 1.9 %    % Basophils 0.3 %    Absolute Neutrophil 2.4 1.6 - 8.3 10e9/L    Absolute Lymphocytes 0.8 0.8 - 5.3 10e9/L    Absolute Monocytes 0.5 0.0 - 1.3 10e9/L    Absolute Eosinophils 0.1 0.0 - 0.7 10e9/L    Absolute Basophils 0.0 0.0 - 0.2 10e9/L    Diff Method Automated Method    UA reflex to Microscopic and Culture   Result Value Ref Range    Color Urine Yellow     Appearance Urine Clear     Glucose Urine Negative NEG^Negative mg/dL    Bilirubin Urine Negative NEG^Negative    Ketones Urine Negative NEG^Negative mg/dL    Specific Gravity Urine <=1.005 1.003 - 1.035    Blood Urine Negative NEG^Negative    pH Urine 6.5 5.0 - 7.0 pH    Protein Albumin Urine Negative NEG^Negative mg/dL    Urobilinogen Urine 0.2 0.2 - 1.0 EU/dL    Nitrite Urine Negative NEG^Negative    Leukocyte Esterase Urine Negative NEG^Negative    Source Midstream Urine            Assessment & Plan     1. Anxiety  Stable refilled  - hydrOXYzine (ATARAX) 25 MG tablet; Take 0.5-2 tablets (12.5-50 mg) by mouth every 6 hours as needed for anxiety  Dispense: 30 tablet; Refill: 1    2. Mild intermittent asthma without complication  Stable she had some wheezes on exam but cleared with cough recommend she use her albuterol inhaler    3. CARDIOVASCULAR SCREENING; LDL GOAL LESS THAN 160  She needs this for her biometric form for work  - Lipid panel reflex to direct LDL Fasting    4. Screening for diabetes mellitus  She needs this for her biometric form for work  - Glucose, whole blood    5. Other systemic lupus erythematosus with other organ involvement (H)  On Plaquenil and Imuran followed by Dr. Light       Return in about 6 months (around 7/17/2020).    FARZANA Palmer [facility]

## 2020-01-17 NOTE — PATIENT INSTRUCTIONS
[facility]   Discharged by : Vikki Bliss MA  If you have any questions regarding your visit please contact your care team:     Team Gold                Clinic Hours Telephone Number     Dr. Odalys Chirinos, CNP 7am-7pm  Monday - Thursday   7am-5pm  Fridays  [phone]   (Appointment scheduling available 24/7)     RN Line  [phone] option 2     [facility] [place] [facility]-9pm Monday-Friday Saturday-Sunday- 9am-5pm     [place] -   5pm-9pm Monday-Friday Saturday-Sunday- 9am-5pm    [phone] - [place] Diaz    [phone] - [place]     For a Price Quote for your services, please call our Consumer Price Line at [phone].     What options do I have for visits at the clinic other than the traditional office visit?     To expand how we care for you, many of our providers are utilizing electronic visits (e-visits) and telephone visits, when medically appropriate, for interactions with their patients rather than a visit in the clinic. We also offer nurse visits for many medical concerns. Just like any other service, we will bill your insurance company for this type of visit based on time spent on the phone with your provider. Not all insurance companies cover these visits. Please check with your medical insurance if this type of visit is covered. You will be responsible for any charges that are not paid by your insurance.   E-visits via [employer]: generally incur a $45.00 fee.     Telephone visits:  Time spent on the phone: *charged based on time that is spent on the phone in increments of 10 minutes. Estimated cost:   5-10 mins $30.00   11-20 mins. $59.00   21-30 mins. $85.00     Use [employer] (secure email communication and access to your chart) to send your primary care provider a message or make an appointment. Ask someone on your Team how to sign up for [employer].     As always, Thank you for trusting us with your  health care needs!    [facility]:    Scheduling Appointments  Prakash Delong [facility]  Call: [phone]    [place]  Call: [phone]    [facility]  Call: [phone]    For Gastroenterology referrals   [facility]   Clinics and Surgery Center, 4th Floor   [address] [place] [address]   Appointments: [phone]    WHERE TO GO FOR CARE?  Clinic    Make an appointment if you:       Are sick (cold, cough, flu, sore throat, earache or in pain).       Have a small injury (sprain, small cut, burn or broken bone).       Need a physical exam, Pap smear, vaccine or prescription refill.       Have questions about your health or medicines.    To reach us:      Call [phone] ([phone]). Open 24 hours every day. (For counseling services, call [phone].)    Log into [facility] [employer]. (Visit [email or web address] to create an account.) Hospital emergency room    An emergency is a serious or life- threatening problem that must be treated right away.    Call 471 or get to the hospital if you have:      Very bad or sudden:            - Chest pain or pressure         - Bleeding         - Head or belly pain         - Dizziness or trouble seeing, walking or                          Speaking      Problems breathing      Blood in your vomit or you are coughing up blood      A major injury (knocked out, loss of a finger or limb, rape, broken bone protruding from skin)    A mental health crisis. (Or call the Mental Health Crisis line at 1-334.849.5784 or Suicide Prevention Hotline at 1-439.182.8486.)    Open 24 hours every day. You don't need an appointment.     Urgent care    Visit urgent care for sickness or small injuries when the clinic is closed. You don't need an appointment. To check hours or find an urgent care near you, visit [email or web address]. Online care    Get online care from  [facility] for more than 70 common problems, like colds, allergies and infections. Open 24 hours every day at:   [email or web address]   Need help deciding?    For advice about where to be seen, you may call your clinic and ask to speak with a nurse. We're here for you 24 hours every day.         If you are deaf or hard of hearing, please let us know. We provide many free services including sign language interpreters, oral interpreters, TTYs, telephone amplifiers, note takers and written materials.

## 2020-01-18 ASSESSMENT — ASTHMA QUESTIONNAIRES: ACT_TOTALSCORE: 25

## 2020-01-18 ASSESSMENT — ANXIETY QUESTIONNAIRES: GAD7 TOTAL SCORE: 4

## 2020-01-21 ENCOUNTER — MYC MEDICAL ADVICE (OUTPATIENT)
Dept: FAMILY MEDICINE | Facility: CLINIC | Age: 35
End: 2020-01-21

## 2020-04-02 DIAGNOSIS — M32.19 OTHER SYSTEMIC LUPUS ERYTHEMATOSUS WITH OTHER ORGAN INVOLVEMENT (H): ICD-10-CM

## 2020-04-02 LAB
ALBUMIN UR-MCNC: NEGATIVE MG/DL
APPEARANCE UR: CLEAR
BASOPHILS # BLD AUTO: 0 10E9/L (ref 0–0.2)
BASOPHILS NFR BLD AUTO: 0.4 %
BILIRUB UR QL STRIP: NEGATIVE
COLOR UR AUTO: YELLOW
DIFFERENTIAL METHOD BLD: ABNORMAL
EOSINOPHIL # BLD AUTO: 0 10E9/L (ref 0–0.7)
EOSINOPHIL NFR BLD AUTO: 0.4 %
ERYTHROCYTE [DISTWIDTH] IN BLOOD BY AUTOMATED COUNT: 11.7 % (ref 10–15)
ERYTHROCYTE [SEDIMENTATION RATE] IN BLOOD BY WESTERGREN METHOD: 8 MM/H (ref 0–20)
GLUCOSE UR STRIP-MCNC: NEGATIVE MG/DL
HCT VFR BLD AUTO: 39.8 % (ref 35–47)
HGB BLD-MCNC: 14.2 G/DL (ref 11.7–15.7)
HGB UR QL STRIP: NEGATIVE
KETONES UR STRIP-MCNC: NEGATIVE MG/DL
LEUKOCYTE ESTERASE UR QL STRIP: NEGATIVE
LYMPHOCYTES # BLD AUTO: 0.6 10E9/L (ref 0.8–5.3)
LYMPHOCYTES NFR BLD AUTO: 10.5 %
MCH RBC QN AUTO: 32.9 PG (ref 26.5–33)
MCHC RBC AUTO-ENTMCNC: 35.7 G/DL (ref 31.5–36.5)
MCV RBC AUTO: 92 FL (ref 78–100)
MONOCYTES # BLD AUTO: 0.6 10E9/L (ref 0–1.3)
MONOCYTES NFR BLD AUTO: 10.1 %
NEUTROPHILS # BLD AUTO: 4.3 10E9/L (ref 1.6–8.3)
NEUTROPHILS NFR BLD AUTO: 78.6 %
NITRATE UR QL: NEGATIVE
PH UR STRIP: 7 PH (ref 5–7)
PLATELET # BLD AUTO: 215 10E9/L (ref 150–450)
RBC # BLD AUTO: 4.31 10E12/L (ref 3.8–5.2)
SOURCE: NORMAL
SP GR UR STRIP: 1.01 (ref 1–1.03)
UROBILINOGEN UR STRIP-ACNC: 0.2 EU/DL (ref 0.2–1)
WBC # BLD AUTO: 5.4 10E9/L (ref 4–11)

## 2020-04-02 PROCEDURE — 86225 DNA ANTIBODY NATIVE: CPT | Performed by: INTERNAL MEDICINE

## 2020-04-02 PROCEDURE — 85025 COMPLETE CBC W/AUTO DIFF WBC: CPT | Performed by: INTERNAL MEDICINE

## 2020-04-02 PROCEDURE — 86160 COMPLEMENT ANTIGEN: CPT | Performed by: INTERNAL MEDICINE

## 2020-04-02 PROCEDURE — 86140 C-REACTIVE PROTEIN: CPT | Performed by: INTERNAL MEDICINE

## 2020-04-02 PROCEDURE — 81003 URINALYSIS AUTO W/O SCOPE: CPT | Performed by: INTERNAL MEDICINE

## 2020-04-02 PROCEDURE — 80053 COMPREHEN METABOLIC PANEL: CPT | Performed by: INTERNAL MEDICINE

## 2020-04-02 PROCEDURE — 36415 COLL VENOUS BLD VENIPUNCTURE: CPT | Performed by: INTERNAL MEDICINE

## 2020-04-02 PROCEDURE — 85652 RBC SED RATE AUTOMATED: CPT | Performed by: INTERNAL MEDICINE

## 2020-04-02 PROCEDURE — 82550 ASSAY OF CK (CPK): CPT | Performed by: INTERNAL MEDICINE

## 2020-04-02 PROCEDURE — 84156 ASSAY OF PROTEIN URINE: CPT | Performed by: INTERNAL MEDICINE

## 2020-04-03 LAB
ALBUMIN SERPL-MCNC: 4.3 G/DL (ref 3.4–5)
ALP SERPL-CCNC: 47 U/L (ref 40–150)
ALT SERPL W P-5'-P-CCNC: 30 U/L (ref 0–50)
ANION GAP SERPL CALCULATED.3IONS-SCNC: 5 MMOL/L (ref 3–14)
AST SERPL W P-5'-P-CCNC: 18 U/L (ref 0–45)
BILIRUB SERPL-MCNC: 0.5 MG/DL (ref 0.2–1.3)
BUN SERPL-MCNC: 8 MG/DL (ref 7–30)
CALCIUM SERPL-MCNC: 9 MG/DL (ref 8.5–10.1)
CHLORIDE SERPL-SCNC: 105 MMOL/L (ref 94–109)
CK SERPL-CCNC: 43 U/L (ref 30–225)
CO2 SERPL-SCNC: 26 MMOL/L (ref 20–32)
CREAT SERPL-MCNC: 0.58 MG/DL (ref 0.52–1.04)
CREAT UR-MCNC: 23 MG/DL
CRP SERPL-MCNC: <2.9 MG/L (ref 0–8)
GFR SERPL CREATININE-BSD FRML MDRD: >90 ML/MIN/{1.73_M2}
GLUCOSE SERPL-MCNC: 83 MG/DL (ref 70–99)
POTASSIUM SERPL-SCNC: 3.9 MMOL/L (ref 3.4–5.3)
PROT SERPL-MCNC: 7.8 G/DL (ref 6.8–8.8)
PROT UR-MCNC: <0.05 G/L
PROT/CREAT 24H UR: NORMAL G/G CR (ref 0–0.2)
SODIUM SERPL-SCNC: 136 MMOL/L (ref 133–144)

## 2020-04-06 LAB
C3 SERPL-MCNC: 90 MG/DL (ref 81–157)
C4 SERPL-MCNC: 20 MG/DL (ref 13–39)
DSDNA AB SER-ACNC: 2 IU/ML

## 2020-04-06 NOTE — RESULT ENCOUNTER NOTE
"Markrt message sent:  \"Ms. Weinberg,    Labs look good!    FYI: Someone will be contacting you before your upcoming visit to change it to a virtual visit, due to COVID19.     Sincerely,  Lavon Light MD  4/6/2020 5:30 PM\""

## 2020-04-17 ENCOUNTER — VIRTUAL VISIT (OUTPATIENT)
Dept: RHEUMATOLOGY | Facility: CLINIC | Age: 35
End: 2020-04-17
Payer: COMMERCIAL

## 2020-04-17 DIAGNOSIS — Z79.899 HIGH RISK MEDICATIONS (NOT ANTICOAGULANTS) LONG-TERM USE: ICD-10-CM

## 2020-04-17 DIAGNOSIS — E55.9 VITAMIN D DEFICIENCY: ICD-10-CM

## 2020-04-17 DIAGNOSIS — M32.19 OTHER SYSTEMIC LUPUS ERYTHEMATOSUS WITH OTHER ORGAN INVOLVEMENT (H): Primary | ICD-10-CM

## 2020-04-17 DIAGNOSIS — I73.00 RAYNAUD'S PHENOMENON WITHOUT GANGRENE: ICD-10-CM

## 2020-04-17 PROCEDURE — 99214 OFFICE O/P EST MOD 30 MIN: CPT | Mod: 95 | Performed by: INTERNAL MEDICINE

## 2020-04-17 RX ORDER — AZATHIOPRINE 50 MG/1
75 TABLET ORAL DAILY
Qty: 45 TABLET | Refills: 4 | Status: SHIPPED | OUTPATIENT
Start: 2020-04-17 | End: 2020-08-02

## 2020-04-17 RX ORDER — CHOLECALCIFEROL (VITAMIN D3) 50 MCG
2000 TABLET ORAL DAILY
Qty: 100 TABLET | Refills: 3 | Status: SHIPPED | OUTPATIENT
Start: 2020-04-17 | End: 2021-07-27

## 2020-04-17 RX ORDER — HYDROXYCHLOROQUINE SULFATE 200 MG/1
TABLET, FILM COATED ORAL
Qty: 45 TABLET | Refills: 12 | Status: SHIPPED | OUTPATIENT
Start: 2020-04-17 | End: 2020-11-12

## 2020-04-17 RX ORDER — AMLODIPINE BESYLATE 10 MG/1
10 TABLET ORAL DAILY
Qty: 30 TABLET | Refills: 4 | Status: SHIPPED | OUTPATIENT
Start: 2020-04-17 | End: 2020-11-12

## 2020-04-17 NOTE — PROGRESS NOTES
"Aleida Weinberg is a 35 year old female who is being evaluated via a billable telephone visit.      The patient has been notified of following:     \"This telephone visit will be conducted via a call between you and your physician/provider. We have found that certain health care needs can be provided without the need for a physical exam.  This service lets us provide the care you need with a short phone conversation.  If a prescription is necessary we can send it directly to your pharmacy.  If lab work is needed we can place an order for that and you can then stop by our lab to have the test done at a later time.    Telephone visits are billed at different rates depending on your insurance coverage. During this emergency period, for some insurers they may be billed the same as an in-person visit.  Please reach out to your insurance provider with any questions.    If during the course of the call the physician/provider feels a telephone visit is not appropriate, you will not be charged for this service.\"    Patient has given verbal consent for Telephone visit?  Yes    How would you like to obtain your AVS? Mathieuhart    Additional provider notes:     Rheumatology Telephone/TeleHealth Visit      Aleida Weinberg MRN# [id number]   YOB: 1985 Age: 35 year old      Date of visit: 4/17/20   PCP: Tatum Dove     Chief Complaint   Patient presents with:  Systemic Lupus Erythematous: doing good    Assessment and Plan   1. Systemic lupus erythematosus (CHANELL >[id number] speckled, RNP+, Sm+, Scl-70+, hypocomplementemia, photosensitivity, malar rash, arthritis, fatigue, Raynaud's phenomenon):  Dx'd 4/2016. Scl-70+; she lacks features of scleroderma at this time; no myopathy by hx of labs. 5/11/2018 echo without evidence of pulmonary hypertension. Previously on MTX 20mg SQ weekly (GI upset with PO doses above 15mg, partially effective) and SSZ (LFT elevations).  Currently on AZA 100mg daily (synovitis when " on 100mg daily but after developing bacterial vaginosis and blepharitis the dose was reduced; she has not had those issues again synovitis did not come back) and HCQ 300mg daily. TPMT normal on 8/21/2017.  Intermittent arthralgias in the hands and feet; no synovitis on exam today. Doing well at this time. In the past we have discussed benlysta and MMF; not needed at this time.   - Continue hydroxychloroquine 300mg daily on average (last eye exam done  4/29/2019)  - Continue azathioprine 75mg daily   - Labs in July:  CBC, CMP, ESR, CRP, CK, C3, C4, dsDNA, UA, Uprotein:creatinine    2. Raynaud's Phenomenon: Cold avoidance was insufficient for controlling her symptoms and therefore amlodipine was started. Uses amlodipine 10mg daily during cold months only. Plans to restart amlodipine now.   - Amlodipine 10mg daily during colder months    3. Chronic Back Pain: History of scoliosis. Degenerative findings on previously reviewed MRIs from 2000 and 2009. This has not changed for many years and does not worsen when her peripheral joint symptoms worsen. Had one episode of sciatica that resolved with chiropractic treatment.      4. Vitamin D deficiency: Currently on vitamin D 2000 units daily.    - Continue vitamin D 2000 units daily  - Lab in July: Vitamin D    5.  Secondary Sjogren's syndrome: Mild dry and dry mouth that are treated infrequently with artificial tears and frequent sips of water.     # Relevant labs and imaging were reviewed with the patient    # High risk medication toxicity monitoring: discussion and labs reviewed; appropriate labs ordered. See above    # Note that this is a virtual visit to reduce the risk of COVID-19 exposure during this current pandemic.       Ms. Weinberg verbalized agreement with and understanding of the rational for the diagnosis and treatment plan.  All questions were answered to best of my ability and the patient's satisfaction. Ms. Weinberg was advised to contact the clinic with any  questions that may arise after the clinic visit.      Thank you for involving me in the care of the patient    Return to clinic: 3-4 months      HPI   Aleida Weinberg is a 35 year old female with medical history significant for intermittent asthma, anxiety, migraines, vitamin D deficiency, and systemic lupus erythematosus who presents for follow-up of SLE.     Today, Ms. Weinberg reports that she is doing well.  No joint pain or morning stiffness.  No gelling phenomenon.  Small painful bump behind the ear that drained and hasn't recurred.  Raynaud's phenomenon tx'd with amlodipine during the colder months.      Denies fevers, chills, nausea, vomiting, constipation, diarrhea. No abdominal pain. No chest pain/pressure, palpitations, or shortness of breath. No oral or nasal sores. No neck pain. No LE swelling.  Has photosensitivity but no photophobia.  Mild dry eyes and dry mouth; she uses artificial tears as needed.  No eye pain or redness. Denies history of serositis. Denies history of DVT, pulmonary embolism, or miscarriage.    Tobacco: quit smoking in 2005  EtOH: Once monthly  Drugs: None  Occupation: Works at [employer], a lot of typing per patient    ROS   GEN: No fevers, chills, night sweats, or weight change  SKIN: See HPI.   HEENT: Has a history of migraines, but no headache today. No change in vision, taste, or hearing. No epistaxis. No oral or nasal ulcers.  CV: No chest pain, pressure, palpitations, or dyspnea on exertion.  PULM: No SOB, wheeze, cough.  GI:  No nausea, vomiting, constipation, diarrhea. No blood in stool. No abdominal pain.  : No blood in urine.  MSK: See HPI.  NEURO: See history of present illness  ENDO: No heat/cold intolerance.  EXT: See history of present illness    Active Problem List     Patient Active Problem List   Diagnosis     Other kyphoscoliosis and scoliosis     Hypertrophic and atrophic condition of skin     Viral warts     Routine postpartum follow-up      CARDIOVASCULAR SCREENING; LDL GOAL LESS THAN 160     Intermittent asthma     Anxiety     Intractable menstrual migraine without status migrainosus     Vitamin D deficiency     Inflammatory polyarthropathy (H)     Chronic back pain     Raynaud's phenomenon without gangrene     Systemic lupus erythematosus (H)     High risk medications (not anticoagulants) long-term use (Plaquenil and AZA)     Dry eyes, bilateral     Past Medical History     Past Medical History:   Diagnosis Date     Mild intermittent asthma      Other kyphoscoliosis and scoliosis     upper back curvature and disc degeneration in lower back.     Past Surgical History     Past Surgical History:   Procedure Laterality Date      SECTION       SURGICAL HISTORY OF -       PE Tubes     Allergy     Allergies   Allergen Reactions     Fluconazole Rash     Current Medication List     Current Outpatient Medications   Medication Sig     albuterol (PROAIR HFA/PROVENTIL HFA/VENTOLIN HFA) 108 (90 Base) MCG/ACT inhaler Inhale 2 puffs into the lungs every 6 hours as needed for shortness of breath / dyspnea     amLODIPine (NORVASC) 10 MG tablet Take 1 tablet (10 mg) by mouth daily     azaTHIOprine (IMURAN) 50 MG tablet Take 1.5 tablets (75 mg) by mouth daily     Cholecalciferol (VITAMIN D) 2000 UNITS tablet Take 2,000 Units by mouth daily     cyclobenzaprine (FLEXERIL) 10 MG tablet Take 0.5-1 tablets (5-10 mg) by mouth 3 times daily as needed for muscle spasms     hydroxychloroquine (PLAQUENIL) 200 MG tablet Hydroxychloroquine 200mg daily; and an additional 200mg every other day.     hydrOXYzine (ATARAX) 25 MG tablet Take 0.5-2 tablets (12.5-50 mg) by mouth every 6 hours as needed for anxiety     levonorgestrel (MIRENA, 52 MG,) 20 MCG/24HR IUD 1 each by Intrauterine route once     LORazepam (ATIVAN) 0.5 MG tablet Take 1 tablet (0.5 mg) by mouth every 8 hours as needed for anxiety     traZODone (DESYREL) 50 MG tablet Take 0.5 tablets (25 mg) by mouth nightly  "as needed for sleep     No current facility-administered medications for this visit.          Social History   See HPI    Family History     Family History   Problem Relation Age of Onset     Heart Disease Maternal Grandfather         Bypass, Heart Disease     Respiratory Maternal Grandfather         asthma     Macular Degeneration Maternal Grandfather      C.A.D. Paternal Grandfather      Heart Disease Maternal Uncle         MI's      Hypertension Paternal Grandmother      Cancer Paternal Grandmother         lymph nodes     Cataracts Paternal Grandmother      Respiratory Other         cousin on mothers side, asthma     Alcohol/Drug Maternal Uncle      Alcohol/Drug Other         bother great grandparents on mother's side     Diabetes No family hx of      Breast Cancer No family hx of      Cancer - colorectal No family hx of      Denies family history of autoimmune disease    Physical Exam     Temp Readings from Last 3 Encounters:   05/31/19 97.6  F (36.4  C) (Oral)   12/07/18 98.6  F (37  C) (Oral)   10/26/18 98.2  F (36.8  C) (Oral)     BP Readings from Last 5 Encounters:   12/13/19 102/72   08/23/19 104/66   05/31/19 104/62   04/12/19 102/71   12/07/18 100/62     Pulse Readings from Last 1 Encounters:   12/13/19 80     Resp Readings from Last 1 Encounters:   08/30/18 16     Estimated body mass index is 23.65 kg/m  as calculated from the following:    Height as of 12/13/19: 1.705 m (5' 7.13\").    Weight as of 12/13/19: 68.8 kg (151 lb 9.6 oz).    Telephone visit    Labs / Imaging (select studies)   RF/CCP  Recent Labs   Lab Test 04/22/16  0902 04/01/16  0910   CCPIGG 1  --    RHF  --  <20     CHANELL  Recent Labs   Lab Test 04/22/16  0902 04/01/16  0910   VALERIE  --  12.4*   ANAIGG >1:42262  Reference range: <1:40  (Note)  Speckled pattern.  INTERPRETIVE INFORMATION: CHANELL by IFA, IgG  Anti-nuclear antibodies (CHANELL) are seen in a variety of  systemic rheumatic diseases and are determined by indirect  fluorescence assay " (IFA) using HEp-2 substrate with an  IgG-specific conjugate. CHANELL titers less than or equal to  1:80 have variable relevance while titers greater than or  equal to 1:160 are considered clinically significant. These  antibodies may precede clinical disease onset; however,  healthy individuals and those with advanced age have been  reported to be positive for CHANELL. When observed, one of the  five basic patterns is reported: homogeneous,  peripheral/rim, speckled, centromere, or nucleolar. If  cytoplasmic fluorescence is observed, it is noted. IFA  methodology is subjective and has occasionally been shown  to lack sensitivity for anti-SSA/Ro antibodies.  Negative results do not necessarily rule out the presence  of SSc. If clinical suspicion remains, consi vicki further  testing for U3-RNP, PM/Scl, or Th/To antibodies associated  with SSc.  Performed by [facility],  [address] [place] [address] [phone]  [email or web address], Twin Rodriguez MD, Lab. Director    --      RNP/Sm/SSA/SSB  Recent Labs   Lab Test 01/12/18  0828 04/22/16  0902   RNPIGG  --  >8.0  Positive   Antibody index (AI) values reflect qualitative changes in antibody   concentration that cannot be directly associated with clinical condition or   disease state.  *   SMIGG  --  1.5*   SSAIGG  --  <0.2  Negative   Antibody index (AI) values reflect qualitative changes in antibody   concentration that cannot be directly associated with clinical condition or   disease state.     SSBIGG  --  <0.2  Negative   Antibody index (AI) values reflect qualitative changes in antibody   concentration that cannot be directly associated with clinical condition or   disease state.     SCLIGG  --  3.1*   TREPAB Negative  --      dsDNA  Recent Labs   Lab Test 04/02/20  1541 07/11/19  1529 11/29/18  1616 08/23/18  1637 04/26/18  1648 01/18/18  1639 10/12/17  1642 07/17/17  1707 04/13/17  1650   DNA 2 2 2 2 2 2 2 1 2     C3/C4  Recent Labs   Lab Test 04/02/20  1541  10/18/19  1542 07/11/19  1529 11/29/18  1616 08/23/18  1637 04/26/18  1648 01/18/18  1639 10/12/17  1642 07/17/17  1707   M6DEBJK 90 74* 75* 82 66* 71* 76 70* 85   A2LSSUN 20 14* 14* 13* 13* 14* 14* 14* 14*     Send-out Labs  Recent Labs   Lab Test 04/21/17  0813   SRESLT SEE NOTE  (Note)  Test name                    Result Flag  Units  RefIntvl  ------------------------------------------------------------  Thiopurine Methyltransferase                                23.2 L      U/mL 24.0-44.0  Sample slightly hemolyzed. Please interpret the results  with caution.  INTERPRETIVE INFORMATION: Thiopurine Methyltransferase, RBC  Normal TPMT activity:  24.0-44.0 U/mL................Individuals are predicted to  be at low risk of bone marrow toxicity (myelosuppression)  as a consequence of standard thiopurine therapy; no dose  adjustment is recommended.  Intermediate TPMT activity:  17.0-23.9 U/mL................Individuals are predicted to  be at intermediate risk of bone marrow toxicity  (myelosuppression) as a consequence of standard thiopurine  therapy; a dose reduction and therapeutic drug management  is recommended.  Low TPMT activity:  less than 17.0 U/mL...........Individuals are predicted to  be at high risk of bone marrow toxicity (myelosuppression)   as a consequence of standard thiopurine dosing. It is  recommended to avoid the use of thiopurine drugs.  High TPMT activity:  greater than 44.0 U/mL........Individuals are not predicted  to be at risk for bone marrow toxicity (myelosuppression)  as a consequence of standard thiopurine dosing, but may be  at risk for therapeutic failure due to excessive  inactivation of thiopurine drugs. Individuals may require  higher than the normal standard dose. Therapeutic drug  management is recommended.  The TPMT, RBC assay is used as a screen to detect  individuals with low and intermediate TPMT activity who may  be at risk for myelosuppression when exposed to  standard  doses of thiopurines, including azathioprine (Imuran) and  6-mercaptopurine (Purinethol). TPMT is the primary  metabolic route for inactivation of thiopurine drugs in the  bone marrow. When TPMT activity is low, it is predicted  that proportionately more 6-mercaptopurine can be converted  into the cytotoxic 6-thioguanine nucle otides that  accumulate in the bone marrow causing excessive toxicity.  The activity of TPMT is measured by the nanomoles of  6-methylmercaptopurine (inactive metabolite) produced per 1  mL of packed red blood cells, (U/mL).    TPMT phenotype testing does not replace the need for  clinical monitoring of patients treated with thiopurine  drugs. Genotype for TPMT cannot be inferred from TPMT  activity (phenotype). Phenotype testing should not be  requested for patients currently treated with thiopurine  drugs. Current TPMT phenotype may not reflect future TPMT  phenotype, particularly in patients who received blood  transfusion within 30-60 days of testing.  TPMT enzyme  activity can be inhibited by several drugs such as:  naproxen (Aleve), ibuprofen (Advil, Motrin), ketoprofen  (Orudis), furosemide (Lasix), sulfasalazine (Azulfidine),  mesalamine (Asacol), olsalazine (Dipentum), mefenamic acid  (Ponstel), thiazide diuretics, and benzoic acid inhibitors.  TPMT inhibitors may contribute t o falsely low results;  patients should abstain from these drugs for at least 48  hours prior to TPMT testing. Falsely low results may also  occur as a result of inappropriate specimen handling and  hemolysis.  Test developed and characteristics determined by [facility]. See Compliance Statement B: [email or web address]  Performed by [facility],  [address] [place] [address] [phone]  [email or web address], Lionel Ferreira MD, Lab. Director     STSTNM Thiopurine Methyltransferase, RBC   STSTCD 92,066   SSPTYP Whole blood, EDTA anticoagulant     Antiphospholipid Antibodies  [facility]   Lab  Test 04/22/16  0902   B2GPG 0.9   B2GPM <0.9  Negative     CARG <15.0  Interpretation:  Negative     JOANN <12.5  Interpretation:  Negative     LUPINT Negative  (Note)  COMMENTS:  The INR is normal.  APTT ratio is normal.  DRVVT Screen ratio is normal.  Thrombin time is normal.  NEGATIVE TEST; A LUPUS ANTICOAGULANT WAS NOT DETECTED IN THIS  SPECIMEN WITHIN THE LIMITS OF THE TESTING REPERTOIRE.  If the clinical picture is strongly suggestive of an antiphospholipid  syndrome, recommend anticardiolipin and beta-2-glycoprotein (IgG and  IgM) antibody tests.  Isabella Olson M.D.  [phone]  4/25/2016    INR =  1.05    Reference range: 0.86-1.14  Thrombin Time= 15.4    Reference range: 13.0-19.0 sec    APTT:       Ratio  Patient  =  0.92  1:2 Mix  =  N/A  Reference:  Negative: Less than or equal to 1.16  Positive: Greater than or equal to 1.17     DILUTE LISA VIPER VENOM TEST:  Screen Ratio = 0.95   Normal is less than 1.21         CBC  Recent Labs   Lab Test 04/02/20  1541 01/16/20  1627 10/18/19  1542   WBC 5.4 3.6* 4.6   RBC 4.31 4.44 4.07   HGB 14.2 14.6 13.9   HCT 39.8 41.2 39.0   MCV 92 93 96   RDW 11.7 11.6 11.7    192 212   MCH 32.9 32.9 34.2*   MCHC 35.7 35.4 35.6   NEUTROPHIL 78.6 64.6 67.9   LYMPH 10.5 20.6 16.3   MONOCYTE 10.1 12.6 14.7   EOSINOPHIL 0.4 1.9 0.9   BASOPHIL 0.4 0.3 0.2   ANEU 4.3 2.4 3.1   ALYM 0.6* 0.8 0.7*   AMANDA 0.6 0.5 0.7   AEOS 0.0 0.1 0.0   ABAS 0.0 0.0 0.0     CMP  Recent Labs   Lab Test 04/02/20  1541 01/16/20  1627 10/18/19  1542 07/11/19  1529 04/04/19  1629 02/08/19  0713    137 136 137 137  --    POTASSIUM 3.9 4.1 3.8 3.7 3.7  --    CHLORIDE 105 106 105 107 105  --    CO2 26 27 26 25 25  --    ANIONGAP 5 4 5 5 7  --    GLC 83 67* 75 78 79 84   BUN 8 16 12 12 9  --    CR 0.58 0.72 0.63 0.66 0.79  --    GFRESTIMATED >90 >90 >90 >90 >90  --    GFRESTBLACK >90 >90 >90 >90 >90  --    SRINIVAS 9.0 9.2 8.8 8.6 8.9  --    BILITOTAL 0.5 0.4 0.4 0.5 0.5  --    ALBUMIN 4.3  4.3 4.5 4.5 4.5  --    PROTTOTAL 7.8 7.4 7.3 7.3 7.6  --    ALKPHOS 47 42 44 41 44  --    AST 18 21 15 18 20  --    ALT 30 29 24 24 21  --       < > = values in this interval not displayed.     ESR/CRP  Recent Labs   Lab Test 04/02/20  1541 10/18/19  1542 07/11/19  1529   SED 8 7 9   CRP <2.9 <2.9 <2.9     CK/Aldolase  Recent Labs   Lab Test 04/02/20  1541 07/11/19  1529 04/04/19  1629  04/22/16  0902   CKT 43 52 62   < > 45   ALDOLASE  --   --   --   --  4.5    < > = values in this interval not displayed.     TSH/T4  Recent Labs   Lab Test 04/01/16  0910   TSH 1.57     Lipid Panel  Recent Labs   Lab Test 01/17/20  0841 02/08/19  0713 01/12/18  0828  07/10/15  0814 09/13/13  0706   CHOL 144 140 157   < > 149 139   TRIG 42 52 34   < > 45 57   HDL 50 44* 59   < > 48* 52   LDL 86 86 91   < > 92 76   VLDL  --   --   --   --  9 11   CHOLHDLRATIO  --   --   --   --  3.1 2.7   NHDL 94 96 98   < >  --   --     < > = values in this interval not displayed.     Hepatitis B  Recent Labs   Lab Test 04/22/16  0902   HBCAB Nonreactive   HEPBANG Nonreactive     Hepatitis C  Recent Labs   Lab Test 10/26/18  0836 01/12/18  0828 04/22/16  0902   HCVAB Nonreactive Nonreactive Nonreactive   Assay performance characteristics have not been established for newborns,   infants, and children       Lyme ab screening  Recent Labs   Lab Test 04/01/16  0910   LYMEGM 0.12     HIV Screening  Recent Labs   Lab Test 10/26/18  0836 01/12/18  0828 04/22/16  0902   HIAGAB Nonreactive Nonreactive Nonreactive   HIV-1 p24 Ag & HIV-1/HIV-2 Ab Not Detected       UA  Recent Labs   Lab Test 04/02/20  1550 01/16/20  1646 10/18/19  1550  04/26/18  1648 01/18/18  1640 01/12/18  0836 12/28/17  0145 10/12/17  1643  01/20/17  0827   COLOR Yellow Yellow Yellow   < > Yellow Yellow Yellow Yellow Yellow   < > Yellow   APPEARANCE Clear Clear Clear   < > Clear Clear Clear Clear Clear   < > Clear   URINEGLC Negative Negative Negative   < > Negative Negative Negative  Negative Negative   < > Negative   URINEBILI Negative Negative Negative   < > Negative Negative Negative Moderate* Negative   < > Negative   SG 1.010 <=1.005 1.010   < > 1.010 1.020 1.025 >1.030 1.010   < > >1.030   URINEPH 7.0 6.5 7.0   < > 7.0 7.0 6.5 6.0 6.5   < > 6.0   PROTEIN Negative Negative Negative   < > Negative Negative Trace* 100* Negative   < > Trace*   UROBILINOGEN 0.2 0.2 0.2   < > 0.2 0.2 0.2 4.0* 0.2   < > 0.2   NITRITE Negative Negative Negative   < > Negative Negative Negative Positive* Negative   < > Negative   UBLD Negative Negative Negative   < > Negative Negative Negative Negative Trace*   < > Negative   LEUKEST Negative Negative Negative   < > Negative Negative Negative Negative Trace*   < > Negative   WBCU  --   --   --   --  0 - 5 O - 2 O - 2 O - 2 O - 2   < > O - 2   RBCU  --   --   --   --  O - 2 O - 2 O - 2 O - 2 O - 2   < > O - 2   SQUAMOUSEPI  --   --   --   --   --  Few Moderate* Moderate* Few   < > Few   BACTERIA  --   --   --   --   --   --  Moderate* Moderate* Few*  --  Few*   MUCOUS  --   --   --   --   --   --  Present* Present*  --   --  Present*    < > = values in this interval not displayed.     Urine Microscopic  Recent Labs   Lab Test 04/26/18  1648 01/18/18  1640 01/12/18  0836 12/28/17  0145 10/12/17  1643  01/20/17  0827   WBCU 0 - 5 O - 2 O - 2 O - 2 O - 2   < > O - 2   RBCU O - 2 O - 2 O - 2 O - 2 O - 2   < > O - 2   SQUAMOUSEPI  --  Few Moderate* Moderate* Few   < > Few   BACTERIA  --   --  Moderate* Moderate* Few*  --  Few*   MUCOUS  --   --  Present* Present*  --   --  Present*    < > = values in this interval not displayed.     Urine Protein  Recent Labs   Lab Test 04/02/20  1550 01/16/20  1646 10/18/19  1550   UTP <0.05 <0.05 <0.05   UTPG Unable to calculate due to low value Unable to calculate due to low value Unable to calculate due to low value   UCRR 23 19 18     Immunization History     Immunization History   Administered Date(s) Administered     HPV  02/25/2010, 02/04/2011     HepB 06/13/1999, 08/20/1999, 02/05/2000     Historical DTP/aP 1985, 1985, 1985, 01/15/1988, 06/15/1991     Influenza (IIV3) PF 11/07/2011     Influenza Vaccine IM > 6 months Valent IIV4 10/11/2016, 10/13/2017, 10/26/2018, 10/17/2019     MMR 05/15/1987, 09/15/1991     Mantoux Tuberculin Skin Test 07/15/1987, 01/19/2005     Meningococcal (Menomune ) 08/09/2004     OPV, trivalent, live 1986, 1985, 1986, 01/15/1988, 09/15/1991     Pneumo Conj 13-V (2010&after) 05/04/2018     Pneumococcal 23 valent 08/30/2018     TDAP Vaccine (Adacel) 01/21/2009, 02/25/2010     Zoster vaccine recombinant adjuvanted (SHINGRIX) 12/13/2018, 03/04/2019          Chart documentation done in part with Dragon Voice recognition Software. Although reviewed after completion, some word and grammatical error may remain.    Phone call start time: 8:01 AM  Phone call end time: 8:23 AM    This vis it is equivalent to a [id number] visit    Location of patient: home  Location of provider: home    Follow up:  follow up appointment scheduled to be in July    Lavon Light MD  4/17/2020

## 2020-05-05 DIAGNOSIS — M54.41 ACUTE MIDLINE LOW BACK PAIN WITH RIGHT-SIDED SCIATICA: ICD-10-CM

## 2020-05-07 RX ORDER — CYCLOBENZAPRINE HCL 10 MG
TABLET ORAL
Qty: 30 TABLET | Refills: 1 | Status: SHIPPED | OUTPATIENT
Start: 2020-05-07 | End: 2020-11-30

## 2020-05-07 NOTE — TELEPHONE ENCOUNTER
Requested Prescriptions   Pending Prescriptions Disp Refills     cyclobenzaprine (FLEXERIL) 10 MG tablet [Pharmacy Med Name: CYCLOBENZAPRINE HCL 10MG TABS] 30 tablet 1     Sig: TAKE ONE-HALF TO ONE TABLET BY MOUTH THREE TIMES A DAY AS NEEDED FOR FOR MUSCLE SPASMS       There is no refill protocol information for this order        Routing refill request to provider for review/approval because:  Drug not on the [facility] refill protocol     Agustina Horowitz RN

## 2020-07-01 NOTE — PATIENT INSTRUCTIONS
Clinics that do Echocardiograms    [facility]: [phone]  [facility]: [phone]  [facility]: [phone]  [facility]): [phone]  [facility]: [phone]  [facility]: [phone]  [facility]): [phone]  [facility] [phone]    Rheumatology    Dr. Lavon Delong [facility]   (Monday)  [address]  [place] [address]       [facility]   (Tuesday)  [address] [place] [address]    [facility]   (Wed., Thurs., and Friday)  [address] [place] [address]    Phone number: [phone]  Thank you for choosing [facility].  Carissa Frye CMA    
[phone]

## 2020-07-23 DIAGNOSIS — M32.19 OTHER SYSTEMIC LUPUS ERYTHEMATOSUS WITH OTHER ORGAN INVOLVEMENT (H): ICD-10-CM

## 2020-07-23 DIAGNOSIS — E55.9 VITAMIN D DEFICIENCY: ICD-10-CM

## 2020-07-23 LAB
ALBUMIN UR-MCNC: NEGATIVE MG/DL
APPEARANCE UR: CLEAR
BASOPHILS # BLD AUTO: 0 10E9/L (ref 0–0.2)
BASOPHILS NFR BLD AUTO: 0.2 %
BILIRUB UR QL STRIP: NEGATIVE
COLOR UR AUTO: YELLOW
DIFFERENTIAL METHOD BLD: ABNORMAL
EOSINOPHIL # BLD AUTO: 0 10E9/L (ref 0–0.7)
EOSINOPHIL NFR BLD AUTO: 0.9 %
ERYTHROCYTE [DISTWIDTH] IN BLOOD BY AUTOMATED COUNT: 11.5 % (ref 10–15)
ERYTHROCYTE [SEDIMENTATION RATE] IN BLOOD BY WESTERGREN METHOD: 7 MM/H (ref 0–20)
GLUCOSE UR STRIP-MCNC: NEGATIVE MG/DL
HCT VFR BLD AUTO: 41.2 % (ref 35–47)
HGB BLD-MCNC: 14.6 G/DL (ref 11.7–15.7)
HGB UR QL STRIP: NEGATIVE
KETONES UR STRIP-MCNC: NEGATIVE MG/DL
LEUKOCYTE ESTERASE UR QL STRIP: NEGATIVE
LYMPHOCYTES # BLD AUTO: 0.8 10E9/L (ref 0.8–5.3)
LYMPHOCYTES NFR BLD AUTO: 18.1 %
MCH RBC QN AUTO: 33.3 PG (ref 26.5–33)
MCHC RBC AUTO-ENTMCNC: 35.4 G/DL (ref 31.5–36.5)
MCV RBC AUTO: 94 FL (ref 78–100)
MONOCYTES # BLD AUTO: 0.6 10E9/L (ref 0–1.3)
MONOCYTES NFR BLD AUTO: 12.8 %
NEUTROPHILS # BLD AUTO: 3.1 10E9/L (ref 1.6–8.3)
NEUTROPHILS NFR BLD AUTO: 68 %
NITRATE UR QL: NEGATIVE
PH UR STRIP: 5.5 PH (ref 5–7)
PLATELET # BLD AUTO: 237 10E9/L (ref 150–450)
RBC # BLD AUTO: 4.39 10E12/L (ref 3.8–5.2)
SOURCE: NORMAL
SP GR UR STRIP: <=1.005 (ref 1–1.03)
UROBILINOGEN UR STRIP-ACNC: 0.2 EU/DL (ref 0.2–1)
WBC # BLD AUTO: 4.5 10E9/L (ref 4–11)

## 2020-07-23 PROCEDURE — 80053 COMPREHEN METABOLIC PANEL: CPT | Performed by: INTERNAL MEDICINE

## 2020-07-23 PROCEDURE — 81003 URINALYSIS AUTO W/O SCOPE: CPT | Performed by: INTERNAL MEDICINE

## 2020-07-23 PROCEDURE — 36415 COLL VENOUS BLD VENIPUNCTURE: CPT | Performed by: INTERNAL MEDICINE

## 2020-07-23 PROCEDURE — 86225 DNA ANTIBODY NATIVE: CPT | Performed by: INTERNAL MEDICINE

## 2020-07-23 PROCEDURE — 82550 ASSAY OF CK (CPK): CPT | Performed by: INTERNAL MEDICINE

## 2020-07-23 PROCEDURE — 82306 VITAMIN D 25 HYDROXY: CPT | Performed by: INTERNAL MEDICINE

## 2020-07-23 PROCEDURE — 86160 COMPLEMENT ANTIGEN: CPT | Performed by: INTERNAL MEDICINE

## 2020-07-23 PROCEDURE — 86140 C-REACTIVE PROTEIN: CPT | Performed by: INTERNAL MEDICINE

## 2020-07-23 PROCEDURE — 85025 COMPLETE CBC W/AUTO DIFF WBC: CPT | Performed by: INTERNAL MEDICINE

## 2020-07-23 PROCEDURE — 84156 ASSAY OF PROTEIN URINE: CPT | Performed by: INTERNAL MEDICINE

## 2020-07-23 PROCEDURE — 85652 RBC SED RATE AUTOMATED: CPT | Performed by: INTERNAL MEDICINE

## 2020-07-24 LAB
ALBUMIN SERPL-MCNC: 4.6 G/DL (ref 3.4–5)
ALP SERPL-CCNC: 44 U/L (ref 40–150)
ALT SERPL W P-5'-P-CCNC: 29 U/L (ref 0–50)
ANION GAP SERPL CALCULATED.3IONS-SCNC: 6 MMOL/L (ref 3–14)
AST SERPL W P-5'-P-CCNC: 20 U/L (ref 0–45)
BILIRUB SERPL-MCNC: 0.4 MG/DL (ref 0.2–1.3)
BUN SERPL-MCNC: 11 MG/DL (ref 7–30)
CALCIUM SERPL-MCNC: 9.2 MG/DL (ref 8.5–10.1)
CHLORIDE SERPL-SCNC: 106 MMOL/L (ref 94–109)
CK SERPL-CCNC: 58 U/L (ref 30–225)
CO2 SERPL-SCNC: 26 MMOL/L (ref 20–32)
CREAT SERPL-MCNC: 0.68 MG/DL (ref 0.52–1.04)
CREAT UR-MCNC: 11 MG/DL
CRP SERPL-MCNC: <2.9 MG/L (ref 0–8)
GFR SERPL CREATININE-BSD FRML MDRD: >90 ML/MIN/{1.73_M2}
GLUCOSE SERPL-MCNC: 79 MG/DL (ref 70–99)
POTASSIUM SERPL-SCNC: 4.1 MMOL/L (ref 3.4–5.3)
PROT SERPL-MCNC: 8 G/DL (ref 6.8–8.8)
PROT UR-MCNC: <0.05 G/L
PROT/CREAT 24H UR: NORMAL G/G CR (ref 0–0.2)
SODIUM SERPL-SCNC: 138 MMOL/L (ref 133–144)

## 2020-07-26 LAB — DEPRECATED CALCIDIOL+CALCIFEROL SERPL-MC: 30 UG/L (ref 20–75)

## 2020-07-27 LAB
C3 SERPL-MCNC: 92 MG/DL (ref 81–157)
C4 SERPL-MCNC: 18 MG/DL (ref 13–39)

## 2020-07-28 LAB — DSDNA AB SER-ACNC: 1 IU/ML

## 2020-07-29 NOTE — PROGRESS NOTES
"Aleida Weinberg is a 35 year old female who is being evaluated via a billable video visit.      The patient has been notified of following:     \"This video visit will be conducted via a call between you and your physician/provider. We have found that certain health care needs can be provided without the need for an in-person physical exam.  This service lets us provide the care you need with a video conversation.  If a prescription is necessary we can send it directly to your pharmacy.  If lab work is needed we can place an order for that and you can then stop by our lab to have the test done at a later time.    Video visits are billed at different rates depending on your insurance coverage.  Please reach out to your insurance provider with any questions.    If during the course of the call the physician/provider feels a video visit is not appropriate, you will not be charged for this service.\"    Patient has given verbal consent for Video visit? Yes  How would you like to obtain your AVS? MyChart  If you are dropped from the video visit, the video invite should be resent to: Text to cell phone: [phone]  Will anyone else be joining your video visit? No    Aleida Weinberg is a 35 year old female who is being evaluated via a billable telephone visit.        Rheumatology Video  Visit      Aleida Weinberg MRN# [id number]   YOB: 1985 Age: 35 year old      Date of visit: 7/30/20   PCP: Tatum Dove     Chief Complaint   Patient presents with:  Systemic Lupus Erythematous: feeling ok, wants to talk about her skin    Assessment and Plan   1. Systemic lupus erythematosus (CHANELL >1:[id number] speckled, RNP+, Sm+, Scl-70+, hypocomplementemia, photosensitivity, malar rash, arthritis, fatigue, Raynaud's phenomenon):  Dx'd 4/2016. Scl-70+; she lacks features of scleroderma at this time; no myopathy by hx of labs. 5/11/2018 echo without evidence of pulmonary hypertension. Previously on MTX " 20mg SQ weekly (GI upset with PO doses above 15mg, partially effective) and SSZ (LFT elevations).  Currently on AZA 100mg daily (synovitis when on 100mg daily but after developing bacterial vaginosis and blepharitis the dose was reduced; she has not had those issues again synovitis did not come back) and HCQ 300mg daily. TPMT normal on 8/21/2017.  Intermittent arthralgias in the hands and feet; no synovitis on exam today. Doing well at this time. In the past we have discussed benlysta and MMF; not needed at this time.   - Continue hydroxychloroquine 300mg daily on average (last eye exam done  4/29/2019)  - Continue azathioprine 75mg daily   - Labs in July:  CBC, CMP, ESR, CRP, CK, C3, C4, dsDNA, UA, Uprotein:creatinine    2. Raynaud's Phenomenon: Cold avoidance was insufficient for controlling her symptoms and therefore amlodipine was started. Uses amlodipine 10mg daily during cold months only. Plans to restart amlodipine now.   - Amlodipine 10mg daily during colder months    3. Chronic Back Pain: History of scoliosis. Degenerative findings on previously reviewed MRIs from 2000 and 2009. This has not changed for many years and does not worsen when her peripheral joint symptoms worsen. Had one episode of sciatica that resolved with chiropractic treatment.      4. Vitamin D deficiency: Currently on vitamin D 2000 units daily.    - Continue vitamin D 2000 units daily  - Lab in July: Vitamin D    5.  Secondary Sjogren's syndrome: Mild dry and dry mouth that are treated infrequently with artificial tears and frequent sips of water.     # Relevant labs and imaging were reviewed with the patient    # High risk medication toxicity monitoring: discussion and labs reviewed; appropriate labs ordered. See above.  Instructed that if confirmed to have COVID-19 or exposure to someone with confirmed COVID-19 to call this clinic for directions on DMARD management.    # Note that this is a virtual visit to reduce the risk of COVID-19  exposure during this current pandemic.      # Considered to be at high risk of complications from the COVID-19 virus.  It is recommended to limit contact with other people and if possible to work remotely or provide a leave of absence to reduce the risk for COVID-19.        Ms. Weinberg verbalized agreement with and understanding of the rational for the diagnosis and treatment plan.  All questions were answered to best of my ability and the patient's satisfaction. Ms. Weinberg was advised to contact the clinic with any questions that may arise after the clinic visit.      Thank you for involving me in the care of the patient    Return to clinic: 3-4 months      HPI   Aleida Weinberg is a 35 year old female with medical history significant for intermittent asthma, anxiety, migraines, vitamin D deficiency, and systemic lupus erythematosus who presents for follow-up of SLE.     Today, Ms. Weinberg reports that she is doing well.  No joint pain or morning stiffness.  No gelling phenomenon.  Tolerating medications. Photosensitive rash still; so she tries to avoid the sun; covers up; uses high SPF sunscreen.      Denies fevers, chills, nausea, vomiting, constipation, diarrhea. No abdominal pain. No chest pain/pressure, palpitations, or shortness of breath. No oral or nasal sores. No neck pain. No LE swelling.  Has photosensitivity but no photophobia.  Mild dry eyes and dry mouth; she uses artificial tears as needed.  No eye pain or redness. Denies history of serositis. Denies history of DVT, pulmonary embolism, or miscarriage.    Tobacco: quit smoking in 2005  EtOH: Once monthly  Drugs: None  Occupation: Works at [employer], a lot of typing per patient    ROS   GEN: No fevers, chills, night sweats, or weight change  SKIN: See HPI.   HEENT: Has a history of migraines, but no headache today. No change in vision, taste, or hearing. No epistaxis. No oral or nasal ulcers.  CV: No chest pain, pressure, palpitations, or  dyspnea on exertion.  PULM: No SOB, wheeze, cough.  GI:  No nausea, vomiting, constipation, diarrhea. No blood in stool. No abdominal pain.  : No blood in urine.  MSK: See HPI.  NEURO: See history of present illness  ENDO: No heat/cold intolerance.  EXT: See history of present illness    Active Problem List     Patient Active Problem List   Diagnosis     Other kyphoscoliosis and scoliosis     Hypertrophic and atrophic condition of skin     Viral warts     Routine postpartum follow-up     CARDIOVASCULAR SCREENING; LDL GOAL LESS THAN 160     Intermittent asthma     Anxiety     Intractable menstrual migraine without status migrainosus     Vitamin D deficiency     Inflammatory polyarthropathy (H)     Chronic back pain     Raynaud's phenomenon without gangrene     Systemic lupus erythematosus (H)     High risk medications (not anticoagulants) long-term use (Plaquenil and AZA)     Dry eyes, bilateral     Past Medical History     Past Medical History:   Diagnosis Date     Mild intermittent asthma      Other kyphoscoliosis and scoliosis     upper back curvature and disc degeneration in lower back.     Past Surgical History     Past Surgical History:   Procedure Laterality Date      SECTION       SURGICAL HISTORY OF -       PE Tubes     Allergy     Allergies   Allergen Reactions     Fluconazole Rash     Current Medication List     Current Outpatient Medications   Medication Sig     albuterol (PROAIR HFA/PROVENTIL HFA/VENTOLIN HFA) 108 (90 Base) MCG/ACT inhaler Inhale 2 puffs into the lungs every 6 hours as needed for shortness of breath / dyspnea     amLODIPine (NORVASC) 10 MG tablet Take 1 tablet (10 mg) by mouth daily     azaTHIOprine (IMURAN) 50 MG tablet Take 1.5 tablets (75 mg) by mouth daily     cyclobenzaprine (FLEXERIL) 10 MG tablet TAKE ONE-HALF TO ONE TABLET BY MOUTH THREE TIMES A DAY AS NEEDED FOR FOR MUSCLE SPASMS     hydroxychloroquine (PLAQUENIL) 200 MG tablet Hydroxychloroquine 200mg daily; and  "an additional 200mg every other day.     hydrOXYzine (ATARAX) 25 MG tablet Take 0.5-2 tablets (12.5-50 mg) by mouth every 6 hours as needed for anxiety     levonorgestrel (MIRENA, 52 MG,) 20 MCG/24HR IUD 1 each by Intrauterine route once     LORazepam (ATIVAN) 0.5 MG tablet Take 1 tablet (0.5 mg) by mouth every 8 hours as needed for anxiety     traZODone (DESYREL) 50 MG tablet Take 0.5 tablets (25 mg) by mouth nightly as needed for sleep     vitamin D3 (CHOLECALCIFEROL) 2000 units (50 mcg) tablet Take 1 tablet (2,000 Units) by mouth daily     No current facility-administered medications for this visit.          Social History   See HPI    Family History     Family History   Problem Relation Age of Onset     Heart Disease Maternal Grandfather         Bypass, Heart Disease     Respiratory Maternal Grandfather         asthma     Macular Degeneration Maternal Grandfather      C.A.D. Paternal Grandfather      Heart Disease Maternal Uncle         MI's      Hypertension Paternal Grandmother      Cancer Paternal Grandmother         lymph nodes     Cataracts Paternal Grandmother      Respiratory Other         cousin on mothers side, asthma     Alcohol/Drug Maternal Uncle      Alcohol/Drug Other         bother great grandparents on mother's side     Diabetes No family hx of      Breast Cancer No family hx of      Cancer - colorectal No family hx of      Denies family history of autoimmune disease    Physical Exam     Temp Readings from Last 3 Encounters:   05/31/19 97.6  F (36.4  C) (Oral)   12/07/18 98.6  F (37  C) (Oral)   10/26/18 98.2  F (36.8  C) (Oral)     BP Readings from Last 5 Encounters:   12/13/19 102/72   08/23/19 104/66   05/31/19 104/62   04/12/19 102/71   12/07/18 100/62     Pulse Readings from Last 1 Encounters:   12/13/19 80     Resp Readings from Last 1 Encounters:   08/30/18 16     Estimated body mass index is 23.65 kg/m  as calculated from the following:    Height as of 12/13/19: 1.705 m (5' 7.13\").    " Weight as of 12/13/19: 68.8 kg (151 lb 9.6 oz).    Telephone visit    Labs / Imaging (select studies)   RF/CCP  Recent Labs   Lab Test 04/22/16  0902 04/01/16 0910   CCPIGG 1  --    RHF  --  <20     CHANELL  Recent Labs   Lab Test 04/22/16  0902 04/01/16 0910   VALERIE  --  12.4*   ANAIGG >1:29366  Reference range: <1:40  (Note)  Speckled pattern.  INTERPRETIVE INFORMATION: CHANELL by IFA, IgG  Anti-nuclear antibodies (CHANELL) are seen in a variety of  systemic rheumatic diseases and are determined by indirect  fluorescence assay (IFA) using HEp-2 substrate with an  IgG-specific conjugate. CHANELL titers less than or equal to  1:80 have variable relevance while titers greater than or  equal to 1:160 are considered clinically significant. These  antibodies may precede clinical disease onset; however,  healthy individuals and those with advanced age have been  reported to be positive for CHANELL. When observed, one of the  five basic patterns is reported: homogeneous,  peripheral/rim, speckled, centromere, or nucleolar. If  cytoplasmic fluorescence is observed, it is noted. IFA  methodology is subjective and has occasionally been shown  to lack sensitivity for anti-SSA/Ro antibodies.  Negative results do not necessarily rule out the presence  of SSc. If clinical suspicion remains, consi vicki further  testing for U3-RNP, PM/Scl, or Th/To antibodies associated  with SSc.  Performed by [facility],  [address] [place] [id number] [phone]  www."[facility] [email or web address] Twin Rodriguez MD, Lab. Director    --      RNP/Sm/SSA/SSB  Recent Labs   Lab Test 01/12/18  0828 04/22/16 0902   RNPIGG  --  >8.0  Positive   Antibody index (AI) values reflect qualitative changes in antibody   concentration that cannot be directly associated with clinical condition or   disease state.  *   SMIGG  --  1.5*   SSAIGG  --  <0.2  Negative   Antibody index (AI) values reflect qualitative changes in antibody   concentration that cannot be directly associated with  clinical condition or   disease state.     SSBIGG  --  <0.2  Negative   Antibody index (AI) values reflect qualitative changes in antibody   concentration that cannot be directly associated with clinical condition or   disease state.     SCLIGG  --  3.1*   TREPAB Negative  --      dsDNA  Recent Labs   Lab Test 07/23/20  1636 04/02/20  1541 07/11/19  1529 11/29/18  1616 08/23/18  1637 04/26/18  1648 01/18/18  1639 10/12/17  1642 07/17/17  1707   DNA 1 2 2 2 2 2 2 2 1     C3/C4  Recent Labs   Lab Test 07/23/20  1636 04/02/20  1541 10/18/19  1542 07/11/19  1529 11/29/18  1616 08/23/18  1637 04/26/18  1648 01/18/18  1639 10/12/17  1642   X2YQXEH 92 90 74* 75* 82 66* 71* 76 70*   [id number] 18 20 14* 14* 13* 13* 14* 14* 14*     RNA Polymerase III Antibody  No results for input(s): RNAPG in the last 16028 hours.  Histone Antibody  No results for input(s): HSTO in the last 66904 hours.  Send-out Labs  Recent Labs   Lab Test 04/21/17  0813   SRESLT SEE NOTE  (Note)  Test name                    Result Flag  Units  RefIntvl  ------------------------------------------------------------  Thiopurine Methyltransferase                                23.2 L      U/mL 24.0-44.0  Sample slightly hemolyzed. Please interpret the results  with caution.  INTERPRETIVE INFORMATION: Thiopurine Methyltransferase, RBC  Normal TPMT activity:  24.0-44.0 U/mL................Individuals are predicted to  be at low risk of bone marrow toxicity (myelosuppression)  as a consequence of standard thiopurine therapy; no dose  adjustment is recommended.  Intermediate TPMT activity:  17.0-23.9 U/mL................Individuals are predicted to  be at intermediate risk of bone marrow toxicity  (myelosuppression) as a consequence of standard thiopurine  therapy; a dose reduction and therapeutic drug management  is recommended.  Low TPMT activity:  less than 17.0 U/mL...........Individuals are predicted to  be at high risk of bone marrow toxicity  (myelosuppression)   as a consequence of standard thiopurine dosing. It is  recommended to avoid the use of thiopurine drugs.  High TPMT activity:  greater than 44.0 U/mL........Individuals are not predicted  to be at risk for bone marrow toxicity (myelosuppression)  as a consequence of standard thiopurine dosing, but may be  at risk for therapeutic failure due to excessive  inactivation of thiopurine drugs. Individuals may require  higher than the normal standard dose. Therapeutic drug  management is recommended.  The TPMT, RBC assay is used as a screen to detect  individuals with low and intermediate TPMT activity who may  be at risk for myelosuppression when exposed to standard  doses of thiopurines, including azathioprine (Imuran) and  6-mercaptopurine (Purinethol). TPMT is the primary  metabolic route for inactivation of thiopurine drugs in the  bone marrow. When TPMT activity is low, it is predicted  that proportionately more 6-mercaptopurine can be converted  into the cytotoxic 6-thioguanine nucle otides that  accumulate in the bone marrow causing excessive toxicity.  The activity of TPMT is measured by the nanomoles of  6-methylmercaptopurine (inactive metabolite) produced per 1  mL of packed red blood cells, (U/mL).    TPMT phenotype testing does not replace the need for  clinical monitoring of patients treated with thiopurine  drugs. Genotype for TPMT cannot be inferred from TPMT  activity (phenotype). Phenotype testing should not be  requested for patients currently treated with thiopurine  drugs. Current TPMT phenotype may not reflect future TPMT  phenotype, particularly in patients who received blood  transfusion within 30-60 days of testing.  TPMT enzyme  activity can be inhibited by several drugs such as:  naproxen (Aleve), ibuprofen (Advil, Motrin), ketoprofen  (Orudis), furosemide (Lasix), sulfasalazine (Azulfidine),  mesalamine (Asacol), olsalazine (Dipentum), mefenamic acid  (Ponstel), thiazide  diuretics, and benzoic acid inhibitors.  TPMT inhibitors may contribute t o falsely low results;  patients should abstain from these drugs for at least 48  hours prior to TPMT testing. Falsely low results may also  occur as a result of inappropriate specimen handling and  hemolysis.  Test developed and characteristics determined by [facility]. See Compliance Statement B: www.[email or web address].[email or web address]  Performed by [facility],  [address] [place] [address] [phone]  [email or web address], Lionel Ferreira MD, Lab. Director     STSTNM Thiopurine Methyltransferase, RBC   STSTCD 92,066   SSPTYP Whole blood, EDTA anticoagulant     Antiphospholipid Antibodies  Recent Labs   Lab Test 04/22/16  0902   B2GPG 0.9   B2GPM <0.9  Negative     CARG <15.0  Interpretation:  Negative     JOANN <12.5  Interpretation:  Negative     LUPINT Negative  (Note)  COMMENTS:  The INR is normal.  APTT ratio is normal.  DRVVT Screen ratio is normal.  Thrombin time is normal.  NEGATIVE TEST; A LUPUS ANTICOAGULANT WAS NOT DETECTED IN THIS  SPECIMEN WITHIN THE LIMITS OF THE TESTING REPERTOIRE.  If the clinical picture is strongly suggestive of an antiphospholipid  syndrome, recommend anticardiolipin and beta-2-glycoprotein (IgG and  IgM) antibody tests.  Isabella Olson M.D.  [phone]  4/25/2016    INR =  1.05    Reference range: 0.86-1.14  Thrombin Time= 15.4    Reference range: 13.0-19.0 sec    APTT:       Ratio  Patient  =  0.92  1:2 Mix  =  N/A  Reference:  Negative: Less than or equal to 1.16  Positive: Greater than or equal to 1.17     DILUTE LISA VIPER VENOM TEST:  Screen Ratio = 0.95   Normal is less than 1.21         CBC  Recent Labs   Lab Test 07/23/20  1636 04/02/20  1541 01/16/20  1627   WBC 4.5 5.4 3.6*   RBC 4.39 4.31 4.44   HGB 14.6 14.2 14.6   HCT 41.2 39.8 41.2   MCV 94 92 93   RDW 11.5 11.7 11.6    215 192   MCH 33.3* 32.9 32.9   MCHC 35.4 35.7 35.4   NEUTROPHIL 68.0 78.6 64.6   LYMPH 18.1 10.5 20.6   MONOCYTE  12.8 10.1 12.6   EOSINOPHIL 0.9 0.4 1.9   BASOPHIL 0.2 0.4 0.3   ANEU 3.1 4.3 2.4   ALYM 0.8 0.6* 0.8   AMANDA 0.6 0.6 0.5   AEOS 0.0 0.0 0.1   ABAS 0.0 0.0 0.0     CMP  Recent Labs   Lab Test 07/23/20 1636 04/02/20  1541 01/16/20  1627 10/18/19  1542 07/11/19  1529 04/04/19  1629    136 137 136 137 137   POTASSIUM 4.1 3.9 4.1 3.8 3.7 3.7   CHLORIDE 106 105 106 105 107 105   CO2 26 26 27 26 25 25   ANIONGAP 6 5 4 5 5 7   GLC 79 83 67* 75 78 79   BUN 11 8 16 12 12 9   CR 0.68 0.58 0.72 0.63 0.66 0.79   GFRESTIMATED >90 >90 >90 >90 >90 >90   GFRESTBLACK >90 >90 >90 >90 >90 >90   SRINIVAS 9.2 9.0 9.2 8.8 8.6 8.9   BILITOTAL 0.4 0.5 0.4 0.4 0.5 0.5   ALBUMIN 4.6 4.3 4.3 4.5 4.5 4.5   PROTTOTAL 8.0 7.8 7.4 7.3 7.3 7.6   ALKPHOS 44 47 42 44 41 44   AST 20 18 21 15 18 20   ALT 29 30 29 24 24 21     Calcium/VitaminD  Recent Labs   Lab Test 07/23/20 1636 04/02/20  1541 01/16/20  1627  02/20/17  1640  10/26/16  0919   SRINIVAS 9.2 9.0 9.2   < >  --    < > 9.3   VITDT 30  --   --   --  33  --  23    < > = values in this interval not displayed.     ESR/CRP  Recent Labs   Lab Test 07/23/20  1636 04/02/20  1541 10/18/19  1542   SED 7 8 7   CRP <2.9 <2.9 <2.9     CK/Aldolase  Recent Labs   Lab Test 07/23/20  1636 04/02/20  1541 07/11/19  1529  04/22/16  0902   CKT 58 43 52   < > 45   ALDOLASE  --   --   --   --  4.5    < > = values in this interval not displayed.     TSH/T4  Recent Labs   Lab Test 04/01/16  0910   TSH 1.57     Lipid Panel  Recent Labs   Lab Test 01/17/20  0841 02/08/19  0713 01/12/18  0828  07/10/15  0814 09/13/13  0706   CHOL 144 140 157   < > 149 139   TRIG 42 52 34   < > 45 57   HDL 50 44* 59   < > 48* 52   LDL 86 86 91   < > 92 76   VLDL  --   --   --   --  9 11   CHOLHDLRATIO  --   --   --   --  3.1 2.7   NHDL 94 96 98   < >  --   --     < > = values in this interval not displayed.     Hepatitis B  Recent Labs   Lab Test 04/22/16  0902   HBCAB Nonreactive   HEPBANG Nonreactive     Hepatitis C  Recent Labs   Lab  Test 10/26/18  0836 01/12/18  0828 04/22/16  0902   HCVAB Nonreactive Nonreactive Nonreactive   Assay performance characteristics have not been established for newborns,   infants, and children       Lyme ab screening  Recent Labs   Lab Test 04/01/16  0910   LYMEGM 0.12     HIV Screening  Recent Labs   Lab Test 10/26/18  0836 01/12/18  0828 04/22/16  0902   HIAGAB Nonreactive Nonreactive Nonreactive   HIV-1 p24 Ag & HIV-1/HIV-2 Ab Not Detected       UA  Recent Labs   Lab Test 07/23/20  1655 04/02/20  1550 01/16/20  1646  04/26/18  1648 01/18/18  1640 01/12/18  0836 12/28/17  0145 10/12/17  1643  01/20/17  0827   COLOR Yellow Yellow Yellow   < > Yellow Yellow Yellow Yellow Yellow   < > Yellow   APPEARANCE Clear Clear Clear   < > Clear Clear Clear Clear Clear   < > Clear   URINEGLC Negative Negative Negative   < > Negative Negative Negative Negative Negative   < > Negative   URINEBILI Negative Negative Negative   < > Negative Negative Negative Moderate* Negative   < > Negative   SG <=1.005 1.010 <=1.005   < > 1.010 1.020 1.025 >1.030 1.010   < > >1.030   URINEPH 5.5 7.0 6.5   < > 7.0 7.0 6.5 6.0 6.5   < > 6.0   PROTEIN Negative Negative Negative   < > Negative Negative Trace* 100* Negative   < > Trace*   UROBILINOGEN 0.2 0.2 0.2   < > 0.2 0.2 0.2 4.0* 0.2   < > 0.2   NITRITE Negative Negative Negative   < > Negative Negative Negative Positive* Negative   < > Negative   UBLD Negative Negative Negative   < > Negative Negative Negative Negative Trace*   < > Negative   LEUKEST Negative Negative Negative   < > Negative Negative Negative Negative Trace*   < > Negative   WBCU  --   --   --   --  0 - 5 O - 2 O - 2 O - 2 O - 2   < > O - 2   RBCU  --   --   --   --  O - 2 O - 2 O - 2 O - 2 O - 2   < > O - 2   SQUAMOUSEPI  --   --   --   --   --  Few Moderate* Moderate* Few   < > Few   BACTERIA  --   --   --   --   --   --  Moderate* Moderate* Few*  --  Few*   MUCOUS  --   --   --   --   --   --  Present* Present*  --   --   Present*    < > = values in this interval not displayed.     Urine Microscopic  Recent Labs   Lab Test 04/26/18  1648 01/18/18  1640 01/12/18  0836 12/28/17  0145 10/12/17  1643  01/20/17  0827   WBCU 0 - 5 O - 2 O - 2 O - 2 O - 2   < > O - 2   RBCU O - 2 O - 2 O - 2 O - 2 O - 2   < > O - 2   SQUAMOUSEPI  --  Few Moderate* Moderate* Few   < > Few   BACTERIA  --   --  Moderate* Moderate* Few*  --  Few*   MUCOUS  --   --  Present* Present*  --   --  Present*    < > = values in this interval not displayed.     Urine Protein  Recent Labs   Lab Test 07/23/20  1655 04/02/20  1550 01/16/20  1646   UTP <0.05 <0.05 <0.05   UTPG Unable to calculate due to low value Unable to calculate due to low value Unable to calculate due to low value   UCRR 11 23 19     Immunization History     Immunization History   Administered Date(s) Administered     HPV 02/25/2010, 02/04/2011     HepB 06/13/1999, 08/20/1999, 02/05/2000     Historical DTP/aP 1985, 1985, 1985, 01/15/1988, 06/15/1991     Influenza (IIV3) PF 11/07/2011     Influenza Vaccine IM > 6 months Valent IIV4 10/11/2016, 10/13/2017, 10/26/2018, 10/17/2019     MMR 05/15/1987, 09/15/1991     Mantoux Tuberculin Skin Test 07/15/1987, 01/19/2005     Meningococcal (Menomune ) 08/09/2004     OPV, trivalent, live 1986, 1986, 1986, 01/15/1988, 09/15/1991     Pneumo Conj 13-V (2010&after) 05/04/2018     Pneumococcal 23 valent 08/30/2018     TDAP Vaccine (Adacel) 01/21/2009, 02/25/2010     Zoster vaccine recombinant adjuvanted (SHINGRIX) 12/13/2018, 03/04/2019          Chart documentation done in part with Dragon Voice recognition Software. Although reviewed after completion, some word and grammatical error may remain.      Video-Visit Details    Type of service:  Video Visit    Video Start Time: 7:40 AM  Video End Time: 7:55 AM    Originating Location (pt. Location): Home in [place]    Distant Location (provider location):  Home    Platform used for Video  Visit: Marjorie Light MD

## 2020-07-30 ENCOUNTER — VIRTUAL VISIT (OUTPATIENT)
Dept: RHEUMATOLOGY | Facility: CLINIC | Age: 35
End: 2020-07-30
Payer: COMMERCIAL

## 2020-07-30 DIAGNOSIS — M32.19 OTHER SYSTEMIC LUPUS ERYTHEMATOSUS WITH OTHER ORGAN INVOLVEMENT (H): Primary | ICD-10-CM

## 2020-07-30 DIAGNOSIS — Z79.899 HIGH RISK MEDICATIONS (NOT ANTICOAGULANTS) LONG-TERM USE: ICD-10-CM

## 2020-07-30 DIAGNOSIS — I73.00 RAYNAUD'S PHENOMENON WITHOUT GANGRENE: ICD-10-CM

## 2020-07-30 PROCEDURE — 99213 OFFICE O/P EST LOW 20 MIN: CPT | Mod: 95 | Performed by: INTERNAL MEDICINE

## 2020-08-02 RX ORDER — AZATHIOPRINE 50 MG/1
75 TABLET ORAL DAILY
Qty: 45 TABLET | Refills: 4 | Status: SHIPPED | OUTPATIENT
Start: 2020-08-02 | End: 2020-11-12

## 2020-10-16 ENCOUNTER — OFFICE VISIT (OUTPATIENT)
Dept: FAMILY MEDICINE | Facility: CLINIC | Age: 35
End: 2020-10-16
Payer: COMMERCIAL

## 2020-10-16 VITALS
HEIGHT: 67 IN | TEMPERATURE: 97.7 F | DIASTOLIC BLOOD PRESSURE: 70 MMHG | HEART RATE: 96 BPM | WEIGHT: 155 LBS | SYSTOLIC BLOOD PRESSURE: 104 MMHG | BODY MASS INDEX: 24.33 KG/M2

## 2020-10-16 DIAGNOSIS — F41.9 ANXIETY: ICD-10-CM

## 2020-10-16 DIAGNOSIS — J45.20 MILD INTERMITTENT ASTHMA WITHOUT COMPLICATION: ICD-10-CM

## 2020-10-16 DIAGNOSIS — Z23 NEED FOR PROPHYLACTIC VACCINATION AND INOCULATION AGAINST INFLUENZA: ICD-10-CM

## 2020-10-16 DIAGNOSIS — R21 RASH AND NONSPECIFIC SKIN ERUPTION: ICD-10-CM

## 2020-10-16 DIAGNOSIS — M06.4 INFLAMMATORY POLYARTHROPATHY (H): ICD-10-CM

## 2020-10-16 DIAGNOSIS — Z00.00 ROUTINE GENERAL MEDICAL EXAMINATION AT A HEALTH CARE FACILITY: Primary | ICD-10-CM

## 2020-10-16 PROCEDURE — 99214 OFFICE O/P EST MOD 30 MIN: CPT | Mod: 25 | Performed by: NURSE PRACTITIONER

## 2020-10-16 PROCEDURE — 90471 IMMUNIZATION ADMIN: CPT | Performed by: NURSE PRACTITIONER

## 2020-10-16 PROCEDURE — 99395 PREV VISIT EST AGE 18-39: CPT | Mod: 25 | Performed by: NURSE PRACTITIONER

## 2020-10-16 PROCEDURE — 90686 IIV4 VACC NO PRSV 0.5 ML IM: CPT | Performed by: NURSE PRACTITIONER

## 2020-10-16 RX ORDER — LORAZEPAM 0.5 MG/1
0.5 TABLET ORAL EVERY 8 HOURS PRN
Qty: 15 TABLET | Refills: 0 | Status: SHIPPED | OUTPATIENT
Start: 2020-10-16 | End: 2021-10-15

## 2020-10-16 RX ORDER — HYDROXYZINE HYDROCHLORIDE 25 MG/1
25 TABLET, FILM COATED ORAL EVERY 6 HOURS PRN
Qty: 30 TABLET | Refills: 1 | Status: SHIPPED | OUTPATIENT
Start: 2020-10-16 | End: 2021-10-15

## 2020-10-16 RX ORDER — AMLODIPINE BESYLATE 10 MG/1
10 TABLET ORAL DAILY
Qty: 30 TABLET | Refills: 4 | Status: CANCELLED | OUTPATIENT
Start: 2020-10-16

## 2020-10-16 RX ORDER — METRONIDAZOLE 7.5 MG/G
GEL TOPICAL 2 TIMES DAILY
Qty: 45 G | Refills: 0 | Status: SHIPPED | OUTPATIENT
Start: 2020-10-16 | End: 2022-04-13

## 2020-10-16 ASSESSMENT — ENCOUNTER SYMPTOMS
DIZZINESS: 0
FREQUENCY: 0
FEVER: 0
HEMATURIA: 0
COUGH: 0
CONSTIPATION: 0
EYE PAIN: 0
CHILLS: 0
HEMATOCHEZIA: 0
DIARRHEA: 0
ABDOMINAL PAIN: 0
NERVOUS/ANXIOUS: 0

## 2020-10-16 ASSESSMENT — MIFFLIN-ST. JEOR: SCORE: 1439.67

## 2020-10-16 NOTE — PROGRESS NOTES
SUBJECTIVE:   CC: Aleida Weinberg is an 35 year old woman who presents for preventive health visit.       Patient has been advised of split billing requirements and indicates understanding: Yes  Healthy Habits:     Getting at least 3 servings of Calcium per day:  Yes    Bi-annual eye exam:  Yes    Dental care twice a year:  Yes    Sleep apnea or symptoms of sleep apnea:  None    Diet:  Regular (no restrictions)    Frequency of exercise:  4-5 days/week    Duration of exercise:  15-30 minutes    Taking medications regularly:  Yes    Medication side effects:  Not applicable    PHQ-2 Total Score: 0    Additional concerns today:  No      IUD in place 5 years- had a lot of trouble having this put in she said a OB/GYN she is due for her Pap in  so I recommend she return turned to OB/GYN for this as well as have her IUD replaced.      Systemic lupus followed by Dr. Light on plaquanil and imuran had eyes examined.  Would like me to take a look at the rash around her nose she showed it to Dr. Light he felt it did have to do with her lupus but also could be something else.    Anxiety Follow-Up    How are you doing with your anxiety since your last visit? No change    Are you having other symptoms that might be associated with anxiety? No    Have you had a significant life event? No     Are you feeling depressed? No    Do you have any concerns with your use of alcohol or other drugs? No  Takes hydroxyzine as needed  Also has a prescription for lorazepam but uses this very sparingly 15 tablets last 1 year  Social History     Tobacco Use     Smoking status: Former Smoker     Packs/day: 0.50     Years: 2.50     Pack years: 1.25     Types: Cigarettes     Quit date: 2005     Years since quittin.9     Smokeless tobacco: Never Used   Substance Use Topics     Alcohol use: Yes     Alcohol/week: 0.0 standard drinks     Comment: rarely - 1 monthly     Drug use: No     TONE-7 SCORE 10/26/2018 2019  1/17/2020   Total Score 7 3 4     PHQ 10/26/2018 5/31/2019 1/17/2020   PHQ-9 Total Score 1 0 0   Q9: Thoughts of better off dead/self-harm past 2 weeks Not at all Not at all Not at all     Last PHQ-9 1/17/2020   1.  Little interest or pleasure in doing things 0   2.  Feeling down, depressed, or hopeless 0   3.  Trouble falling or staying asleep, or sleeping too much 0   4.  Feeling tired or having little energy 0   5.  Poor appetite or overeating 0   6.  Feeling bad about yourself 0   7.  Trouble concentrating 0   8.  Moving slowly or restless 0   Q9: Thoughts of better off dead/self-harm past 2 weeks 0   PHQ-9 Total Score 0   Difficulty at work, home, or with people Not difficult at all     TONE-7  1/17/2020   1. Feeling nervous, anxious, or on edge 1   2. Not being able to stop or control worrying 0   3. Worrying too much about different things 2   4. Trouble relaxing 1   5. Being so restless that it is hard to sit still 0   6. Becoming easily annoyed or irritable 0   7. Feeling afraid, as if something awful might happen 0   TONE-7 Total Score 4   If you checked any problems, how difficult have they made it for you to do your work, take care of things at home, or get along with other people? Not difficult at all       Asthma Follow-Up    Was ACT completed today?    Yes    ACT Total Scores 1/17/2020   ACT TOTAL SCORE -   ASTHMA ER VISITS -   ASTHMA HOSPITALIZATIONS -   ACT TOTAL SCORE (Goal Greater than or Equal to 20) 25   In the past 12 months, how many times did you visit the emergency room for your asthma without being admitted to the hospital? 0   In the past 12 months, how many times were you hospitalized overnight because of your asthma? 0          How many days per week do you miss taking your asthma controller medication?  I do not have an asthma controller medication    Please describe any recent triggers for your asthma: None    Have you had any Emergency Room Visits, Urgent Care Visits, or Hospital  Admissions since your last office visit?  No      Today's PHQ-2 Score:   PHQ-2 (  [employer]) 10/16/2020   Q1: Little interest or pleasure in doing things 0   Q2: Feeling down, depressed or hopeless 0   PHQ-2 Score 0   Q1: Little interest or pleasure in doing things Not at all   Q2: Feeling down, depressed or hopeless Not at all   PHQ-2 Score 0       Abuse: Current or Past (Physical, Sexual or Emotional) - No  Do you feel safe in your environment? Yes        Social History     Tobacco Use     Smoking status: Former Smoker     Packs/day: 0.50     Years: 2.50     Pack years: 1.25     Types: Cigarettes     Quit date: 2005     Years since quittin.9     Smokeless tobacco: Never Used   Substance Use Topics     Alcohol use: Yes     Alcohol/week: 0.0 standard drinks     Comment: rarely - 1 monthly     If you drink alcohol do you typically have >3 drinks per day or >7 drinks per week? No    Alcohol Use 10/16/2020   Prescreen: >3 drinks/day or >7 drinks/week? No   Prescreen: >3 drinks/day or >7 drinks/week? -   No flowsheet data found.    Reviewed orders with patient.  Reviewed health maintenance and updated orders accordingly - Yes  Lab work is in process    Mammogram not appropriate for this patient based on age.    Pertinent mammograms are reviewed under the imaging tab.  History of abnormal Pap smear: NO - age 30-65 PAP every 5 years with negative HPV co-testing recommended  PAP / HPV Latest Ref Rng & Units 10/7/2016 2013 2010   PAP - NIL NIL NIL   HPV 16 DNA NEG Negative - -   HPV 18 DNA NEG Negative - -   OTHER HR HPV NEG Negative - -     Reviewed and updated as needed this visit by clinical staff  Tobacco  Allergies  Meds   Med Hx  Surg Hx  Fam Hx  Soc Hx        Reviewed and updated as needed this visit by Provider                    Review of Systems   Constitutional: Negative for chills and fever.   HENT: Negative for congestion and ear pain.    Eyes: Negative for pain.   Respiratory:  "Negative for cough.    Cardiovascular: Negative for chest pain.   Gastrointestinal: Negative for abdominal pain, constipation, diarrhea and hematochezia.   Genitourinary: Negative for frequency, genital sores and hematuria.   Neurological: Negative for dizziness.   Psychiatric/Behavioral: The patient is not nervous/anxious.         OBJECTIVE:   /70   Pulse 96   Temp 97.7  F (36.5  C) (Oral)   Ht 1.708 m (5' 7.25\")   Wt 70.3 kg (155 lb)   BMI 24.10 kg/m    Physical Exam  GENERAL: healthy, alert and no distress  EYES: Eyes grossly normal to inspection, PERRL and conjunctivae and sclerae normal  HENT: ear canals and TM's normal, nose and mouth without ulcers or lesions  NECK: no adenopathy, no asymmetry, masses, or scars and thyroid normal to palpation  RESP: lungs clear to auscultation - no rales, rhonchi or wheezes  BREAST: normal without masses, tenderness or nipple discharge and no palpable axillary masses or adenopathy  CV: regular rate and rhythm, normal S1 S2, no S3 or S4, no murmur, click or rub, no peripheral edema and peripheral pulses strong  ABDOMEN: soft, nontender, no hepatosplenomegaly, no masses and bowel sounds normal  MS: no gross musculoskeletal defects noted, no edema  SKIN:  Erythema around nose and cheeks  NEURO: Normal strength and tone, mentation intact and speech normal  PSYCH: mentation appears normal, affect normal/bright    Diagnostic Test Results:  Labs reviewed in Epic  No results found for this or any previous visit (from the past 24 hour(s)).    ASSESSMENT/PLAN:   1. Routine general medical examination at a health care facility    - Lipid panel reflex to direct LDL Fasting; Future    2. Anxiety  Stable refilled  - hydrOXYzine (ATARAX) 25 MG tablet; Take 1 tablet (25 mg) by mouth every 6 hours as needed for anxiety  Dispense: 30 tablet; Refill: 1  - LORazepam (ATIVAN) 0.5 MG tablet; Take 1 tablet (0.5 mg) by mouth every 8 hours as needed for anxiety  Dispense: 15 tablet; " "Refill: 0    3. Need for prophylactic vaccination and inoculation against influenza  Today  - INFLUENZA VACCINE IM > 6 MONTHS VALENT IIV4 [58991]  - Vaccine Administration, Initial [75686]    4. Rash and nonspecific skin eruption  Multifactorial likely second to lupus and some underlying rosacea try metronidazole gel if no improvement then I recommend dermatology referral  - metroNIDAZOLE (METROGEL) 0.75 % external gel; Apply topically 2 times daily  Dispense: 45 g; Refill: 0    5. Inflammatory polyarthropathy (H)  Followed by rheumatology on DMARDs    6. Mild intermittent asthma without complication  Stable really exercise-induced      Patient has been advised of split billing requirements and indicates understanding: Yes  COUNSELING:  Reviewed preventive health counseling, as reflected in patient instructions       Regular exercise       Healthy diet/nutrition    Estimated body mass index is 23.65 kg/m  as calculated from the following:    Height as of 12/13/19: 1.705 m (5' 7.13\").    Weight as of 12/13/19: 68.8 kg (151 lb 9.6 oz).        She reports that she quit smoking about 14 years ago. Her smoking use included cigarettes. She has a 1.25 pack-year smoking history. She has never used smokeless tobacco.      Counseling Resources:  ATP IV Guidelines  Pooled Cohorts Equation Calculator  Breast Cancer Risk Calculator  BRCA-Related Cancer Risk Assessment: FHS-7 Tool  FRAX Risk Assessment  ICSI Preventive Guidelines  Dietary Guidelines for Americans, 2010  USDA's MyPlate  ASA Prophylaxis  Lung CA Screening    FARZANA Palmer CNP  [facility]  "

## 2020-10-17 ASSESSMENT — ASTHMA QUESTIONNAIRES: ACT_TOTALSCORE: 25

## 2020-10-20 ENCOUNTER — OFFICE VISIT (OUTPATIENT)
Dept: FAMILY MEDICINE | Facility: CLINIC | Age: 35
End: 2020-10-20
Payer: COMMERCIAL

## 2020-10-20 VITALS
HEART RATE: 93 BPM | DIASTOLIC BLOOD PRESSURE: 77 MMHG | BODY MASS INDEX: 24.25 KG/M2 | OXYGEN SATURATION: 98 % | SYSTOLIC BLOOD PRESSURE: 117 MMHG | WEIGHT: 156 LBS | TEMPERATURE: 98.6 F

## 2020-10-20 DIAGNOSIS — M32.19 OTHER SYSTEMIC LUPUS ERYTHEMATOSUS WITH OTHER ORGAN INVOLVEMENT (H): ICD-10-CM

## 2020-10-20 DIAGNOSIS — R21 RASH: Primary | ICD-10-CM

## 2020-10-20 LAB
KOH PREP SPEC: NORMAL
SPECIMEN SOURCE: NORMAL

## 2020-10-20 PROCEDURE — 99213 OFFICE O/P EST LOW 20 MIN: CPT | Performed by: PHYSICIAN ASSISTANT

## 2020-10-20 PROCEDURE — 87220 TISSUE EXAM FOR FUNGI: CPT | Performed by: PHYSICIAN ASSISTANT

## 2020-10-20 NOTE — PROGRESS NOTES
Subjective     Aleida Weinberg is a 35 year old female who presents to clinic today for the following health issues:    HPI         Rash  Onset/Duration: since yesterday   Description  Location: R inner leg   Character: round, red  Itching: no  Intensity:  moderate  Progression of Symptoms:  same  Accompanying signs and symptoms:   Fever: no  Body aches or joint pain: no  Sore throat symptoms: no  Recent cold symptoms: no  History:           Previous episodes of similar rash: None  New exposures:  None  Recent travel: no  Exposure to similar rash: no  Precipitating or alleviating factors: none   Therapies tried and outcome: none        Review of Systems   As above      Objective    /77   Pulse 93   Temp 98.6  F (37  C) (Tympanic)   Wt 70.8 kg (156 lb)   SpO2 98%   BMI 24.25 kg/m    Body mass index is 24.25 kg/m .  Physical Exam  Constitutional:       General: She is not in acute distress.  Skin:     Comments: White scaly macular lesions on right upper leg/groin, red macular lesion on mons pubis    Neurological:      Mental Status: She is alert.            Results for orders placed or performed in visit on 10/20/20 (from the past 24 hour(s))   KOH prep (skin, hair or nails only)    Specimen: Skin   Result Value Ref Range    Specimen Description Skin     KOH Skin Hair Nails Test No fungal elements seen            Assessment & Plan     Rash  No fungus seen.  Ok to try hydrocortisone cream on rash.  If worsening or spreading see derm   - KOH prep (skin, hair or nails only)    Other systemic lupus erythematosus with other organ involvement (H)  As above-if worsening see derm but suspect dry skin or contact dermatitis              Return in about 2 weeks (around 11/3/2020) for if not improving.    Jossie Nick PA-C  [facility]

## 2020-10-26 ENCOUNTER — MYC MEDICAL ADVICE (OUTPATIENT)
Dept: RHEUMATOLOGY | Facility: CLINIC | Age: 35
End: 2020-10-26

## 2020-11-05 DIAGNOSIS — M32.19 OTHER SYSTEMIC LUPUS ERYTHEMATOSUS WITH OTHER ORGAN INVOLVEMENT (H): ICD-10-CM

## 2020-11-05 DIAGNOSIS — Z79.899 HIGH RISK MEDICATIONS (NOT ANTICOAGULANTS) LONG-TERM USE: ICD-10-CM

## 2020-11-05 LAB
ALBUMIN UR-MCNC: NEGATIVE MG/DL
APPEARANCE UR: CLEAR
BASOPHILS # BLD AUTO: 0 10E9/L (ref 0–0.2)
BASOPHILS NFR BLD AUTO: 0.6 %
BILIRUB UR QL STRIP: NEGATIVE
COLOR UR AUTO: YELLOW
DIFFERENTIAL METHOD BLD: ABNORMAL
EOSINOPHIL # BLD AUTO: 0 10E9/L (ref 0–0.7)
EOSINOPHIL NFR BLD AUTO: 1.2 %
ERYTHROCYTE [DISTWIDTH] IN BLOOD BY AUTOMATED COUNT: 11.4 % (ref 10–15)
ERYTHROCYTE [SEDIMENTATION RATE] IN BLOOD BY WESTERGREN METHOD: 9 MM/H (ref 0–20)
GLUCOSE UR STRIP-MCNC: NEGATIVE MG/DL
HCT VFR BLD AUTO: 37.2 % (ref 35–47)
HGB BLD-MCNC: 13.1 G/DL (ref 11.7–15.7)
HGB UR QL STRIP: NEGATIVE
KETONES UR STRIP-MCNC: NEGATIVE MG/DL
LEUKOCYTE ESTERASE UR QL STRIP: NEGATIVE
LYMPHOCYTES # BLD AUTO: 0.8 10E9/L (ref 0.8–5.3)
LYMPHOCYTES NFR BLD AUTO: 23.1 %
MCH RBC QN AUTO: 33.2 PG (ref 26.5–33)
MCHC RBC AUTO-ENTMCNC: 35.2 G/DL (ref 31.5–36.5)
MCV RBC AUTO: 94 FL (ref 78–100)
MONOCYTES # BLD AUTO: 0.5 10E9/L (ref 0–1.3)
MONOCYTES NFR BLD AUTO: 14.4 %
NEUTROPHILS # BLD AUTO: 2 10E9/L (ref 1.6–8.3)
NEUTROPHILS NFR BLD AUTO: 60.7 %
NITRATE UR QL: NEGATIVE
PH UR STRIP: 6.5 PH (ref 5–7)
PLATELET # BLD AUTO: 202 10E9/L (ref 150–450)
RBC # BLD AUTO: 3.95 10E12/L (ref 3.8–5.2)
SOURCE: NORMAL
SP GR UR STRIP: <=1.005 (ref 1–1.03)
UROBILINOGEN UR STRIP-ACNC: 0.2 EU/DL (ref 0.2–1)
WBC # BLD AUTO: 3.3 10E9/L (ref 4–11)

## 2020-11-05 PROCEDURE — 86160 COMPLEMENT ANTIGEN: CPT | Performed by: INTERNAL MEDICINE

## 2020-11-05 PROCEDURE — 85025 COMPLETE CBC W/AUTO DIFF WBC: CPT | Performed by: INTERNAL MEDICINE

## 2020-11-05 PROCEDURE — 36415 COLL VENOUS BLD VENIPUNCTURE: CPT | Performed by: INTERNAL MEDICINE

## 2020-11-05 PROCEDURE — 81003 URINALYSIS AUTO W/O SCOPE: CPT | Performed by: INTERNAL MEDICINE

## 2020-11-05 PROCEDURE — 85652 RBC SED RATE AUTOMATED: CPT | Performed by: INTERNAL MEDICINE

## 2020-11-05 PROCEDURE — 84156 ASSAY OF PROTEIN URINE: CPT | Performed by: INTERNAL MEDICINE

## 2020-11-05 PROCEDURE — 82550 ASSAY OF CK (CPK): CPT | Performed by: INTERNAL MEDICINE

## 2020-11-05 PROCEDURE — 86140 C-REACTIVE PROTEIN: CPT | Performed by: INTERNAL MEDICINE

## 2020-11-05 PROCEDURE — 86225 DNA ANTIBODY NATIVE: CPT | Performed by: INTERNAL MEDICINE

## 2020-11-05 PROCEDURE — 80053 COMPREHEN METABOLIC PANEL: CPT | Performed by: INTERNAL MEDICINE

## 2020-11-06 LAB
ALBUMIN SERPL-MCNC: 4.1 G/DL (ref 3.4–5)
ALP SERPL-CCNC: 39 U/L (ref 40–150)
ALT SERPL W P-5'-P-CCNC: 23 U/L (ref 0–50)
ANION GAP SERPL CALCULATED.3IONS-SCNC: 6 MMOL/L (ref 3–14)
AST SERPL W P-5'-P-CCNC: 17 U/L (ref 0–45)
BILIRUB SERPL-MCNC: 0.4 MG/DL (ref 0.2–1.3)
BUN SERPL-MCNC: 13 MG/DL (ref 7–30)
CALCIUM SERPL-MCNC: 8.8 MG/DL (ref 8.5–10.1)
CHLORIDE SERPL-SCNC: 107 MMOL/L (ref 94–109)
CK SERPL-CCNC: 64 U/L (ref 30–225)
CO2 SERPL-SCNC: 25 MMOL/L (ref 20–32)
CREAT SERPL-MCNC: 0.68 MG/DL (ref 0.52–1.04)
CRP SERPL-MCNC: <2.9 MG/L (ref 0–8)
GFR SERPL CREATININE-BSD FRML MDRD: >90 ML/MIN/{1.73_M2}
GLUCOSE SERPL-MCNC: 92 MG/DL (ref 70–99)
POTASSIUM SERPL-SCNC: 3.8 MMOL/L (ref 3.4–5.3)
PROT SERPL-MCNC: 7 G/DL (ref 6.8–8.8)
PROT UR-MCNC: <0.05 G/L
PROT/CREAT 24H UR: NORMAL G/G CR (ref 0–0.2)
SODIUM SERPL-SCNC: 138 MMOL/L (ref 133–144)

## 2020-11-09 LAB
C3 SERPL-MCNC: 89 MG/DL (ref 81–157)
C4 SERPL-MCNC: 17 MG/DL (ref 13–39)
DSDNA AB SER-ACNC: 1 IU/ML

## 2020-11-12 ENCOUNTER — VIRTUAL VISIT (OUTPATIENT)
Dept: RHEUMATOLOGY | Facility: CLINIC | Age: 35
End: 2020-11-12
Payer: COMMERCIAL

## 2020-11-12 DIAGNOSIS — I73.00 RAYNAUD'S PHENOMENON WITHOUT GANGRENE: ICD-10-CM

## 2020-11-12 DIAGNOSIS — M32.19 OTHER SYSTEMIC LUPUS ERYTHEMATOSUS WITH OTHER ORGAN INVOLVEMENT (H): Primary | ICD-10-CM

## 2020-11-12 DIAGNOSIS — M79.674 PAIN OF TOE OF RIGHT FOOT: ICD-10-CM

## 2020-11-12 DIAGNOSIS — Z79.899 HIGH RISK MEDICATIONS (NOT ANTICOAGULANTS) LONG-TERM USE: ICD-10-CM

## 2020-11-12 PROCEDURE — 99213 OFFICE O/P EST LOW 20 MIN: CPT | Mod: 95 | Performed by: INTERNAL MEDICINE

## 2020-11-12 RX ORDER — HYDROXYCHLOROQUINE SULFATE 200 MG/1
200 TABLET, FILM COATED ORAL DAILY
Qty: 90 TABLET | Refills: 1 | Status: SHIPPED | OUTPATIENT
Start: 2020-11-12 | End: 2021-02-16

## 2020-11-12 RX ORDER — AZATHIOPRINE 50 MG/1
50 TABLET ORAL DAILY
Qty: 90 TABLET | Refills: 1 | Status: SHIPPED | OUTPATIENT
Start: 2020-11-12 | End: 2021-02-16

## 2020-11-12 RX ORDER — AMLODIPINE BESYLATE 10 MG/1
10 TABLET ORAL DAILY
Qty: 30 TABLET | Refills: 4 | Status: SHIPPED | OUTPATIENT
Start: 2020-11-12 | End: 2022-10-27

## 2020-11-12 NOTE — PROGRESS NOTES
"Aleida Weinberg is a 35 year old female who is being evaluated via a billable video visit.      The patient has been notified of following:     \"This video visit will be conducted via a call between you and your physician/provider. We have found that certain health care needs can be provided without the need for an in-person physical exam.  This service lets us provide the care you need with a video conversation.  If a prescription is necessary we can send it directly to your pharmacy.  If lab work is needed we can place an order for that and you can then stop by our lab to have the test done at a later time.    Video visits are billed at different rates depending on your insurance coverage.  Please reach out to your insurance provider with any questions.    If during the course of the call the physician/provider feels a video visit is not appropriate, you will not be charged for this service.\"    Patient has given verbal consent for Video visit? Yes  How would you like to obtain your AVS? MyChart  If you are dropped from the video visit, the video invite should be resent to: Text to cell phone: [phone]  Will anyone else be joining your video visit? No      Carissa Frye CMA Rheumatology  11/12/2020 7:34 AM    Rheumatology Video Visit      Aleida Weinberg MRN# [id number]   YOB: 1985 Age: 35 year old      Date of visit: 11/12/20   PCP: Tatum Dove     Chief Complaint   Patient presents with:  Systemic Lupus Erythematous: doing ok    Assessment and Plan   1. Systemic lupus erythematosus (CHANELL >1:[id number] speckled, RNP+, Sm+, Scl-70+, hypocomplementemia, photosensitivity, malar rash, arthritis, fatigue, Raynaud's phenomenon):  Dx'd 4/2016. Scl-70+; she lacks features of scleroderma except for raynaud's; no myopathy based on labs/symptoms. 5/11/2018 echo without evidence of pulmonary hypertension. Previously on MTX 20mg SQ weekly (GI upset with PO doses above 15mg, partially effective) " and SSZ (LFT elevations).  Currently on AZA 50mg daily and HCQ 200mg daily (patient self reduced from 300mg daily on average based on preference of an easier regimen). TPMT normal on 8/21/2017.  Doing well at this time.   - Continue hydroxychloroquine 200mg daily (last eye exam done  4/29/2019)  - Continue azathioprine 50mg daily   - Ophthalmology referral for hydroxychloroquine toxicity monitoring  - Labs in 3 months: CBC, CMP, ESR, CRP, CK, C3, C4, dsDNA, UA, Uprotein:creatinine    2. Raynaud's Phenomenon: Cold avoidance was insufficient for controlling her symptoms in the past; doing okay without CCB now.  Restart CCB when needed.   - Amlodipine 10mg daily during colder months    3. Right 1st MTP pain: degenerative in nature; advised stiff soled shoes    4. Vitamin D deficiency: Currently on vitamin D 2000 units daily.    - Continue vitamin D 2000 units daily    5.  Secondary Sjogren's syndrome: Mild dry and dry mouth that are treated infrequently with artificial tears and frequent sips of water.     6. Abnormal left eye vision for about 20 min: not an issue today; advised she discuss with ophthalmology when seen for her hydroxychloroquine toxicity monitoring exam    # Relevant labs and imaging were reviewed with the patient    # High risk medication toxicity monitoring: discussion and labs reviewed; appropriate labs ordered. See above.  Instructed that if confirmed to have COVID-19 or exposure to someone with confirmed COVID-19 to call this clinic for directions on DMARD management.    # Note that this is a virtual visit to reduce the risk of COVID-19 exposure during this current pandemic.      # Considered to be at high risk of complications from the COVID-19 virus.  It is recommended to limit contact with other people and if possible to work remotely or provide a leave of absence to reduce the risk for COVID-19.        Ms. Weinberg verbalized agreement with and understanding of the rational for the diagnosis and  treatment plan.  All questions were answered to best of my ability and the patient's satisfaction. Ms. Weinberg was advised to contact the clinic with any questions that may arise after the clinic visit.      Thank you for involving me in the care of the patient    Return to clinic: 3-4 months      HPI   Aleida Weinberg is a 35 year old female with medical history significant for intermittent asthma, anxiety, migraines, vitamin D deficiency, and systemic lupus erythematosus who presents for follow-up of SLE.     Today, Ms. Weinberg reports that she is doing well. Joints are doing well; mild ache in the 1st MTP of the right foot every since injury from tripping this past summer. Wears good shoes she says but bends at the toes easily - not stiff soled.  She notes that about 1 month ago she had worsening vision of the left eye; notes that she wears glasses or contacts; felt like there was an orb in her vision; resolved after 20 min of laying down. The orb was described as a small area of the vision in one eye that didn't focus well.  Raynaud's doing well with cold avoidance; not using amlodipine at this time.  AZA 50mg daily and hydroxychloroquine 200mg daily; she reduced from AZA 75mg daily and HCQ 300mg daily (avg) based on convenience, about 2 mo ago and no change in symptoms.  No morning stiffness. No rash currently.      Denies fevers, chills, nausea, vomiting, constipation, diarrhea. No abdominal pain. No chest pain/pressure, palpitations, or shortness of breath. No oral or nasal sores. No neck pain. No LE swelling.  Has photosensitivity but no photophobia.  Mild dry eyes and dry mouth; she uses artificial tears as needed.  No eye pain or redness. Denies history of serositis. Denies history of DVT, pulmonary embolism, or miscarriage.    Tobacco: quit smoking in 2005  EtOH: Once monthly  Drugs: None  Occupation: Works at [employer], a lot of typing per patient    ROS   GEN: No fevers, chills, night sweats, or  weight change  SKIN: See HPI.   HEENT:  No change in vision, taste, or hearing.  No oral or nasal ulcers.  CV: No chest pain, pressure, palpitations, or dyspnea on exertion.  PULM: No SOB, wheeze, cough.  GI:  No nausea, vomiting, constipation, diarrhea. No blood in stool. No abdominal pain.  : No blood in urine.  MSK: See HPI.  NEURO: See history of present illness  EXT: See history of present illness    Active Problem List     Patient Active Problem List   Diagnosis     Other kyphoscoliosis and scoliosis     Hypertrophic and atrophic condition of skin     Viral warts     Routine postpartum follow-up     CARDIOVASCULAR SCREENING; LDL GOAL LESS THAN 160     Intermittent asthma     Anxiety     Intractable menstrual migraine without status migrainosus     Vitamin D deficiency     Inflammatory polyarthropathy (H)     Chronic back pain     Raynaud's phenomenon without gangrene     Systemic lupus erythematosus (H)     High risk medications (not anticoagulants) long-term use (Plaquenil and AZA)     Dry eyes, bilateral     Past Medical History     Past Medical History:   Diagnosis Date     Mild intermittent asthma      Other kyphoscoliosis and scoliosis     upper back curvature and disc degeneration in lower back.     Past Surgical History     Past Surgical History:   Procedure Laterality Date      SECTION       SURGICAL HISTORY OF -       PE Tubes     Allergy     Allergies   Allergen Reactions     Fluconazole Rash     Current Medication List     Current Outpatient Medications   Medication Sig     albuterol (PROAIR HFA/PROVENTIL HFA/VENTOLIN HFA) 108 (90 Base) MCG/ACT inhaler Inhale 2 puffs into the lungs every 6 hours as needed for shortness of breath / dyspnea     amLODIPine (NORVASC) 10 MG tablet Take 1 tablet (10 mg) by mouth daily     azaTHIOprine (IMURAN) 50 MG tablet Take 1.5 tablets (75 mg) by mouth daily     cyclobenzaprine (FLEXERIL) 10 MG tablet TAKE ONE-HALF TO ONE TABLET BY MOUTH THREE TIMES A  DAY AS NEEDED FOR FOR MUSCLE SPASMS     hydroxychloroquine (PLAQUENIL) 200 MG tablet Hydroxychloroquine 200mg daily; and an additional 200mg every other day.     hydrOXYzine (ATARAX) 25 MG tablet Take 1 tablet (25 mg) by mouth every 6 hours as needed for anxiety     levonorgestrel (MIRENA, 52 MG,) 20 MCG/24HR IUD 1 each by Intrauterine route once     LORazepam (ATIVAN) 0.5 MG tablet Take 1 tablet (0.5 mg) by mouth every 8 hours as needed for anxiety     metroNIDAZOLE (METROGEL) 0.75 % external gel Apply topically 2 times daily     traZODone (DESYREL) 50 MG tablet Take 0.5 tablets (25 mg) by mouth nightly as needed for sleep     vitamin D3 (CHOLECALCIFEROL) 2000 units (50 mcg) tablet Take 1 tablet (2,000 Units) by mouth daily     No current facility-administered medications for this visit.          Social History   See HPI    Family History     Family History   Problem Relation Age of Onset     Heart Disease Maternal Grandfather         Bypass, Heart Disease     Respiratory Maternal Grandfather         asthma     Macular Degeneration Maternal Grandfather      C.A.D. Paternal Grandfather      Heart Disease Maternal Uncle         MI's      Hypertension Paternal Grandmother      Cancer Paternal Grandmother         lymph nodes     Cataracts Paternal Grandmother      Respiratory Other         cousin on mothers side, asthma     Alcohol/Drug Maternal Uncle      Alcohol/Drug Other         bother great grandparents on mother's side     Diabetes No family hx of      Breast Cancer No family hx of      Cancer - colorectal No family hx of      Denies family history of autoimmune disease    Physical Exam     Temp Readings from Last 3 Encounters:   10/20/20 98.6  F (37  C) (Tympanic)   10/16/20 97.7  F (36.5  C) (Oral)   05/31/19 97.6  F (36.4  C) (Oral)     BP Readings from Last 5 Encounters:   10/20/20 117/77   10/16/20 104/70   12/13/19 102/72   08/23/19 104/66   05/31/19 104/62     Pulse Readings from Last 1 Encounters:  "  10/20/20 93     Resp Readings from Last 1 Encounters:   08/30/18 16     Estimated body mass index is 24.25 kg/m  as calculated from the following:    Height as of 10/16/20: 1.708 m (5' 7.25\").    Weight as of 10/20/20: 70.8 kg (156 lb).      GEN: NAD. Healthy appearing adult.   HEENT: MMM.  Anicteric, noninjected sclera. No obvious external lesions of the ear and nose. Hearing intact.  PULM: No increased work of breathing  SKIN: No rash or jaundice seen  PSYCH: Alert. Appropriate.        Labs / Imaging (select studies)     RF/CCP  Recent Labs   Lab Test 04/22/16  0902 04/01/16  0910   CCPIGG 1  --    RHF  --  <20     CHANELL  Recent Labs   Lab Test 04/22/16  0902 04/01/16  0910   VALERIE  --  12.4*   ANAIGG >1:31323  Reference range: <1:40  (Note)  Speckled pattern.  INTERPRETIVE INFORMATION: CHANELL by IFA, IgG  Anti-nuclear antibodies (CHANELL) are seen in a variety of  systemic rheumatic diseases and are determined by indirect  fluorescence assay (IFA) using HEp-2 substrate with an  IgG-specific conjugate. CHANELL titers less than or equal to  1:80 have variable relevance while titers greater than or  equal to 1:160 are considered clinically significant. These  antibodies may precede clinical disease onset; however,  healthy individuals and those with advanced age have been  reported to be positive for CHANELL. When observed, one of the  five basic patterns is reported: homogeneous,  peripheral/rim, speckled, centromere, or nucleolar. If  cytoplasmic fluorescence is observed, it is noted. IFA  methodology is subjective and has occasionally been shown  to lack sensitivity for anti-SSA/Ro antibodies.  Negative results do not necessarily rule out the presence  of SSc. If clinical suspicion remains, consi vicki further  testing for U3-RNP, PM/Scl, or Th/To antibodies associated  with SSc.  Performed by [facility],  [address] [place] [id number] [phone]  [email or web address], Twin Rodriguez MD, Lab. Director    --  "     RNP/Sm/SSA/SSB  Recent Labs   Lab Test 01/12/18  0828 04/22/16  0902   RNPIGG  --  >8.0  Positive   Antibody index (AI) values reflect qualitative changes in antibody   concentration that cannot be directly associated with clinical condition or   disease state.  *   SMIGG  --  1.5*   SSAIGG  --  <0.2  Negative   Antibody index (AI) values reflect qualitative changes in antibody   concentration that cannot be directly associated with clinical condition or   disease state.     SSBIGG  --  <0.2  Negative   Antibody index (AI) values reflect qualitative changes in antibody   concentration that cannot be directly associated with clinical condition or   disease state.     SCLIGG  --  3.1*   TREPAB Negative  --      dsDNA  Recent Labs   Lab Test 11/05/20  1628 07/23/20  1636 04/02/20  1541 07/11/19  1529 11/29/18  1616 08/23/18  1637 04/26/18  1648 01/18/18  1639 10/12/17  1642   DNA 1 1 2 2 2 2 2 2 2     C3/C4  Recent Labs   Lab Test 11/05/20  1628 07/23/20  1636 04/02/20  1541 10/18/19  1542 07/11/19  1529 11/29/18  1616 08/23/18  1637 04/26/18  1648 01/18/18  1639   A2LSHTX 89 92 90 74* 75* 82 66* 71* 76   I7JGGCT 17 18 20 14* 14* 13* 13* 14* 14*     Send-out Labs  Recent Labs   Lab Test 04/21/17  0813   SRESLT SEE NOTE  (Note)  Test name                    Result Flag  Units  RefIntvl  ------------------------------------------------------------  Thiopurine Methyltransferase                                23.2 L      U/mL 24.0-44.0  Sample slightly hemolyzed. Please interpret the results  with caution.  INTERPRETIVE INFORMATION: Thiopurine Methyltransferase, RBC  Normal TPMT activity:  24.0-44.0 U/mL................Individuals are predicted to  be at low risk of bone marrow toxicity (myelosuppression)  as a consequence of standard thiopurine therapy; no dose  adjustment is recommended.  Intermediate TPMT activity:  17.0-23.9 U/mL................Individuals are predicted to  be at intermediate risk of bone marrow  toxicity  (myelosuppression) as a consequence of standard thiopurine  therapy; a dose reduction and therapeutic drug management  is recommended.  Low TPMT activity:  less than 17.0 U/mL...........Individuals are predicted to  be at high risk of bone marrow toxicity (myelosuppression)   as a consequence of standard thiopurine dosing. It is  recommended to avoid the use of thiopurine drugs.  High TPMT activity:  greater than 44.0 U/mL........Individuals are not predicted  to be at risk for bone marrow toxicity (myelosuppression)  as a consequence of standard thiopurine dosing, but may be  at risk for therapeutic failure due to excessive  inactivation of thiopurine drugs. Individuals may require  higher than the normal standard dose. Therapeutic drug  management is recommended.  The TPMT, RBC assay is used as a screen to detect  individuals with low and intermediate TPMT activity who may  be at risk for myelosuppression when exposed to standard  doses of thiopurines, including azathioprine (Imuran) and  6-mercaptopurine (Purinethol). TPMT is the primary  metabolic route for inactivation of thiopurine drugs in the  bone marrow. When TPMT activity is low, it is predicted  that proportionately more 6-mercaptopurine can be converted  into the cytotoxic 6-thioguanine nucle otides that  accumulate in the bone marrow causing excessive toxicity.  The activity of TPMT is measured by the nanomoles of  6-methylmercaptopurine (inactive metabolite) produced per 1  mL of packed red blood cells, (U/mL).    TPMT phenotype testing does not replace the need for  clinical monitoring of patients treated with thiopurine  drugs. Genotype for TPMT cannot be inferred from TPMT  activity (phenotype). Phenotype testing should not be  requested for patients currently treated with thiopurine  drugs. Current TPMT phenotype may not reflect future TPMT  phenotype, particularly in patients who received blood  transfusion within 30-60 days of testing.   TPMT enzyme  activity can be inhibited by several drugs such as:  naproxen (Aleve), ibuprofen (Advil, Motrin), ketoprofen  (Orudis), furosemide (Lasix), sulfasalazine (Azulfidine),  mesalamine (Asacol), olsalazine (Dipentum), mefenamic acid  (Ponstel), thiazide diuretics, and benzoic acid inhibitors.  TPMT inhibitors may contribute t o falsely low results;  patients should abstain from these drugs for at least 48  hours prior to TPMT testing. Falsely low results may also  occur as a result of inappropriate specimen handling and  hemolysis.  Test developed and characteristics determined by [facility]. See Compliance Statement B: www.[facility].com/CS  Performed by [facility],  [address] [place] [address] [phone]  [email or web address], Lionel Ferreira MD, Lab. Director     STSTNM Thiopurine Methyltransferase, RBC   STSTCD 92,066   SSPTYP Whole blood, EDTA anticoagulant     Antiphospholipid Antibodies  Recent Labs   Lab Test 04/22/16  0902   B2GPG 0.9   B2GPM <0.9  Negative     CARG <15.0  Interpretation:  Negative     JOANN <12.5  Interpretation:  Negative     LUPINT Negative  (Note)  COMMENTS:  The INR is normal.  APTT ratio is normal.  DRVVT Screen ratio is normal.  Thrombin time is normal.  NEGATIVE TEST; A LUPUS ANTICOAGULANT WAS NOT DETECTED IN THIS  SPECIMEN WITHIN THE LIMITS OF THE TESTING REPERTOIRE.  If the clinical picture is strongly suggestive of an antiphospholipid  syndrome, recommend anticardiolipin and beta-2-glycoprotein (IgG and  IgM) antibody tests.  Isabella Olson M.D.  [phone]  4/25/2016    INR =  1.05    Reference range: 0.86-1.14  Thrombin Time= 15.4    Reference range: 13.0-19.0 sec    APTT:       Ratio  Patient  =  0.92  1:2 Mix  =  N/A  Reference:  Negative: Less than or equal to 1.16  Positive: Greater than or equal to 1.17     DILUTE LISA VIPER VENOM TEST:  Screen Ratio = 0.95   Normal is less than 1.21         CBC  Recent Labs   Lab Test 11/05/20  1147  07/23/20  1636 04/02/20  1541   WBC 3.3* 4.5 5.4   RBC 3.95 4.39 4.31   HGB 13.1 14.6 14.2   HCT 37.2 41.2 39.8   MCV 94 94 92   RDW 11.4 11.5 11.7    237 215   MCH 33.2* 33.3* 32.9   MCHC 35.2 35.4 35.7   NEUTROPHIL 60.7 68.0 78.6   LYMPH 23.1 18.1 10.5   MONOCYTE 14.4 12.8 10.1   EOSINOPHIL 1.2 0.9 0.4   BASOPHIL 0.6 0.2 0.4   ANEU 2.0 3.1 4.3   ALYM 0.8 0.8 0.6*   AMANDA 0.5 0.6 0.6   AEOS 0.0 0.0 0.0   ABAS 0.0 0.0 0.0     CMP  Recent Labs   Lab Test 11/05/20  1628 07/23/20  1636 04/02/20  1541 01/16/20  1627 10/18/19  1542 07/11/19  1529    138 136 137 136 137   POTASSIUM 3.8 4.1 3.9 4.1 3.8 3.7   CHLORIDE 107 106 105 106 105 107   CO2 25 26 26 27 26 25   ANIONGAP 6 6 5 4 5 5   GLC 92 79 83 67* 75 78   BUN 13 11 8 16 12 12   CR 0.68 0.68 0.58 0.72 0.63 0.66   GFRESTIMATED >90 >90 >90 >90 >90 >90   GFRESTBLACK >90 >90 >90 >90 >90 >90   SRINIVAS 8.8 9.2 9.0 9.2 8.8 8.6   BILITOTAL 0.4 0.4 0.5 0.4 0.4 0.5   ALBUMIN 4.1 4.6 4.3 4.3 4.5 4.5   PROTTOTAL 7.0 8.0 7.8 7.4 7.3 7.3   ALKPHOS 39* 44 47 42 44 41   AST 17 20 18 21 15 18   ALT 23 29 30 29 24 24       Calcium/VitaminD  Recent Labs   Lab Test 11/05/20  1628 07/23/20  1636 04/02/20  1541 02/20/17  1640 02/20/17  1640 10/26/16  0919 10/26/16  0919   SRINIVAS 8.8 9.2 9.0   < >  --    < > 9.3   VITDT  --  30  --   --  33  --  23    < > = values in this interval not displayed.     ESR/CRP  Recent Labs   Lab Test 11/05/20  1628 07/23/20  1636 04/02/20  1541   SED 9 7 8   CRP <2.9 <2.9 <2.9     CK/Aldolase  Recent Labs   Lab Test 11/05/20  1628 07/23/20  1636 04/02/20  1541 04/22/16  0902 04/22/16  0902   CKT 64 58 43   < > 45   ALDOLASE  --   --   --   --  4.5    < > = values in this interval not displayed.     TSH/T4  Recent Labs   Lab Test 04/01/16  0910   TSH 1.57     Lipid Panel  Recent Labs   Lab Test 01/17/20  0841 02/08/19  0713 01/12/18  0828 07/10/15  0814 07/10/15  0814 09/13/13  0706   CHOL 144 140 157   < > 149 139   TRIG 42 52 34   < > 45 57   HDL 50  44* 59   < > 48* 52   LDL 86 86 91   < > 92 76   VLDL  --   --   --   --  9 11   CHOLHDLRATIO  --   --   --   --  3.1 2.7   NHDL 94 96 98   < >  --   --     < > = values in this interval not displayed.     Hepatitis B  Recent Labs   Lab Test 04/22/16  0902   HBCAB Nonreactive   HEPBANG Nonreactive     Hepatitis C  Recent Labs   Lab Test 10/26/18  0836 01/12/18  0828 04/22/16  0902   HCVAB Nonreactive Nonreactive Nonreactive   Assay performance characteristics have not been established for newborns,   infants, and children       Lyme ab screening  Recent Labs   Lab Test 04/01/16  0910   LYMEGM 0.12     HIV Screening  Recent Labs   Lab Test 10/26/18  0836 01/12/18  0828 04/22/16  0902   HIAGAB Nonreactive Nonreactive Nonreactive   HIV-1 p24 Ag & HIV-1/HIV-2 Ab Not Detected       UA  Recent Labs   Lab Test 11/05/20  1638 07/23/20  1655 04/02/20  1550 04/26/18  1648 04/26/18  1648 01/18/18  1640 01/12/18  0836 12/28/17  0145 10/12/17  1643 01/20/17  0827 01/20/17  0827   COLOR Yellow Yellow Yellow   < > Yellow Yellow Yellow Yellow Yellow   < > Yellow   APPEARANCE Clear Clear Clear   < > Clear Clear Clear Clear Clear   < > Clear   URINEGLC Negative Negative Negative   < > Negative Negative Negative Negative Negative   < > Negative   URINEBILI Negative Negative Negative   < > Negative Negative Negative Moderate* Negative   < > Negative   SG <=1.005 <=1.005 1.010   < > 1.010 1.020 1.025 >1.030 1.010   < > >1.030   URINEPH 6.5 5.5 7.0   < > 7.0 7.0 6.5 6.0 6.5   < > 6.0   PROTEIN Negative Negative Negative   < > Negative Negative Trace* 100* Negative   < > Trace*   UROBILINOGEN 0.2 0.2 0.2   < > 0.2 0.2 0.2 4.0* 0.2   < > 0.2   NITRITE Negative Negative Negative   < > Negative Negative Negative Positive* Negative   < > Negative   UBLD Negative Negative Negative   < > Negative Negative Negative Negative Trace*   < > Negative   LEUKEST Negative Negative Negative   < > Negative Negative Negative Negative Trace*   < > Negative    WBCU  --   --   --   --  0 - 5 O - 2 O - 2 O - 2 O - 2   < > O - 2   RBCU  --   --   --   --  O - 2 O - 2 O - 2 O - 2 O - 2   < > O - 2   SQUAMOUSEPI  --   --   --   --   --  Few Moderate* Moderate* Few   < > Few   BACTERIA  --   --   --   --   --   --  Moderate* Moderate* Few*  --  Few*   MUCOUS  --   --   --   --   --   --  Present* Present*  --   --  Present*    < > = values in this interval not displayed.     Urine Microscopic  Recent Labs   Lab Test 04/26/18  1648 01/18/18  1640 01/12/18  0836 12/28/17  0145 10/12/17  1643 01/20/17  0827 01/20/17  0827   WBCU 0 - 5 O - 2 O - 2 O - 2 O - 2   < > O - 2   RBCU O - 2 O - 2 O - 2 O - 2 O - 2   < > O - 2   SQUAMOUSEPI  --  Few Moderate* Moderate* Few   < > Few   BACTERIA  --   --  Moderate* Moderate* Few*  --  Few*   MUCOUS  --   --  Present* Present*  --   --  Present*    < > = values in this interval not displayed.     Urine Protein  Recent Labs   Lab Test 11/05/20  1638 07/23/20  1655 04/02/20  1550 01/16/20  1646   UTP <0.05 <0.05 <0.05 <0.05   UTPG Unable to calculate due to low value Unable to calculate due to low value Unable to calculate due to low value Unable to calculate due to low value   UCRR  --  11 23 19       Immunization History     Immunization History   Administered Date(s) Administered     HPV 02/25/2010, 02/04/2011     HepB 06/13/1999, 08/20/1999, 02/05/2000     Historical DTP/aP 1985, 1985, 1985, 01/15/1988, 06/15/1991     Influenza (IIV3) PF 11/07/2011     Influenza Vaccine IM > 6 months Valent IIV4 10/11/2016, 10/13/2017, 10/26/2018, 10/17/2019, 10/16/2020     MMR 05/15/1987, 09/15/1991     Mantoux Tuberculin Skin Test 07/15/1987, 01/19/2005     Meningococcal (Menomune ) 08/09/2004     OPV, trivalent, live 1986, 1986, 1986, 01/15/1988, 09/15/1991     Pneumo Conj 13-V (2010&after) 05/04/2018     Pneumococcal 23 valent 08/30/2018     TDAP Vaccine (Adacel) 01/21/2009, 02/25/2010, 08/17/2020     Zoster vaccine  recombinant adjuvanted (SHINGRIX) 12/13/2018, 03/04/2019          Chart documentation done in part with Dragon Voice recognition Software. Although reviewed after completion, some word and grammatical error may remain.      Video-Visit Details    Type of service:  Video Visit    Video Start Time: 7:49 AM  Video End Time: 8:07 AM    Originating Location (pt. Location): [place], [place]    Distant Location (provider location):  [place], [place]    Platform used for Video Visit: Marjorie Light MD

## 2020-11-30 ENCOUNTER — MYC REFILL (OUTPATIENT)
Dept: FAMILY MEDICINE | Facility: CLINIC | Age: 35
End: 2020-11-30

## 2020-11-30 DIAGNOSIS — M54.41 ACUTE MIDLINE LOW BACK PAIN WITH RIGHT-SIDED SCIATICA: ICD-10-CM

## 2020-12-01 RX ORDER — CYCLOBENZAPRINE HCL 10 MG
10 TABLET ORAL 3 TIMES DAILY PRN
Qty: 30 TABLET | Refills: 0 | Status: SHIPPED | OUTPATIENT
Start: 2020-12-01 | End: 2020-12-02

## 2020-12-01 NOTE — TELEPHONE ENCOUNTER
Routing refill request to provider for review/approval because:  Drug not on the [facility] refill protocol     Hattie Xie RN, BSN, PHN  [facility]: [place]

## 2020-12-02 ENCOUNTER — VIRTUAL VISIT (OUTPATIENT)
Dept: FAMILY MEDICINE | Facility: CLINIC | Age: 35
End: 2020-12-02
Payer: COMMERCIAL

## 2020-12-02 DIAGNOSIS — M54.41 ACUTE MIDLINE LOW BACK PAIN WITH RIGHT-SIDED SCIATICA: ICD-10-CM

## 2020-12-02 PROCEDURE — 99214 OFFICE O/P EST MOD 30 MIN: CPT | Mod: 95 | Performed by: NURSE PRACTITIONER

## 2020-12-02 RX ORDER — CYCLOBENZAPRINE HCL 10 MG
10 TABLET ORAL 3 TIMES DAILY PRN
Qty: 30 TABLET | Refills: 2 | Status: SHIPPED | OUTPATIENT
Start: 2020-12-02 | End: 2022-12-12

## 2020-12-02 NOTE — PROGRESS NOTES
"Aleida Weinberg is a 35 year old female who is being evaluated via a billable video visit.      The patient has been notified of following:     \"This video visit will be conducted via a call between you and your physician/provider. We have found that certain health care needs can be provided without the need for an in-person physical exam.  This service lets us provide the care you need with a video conversation.  If a prescription is necessary we can send it directly to your pharmacy.  If lab work is needed we can place an order for that and you can then stop by our lab to have the test done at a later time.    Video visits are billed at different rates depending on your insurance coverage.  Please reach out to your insurance provider with any questions.    If during the course of the call the physician/provider feels a video visit is not appropriate, you will not be charged for this service.\"    Patient has given verbal consent for Video visit? Yes  How would you like to obtain your AVS? MyChart  If you are dropped from the video visit, the video invite should be resent to: Text to cell phone: [phone]  Will anyone else be joining your video visit? No    Subjective     Aleida Weinberg is a 35 year old female who presents today via video visit for the following health issues:    HPI     Chronic/Recurring Back Pain Follow Up      Where is your back pain located? (Select all that apply) low back both and middle of back both    How would you describe your back pain?  burning and dull ache    Where does your back pain spread? the right and left buttock, the right and left  thigh and the right and left  knee    Since your last clinic visit for back pain, how has your pain changed? gradually worsening    Does your back pain interfere with your job? No    Since your last visit, have you tried any new treatment? Yes -  activity or exercise      How many servings of fruits and vegetables do you eat " daily?  2-3    On average, how many sweetened beverages do you drink each day (Examples: soda, juice, sweet tea, etc.  Do NOT count diet or artificially sweetened beverages)?   1    How many days per week do you exercise enough to make your heart beat faster? 5    How many minutes a day do you exercise enough to make your heart beat faster? 30 - 60    How many days per week do you miss taking your medication? 0    History of sciatica.   Has been working with chiropractor which has helped.   Been taking flexeril as needed.          Video Start Time: 12:44 PM      Review of Systems   Constitutional, HEENT, cardiovascular, pulmonary, GI, , musculoskeletal, neuro, skin, endocrine and psych systems are negative, except as otherwise noted.      Objective           Vitals:  No vitals were obtained today due to virtual visit.    Physical Exam     GENERAL: Healthy, alert and no distress  EYES: Eyes grossly normal to inspection.  No discharge or erythema, or obvious scleral/conjunctival abnormalities.  RESP: No audible wheeze, cough, or visible cyanosis.  No visible retractions or increased work of breathing.    SKIN: Visible skin clear. No significant rash, abnormal pigmentation or lesions.  NEURO: Cranial nerves grossly intact.  Mentation and speech appropriate for age.  PSYCH: Mentation appears normal, affect normal/bright, judgement and insight intact, normal speech and appearance well-groomed.      No results found for this or any previous visit (from the past 24 hour(s)).        Assessment & Plan     Acute midline low back pain with right-sided sciatica  Declined physical therapy. Doing chiro. Recommend ice/heat, NSAIDS, flexeril refilled   - cyclobenzaprine (FLEXERIL) 10 MG tablet; Take 1 tablet (10 mg) by mouth 3 times daily as needed for muscle spasms          No follow-ups on file.    FARZANA Palmer [facility]      Video-Visit Details    Type of service:  Video  Visit    Video End Time:12:51 PM    Originating Location (pt. Location): Home    Distant Location (provider location):  [facility]     Platform used for Video Visit: Bahman

## 2020-12-10 ENCOUNTER — OFFICE VISIT (OUTPATIENT)
Dept: FAMILY MEDICINE | Facility: CLINIC | Age: 35
End: 2020-12-10
Payer: COMMERCIAL

## 2020-12-10 VITALS
HEART RATE: 80 BPM | DIASTOLIC BLOOD PRESSURE: 70 MMHG | WEIGHT: 157.2 LBS | TEMPERATURE: 98.1 F | HEIGHT: 67 IN | SYSTOLIC BLOOD PRESSURE: 118 MMHG | BODY MASS INDEX: 24.67 KG/M2

## 2020-12-10 DIAGNOSIS — R21 RASH: Primary | ICD-10-CM

## 2020-12-10 PROCEDURE — 99213 OFFICE O/P EST LOW 20 MIN: CPT | Performed by: PHYSICIAN ASSISTANT

## 2020-12-10 ASSESSMENT — MIFFLIN-ST. JEOR: SCORE: 1445.68

## 2020-12-10 NOTE — PROGRESS NOTES
"Subjective     Aleida Weinberg is a 35 year old female who presents to clinic today for the following health issues:    HPI         Rash  Onset/Duration: 8-9 days ago  Description  Location: Neck, back, chest, both arms  Character: redness, flat spots  Itching: severe  Intensity:  Moderate-severe  Progression of Symptoms:  same  Accompanying signs and symptoms:   Fever: no  Body aches or joint pain: no  Sore throat symptoms: no  Recent cold symptoms: no  History:           Previous episodes of similar rash: None  New exposures:  None, two weeks ago patient took thrust oral rinse, patient was seen 1 month ago for hives and a scrap was done and it was determined not fungal.   Recent travel: no  Exposure to similar rash: no  Precipitating or alleviating factors: Heat makes it worse, when patient sleeps it gets worse. Patient has been using gold bond eczema cream that helps with itchiness.   Therapies tried and outcome: Gold bond eczema cream helps with itchiness and oral benadryl did not help. Patient did try hydrocortisone cream but not sure if it helped.     Aleida presents with rash x9 days. Rash is small red flat areas on flexor side of elbows, chest, shoulders, and upper back. Rash is very itchy. No new lotions or detergents. No changes in medications besides using a nystatin oral rinse over 10 days ago. She uses Gold Bond Eczema cream with some relief. Benadryl and hydrocortisone cream did not help. She had a rash/hives on her upper thigh 2 months ago that was scraped and found not to be fungal. This rash looks different.       Review of Systems   Constitutional, HEENT, cardiovascular, pulmonary, gi and gu systems are negative, except as otherwise noted.      Objective    /70 (BP Location: Right arm, Patient Position: Chair, Cuff Size: Adult Regular)   Pulse 80   Temp 98.1  F (36.7  C) (Oral)   Ht 1.702 m (5' 7\")   Wt 71.3 kg (157 lb 3.2 oz)   BMI 24.62 kg/m    Body mass index is 24.62 " kg/m .  Physical Exam   GENERAL: healthy, alert and no distress    GENERAL: alert, no acute distress  SKIN: multiple very faint erythematous papules on bilateral flexural surfaces of elbows, chest, shoulders, and upper back. These are not well demarcated.            Assessment:  Rash, unclear. Is very faint. No particular pattern and exam is mild.   - May be allergic reaction  - Will try Zyrtec   - Hydrocortisone cream offered. Patient declined and will follow-up if she wants to try.   - Has appointment with dermatology on 12/18.    JJ Moore, PA-C

## 2020-12-22 ENCOUNTER — TELEPHONE (OUTPATIENT)
Dept: RHEUMATOLOGY | Facility: CLINIC | Age: 35
End: 2020-12-22

## 2020-12-22 NOTE — TELEPHONE ENCOUNTER
Received a fax for a PA stating insurance only allows 30 tablets in 365 days. Please submit PA.    Carissa Frye CMA Rheumatology  12/22/2020 11:52 AM

## 2020-12-23 NOTE — TELEPHONE ENCOUNTER
Central Prior Authorization Team   Phone: [phone]      PA Initiation    Medication: Hydroxychloroquine-Initiated  Insurance Company: [employer] - Phone [phone] Fax [phone]  Pharmacy Filling the Rx: [facility] - [place], [place] - [address] [place], [place] [address]  Filling Pharmacy Phone: [phone]  Filling Pharmacy Fax:    Start Date: 12/23/2020

## 2020-12-23 NOTE — TELEPHONE ENCOUNTER
Prior Authorization Approval    Authorization Effective Date: 11/23/2020  Authorization Expiration Date: 12/23/2021  Medication: Hydroxychloroquine-APPROVED  Approved Dose/Quantity:   Reference #:     Insurance Company: [employer] - Phone [phone] Fax [phone]  Expected CoPay:       CoPay Card Available:      Foundation Assistance Needed:    Which Pharmacy is filling the prescription (Not needed for infusion/clinic administered): [facility] - [place], [place] - [address] [place] [address]  Pharmacy Notified: Yes  Patient Notified: No    Pharmacy will notify patient when medication is ready.

## 2021-01-07 ENCOUNTER — MYC MEDICAL ADVICE (OUTPATIENT)
Dept: FAMILY MEDICINE | Facility: CLINIC | Age: 36
End: 2021-01-07

## 2021-01-07 DIAGNOSIS — Z00.00 ROUTINE HISTORY AND PHYSICAL EXAMINATION OF ADULT: Primary | ICD-10-CM

## 2021-01-13 NOTE — TELEPHONE ENCOUNTER
Routing to RN. Patient is wanting to get orders for glucose and cholesterol for her biometric screening.    LALITHA Martinez  [facility]

## 2021-01-13 NOTE — TELEPHONE ENCOUNTER
Patient requesting cholesterol and glucose lab orders. Scheduled for lab appointment 1/21, and in person with PCP 1/27    Routed to PCP to review and advise.     Hattie Xie, RN, BSN, PHN  [facility]: [place]

## 2021-01-21 DIAGNOSIS — Z00.00 ROUTINE HISTORY AND PHYSICAL EXAMINATION OF ADULT: ICD-10-CM

## 2021-01-21 PROCEDURE — 82947 ASSAY GLUCOSE BLOOD QUANT: CPT | Performed by: NURSE PRACTITIONER

## 2021-01-21 PROCEDURE — 36415 COLL VENOUS BLD VENIPUNCTURE: CPT | Performed by: NURSE PRACTITIONER

## 2021-01-21 PROCEDURE — 80061 LIPID PANEL: CPT | Performed by: NURSE PRACTITIONER

## 2021-01-22 ENCOUNTER — MYC MEDICAL ADVICE (OUTPATIENT)
Dept: FAMILY MEDICINE | Facility: CLINIC | Age: 36
End: 2021-01-22
Payer: COMMERCIAL

## 2021-01-22 LAB
CHOLEST SERPL-MCNC: 155 MG/DL
GLUCOSE SERPL-MCNC: 80 MG/DL (ref 70–99)
HDLC SERPL-MCNC: 56 MG/DL
LDLC SERPL CALC-MCNC: 91 MG/DL
NONHDLC SERPL-MCNC: 99 MG/DL
TRIGL SERPL-MCNC: 38 MG/DL

## 2021-02-09 ENCOUNTER — OFFICE VISIT (OUTPATIENT)
Dept: FAMILY MEDICINE | Facility: CLINIC | Age: 36
End: 2021-02-09
Payer: COMMERCIAL

## 2021-02-09 VITALS
DIASTOLIC BLOOD PRESSURE: 86 MMHG | RESPIRATION RATE: 20 BRPM | HEART RATE: 80 BPM | BODY MASS INDEX: 23.98 KG/M2 | OXYGEN SATURATION: 98 % | SYSTOLIC BLOOD PRESSURE: 132 MMHG | TEMPERATURE: 97.9 F | HEIGHT: 67 IN | WEIGHT: 152.8 LBS

## 2021-02-09 DIAGNOSIS — Z02.9 ADMINISTRATIVE ENCOUNTER: Primary | ICD-10-CM

## 2021-02-09 PROCEDURE — 99212 OFFICE O/P EST SF 10 MIN: CPT | Performed by: NURSE PRACTITIONER

## 2021-02-09 ASSESSMENT — MIFFLIN-ST. JEOR: SCORE: 1420.73

## 2021-02-09 NOTE — PROGRESS NOTES
"  Assessment & Plan     Administrative encounter  Patient is doing well today without any concerns.  She is here to get a biometrics form filled out.  Her recent labs from 1/21/2021 were reviewed today including normal cholesterol panel and normal fasting blood sugar.  Form completed.                           Return in about 8 months (around 10/18/2021) for Physical Exam.      Milagro Bryson NP  [facility] LIN Shin is a 36 year old who presents to clinic today for the following health issues     HPI       Biometeric screen form, had labs in January 2021 for fasting cholesterol and diabetic screening which were normal.  She had a physical back in the fall but per her biometric form she needs weight and height as well as vital signs to be reported from 2021.  She did not want to wait until her physical in the fall to get this done.    She is feeling well today without any complaints.    Asthma: She reports her asthma is under excellent control.  ACT completed and scored 25.    Review of Systems   Constitutional, HEENT, cardiovascular, pulmonary, gi and gu systems are negative, except as otherwise noted.      Objective    /86 (BP Location: Right arm)   Pulse 80   Temp 97.9  F (36.6  C) (Oral)   Resp 20   Ht 1.702 m (5' 7\")   Wt 69.3 kg (152 lb 12.8 oz)   SpO2 98%   BMI 23.93 kg/m    Body mass index is 23.93 kg/m .  Physical Exam   GENERAL: healthy, alert and no distress  RESP: lungs clear to auscultation - no rales, rhonchi or wheezes  CV: regular rates and rhythm, normal S1 S2, no S3 or S4 and no murmur, click or rub  PSYCH: mentation appears normal, affect normal/bright              "

## 2021-02-10 DIAGNOSIS — M32.19 OTHER SYSTEMIC LUPUS ERYTHEMATOSUS WITH OTHER ORGAN INVOLVEMENT (H): ICD-10-CM

## 2021-02-10 DIAGNOSIS — Z79.899 HIGH RISK MEDICATIONS (NOT ANTICOAGULANTS) LONG-TERM USE: ICD-10-CM

## 2021-02-10 LAB
ALBUMIN UR-MCNC: NEGATIVE MG/DL
APPEARANCE UR: CLEAR
BASOPHILS # BLD AUTO: 0 10E9/L (ref 0–0.2)
BASOPHILS NFR BLD AUTO: 0.5 %
BILIRUB UR QL STRIP: NEGATIVE
COLOR UR AUTO: YELLOW
DIFFERENTIAL METHOD BLD: NORMAL
EOSINOPHIL # BLD AUTO: 0 10E9/L (ref 0–0.7)
EOSINOPHIL NFR BLD AUTO: 0.7 %
ERYTHROCYTE [DISTWIDTH] IN BLOOD BY AUTOMATED COUNT: 11.8 % (ref 10–15)
ERYTHROCYTE [SEDIMENTATION RATE] IN BLOOD BY WESTERGREN METHOD: 8 MM/H (ref 0–20)
GLUCOSE UR STRIP-MCNC: NEGATIVE MG/DL
HCT VFR BLD AUTO: 40.8 % (ref 35–47)
HGB BLD-MCNC: 14.2 G/DL (ref 11.7–15.7)
HGB UR QL STRIP: NEGATIVE
KETONES UR STRIP-MCNC: NEGATIVE MG/DL
LEUKOCYTE ESTERASE UR QL STRIP: NEGATIVE
LYMPHOCYTES # BLD AUTO: 0.8 10E9/L (ref 0.8–5.3)
LYMPHOCYTES NFR BLD AUTO: 18 %
MCH RBC QN AUTO: 32.8 PG (ref 26.5–33)
MCHC RBC AUTO-ENTMCNC: 34.8 G/DL (ref 31.5–36.5)
MCV RBC AUTO: 94 FL (ref 78–100)
MONOCYTES # BLD AUTO: 0.6 10E9/L (ref 0–1.3)
MONOCYTES NFR BLD AUTO: 13 %
NEUTROPHILS # BLD AUTO: 2.9 10E9/L (ref 1.6–8.3)
NEUTROPHILS NFR BLD AUTO: 67.8 %
NITRATE UR QL: NEGATIVE
PH UR STRIP: 7 PH (ref 5–7)
PLATELET # BLD AUTO: 212 10E9/L (ref 150–450)
RBC # BLD AUTO: 4.33 10E12/L (ref 3.8–5.2)
SOURCE: NORMAL
SP GR UR STRIP: 1.01 (ref 1–1.03)
UROBILINOGEN UR STRIP-ACNC: 0.2 EU/DL (ref 0.2–1)
WBC # BLD AUTO: 4.2 10E9/L (ref 4–11)

## 2021-02-10 PROCEDURE — 86140 C-REACTIVE PROTEIN: CPT | Performed by: INTERNAL MEDICINE

## 2021-02-10 PROCEDURE — 82550 ASSAY OF CK (CPK): CPT | Performed by: INTERNAL MEDICINE

## 2021-02-10 PROCEDURE — 85652 RBC SED RATE AUTOMATED: CPT | Performed by: INTERNAL MEDICINE

## 2021-02-10 PROCEDURE — 36415 COLL VENOUS BLD VENIPUNCTURE: CPT | Performed by: INTERNAL MEDICINE

## 2021-02-10 PROCEDURE — 86225 DNA ANTIBODY NATIVE: CPT | Performed by: INTERNAL MEDICINE

## 2021-02-10 PROCEDURE — 84156 ASSAY OF PROTEIN URINE: CPT | Performed by: INTERNAL MEDICINE

## 2021-02-10 PROCEDURE — 81003 URINALYSIS AUTO W/O SCOPE: CPT | Performed by: INTERNAL MEDICINE

## 2021-02-10 PROCEDURE — 86160 COMPLEMENT ANTIGEN: CPT | Performed by: INTERNAL MEDICINE

## 2021-02-10 PROCEDURE — 80053 COMPREHEN METABOLIC PANEL: CPT | Performed by: INTERNAL MEDICINE

## 2021-02-10 PROCEDURE — 85025 COMPLETE CBC W/AUTO DIFF WBC: CPT | Performed by: INTERNAL MEDICINE

## 2021-02-10 ASSESSMENT — ASTHMA QUESTIONNAIRES: ACT_TOTALSCORE: 25

## 2021-02-11 LAB
CREAT UR-MCNC: 52 MG/DL
CRP SERPL-MCNC: <2.9 MG/L (ref 0–8)
PROT UR-MCNC: <0.05 G/L
PROT/CREAT 24H UR: NORMAL G/G CR (ref 0–0.2)

## 2021-02-12 LAB
ALBUMIN SERPL-MCNC: 4.5 G/DL (ref 3.4–5)
ALP SERPL-CCNC: 38 U/L (ref 40–150)
ALT SERPL W P-5'-P-CCNC: 23 U/L (ref 0–50)
ANION GAP SERPL CALCULATED.3IONS-SCNC: 6 MMOL/L (ref 3–14)
AST SERPL W P-5'-P-CCNC: 15 U/L (ref 0–45)
BILIRUB SERPL-MCNC: 0.5 MG/DL (ref 0.2–1.3)
BUN SERPL-MCNC: 10 MG/DL (ref 7–30)
C3 SERPL-MCNC: 89 MG/DL (ref 81–157)
C4 SERPL-MCNC: 17 MG/DL (ref 13–39)
CALCIUM SERPL-MCNC: 9.8 MG/DL (ref 8.5–10.1)
CHLORIDE SERPL-SCNC: 106 MMOL/L (ref 94–109)
CK SERPL-CCNC: 60 U/L (ref 30–225)
CO2 SERPL-SCNC: 24 MMOL/L (ref 20–32)
CREAT SERPL-MCNC: 0.74 MG/DL (ref 0.52–1.04)
DSDNA AB SER-ACNC: 1 IU/ML
GFR SERPL CREATININE-BSD FRML MDRD: >90 ML/MIN/{1.73_M2}
GLUCOSE SERPL-MCNC: 75 MG/DL (ref 70–99)
POTASSIUM SERPL-SCNC: 4 MMOL/L (ref 3.4–5.3)
PROT SERPL-MCNC: 7.6 G/DL (ref 6.8–8.8)
SODIUM SERPL-SCNC: 136 MMOL/L (ref 133–144)

## 2021-02-16 ENCOUNTER — VIRTUAL VISIT (OUTPATIENT)
Dept: RHEUMATOLOGY | Facility: CLINIC | Age: 36
End: 2021-02-16
Payer: COMMERCIAL

## 2021-02-16 DIAGNOSIS — Z79.899 HIGH RISK MEDICATIONS (NOT ANTICOAGULANTS) LONG-TERM USE: ICD-10-CM

## 2021-02-16 DIAGNOSIS — M32.19 OTHER SYSTEMIC LUPUS ERYTHEMATOSUS WITH OTHER ORGAN INVOLVEMENT (H): Primary | ICD-10-CM

## 2021-02-16 DIAGNOSIS — I73.00 RAYNAUD'S PHENOMENON WITHOUT GANGRENE: ICD-10-CM

## 2021-02-16 PROCEDURE — 99214 OFFICE O/P EST MOD 30 MIN: CPT | Mod: 95 | Performed by: INTERNAL MEDICINE

## 2021-02-16 RX ORDER — HYDROXYCHLOROQUINE SULFATE 200 MG/1
200 TABLET, FILM COATED ORAL DAILY
Qty: 90 TABLET | Refills: 2 | Status: SHIPPED | OUTPATIENT
Start: 2021-02-16 | End: 2021-09-08

## 2021-02-16 RX ORDER — AZATHIOPRINE 50 MG/1
50 TABLET ORAL DAILY
Qty: 90 TABLET | Refills: 2 | Status: SHIPPED | OUTPATIENT
Start: 2021-02-16 | End: 2021-09-08

## 2021-02-16 NOTE — PATIENT INSTRUCTIONS
RHEUMATOLOGY    Dr. Lavon Light    [facility]  [address] [place] [address]    Our new phone number for Rheumatology is [phone], this number will be able to help you schedule appointments for Dr. Light or if you have any message you would like sent to us.    Thank you for choosing [facility]!  Carissa Frye [facility] Rheumatology

## 2021-02-16 NOTE — PROGRESS NOTES
Aleida Weinberg  is a 36 year old year old female who is being evaluated via a billable video visit.      How would you like to obtain your AVS? MyChart  If the video visit is dropped, the invitation should be resent by: Text to cell phone: [phone]  Will anyone else be joining your video visit? No    Rheumatology Video Visit      Aleida Weinberg MRN# [id number]   YOB: 1985 Age: 36 year old      Date of visit: 2/16/21   PCP: Tatum Dove     Chief Complaint   Patient presents with:  Systemic Lupus Erythematous    Assessment and Plan   1. Systemic lupus erythematosus (CHANELL >1:13201 speckled, RNP+, Sm+, Scl-70+, hypocomplementemia, photosensitivity, malar rash, arthritis, fatigue, Raynaud's phenomenon):  Dx'd 4/2016. Scl-70+; she lacks features of scleroderma except for raynaud's; no myopathy based on labs/symptoms. 5/11/2018 echo without evidence of pulmonary hypertension. Previously on MTX 20mg SQ weekly (GI upset with PO doses above 15mg, partially effective) and SSZ (LFT elevations).  Currently on AZA 50mg daily and HCQ 200mg daily (patient self reduced from 300mg daily on average based on preference of an easier regimen). TPMT normal on 8/21/2017.  Doing well at this time.  Chronic illness, stable.    - Continue hydroxychloroquine 200mg daily (last eye exam done  4/29/2019)  - Continue azathioprine 50mg daily   - Ophthalmology referral for hydroxychloroquine toxicity monitoring was given previously (she says that it is scheduled for next week at the [facility] location in [place])  - Labs in 3 months: CBC, CMP, ESR, CRP, C3, C4, UA, Uprotein:creatinine    High risk medication requiring intensive toxicity monitoring at least quarterly: labs ordered include CBC, Creatinine, Hepatic panel to monitor for cytopenia and hepatotoxicity; checking creatinine as it affects clearance of medication.      2. Raynaud's Phenomenon: Cold avoidance was insufficient for controlling her symptoms in  the past; then did well on amlodipine, but not using now and doing okay. Amlodipine PRN.   Chronic illness, stable.    - Amlodipine 10mg daily during colder months, as needed    3. Right 1st MTP pain: degenerative in nature; advised stiff soled shoes    4.  Low vitamin D: Currently on vitamin D 2000 units daily.    - Continue vitamin D 2000 units daily    5.  Secondary Sjogren's syndrome: Mild dry and dry mouth that are treated infrequently with artificial tears and frequent sips of water.     # At this time the COVID-19 vaccine (currently available from [employer]) is recommended based on our knowledge of this vaccine and vaccines in the past.  Based on the data for the mRNA COVID-19 vaccines available in the [place], there is no preference for one COVID-19 vaccine over another. Note that there is no data currently available to specifically establish safety and efficacy for this vaccine in immunocompromised people.    #  Instructed that if confirmed to have COVID-19 or exposure to someone with confirmed COVID-19 to call this clinic for directions on DMARD management.    # This is a virtual visit to reduce the risk of COVID-19 exposure during this current pandemic.      # Considered to be at high risk of complications from the COVID-19 virus.  It is recommended to limit contact with other people and if possible to work remotely or provide a leave of absence to reduce the risk for COVID-19.      Total minutes spent in evaluation with patient, documentation, , and review of pertinent studies and chart notes: 18     Ms. Weinberg verbalized agreement with and understanding of the rational for the diagnosis and treatment plan.  All questions were answered to best of my ability and the patient's satisfaction. Ms. Weinberg was advised to contact the clinic with any questions that may arise after the clinic visit.      Thank you for involving me in the care of the patient    Return to clinic: 3-4  months      HPI   Aleida Digna Weinberg is a 36 year old female with medical history significant for intermittent asthma, anxiety, migraines, vitamin D deficiency, and systemic lupus erythematosus who presents for follow-up of SLE.     Today, Ms. Weinberg reports that she is doing well.  No joint pain or swelling.  Morning stiffness for less than 10 minutes.  Tolerating medications well.  Planning to have her eye exam done at the [facility] location in [place] next week.  Raynaud's phenomenon is doing well with cold avoidance/keeping warm; not using amlodipine.  She notes that she underwent ice fishing and did okay with regard to Raynaud's phenomenon.  She had a rash of unclear etiology so seen by dermatologist who told her that it was eczema and it resolved with topical steroids, per patient report.    Denies fevers, chills, nausea, vomiting, constipation, diarrhea. No abdominal pain. No chest pain/pressure, palpitations, or shortness of breath. No oral or nasal sores. No neck pain. No LE swelling.  Has photosensitivity but no photophobia.  Mild dry eyes and dry mouth; she uses artificial tears as needed.  No eye pain or redness. Denies history of serositis. Denies history of DVT, pulmonary embolism, or miscarriage.    Tobacco: quit smoking in 2005  EtOH: Once monthly  Drugs: None  Occupation: Works at [employer], a lot of typing per patient    ROS   12 point review of system was completed and negative except as noted in the HPI     Active Problem List     Patient Active Problem List   Diagnosis     Other kyphoscoliosis and scoliosis     Hypertrophic and atrophic condition of skin     Viral warts     CARDIOVASCULAR SCREENING; LDL GOAL LESS THAN 160     Intermittent asthma     Anxiety     Intractable menstrual migraine without status migrainosus     Vitamin D deficiency     Inflammatory polyarthropathy (H)     Chronic back pain     Raynaud's phenomenon without gangrene     Systemic lupus erythematosus (H)      High risk medications (not anticoagulants) long-term use (Plaquenil and AZA)     Dry eyes, bilateral     Past Medical History     Past Medical History:   Diagnosis Date     Mild intermittent asthma      Other kyphoscoliosis and scoliosis     upper back curvature and disc degeneration in lower back.     Past Surgical History     Past Surgical History:   Procedure Laterality Date      SECTION       SURGICAL HISTORY OF -       PE Tubes     Allergy     Allergies   Allergen Reactions     Fluconazole Rash     Current Medication List     Current Outpatient Medications   Medication Sig     albuterol (PROAIR HFA/PROVENTIL HFA/VENTOLIN HFA) 108 (90 Base) MCG/ACT inhaler Inhale 2 puffs into the lungs every 6 hours as needed for shortness of breath / dyspnea     amLODIPine (NORVASC) 10 MG tablet Take 1 tablet (10 mg) by mouth daily for raynaud's phenomenon     azaTHIOprine (IMURAN) 50 MG tablet Take 1 tablet (50 mg) by mouth daily     cyclobenzaprine (FLEXERIL) 10 MG tablet Take 1 tablet (10 mg) by mouth 3 times daily as needed for muscle spasms     hydroxychloroquine (PLAQUENIL) 200 MG tablet Take 1 tablet (200 mg) by mouth daily . Yearly eye exam needed     hydrOXYzine (ATARAX) 25 MG tablet Take 1 tablet (25 mg) by mouth every 6 hours as needed for anxiety     levonorgestrel (MIRENA, 52 MG,) 20 MCG/24HR IUD 1 each by Intrauterine route once     LORazepam (ATIVAN) 0.5 MG tablet Take 1 tablet (0.5 mg) by mouth every 8 hours as needed for anxiety     metroNIDAZOLE (METROGEL) 0.75 % external gel Apply topically 2 times daily     traZODone (DESYREL) 50 MG tablet Take 0.5 tablets (25 mg) by mouth nightly as needed for sleep     vitamin D3 (CHOLECALCIFEROL) 2000 units (50 mcg) tablet Take 1 tablet (2,000 Units) by mouth daily     No current facility-administered medications for this visit.          Social History   See HPI    Family History     Family History   Problem Relation Age of Onset     Heart Disease Maternal  "Grandfather         Bypass, Heart Disease     Respiratory Maternal Grandfather         asthma     Macular Degeneration Maternal Grandfather      C.A.D. Paternal Grandfather      Heart Disease Maternal Uncle         MI's      Hypertension Paternal Grandmother      Cancer Paternal Grandmother         lymph nodes     Cataracts Paternal Grandmother      Respiratory Other         cousin on mothers side, asthma     Alcohol/Drug Maternal Uncle      Alcohol/Drug Other         bother great grandparents on mother's side     Diabetes No family hx of      Breast Cancer No family hx of      Cancer - colorectal No family hx of      Denies family history of autoimmune disease    Physical Exam     Temp Readings from Last 3 Encounters:   02/09/21 97.9  F (36.6  C) (Oral)   12/10/20 98.1  F (36.7  C) (Oral)   10/20/20 98.6  F (37  C) (Tympanic)     BP Readings from Last 5 Encounters:   02/09/21 132/86   12/10/20 118/70   10/20/20 117/77   10/16/20 104/70   12/13/19 102/72     Pulse Readings from Last 1 Encounters:   02/09/21 80     Resp Readings from Last 1 Encounters:   02/09/21 20     Estimated body mass index is 23.93 kg/m  as calculated from the following:    Height as of 2/9/21: 1.702 m (5' 7\").    Weight as of 2/9/21: 69.3 kg (152 lb 12.8 oz).      GEN: NAD. Healthy appearing adult.   HEENT: MMM.  Anicteric, noninjected sclera. No obvious external lesions of the ear and nose. Hearing intact.  PULM: No increased work of breathing  SKIN: No rash or jaundice seen  PSYCH: Alert. Appropriate.        Labs / Imaging (select studies)       RF/CCP  Recent Labs   Lab Test 04/22/16  0902 04/01/16  0910   CCPIGG 1  --    RHF  --  <20     CHANELL  Recent Labs   Lab Test 04/22/16  0902 04/01/16  0910   VALERIE  --  12.4*   ANAIGG >1:91312  Reference range: <1:40  (Note)  Speckled pattern.  INTERPRETIVE INFORMATION: CHANELL by IFA, IgG  Anti-nuclear antibodies (CHANELL) are seen in a variety of  systemic rheumatic diseases and are determined by " indirect  fluorescence assay (IFA) using HEp-2 substrate with an  IgG-specific conjugate. CHANELL titers less than or equal to  1:80 have variable relevance while titers greater than or  equal to 1:160 are considered clinically significant. These  antibodies may precede clinical disease onset; however,  healthy individuals and those with advanced age have been  reported to be positive for CHANELL. When observed, one of the  five basic patterns is reported: homogeneous,  peripheral/rim, speckled, centromere, or nucleolar. If  cytoplasmic fluorescence is observed, it is noted. IFA  methodology is subjective and has occasionally been shown  to lack sensitivity for anti-SSA/Ro antibodies.  Negative results do not necessarily rule out the presence  of SSc. If clinical suspicion remains, consi vicki further  testing for U3-RNP, PM/Scl, or Th/To antibodies associated  with SSc.  Performed by [facility],  [address] [place] [id number] [phone]  [email or web address], Twin Rodriguez MD, Lab. Director    --      RNP/Sm/SSA/SSB  Recent Labs   Lab Test 01/12/18  0828 04/22/16  0902   RNPIGG  --  >8.0  Positive   Antibody index (AI) values reflect qualitative changes in antibody   concentration that cannot be directly associated with clinical condition or   disease state.  *   SMIGG  --  1.5*   SSAIGG  --  <0.2  Negative   Antibody index (AI) values reflect qualitative changes in antibody   concentration that cannot be directly associated with clinical condition or   disease state.     SSBIGG  --  <0.2  Negative   Antibody index (AI) values reflect qualitative changes in antibody   concentration that cannot be directly associated with clinical condition or   disease state.     SCLIGG  --  3.1*   TREPAB Negative  --      dsDNA  Recent Labs   Lab Test 02/10/21  1627 11/05/20  1628 07/23/20  1636 04/02/20  1541 07/11/19  1529 11/29/18  1616 08/23/18  1637 04/26/18  1648 01/18/18  1639   DNA 1 1 1 2 2 2 2 2 2     C3/C4  Recent Labs    Lab Test 02/10/21  1627 11/05/20  1628 07/23/20  1636 04/02/20  1541 10/18/19  1542 07/11/19  1529 11/29/18  1616 08/23/18  1637 04/26/18  1648   V6OTZRH 89 89 92 90 74* 75* 82 66* 71*   U6CKQTB 17 17 18 20 14* 14* 13* 13* 14*     Send-out Labs  Recent Labs   Lab Test 04/21/17  0813   SRESLT SEE NOTE  (Note)  Test name                    Result Flag  Units  RefIntvl  ------------------------------------------------------------  Thiopurine Methyltransferase                                23.2 L      U/mL 24.0-44.0  Sample slightly hemolyzed. Please interpret the results  with caution.  INTERPRETIVE INFORMATION: Thiopurine Methyltransferase, RBC  Normal TPMT activity:  24.0-44.0 U/mL................Individuals are predicted to  be at low risk of bone marrow toxicity (myelosuppression)  as a consequence of standard thiopurine therapy; no dose  adjustment is recommended.  Intermediate TPMT activity:  17.0-23.9 U/mL................Individuals are predicted to  be at intermediate risk of bone marrow toxicity  (myelosuppression) as a consequence of standard thiopurine  therapy; a dose reduction and therapeutic drug management  is recommended.  Low TPMT activity:  less than 17.0 U/mL...........Individuals are predicted to  be at high risk of bone marrow toxicity (myelosuppression)   as a consequence of standard thiopurine dosing. It is  recommended to avoid the use of thiopurine drugs.  High TPMT activity:  greater than 44.0 U/mL........Individuals are not predicted  to be at risk for bone marrow toxicity (myelosuppression)  as a consequence of standard thiopurine dosing, but may be  at risk for therapeutic failure due to excessive  inactivation of thiopurine drugs. Individuals may require  higher than the normal standard dose. Therapeutic drug  management is recommended.  The TPMT, RBC assay is used as a screen to detect  individuals with low and intermediate TPMT activity who may  be at risk for myelosuppression when  exposed to standard  doses of thiopurines, including azathioprine (Imuran) and  6-mercaptopurine (Purinethol). TPMT is the primary  metabolic route for inactivation of thiopurine drugs in the  bone marrow. When TPMT activity is low, it is predicted  that proportionately more 6-mercaptopurine can be converted  into the cytotoxic 6-thioguanine nucle otides that  accumulate in the bone marrow causing excessive toxicity.  The activity of TPMT is measured by the nanomoles of  6-methylmercaptopurine (inactive metabolite) produced per 1  mL of packed red blood cells, (U/mL).    TPMT phenotype testing does not replace the need for  clinical monitoring of patients treated with thiopurine  drugs. Genotype for TPMT cannot be inferred from TPMT  activity (phenotype). Phenotype testing should not be  requested for patients currently treated with thiopurine  drugs. Current TPMT phenotype may not reflect future TPMT  phenotype, particularly in patients who received blood  transfusion within 30-60 days of testing.  TPMT enzyme  activity can be inhibited by several drugs such as:  naproxen (Aleve), ibuprofen (Advil, Motrin), ketoprofen  (Orudis), furosemide (Lasix), sulfasalazine (Azulfidine),  mesalamine (Asacol), olsalazine (Dipentum), mefenamic acid  (Ponstel), thiazide diuretics, and benzoic acid inhibitors.  TPMT inhibitors may contribute t o falsely low results;  patients should abstain from these drugs for at least 48  hours prior to TPMT testing. Falsely low results may also  occur as a result of inappropriate specimen handling and  hemolysis.  Test developed and characteristics determined by [facility]. See Compliance Statement B: [email or web address]  Performed by [facility],  [address] [place] [id number] [phone]  [email or web address], Lionel Ferreira MD, Lab. Director     STSTNM Thiopurine Methyltransferase, RBC   STSTCD 92,066   SSPTYP Whole blood, EDTA anticoagulant     Antiphospholipid Antibodies  Recent  Labs   Lab Test 04/22/16  0902   B2GPG 0.9   B2GPM <0.9  Negative     CARG <15.0  Interpretation:  Negative     JOANN <12.5  Interpretation:  Negative     LUPINT Negative  (Note)  COMMENTS:  The INR is normal.  APTT ratio is normal.  DRVVT Screen ratio is normal.  Thrombin time is normal.  NEGATIVE TEST; A LUPUS ANTICOAGULANT WAS NOT DETECTED IN THIS  SPECIMEN WITHIN THE LIMITS OF THE TESTING REPERTOIRE.  If the clinical picture is strongly suggestive of an antiphospholipid  syndrome, recommend anticardiolipin and beta-2-glycoprotein (IgG and  IgM) antibody tests.  Isabella Olson M.D.  [phone]  4/25/2016    INR =  1.05    Reference range: 0.86-1.14  Thrombin Time= 15.4    Reference range: 13.0-19.0 sec    APTT:       Ratio  Patient  =  0.92  1:2 Mix  =  N/A  Reference:  Negative: Less than or equal to 1.16  Positive: Greater than or equal to 1.17     DILUTE LISA VIPER VENOM TEST:  Screen Ratio = 0.95   Normal is less than 1.21           CBC  Recent Labs   Lab Test 02/10/21  1627 11/05/20  1628 07/23/20  1636   WBC 4.2 3.3* 4.5   RBC 4.33 3.95 4.39   HGB 14.2 13.1 14.6   HCT 40.8 37.2 41.2   MCV 94 94 94   RDW 11.8 11.4 11.5    202 237   MCH 32.8 33.2* 33.3*   MCHC 34.8 35.2 35.4   NEUTROPHIL 67.8 60.7 68.0   LYMPH 18.0 23.1 18.1   MONOCYTE 13.0 14.4 12.8   EOSINOPHIL 0.7 1.2 0.9   BASOPHIL 0.5 0.6 0.2   ANEU 2.9 2.0 3.1   ALYM 0.8 0.8 0.8   AMANDA 0.6 0.5 0.6   AEOS 0.0 0.0 0.0   ABAS 0.0 0.0 0.0     CMP  Recent Labs   Lab Test 02/10/21  1627 01/21/21  0716 11/05/20  1628 07/23/20  1636 04/02/20  1541 01/16/20  1627     --  138 138 136 137   POTASSIUM 4.0  --  3.8 4.1 3.9 4.1   CHLORIDE 106  --  107 106 105 106   CO2 24  --  25 26 26 27   ANIONGAP 6  --  6 6 5 4   GLC 75 80 92 79 83 67*   BUN 10  --  13 11 8 16   CR 0.74  --  0.68 0.68 0.58 0.72   GFRESTIMATED >90  --  >90 >90 >90 >90   GFRESTBLACK >90  --  >90 >90 >90 >90   SRINIVAS 9.8  --  8.8 9.2 9.0 9.2   BILITOTAL 0.5  --  0.4 0.4 0.5 0.4    ALBUMIN 4.5  --  4.1 4.6 4.3 4.3   PROTTOTAL 7.6  --  7.0 8.0 7.8 7.4   ALKPHOS 38*  --  39* 44 47 42   AST 15  --  17 20 18 21   ALT 23  --  23 29 30 29     Calcium/VitaminD  Recent Labs   Lab Test 02/10/21  1627 11/05/20  1628 07/23/20  1636 02/20/17  1640 02/20/17  1640 10/26/16  0919 10/26/16  0919   SRINIVAS 9.8 8.8 9.2   < >  --    < > 9.3   VITDT  --   --  30  --  33  --  23    < > = values in this interval not displayed.     ESR/CRP  Recent Labs   Lab Test 02/10/21  1627 11/05/20  1628 07/23/20  1636   SED 8 9 7   CRP <2.9 <2.9 <2.9     CK/Aldolase  Recent Labs   Lab Test 02/10/21  1627 11/05/20  1628 07/23/20  1636 04/22/16  0902 04/22/16  0902   CKT 60 64 58   < > 45   ALDOLASE  --   --   --   --  4.5    < > = values in this interval not displayed.     TSH/T4  Recent Labs   Lab Test 04/01/16  0910   TSH 1.57     Lipid Panel  Recent Labs   Lab Test 01/21/21  0716 01/17/20  0841 02/08/19  0713 07/10/15  0814 07/10/15  0814 09/13/13  0706   CHOL 155 144 140   < > 149 139   TRIG 38 42 52   < > 45 57   HDL 56 50 44*   < > 48* 52   LDL 91 86 86   < > 92 76   VLDL  --   --   --   --  9 11   CHOLHDLRATIO  --   --   --   --  3.1 2.7   NHDL 99 94 96   < >  --   --     < > = values in this interval not displayed.     Hepatitis B  Recent Labs   Lab Test 04/22/16  0902   HBCAB Nonreactive   HEPBANG Nonreactive     Hepatitis C  Recent Labs   Lab Test 10/26/18  0836 01/12/18  0828 04/22/16  0902   HCVAB Nonreactive Nonreactive Nonreactive   Assay performance characteristics have not been established for newborns,   infants, and children       Lyme ab screening  Recent Labs   Lab Test 04/01/16  0910   LYMEGM 0.12     HIV Screening  Recent Labs   Lab Test 10/26/18  0836 01/12/18  0828 04/22/16  0902   HIAGAB Nonreactive Nonreactive Nonreactive   HIV-1 p24 Ag & HIV-1/HIV-2 Ab Not Detected       UA  Recent Labs   Lab Test 02/10/21  1623 11/05/20  1638 07/23/20  1655 04/26/18  1648 04/26/18  1648 01/18/18  1640 01/12/18  0836  12/28/17  0145 10/12/17  1643 01/20/17  0827 01/20/17  0827   COLOR Yellow Yellow Yellow   < > Yellow Yellow Yellow Yellow Yellow   < > Yellow   APPEARANCE Clear Clear Clear   < > Clear Clear Clear Clear Clear   < > Clear   URINEGLC Negative Negative Negative   < > Negative Negative Negative Negative Negative   < > Negative   URINEBILI Negative Negative Negative   < > Negative Negative Negative Moderate* Negative   < > Negative   SG 1.015 <=1.005 <=1.005   < > 1.010 1.020 1.025 >1.030 1.010   < > >1.030   URINEPH 7.0 6.5 5.5   < > 7.0 7.0 6.5 6.0 6.5   < > 6.0   PROTEIN Negative Negative Negative   < > Negative Negative Trace* 100* Negative   < > Trace*   UROBILINOGEN 0.2 0.2 0.2   < > 0.2 0.2 0.2 4.0* 0.2   < > 0.2   NITRITE Negative Negative Negative   < > Negative Negative Negative Positive* Negative   < > Negative   UBLD Negative Negative Negative   < > Negative Negative Negative Negative Trace*   < > Negative   LEUKEST Negative Negative Negative   < > Negative Negative Negative Negative Trace*   < > Negative   WBCU  --   --   --   --  0 - 5 O - 2 O - 2 O - 2 O - 2   < > O - 2   RBCU  --   --   --   --  O - 2 O - 2 O - 2 O - 2 O - 2   < > O - 2   SQUAMOUSEPI  --   --   --   --   --  Few Moderate* Moderate* Few   < > Few   BACTERIA  --   --   --   --   --   --  Moderate* Moderate* Few*  --  Few*   MUCOUS  --   --   --   --   --   --  Present* Present*  --   --  Present*    < > = values in this interval not displayed.     Urine Microscopic  Recent Labs   Lab Test 04/26/18 1648 01/18/18  1640 01/12/18  0836 12/28/17  0145 10/12/17  1643 01/20/17  0827 01/20/17  0827   WBCU 0 - 5 O - 2 O - 2 O - 2 O - 2   < > O - 2   RBCU O - 2 O - 2 O - 2 O - 2 O - 2   < > O - 2   SQUAMOUSEPI  --  Few Moderate* Moderate* Few   < > Few   BACTERIA  --   --  Moderate* Moderate* Few*  --  Few*   MUCOUS  --   --  Present* Present*  --   --  Present*    < > = values in this interval not displayed.     Urine Protein  Recent Labs   Lab  Test 02/10/21  1623 11/05/20  1638 07/23/20  1655 04/02/20  1550   UTP <0.05 <0.05 <0.05 <0.05   UTPG Unable to calculate due to low value Unable to calculate due to low value Unable to calculate due to low value Unable to calculate due to low value   UCRR 52  --  11 23       Immunization History     Immunization History   Administered Date(s) Administered     HPV 02/25/2010, 02/04/2011     HepB 06/13/1999, 08/20/1999, 02/05/2000     Historical DTP/aP 1985, 1985, 1985, 01/15/1988, 06/15/1991     Influenza (IIV3) PF 11/07/2011     Influenza Vaccine IM > 6 months Valent IIV4 10/11/2016, 10/13/2017, 10/26/2018, 10/17/2019, 10/16/2020     MMR 05/15/1987, 09/15/1991     Mantoux Tuberculin Skin Test 07/15/1987, 01/19/2005     Meningococcal (Menomune ) 08/09/2004     OPV, trivalent, live 1986, 1986, 1986, 01/15/1988, 09/15/1991     Pneumo Conj 13-V (2010&after) 05/04/2018     Pneumococcal 23 valent 08/30/2018     TDAP Vaccine (Adacel) 01/21/2009, 02/25/2010, 08/17/2020     Zoster vaccine recombinant adjuvanted (SHINGRIX) 12/13/2018, 03/04/2019          Chart documentation done in part with Dragon Voice recognition Software. Although reviewed after completion, some word and grammatical error may remain.        Video-Visit Details    Type of service:  Video Visit    Video Start Time: 10:14 AM    Video End Time: 10:26 AM    Originating Location (pt. Location): [place], [place]    Distant Location (provider location):  Home    Platform used for Video Visit: Marjorie Light MD

## 2021-02-22 ENCOUNTER — OFFICE VISIT (OUTPATIENT)
Dept: OPHTHALMOLOGY | Facility: CLINIC | Age: 36
End: 2021-02-22
Payer: COMMERCIAL

## 2021-02-22 DIAGNOSIS — H00.15 CHALAZION OF LEFT LOWER EYELID: ICD-10-CM

## 2021-02-22 DIAGNOSIS — Z79.899 HIGH RISK MEDICATION USE: Primary | ICD-10-CM

## 2021-02-22 DIAGNOSIS — M32.19 OTHER SYSTEMIC LUPUS ERYTHEMATOSUS WITH OTHER ORGAN INVOLVEMENT (H): ICD-10-CM

## 2021-02-22 PROCEDURE — 92012 INTRM OPH EXAM EST PATIENT: CPT | Performed by: STUDENT IN AN ORGANIZED HEALTH CARE EDUCATION/TRAINING PROGRAM

## 2021-02-22 PROCEDURE — 92083 EXTENDED VISUAL FIELD XM: CPT | Performed by: STUDENT IN AN ORGANIZED HEALTH CARE EDUCATION/TRAINING PROGRAM

## 2021-02-22 PROCEDURE — 92134 CPTRZ OPH DX IMG PST SGM RTA: CPT | Performed by: STUDENT IN AN ORGANIZED HEALTH CARE EDUCATION/TRAINING PROGRAM

## 2021-02-22 ASSESSMENT — CUP TO DISC RATIO
OS_RATIO: 0.3
OD_RATIO: 0.3

## 2021-02-22 ASSESSMENT — VISUAL ACUITY
OD_CC+: -1
CORRECTION_TYPE: GLASSES
OS_CC: 20/20
METHOD: SNELLEN - LINEAR
OS_CC+: -2
OD_CC: 20/20

## 2021-02-22 ASSESSMENT — REFRACTION_WEARINGRX: SPECS_TYPE: SVL

## 2021-02-22 ASSESSMENT — EXTERNAL EXAM - RIGHT EYE: OD_EXAM: NORMAL

## 2021-02-22 ASSESSMENT — EXTERNAL EXAM - LEFT EYE: OS_EXAM: NORMAL

## 2021-02-22 ASSESSMENT — SLIT LAMP EXAM - LIDS: COMMENTS: NORMAL

## 2021-02-22 NOTE — LETTER
2/22/2021       RE: Aleida Weinberg  [address] [facility] [address]      Dear Dr. iLght,    Thank you for referring your patient, Aleida Weinberg, to the [facility].  Her Plaquenil eye tests were normal today. Plan to repeat tests in 1 year. Please see a copy of my visit note below.     Current Eye Medications:  None     Subjective:  Here for OCT and HVF referred by Dr. Light for plaquenil use. Has been on 200 mg daily for about 6-8 months now for her Lupus. Also has a stye on the LLL for about five days now.  Has been doing warm compresses with a microwavable rice pack, but is not helping much (but is less tender and smaller than it used to be).    Saw Dr. Begum in 2019 for Plaquenil eye tests.     Objective:  See Ophthalmology Exam.       Assessment:  Aleida Weinberg is a 36 year old female who presents with:   Encounter Diagnoses   Name Primary?     High risk medication use Plaquenil since 2015.     Macular SD-OCT within normal limits both eyes.    Inman visual field (HVF) 10-2 within normal limits both eyes.    No signs of Plaquenil retinal toxicity at present.        Other systemic lupus erythematosus with other organ involvement (H)      Chalazion of left lower eyelid Encouraged continued hot packing.        Plan:  Normal Plaquenil eye tests today.    Chalazion  There are tiny oil glands in the upper eyelid near the eyelashes. They help lubricate the eyes.   If these glands become blocked, a small hard lump forms in the upper eyelid, called a chalazion.   The lump can take several weeks to grow, causing pain and tenderness, sensitivity to light, and increased tearing.    Home Care  1. Apply a hot compress for 15 minutes at least 4 times a day. Use a microwaveable pack filled with dry,uncooked rice, gel beads, etc.  This will reduce the swelling and soften the hardened oils blocking the duct.   2. Massage the area gently after applying  the compress to promote drainage.  Do not try to pop or squeeze the chalazion.  3. Once a day, with eyes closed, clean your eyelids with baby shampoo to help  reduce clogging of the duct, as well as help prevent recurrences.  If the bump does not resolve with hot packing after a few weeks, we may be able to incise and drain the bump or inject steroids into the bump.  Anne Frias MD  [phone]                      Again, thank you for allowing me to participate in the care of your patient.        Sincerely,        Anne Frias MD

## 2021-02-22 NOTE — PATIENT INSTRUCTIONS
Normal Plaquenil eye tests today.    Chalazion  There are tiny oil glands in the upper eyelid near the eyelashes. They help lubricate the eyes.   If these glands become blocked, a small hard lump forms in the upper eyelid, called a chalazion.   The lump can take several weeks to grow, causing pain and tenderness, sensitivity to light, and increased tearing.    Home Care  1. Apply a hot compress for 15 minutes at least 4 times a day. Use a microwaveable pack filled with dry,uncooked rice, gel beads, etc.  This will reduce the swelling and soften the hardened oils blocking the duct.   2. Massage the area gently after applying the compress to promote drainage.  Do not try to pop or squeeze the chalazion.  3. Once a day, with eyes closed, clean your eyelids with baby shampoo to help  reduce clogging of the duct, as well as help prevent recurrences.  If the bump does not resolve with hot packing after a few weeks, we may be able to  incise and drain the bump or inject steroids into the bump.  Anne Frias MD  [phone]

## 2021-02-22 NOTE — PROGRESS NOTES
Current Eye Medications:  None     Subjective:  Here for OCT and HVF referred by Dr. Light for plaquenil use. Has been on 200 mg daily for about 6-8 months now for her Lupus. Also has a stye on the LLL for about five days now.  Has been doing warm compresses with a microwavable rice pack, but is not helping much (but is less tender and smaller than it used to be).    Saw Dr. Begum in 2019 for Plaquenil eye tests.     Objective:  See Ophthalmology Exam.       Assessment:  Aleida Weinberg is a 36 year old female who presents with:   Encounter Diagnoses   Name Primary?     High risk medication use Plaquenil since 2015.     Macular SD-OCT within normal limits both eyes.    Inman visual field (HVF) 10-2 within normal limits both eyes.    No signs of Plaquenil retinal toxicity at present.        Other systemic lupus erythematosus with other organ involvement (H)      Chalazion of left lower eyelid Encouraged continued hot packing.        Plan:  Normal Plaquenil eye tests today.    Chalazion  There are tiny oil glands in the upper eyelid near the eyelashes. They help lubricate the eyes.   If these glands become blocked, a small hard lump forms in the upper eyelid, called a chalazion.   The lump can take several weeks to grow, causing pain and tenderness, sensitivity to light, and increased tearing.    Home Care  1. Apply a hot compress for 15 minutes at least 4 times a day. Use a microwaveable pack filled with dry,uncooked rice, gel beads, etc.  This will reduce the swelling and soften the hardened oils blocking the duct.   2. Massage the area gently after applying the compress to promote drainage.  Do not try to pop or squeeze the chalazion.  3. Once a day, with eyes closed, clean your eyelids with baby shampoo to help  reduce clogging of the duct, as well as help prevent recurrences.  If the bump does not resolve with hot packing after a few weeks, we may be able to incise and drain the bump or inject  steroids into the bump.  Anne Frias MD  [phone]

## 2021-03-16 ENCOUNTER — IMMUNIZATION (OUTPATIENT)
Dept: NURSING | Facility: CLINIC | Age: 36
End: 2021-03-16
Payer: COMMERCIAL

## 2021-03-16 PROCEDURE — 91300 PR COVID VAC PFIZER DIL RECON 30 MCG/0.3 ML IM: CPT

## 2021-03-16 PROCEDURE — 0001A PR COVID VAC PFIZER DIL RECON 30 MCG/0.3 ML IM: CPT

## 2021-04-06 ENCOUNTER — IMMUNIZATION (OUTPATIENT)
Dept: NURSING | Facility: CLINIC | Age: 36
End: 2021-04-06
Attending: INTERNAL MEDICINE
Payer: COMMERCIAL

## 2021-04-06 PROCEDURE — 91300 PR COVID VAC PFIZER DIL RECON 30 MCG/0.3 ML IM: CPT

## 2021-04-06 PROCEDURE — 0002A PR COVID VAC PFIZER DIL RECON 30 MCG/0.3 ML IM: CPT

## 2021-05-18 DIAGNOSIS — Z79.899 HIGH RISK MEDICATIONS (NOT ANTICOAGULANTS) LONG-TERM USE: ICD-10-CM

## 2021-05-18 DIAGNOSIS — M32.19 OTHER SYSTEMIC LUPUS ERYTHEMATOSUS WITH OTHER ORGAN INVOLVEMENT (H): ICD-10-CM

## 2021-05-18 LAB
ALBUMIN UR-MCNC: NEGATIVE MG/DL
APPEARANCE UR: CLEAR
BACTERIA #/AREA URNS HPF: ABNORMAL /HPF
BASOPHILS # BLD AUTO: 0 10E9/L (ref 0–0.2)
BASOPHILS NFR BLD AUTO: 0.5 %
BILIRUB UR QL STRIP: NEGATIVE
COLOR UR AUTO: YELLOW
DIFFERENTIAL METHOD BLD: ABNORMAL
EOSINOPHIL # BLD AUTO: 0 10E9/L (ref 0–0.7)
EOSINOPHIL NFR BLD AUTO: 1 %
ERYTHROCYTE [DISTWIDTH] IN BLOOD BY AUTOMATED COUNT: 11.7 % (ref 10–15)
ERYTHROCYTE [SEDIMENTATION RATE] IN BLOOD BY WESTERGREN METHOD: 8 MM/H (ref 0–20)
GLUCOSE UR STRIP-MCNC: NEGATIVE MG/DL
HCT VFR BLD AUTO: 40.3 % (ref 35–47)
HGB BLD-MCNC: 14.2 G/DL (ref 11.7–15.7)
HGB UR QL STRIP: ABNORMAL
KETONES UR STRIP-MCNC: NEGATIVE MG/DL
LEUKOCYTE ESTERASE UR QL STRIP: NEGATIVE
LYMPHOCYTES # BLD AUTO: 0.8 10E9/L (ref 0.8–5.3)
LYMPHOCYTES NFR BLD AUTO: 20.2 %
MCH RBC QN AUTO: 33.3 PG (ref 26.5–33)
MCHC RBC AUTO-ENTMCNC: 35.2 G/DL (ref 31.5–36.5)
MCV RBC AUTO: 95 FL (ref 78–100)
MONOCYTES # BLD AUTO: 0.6 10E9/L (ref 0–1.3)
MONOCYTES NFR BLD AUTO: 14 %
NEUTROPHILS # BLD AUTO: 2.5 10E9/L (ref 1.6–8.3)
NEUTROPHILS NFR BLD AUTO: 64.3 %
NITRATE UR QL: NEGATIVE
NON-SQ EPI CELLS #/AREA URNS LPF: ABNORMAL /LPF
PH UR STRIP: 6 PH (ref 5–7)
PLATELET # BLD AUTO: 225 10E9/L (ref 150–450)
RBC # BLD AUTO: 4.26 10E12/L (ref 3.8–5.2)
RBC #/AREA URNS AUTO: ABNORMAL /HPF
SOURCE: ABNORMAL
SP GR UR STRIP: 1.01 (ref 1–1.03)
UROBILINOGEN UR STRIP-ACNC: 0.2 EU/DL (ref 0.2–1)
WBC # BLD AUTO: 3.9 10E9/L (ref 4–11)
WBC #/AREA URNS AUTO: ABNORMAL /HPF

## 2021-05-18 PROCEDURE — 85025 COMPLETE CBC W/AUTO DIFF WBC: CPT | Performed by: INTERNAL MEDICINE

## 2021-05-18 PROCEDURE — 84156 ASSAY OF PROTEIN URINE: CPT | Performed by: INTERNAL MEDICINE

## 2021-05-18 PROCEDURE — 85652 RBC SED RATE AUTOMATED: CPT | Performed by: INTERNAL MEDICINE

## 2021-05-18 PROCEDURE — 80053 COMPREHEN METABOLIC PANEL: CPT | Performed by: INTERNAL MEDICINE

## 2021-05-18 PROCEDURE — 36415 COLL VENOUS BLD VENIPUNCTURE: CPT | Performed by: INTERNAL MEDICINE

## 2021-05-18 PROCEDURE — 86140 C-REACTIVE PROTEIN: CPT | Performed by: INTERNAL MEDICINE

## 2021-05-18 PROCEDURE — 86160 COMPLEMENT ANTIGEN: CPT | Performed by: INTERNAL MEDICINE

## 2021-05-18 PROCEDURE — 81001 URINALYSIS AUTO W/SCOPE: CPT | Performed by: INTERNAL MEDICINE

## 2021-05-19 LAB
ALBUMIN SERPL-MCNC: 4.4 G/DL (ref 3.4–5)
ALP SERPL-CCNC: 41 U/L (ref 40–150)
ALT SERPL W P-5'-P-CCNC: 25 U/L (ref 0–50)
ANION GAP SERPL CALCULATED.3IONS-SCNC: 7 MMOL/L (ref 3–14)
AST SERPL W P-5'-P-CCNC: 19 U/L (ref 0–45)
BILIRUB SERPL-MCNC: 0.7 MG/DL (ref 0.2–1.3)
BUN SERPL-MCNC: 11 MG/DL (ref 7–30)
CALCIUM SERPL-MCNC: 9.1 MG/DL (ref 8.5–10.1)
CHLORIDE SERPL-SCNC: 107 MMOL/L (ref 94–109)
CO2 SERPL-SCNC: 26 MMOL/L (ref 20–32)
CREAT SERPL-MCNC: 0.76 MG/DL (ref 0.52–1.04)
CREAT UR-MCNC: 31 MG/DL
CRP SERPL-MCNC: <2.9 MG/L (ref 0–8)
GFR SERPL CREATININE-BSD FRML MDRD: >90 ML/MIN/{1.73_M2}
GLUCOSE SERPL-MCNC: 63 MG/DL (ref 70–99)
POTASSIUM SERPL-SCNC: 3.7 MMOL/L (ref 3.4–5.3)
PROT SERPL-MCNC: 7.7 G/DL (ref 6.8–8.8)
PROT UR-MCNC: <0.05 G/L
PROT/CREAT 24H UR: NORMAL G/G CR (ref 0–0.2)
SODIUM SERPL-SCNC: 140 MMOL/L (ref 133–144)

## 2021-05-20 LAB
C3 SERPL-MCNC: 84 MG/DL (ref 81–157)
C4 SERPL-MCNC: 18 MG/DL (ref 13–39)

## 2021-05-25 ENCOUNTER — VIRTUAL VISIT (OUTPATIENT)
Dept: RHEUMATOLOGY | Facility: CLINIC | Age: 36
End: 2021-05-25
Payer: COMMERCIAL

## 2021-05-25 DIAGNOSIS — Z79.899 HIGH RISK MEDICATIONS (NOT ANTICOAGULANTS) LONG-TERM USE: ICD-10-CM

## 2021-05-25 DIAGNOSIS — M32.19 OTHER SYSTEMIC LUPUS ERYTHEMATOSUS WITH OTHER ORGAN INVOLVEMENT (H): Primary | ICD-10-CM

## 2021-05-25 DIAGNOSIS — I73.00 RAYNAUD'S PHENOMENON WITHOUT GANGRENE: ICD-10-CM

## 2021-05-25 PROCEDURE — 99214 OFFICE O/P EST MOD 30 MIN: CPT | Mod: GT | Performed by: INTERNAL MEDICINE

## 2021-05-25 NOTE — PROGRESS NOTES
Aleida is a 36 year old who is being evaluated via a billable video visit.      How would you like to obtain your AVS? MyChart  If the video visit is dropped, the invitation should be resent by: Text to cell phone: [phone]  Will anyone else be joining your video visit? No        Rheumatology Video Visit      Aleida Weinberg MRN# [id number]   YOB: 1985 Age: 36 year old      Date of visit: 5/25/21   PCP: Tatum Dove     Chief Complaint   Patient presents with:  RECHECK    Assessment and Plan     1. Systemic lupus erythematosus (CHANELL >1:[id number] speckled, RNP+, Sm+, Scl-70+, hypocomplementemia, photosensitivity, malar rash, arthritis, fatigue, Raynaud's phenomenon):  Dx'd 4/2016. Scl-70+; she lacks features of scleroderma except for raynaud's; no myopathy based on labs/symptoms. 5/11/2018 echo without evidence of pulmonary hypertension. Previously on MTX 20mg SQ weekly (GI upset with PO doses above 15mg, partially effective) and SSZ (LFT elevations).  Currently on AZA 50mg daily and HCQ 200mg daily (patient self reduced previously from 300mg daily on average based on preference of an easier regimen). TPMT normal on 8/21/2017.  Doing well at this time.  Chronic illness, stable.    - Continue hydroxychloroquine 200mg daily (last eye exam was okay on 2/22/2021 by Dr. Frias)  - Continue azathioprine 50mg daily   - Ophthalmology referral for hydroxychloroquine toxicity monitoring was given previously (she says that it is scheduled for next week at the [facility] location in [place])  - Labs in 3 months: CBC, CMP, ESR, CRP, UA, Uprotein:creatinine    High risk medication requiring intensive toxicity monitoring at least quarterly: labs ordered include CBC, Creatinine, Hepatic panel to monitor for cytopenia and hepatotoxicity; checking creatinine as it affects clearance of medication.      2. Raynaud's Phenomenon: Cold avoidance was insufficient for controlling her symptoms in the past; then  did well on amlodipine, but not using now and doing okay. Amlodipine PRN.   Chronic illness, stable.    - Amlodipine 10mg daily during colder months, as needed    3. Right 1st MTP pain: degenerative in nature; advised stiff soled shoes    4.  Low vitamin D: Currently on vitamin D 2000 units daily.    - Continue vitamin D 2000 units daily    5.  Secondary Sjogren's syndrome: Mild dry and dry mouth that are treated infrequently with artificial tears and frequent sips of water.     6.  Facial rash: Seeing dermatology at [facility].  Reportedly due to SLE and possibly rosacea.  She is trying different topical creams with her dermatologist for the rosacea.  Prefer to avoid light therapy given lupus diagnosis.    # s/p 2 doses of the Pfizer COVID19 vaccine, received 3/16/2021 and 4/6/2021    Total minutes spent in evaluation with patient, documentation, , and review of pertinent studies and chart notes: 27     Ms. Weinberg verbalized agreement with and understanding of the rational for the diagnosis and treatment plan.  All questions were answered to best of my ability and the patient's satisfaction. Ms. Weinberg was advised to contact the clinic with any questions that may arise after the clinic visit.      Thank you for involving me in the care of the patient    Return to clinic: 3-4 months      HPI   Aleida Weinberg is a 36 year old female with medical history significant for intermittent asthma, anxiety, migraines, vitamin D deficiency, and systemic lupus erythematosus who presents for follow-up of SLE.     Today, Ms. Weinberg reports that she is doing well.  No joint pain or swelling.  Morning stiffness for less than 10 minutes.  No gelling phenomenon.   strength is okay.  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  She also reports that the facial rash  could be a gluten sensitivity so she is considering a gluten free diet to see if that helps.  Not requiring amlodipine.  Raynaud's is well controlled at this time.     Denies fevers, chills, nausea, vomiting, constipation, diarrhea. No abdominal pain. No chest pain/pressure, palpitations, or shortness of breath. No oral or nasal sores. No neck pain. No LE swelling.  Has photosensitivity but no photophobia.  Mild dry eyes and dry mouth; she uses artificial tears as needed.  No eye pain or redness. Denies history of serositis. Denies history of DVT, pulmonary embolism, or miscarriage.    Tobacco: quit smoking in   EtOH: Once monthly  Drugs: None  Occupation: Works at [employer], a lot of typing per patient    ROS   12 point review of system was completed and negative except as noted in the HPI     Active Problem List     Patient Active Problem List   Diagnosis     Other kyphoscoliosis and scoliosis     Hypertrophic and atrophic condition of skin     Viral warts     CARDIOVASCULAR SCREENING; LDL GOAL LESS THAN 160     Intermittent asthma     Anxiety     Intractable menstrual migraine without status migrainosus     Vitamin D deficiency     Inflammatory polyarthropathy (H)     Chronic back pain     Raynaud's phenomenon without gangrene     Systemic lupus erythematosus (H)     High risk medications (not anticoagulants) long-term use (Plaquenil and AZA)     Dry eyes, bilateral     Past Medical History     Past Medical History:   Diagnosis Date     Arthritis      Lupus erythematosus      Mild intermittent asthma      Other kyphoscoliosis and scoliosis     upper back curvature and disc degeneration in lower back.     Past Surgical History     Past Surgical History:   Procedure Laterality Date      SECTION  2006     SURGICAL HISTORY OF -       PE Tubes     Allergy     Allergies   Allergen Reactions     Fluconazole Rash     Current Medication List     Current Outpatient Medications   Medication Sig     albuterol  (PROAIR HFA/PROVENTIL HFA/VENTOLIN HFA) 108 (90 Base) MCG/ACT inhaler Inhale 2 puffs into the lungs every 6 hours as needed for shortness of breath / dyspnea     amLODIPine (NORVASC) 10 MG tablet Take 1 tablet (10 mg) by mouth daily for raynaud's phenomenon     azaTHIOprine (IMURAN) 50 MG tablet Take 1 tablet (50 mg) by mouth daily     cyclobenzaprine (FLEXERIL) 10 MG tablet Take 1 tablet (10 mg) by mouth 3 times daily as needed for muscle spasms     hydroxychloroquine (PLAQUENIL) 200 MG tablet Take 1 tablet (200 mg) by mouth daily . Yearly eye exam including 10-2 VF and SD-OCT required     hydrOXYzine (ATARAX) 25 MG tablet Take 1 tablet (25 mg) by mouth every 6 hours as needed for anxiety     levonorgestrel (MIRENA, 52 MG,) 20 MCG/24HR IUD 1 each by Intrauterine route once     LORazepam (ATIVAN) 0.5 MG tablet Take 1 tablet (0.5 mg) by mouth every 8 hours as needed for anxiety     metroNIDAZOLE (METROGEL) 0.75 % external gel Apply topically 2 times daily     traZODone (DESYREL) 50 MG tablet Take 0.5 tablets (25 mg) by mouth nightly as needed for sleep     vitamin D3 (CHOLECALCIFEROL) 2000 units (50 mcg) tablet Take 1 tablet (2,000 Units) by mouth daily     No current facility-administered medications for this visit.          Social History   See HPI    Family History     Family History   Problem Relation Age of Onset     Heart Disease Maternal Grandfather         Bypass, Heart Disease     Respiratory Maternal Grandfather         asthma     Macular Degeneration Maternal Grandfather      C.A.D. Paternal Grandfather      Heart Disease Maternal Uncle         MI's      Hypertension Paternal Grandmother      Cancer Paternal Grandmother         lymph nodes     Cataracts Paternal Grandmother      Respiratory Other         cousin on mothers side, asthma     Alcohol/Drug Maternal Uncle      Alcohol/Drug Other         bother great grandparents on mother's side     Diabetes No family hx of      Breast Cancer No family hx of   "    Cancer - colorectal No family hx of      Denies family history of autoimmune disease    Physical Exam     Temp Readings from Last 3 Encounters:   02/09/21 97.9  F (36.6  C) (Oral)   12/10/20 98.1  F (36.7  C) (Oral)   10/20/20 98.6  F (37  C) (Tympanic)     BP Readings from Last 5 Encounters:   02/09/21 132/86   12/10/20 118/70   10/20/20 117/77   10/16/20 104/70   12/13/19 102/72     Pulse Readings from Last 1 Encounters:   02/09/21 80     Resp Readings from Last 1 Encounters:   02/09/21 20     Estimated body mass index is 23.93 kg/m  as calculated from the following:    Height as of 2/9/21: 1.702 m (5' 7\").    Weight as of 2/9/21: 69.3 kg (152 lb 12.8 oz).      GEN: NAD. Healthy appearing adult.   HEENT: MMM.  Anicteric, noninjected sclera. No obvious external lesions of the ear and nose. Hearing intact.  PULM: No increased work of breathing  MSK:  Hands and wrists without swelling.    SKIN: No rash or jaundice seen  PSYCH: Alert. Appropriate.        Labs / Imaging (select studies)     RF/CCP  Recent Labs   Lab Test 04/22/16  0902 04/01/16  0910   CCPIGG 1  --    RHF  --  <20     CHANELL  Recent Labs   Lab Test 04/22/16  0902 04/01/16  0910   VALERIE  --  12.4*   ANAIGG >1:72454  Reference range: <1:40  (Note)  Speckled pattern.  INTERPRETIVE INFORMATION: HCANELL by IFA, IgG  Anti-nuclear antibodies (CHANELL) are seen in a variety of  systemic rheumatic diseases and are determined by indirect  fluorescence assay (IFA) using HEp-2 substrate with an  IgG-specific conjugate. CHANELL titers less than or equal to  1:80 have variable relevance while titers greater than or  equal to 1:160 are considered clinically significant. These  antibodies may precede clinical disease onset; however,  healthy individuals and those with advanced age have been  reported to be positive for CHANELL. When observed, one of the  five basic patterns is reported: homogeneous,  peripheral/rim, speckled, centromere, or nucleolar. If  cytoplasmic fluorescence is " observed, it is noted. IFA  methodology is subjective and has occasionally been shown  to lack sensitivity for anti-SSA/Ro antibodies.  Negative results do not necessarily rule out the presence  of SSc. If clinical suspicion remains, consi vicki further  testing for U3-RNP, PM/Scl, or Th/To antibodies associated  with SSc.  Performed by [facility],  [address] [place] [id number] [phone]  [email or web address], Twin Rodriguez MD, Lab. Director    --      RNP/Sm/SSA/SSB  Recent Labs   Lab Test 01/12/18  0828 04/22/16  0902   RNPIGG  --  >8.0  Positive   Antibody index (AI) values reflect qualitative changes in antibody   concentration that cannot be directly associated with clinical condition or   disease state.  *   SMIGG  --  1.5*   SSAIGG  --  <0.2  Negative   Antibody index (AI) values reflect qualitative changes in antibody   concentration that cannot be directly associated with clinical condition or   disease state.     SSBIGG  --  <0.2  Negative   Antibody index (AI) values reflect qualitative changes in antibody   concentration that cannot be directly associated with clinical condition or   disease state.     SCLIGG  --  3.1*   TREPAB Negative  --      dsDNA  Recent Labs   Lab Test 02/10/21  1627 11/05/20  1628 07/23/20  1636 04/02/20  1541 07/11/19  1529 11/29/18  1616 08/23/18  1637 04/26/18  1648 01/18/18  1639   DNA 1 1 1 2 2 2 2 2 2     C3/C4  Recent Labs   Lab Test 05/18/21  1606 02/10/21  1627 11/05/20  1628 07/23/20  1636 04/02/20  1541 10/18/19  1542 07/11/19  1529 11/29/18  1616 08/23/18  1637   [id number] 84 89 89 92 90 74* 75* 82 66*   [id number] 18 17 17 18 20 14* 14* 13* 13*       Send-out Labs  Recent Labs   Lab Test 04/21/17  0813   SRESLT SEE NOTE  (Note)  Test name                    Result Flag  Units  RefIntvl  ------------------------------------------------------------  Thiopurine Methyltransferase                                23.2 L      U/mL 24.0-44.0  Sample slightly hemolyzed.  Please interpret the results  with caution.  INTERPRETIVE INFORMATION: Thiopurine Methyltransferase, RBC  Normal TPMT activity:  24.0-44.0 U/mL................Individuals are predicted to  be at low risk of bone marrow toxicity (myelosuppression)  as a consequence of standard thiopurine therapy; no dose  adjustment is recommended.  Intermediate TPMT activity:  17.0-23.9 U/mL................Individuals are predicted to  be at intermediate risk of bone marrow toxicity  (myelosuppression) as a consequence of standard thiopurine  therapy; a dose reduction and therapeutic drug management  is recommended.  Low TPMT activity:  less than 17.0 U/mL...........Individuals are predicted to  be at high risk of bone marrow toxicity (myelosuppression)   as a consequence of standard thiopurine dosing. It is  recommended to avoid the use of thiopurine drugs.  High TPMT activity:  greater than 44.0 U/mL........Individuals are not predicted  to be at risk for bone marrow toxicity (myelosuppression)  as a consequence of standard thiopurine dosing, but may be  at risk for therapeutic failure due to excessive  inactivation of thiopurine drugs. Individuals may require  higher than the normal standard dose. Therapeutic drug  management is recommended.  The TPMT, RBC assay is used as a screen to detect  individuals with low and intermediate TPMT activity who may  be at risk for myelosuppression when exposed to standard  doses of thiopurines, including azathioprine (Imuran) and  6-mercaptopurine (Purinethol). TPMT is the primary  metabolic route for inactivation of thiopurine drugs in the  bone marrow. When TPMT activity is low, it is predicted  that proportionately more 6-mercaptopurine can be converted  into the cytotoxic 6-thioguanine nucle otides that  accumulate in the bone marrow causing excessive toxicity.  The activity of TPMT is measured by the nanomoles of  6-methylmercaptopurine (inactive metabolite) produced per 1  mL of packed  red blood cells, (U/mL).    TPMT phenotype testing does not replace the need for  clinical monitoring of patients treated with thiopurine  drugs. Genotype for TPMT cannot be inferred from TPMT  activity (phenotype). Phenotype testing should not be  requested for patients currently treated with thiopurine  drugs. Current TPMT phenotype may not reflect future TPMT  phenotype, particularly in patients who received blood  transfusion within 30-60 days of testing.  TPMT enzyme  activity can be inhibited by several drugs such as:  naproxen (Aleve), ibuprofen (Advil, Motrin), ketoprofen  (Orudis), furosemide (Lasix), sulfasalazine (Azulfidine),  mesalamine (Asacol), olsalazine (Dipentum), mefenamic acid  (Ponstel), thiazide diuretics, and benzoic acid inhibitors.  TPMT inhibitors may contribute t o falsely low results;  patients should abstain from these drugs for at least 48  hours prior to TPMT testing. Falsely low results may also  occur as a result of inappropriate specimen handling and  hemolysis.  Test developed and characteristics determined by [facility]. See Compliance Statement B: [email or web address].com/CS  Performed by [facility],  [address] [place] [id number] [phone]  [email or web address], Lionel Ferreira MD, Lab. Director     STSTNM Thiopurine Methyltransferase, RBC   STSTCD 92,066   SSPTYP Whole blood, EDTA anticoagulant     Antiphospholipid Antibodies  Recent Labs   Lab Test 04/22/16  0902   B2GPG 0.9   B2GPM <0.9  Negative     CARG <15.0  Interpretation:  Negative     JOANN <12.5  Interpretation:  Negative     LUPINT Negative  (Note)  COMMENTS:  The INR is normal.  APTT ratio is normal.  DRVVT Screen ratio is normal.  Thrombin time is normal.  NEGATIVE TEST; A LUPUS ANTICOAGULANT WAS NOT DETECTED IN THIS  SPECIMEN WITHIN THE LIMITS OF THE TESTING REPERTOIRE.  If the clinical picture is strongly suggestive of an antiphospholipid  syndrome, recommend anticardiolipin and beta-2-glycoprotein (IgG  and  IgM) antibody tests.  Isabella Olson M.D.  [phone]  4/25/2016    INR =  1.05    Reference range: 0.86-1.14  Thrombin Time= 15.4    Reference range: 13.0-19.0 sec    APTT:       Ratio  Patient  =  0.92  1:2 Mix  =  N/A  Reference:  Negative: Less than or equal to 1.16  Positive: Greater than or equal to 1.17     DILUTE LISA VIPER VENOM TEST:  Screen Ratio = 0.95   Normal is less than 1.21         CBC  Recent Labs   Lab Test 05/18/21  1606 02/10/21  1627 11/05/20  1628   WBC 3.9* 4.2 3.3*   RBC 4.26 4.33 3.95   HGB 14.2 14.2 13.1   HCT 40.3 40.8 37.2   MCV 95 94 94   RDW 11.7 11.8 11.4    212 202   MCH 33.3* 32.8 33.2*   MCHC 35.2 34.8 35.2   NEUTROPHIL 64.3 67.8 60.7   LYMPH 20.2 18.0 23.1   MONOCYTE 14.0 13.0 14.4   EOSINOPHIL 1.0 0.7 1.2   BASOPHIL 0.5 0.5 0.6   ANEU 2.5 2.9 2.0   ALYM 0.8 0.8 0.8   AMANDA 0.6 0.6 0.5   AEOS 0.0 0.0 0.0   ABAS 0.0 0.0 0.0     CMP  Recent Labs   Lab Test 05/18/21  1606 02/10/21  1627 01/21/21  0716 11/05/20  1628 07/23/20  1636 04/02/20  1541    136  --  138 138 136   POTASSIUM 3.7 4.0  --  3.8 4.1 3.9   CHLORIDE 107 106  --  107 106 105   CO2 26 24  --  25 26 26   ANIONGAP 7 6  --  6 6 5   GLC 63* 75 80 92 79 83   BUN 11 10  --  13 11 8   CR 0.76 0.74  --  0.68 0.68 0.58   GFRESTIMATED >90 >90  --  >90 >90 >90   GFRESTBLACK >90 >90  --  >90 >90 >90   SRINIVAS 9.1 9.8  --  8.8 9.2 9.0   BILITOTAL 0.7 0.5  --  0.4 0.4 0.5   ALBUMIN 4.4 4.5  --  4.1 4.6 4.3   PROTTOTAL 7.7 7.6  --  7.0 8.0 7.8   ALKPHOS 41 38*  --  39* 44 47   AST 19 15  --  17 20 18   ALT 25 23  --  23 29 30     Calcium/VitaminD  Recent Labs   Lab Test 05/18/21  1606 02/10/21  1627 11/05/20  1628 07/23/20  1636 02/20/17  1640 02/20/17  1640 10/26/16  0919 10/26/16  0919   SRINIVAS 9.1 9.8 8.8 9.2   < >  --    < > 9.3   VITDT  --   --   --  30  --  33  --  23    < > = values in this interval not displayed.     ESR/CRP  Recent Labs   Lab Test 05/18/21  1606 02/10/21  1627 11/05/20  1628   SED 8 8 9    CRP <2.9 <2.9 <2.9     CK/Aldolase  Recent Labs   Lab Test 02/10/21  1627 11/05/20  1628 07/23/20  1636 04/22/16  0902 04/22/16  0902   CKT 60 64 58   < > 45   ALDOLASE  --   --   --   --  4.5    < > = values in this interval not displayed.     TSH/T4  Recent Labs   Lab Test 04/01/16  0910   TSH 1.57     Lipid Panel  Recent Labs   Lab Test 01/21/21  0716 01/17/20  0841 02/08/19  0713 07/10/15  0814 07/10/15  0814 09/13/13  0706   CHOL 155 144 140   < > 149 139   TRIG 38 42 52   < > 45 57   HDL 56 50 44*   < > 48* 52   LDL 91 86 86   < > 92 76   VLDL  --   --   --   --  9 11   CHOLHDLRATIO  --   --   --   --  3.1 2.7   NHDL 99 94 96   < >  --   --     < > = values in this interval not displayed.     Hepatitis B  Recent Labs   Lab Test 04/22/16  0902   HBCAB Nonreactive   HEPBANG Nonreactive     Hepatitis C  Recent Labs   Lab Test 10/26/18  0836 01/12/18  0828 04/22/16  0902   HCVAB Nonreactive Nonreactive Nonreactive   Assay performance characteristics have not been established for newborns,   infants, and children       Lyme ab screening  Recent Labs   Lab Test 04/01/16  0910   LYMEGM 0.12     HIV Screening  Recent Labs   Lab Test 10/26/18  0836 01/12/18  0828 04/22/16  0902   HIAGAB Nonreactive Nonreactive Nonreactive   HIV-1 p24 Ag & HIV-1/HIV-2 Ab Not Detected       UA  Recent Labs   Lab Test 05/18/21  1617 02/10/21  1623 11/05/20  1638 04/26/18  1648 04/26/18  1648 01/18/18  1640 01/12/18  0836 12/28/17  0145 01/20/17  0827 01/20/17  0827   COLOR Yellow Yellow Yellow   < > Yellow Yellow Yellow Yellow   < > Yellow   APPEARANCE Clear Clear Clear   < > Clear Clear Clear Clear   < > Clear   URINEGLC Negative Negative Negative   < > Negative Negative Negative Negative   < > Negative   URINEBILI Negative Negative Negative   < > Negative Negative Negative Moderate*   < > Negative   SG 1.010 1.015 <=1.005   < > 1.010 1.020 1.025 >1.030   < > >1.030   URINEPH 6.0 7.0 6.5   < > 7.0 7.0 6.5 6.0   < > 6.0   PROTEIN  Negative Negative Negative   < > Negative Negative Trace* 100*   < > Trace*   UROBILINOGEN 0.2 0.2 0.2   < > 0.2 0.2 0.2 4.0*   < > 0.2   NITRITE Negative Negative Negative   < > Negative Negative Negative Positive*   < > Negative   UBLD Trace* Negative Negative   < > Negative Negative Negative Negative   < > Negative   LEUKEST Negative Negative Negative   < > Negative Negative Negative Negative   < > Negative   WBCU 0 - 5  --   --   --  0 - 5 O - 2 O - 2 O - 2   < > O - 2   RBCU O - 2  --   --   --  O - 2 O - 2 O - 2 O - 2   < > O - 2   SQUAMOUSEPI Few  --   --   --   --  Few Moderate* Moderate*   < > Few   BACTERIA Rare*  --   --   --   --   --  Moderate* Moderate*   < > Few*   MUCOUS  --   --   --   --   --   --  Present* Present*  --  Present*    < > = values in this interval not displayed.     Urine Microscopic  Recent Labs   Lab Test 05/18/21  1617 04/26/18  1648 01/18/18  1640 01/12/18  0836 12/28/17  0145 01/20/17  0827 01/20/17  0827   WBCU 0 - 5 0 - 5 O - 2 O - 2 O - 2   < > O - 2   RBCU O - 2 O - 2 O - 2 O - 2 O - 2   < > O - 2   SQUAMOUSEPI Few  --  Few Moderate* Moderate*   < > Few   BACTERIA Rare*  --   --  Moderate* Moderate*   < > Few*   MUCOUS  --   --   --  Present* Present*  --  Present*    < > = values in this interval not displayed.     Urine Protein  Recent Labs   Lab Test 05/18/21  1617 02/10/21  1623 11/05/20  1638 07/23/20  1655   UTP <0.05 <0.05 <0.05 <0.05   UTPG Unable to calculate due to low value Unable to calculate due to low value Unable to calculate due to low value Unable to calculate due to low value   UCRR 31 52  --  11       Immunization History     Immunization History   Administered Date(s) Administered     COVID-19,PF,Pfizer 03/16/2021, 04/06/2021     HPV 02/25/2010, 02/04/2011     HepB 06/13/1999, 08/20/1999, 02/05/2000     Historical DTP/aP 1985, 1985, 1985, 01/15/1988, 06/15/1991     Influenza (IIV3) PF 11/07/2011     Influenza Vaccine IM > 6 months Valent  IIV4 10/11/2016, 10/13/2017, 10/26/2018, 10/17/2019, 10/16/2020     MMR 05/15/1987, 09/15/1991     Mantoux Tuberculin Skin Test 07/15/1987, 01/19/2005     Meningococcal (Menomune ) 08/09/2004     OPV, trivalent, live 1986, 1986, 1986, 01/15/1988, 09/15/1991     Pneumo Conj 13-V (2010&after) 05/04/2018     Pneumococcal 23 valent 08/30/2018     TDAP Vaccine (Adacel) 01/21/2009, 02/25/2010, 08/17/2020     Zoster vaccine recombinant adjuvanted (SHINGRIX) 12/13/2018, 03/04/2019          Chart documentation done in part with Dragon Voice recognition Software. Although reviewed after completion, some word and grammatical error may remain.      Video-Visit Details    Type of service:  Video Visit    Video Start Time: 8:30 AM    Video End Time:8:47 AM    Originating Location (pt. Location): [place], [place]    Distant Location (provider location):  Home    Platform used for Video Visit: Bahman Light MD

## 2021-07-27 DIAGNOSIS — M32.19 OTHER SYSTEMIC LUPUS ERYTHEMATOSUS WITH OTHER ORGAN INVOLVEMENT (H): ICD-10-CM

## 2021-07-27 DIAGNOSIS — E55.9 VITAMIN D DEFICIENCY: ICD-10-CM

## 2021-07-27 RX ORDER — CHOLECALCIFEROL (VITAMIN D3) 50 MCG
50 TABLET ORAL DAILY
Qty: 100 TABLET | Refills: 3 | Status: SHIPPED | OUTPATIENT
Start: 2021-07-27 | End: 2022-04-13

## 2021-07-27 NOTE — TELEPHONE ENCOUNTER
Prescription approved per [facility] Refill Protocol.  Clare Vyas RN, BSN [facility]

## 2021-08-30 ENCOUNTER — IMMUNIZATION (OUTPATIENT)
Dept: NURSING | Facility: CLINIC | Age: 36
End: 2021-08-30
Payer: COMMERCIAL

## 2021-08-30 PROCEDURE — 0002A PR COVID VAC PFIZER DIL RECON 30 MCG/0.3 ML IM: CPT

## 2021-08-30 PROCEDURE — 0003A PR COVID VAC PFIZER DIL RECON 30 MCG/0.3 ML IM: CPT

## 2021-08-30 PROCEDURE — 91300 PR COVID VAC PFIZER DIL RECON 30 MCG/0.3 ML IM: CPT

## 2021-09-01 ENCOUNTER — LAB (OUTPATIENT)
Dept: LAB | Facility: CLINIC | Age: 36
End: 2021-09-01
Payer: COMMERCIAL

## 2021-09-01 DIAGNOSIS — M32.19 OTHER SYSTEMIC LUPUS ERYTHEMATOSUS WITH OTHER ORGAN INVOLVEMENT (H): ICD-10-CM

## 2021-09-01 DIAGNOSIS — Z79.899 HIGH RISK MEDICATIONS (NOT ANTICOAGULANTS) LONG-TERM USE: ICD-10-CM

## 2021-09-01 LAB
ALBUMIN UR-MCNC: NEGATIVE MG/DL
APPEARANCE UR: CLEAR
BASOPHILS # BLD AUTO: 0 10E3/UL (ref 0–0.2)
BASOPHILS NFR BLD AUTO: 0 %
BILIRUB UR QL STRIP: NEGATIVE
COLOR UR AUTO: YELLOW
EOSINOPHIL # BLD AUTO: 0 10E3/UL (ref 0–0.7)
EOSINOPHIL NFR BLD AUTO: 1 %
ERYTHROCYTE [DISTWIDTH] IN BLOOD BY AUTOMATED COUNT: 11.7 % (ref 10–15)
ERYTHROCYTE [SEDIMENTATION RATE] IN BLOOD BY WESTERGREN METHOD: 9 MM/HR (ref 0–20)
GLUCOSE UR STRIP-MCNC: NEGATIVE MG/DL
HCT VFR BLD AUTO: 40.9 % (ref 35–47)
HGB BLD-MCNC: 14.5 G/DL (ref 11.7–15.7)
HGB UR QL STRIP: NEGATIVE
KETONES UR STRIP-MCNC: NEGATIVE MG/DL
LEUKOCYTE ESTERASE UR QL STRIP: NEGATIVE
LYMPHOCYTES # BLD AUTO: 0.5 10E3/UL (ref 0.8–5.3)
LYMPHOCYTES NFR BLD AUTO: 12 %
MCH RBC QN AUTO: 33.4 PG (ref 26.5–33)
MCHC RBC AUTO-ENTMCNC: 35.5 G/DL (ref 31.5–36.5)
MCV RBC AUTO: 94 FL (ref 78–100)
MONOCYTES # BLD AUTO: 0.6 10E3/UL (ref 0–1.3)
MONOCYTES NFR BLD AUTO: 14 %
NEUTROPHILS # BLD AUTO: 3.2 10E3/UL (ref 1.6–8.3)
NEUTROPHILS NFR BLD AUTO: 73 %
NITRATE UR QL: NEGATIVE
PH UR STRIP: 6 [PH] (ref 5–7)
PLATELET # BLD AUTO: 224 10E3/UL (ref 150–450)
RBC # BLD AUTO: 4.34 10E6/UL (ref 3.8–5.2)
SP GR UR STRIP: >=1.03 (ref 1–1.03)
UROBILINOGEN UR STRIP-ACNC: 0.2 E.U./DL
WBC # BLD AUTO: 4.4 10E3/UL (ref 4–11)

## 2021-09-01 PROCEDURE — 81003 URINALYSIS AUTO W/O SCOPE: CPT

## 2021-09-01 PROCEDURE — 80053 COMPREHEN METABOLIC PANEL: CPT

## 2021-09-01 PROCEDURE — 84156 ASSAY OF PROTEIN URINE: CPT

## 2021-09-01 PROCEDURE — 85652 RBC SED RATE AUTOMATED: CPT

## 2021-09-01 PROCEDURE — 86140 C-REACTIVE PROTEIN: CPT

## 2021-09-01 PROCEDURE — 85025 COMPLETE CBC W/AUTO DIFF WBC: CPT

## 2021-09-01 PROCEDURE — 36415 COLL VENOUS BLD VENIPUNCTURE: CPT

## 2021-09-02 LAB
CREAT UR-MCNC: 23 MG/DL
CRP SERPL-MCNC: 5.6 MG/L (ref 0–8)
PROT UR-MCNC: <0.05 G/L
PROT/CREAT 24H UR: NORMAL MG/G{CREAT}

## 2021-09-03 LAB
ALBUMIN SERPL-MCNC: 4.3 G/DL (ref 3.4–5)
ALP SERPL-CCNC: 43 U/L (ref 40–150)
ALT SERPL W P-5'-P-CCNC: 21 U/L (ref 0–50)
ANION GAP SERPL CALCULATED.3IONS-SCNC: 5 MMOL/L (ref 3–14)
AST SERPL W P-5'-P-CCNC: 16 U/L (ref 0–45)
BILIRUB SERPL-MCNC: 0.5 MG/DL (ref 0.2–1.3)
BUN SERPL-MCNC: 9 MG/DL (ref 7–30)
CALCIUM SERPL-MCNC: 9.2 MG/DL (ref 8.5–10.1)
CHLORIDE BLD-SCNC: 103 MMOL/L (ref 94–109)
CO2 SERPL-SCNC: 28 MMOL/L (ref 20–32)
CREAT SERPL-MCNC: 0.7 MG/DL (ref 0.52–1.04)
GFR SERPL CREATININE-BSD FRML MDRD: >90 ML/MIN/1.73M2
GLUCOSE BLD-MCNC: 76 MG/DL (ref 70–99)
POTASSIUM BLD-SCNC: 3.8 MMOL/L (ref 3.4–5.3)
PROT SERPL-MCNC: 7.6 G/DL (ref 6.8–8.8)
SODIUM SERPL-SCNC: 136 MMOL/L (ref 133–144)

## 2021-09-08 ENCOUNTER — OFFICE VISIT (OUTPATIENT)
Dept: RHEUMATOLOGY | Facility: CLINIC | Age: 36
End: 2021-09-08
Payer: COMMERCIAL

## 2021-09-08 VITALS
BODY MASS INDEX: 23.61 KG/M2 | HEIGHT: 67 IN | WEIGHT: 150.4 LBS | OXYGEN SATURATION: 95 % | HEART RATE: 77 BPM | DIASTOLIC BLOOD PRESSURE: 78 MMHG | SYSTOLIC BLOOD PRESSURE: 115 MMHG

## 2021-09-08 DIAGNOSIS — Z79.899 HIGH RISK MEDICATIONS (NOT ANTICOAGULANTS) LONG-TERM USE: ICD-10-CM

## 2021-09-08 DIAGNOSIS — M32.19 OTHER SYSTEMIC LUPUS ERYTHEMATOSUS WITH OTHER ORGAN INVOLVEMENT (H): Primary | ICD-10-CM

## 2021-09-08 DIAGNOSIS — I73.00 RAYNAUD'S PHENOMENON WITHOUT GANGRENE: ICD-10-CM

## 2021-09-08 PROCEDURE — 99214 OFFICE O/P EST MOD 30 MIN: CPT | Performed by: INTERNAL MEDICINE

## 2021-09-08 RX ORDER — HYDROXYCHLOROQUINE SULFATE 200 MG/1
200 TABLET, FILM COATED ORAL DAILY
Qty: 90 TABLET | Refills: 2 | Status: SHIPPED | OUTPATIENT
Start: 2021-09-08 | End: 2022-04-13

## 2021-09-08 RX ORDER — AZATHIOPRINE 50 MG/1
50 TABLET ORAL DAILY
Qty: 90 TABLET | Refills: 2 | Status: SHIPPED | OUTPATIENT
Start: 2021-09-08 | End: 2022-04-13

## 2021-09-08 RX ORDER — AMLODIPINE BESYLATE 10 MG/1
10 TABLET ORAL DAILY
Qty: 30 TABLET | Refills: 4 | Status: CANCELLED | OUTPATIENT
Start: 2021-09-08

## 2021-09-08 ASSESSMENT — MIFFLIN-ST. JEOR: SCORE: 1409.84

## 2021-09-08 NOTE — PATIENT INSTRUCTIONS
RHEUMATOLOGY    Dr. Lavon Light    [address] [place] [address]  Phone number: [phone]  Fax number: [phone]      Thank you for choosing [facility]!    Carissa Frye CMA Rheumatology

## 2021-09-08 NOTE — PROGRESS NOTES
Rheumatology Clinic Visit      Aleida Weinberg MRN# [id number]   YOB: 1985 Age: 36 year old      Date of visit: 9/08/21   PCP: Sayra Chirinos     Chief Complaint   Patient presents with:  Systemic Lupus Erythematous    Assessment and Plan     1. Systemic lupus erythematosus (CHANELL >1:92829 speckled, RNP+, Sm+, Scl-70+, hypocomplementemia, photosensitivity, malar rash, arthritis, fatigue, Raynaud's phenomenon):  Dx'd 4/2016. Scl-70+; she lacks features of scleroderma except for raynaud's; no myopathy based on labs/symptoms. 5/11/2018 echo without evidence of pulmonary hypertension. Previously on MTX 20mg SQ weekly (GI upset with PO doses above 15mg, partially effective) and SSZ (LFT elevations).  Currently on AZA 50mg daily and HCQ 200mg daily (patient self reduced previously from 300mg daily on average based on preference of an easier regimen and continued to do well). TPMT normal on 8/21/2017.  Doing well at this time.  Chronic illness, stable.    - Continue hydroxychloroquine 200mg daily (last eye exam was okay on 2/22/2021 by Dr. Frias)  - Continue azathioprine 50mg daily   - Labs in 3 months: CBC, CMP, ESR, CRP, CK, C3, C4, dsDNA, UA, Uprotein:creatinine    High risk medication requiring intensive toxicity monitoring at least quarterly: labs ordered include CBC, Creatinine, Hepatic panel to monitor for cytopenia and hepatotoxicity; checking creatinine as it affects clearance of medication.       2. Raynaud's Phenomenon: Cold avoidance was insufficient for controlling her symptoms in the past; then did well on amlodipine, but not using now and doing okay. Amlodipine PRN.   Chronic illness, stable.    - Amlodipine 10mg daily during colder months, as needed    3.  Low vitamin D: Currently on vitamin D 2000 units daily.    - Continue vitamin D 2000 units daily    4.  Secondary Sjogren's syndrome: Mild dry and dry mouth that are treated infrequently with artificial tears and frequent sips of  water.  Continues to follow with a dentist every 6 months    5.  Facial rash: Seeing dermatology at [facility].  Reportedly due to SLE and rosacea from derm perspective, and improving with topical creams from her dermatologist for rosacea.    6.  Vaccinations:   - Influenza: encouraged yearly vaccination  - [id number]: up to date  - [id number]: up to date  - Shingrix: Up to date  - COVID-19: has received the Pfizer COVID-19 vaccine on 3/16/2021, 4/6/2021, and 8/30/2021    Total minutes spent in evaluation with patient, documentation, , and review of pertinent studies and chart notes: 18    Ms. Weinberg verbalized agreement with and understanding of the rational for the diagnosis and treatment plan.  All questions were answered to best of my ability and the patient's satisfaction. Ms. Weinberg was advised to contact the clinic with any questions that may arise after the clinic visit.      Thank you for involving me in the care of the patient    Return to clinic: 3-4 months      HPI   Aleida Digna Weinberg is a 36 year old female with medical history significant for intermittent asthma, anxiety, migraines, vitamin D deficiency, and systemic lupus erythematosus who presents for follow-up of SLE.     Today, 9/8/2021: Doing well.  Occasionally with some collarbone pain at the end of the day that she associates with more fatigue, and improves with rest; no swelling or increased warmth over the collarbone.  Otherwise doing well without other joint pains.  Facial rash is being treated by her dermatologist at [facility] and has improved with topical treatments, but she is not always able to get these topical treatments because her insurance does not always cover them.    Denies fevers, chills, nausea, vomiting, constipation, diarrhea. No abdominal pain. No chest pain/pressure, palpitations, or shortness of breath. No oral or nasal sores. No neck pain. No LE swelling.  Has photosensitivity but no photophobia.  Mild dry eyes  and dry mouth; she uses artificial tears as needed.  No eye pain or redness. Denies history of serositis. Denies history of DVT, pulmonary embolism, or miscarriage.    Tobacco: quit smoking in   EtOH: Once monthly  Drugs: None  Occupation: Works at [employer], a lot of typing per patient    ROS   12 point review of system was completed and negative except as noted in the HPI     Active Problem List     Patient Active Problem List   Diagnosis     Other kyphoscoliosis and scoliosis     Hypertrophic and atrophic condition of skin     Viral warts     CARDIOVASCULAR SCREENING; LDL GOAL LESS THAN 160     Intermittent asthma     Anxiety     Intractable menstrual migraine without status migrainosus     Vitamin D deficiency     Inflammatory polyarthropathy (H)     Chronic back pain     Raynaud's phenomenon without gangrene     Systemic lupus erythematosus (H)     High risk medications (not anticoagulants) long-term use (Plaquenil and AZA)     Dry eyes, bilateral     Past Medical History     Past Medical History:   Diagnosis Date     Arthritis      Lupus erythematosus      Mild intermittent asthma      Other kyphoscoliosis and scoliosis     upper back curvature and disc degeneration in lower back.     Past Surgical History     Past Surgical History:   Procedure Laterality Date      SECTION       SURGICAL HISTORY OF -       PE Tubes     Allergy     Allergies   Allergen Reactions     Fluconazole Rash     Current Medication List     Current Outpatient Medications   Medication Sig     albuterol (PROAIR HFA/PROVENTIL HFA/VENTOLIN HFA) 108 (90 Base) MCG/ACT inhaler Inhale 2 puffs into the lungs every 6 hours as needed for shortness of breath / dyspnea     amLODIPine (NORVASC) 10 MG tablet Take 1 tablet (10 mg) by mouth daily for raynaud's phenomenon     azaTHIOprine (IMURAN) 50 MG tablet Take 1 tablet (50 mg) by mouth daily     cyclobenzaprine (FLEXERIL) 10 MG tablet Take 1 tablet (10 mg) by mouth 3 times daily as  needed for muscle spasms     hydroxychloroquine (PLAQUENIL) 200 MG tablet Take 1 tablet (200 mg) by mouth daily . Yearly eye exam including 10-2 VF and SD-OCT required     hydrOXYzine (ATARAX) 25 MG tablet Take 1 tablet (25 mg) by mouth every 6 hours as needed for anxiety     levonorgestrel (MIRENA, 52 MG,) 20 MCG/24HR IUD 1 each by Intrauterine route once     LORazepam (ATIVAN) 0.5 MG tablet Take 1 tablet (0.5 mg) by mouth every 8 hours as needed for anxiety     metroNIDAZOLE (METROGEL) 0.75 % external gel Apply topically 2 times daily     traZODone (DESYREL) 50 MG tablet Take 0.5 tablets (25 mg) by mouth nightly as needed for sleep     vitamin D3 (CHOLECALCIFEROL) 50 mcg (2000 units) tablet Take 1 tablet (50 mcg) by mouth daily     No current facility-administered medications for this visit.         Social History   See HPI    Family History     Family History   Problem Relation Age of Onset     Heart Disease Maternal Grandfather         Bypass, Heart Disease     Respiratory Maternal Grandfather         asthma     Macular Degeneration Maternal Grandfather      C.A.D. Paternal Grandfather      Heart Disease Maternal Uncle         MI's      Hypertension Paternal Grandmother      Cancer Paternal Grandmother         lymph nodes     Cataracts Paternal Grandmother      Respiratory Other         cousin on mothers side, asthma     Alcohol/Drug Maternal Uncle      Alcohol/Drug Other         bother great grandparents on mother's side     Diabetes No family hx of      Breast Cancer No family hx of      Cancer - colorectal No family hx of      Denies family history of autoimmune disease    Physical Exam     Temp Readings from Last 3 Encounters:   02/09/21 97.9  F (36.6  C) (Oral)   12/10/20 98.1  F (36.7  C) (Oral)   10/20/20 98.6  F (37  C) (Tympanic)     BP Readings from Last 5 Encounters:   09/08/21 115/78   02/09/21 132/86   12/10/20 118/70   10/20/20 117/77   10/16/20 104/70     Pulse Readings from Last 1 Encounters:  "  09/08/21 77     Resp Readings from Last 1 Encounters:   02/09/21 20     Estimated body mass index is 23.56 kg/m  as calculated from the following:    Height as of this encounter: 1.702 m (5' 7\").    Weight as of this encounter: 68.2 kg (150 lb 6.4 oz).    GEN: NAD. Healthy appearing adult.   HEENT:  Anicteric, noninjected sclera. No obvious external lesions of the ear and nose. Hearing intact.  CV: S1, S2. RRR. No m/r/g  PULM: No increased work of breathing. CTA bilaterally   MSK: MCPs, PIPs, DIPs without swelling or tenderness to palpation.  Wrists without swelling or tenderness to palpation.  Elbows and shoulders without swelling or tenderness to palpation.   Knees, ankles, and MTPs without swelling or tenderness to palpation.     SKIN: No rash or jaundice seen  PSYCH: Alert. Appropriate.      Labs / Imaging (select studies)     RF/CCP  Recent Labs   Lab Test 04/22/16  0902 04/01/16  0910   CCPIGG 1  --    RHF  --  <20     CHANELL  Recent Labs   Lab Test 04/22/16  0902 04/01/16  0910   VALERIE  --  12.4*   ANAIGG >1:68345  Reference range: <1:40  (Note)  Speckled pattern.  INTERPRETIVE INFORMATION: CHANELL by IFA, IgG  Anti-nuclear antibodies (CHANELL) are seen in a variety of  systemic rheumatic diseases and are determined by indirect  fluorescence assay (IFA) using HEp-2 substrate with an  IgG-specific conjugate. CHANELL titers less than or equal to  1:80 have variable relevance while titers greater than or  equal to 1:160 are considered clinically significant. These  antibodies may precede clinical disease onset; however,  healthy individuals and those with advanced age have been  reported to be positive for CHANELL. When observed, one of the  five basic patterns is reported: homogeneous,  peripheral/rim, speckled, centromere, or nucleolar. If  cytoplasmic fluorescence is observed, it is noted. IFA  methodology is subjective and has occasionally been shown  to lack sensitivity for anti-SSA/Ro antibodies.  Negative results do not " necessarily rule out the presence  of SSc. If clinical suspicion remains, consi vicki further  testing for U3-RNP, PM/Scl, or Th/To antibodies associated  with SSc.  Performed by [employer],  [facility] [place] [address] [phone]  [email or web address] MD, Lab. Director    --      RNP/Sm/SSA/SSB  Recent Labs   Lab Test 01/12/18  0828 04/22/16  0902   RNPIGG  --  >8.0  Positive   Antibody index (AI) values reflect qualitative changes in antibody   concentration that cannot be directly associated with clinical condition or   disease state.  *   SMIGG  --  1.5*   SSAIGG  --  <0.2  Negative   Antibody index (AI) values reflect qualitative changes in antibody   concentration that cannot be directly associated with clinical condition or   disease state.     SSBIGG  --  <0.2  Negative   Antibody index (AI) values reflect qualitative changes in antibody   concentration that cannot be directly associated with clinical condition or   disease state.     SCLIGG  --  3.1*   TREPAB Negative  --      dsDNA  Recent Labs   Lab Test 02/10/21  1627 11/05/20  1628 07/23/20  1636 04/02/20  1541 07/11/19  1529 11/29/18  1616 08/23/18  1637 04/26/18  1648 01/18/18  1639   DNA 1 1 1 2 2 2 2 2 2     C3/C4  Recent Labs   Lab Test 05/18/21  1606 02/10/21  1627 11/05/20  1628 07/23/20  1636 04/02/20  1541 10/18/19  1542 07/11/19  1529 11/29/18  1616 08/23/18  1637   B7VQPMG 84 89 89 92 90 74* 75* 82 66*   T2SYOFX 18 17 17 18 20 14* 14* 13* 13*     RNA Polymerase III Antibody  No results for input(s): RNAPG in the last 88091 hours.  Histone Antibody  No results for input(s): HSTO in the last 92737 hours.  Send-out Labs  Recent Labs   Lab Test 04/21/17  0813   SRESLT SEE NOTE  (Note)  Test name                    Result Flag  Units  RefIntvl  ------------------------------------------------------------  Thiopurine Methyltransferase                                23.2 L      U/mL 24.0-44.0  Sample slightly hemolyzed. Please  interpret the results  with caution.  INTERPRETIVE INFORMATION: Thiopurine Methyltransferase, RBC  Normal TPMT activity:  24.0-44.0 U/mL................Individuals are predicted to  be at low risk of bone marrow toxicity (myelosuppression)  as a consequence of standard thiopurine therapy; no dose  adjustment is recommended.  Intermediate TPMT activity:  17.0-23.9 U/mL................Individuals are predicted to  be at intermediate risk of bone marrow toxicity  (myelosuppression) as a consequence of standard thiopurine  therapy; a dose reduction and therapeutic drug management  is recommended.  Low TPMT activity:  less than 17.0 U/mL...........Individuals are predicted to  be at high risk of bone marrow toxicity (myelosuppression)   as a consequence of standard thiopurine dosing. It is  recommended to avoid the use of thiopurine drugs.  High TPMT activity:  greater than 44.0 U/mL........Individuals are not predicted  to be at risk for bone marrow toxicity (myelosuppression)  as a consequence of standard thiopurine dosing, but may be  at risk for therapeutic failure due to excessive  inactivation of thiopurine drugs. Individuals may require  higher than the normal standard dose. Therapeutic drug  management is recommended.  The TPMT, RBC assay is used as a screen to detect  individuals with low and intermediate TPMT activity who may  be at risk for myelosuppression when exposed to standard  doses of thiopurines, including azathioprine (Imuran) and  6-mercaptopurine (Purinethol). TPMT is the primary  metabolic route for inactivation of thiopurine drugs in the  bone marrow. When TPMT activity is low, it is predicted  that proportionately more 6-mercaptopurine can be converted  into the cytotoxic 6-thioguanine nucle otides that  accumulate in the bone marrow causing excessive toxicity.  The activity of TPMT is measured by the nanomoles of  6-methylmercaptopurine (inactive metabolite) produced per 1  mL of packed red  blood cells, (U/mL).    TPMT phenotype testing does not replace the need for  clinical monitoring of patients treated with thiopurine  drugs. Genotype for TPMT cannot be inferred from TPMT  activity (phenotype). Phenotype testing should not be  requested for patients currently treated with thiopurine  drugs. Current TPMT phenotype may not reflect future TPMT  phenotype, particularly in patients who received blood  transfusion within 30-60 days of testing.  TPMT enzyme  activity can be inhibited by several drugs such as:  naproxen (Aleve), ibuprofen (Advil, Motrin), ketoprofen  (Orudis), furosemide (Lasix), sulfasalazine (Azulfidine),  mesalamine (Asacol), olsalazine (Dipentum), mefenamic acid  (Ponstel), thiazide diuretics, and benzoic acid inhibitors.  TPMT inhibitors may contribute t o falsely low results;  patients should abstain from these drugs for at least 48  hours prior to TPMT testing. Falsely low results may also  occur as a result of inappropriate specimen handling and  hemolysis.  Test developed and characteristics determined by [employer]. See Compliance Statement B: [email or web address].[email or web address]  Performed by [employer],  [address] [place] [address] [phone]  [email or web address], Lionel Ferreira MD, Lab. Director     STSTNM Thiopurine Methyltransferase, RBC   STSTCD 92,066   SSPTYP Whole blood, EDTA anticoagulant     Antiphospholipid Antibodies  Recent Labs   Lab Test 04/22/16  0902   B2GPG 0.9   B2GPM <0.9  Negative     CARG <15.0  Interpretation:  Negative     JOANN <12.5  Interpretation:  Negative     LUPINT Negative  (Note)  COMMENTS:  The INR is normal.  APTT ratio is normal.  DRVVT Screen ratio is normal.  Thrombin time is normal.  NEGATIVE TEST; A LUPUS ANTICOAGULANT WAS NOT DETECTED IN THIS  SPECIMEN WITHIN THE LIMITS OF THE TESTING REPERTOIRE.  If the clinical picture is strongly suggestive of an antiphospholipid  syndrome, recommend anticardiolipin and beta-2-glycoprotein (IgG and  IgM)  antibody tests.  Isabella Olson M.D.  [phone]  4/25/2016    INR =  1.05    Reference range: 0.86-1.14  Thrombin Time= 15.4    Reference range: 13.0-19.0 sec    APTT:       Ratio  Patient  =  0.92  1:2 Mix  =  N/A  Reference:  Negative: Less than or equal to 1.16  Positive: Greater than or equal to 1.17     DILUTE LISA VIPER VENOM TEST:  Screen Ratio = 0.95   Normal is less than 1.21         CBC  Recent Labs   Lab Test 09/01/21 1625 05/18/21  1606 02/10/21  1627 11/05/20  1628   WBC 4.4 3.9* 4.2 3.3*   RBC 4.34 4.26 4.33 3.95   HGB 14.5 14.2 14.2 13.1   HCT 40.9 40.3 40.8 37.2   MCV 94 95 94 94   RDW 11.7 11.7 11.8 11.4    225 212 202   MCH 33.4* 33.3* 32.8 33.2*   MCHC 35.5 35.2 34.8 35.2   NEUTROPHIL 73 64.3 67.8 60.7   LYMPH 12 20.2 18.0 23.1   MONOCYTE 14 14.0 13.0 14.4   EOSINOPHIL 1 1.0 0.7 1.2   BASOPHIL 0 0.5 0.5 0.6   ANEU  --  2.5 2.9 2.0   ALYM  --  0.8 0.8 0.8   AMANDA  --  0.6 0.6 0.5   AEOS  --  0.0 0.0 0.0   ABAS  --  0.0 0.0 0.0   ANEUTAUTO 3.2  --   --   --    ALYMPAUTO 0.5*  --   --   --    AMONOAUTO 0.6  --   --   --    AEOSAUTO 0.0  --   --   --    ABSBASO 0.0  --   --   --      CMP  Recent Labs   Lab Test 09/01/21 1625 05/18/21  1606 02/10/21  1627 01/21/21  0716 11/05/20 1628 07/23/20  1636    140 136  --  138 138   POTASSIUM 3.8 3.7 4.0  --  3.8 4.1   CHLORIDE 103 107 106  --  107 106   CO2 28 26 24  --  25 26   ANIONGAP 5 7 6  --  6 6   GLC 76 63* 75 80 92 79   BUN 9 11 10  --  13 11   CR 0.70 0.76 0.74  --  0.68 0.68   GFRESTIMATED >90 >90 >90  --  >90 >90   GFRESTBLACK  --  >90 >90  --  >90 >90   SRINIVAS 9.2 9.1 9.8  --  8.8 9.2   BILITOTAL 0.5 0.7 0.5  --  0.4 0.4   ALBUMIN 4.3 4.4 4.5  --  4.1 4.6   PROTTOTAL 7.6 7.7 7.6  --  7.0 8.0   ALKPHOS 43 41 38*  --  39* 44   AST 16 19 15  --  17 20   ALT 21 25 23  --  23 29     Calcium/VitaminD  Recent Labs   Lab Test 09/01/21  1625 05/18/21  1606 02/10/21  1627 07/23/20  1636 02/20/17  1640 10/26/16  0919   SRNIIVAS 9.2 9.1 9.8  9.2  --  9.3   VITDT  --   --   --  30 33 23     ESR/CRP  Recent Labs   Lab Test 09/01/21  1625 05/18/21  1606 02/10/21  1627   SED 9 8 8   CRP 5.6 <2.9 <2.9     CK/Aldolase  Recent Labs   Lab Test 02/10/21  1627 11/05/20  1628 07/23/20  1636 04/22/16  0902   CKT 60 64 58 45   ALDOLASE  --   --   --  4.5     TSH/T4  Recent Labs   Lab Test 04/01/16  0910   TSH 1.57     Lipid Panel  Recent Labs   Lab Test 01/21/21  0716 01/17/20  0841 02/08/19  0713 07/10/15  0814 09/13/13  0706 09/13/13  0706   CHOL 155 144 140 149  --  139   TRIG 38 42 52 45  --  57   HDL 56 50 44* 48*  --  52   LDL 91 86 86 92  --  76   VLDL  --   --   --  9  --  11   CHOLHDLRATIO  --   --   --  3.1  --  2.7   NHDL 99 94 96  --    < >  --     < > = values in this interval not displayed.     Hepatitis B  Recent Labs   Lab Test 04/22/16  0902   HBCAB Nonreactive   HEPBANG Nonreactive     Hepatitis C  Recent Labs   Lab Test 10/26/18  0836 01/12/18  0828 04/22/16  0902   HCVAB Nonreactive Nonreactive Nonreactive   Assay performance characteristics have not been established for newborns,   infants, and children       Lyme ab screening  Recent Labs   Lab Test 04/01/16  0910   LYMEGM 0.12     HIV Screening  Recent Labs   Lab Test 10/26/18  0836 01/12/18  0828 04/22/16  0902   HIAGAB Nonreactive Nonreactive Nonreactive   HIV-1 p24 Ag & HIV-1/HIV-2 Ab Not Detected       UA  Recent Labs   Lab Test 09/01/21  1629 05/18/21  1617 02/10/21  1623 04/26/18  1648 01/18/18  1640 01/12/18  0836 12/28/17  0145 04/13/17  1650 01/20/17  0827   COLOR Yellow Yellow Yellow Yellow Yellow Yellow Yellow  --  Yellow   APPEARANCE Clear Clear Clear Clear Clear Clear Clear  --  Clear   URINEGLC Negative Negative Negative Negative Negative Negative Negative  --  Negative   URINEBILI Negative Negative Negative Negative Negative Negative Moderate*  --  Negative   SG >=1.030 1.010 1.015 1.010 1.020 1.025 >1.030  --  >1.030   URINEPH 6.0 6.0 7.0 7.0 7.0 6.5 6.0  --  6.0   PROTEIN  Negative Negative Negative Negative Negative Trace* 100*  --  Trace*   UROBILINOGEN 0.2 0.2 0.2 0.2 0.2 0.2 4.0*  --  0.2   NITRITE Negative Negative Negative Negative Negative Negative Positive*  --  Negative   UBLD Negative Trace* Negative Negative Negative Negative Negative  --  Negative   LEUKEST Negative Negative Negative Negative Negative Negative Negative  --  Negative   WBCU  --  0 - 5  --  0 - 5 O - 2 O - 2 O - 2  --  O - 2   RBCU  --  O - 2  --  O - 2 O - 2 O - 2 O - 2  --  O - 2   SQUAMOUSEPI  --  Few  --   --  Few Moderate* Moderate*  --  Few   BACTERIA  --  Rare*  --   --   --  Moderate* Moderate*   < > Few*   MUCOUS  --   --   --   --   --  Present* Present*  --  Present*    < > = values in this interval not displayed.     Urine Microscopic  Recent Labs   Lab Test 05/18/21  1617 04/26/18  1648 01/18/18  1640 01/12/18  0836 12/28/17  0145 04/13/17  1650 01/20/17  0827   WBCU 0 - 5 0 - 5 O - 2 O - 2 O - 2  --  O - 2   RBCU O - 2 O - 2 O - 2 O - 2 O - 2  --  O - 2   SQUAMOUSEPI Few  --  Few Moderate* Moderate*  --  Few   BACTERIA Rare*  --   --  Moderate* Moderate*   < > Few*   MUCOUS  --   --   --  Present* Present*  --  Present*    < > = values in this interval not displayed.     Urine Protein  Recent Labs   Lab Test 09/01/21  1629 05/18/21  1617 02/10/21  1623 11/05/20  1638   UTP <0.05 <0.05 <0.05 <0.05   UTPG  --  Unable to calculate due to low value Unable to calculate due to low value Unable to calculate due to low value   UCRR 23 31 52  --      Immunization History     Immunization History   Administered Date(s) Administered     COVID-19,PF,Pfizer 03/16/2021, 04/06/2021, 08/30/2021     HPV 02/25/2010, 02/04/2011     HepB 06/13/1999, 08/20/1999, 02/05/2000     Historical DTP/aP 1985, 1985, 1985, 01/15/1988, 06/15/1991     Influenza (IIV3) PF 11/07/2011     Influenza Vaccine IM > 6 months Valent IIV4 (Alfuria,Fluzone) 10/11/2016, 10/13/2017, 10/26/2018, 10/17/2019, 10/16/2020      MMR 05/15/1987, 09/15/1991     Mantoux Tuberculin Skin Test 07/15/1987, 01/19/2005     Meningococcal (Menomune ) 08/09/2004     OPV, trivalent, live 1986, 1986, 1986, 01/15/1988, 09/15/1991     Pneumo Conj 13-V (2010&after) 05/04/2018     Pneumococcal 23 valent 08/30/2018     TDAP Vaccine (Adacel) 01/21/2009, 02/25/2010, 08/17/2020     Zoster vaccine recombinant adjuvanted (SHINGRIX) 12/13/2018, 03/04/2019          Chart documentation done in part with Dragon Voice recognition Software. Although reviewed after completion, some word and grammatical error may remain.    Lavon Light MD

## 2021-09-08 NOTE — NURSING NOTE
RAPID3 (0-30) Cumulative Score  12.7          RAPID3 Weighted Score (divide #4 by 3 and that is the weighted score)  4.2

## 2021-10-15 ENCOUNTER — OFFICE VISIT (OUTPATIENT)
Dept: FAMILY MEDICINE | Facility: CLINIC | Age: 36
End: 2021-10-15
Payer: COMMERCIAL

## 2021-10-15 VITALS
DIASTOLIC BLOOD PRESSURE: 72 MMHG | WEIGHT: 150 LBS | BODY MASS INDEX: 23.54 KG/M2 | TEMPERATURE: 98.2 F | HEIGHT: 67 IN | SYSTOLIC BLOOD PRESSURE: 108 MMHG | HEART RATE: 72 BPM

## 2021-10-15 DIAGNOSIS — F41.9 ANXIETY: ICD-10-CM

## 2021-10-15 DIAGNOSIS — Z23 NEED FOR PROPHYLACTIC VACCINATION AND INOCULATION AGAINST INFLUENZA: ICD-10-CM

## 2021-10-15 DIAGNOSIS — Z12.4 CERVICAL CANCER SCREENING: ICD-10-CM

## 2021-10-15 DIAGNOSIS — M32.19 OTHER SYSTEMIC LUPUS ERYTHEMATOSUS WITH OTHER ORGAN INVOLVEMENT (H): ICD-10-CM

## 2021-10-15 DIAGNOSIS — Z00.00 ROUTINE GENERAL MEDICAL EXAMINATION AT A HEALTH CARE FACILITY: Primary | ICD-10-CM

## 2021-10-15 DIAGNOSIS — J45.20 INTERMITTENT ASTHMA, UNCOMPLICATED: ICD-10-CM

## 2021-10-15 DIAGNOSIS — L02.92 BOIL: ICD-10-CM

## 2021-10-15 DIAGNOSIS — I73.00 RAYNAUD'S PHENOMENON WITHOUT GANGRENE: ICD-10-CM

## 2021-10-15 LAB
CHOLEST SERPL-MCNC: 163 MG/DL
FASTING STATUS PATIENT QL REPORTED: YES
HDLC SERPL-MCNC: 60 MG/DL
LDLC SERPL CALC-MCNC: 95 MG/DL
NONHDLC SERPL-MCNC: 103 MG/DL
TRIGL SERPL-MCNC: 41 MG/DL

## 2021-10-15 PROCEDURE — 99395 PREV VISIT EST AGE 18-39: CPT | Mod: 25 | Performed by: NURSE PRACTITIONER

## 2021-10-15 PROCEDURE — G0145 SCR C/V CYTO,THINLAYER,RESCR: HCPCS | Performed by: NURSE PRACTITIONER

## 2021-10-15 PROCEDURE — 90686 IIV4 VACC NO PRSV 0.5 ML IM: CPT | Performed by: NURSE PRACTITIONER

## 2021-10-15 PROCEDURE — 87624 HPV HI-RISK TYP POOLED RSLT: CPT | Performed by: NURSE PRACTITIONER

## 2021-10-15 PROCEDURE — 36415 COLL VENOUS BLD VENIPUNCTURE: CPT | Performed by: NURSE PRACTITIONER

## 2021-10-15 PROCEDURE — 99214 OFFICE O/P EST MOD 30 MIN: CPT | Mod: 25 | Performed by: NURSE PRACTITIONER

## 2021-10-15 PROCEDURE — 80061 LIPID PANEL: CPT | Performed by: NURSE PRACTITIONER

## 2021-10-15 PROCEDURE — 90471 IMMUNIZATION ADMIN: CPT | Performed by: NURSE PRACTITIONER

## 2021-10-15 RX ORDER — ALBUTEROL SULFATE 90 UG/1
2 AEROSOL, METERED RESPIRATORY (INHALATION) EVERY 6 HOURS PRN
Qty: 18 G | Refills: 1 | Status: SHIPPED | OUTPATIENT
Start: 2021-10-15

## 2021-10-15 RX ORDER — CEPHALEXIN 500 MG/1
500 CAPSULE ORAL 2 TIMES DAILY
Qty: 10 CAPSULE | Refills: 0 | Status: SHIPPED | OUTPATIENT
Start: 2021-10-15 | End: 2021-10-20

## 2021-10-15 RX ORDER — LORAZEPAM 0.5 MG/1
0.5 TABLET ORAL EVERY 8 HOURS PRN
Qty: 15 TABLET | Refills: 0 | Status: SHIPPED | OUTPATIENT
Start: 2021-10-15 | End: 2022-12-12

## 2021-10-15 RX ORDER — HYDROXYZINE HYDROCHLORIDE 25 MG/1
25 TABLET, FILM COATED ORAL EVERY 6 HOURS PRN
Qty: 90 TABLET | Refills: 6 | Status: SHIPPED | OUTPATIENT
Start: 2021-10-15 | End: 2022-12-12

## 2021-10-15 ASSESSMENT — ANXIETY QUESTIONNAIRES
1. FEELING NERVOUS, ANXIOUS, OR ON EDGE: SEVERAL DAYS
GAD7 TOTAL SCORE: 6
6. BECOMING EASILY ANNOYED OR IRRITABLE: SEVERAL DAYS
2. NOT BEING ABLE TO STOP OR CONTROL WORRYING: NOT AT ALL
7. FEELING AFRAID AS IF SOMETHING AWFUL MIGHT HAPPEN: SEVERAL DAYS
3. WORRYING TOO MUCH ABOUT DIFFERENT THINGS: SEVERAL DAYS
5. BEING SO RESTLESS THAT IT IS HARD TO SIT STILL: SEVERAL DAYS

## 2021-10-15 ASSESSMENT — ENCOUNTER SYMPTOMS
PALPITATIONS: 0
BREAST MASS: 0
COUGH: 0
JOINT SWELLING: 0
FREQUENCY: 0
CHILLS: 0
HEARTBURN: 0
DIZZINESS: 0
SHORTNESS OF BREATH: 0
WEAKNESS: 0
ARTHRALGIAS: 0
HEMATURIA: 0
MYALGIAS: 0
ABDOMINAL PAIN: 0
CONSTIPATION: 0
EYE PAIN: 0
HEMATOCHEZIA: 0
DYSURIA: 0
FEVER: 0
DIARRHEA: 0
NAUSEA: 0
PARESTHESIAS: 0
HEADACHES: 0
NERVOUS/ANXIOUS: 0

## 2021-10-15 ASSESSMENT — PATIENT HEALTH QUESTIONNAIRE - PHQ9
5. POOR APPETITE OR OVEREATING: SEVERAL DAYS
SUM OF ALL RESPONSES TO PHQ QUESTIONS 1-9: 0

## 2021-10-15 ASSESSMENT — MIFFLIN-ST. JEOR: SCORE: 1411.99

## 2021-10-15 NOTE — PROGRESS NOTES
SUBJECTIVE:   CC: Aleida Weinberg is an 36 year old woman who presents for preventive health visit.       Patient has been advised of split billing requirements and indicates understanding: Yes  Healthy Habits:     Getting at least 3 servings of Calcium per day:  Yes    Bi-annual eye exam:  Yes    Dental care twice a year:  Yes    Sleep apnea or symptoms of sleep apnea:  None    Diet:  Regular (no restrictions)    Frequency of exercise:  4-5 days/week    Duration of exercise:  30-45 minutes    Taking medications regularly:  Yes    Medication side effects:  None    PHQ-2 Total Score: 0    Additional concerns today:  No      Lupus- no flares did go to [facility] for her skin has rosacea did creams and allergy testing   On plaquenil and imuran  followed by [facility]     Raynaud's disease - stable on amlodipine PRN     Double vaccinated and got booster       Anxiety Follow-Up    How are you doing with your anxiety since your last visit? No change    Are you having other symptoms that might be associated with anxiety? No    Have you had a significant life event? No     Are you feeling depressed? No    Do you have any concerns with your use of alcohol or other drugs? No    Has ingrown hair- boil, no fever, no chills.     Social History     Tobacco Use     Smoking status: Former Smoker     Packs/day: 0.50     Years: 2.50     Pack years: 1.25     Types: Cigarettes     Quit date: 11/11/2005     Years since quitting: 15.9     Smokeless tobacco: Never Used   Substance Use Topics     Alcohol use: Yes     Alcohol/week: 0.0 standard drinks     Comment: rarely - 1 monthly     Drug use: No     TONE-7 SCORE 10/26/2018 5/31/2019 1/17/2020   Total Score 7 3 4     PHQ 10/26/2018 5/31/2019 1/17/2020   PHQ-9 Total Score 1 0 0   Q9: Thoughts of better off dead/self-harm past 2 weeks Not at all Not at all Not at all     Last PHQ-9 10/15/2021   1.  Little interest or pleasure in doing things 0   2.  Feeling down, depressed, or  hopeless 0   3.  Trouble falling or staying asleep, or sleeping too much 0   4.  Feeling tired or having little energy 0   5.  Poor appetite or overeating 0   6.  Feeling bad about yourself 0   7.  Trouble concentrating 0   8.  Moving slowly or restless 0   Q9: Thoughts of better off dead/self-harm past 2 weeks 0   PHQ-9 Total Score 0   Difficulty at work, home, or with people -     TONE-7  10/15/2021   1. Feeling nervous, anxious, or on edge 1   2. Not being able to stop or control worrying 0   3. Worrying too much about different things 1   4. Trouble relaxing 1   5. Being so restless that it is hard to sit still 1   6. Becoming easily annoyed or irritable 1   7. Feeling afraid, as if something awful might happen 1   TONE-7 Total Score 6   If you checked any problems, how difficult have they made it for you to do your work, take care of things at home, or get along with other people? -       Asthma Follow-Up    Was ACT completed today?    Yes    ACT Total Scores 2/9/2021   ACT TOTAL SCORE -   ASTHMA ER VISITS -   ASTHMA HOSPITALIZATIONS -   ACT TOTAL SCORE (Goal Greater than or Equal to 20) 25   In the past 12 months, how many times did you visit the emergency room for your asthma without being admitted to the hospital? 0   In the past 12 months, how many times were you hospitalized overnight because of your asthma? 0          How many days per week do you miss taking your asthma controller medication?  I do not have an asthma controller medication    Please describe any recent triggers for your asthma: None    Have you had any Emergency Room Visits, Urgent Care Visits, or Hospital Admissions since your last office visit?  No      Today's PHQ-2 Score:   PHQ-2 ( 1999 Pf[employer]) 10/15/2021   Q1: Little interest or pleasure in doing things 0   Q2: Feeling down, depressed or hopeless 0   PHQ-2 Score 0   Q1: Little interest or pleasure in doing things Not at all   Q2: Feeling down, depressed or hopeless Not at all   PHQ-2  Score 0       Abuse: Current or Past (Physical, Sexual or Emotional) - No  Do you feel safe in your environment? Yes    Have you ever done Advance Care Planning? (For example, a Health Directive, POLST, or a discussion with a medical provider or your loved ones about your wishes): No, advance care planning information given to patient to review.  Patient declined advance care planning discussion at this time.    Social History     Tobacco Use     Smoking status: Former Smoker     Packs/day: 0.50     Years: 2.50     Pack years: 1.25     Types: Cigarettes     Quit date: 11/11/2005     Years since quitting: 15.9     Smokeless tobacco: Never Used   Substance Use Topics     Alcohol use: Yes     Alcohol/week: 0.0 standard drinks     Comment: rarely - 1 monthly     If you drink alcohol do you typically have >3 drinks per day or >7 drinks per week? No    Alcohol Use 10/15/2021   Prescreen: >3 drinks/day or >7 drinks/week? No   Prescreen: >3 drinks/day or >7 drinks/week? -   No flowsheet data found.    Reviewed orders with patient.  Reviewed health maintenance and updated orders accordingly - Yes  Lab work is in process    Breast Cancer Screening:    Breast CA Risk Assessment (FHS-7) 10/15/2021   Do you have a family history of breast, colon, or ovarian cancer? No / Unknown         Patient under 40 years of age: Routine Mammogram Screening not recommended.   Pertinent mammograms are reviewed under the imaging tab.    History of abnormal Pap smear: NO - age 30-65 PAP every 5 years with negative HPV co-testing recommended  PAP / HPV Latest Ref Rng & Units 10/7/2016 9/20/2013 2/25/2010   PAP (Historical) - NIL NIL NIL   HPV16 NEG Negative - -   HPV18 NEG Negative - -   HRHPV NEG Negative - -     Reviewed and updated as needed this visit by clinical staff  Tobacco   Meds   Med Hx  Surg Hx  Fam Hx  Soc Hx        Reviewed and updated as needed this visit by Provider                    Review of Systems   Constitutional:  "Negative for chills and fever.   HENT: Negative for congestion, ear pain and hearing loss.    Eyes: Negative for pain and visual disturbance.   Respiratory: Negative for cough and shortness of breath.    Cardiovascular: Negative for chest pain, palpitations and peripheral edema.   Gastrointestinal: Negative for abdominal pain, constipation, diarrhea, heartburn, hematochezia and nausea.   Breasts:  Negative for tenderness, breast mass and discharge.   Genitourinary: Negative for dysuria, frequency, genital sores, hematuria, pelvic pain, urgency, vaginal bleeding and vaginal discharge.   Musculoskeletal: Negative for arthralgias, joint swelling and myalgias.   Skin: Negative for rash.   Neurological: Negative for dizziness, weakness, headaches and paresthesias.   Psychiatric/Behavioral: Negative for mood changes. The patient is not nervous/anxious.           OBJECTIVE:   /72   Pulse 72   Temp 98.2  F (36.8  C) (Oral)   Ht 1.708 m (5' 7.25\")   Wt 68 kg (150 lb)   BMI 23.32 kg/m    Physical Exam  GENERAL: healthy, alert and no distress  EYES: Eyes grossly normal to inspection, PERRL and conjunctivae and sclerae normal  HENT: ear canals and TM's normal, nose and mouth without ulcers or lesions  NECK: no adenopathy, no asymmetry, masses, or scars and thyroid normal to palpation  RESP: lungs clear to auscultation - no rales, rhonchi or wheezes  BREAST: normal without masses, tenderness or nipple discharge and no palpable axillary masses or adenopathy  CV: regular rate and rhythm, normal S1 S2, no S3 or S4, no murmur, click or rub, no peripheral edema and peripheral pulses strong  ABDOMEN: soft, nontender, no hepatosplenomegaly, no masses and bowel sounds normal   (female): normal female external genitalia, normal urethral meatus, vaginal mucosa pink, moist, well rugated, and normal cervix/adnexa/uterus without masses or discharge  MS: no gross musculoskeletal defects noted, no edema  SKIN: left upper inner " "thigh - hard pink nodule   NEURO: Normal strength and tone, mentation intact and speech normal  PSYCH: mentation appears normal, affect normal/bright    Diagnostic Test Results:  Labs reviewed in Epic  No results found for this or any previous visit (from the past 24 hour(s)).    ASSESSMENT/PLAN:       ICD-10-CM    1. Routine general medical examination at a health care facility  Z00.00 Lipid panel reflex to direct LDL Fasting     Lipid panel reflex to direct LDL Fasting   2. Intermittent asthma, uncomplicated  J45.20 albuterol (PROAIR HFA/PROVENTIL HFA/VENTOLIN HFA) 108 (90 Base) MCG/ACT inhaler   3. Anxiety  F41.9 LORazepam (ATIVAN) 0.5 MG tablet     hydrOXYzine (ATARAX) 25 MG tablet   4. Need for prophylactic vaccination and inoculation against influenza  Z23 INFLUENZA VACCINE IM > 6 MONTHS VALENT IIV4 (AFLURIA/FLUZONE)   5. Other systemic lupus erythematosus with other organ involvement (H)  M32.19    6. Raynaud's phenomenon without gangrene  I73.00    7. Cervical cancer screening  Z12.4 REVIEW OF HEALTH MAINTENANCE PROTOCOL ORDERS     Pap Screen with HPV - recommended age 30 - 65 years   8. Boil  L02.92    with chronic conditions and acute issue disccussed  Boil-non fluctuant not appropriate to I&D  recommend abx given immunocompromised increased risk of infection -pt prefers to to do warm compress. Keflex ordered in case S&S do not improve.   Anxiety stable meds refilled  Asthma stable albuterol refilled  Lupus/raynaud's followed by Dr. Light     Patient has been advised of split billing requirements and indicates understanding: Yes  COUNSELING:  Reviewed preventive health counseling, as reflected in patient instructions       Regular exercise       Healthy diet/nutrition    Estimated body mass index is 23.56 kg/m  as calculated from the following:    Height as of 9/8/21: 1.702 m (5' 7\").    Weight as of 9/8/21: 68.2 kg (150 lb 6.4 oz).        She reports that she quit smoking about 15 years ago. Her " smoking use included cigarettes. She has a 1.25 pack-year smoking history. She has never used smokeless tobacco.      Counseling Resources:  ATP IV Guidelines  Pooled Cohorts Equation Calculator  Breast Cancer Risk Calculator  BRCA-Related Cancer Risk Assessment: FHS-7 Tool  FRAX Risk Assessment  ICSI Preventive Guidelines  Dietary Guidelines for Americans, 2010  USDA's MyPlate  ASA Prophylaxis  Lung CA Screening    [facility]ZA[facility]

## 2021-10-15 NOTE — PATIENT INSTRUCTIONS
Boil monitor if you prefer and warm compress   If no improvement or worsening symptoms then start Abx     Preventive Health Recommendations  Female Ages 26 - 39  Yearly exam:   See your health care provider every year in order to    Review health changes.     Discuss preventive care.      Review your medicines if you your doctor has prescribed any.    Until age 30: Get a Pap test every three years (more often if you have had an abnormal result).    After age 30: Talk to your doctor about whether you should have a Pap test every 3 years or have a Pap test with HPV screening every 5 years.   You do not need a Pap test if your uterus was removed (hysterectomy) and you have not had cancer.  You should be tested each year for STDs (sexually transmitted diseases), if you're at risk.   Talk to your provider about how often to have your cholesterol checked.  If you are at risk for diabetes, you should have a diabetes test (fasting glucose).  Shots: Get a flu shot each year. Get a tetanus shot every 10 years.   Nutrition:     Eat at least 5 servings of fruits and vegetables each day.    Eat whole-grain bread, whole-wheat pasta and brown rice instead of white grains and rice.    Get adequate Calcium and Vitamin D.     Lifestyle    Exercise at least 150 minutes a week (30 minutes a day, 5 days of the week). This will help you control your weight and prevent disease.    Limit alcohol to one drink per day.    No smoking.     Wear sunscreen to prevent skin cancer.    See your dentist every six months for an exam and cleaning.

## 2021-10-16 ASSESSMENT — ASTHMA QUESTIONNAIRES: ACT_TOTALSCORE: 25

## 2021-10-16 ASSESSMENT — ANXIETY QUESTIONNAIRES: GAD7 TOTAL SCORE: 6

## 2021-10-19 LAB
BKR LAB AP GYN ADEQUACY: NORMAL
BKR LAB AP GYN INTERPRETATION: NORMAL
BKR LAB AP HPV REFLEX: NORMAL
BKR LAB AP PREVIOUS ABNORMAL: NORMAL
PATH REPORT.COMMENTS IMP SPEC: NORMAL
PATH REPORT.RELEVANT HX SPEC: NORMAL

## 2021-10-21 LAB
HUMAN PAPILLOMA VIRUS 16 DNA: NEGATIVE
HUMAN PAPILLOMA VIRUS 18 DNA: NEGATIVE
HUMAN PAPILLOMA VIRUS FINAL DIAGNOSIS: NORMAL
HUMAN PAPILLOMA VIRUS OTHER HR: NEGATIVE

## 2021-12-09 ENCOUNTER — TELEPHONE (OUTPATIENT)
Dept: RHEUMATOLOGY | Facility: CLINIC | Age: 36
End: 2021-12-09
Payer: COMMERCIAL

## 2021-12-09 NOTE — TELEPHONE ENCOUNTER
Prior Authorization Not Needed per Insurance    Medication: (RENEWAL) - Hydroxychloroquine (PLAQUENIL) 200 MG tablet  Insurance Company: [employer] - Phone [phone] Fax [phone]  Expected CoPay:      Pharmacy Filling the Rx: [facility] [place] - [place], [place] - [address] [place] [address].  Pharmacy Notified: Yes  Patient Notified: Yes  **Instructed pharmacy to notify patient when script is ready to /ship.**

## 2021-12-09 NOTE — TELEPHONE ENCOUNTER
Prior Authorization Retail Medication Request    Medication/Dose: (RENEWAL) - Hydroxychloroquine (PLAQUENIL) 200 MG tablet  ICD code (if different than what is on RX):  Other systemic lupus erythematosus with other organ involvement (H) [M32.19]  Previously Tried and Failed:    Rationale:      Insurance Name: [employer]  Insurance ID: [id number]    Pharmacy Information (if different than what is on RX)  Name:  [facility] [place] - [place], [place] - [address] [place] [address].  Phone:  [phone]

## 2021-12-15 ENCOUNTER — LAB (OUTPATIENT)
Dept: LAB | Facility: CLINIC | Age: 36
End: 2021-12-15
Payer: COMMERCIAL

## 2021-12-15 DIAGNOSIS — M32.19 OTHER SYSTEMIC LUPUS ERYTHEMATOSUS WITH OTHER ORGAN INVOLVEMENT (H): ICD-10-CM

## 2021-12-15 DIAGNOSIS — Z79.899 HIGH RISK MEDICATIONS (NOT ANTICOAGULANTS) LONG-TERM USE: ICD-10-CM

## 2021-12-15 LAB
ALBUMIN UR-MCNC: NEGATIVE MG/DL
APPEARANCE UR: CLEAR
BASOPHILS # BLD AUTO: 0 10E3/UL (ref 0–0.2)
BASOPHILS NFR BLD AUTO: 0 %
BILIRUB UR QL STRIP: NEGATIVE
COLOR UR AUTO: YELLOW
CREAT UR-MCNC: 25 MG/DL
CRP SERPL-MCNC: <2.9 MG/L (ref 0–8)
EOSINOPHIL # BLD AUTO: 0 10E3/UL (ref 0–0.7)
EOSINOPHIL NFR BLD AUTO: 1 %
ERYTHROCYTE [DISTWIDTH] IN BLOOD BY AUTOMATED COUNT: 11.4 % (ref 10–15)
ERYTHROCYTE [SEDIMENTATION RATE] IN BLOOD BY WESTERGREN METHOD: 9 MM/HR (ref 0–20)
GLUCOSE UR STRIP-MCNC: NEGATIVE MG/DL
HCT VFR BLD AUTO: 37.6 % (ref 35–47)
HGB BLD-MCNC: 13.4 G/DL (ref 11.7–15.7)
HGB UR QL STRIP: NEGATIVE
KETONES UR STRIP-MCNC: NEGATIVE MG/DL
LEUKOCYTE ESTERASE UR QL STRIP: NEGATIVE
LYMPHOCYTES # BLD AUTO: 0.7 10E3/UL (ref 0.8–5.3)
LYMPHOCYTES NFR BLD AUTO: 16 %
MCH RBC QN AUTO: 32.9 PG (ref 26.5–33)
MCHC RBC AUTO-ENTMCNC: 35.6 G/DL (ref 31.5–36.5)
MCV RBC AUTO: 92 FL (ref 78–100)
MONOCYTES # BLD AUTO: 0.7 10E3/UL (ref 0–1.3)
MONOCYTES NFR BLD AUTO: 14 %
NEUTROPHILS # BLD AUTO: 3.2 10E3/UL (ref 1.6–8.3)
NEUTROPHILS NFR BLD AUTO: 69 %
NITRATE UR QL: NEGATIVE
PH UR STRIP: 6.5 [PH] (ref 5–7)
PLATELET # BLD AUTO: 212 10E3/UL (ref 150–450)
PROT UR-MCNC: <0.05 G/L
PROT/CREAT 24H UR: NORMAL MG/G{CREAT}
RBC # BLD AUTO: 4.07 10E6/UL (ref 3.8–5.2)
SP GR UR STRIP: 1.01 (ref 1–1.03)
UROBILINOGEN UR STRIP-ACNC: 0.2 E.U./DL
WBC # BLD AUTO: 4.7 10E3/UL (ref 4–11)

## 2021-12-15 PROCEDURE — 85652 RBC SED RATE AUTOMATED: CPT

## 2021-12-15 PROCEDURE — 86225 DNA ANTIBODY NATIVE: CPT

## 2021-12-15 PROCEDURE — 80053 COMPREHEN METABOLIC PANEL: CPT

## 2021-12-15 PROCEDURE — 86140 C-REACTIVE PROTEIN: CPT

## 2021-12-15 PROCEDURE — 36415 COLL VENOUS BLD VENIPUNCTURE: CPT

## 2021-12-15 PROCEDURE — 82550 ASSAY OF CK (CPK): CPT

## 2021-12-15 PROCEDURE — 84156 ASSAY OF PROTEIN URINE: CPT

## 2021-12-15 PROCEDURE — 85025 COMPLETE CBC W/AUTO DIFF WBC: CPT

## 2021-12-15 PROCEDURE — 86160 COMPLEMENT ANTIGEN: CPT

## 2021-12-15 PROCEDURE — 81003 URINALYSIS AUTO W/O SCOPE: CPT

## 2021-12-15 PROCEDURE — 86160 COMPLEMENT ANTIGEN: CPT | Mod: 59

## 2021-12-16 LAB
ALBUMIN SERPL-MCNC: 3.8 G/DL (ref 3.4–5)
ALP SERPL-CCNC: 36 U/L (ref 40–150)
ALT SERPL W P-5'-P-CCNC: 21 U/L (ref 0–50)
ANION GAP SERPL CALCULATED.3IONS-SCNC: 5 MMOL/L (ref 3–14)
AST SERPL W P-5'-P-CCNC: 15 U/L (ref 0–45)
BILIRUB SERPL-MCNC: 0.5 MG/DL (ref 0.2–1.3)
BUN SERPL-MCNC: 11 MG/DL (ref 7–30)
C3 SERPL-MCNC: 87 MG/DL (ref 81–157)
C4 SERPL-MCNC: 17 MG/DL (ref 13–39)
CALCIUM SERPL-MCNC: 8.8 MG/DL (ref 8.5–10.1)
CHLORIDE BLD-SCNC: 108 MMOL/L (ref 94–109)
CK SERPL-CCNC: 60 U/L (ref 30–225)
CO2 SERPL-SCNC: 24 MMOL/L (ref 20–32)
CREAT SERPL-MCNC: 0.62 MG/DL (ref 0.52–1.04)
DSDNA AB SER-ACNC: 1.1 IU/ML
GFR SERPL CREATININE-BSD FRML MDRD: >90 ML/MIN/1.73M2
GLUCOSE BLD-MCNC: 97 MG/DL (ref 70–99)
POTASSIUM BLD-SCNC: 3.9 MMOL/L (ref 3.4–5.3)
PROT SERPL-MCNC: 7 G/DL (ref 6.8–8.8)
SODIUM SERPL-SCNC: 137 MMOL/L (ref 133–144)

## 2022-01-05 ENCOUNTER — VIRTUAL VISIT (OUTPATIENT)
Dept: RHEUMATOLOGY | Facility: CLINIC | Age: 37
End: 2022-01-05
Payer: COMMERCIAL

## 2022-01-05 DIAGNOSIS — M32.19 OTHER SYSTEMIC LUPUS ERYTHEMATOSUS WITH OTHER ORGAN INVOLVEMENT (H): Primary | ICD-10-CM

## 2022-01-05 DIAGNOSIS — I73.00 RAYNAUD'S PHENOMENON WITHOUT GANGRENE: ICD-10-CM

## 2022-01-05 DIAGNOSIS — Z79.899 HIGH RISK MEDICATIONS (NOT ANTICOAGULANTS) LONG-TERM USE: ICD-10-CM

## 2022-01-05 PROCEDURE — 99214 OFFICE O/P EST MOD 30 MIN: CPT | Mod: GT | Performed by: INTERNAL MEDICINE

## 2022-01-05 NOTE — PROGRESS NOTES
Aleida Weinberg  is a 37 year old year old female who is being evaluated via a billable video visit.      How would you like to obtain your AVS? MyChart  If the video visit is dropped, the invitation should be resent by: Text to cell phone: [phone]  Will anyone else be joining your video visit? No    Rheumatology Video Visit      Aleida Weinberg MRN# [id number]   YOB: 1985 Age: 37 year old      Date of visit: 1/05/22   PCP: Sayra Chirinos     Chief Complaint   Patient presents with:  Systemic Lupus Erythematous    Assessment and Plan     1. Systemic lupus erythematosus (CHANELL >1:93432 speckled, RNP+, Sm+, Scl-70+, hypocomplementemia, photosensitivity, malar rash, arthritis, fatigue, Raynaud's phenomenon):  Dx'd 4/2016. Scl-70+; she lacks features of scleroderma except for raynaud's; no myopathy based on labs/symptoms. 5/11/2018 echo without evidence of pulmonary hypertension. Previously on MTX 20mg SQ weekly (GI upset with PO doses above 15mg, partially effective) and SSZ (LFT elevations).  Currently on AZA 50mg daily and HCQ 200mg daily (patient self reduced previously from 300mg daily on average based on preference of an easier regimen and continued to do well). TPMT normal on 8/21/2017.  Doing well at this time.  Chronic illness, stable.    - Continue hydroxychloroquine 200mg daily (last eye exam was okay on 2/22/2021 by Dr. Frias)  - Continue azathioprine 50mg daily   - Labs in 3 months: CBC, CMP, ESR, CRP, UA, Uprotein:creatinine    High risk medication requiring intensive toxicity monitoring at least quarterly: labs ordered include CBC, Creatinine, Hepatic panel to monitor for cytopenia and hepatotoxicity; checking creatinine as it affects clearance of medication.       2. Raynaud's Phenomenon: Cold avoidance was insufficient for controlling her symptoms in the past; then did well on amlodipine, but not using now and doing okay. Amlodipine PRN.   Chronic illness, stable.    -  Amlodipine 10mg daily during colder months, as needed    3.  Low vitamin D: Currently on vitamin D 2000 units daily.    - Continue vitamin D 2000 units daily    4.  Secondary Sjogren's syndrome: Mild dry and dry mouth that are treated infrequently with artificial tears and frequent sips of water. Dentist follow-up every 6 months     5.  Facial rash: Seeing dermatology at [facility].  Reportedly due to SLE and rosacea from derm perspective, and improving with topical creams from her dermatologist for rosacea.    6.  Vaccinations:   - Influenza: encouraged yearly vaccination  - [id number]: up to date  - [id number]: up to date  - Shingrix: Up to date  - COVID-19: has received the Pfizer COVID-19 vaccine on 3/16/2021, 4/6/2021, and 8/30/2021; advised receiving one dose of the Moderna COVID-19 vaccine 6 months from the August vaccination date, and to hold azathioprine for 1 week afterward    Total minutes spent in evaluation with patient, documentation, , and review of pertinent studies and chart notes: 18    Ms. Weinberg verbalized agreement with and understanding of the rational for the diagnosis and treatment plan.  All questions were answered to best of my ability and the patient's satisfaction. Ms. Weinberg was advised to contact the clinic with any questions that may arise after the clinic visit.      Thank you for involving me in the care of the patient    Return to clinic: 3-4 months      HPI   Aleida Weinberg is a 37 year old female with medical history significant for intermittent asthma, anxiety, migraines, vitamin D deficiency, and systemic lupus erythematosus who presents for follow-up of SLE.     Today, 1/5/2022: Seen virtually today because her daughter has COVID-19 and she wanted to ensure that she was not infectious coming to the clinic. I thanked her for this. Currently doing well. No joint pain or swelling. No morning stiffness or gelling phenomenon. Facial rash controlled with topical  treatments from dermatology. Tolerating hydroxychloroquine and azathioprine well. Not requiring amlodipine because she is mostly indoors and her Raynaud's phenomenon is well controlled.    Denies fevers, chills, nausea, vomiting, constipation, diarrhea. No abdominal pain. No chest pain/pressure, palpitations, or shortness of breath. No oral or nasal sores.     Tobacco: quit smoking in   EtOH: Once monthly  Drugs: None  Occupation: Works at [employer], a lot of typing per patient    ROS   12 point review of system was completed and negative except as noted in the HPI     Active Problem List     Patient Active Problem List   Diagnosis     Other kyphoscoliosis and scoliosis     Hypertrophic and atrophic condition of skin     Viral warts     CARDIOVASCULAR SCREENING; LDL GOAL LESS THAN 160     Intermittent asthma     Anxiety     Intractable menstrual migraine without status migrainosus     Vitamin D deficiency     Inflammatory polyarthropathy (H)     Chronic back pain     Raynaud's phenomenon without gangrene     Systemic lupus erythematosus (H)     High risk medications (not anticoagulants) long-term use (Plaquenil and AZA)     Dry eyes, bilateral     Past Medical History     Past Medical History:   Diagnosis Date     Arthritis      Lupus erythematosus      Mild intermittent asthma      Other kyphoscoliosis and scoliosis     upper back curvature and disc degeneration in lower back.     Past Surgical History     Past Surgical History:   Procedure Laterality Date      SECTION       SURGICAL HISTORY OF -       PE Tubes     Allergy     Allergies   Allergen Reactions     Fluconazole Rash     Current Medication List     Current Outpatient Medications   Medication Sig     albuterol (PROAIR HFA/PROVENTIL HFA/VENTOLIN HFA) 108 (90 Base) MCG/ACT inhaler Inhale 2 puffs into the lungs every 6 hours as needed for shortness of breath / dyspnea     amLODIPine (NORVASC) 10 MG tablet Take 1 tablet (10 mg) by mouth  daily for raynaud's phenomenon     azaTHIOprine (IMURAN) 50 MG tablet Take 1 tablet (50 mg) by mouth daily     cyclobenzaprine (FLEXERIL) 10 MG tablet Take 1 tablet (10 mg) by mouth 3 times daily as needed for muscle spasms     hydroxychloroquine (PLAQUENIL) 200 MG tablet Take 1 tablet (200 mg) by mouth daily . Yearly eye exam including 10-2 VF and SD-OCT required     hydrOXYzine (ATARAX) 25 MG tablet Take 1 tablet (25 mg) by mouth every 6 hours as needed for anxiety     levonorgestrel (MIRENA, 52 MG,) 20 MCG/24HR IUD 1 each by Intrauterine route once     LORazepam (ATIVAN) 0.5 MG tablet Take 1 tablet (0.5 mg) by mouth every 8 hours as needed for anxiety     metroNIDAZOLE (METROGEL) 0.75 % external gel Apply topically 2 times daily     traZODone (DESYREL) 50 MG tablet Take 0.5 tablets (25 mg) by mouth nightly as needed for sleep     vitamin D3 (CHOLECALCIFEROL) 50 mcg (2000 units) tablet Take 1 tablet (50 mcg) by mouth daily     No current facility-administered medications for this visit.         Social History   See HPI    Family History     Family History   Problem Relation Age of Onset     Heart Disease Maternal Grandfather         Bypass, Heart Disease     Respiratory Maternal Grandfather         asthma     Macular Degeneration Maternal Grandfather      Hypertension Paternal Grandmother      Cancer Paternal Grandmother         lymph nodes     Cataracts Paternal Grandmother      C.A.D. Paternal Grandfather      Heart Disease Maternal Uncle         MI's      Alcohol/Drug Maternal Uncle      Respiratory Other         cousin on mothers side, asthma     Alcohol/Drug Other         bother great grandparents on mother's side     Diabetes No family hx of      Breast Cancer No family hx of      Cancer - colorectal No family hx of      Denies family history of autoimmune disease    Physical Exam     Temp Readings from Last 3 Encounters:   10/15/21 98.2  F (36.8  C) (Oral)   02/09/21 97.9  F (36.6  C) (Oral)   12/10/20 98.1  " F (36.7  C) (Oral)     BP Readings from Last 5 Encounters:   10/15/21 108/72   09/08/21 115/78   02/09/21 132/86   12/10/20 118/70   10/20/20 117/77     Pulse Readings from Last 1 Encounters:   10/15/21 72     Resp Readings from Last 1 Encounters:   02/09/21 20     Estimated body mass index is 23.32 kg/m  as calculated from the following:    Height as of 10/15/21: 1.708 m (5' 7.25\").    Weight as of 10/15/21: 68 kg (150 lb).      GEN: NAD. Healthy appearing adult.   HEENT: MMM.  Anicteric, noninjected sclera. No obvious external lesions of the ear and nose. Hearing intact.  PULM: No increased work of breathing  SKIN: No rash or jaundice seen  PSYCH: Alert. Appropriate.      Labs / Imaging (select studies)     RF/CCP  Recent Labs   Lab Test 04/22/16  0902 04/01/16  0910   CCPIGG 1  --    RHF  --  <20     CHANELL  Recent Labs   Lab Test 04/22/16  0902 04/01/16  0910   VALERIE  --  12.4*   ANAIGG >1:91696  Reference range: <1:40  (Note)  Speckled pattern.  INTERPRETIVE INFORMATION: CHANELL by IFA, IgG  Anti-nuclear antibodies (CHANELL) are seen in a variety of  systemic rheumatic diseases and are determined by indirect  fluorescence assay (IFA) using HEp-2 substrate with an  IgG-specific conjugate. CHANELL titers less than or equal to  1:80 have variable relevance while titers greater than or  equal to 1:160 are considered clinically significant. These  antibodies may precede clinical disease onset; however,  healthy individuals and those with advanced age have been  reported to be positive for CHANELL. When observed, one of the  five basic patterns is reported: homogeneous,  peripheral/rim, speckled, centromere, or nucleolar. If  cytoplasmic fluorescence is observed, it is noted. IFA  methodology is subjective and has occasionally been shown  to lack sensitivity for anti-SSA/Ro antibodies.  Negative results do not necessarily rule out the presence  of SSc. If clinical suspicion remains, consi vicki further  testing for U3-RNP, PM/Scl, or " Th/To antibodies associated  with SSc.  Performed by [facility],  [facility] [place] [address] [phone]  [email or web address] Twin Rodriguez MD, Lab. Director    --      RNP/Sm/SSA/SSB  Recent Labs   Lab Test 01/12/18  0828 04/22/16  0902   RNPIGG  --  >8.0  Positive   Antibody index (AI) values reflect qualitative changes in antibody   concentration that cannot be directly associated with clinical condition or   disease state.  *   SMIGG  --  1.5*   SSAIGG  --  <0.2  Negative   Antibody index (AI) values reflect qualitative changes in antibody   concentration that cannot be directly associated with clinical condition or   disease state.     SSBIGG  --  <0.2  Negative   Antibody index (AI) values reflect qualitative changes in antibody   concentration that cannot be directly associated with clinical condition or   disease state.     SCLIGG  --  3.1*   TREPAB Negative  --      dsDNA  Recent Labs   Lab Test 12/15/21  1516 02/10/21  1627 11/05/20  1628 07/23/20  1636 04/02/20  1541 07/11/19  1529 11/29/18  1616 08/23/18  1637 04/26/18  1648   DNA 1.1 1 1 1 2 2 2 2 2     C3/C4  Recent Labs   Lab Test 12/15/21  1516 05/18/21  1606 02/10/21  1627 11/05/20  1628 07/23/20  1636 04/02/20  1541 10/18/19  1542 07/11/19  1529 11/29/18  1616   [id number] 87 84 89 89 92 90 74* 75* 82   K4GULFD 17 18 17 17 18 20 14* 14* 13*       Send-out Labs  Recent Labs   Lab Test 04/21/17  0813   SRESLT SEE NOTE  (Note)  Test name                    Result Flag  Units  RefIntvl  ------------------------------------------------------------  Thiopurine Methyltransferase                                23.2 L      U/mL 24.0-44.0  Sample slightly hemolyzed. Please interpret the results  with caution.  INTERPRETIVE INFORMATION: Thiopurine Methyltransferase, RBC  Normal TPMT activity:  24.0-44.0 U/mL................Individuals are predicted to  be at low risk of bone marrow toxicity (myelosuppression)  as a consequence of standard thiopurine  therapy; no dose  adjustment is recommended.  Intermediate TPMT activity:  17.0-23.9 U/mL................Individuals are predicted to  be at intermediate risk of bone marrow toxicity  (myelosuppression) as a consequence of standard thiopurine  therapy; a dose reduction and therapeutic drug management  is recommended.  Low TPMT activity:  less than 17.0 U/mL...........Individuals are predicted to  be at high risk of bone marrow toxicity (myelosuppression)   as a consequence of standard thiopurine dosing. It is  recommended to avoid the use of thiopurine drugs.  High TPMT activity:  greater than 44.0 U/mL........Individuals are not predicted  to be at risk for bone marrow toxicity (myelosuppression)  as a consequence of standard thiopurine dosing, but may be  at risk for therapeutic failure due to excessive  inactivation of thiopurine drugs. Individuals may require  higher than the normal standard dose. Therapeutic drug  management is recommended.  The TPMT, RBC assay is used as a screen to detect  individuals with low and intermediate TPMT activity who may  be at risk for myelosuppression when exposed to standard  doses of thiopurines, including azathioprine (Imuran) and  6-mercaptopurine (Purinethol). TPMT is the primary  metabolic route for inactivation of thiopurine drugs in the  bone marrow. When TPMT activity is low, it is predicted  that proportionately more 6-mercaptopurine can be converted  into the cytotoxic 6-thioguanine nucle otides that  accumulate in the bone marrow causing excessive toxicity.  The activity of TPMT is measured by the nanomoles of  6-methylmercaptopurine (inactive metabolite) produced per 1  mL of packed red blood cells, (U/mL).    TPMT phenotype testing does not replace the need for  clinical monitoring of patients treated with thiopurine  drugs. Genotype for TPMT cannot be inferred from TPMT  activity (phenotype). Phenotype testing should not be  requested for patients currently treated  with thiopurine  drugs. Current TPMT phenotype may not reflect future TPMT  phenotype, particularly in patients who received blood  transfusion within 30-60 days of testing.  TPMT enzyme  activity can be inhibited by several drugs such as:  naproxen (Aleve), ibuprofen (Advil, Motrin), ketoprofen  (Orudis), furosemide (Lasix), sulfasalazine (Azulfidine),  mesalamine (Asacol), olsalazine (Dipentum), mefenamic acid  (Ponstel), thiazide diuretics, and benzoic acid inhibitors.  TPMT inhibitors may contribute t o falsely low results;  patients should abstain from these drugs for at least 48  hours prior to TPMT testing. Falsely low results may also  occur as a result of inappropriate specimen handling and  hemolysis.  Test developed and characteristics determined by [facility]. See Compliance Statement B: www.[email or web address].com/CS  Performed by [facility],  [address] [place] [id number] [phone]  [email or web address], Lionel Ferreira MD, Lab. Director     STSTNM Thiopurine Methyltransferase, RBC   STSTCD 92,066   SSPTYP Whole blood, EDTA anticoagulant     Antiphospholipid Antibodies  Recent Labs   Lab Test 04/22/16  0902   B2GPG 0.9   B2GPM <0.9  Negative     CARG <15.0  Interpretation:  Negative     JOANN <12.5  Interpretation:  Negative     LUPINT Negative  (Note)  COMMENTS:  The INR is normal.  APTT ratio is normal.  DRVVT Screen ratio is normal.  Thrombin time is normal.  NEGATIVE TEST; A LUPUS ANTICOAGULANT WAS NOT DETECTED IN THIS  SPECIMEN WITHIN THE LIMITS OF THE TESTING REPERTOIRE.  If the clinical picture is strongly suggestive of an antiphospholipid  syndrome, recommend anticardiolipin and beta-2-glycoprotein (IgG and  IgM) antibody tests.  Isabella Olson M.D.  [phone]  4/25/2016    INR =  1.05    Reference range: 0.86-1.14  Thrombin Time= 15.4    Reference range: 13.0-19.0 sec    APTT:       Ratio  Patient  =  0.92  1:2 Mix  =  N/A  Reference:  Negative: Less than or equal to 1.16  Positive:  Greater than or equal to 1.17     DILUTE LISA VIPER VENOM TEST:  Screen Ratio = 0.95   Normal is less than 1.21         CBC  Recent Labs   Lab Test 12/15/21  1516 09/01/21  1625 05/18/21  1606 02/10/21  1627 11/05/20  1628   WBC 4.7 4.4 3.9* 4.2 3.3*   RBC 4.07 4.34 4.26 4.33 3.95   HGB 13.4 14.5 14.2 14.2 13.1   HCT 37.6 40.9 40.3 40.8 37.2   MCV 92 94 95 94 94   RDW 11.4 11.7 11.7 11.8 11.4    224 225 212 202   MCH 32.9 33.4* 33.3* 32.8 33.2*   MCHC 35.6 35.5 35.2 34.8 35.2   NEUTROPHIL 69 73 64.3 67.8 60.7   LYMPH 16 12 20.2 18.0 23.1   MONOCYTE 14 14 14.0 13.0 14.4   EOSINOPHIL 1 1 1.0 0.7 1.2   BASOPHIL 0 0 0.5 0.5 0.6   ANEU  --   --  2.5 2.9 2.0   ALYM  --   --  0.8 0.8 0.8   AMANDA  --   --  0.6 0.6 0.5   AEOS  --   --  0.0 0.0 0.0   ABAS  --   --  0.0 0.0 0.0   ANEUTAUTO 3.2 3.2  --   --   --    ALYMPAUTO 0.7* 0.5*  --   --   --    AMONOAUTO 0.7 0.6  --   --   --    AEOSAUTO 0.0 0.0  --   --   --    ABSBASO 0.0 0.0  --   --   --      CMP  Recent Labs   Lab Test 12/15/21  1516 09/01/21  1625 05/18/21  1606 02/10/21  1627 01/21/21  0716 11/05/20  1628 07/23/20  1636    136 140 136  --  138 138   POTASSIUM 3.9 3.8 3.7 4.0  --  3.8 4.1   CHLORIDE 108 103 107 106  --  107 106   CO2 24 28 26 24  --  25 26   ANIONGAP 5 5 7 6  --  6 6   GLC 97 76 63* 75 80 92 79   BUN 11 9 11 10  --  13 11   CR 0.62 0.70 0.76 0.74  --  0.68 0.68   GFRESTIMATED >90 >90 >90 >90  --  >90 >90   GFRESTBLACK  --   --  >90 >90  --  >90 >90   SRINIVAS 8.8 9.2 9.1 9.8  --  8.8 9.2   BILITOTAL 0.5 0.5 0.7 0.5  --  0.4 0.4   ALBUMIN 3.8 4.3 4.4 4.5  --  4.1 4.6   PROTTOTAL 7.0 7.6 7.7 7.6  --  7.0 8.0   ALKPHOS 36* 43 41 38*  --  39* 44   AST 15 16 19 15  --  17 20   ALT 21 21 25 23  --  23 29     Calcium/VitaminD  Recent Labs   Lab Test 12/15/21  1516 09/01/21  1625 05/18/21  1606 11/05/20  1628 07/23/20  1636 04/13/17  1650 02/20/17  1640 01/20/17  0828 10/26/16  0919   SRINIVAS 8.8 9.2 9.1   < > 9.2   < >  --    < > 9.3   VITDT  --    --   --   --  30  --  33  --  23    < > = values in this interval not displayed.     ESR/CRP  Recent Labs   Lab Test 12/15/21  1516 09/01/21  1625 05/18/21  1606   SED 9 9 8   CRP <2.9 5.6 <2.9     CK/Aldolase  Recent Labs   Lab Test 12/15/21  1516 02/10/21  1627 11/05/20  1628 07/22/16  0916 04/22/16  0902   CKT 60 60 64   < > 45   ALDOLASE  --   --   --   --  4.5    < > = values in this interval not displayed.     TSH/T4  Recent Labs   Lab Test 04/01/16  0910   TSH 1.57     Lipid Panel  Recent Labs   Lab Test 10/15/21  0937 01/21/21  0716 01/17/20  0841 02/09/17  0721 07/10/15  0814   CHOL 163 155 144   < > 149   TRIG 41 38 42   < > 45   HDL 60 56 50   < > 48*   LDL 95 91 86   < > 92   VLDL  --   --   --   --  9   CHOLHDLRATIO  --   --   --   --  3.1   NHDL 103 99 94   < >  --     < > = values in this interval not displayed.     Hepatitis B  Recent Labs   Lab Test 04/22/16  0902   HBCAB Nonreactive   HEPBANG Nonreactive     Hepatitis C  Recent Labs   Lab Test 10/26/18  0836 01/12/18  0828 04/22/16  0902   HCVAB Nonreactive Nonreactive Nonreactive   Assay performance characteristics have not been established for newborns,   infants, and children       Lyme ab screening  Recent Labs   Lab Test 04/01/16  0910   LYMEGM 0.12     HIV Screening  Recent Labs   Lab Test 10/26/18  0836 01/12/18  0828 04/22/16  0902   HIAGAB Nonreactive Nonreactive Nonreactive   HIV-1 p24 Ag & HIV-1/HIV-2 Ab Not Detected       UA  Recent Labs   Lab Test 12/15/21  1509 09/01/21  1629 05/18/21  1617 08/23/18  1638 04/26/18  1648 01/18/18  1640 01/12/18  0836 12/28/17  0145 04/13/17  1650 01/20/17  0827   COLOR Yellow Yellow Yellow   < > Yellow Yellow Yellow Yellow   < > Yellow   APPEARANCE Clear Clear Clear   < > Clear Clear Clear Clear   < > Clear   URINEGLC Negative Negative Negative   < > Negative Negative Negative Negative   < > Negative   URINEBILI Negative Negative Negative   < > Negative Negative Negative Moderate*   < > Negative   SG  1.010 >=1.030 1.010   < > 1.010 1.020 1.025 >1.030   < > >1.030   URINEPH 6.5 6.0 6.0   < > 7.0 7.0 6.5 6.0   < > 6.0   PROTEIN Negative Negative Negative   < > Negative Negative Trace* 100*   < > Trace*   UROBILINOGEN 0.2 0.2 0.2   < > 0.2 0.2 0.2 4.0*   < > 0.2   NITRITE Negative Negative Negative   < > Negative Negative Negative Positive*   < > Negative   UBLD Negative Negative Trace*   < > Negative Negative Negative Negative   < > Negative   LEUKEST Negative Negative Negative   < > Negative Negative Negative Negative   < > Negative   WBCU  --   --  0 - 5  --  0 - 5 O - 2 O - 2 O - 2   < > O - 2   RBCU  --   --  O - 2  --  O - 2 O - 2 O - 2 O - 2   < > O - 2   SQUAMOUSEPI  --   --  Few  --   --  Few Moderate* Moderate*   < > Few   BACTERIA  --   --  Rare*  --   --   --  Moderate* Moderate*   < > Few*   MUCOUS  --   --   --   --   --   --  Present* Present*  --  Present*    < > = values in this interval not displayed.     Urine Microscopic  Recent Labs   Lab Test 05/18/21  1617 04/26/18  1648 01/18/18  1640 01/12/18  0836 12/28/17  0145 04/13/17  1650 01/20/17  0827   WBCU 0 - 5 0 - 5 O - 2 O - 2 O - 2   < > O - 2   RBCU O - 2 O - 2 O - 2 O - 2 O - 2   < > O - 2   SQUAMOUSEPI Few  --  Few Moderate* Moderate*   < > Few   BACTERIA Rare*  --   --  Moderate* Moderate*   < > Few*   MUCOUS  --   --   --  Present* Present*  --  Present*    < > = values in this interval not displayed.     Urine Protein  Recent Labs   Lab Test 12/15/21  1516 09/01/21  1629 05/18/21  1617 02/10/21  1623 11/05/20  1638   UTP <0.05 <0.05 <0.05 <0.05 <0.05   UTPG  --   --  Unable to calculate due to low value Unable to calculate due to low value Unable to calculate due to low value   UCRR 25 23 31 52  --      Immunization History     Immunization History   Administered Date(s) Administered     COVID-19,PF,Pfizer (12+ Yrs) 03/16/2021, 04/06/2021, 08/30/2021     HPV 02/25/2010, 02/04/2011     HepB 06/13/1999, 08/20/1999, 02/05/2000      Historical DTP/aP 1985, 1985, 1985, 01/15/1988, 06/15/1991     Influenza (IIV3) PF 11/07/2011     Influenza Vaccine IM > 6 months Valent IIV4 (Alfuria,Fluzone) 10/11/2016, 10/13/2017, 10/26/2018, 10/17/2019, 10/16/2020, 10/15/2021     MMR 05/15/1987, 09/15/1991     Mantoux Tuberculin Skin Test 07/15/1987, 01/19/2005     Meningococcal (Menomune ) 08/09/2004     OPV, trivalent, live 1986, 1986, 1986, 01/15/1988, 09/15/1991     Pneumo Conj 13-V (2010&after) 05/04/2018     Pneumococcal 23 valent 08/30/2018     TDAP Vaccine (Adacel) 01/21/2009, 02/25/2010, 08/17/2020     Zoster vaccine recombinant adjuvanted (SHINGRIX) 12/13/2018, 03/04/2019          Chart documentation done in part with Dragon Voice recognition Software. Although reviewed after completion, some word and grammatical error may remain.      Video-Visit Details    Type of service:  Video Visit    Video Start Time: 7:55 AM    Video End Time: 8:04 AM    Originating Location (pt. Location): [place], [place]    Distant Location (provider location):  [facility]     Platform used for Video Visit: Bahman Light MD

## 2022-01-05 NOTE — PATIENT INSTRUCTIONS
RHEUMATOLOGY    Dr. Lavon Light    [address] [place] [address]  Phone number: [phone]  Fax number: [phone]      Thank you for choosing [facility]!    Carissa Frye CMA Rheumatology

## 2022-01-28 ENCOUNTER — TRANSFERRED RECORDS (OUTPATIENT)
Dept: HEALTH INFORMATION MANAGEMENT | Facility: CLINIC | Age: 37
End: 2022-01-28
Payer: COMMERCIAL

## 2022-02-09 ENCOUNTER — LAB (OUTPATIENT)
Dept: LAB | Facility: CLINIC | Age: 37
End: 2022-02-09
Payer: COMMERCIAL

## 2022-02-09 DIAGNOSIS — Z00.00 ROUTINE HISTORY AND PHYSICAL EXAMINATION OF ADULT: ICD-10-CM

## 2022-02-09 PROCEDURE — 36415 COLL VENOUS BLD VENIPUNCTURE: CPT

## 2022-02-09 PROCEDURE — 80061 LIPID PANEL: CPT

## 2022-02-09 PROCEDURE — 82947 ASSAY GLUCOSE BLOOD QUANT: CPT

## 2022-02-10 LAB
CHOLEST SERPL-MCNC: 158 MG/DL
FASTING STATUS PATIENT QL REPORTED: YES
FASTING STATUS PATIENT QL REPORTED: YES
GLUCOSE BLD-MCNC: 83 MG/DL (ref 70–99)
HDLC SERPL-MCNC: 50 MG/DL
LDLC SERPL CALC-MCNC: 98 MG/DL
NONHDLC SERPL-MCNC: 108 MG/DL
TRIGL SERPL-MCNC: 50 MG/DL

## 2022-02-16 ENCOUNTER — ALLIED HEALTH/NURSE VISIT (OUTPATIENT)
Dept: FAMILY MEDICINE | Facility: CLINIC | Age: 37
End: 2022-02-16
Payer: COMMERCIAL

## 2022-02-16 VITALS
SYSTOLIC BLOOD PRESSURE: 108 MMHG | BODY MASS INDEX: 24.17 KG/M2 | HEIGHT: 67 IN | WEIGHT: 154 LBS | DIASTOLIC BLOOD PRESSURE: 76 MMHG

## 2022-02-16 DIAGNOSIS — Z01.30 BP CHECK: Primary | ICD-10-CM

## 2022-02-16 PROCEDURE — 99207 PR NO CHARGE NURSE ONLY: CPT

## 2022-02-16 NOTE — PROGRESS NOTES
Patient came in for bp and wt check for biometric screening form.     Form placed in Sirisha Chirinos's sign me folder.

## 2022-02-23 ENCOUNTER — MYC MEDICAL ADVICE (OUTPATIENT)
Dept: FAMILY MEDICINE | Facility: CLINIC | Age: 37
End: 2022-02-23
Payer: COMMERCIAL

## 2022-03-04 ENCOUNTER — IMMUNIZATION (OUTPATIENT)
Dept: NURSING | Facility: CLINIC | Age: 37
End: 2022-03-04
Payer: COMMERCIAL

## 2022-03-04 PROCEDURE — 91306 COVID-19,PF,MODERNA (18+ YRS BOOSTER .25ML): CPT

## 2022-03-04 PROCEDURE — 0064A COVID-19,PF,MODERNA (18+ YRS BOOSTER .25ML): CPT

## 2022-04-11 ENCOUNTER — LAB (OUTPATIENT)
Dept: LAB | Facility: CLINIC | Age: 37
End: 2022-04-11
Payer: COMMERCIAL

## 2022-04-11 DIAGNOSIS — M32.19 OTHER SYSTEMIC LUPUS ERYTHEMATOSUS WITH OTHER ORGAN INVOLVEMENT (H): ICD-10-CM

## 2022-04-11 DIAGNOSIS — Z79.899 HIGH RISK MEDICATIONS (NOT ANTICOAGULANTS) LONG-TERM USE: ICD-10-CM

## 2022-04-11 LAB
BASOPHILS # BLD AUTO: 0 10E3/UL (ref 0–0.2)
BASOPHILS NFR BLD AUTO: 0 %
CREAT UR-MCNC: 66 MG/DL
CRP SERPL-MCNC: <2.9 MG/L (ref 0–8)
EOSINOPHIL # BLD AUTO: 0 10E3/UL (ref 0–0.7)
EOSINOPHIL NFR BLD AUTO: 1 %
ERYTHROCYTE [DISTWIDTH] IN BLOOD BY AUTOMATED COUNT: 11.8 % (ref 10–15)
ERYTHROCYTE [SEDIMENTATION RATE] IN BLOOD BY WESTERGREN METHOD: 8 MM/HR (ref 0–20)
HCT VFR BLD AUTO: 40.9 % (ref 35–47)
HGB BLD-MCNC: 14.4 G/DL (ref 11.7–15.7)
LYMPHOCYTES # BLD AUTO: 0.8 10E3/UL (ref 0.8–5.3)
LYMPHOCYTES NFR BLD AUTO: 17 %
MCH RBC QN AUTO: 32.9 PG (ref 26.5–33)
MCHC RBC AUTO-ENTMCNC: 35.2 G/DL (ref 31.5–36.5)
MCV RBC AUTO: 93 FL (ref 78–100)
MONOCYTES # BLD AUTO: 0.6 10E3/UL (ref 0–1.3)
MONOCYTES NFR BLD AUTO: 12 %
NEUTROPHILS # BLD AUTO: 3.4 10E3/UL (ref 1.6–8.3)
NEUTROPHILS NFR BLD AUTO: 70 %
PLATELET # BLD AUTO: 246 10E3/UL (ref 150–450)
PROT UR-MCNC: 0.07 G/L
PROT/CREAT 24H UR: 0.11 G/G CR (ref 0–0.2)
RBC # BLD AUTO: 4.38 10E6/UL (ref 3.8–5.2)
WBC # BLD AUTO: 4.9 10E3/UL (ref 4–11)

## 2022-04-11 PROCEDURE — 85652 RBC SED RATE AUTOMATED: CPT

## 2022-04-11 PROCEDURE — 85025 COMPLETE CBC W/AUTO DIFF WBC: CPT

## 2022-04-11 PROCEDURE — 80053 COMPREHEN METABOLIC PANEL: CPT

## 2022-04-11 PROCEDURE — 84156 ASSAY OF PROTEIN URINE: CPT

## 2022-04-11 PROCEDURE — 86140 C-REACTIVE PROTEIN: CPT

## 2022-04-11 PROCEDURE — 36415 COLL VENOUS BLD VENIPUNCTURE: CPT

## 2022-04-12 LAB
ALBUMIN SERPL-MCNC: 4.5 G/DL (ref 3.4–5)
ALP SERPL-CCNC: 42 U/L (ref 40–150)
ALT SERPL W P-5'-P-CCNC: 20 U/L (ref 0–50)
ANION GAP SERPL CALCULATED.3IONS-SCNC: 3 MMOL/L (ref 3–14)
AST SERPL W P-5'-P-CCNC: 15 U/L (ref 0–45)
BILIRUB SERPL-MCNC: 0.5 MG/DL (ref 0.2–1.3)
BUN SERPL-MCNC: 12 MG/DL (ref 7–30)
CALCIUM SERPL-MCNC: 9.3 MG/DL (ref 8.5–10.1)
CHLORIDE BLD-SCNC: 108 MMOL/L (ref 94–109)
CO2 SERPL-SCNC: 26 MMOL/L (ref 20–32)
CREAT SERPL-MCNC: 0.74 MG/DL (ref 0.52–1.04)
GFR SERPL CREATININE-BSD FRML MDRD: >90 ML/MIN/1.73M2
GLUCOSE BLD-MCNC: 86 MG/DL (ref 70–99)
POTASSIUM BLD-SCNC: 3.7 MMOL/L (ref 3.4–5.3)
PROT SERPL-MCNC: 7.4 G/DL (ref 6.8–8.8)
SODIUM SERPL-SCNC: 137 MMOL/L (ref 133–144)

## 2022-04-13 ENCOUNTER — OFFICE VISIT (OUTPATIENT)
Dept: RHEUMATOLOGY | Facility: CLINIC | Age: 37
End: 2022-04-13
Payer: COMMERCIAL

## 2022-04-13 VITALS
DIASTOLIC BLOOD PRESSURE: 76 MMHG | OXYGEN SATURATION: 96 % | HEART RATE: 96 BPM | WEIGHT: 155.6 LBS | BODY MASS INDEX: 24.37 KG/M2 | SYSTOLIC BLOOD PRESSURE: 129 MMHG

## 2022-04-13 DIAGNOSIS — Z79.899 HIGH RISK MEDICATIONS (NOT ANTICOAGULANTS) LONG-TERM USE: ICD-10-CM

## 2022-04-13 DIAGNOSIS — E55.9 VITAMIN D DEFICIENCY: ICD-10-CM

## 2022-04-13 DIAGNOSIS — M32.19 OTHER SYSTEMIC LUPUS ERYTHEMATOSUS WITH OTHER ORGAN INVOLVEMENT (H): Primary | ICD-10-CM

## 2022-04-13 PROCEDURE — 99214 OFFICE O/P EST MOD 30 MIN: CPT | Performed by: INTERNAL MEDICINE

## 2022-04-13 RX ORDER — CHOLECALCIFEROL (VITAMIN D3) 50 MCG
50 TABLET ORAL DAILY
Qty: 90 TABLET | Refills: 2 | Status: SHIPPED | OUTPATIENT
Start: 2022-04-13 | End: 2022-10-17

## 2022-04-13 RX ORDER — HYDROXYCHLOROQUINE SULFATE 200 MG/1
200 TABLET, FILM COATED ORAL DAILY
Qty: 90 TABLET | Refills: 2 | Status: SHIPPED | OUTPATIENT
Start: 2022-04-13 | End: 2022-10-17

## 2022-04-13 RX ORDER — AZATHIOPRINE 50 MG/1
50 TABLET ORAL DAILY
Qty: 90 TABLET | Refills: 2 | Status: SHIPPED | OUTPATIENT
Start: 2022-04-13 | End: 2022-10-17

## 2022-04-13 NOTE — NURSING NOTE
RAPID3 (0-30) Cumulative Score  13.2          RAPID3 Weighted Score (divide #4 by 3 and that is the weighted score)  4.4

## 2022-04-13 NOTE — PATIENT INSTRUCTIONS
RHEUMATOLOGY    Dr. Lavon Light    [address] [place] [address]  Phone number: [phone]  Fax number: [phone]      Thank you for choosing [facility]!    Carissa Frye CMA Rheumatology

## 2022-04-13 NOTE — PROGRESS NOTES
Rheumatology Clinic Visit      Aleida Weinberg MRN# [id number]   YOB: 1985 Age: 37 year old      Date of visit: 4/13/22   PCP: Sayra Chirinos     Chief Complaint   Patient presents with:  Systemic Lupus Erythematous: Doing good.    Assessment and Plan     1. Systemic lupus erythematosus (CHANELL >1:[id number] speckled, RNP+, Sm+, Scl-70+, hypocomplementemia, photosensitivity, malar rash, arthritis, fatigue, Raynaud's phenomenon):  Dx'd 4/2016. Scl-70+; she lacks features of scleroderma except for raynaud's; no myopathy based on labs/symptoms. 5/11/2018 echo without evidence of pulmonary hypertension. Previously on MTX 20mg SQ weekly (GI upset with PO doses above 15mg, partially effective) and SSZ (LFT elevations).  Currently on AZA 50mg daily and HCQ 200mg daily (patient self reduced previously from 300mg daily on average based on preference of an easier regimen and continued to do well). TPMT normal on 8/21/2017.  Doing well at this time.  Chronic illness, stable.    - Continue hydroxychloroquine 200mg daily (last eye exam was okay on 2/22/2021 by Dr. Frias)  - Continue azathioprine 50mg daily   - Labs in 3 months: CBC, CMP, ESR, CRP, UA, Uprotein:creatinine    High risk medication requiring intensive toxicity monitoring at least quarterly: labs ordered include CBC, Creatinine, Hepatic panel to monitor for cytopenia and hepatotoxicity; checking creatinine as it affects clearance of medication.       2. Raynaud's Phenomenon: Cold avoidance was insufficient for controlling her symptoms in the past; then did well on amlodipine, but not using now and doing okay. Amlodipine PRN.   Chronic illness, stable.    - Amlodipine 10mg daily during colder months, as needed    3.  Low vitamin D: Currently on vitamin D 2000 units daily.    - Continue vitamin D 2000 units daily    4.  Secondary Sjogren's syndrome: Mild dry and dry mouth that are treated infrequently with artificial tears and frequent sips of water.  Dentist follow-up every 6 months     5.  Facial rash: Seeing dermatology at [facility].  Reportedly due to SLE and rosacea from derm perspective, and improving with topical creams from her dermatologist for rosacea.    6.  Vaccinations:   - Influenza: encouraged yearly vaccination  - [id number]: up to date  - [id number]: up to date  - Shingrix: Up to date  - COVID-19: has received the Pfizer COVID-19 vaccine on 3/16/2021, 4/6/2021, and 8/30/2021; Moderna vaccine on 3/4/22.  May receive 1/5 mRNA COVID-19 vaccination 4 months after the fourth dose was received    Total minutes spent in evaluation with patient, documentation, , and review of pertinent studies and chart notes: 16    Ms. Weinberg verbalized agreement with and understanding of the rational for the diagnosis and treatment plan.  All questions were answered to best of my ability and the patient's satisfaction. Ms. Weinberg was advised to contact the clinic with any questions that may arise after the clinic visit.      Thank you for involving me in the care of the patient    Return to clinic: 3-4 months      HPI   Aleida Weinberg is a 37 year old female with medical history significant for intermittent asthma, anxiety, migraines, vitamin D deficiency, and systemic lupus erythematosus who presents for follow-up of SLE.     Today, 4/13/2022: Currently doing well.  No joint pain or swelling.  No morning stiffness or gelling phenomenon.  Raynaud's phenomenon doing well; she did not require amlodipine last winter.  Taking vitamin D 2000 units daily.  Biggest issue is a facial rash with any sun exposure despite using high SPF sunscreens, so she tries to avoid sun exposure as much as she can    Denies fevers, chills, nausea, vomiting, constipation, diarrhea. No abdominal pain. No chest pain/pressure, palpitations, or shortness of breath. No oral or nasal sores.     Tobacco: quit smoking in 2005  EtOH: Once monthly  Drugs: None  Occupation: Works at [employer]  [place], a lot of typing per patient    ROS   12 point review of system was completed and negative except as noted in the HPI     Active Problem List     Patient Active Problem List   Diagnosis     Other kyphoscoliosis and scoliosis     Hypertrophic and atrophic condition of skin     Viral warts     CARDIOVASCULAR SCREENING; LDL GOAL LESS THAN 160     Intermittent asthma     Anxiety     Intractable menstrual migraine without status migrainosus     Vitamin D deficiency     Inflammatory polyarthropathy (H)     Chronic back pain     Raynaud's phenomenon without gangrene     Systemic lupus erythematosus (H)     High risk medications (not anticoagulants) long-term use (Plaquenil and AZA)     Dry eyes, bilateral     Past Medical History     Past Medical History:   Diagnosis Date     Arthritis      Lupus erythematosus      Mild intermittent asthma      Other kyphoscoliosis and scoliosis     upper back curvature and disc degeneration in lower back.     Past Surgical History     Past Surgical History:   Procedure Laterality Date      SECTION       SURGICAL HISTORY OF -       PE Tubes     Allergy     Allergies   Allergen Reactions     Fluconazole Rash     Current Medication List     Current Outpatient Medications   Medication Sig     albuterol (PROAIR HFA/PROVENTIL HFA/VENTOLIN HFA) 108 (90 Base) MCG/ACT inhaler Inhale 2 puffs into the lungs every 6 hours as needed for shortness of breath / dyspnea     azaTHIOprine (IMURAN) 50 MG tablet Take 1 tablet (50 mg) by mouth daily     cyclobenzaprine (FLEXERIL) 10 MG tablet Take 1 tablet (10 mg) by mouth 3 times daily as needed for muscle spasms     hydroxychloroquine (PLAQUENIL) 200 MG tablet Take 1 tablet (200 mg) by mouth daily . Yearly eye exam including 10-2 VF and SD-OCT required     hydrOXYzine (ATARAX) 25 MG tablet Take 1 tablet (25 mg) by mouth every 6 hours as needed for anxiety     levonorgestrel (MIRENA) 20 MCG/24HR IUD 1 each by Intrauterine route once      "LORazepam (ATIVAN) 0.5 MG tablet Take 1 tablet (0.5 mg) by mouth every 8 hours as needed for anxiety     traZODone (DESYREL) 50 MG tablet Take 0.5 tablets (25 mg) by mouth nightly as needed for sleep     vitamin D3 (CHOLECALCIFEROL) 50 mcg (2000 units) tablet Take 1 tablet (50 mcg) by mouth daily     amLODIPine (NORVASC) 10 MG tablet Take 1 tablet (10 mg) by mouth daily for raynaud's phenomenon (Patient not taking: Reported on 4/13/2022)     No current facility-administered medications for this visit.         Social History   See HPI    Family History     Family History   Problem Relation Age of Onset     Heart Disease Maternal Grandfather         Bypass, Heart Disease     Respiratory Maternal Grandfather         asthma     Macular Degeneration Maternal Grandfather      Hypertension Paternal Grandmother      Cancer Paternal Grandmother         lymph nodes     Cataracts Paternal Grandmother      C.A.D. Paternal Grandfather      Heart Disease Maternal Uncle         MI's      Alcohol/Drug Maternal Uncle      Respiratory Other         cousin on mothers side, asthma     Alcohol/Drug Other         bother great grandparents on mother's side     Diabetes No family hx of      Breast Cancer No family hx of      Cancer - colorectal No family hx of      Denies family history of autoimmune disease    Physical Exam     Temp Readings from Last 3 Encounters:   10/15/21 98.2  F (36.8  C) (Oral)   02/09/21 97.9  F (36.6  C) (Oral)   12/10/20 98.1  F (36.7  C) (Oral)     BP Readings from Last 5 Encounters:   04/13/22 129/76   02/16/22 108/76   10/15/21 108/72   09/08/21 115/78   02/09/21 132/86     Pulse Readings from Last 1 Encounters:   04/13/22 96     Resp Readings from Last 1 Encounters:   02/09/21 20     Estimated body mass index is 24.37 kg/m  as calculated from the following:    Height as of 2/16/22: 1.702 m (5' 7\").    Weight as of this encounter: 70.6 kg (155 lb 9.6 oz).      GEN: NAD. Healthy appearing adult.   HEENT:  " Anicteric, noninjected sclera. No obvious external lesions of the ear and nose. Hearing intact.  CV: S1, S2. RRR. No m/r/g  PULM: No increased work of breathing. CTA bilaterally   MSK: MCPs, PIPs, DIPs without swelling or tenderness to palpation.  Wrists without swelling or tenderness to palpation.  Elbows and shoulders without swelling or tenderness to palpation.  Knees, ankles, and MTPs without swelling or tenderness to palpation.    SKIN: No rash or jaundice seen.  No evidence of current or previous ischemic insults to the fingertips by visual inspection  PSYCH: Alert. Appropriate.         Labs / Imaging (select studies)     RF/CCP  Recent Labs   Lab Test 04/22/16  0902 04/01/16  0910   CCPIGG 1  --    RHF  --  <20     CHANELL  Recent Labs   Lab Test 04/22/16  0902 04/01/16  0910   VALERIE  --  12.4*   ANAIGG >1:78970  Reference range: <1:40  (Note)  Speckled pattern.  INTERPRETIVE INFORMATION: CHANELL by IFA, IgG  Anti-nuclear antibodies (CHANELL) are seen in a variety of  systemic rheumatic diseases and are determined by indirect  fluorescence assay (IFA) using HEp-2 substrate with an  IgG-specific conjugate. CHANELL titers less than or equal to  1:80 have variable relevance while titers greater than or  equal to 1:160 are considered clinically significant. These  antibodies may precede clinical disease onset; however,  healthy individuals and those with advanced age have been  reported to be positive for CHANELL. When observed, one of the  five basic patterns is reported: homogeneous,  peripheral/rim, speckled, centromere, or nucleolar. If  cytoplasmic fluorescence is observed, it is noted. IFA  methodology is subjective and has occasionally been shown  to lack sensitivity for anti-SSA/Ro antibodies.  Negative results do not necessarily rule out the presence  of SSc. If clinical suspicion remains, consi vicki further  testing for U3-RNP, PM/Scl, or Th/To antibodies associated  with SSc.  Performed by [facility],  [address] [place] [address]  [place] [address] [phone]  [email or web address], Twin Rodriguez MD, Lab. Director    --      RNP/Sm/SSA/SSB  Recent Labs   Lab Test 01/12/18  0828 04/22/16  0902   RNPIGG  --  >8.0  Positive   Antibody index (AI) values reflect qualitative changes in antibody   concentration that cannot be directly associated with clinical condition or   disease state.  *   SMIGG  --  1.5*   SSAIGG  --  <0.2  Negative   Antibody index (AI) values reflect qualitative changes in antibody   concentration that cannot be directly associated with clinical condition or   disease state.     SSBIGG  --  <0.2  Negative   Antibody index (AI) values reflect qualitative changes in antibody   concentration that cannot be directly associated with clinical condition or   disease state.     SCLIGG  --  3.1*   TREPAB Negative  --      dsDNA  Recent Labs   Lab Test 12/15/21  1516 02/10/21  1627 11/05/20  1628 07/23/20  1636 04/02/20  1541 07/11/19  1529 11/29/18  1616 08/23/18  1637 04/26/18  1648   DNA 1.1 1 1 1 2 2 2 2 2     C3/C4  Recent Labs   Lab Test 12/15/21  1516 05/18/21  1606 02/10/21  1627 11/05/20  1628 07/23/20  1636 04/02/20  1541 10/18/19  1542 07/11/19  1529 11/29/18  1616   [id number] 87 84 89 89 92 90 74* 75* 82   [id number] 17 18 17 17 18 20 14* 14* 13*     Send-out Labs  Recent Labs   Lab Test 04/21/17  0813   SRESLT SEE NOTE  (Note)  Test name                    Result Flag  Units  RefIntvl  ------------------------------------------------------------  Thiopurine Methyltransferase                                23.2 L      U/mL 24.0-44.0  Sample slightly hemolyzed. Please interpret the results  with caution.  INTERPRETIVE INFORMATION: Thiopurine Methyltransferase, RBC  Normal TPMT activity:  24.0-44.0 U/mL................Individuals are predicted to  be at low risk of bone marrow toxicity (myelosuppression)  as a consequence of standard thiopurine therapy; no dose  adjustment is recommended.  Intermediate TPMT activity:  17.0-23.9  U/mL................Individuals are predicted to  be at intermediate risk of bone marrow toxicity  (myelosuppression) as a consequence of standard thiopurine  therapy; a dose reduction and therapeutic drug management  is recommended.  Low TPMT activity:  less than 17.0 U/mL...........Individuals are predicted to  be at high risk of bone marrow toxicity (myelosuppression)   as a consequence of standard thiopurine dosing. It is  recommended to avoid the use of thiopurine drugs.  High TPMT activity:  greater than 44.0 U/mL........Individuals are not predicted  to be at risk for bone marrow toxicity (myelosuppression)  as a consequence of standard thiopurine dosing, but may be  at risk for therapeutic failure due to excessive  inactivation of thiopurine drugs. Individuals may require  higher than the normal standard dose. Therapeutic drug  management is recommended.  The TPMT, RBC assay is used as a screen to detect  individuals with low and intermediate TPMT activity who may  be at risk for myelosuppression when exposed to standard  doses of thiopurines, including azathioprine (Imuran) and  6-mercaptopurine (Purinethol). TPMT is the primary  metabolic route for inactivation of thiopurine drugs in the  bone marrow. When TPMT activity is low, it is predicted  that proportionately more 6-mercaptopurine can be converted  into the cytotoxic 6-thioguanine nucle otides that  accumulate in the bone marrow causing excessive toxicity.  The activity of TPMT is measured by the nanomoles of  6-methylmercaptopurine (inactive metabolite) produced per 1  mL of packed red blood cells, (U/mL).    TPMT phenotype testing does not replace the need for  clinical monitoring of patients treated with thiopurine  drugs. Genotype for TPMT cannot be inferred from TPMT  activity (phenotype). Phenotype testing should not be  requested for patients currently treated with thiopurine  drugs. Current TPMT phenotype may not reflect future  TPMT  phenotype, particularly in patients who received blood  transfusion within 30-60 days of testing.  TPMT enzyme  activity can be inhibited by several drugs such as:  naproxen (Aleve), ibuprofen (Advil, Motrin), ketoprofen  (Orudis), furosemide (Lasix), sulfasalazine (Azulfidine),  mesalamine (Asacol), olsalazine (Dipentum), mefenamic acid  (Ponstel), thiazide diuretics, and benzoic acid inhibitors.  TPMT inhibitors may contribute t o falsely low results;  patients should abstain from these drugs for at least 48  hours prior to TPMT testing. Falsely low results may also  occur as a result of inappropriate specimen handling and  hemolysis.  Test developed and characteristics determined by [facility]. See Compliance Statement B: www.[facility].com/CS  Performed by [facility],  [address] [place] [id number] [phone]  [email or web address], Lionel Ferreira MD, Lab. Director     STSTNM Thiopurine Methyltransferase, RBC   STSTCD 92,066   SSPTYP Whole blood, EDTA anticoagulant     Antiphospholipid Antibodies  Recent Labs   Lab Test 04/22/16  0902   B2GPG 0.9   B2GPM <0.9  Negative     CARG <15.0  Interpretation:  Negative     JOANN <12.5  Interpretation:  Negative     LUPINT Negative  (Note)  COMMENTS:  The INR is normal.  APTT ratio is normal.  DRVVT Screen ratio is normal.  Thrombin time is normal.  NEGATIVE TEST; A LUPUS ANTICOAGULANT WAS NOT DETECTED IN THIS  SPECIMEN WITHIN THE LIMITS OF THE TESTING REPERTOIRE.  If the clinical picture is strongly suggestive of an antiphospholipid  syndrome, recommend anticardiolipin and beta-2-glycoprotein (IgG and  IgM) antibody tests.  Isabella Olson M.D.  [phone]  4/25/2016    INR =  1.05    Reference range: 0.86-1.14  Thrombin Time= 15.4    Reference range: 13.0-19.0 sec    APTT:       Ratio  Patient  =  0.92  1:2 Mix  =  N/A  Reference:  Negative: Less than or equal to 1.16  Positive: Greater than or equal to 1.17     DILUTE LISA VIPER VENOM  TEST:  Screen Ratio = 0.95   Normal is less than 1.21           CBC  Recent Labs   Lab Test 04/11/22  1441 12/15/21  1516 09/01/21 1625 05/18/21  1606 02/10/21  1627 11/05/20  1628   WBC 4.9 4.7 4.4 3.9* 4.2 3.3*   RBC 4.38 4.07 4.34 4.26 4.33 3.95   HGB 14.4 13.4 14.5 14.2 14.2 13.1   HCT 40.9 37.6 40.9 40.3 40.8 37.2   MCV 93 92 94 95 94 94   RDW 11.8 11.4 11.7 11.7 11.8 11.4    212 224 225 212 202   MCH 32.9 32.9 33.4* 33.3* 32.8 33.2*   MCHC 35.2 35.6 35.5 35.2 34.8 35.2   NEUTROPHIL 70 69 73 64.3 67.8 60.7   LYMPH 17 16 12 20.2 18.0 23.1   MONOCYTE 12 14 14 14.0 13.0 14.4   EOSINOPHIL 1 1 1 1.0 0.7 1.2   BASOPHIL 0 0 0 0.5 0.5 0.6   ANEU  --   --   --  2.5 2.9 2.0   ALYM  --   --   --  0.8 0.8 0.8   AMANDA  --   --   --  0.6 0.6 0.5   AEOS  --   --   --  0.0 0.0 0.0   ABAS  --   --   --  0.0 0.0 0.0   ANEUTAUTO 3.4 3.2 3.2  --   --   --    ALYMPAUTO 0.8 0.7* 0.5*  --   --   --    AMONOAUTO 0.6 0.7 0.6  --   --   --    AEOSAUTO 0.0 0.0 0.0  --   --   --    ABSBASO 0.0 0.0 0.0  --   --   --      CMP  Recent Labs   Lab Test 04/11/22  1441 02/09/22  0701 12/15/21  1516 09/01/21  1625 05/18/21  1606 02/10/21  1627 01/21/21  0716 11/05/20  1628 07/23/20  1636     --  137 136 140 136  --  138 138   POTASSIUM 3.7  --  3.9 3.8 3.7 4.0  --  3.8 4.1   CHLORIDE 108  --  108 103 107 106  --  107 106   CO2 26  --  24 28 26 24  --  25 26   ANIONGAP 3  --  5 5 7 6  --  6 6   GLC 86 83 97 76 63* 75   < > 92 79   BUN 12  --  11 9 11 10  --  13 11   CR 0.74  --  0.62 0.70 0.76 0.74  --  0.68 0.68   GFRESTIMATED >90  --  >90 >90 >90 >90  --  >90 >90   GFRESTBLACK  --   --   --   --  >90 >90  --  >90 >90   SRINIVAS 9.3  --  8.8 9.2 9.1 9.8  --  8.8 9.2   BILITOTAL 0.5  --  0.5 0.5 0.7 0.5  --  0.4 0.4   ALBUMIN 4.5  --  3.8 4.3 4.4 4.5  --  4.1 4.6   PROTTOTAL 7.4  --  7.0 7.6 7.7 7.6  --  7.0 8.0   ALKPHOS 42  --  36* 43 41 38*  --  39* 44   AST 15  --  15 16 19 15  --  17 20   ALT 20  --  21 21 25 23  --  23 29    < > =  values in this interval not displayed.     Calcium/VitaminD  Recent Labs   Lab Test 04/11/22  1441 12/15/21  1516 09/01/21  1625 11/05/20  1628 07/23/20  1636 04/13/17  1650 02/20/17  1640 01/20/17  0828 10/26/16  0919   SRINIVAS 9.3 8.8 9.2   < > 9.2   < >  --    < > 9.3   VITDT  --   --   --   --  30  --  33  --  23    < > = values in this interval not displayed.     ESR/CRP  Recent Labs   Lab Test 04/11/22  1441 12/15/21  1516 09/01/21  1625   SED 8 9 9   CRP <2.9 <2.9 5.6     CK/Aldolase  Recent Labs   Lab Test 12/15/21  1516 02/10/21  1627 11/05/20  1628 07/22/16  0916 04/22/16  0902   CKT 60 60 64   < > 45   ALDOLASE  --   --   --   --  4.5    < > = values in this interval not displayed.     TSH/T4  Recent Labs   Lab Test 04/01/16  0910   TSH 1.57     Lipid Panel  Recent Labs   Lab Test 02/09/22  0701 10/15/21  0937 01/21/21  0716 02/09/17  0721 07/10/15  0814   CHOL 158 163 155   < > 149   TRIG 50 41 38   < > 45   HDL 50 60 56   < > 48*   LDL 98 95 91   < > 92   VLDL  --   --   --   --  9   CHOLHDLRATIO  --   --   --   --  3.1   NHDL 108 103 99   < >  --     < > = values in this interval not displayed.     Hepatitis B  Recent Labs   Lab Test 04/22/16  0902   HBCAB Nonreactive   HEPBANG Nonreactive     Hepatitis C  Recent Labs   Lab Test 10/26/18  0836 01/12/18  0828 04/22/16  0902   HCVAB Nonreactive Nonreactive Nonreactive   Assay performance characteristics have not been established for newborns,   infants, and children       Lyme ab screening  Recent Labs   Lab Test 04/01/16  0910   LYMEGM 0.12     HIV Screening  Recent Labs   Lab Test 10/26/18  0836 01/12/18  0828 04/22/16  0902   HIAGAB Nonreactive Nonreactive Nonreactive   HIV-1 p24 Ag & HIV-1/HIV-2 Ab Not Detected       UA  Recent Labs   Lab Test 12/15/21  1509 09/01/21  1629 05/18/21  1617 08/23/18  1638 04/26/18  1648 01/18/18  1640 01/12/18  0836 12/28/17  0145 04/13/17  1650 01/20/17  0827   COLOR Yellow Yellow Yellow   < > Yellow Yellow Yellow Yellow    < > Yellow   APPEARANCE Clear Clear Clear   < > Clear Clear Clear Clear   < > Clear   URINEGLC Negative Negative Negative   < > Negative Negative Negative Negative   < > Negative   URINEBILI Negative Negative Negative   < > Negative Negative Negative Moderate*   < > Negative   SG 1.010 >=1.030 1.010   < > 1.010 1.020 1.025 >1.030   < > >1.030   URINEPH 6.5 6.0 6.0   < > 7.0 7.0 6.5 6.0   < > 6.0   PROTEIN Negative Negative Negative   < > Negative Negative Trace* 100*   < > Trace*   UROBILINOGEN 0.2 0.2 0.2   < > 0.2 0.2 0.2 4.0*   < > 0.2   NITRITE Negative Negative Negative   < > Negative Negative Negative Positive*   < > Negative   UBLD Negative Negative Trace*   < > Negative Negative Negative Negative   < > Negative   LEUKEST Negative Negative Negative   < > Negative Negative Negative Negative   < > Negative   WBCU  --   --  0 - 5  --  0 - 5 O - 2 O - 2 O - 2   < > O - 2   RBCU  --   --  O - 2  --  O - 2 O - 2 O - 2 O - 2   < > O - 2   SQUAMOUSEPI  --   --  Few  --   --  Few Moderate* Moderate*   < > Few   BACTERIA  --   --  Rare*  --   --   --  Moderate* Moderate*   < > Few*   MUCOUS  --   --   --   --   --   --  Present* Present*  --  Present*    < > = values in this interval not displayed.     Urine Microscopic  Recent Labs   Lab Test 05/18/21  1617 04/26/18  1648 01/18/18  1640 01/12/18  0836 12/28/17  0145 04/13/17  1650 01/20/17  0827   WBCU 0 - 5 0 - 5 O - 2 O - 2 O - 2   < > O - 2   RBCU O - 2 O - 2 O - 2 O - 2 O - 2   < > O - 2   SQUAMOUSEPI Few  --  Few Moderate* Moderate*   < > Few   BACTERIA Rare*  --   --  Moderate* Moderate*   < > Few*   MUCOUS  --   --   --  Present* Present*  --  Present*    < > = values in this interval not displayed.     Urine Protein  Recent Labs   Lab Test 04/11/22  1441 12/15/21  1516 09/01/21  1629   UTP 0.07 <0.05 <0.05   UTPG 0.11  --   --    UCRR 66 25 23     Immunization History     Immunization History   Administered Date(s) Administered     COVID-19,PF,Moderna Booster  03/04/2022     COVID-19,PF,Pfizer (12+ Yrs) 03/16/2021, 04/06/2021, 08/30/2021     DT (PEDS <7y) 08/22/1997     Flu, Unspecified 10/21/2015     HPV 02/25/2010, 02/04/2011     HPV Quadrivalent 02/25/2010, 02/04/2011     HepB 06/13/1999, 08/20/1999, 02/05/2000     HepB-Adult 07/13/1999, 08/20/1999, 02/05/2000     Historical DTP/aP 1986, 1985, 1986, 01/15/1988, 06/15/1991, 08/22/1997     Historical Hepb 07/13/1999, 08/20/1999     Influenza (IIV3) PF 10/19/2010, 11/07/2011, 09/23/2012     Influenza Vaccine IM > 6 months Valent IIV4 (Alfuria,Fluzone) 10/11/2016, 10/13/2017, 10/26/2018, 10/17/2019, 10/16/2020, 10/15/2021     MMR 05/15/1987, 09/15/1991     Mantoux Tuberculin Skin Test 07/15/1987, 01/19/2005     Meningococcal (Menactra ) 08/09/2004     Meningococcal (Menomune ) 08/09/2004     OPV, trivalent, live 1986, 1986, 1986, 01/15/1988, 09/15/1991     OPV, unspecified 1986, 1986, 1986, 01/15/1988, 09/15/1991     Pneumo Conj 13-V (2010&after) 05/04/2018     Pneumococcal 23 valent 08/30/2018     TDAP Vaccine (Adacel) 01/21/2009, 02/25/2010, 08/17/2020     Tdap (Adacel,Boostrix) 01/20/2009     Zoster vaccine recombinant adjuvanted (SHINGRIX) 12/13/2018, 03/04/2019          Chart documentation done in part with Dragon Voice recognition Software. Although reviewed after completion, some word and grammatical error may remain.    Lavon Light MD

## 2022-07-14 ENCOUNTER — LAB (OUTPATIENT)
Dept: LAB | Facility: CLINIC | Age: 37
End: 2022-07-14
Payer: COMMERCIAL

## 2022-07-14 DIAGNOSIS — Z79.899 HIGH RISK MEDICATIONS (NOT ANTICOAGULANTS) LONG-TERM USE: ICD-10-CM

## 2022-07-14 DIAGNOSIS — M32.19 OTHER SYSTEMIC LUPUS ERYTHEMATOSUS WITH OTHER ORGAN INVOLVEMENT (H): ICD-10-CM

## 2022-07-14 LAB
ALBUMIN MFR UR ELPH: 6.9 MG/DL
ALBUMIN UR-MCNC: NEGATIVE MG/DL
APPEARANCE UR: CLEAR
BASOPHILS # BLD AUTO: 0 10E3/UL (ref 0–0.2)
BASOPHILS NFR BLD AUTO: 0 %
BILIRUB UR QL STRIP: NEGATIVE
COLOR UR AUTO: YELLOW
CREAT UR-MCNC: 76.5 MG/DL
CRP SERPL-MCNC: <3 MG/L
EOSINOPHIL # BLD AUTO: 0 10E3/UL (ref 0–0.7)
EOSINOPHIL NFR BLD AUTO: 1 %
ERYTHROCYTE [DISTWIDTH] IN BLOOD BY AUTOMATED COUNT: 11.4 % (ref 10–15)
ERYTHROCYTE [SEDIMENTATION RATE] IN BLOOD BY WESTERGREN METHOD: 8 MM/HR (ref 0–20)
GLUCOSE UR STRIP-MCNC: NEGATIVE MG/DL
HCT VFR BLD AUTO: 37.4 % (ref 35–47)
HGB BLD-MCNC: 13.4 G/DL (ref 11.7–15.7)
HGB UR QL STRIP: NEGATIVE
KETONES UR STRIP-MCNC: NEGATIVE MG/DL
LEUKOCYTE ESTERASE UR QL STRIP: NEGATIVE
LYMPHOCYTES # BLD AUTO: 0.9 10E3/UL (ref 0.8–5.3)
LYMPHOCYTES NFR BLD AUTO: 17 %
MCH RBC QN AUTO: 32.8 PG (ref 26.5–33)
MCHC RBC AUTO-ENTMCNC: 35.8 G/DL (ref 31.5–36.5)
MCV RBC AUTO: 92 FL (ref 78–100)
MONOCYTES # BLD AUTO: 0.7 10E3/UL (ref 0–1.3)
MONOCYTES NFR BLD AUTO: 14 %
NEUTROPHILS # BLD AUTO: 3.5 10E3/UL (ref 1.6–8.3)
NEUTROPHILS NFR BLD AUTO: 69 %
NITRATE UR QL: NEGATIVE
PH UR STRIP: 5.5 [PH] (ref 5–7)
PLATELET # BLD AUTO: 239 10E3/UL (ref 150–450)
PROT/CREAT 24H UR: 0.09 MG/MG CR (ref 0–0.2)
RBC # BLD AUTO: 4.08 10E6/UL (ref 3.8–5.2)
SP GR UR STRIP: 1.02 (ref 1–1.03)
UROBILINOGEN UR STRIP-ACNC: 0.2 E.U./DL
WBC # BLD AUTO: 5.1 10E3/UL (ref 4–11)

## 2022-07-14 PROCEDURE — 80053 COMPREHEN METABOLIC PANEL: CPT

## 2022-07-14 PROCEDURE — 85652 RBC SED RATE AUTOMATED: CPT

## 2022-07-14 PROCEDURE — 85025 COMPLETE CBC W/AUTO DIFF WBC: CPT

## 2022-07-14 PROCEDURE — 36415 COLL VENOUS BLD VENIPUNCTURE: CPT

## 2022-07-14 PROCEDURE — 81003 URINALYSIS AUTO W/O SCOPE: CPT

## 2022-07-14 PROCEDURE — 86140 C-REACTIVE PROTEIN: CPT

## 2022-07-14 PROCEDURE — 84156 ASSAY OF PROTEIN URINE: CPT

## 2022-07-15 LAB
ALBUMIN SERPL-MCNC: 4.1 G/DL (ref 3.4–5)
ALP SERPL-CCNC: 38 U/L (ref 40–150)
ALT SERPL W P-5'-P-CCNC: 17 U/L (ref 0–50)
ANION GAP SERPL CALCULATED.3IONS-SCNC: 4 MMOL/L (ref 3–14)
AST SERPL W P-5'-P-CCNC: 14 U/L (ref 0–45)
BILIRUB SERPL-MCNC: 0.4 MG/DL (ref 0.2–1.3)
BUN SERPL-MCNC: 11 MG/DL (ref 7–30)
CALCIUM SERPL-MCNC: 8.9 MG/DL (ref 8.5–10.1)
CHLORIDE BLD-SCNC: 108 MMOL/L (ref 94–109)
CO2 SERPL-SCNC: 27 MMOL/L (ref 20–32)
CREAT SERPL-MCNC: 0.63 MG/DL (ref 0.52–1.04)
GFR SERPL CREATININE-BSD FRML MDRD: >90 ML/MIN/1.73M2
GLUCOSE BLD-MCNC: 106 MG/DL (ref 70–99)
POTASSIUM BLD-SCNC: 4.2 MMOL/L (ref 3.4–5.3)
PROT SERPL-MCNC: 6.8 G/DL (ref 6.8–8.8)
SODIUM SERPL-SCNC: 139 MMOL/L (ref 133–144)

## 2022-07-20 ENCOUNTER — OFFICE VISIT (OUTPATIENT)
Dept: RHEUMATOLOGY | Facility: CLINIC | Age: 37
End: 2022-07-20
Payer: COMMERCIAL

## 2022-07-20 VITALS
DIASTOLIC BLOOD PRESSURE: 79 MMHG | BODY MASS INDEX: 24.31 KG/M2 | HEART RATE: 84 BPM | SYSTOLIC BLOOD PRESSURE: 112 MMHG | WEIGHT: 155.2 LBS | OXYGEN SATURATION: 95 %

## 2022-07-20 DIAGNOSIS — M32.19 OTHER SYSTEMIC LUPUS ERYTHEMATOSUS WITH OTHER ORGAN INVOLVEMENT (H): Primary | ICD-10-CM

## 2022-07-20 DIAGNOSIS — Z79.899 HIGH RISK MEDICATIONS (NOT ANTICOAGULANTS) LONG-TERM USE: ICD-10-CM

## 2022-07-20 PROCEDURE — 99214 OFFICE O/P EST MOD 30 MIN: CPT | Performed by: INTERNAL MEDICINE

## 2022-07-20 NOTE — PATIENT INSTRUCTIONS
RHEUMATOLOGY    Dr. Lavon Light    [address] [place] [address]  Phone number: [phone]  Fax number: [phone]      Thank you for choosing [facility]!    Carissa Frye CMA Rheumatology

## 2022-07-20 NOTE — PROGRESS NOTES
Rheumatology Clinic Visit      Aleida Weinberg MRN# [id number]   YOB: 1985 Age: 37 year old      Date of visit: 7/20/22   PCP: Sayra Chirinos     Chief Complaint   Patient presents with:  Systemic Lupus Erythematous: Doing good    Assessment and Plan     1. Systemic lupus erythematosus (CHANELL >1:13411 speckled, RNP+, Sm+, Scl-70+, hypocomplementemia, photosensitivity, malar rash, arthritis, fatigue, Raynaud's phenomenon):  Dx'd 4/2016. Scl-70+; she lacks features of scleroderma except for raynaud's; no myopathy based on labs/symptoms. 5/11/2018 echo without evidence of pulmonary hypertension. Previously on MTX 20mg SQ weekly (GI upset with PO doses above 15mg, partially effective) and SSZ (LFT elevations).  Currently on AZA 50mg daily and HCQ 200mg daily (patient self reduced previously from 300mg daily on average based on preference of an easier regimen and continued to do well). TPMT normal on 8/21/2017.  Doing well at this time.  Chronic illness, stable.    - Continue hydroxychloroquine 200mg daily (last eye exam was okay on 1/28/2022 at [facility])  - Continue azathioprine 50mg daily   - Labs in 3 months: CBC, CMP, ESR, CRP, C3, C4, dsDNA UA, Uprotein:creatinine    High risk medication requiring intensive toxicity monitoring at least quarterly: labs ordered include CBC, Creatinine, Hepatic panel to monitor for cytopenia and hepatotoxicity; checking creatinine as it affects clearance of medication.       2. Raynaud's Phenomenon: Cold avoidance was insufficient for controlling her symptoms in the past; then did well on amlodipine, but not using now and doing okay. Amlodipine PRN.   Chronic illness, stable.    - Amlodipine 10mg daily during colder months, as needed    3.  Low vitamin D: Currently on vitamin D 2000 units daily.    - Continue vitamin D 2000 units daily    4.  Secondary Sjogren's syndrome: Mild dry and dry mouth that are treated infrequently with artificial tears and  frequent sips of water. Dentist follow-up every 6 months     5.  Facial rash: Seeing dermatology at [facility].  Reportedly due to SLE and rosacea from derm perspective, and improved with topical creams from her dermatologist for rosacea.    6.  Mild medial joint line tenderness of the knees: Encouraged physical therapy exercises for the knees.    7.  Mild bilateral medial epicondylitis: Avoid aggravating activities.  Ice, rest, and topical Voltaren gel as needed.  Symptoms are very mild at this time and only noticed during exam    8.  Vaccinations:   - Influenza: encouraged yearly vaccination  - [id number]: up to date  - [id number]: up to date  - Shingrix: Up to date  - COVID-19: has received the Pfizer COVID-19 vaccine on 3/16/2021, 4/6/2021, and 8/30/2021; Moderna vaccine on 3/4/22.  A 5th mRNA vaccine (2nd booster) may be received at least 4 months after the 4th dose.   Based on our current understanding (and this may change over time as we learn more), medications should be adjusted as noted below, if disease activity allows:  - NSAIDs and Acetaminophen: hold for 24 hours prior to vaccination if able to do so  - Azathioprine should be held for 1-2 weeks after each COVID-19 vaccine (as disease activity allows)     Total minutes spent in evaluation with patient, documentation, , and review of pertinent studies and chart notes: 18    Ms. Weinberg verbalized agreement with and understanding of the rational for the diagnosis and treatment plan.  All questions were answered to best of my ability and the patient's satisfaction. Ms. Weinberg was advised to contact the clinic with any questions that may arise after the clinic visit.      Thank you for involving me in the care of the patient    Return to clinic: 3-4 months      HPI   Aleida Weinberg is a 37 year old female with medical history significant for intermittent asthma, anxiety, migraines, vitamin D deficiency, and systemic lupus erythematosus who  presents for follow-up of SLE.     Today, 2022: Currently doing well.  [place] had more sun exposure, feeling more tired after that.  Generally finds shade and uses sunscreen.  No joint pain or swelling except for when I palpated the medial joint line of the knees and very mild over the bilateral elbows at the medial epicondyles.  No morning stiffness.  Tolerating medications well.    Denies fevers, chills, nausea, vomiting, constipation, diarrhea. No abdominal pain. No chest pain/pressure, palpitations, or shortness of breath. No oral or nasal sores.     Tobacco: quit smoking in   EtOH: Once monthly  Drugs: None  Occupation: Works at [employer], a lot of typing per patient    ROS   12 point review of system was completed and negative except as noted in the HPI     Active Problem List     Patient Active Problem List   Diagnosis     Other kyphoscoliosis and scoliosis     Hypertrophic and atrophic condition of skin     Viral warts     CARDIOVASCULAR SCREENING; LDL GOAL LESS THAN 160     Intermittent asthma     Anxiety     Intractable menstrual migraine without status migrainosus     Vitamin D deficiency     Inflammatory polyarthropathy (H)     Chronic back pain     Raynaud's phenomenon without gangrene     Systemic lupus erythematosus (H)     High risk medications (not anticoagulants) long-term use (Plaquenil and AZA)     Dry eyes, bilateral     Past Medical History     Past Medical History:   Diagnosis Date     Arthritis      Lupus erythematosus      Mild intermittent asthma      Other kyphoscoliosis and scoliosis     upper back curvature and disc degeneration in lower back.     Past Surgical History     Past Surgical History:   Procedure Laterality Date      SECTION       SURGICAL HISTORY OF -       PE Tubes     Allergy     Allergies   Allergen Reactions     Fluconazole Rash     Current Medication List     Current Outpatient Medications   Medication Sig      albuterol (PROAIR HFA/PROVENTIL HFA/VENTOLIN HFA) 108 (90 Base) MCG/ACT inhaler Inhale 2 puffs into the lungs every 6 hours as needed for shortness of breath / dyspnea     azaTHIOprine (IMURAN) 50 MG tablet Take 1 tablet (50 mg) by mouth daily     cyclobenzaprine (FLEXERIL) 10 MG tablet Take 1 tablet (10 mg) by mouth 3 times daily as needed for muscle spasms     hydroxychloroquine (PLAQUENIL) 200 MG tablet Take 1 tablet (200 mg) by mouth daily . Yearly eye exam including 10-2 VF and SD-OCT required     hydrOXYzine (ATARAX) 25 MG tablet Take 1 tablet (25 mg) by mouth every 6 hours as needed for anxiety     levonorgestrel (MIRENA) 20 MCG/24HR IUD 1 each by Intrauterine route once     LORazepam (ATIVAN) 0.5 MG tablet Take 1 tablet (0.5 mg) by mouth every 8 hours as needed for anxiety     traZODone (DESYREL) 50 MG tablet Take 0.5 tablets (25 mg) by mouth nightly as needed for sleep     vitamin D3 (CHOLECALCIFEROL) 50 mcg (2000 units) tablet Take 1 tablet (50 mcg) by mouth daily     amLODIPine (NORVASC) 10 MG tablet Take 1 tablet (10 mg) by mouth daily for raynaud's phenomenon (Patient not taking: Reported on 4/13/2022)     No current facility-administered medications for this visit.         Social History   See HPI    Family History     Family History   Problem Relation Age of Onset     Heart Disease Maternal Grandfather         Bypass, Heart Disease     Respiratory Maternal Grandfather         asthma     Macular Degeneration Maternal Grandfather      Hypertension Paternal Grandmother      Cancer Paternal Grandmother         lymph nodes     Cataracts Paternal Grandmother      C.A.D. Paternal Grandfather      Heart Disease Maternal Uncle         MI's      Alcohol/Drug Maternal Uncle      Respiratory Other         cousin on mothers side, asthma     Alcohol/Drug Other         bother great grandparents on mother's side     Diabetes No family hx of      Breast Cancer No family hx of      Cancer - colorectal No family hx  "of      Denies family history of autoimmune disease    Physical Exam     Temp Readings from Last 3 Encounters:   10/15/21 98.2  F (36.8  C) (Oral)   02/09/21 97.9  F (36.6  C) (Oral)   12/10/20 98.1  F (36.7  C) (Oral)     BP Readings from Last 5 Encounters:   07/20/22 112/79   04/13/22 129/76   02/16/22 108/76   10/15/21 108/72   09/08/21 115/78     Pulse Readings from Last 1 Encounters:   07/20/22 84     Resp Readings from Last 1 Encounters:   02/09/21 20     Estimated body mass index is 24.31 kg/m  as calculated from the following:    Height as of 2/16/22: 1.702 m (5' 7\").    Weight as of this encounter: 70.4 kg (155 lb 3.2 oz).      GEN: NAD. Healthy appearing adult.   HEENT:  Anicteric, noninjected sclera. No obvious external lesions of the ear and nose. Hearing intact.  CV: S1, S2. RRR. No m/r/g  PULM: No increased work of breathing. CTA bilaterally   MSK: MCPs, PIPs, DIPs without swelling or tenderness to palpation.  Wrists without swelling or tenderness to palpation.  Elbows without swelling, increased warmth, or overlying erythema; mildly tender to palpation over the medial epicondyles bilaterally.  Shoulders without swelling or tenderness to palpation.  Knees without swelling, increased warmth, or overlying erythema; mild medial joint line tenderness.  Ankles and MTPs without swelling or tenderness to palpation.  SKIN: No rash or jaundice seen.  No evidence of current or previous ischemic insults to the fingertips by visual inspection  PSYCH: Alert. Appropriate.         Labs / Imaging (select studies)     RF/CCP  Recent Labs   Lab Test 04/22/16  0902 04/01/16  0910   CCPIGG 1  --    RHF  --  <20     CHANELL  Recent Labs   Lab Test 04/22/16  0902 04/01/16  0910   VALERIE  --  12.4*   ANAIGG >1:23393  Reference range: <1:40  (Note)  Speckled pattern.  INTERPRETIVE INFORMATION: CHANELL by IFA, IgG  Anti-nuclear antibodies (CHANELL) are seen in a variety of  systemic rheumatic diseases and are determined by " indirect  fluorescence assay (IFA) using HEp-2 substrate with an  IgG-specific conjugate. CHANELL titers less than or equal to  1:80 have variable relevance while titers greater than or  equal to 1:160 are considered clinically significant. These  antibodies may precede clinical disease onset; however,  healthy individuals and those with advanced age have been  reported to be positive for CHANELL. When observed, one of the  five basic patterns is reported: homogeneous,  peripheral/rim, speckled, centromere, or nucleolar. If  cytoplasmic fluorescence is observed, it is noted. IFA  methodology is subjective and has occasionally been shown  to lack sensitivity for anti-SSA/Ro antibodies.  Negative results do not necessarily rule out the presence  of SSc. If clinical suspicion remains, consi vicki further  testing for U3-RNP, PM/Scl, or Th/To antibodies associated  with SSc.  Performed by [facility],  [address] [place] [id number] [phone]  [email or web address], Twin Rodriguez MD, Lab. Director    --      RNP/Sm/SSA/SSB  Recent Labs   Lab Test 01/12/18  0828 04/22/16  0902   RNPIGG  --  >8.0  Positive   Antibody index (AI) values reflect qualitative changes in antibody   concentration that cannot be directly associated with clinical condition or   disease state.  *   SMIGG  --  1.5*   SSAIGG  --  <0.2  Negative   Antibody index (AI) values reflect qualitative changes in antibody   concentration that cannot be directly associated with clinical condition or   disease state.     SSBIGG  --  <0.2  Negative   Antibody index (AI) values reflect qualitative changes in antibody   concentration that cannot be directly associated with clinical condition or   disease state.     SCLIGG  --  3.1*   TREPAB Negative  --      dsDNA  Recent Labs   Lab Test 12/15/21  1516 02/10/21  1627 11/05/20  1628 07/23/20  1636 04/02/20  1541 07/11/19  1529 11/29/18  1616 08/23/18  1637 04/26/18  1648   DNA 1.1 1 1 1 2 2 2 2 2     C3/C4  Recent Labs    Lab Test 12/15/21  1516 05/18/21  1606 02/10/21  1627 11/05/20  1628 07/23/20  1636 04/02/20  1541 10/18/19  1542 07/11/19  1529 11/29/18  1616   B1EXIQI 87 84 89 89 92 90 74* 75* 82   I7UTERA 17 18 17 17 18 20 14* 14* 13*     Send-out Labs  Recent Labs   Lab Test 04/21/17  0813   SRESLT SEE NOTE  (Note)  Test name                    Result Flag  Units  RefIntvl  ------------------------------------------------------------  Thiopurine Methyltransferase                                23.2 L      U/mL 24.0-44.0  Sample slightly hemolyzed. Please interpret the results  with caution.  INTERPRETIVE INFORMATION: Thiopurine Methyltransferase, RBC  Normal TPMT activity:  24.0-44.0 U/mL................Individuals are predicted to  be at low risk of bone marrow toxicity (myelosuppression)  as a consequence of standard thiopurine therapy; no dose  adjustment is recommended.  Intermediate TPMT activity:  17.0-23.9 U/mL................Individuals are predicted to  be at intermediate risk of bone marrow toxicity  (myelosuppression) as a consequence of standard thiopurine  therapy; a dose reduction and therapeutic drug management  is recommended.  Low TPMT activity:  less than 17.0 U/mL...........Individuals are predicted to  be at high risk of bone marrow toxicity (myelosuppression)   as a consequence of standard thiopurine dosing. It is  recommended to avoid the use of thiopurine drugs.  High TPMT activity:  greater than 44.0 U/mL........Individuals are not predicted  to be at risk for bone marrow toxicity (myelosuppression)  as a consequence of standard thiopurine dosing, but may be  at risk for therapeutic failure due to excessive  inactivation of thiopurine drugs. Individuals may require  higher than the normal standard dose. Therapeutic drug  management is recommended.  The TPMT, RBC assay is used as a screen to detect  individuals with low and intermediate TPMT activity who may  be at risk for myelosuppression when  exposed to standard  doses of thiopurines, including azathioprine (Imuran) and  6-mercaptopurine (Purinethol). TPMT is the primary  metabolic route for inactivation of thiopurine drugs in the  bone marrow. When TPMT activity is low, it is predicted  that proportionately more 6-mercaptopurine can be converted  into the cytotoxic 6-thioguanine nucle otides that  accumulate in the bone marrow causing excessive toxicity.  The activity of TPMT is measured by the nanomoles of  6-methylmercaptopurine (inactive metabolite) produced per 1  mL of packed red blood cells, (U/mL).    TPMT phenotype testing does not replace the need for  clinical monitoring of patients treated with thiopurine  drugs. Genotype for TPMT cannot be inferred from TPMT  activity (phenotype). Phenotype testing should not be  requested for patients currently treated with thiopurine  drugs. Current TPMT phenotype may not reflect future TPMT  phenotype, particularly in patients who received blood  transfusion within 30-60 days of testing.  TPMT enzyme  activity can be inhibited by several drugs such as:  naproxen (Aleve), ibuprofen (Advil, Motrin), ketoprofen  (Orudis), furosemide (Lasix), sulfasalazine (Azulfidine),  mesalamine (Asacol), olsalazine (Dipentum), mefenamic acid  (Ponstel), thiazide diuretics, and benzoic acid inhibitors.  TPMT inhibitors may contribute t o falsely low results;  patients should abstain from these drugs for at least 48  hours prior to TPMT testing. Falsely low results may also  occur as a result of inappropriate specimen handling and  hemolysis.  Test developed and characteristics determined by [facility]. See Compliance Statement B: www.[employer]/CS  Performed by [facility],  [address] [place] [id number] [phone]  [email or web address], Lionel Ferreira MD, Lab. Director     STSTNM Thiopurine Methyltransferase, RBC   STSTCD 92,066   SSPTYP Whole blood, EDTA anticoagulant     Antiphospholipid Antibodies  Recent  Labs   Lab Test 04/22/16  0902   B2GPG 0.9   B2GPM <0.9  Negative     CARG <15.0  Interpretation:  Negative     JOANN <12.5  Interpretation:  Negative     LUPINT Negative  (Note)  COMMENTS:  The INR is normal.  APTT ratio is normal.  DRVVT Screen ratio is normal.  Thrombin time is normal.  NEGATIVE TEST; A LUPUS ANTICOAGULANT WAS NOT DETECTED IN THIS  SPECIMEN WITHIN THE LIMITS OF THE TESTING REPERTOIRE.  If the clinical picture is strongly suggestive of an antiphospholipid  syndrome, recommend anticardiolipin and beta-2-glycoprotein (IgG and  IgM) antibody tests.  Isabella Olson M.D.  [phone]  4/25/2016    INR =  1.05    Reference range: 0.86-1.14  Thrombin Time= 15.4    Reference range: 13.0-19.0 sec    APTT:       Ratio  Patient  =  0.92  1:2 Mix  =  N/A  Reference:  Negative: Less than or equal to 1.16  Positive: Greater than or equal to 1.17     DILUTE LISA VIPER VENOM TEST:  Screen Ratio = 0.95   Normal is less than 1.21         CBC  Recent Labs   Lab Test 07/14/22  1507 04/11/22  1441 12/15/21  1516 09/01/21  1625 05/18/21  1606 02/10/21  1627 11/05/20  1628   WBC 5.1 4.9 4.7   < > 3.9* 4.2 3.3*   RBC 4.08 4.38 4.07   < > 4.26 4.33 3.95   HGB 13.4 14.4 13.4   < > 14.2 14.2 13.1   HCT 37.4 40.9 37.6   < > 40.3 40.8 37.2   MCV 92 93 92   < > 95 94 94   RDW 11.4 11.8 11.4   < > 11.7 11.8 11.4    246 212   < > 225 212 202   MCH 32.8 32.9 32.9   < > 33.3* 32.8 33.2*   MCHC 35.8 35.2 35.6   < > 35.2 34.8 35.2   NEUTROPHIL 69 70 69   < > 64.3 67.8 60.7   LYMPH 17 17 16   < > 20.2 18.0 23.1   MONOCYTE 14 12 14   < > 14.0 13.0 14.4   EOSINOPHIL 1 1 1   < > 1.0 0.7 1.2   BASOPHIL 0 0 0   < > 0.5 0.5 0.6   ANEU  --   --   --   --  2.5 2.9 2.0   ALYM  --   --   --   --  0.8 0.8 0.8   AMANDA  --   --   --   --  0.6 0.6 0.5   AEOS  --   --   --   --  0.0 0.0 0.0   ABAS  --   --   --   --  0.0 0.0 0.0   ANEUTAUTO 3.5 3.4 3.2   < >  --   --   --    ALYMPAUTO 0.9 0.8 0.7*   < >  --   --   --    AMONOAUTO 0.7  0.6 0.7   < >  --   --   --    AEOSAUTO 0.0 0.0 0.0   < >  --   --   --    ABSBASO 0.0 0.0 0.0   < >  --   --   --     < > = values in this interval not displayed.     CMP  Recent Labs   Lab Test 07/14/22  1507 04/11/22  1441 02/09/22  0701 12/15/21  1516 09/01/21  1625 05/18/21  1606 02/10/21  1627 01/21/21  0716 11/05/20  1628 07/23/20  1636    137  --  137 136 140 136  --  138 138   POTASSIUM 4.2 3.7  --  3.9 3.8 3.7 4.0  --  3.8 4.1   CHLORIDE 108 108  --  108 103 107 106  --  107 106   CO2 27 26  --  24 28 26 24  --  25 26   ANIONGAP 4 3  --  5 5 7 6  --  6 6   * 86 83 97 76 63* 75   < > 92 79   BUN 11 12  --  11 9 11 10  --  13 11   CR 0.63 0.74  --  0.62 0.70 0.76 0.74  --  0.68 0.68   GFRESTIMATED >90 >90  --  >90 >90 >90 >90  --  >90 >90   GFRESTBLACK  --   --   --   --   --  >90 >90  --  >90 >90   SRINIVAS 8.9 9.3  --  8.8 9.2 9.1 9.8  --  8.8 9.2   BILITOTAL 0.4 0.5  --  0.5 0.5 0.7 0.5  --  0.4 0.4   ALBUMIN 4.1 4.5  --  3.8 4.3 4.4 4.5  --  4.1 4.6   PROTTOTAL 6.8 7.4  --  7.0 7.6 7.7 7.6  --  7.0 8.0   ALKPHOS 38* 42  --  36* 43 41 38*  --  39* 44   AST 14 15  --  15 16 19 15  --  17 20   ALT 17 20  --  21 21 25 23  --  23 29    < > = values in this interval not displayed.     Calcium/VitaminD  Recent Labs   Lab Test 07/14/22  1507 04/11/22  1441 12/15/21  1516 11/05/20  1628 07/23/20  1636 04/13/17  1650 02/20/17  1640 01/20/17  0828 10/26/16  0919   SRINIVAS 8.9 9.3 8.8   < > 9.2   < >  --    < > 9.3   VITDT  --   --   --   --  30  --  33  --  23    < > = values in this interval not displayed.     ESR/CRP  Recent Labs   Lab Test 07/14/22  1507 04/11/22  1441 12/15/21  1516   SED 8 8 9   CRP <3.00 <2.9 <2.9     CK/Aldolase  Recent Labs   Lab Test 12/15/21  1516 02/10/21  1627 11/05/20  1628 07/22/16  0916 04/22/16  0902   CKT 60 60 64   < > 45   ALDOLASE  --   --   --   --  4.5    < > = values in this interval not displayed.     TSH/T4  Recent Labs   Lab Test 04/01/16  0910   TSH 1.57     Lipid  Panel  Recent Labs   Lab Test 02/09/22  0701 10/15/21  0937 01/21/21  0716 02/09/17  0721 07/10/15  0814   CHOL 158 163 155   < > 149   TRIG 50 41 38   < > 45   HDL 50 60 56   < > 48*   LDL 98 95 91   < > 92   VLDL  --   --   --   --  9   CHOLHDLRATIO  --   --   --   --  3.1   NHDL 108 103 99   < >  --     < > = values in this interval not displayed.     Hepatitis B  Recent Labs   Lab Test 04/22/16  0902   HBCAB Nonreactive   HEPBANG Nonreactive     Hepatitis C  Recent Labs   Lab Test 10/26/18  0836 01/12/18  0828 04/22/16  0902   HCVAB Nonreactive Nonreactive Nonreactive   Assay performance characteristics have not been established for newborns,   infants, and children       Lyme ab screening  Recent Labs   Lab Test 04/01/16  0910   LYMEGM 0.12     HIV Screening  Recent Labs   Lab Test 10/26/18  0836 01/12/18  0828 04/22/16  0902   HIAGAB Nonreactive Nonreactive Nonreactive   HIV-1 p24 Ag & HIV-1/HIV-2 Ab Not Detected       UA  Recent Labs   Lab Test 07/14/22  1507 12/15/21  1509 09/01/21  1629 05/18/21  1617 08/23/18  1638 04/26/18  1648 01/18/18  1640 01/12/18  0836 12/28/17  0145 04/13/17  1650 01/20/17  0827   COLOR Yellow Yellow Yellow Yellow   < > Yellow Yellow Yellow Yellow   < > Yellow   APPEARANCE Clear Clear Clear Clear   < > Clear Clear Clear Clear   < > Clear   URINEGLC Negative Negative Negative Negative   < > Negative Negative Negative Negative   < > Negative   URINEBILI Negative Negative Negative Negative   < > Negative Negative Negative Moderate*   < > Negative   SG 1.020 1.010 >=1.030 1.010   < > 1.010 1.020 1.025 >1.030   < > >1.030   URINEPH 5.5 6.5 6.0 6.0   < > 7.0 7.0 6.5 6.0   < > 6.0   PROTEIN Negative Negative Negative Negative   < > Negative Negative Trace* 100*   < > Trace*   UROBILINOGEN 0.2 0.2 0.2 0.2   < > 0.2 0.2 0.2 4.0*   < > 0.2   NITRITE Negative Negative Negative Negative   < > Negative Negative Negative Positive*   < > Negative   UBLD Negative Negative Negative Trace*   < >  Negative Negative Negative Negative   < > Negative   LEUKEST Negative Negative Negative Negative   < > Negative Negative Negative Negative   < > Negative   WBCU  --   --   --  0 - 5  --  0 - 5 O - 2 O - 2 O - 2   < > O - 2   RBCU  --   --   --  O - 2  --  O - 2 O - 2 O - 2 O - 2   < > O - 2   SQUAMOUSEPI  --   --   --  Few  --   --  Few Moderate* Moderate*   < > Few   BACTERIA  --   --   --  Rare*  --   --   --  Moderate* Moderate*   < > Few*   MUCOUS  --   --   --   --   --   --   --  Present* Present*  --  Present*    < > = values in this interval not displayed.     Urine Microscopic  Recent Labs   Lab Test 05/18/21  1617 04/26/18  1648 01/18/18  1640 01/12/18  0836 12/28/17  0145 04/13/17  1650 01/20/17  0827   WBCU 0 - 5 0 - 5 O - 2 O - 2 O - 2   < > O - 2   RBCU O - 2 O - 2 O - 2 O - 2 O - 2   < > O - 2   SQUAMOUSEPI Few  --  Few Moderate* Moderate*   < > Few   BACTERIA Rare*  --   --  Moderate* Moderate*   < > Few*   MUCOUS  --   --   --  Present* Present*  --  Present*    < > = values in this interval not displayed.     Urine Protein  Recent Labs   Lab Test 07/14/22  1507 04/11/22  1441 12/15/21  1516 09/01/21  1629   UTP  --  0.07 <0.05 <0.05   UTPG  --  0.11  --   --    UCRR 76.5 66 25 23     Immunization History     Immunization History   Administered Date(s) Administered     COVID-19,PF,Moderna Booster 03/04/2022     COVID-19,PF,Pfizer (12+ Yrs) 03/16/2021, 04/06/2021, 08/30/2021     DT (PEDS <7y) 08/22/1997     Flu, Unspecified 10/21/2015     HPV 02/25/2010, 02/04/2011     HPV Quadrivalent 02/25/2010, 02/04/2011     HepB 06/13/1999, 08/20/1999, 02/05/2000     HepB-Adult 07/13/1999, 08/20/1999, 02/05/2000     Historical DTP/aP 1986, 1986, 1986, 01/15/1988, 06/15/1991, 08/22/1997     Historical Hepb 07/13/1999, 08/20/1999     Influenza (IIV3) PF 10/19/2010, 11/07/2011, 09/23/2012     Influenza Vaccine IM > 6 months Valent IIV4 (Johnuria,Fluzone) 10/11/2016, 10/13/2017, 10/26/2018,  10/17/2019, 10/16/2020, 10/15/2021     MMR 05/15/1987, 09/15/1991     Mantoux Tuberculin Skin Test 07/15/1987, 01/19/2005     Meningococcal (Menactra ) 08/09/2004     Meningococcal (Menomune ) 08/09/2004     OPV, trivalent, live 1986, 1986, 1986, 01/15/1988, 09/15/1991     OPV, unspecified 1986, 1986, 1986, 01/15/1988, 09/15/1991     Pneumo Conj 13-V (2010&after) 05/04/2018     Pneumococcal 23 valent 08/30/2018     TDAP Vaccine (Adacel) 01/21/2009, 02/25/2010, 08/17/2020     Tdap (Adacel,Boostrix) 01/20/2009     Zoster vaccine recombinant adjuvanted (SHINGRIX) 12/13/2018, 03/04/2019          Chart documentation done in part with Dragon Voice recognition Software. Although reviewed after completion, some word and grammatical error may remain.    Lavon Light MD

## 2022-08-25 NOTE — TELEPHONE ENCOUNTER
DIAGNOSIS: Issue with a cyst in my left wrist that is causing pain and stiffness   APPOINTMENT DATE: 9.6.22   NOTES STATUS DETAILS   OFFICE NOTE from referring provider     OFFICE NOTE from other specialist     DISCHARGE SUMMARY from hospital     DISCHARGE REPORT from the ER     OPERATIVE REPORT     EMG report     MEDICATION LIST Internal    MRI     DEXA (osteoporosis/bone health)     CT SCAN     XRAYS (IMAGES & REPORTS) Internal 4.22.16 bilat hand

## 2022-09-06 ENCOUNTER — PRE VISIT (OUTPATIENT)
Dept: ORTHOPEDICS | Facility: CLINIC | Age: 37
End: 2022-09-06

## 2022-09-06 ENCOUNTER — OFFICE VISIT (OUTPATIENT)
Dept: ORTHOPEDICS | Facility: CLINIC | Age: 37
End: 2022-09-06
Payer: COMMERCIAL

## 2022-09-06 DIAGNOSIS — M67.432 GANGLION CYST OF DORSUM OF LEFT WRIST: Primary | ICD-10-CM

## 2022-09-06 PROCEDURE — 99203 OFFICE O/P NEW LOW 30 MIN: CPT | Performed by: FAMILY MEDICINE

## 2022-09-06 NOTE — PROGRESS NOTES
ASSESSMENT/PLAN:    (M67.432) Ganglion cyst of dorsum of left wrist  (primary encounter diagnosis)  Comment: exam consistent w/ ganglion cyst; pt would like to defer aspiration and get definitive surgery; will check MRI for surgical planning and setup appt w/ hand surgery for definitive tx; f/u prn  Plan: XR Wrist Left G/E 3 Views, MR Wrist Left w/o         Contrast, Orthopedic  Referral          Collins Byrne MD  2022  11:06 AM        Pt is a 37 year old L-handed female here today for:     Left Wrist/ Hand pain:   Location: Left dorsal wrist    Duration: Few months    Trauma/ Fall? No    Swelling? No    Numbness/ Tingling? Ulnar Deviation numbness    Weakness? No    Imaging? No    Treatment? NA        Past Medical History:   Diagnosis Date     Arthritis      Lupus erythematosus      Mild intermittent asthma      Other kyphoscoliosis and scoliosis     upper back curvature and disc degeneration in lower back.      Past Surgical History:   Procedure Laterality Date      SECTION       SURGICAL HISTORY OF -       PE Tubes      Current Outpatient Medications   Medication Sig Dispense Refill     albuterol (PROAIR HFA/PROVENTIL HFA/VENTOLIN HFA) 108 (90 Base) MCG/ACT inhaler Inhale 2 puffs into the lungs every 6 hours as needed for shortness of breath / dyspnea 18 g 1     amLODIPine (NORVASC) 10 MG tablet Take 1 tablet (10 mg) by mouth daily for raynaud's phenomenon (Patient not taking: Reported on 2022) 30 tablet 4     azaTHIOprine (IMURAN) 50 MG tablet Take 1 tablet (50 mg) by mouth daily 90 tablet 2     cyclobenzaprine (FLEXERIL) 10 MG tablet Take 1 tablet (10 mg) by mouth 3 times daily as needed for muscle spasms 30 tablet 2     hydroxychloroquine (PLAQUENIL) 200 MG tablet Take 1 tablet (200 mg) by mouth daily . Yearly eye exam including 10-2 VF and SD-OCT required 90 tablet 2     hydrOXYzine (ATARAX) 25 MG tablet Take 1 tablet (25 mg) by mouth every 6 hours as needed for anxiety  90 tablet 6     levonorgestrel (MIRENA) 20 MCG/24HR IUD 1 each by Intrauterine route once       LORazepam (ATIVAN) 0.5 MG tablet Take 1 tablet (0.5 mg) by mouth every 8 hours as needed for anxiety 15 tablet 0     traZODone (DESYREL) 50 MG tablet Take 0.5 tablets (25 mg) by mouth nightly as needed for sleep 30 tablet 0     vitamin D3 (CHOLECALCIFEROL) 50 mcg (2000 units) tablet Take 1 tablet (50 mcg) by mouth daily 90 tablet 2      Allergies   Allergen Reactions     Fluconazole Rash      ROS:   Gen- no fevers/chills   Rheum - no morning stiffness   Derm - no rash/ redness   Neuro - no numbness, no tingling   Remainder of ROS negative.     Exam:   There were no vitals taken for this visit.       L WRIST   Inspection: Swelling - YES - dorsum wrist; Atrophy - NO   Sensation: intact in median, radial, ulnar distribution   ROM:   Wrist: flexion- full/ extension- full/ pronation- full/ supination- full;   radial deviation - full; ulnar deviation- full   Strength: 5/5 in all motions   Bony tenderness:   Wrist: Carpal bones: NO; Snuffbox: NO; +TTP at dorsal ganglion cyst   Tendons: intact   Maneuvers: deferred       Xray of L wrist on September 6, 2022 at [facility] [place] location - films personally reviewed with patient at time of visit     My impression: normal

## 2022-09-06 NOTE — LETTER
2022      RE: Aleida Weinberg  [address] [facility] [address]     Dear Colleague,    Thank you for referring your patient, Aleida Weinberg, to the [facility] [place]. Please see a copy of my visit note below.    ASSESSMENT/PLAN:    (M67.432) Ganglion cyst of dorsum of left wrist  (primary encounter diagnosis)  Comment: exam consistent w/ ganglion cyst; pt would like to defer aspiration and get definitive surgery; will check MRI for surgical planning and setup appt w/ hand surgery for definitive tx; f/u prn  Plan: XR Wrist Left G/E 3 Views, MR Wrist Left w/o         Contrast, Orthopedic  Referral          Collins Byrne MD   2022  11:06 AM    Pt is a 37 year old L-handed female here today for:     Left Wrist/ Hand pain:   Location: Left dorsal wrist    Duration: Few months    Trauma/ Fall? No    Swelling? No    Numbness/ Tingling? Ulnar Deviation numbness    Weakness? No    Imaging? No    Treatment? NA    Past Medical History:   Diagnosis Date     Arthritis      Lupus erythematosus      Mild intermittent asthma      Other kyphoscoliosis and scoliosis     upper back curvature and disc degeneration in lower back.      Past Surgical History:   Procedure Laterality Date      SECTION  2006     SURGICAL HISTORY OF -       PE Tubes      Current Outpatient Medications   Medication Sig Dispense Refill     albuterol (PROAIR HFA/PROVENTIL HFA/VENTOLIN HFA) 108 (90 Base) MCG/ACT inhaler Inhale 2 puffs into the lungs every 6 hours as needed for shortness of breath / dyspnea 18 g 1     amLODIPine (NORVASC) 10 MG tablet Take 1 tablet (10 mg) by mouth daily for raynaud's phenomenon (Patient not taking: Reported on 2022) 30 tablet 4     azaTHIOprine (IMURAN) 50 MG tablet Take 1 tablet (50 mg) by mouth daily 90 tablet 2     cyclobenzaprine (FLEXERIL) 10 MG tablet Take 1 tablet (10 mg) by mouth 3 times daily as needed for muscle  spasms 30 tablet 2     hydroxychloroquine (PLAQUENIL) 200 MG tablet Take 1 tablet (200 mg) by mouth daily . Yearly eye exam including 10-2 VF and SD-OCT required 90 tablet 2     hydrOXYzine (ATARAX) 25 MG tablet Take 1 tablet (25 mg) by mouth every 6 hours as needed for anxiety 90 tablet 6     levonorgestrel (MIRENA) 20 MCG/24HR IUD 1 each by Intrauterine route once       LORazepam (ATIVAN) 0.5 MG tablet Take 1 tablet (0.5 mg) by mouth every 8 hours as needed for anxiety 15 tablet 0     traZODone (DESYREL) 50 MG tablet Take 0.5 tablets (25 mg) by mouth nightly as needed for sleep 30 tablet 0     vitamin D3 (CHOLECALCIFEROL) 50 mcg (2000 units) tablet Take 1 tablet (50 mcg) by mouth daily 90 tablet 2      Allergies   Allergen Reactions     Fluconazole Rash      ROS:   Gen- no fevers/chills   Rheum - no morning stiffness   Derm - no rash/ redness   Neuro - no numbness, no tingling   Remainder of ROS negative.     Exam:   There were no vitals taken for this visit.       L WRIST   Inspection: Swelling - YES - dorsum wrist; Atrophy - NO   Sensation: intact in median, radial, ulnar distribution   ROM:   Wrist: flexion- full/ extension- full/ pronation- full/ supination- full;   radial deviation - full; ulnar deviation- full   Strength: 5/5 in all motions   Bony tenderness:   Wrist: Carpal bones: NO; Snuffbox: NO; +TTP at dorsal ganglion cyst   Tendons: intact   Maneuvers: deferred       [place] [facility] [place] personally reviewed with patient at time of visit     My impression: normal       Again, thank you for allowing me to participate in the care of your patient.      Sincerely,    Collins Byrne MD

## 2022-09-07 NOTE — TELEPHONE ENCOUNTER
DIAGNOSIS: LT Wrist (MRI Results)   APPOINTMENT DATE: 09/14/2022   NOTES STATUS DETAILS   OFFICE NOTE from other specialist Internal 09/06/2022 - Collins Byrne MD - [facility]  07/20/2022 - Lavon Light MD - [facility] Rheumatology  04/06/2021 - Tatum Dove  - [facility] FP   MEDICATION LIST Internal  Care Everywhere    LABS     CBC/DIFF Internal 07/20/2022   XRAYS (IMAGES & REPORTS) Internal 09/06/2022 - LT Wrist  04/22/2016 - Bilateral Hands

## 2022-09-11 NOTE — PROGRESS NOTES
Chief Complaint: No chief complaint on file.    Diagnosis: left wrist mass, consistent with dorsal ganglion cyst  Treatment: none    History of Present Illness: Aleida Weinberg is a 37 year old LHD female presenting for evaluation of left wrist mass.  The mass has been present for over 1 year, but in the last few months it has become more bothersome.  She has pain with activities, and it sometimes is bothersome at work.  She has not had any overlying skin changes.  She thinks the mass is growing slightly.  She denies numbness or tingling in her fingers, but sometimes at night has paresthesias over the ulnar border of her hand.    Clinical documentation by Dr. Byrne on 2022 was reviewed.    Occupation: works for [employer]    Past Medical History:   Past Medical History:   Diagnosis Date     Arthritis      Lupus erythematosus      Mild intermittent asthma      Other kyphoscoliosis and scoliosis     upper back curvature and disc degeneration in lower back.       Past Surgical History:   Past Surgical History:   Procedure Laterality Date      SECTION       SURGICAL HISTORY OF -       PE Tubes       Medications:   Current Outpatient Medications:      albuterol (PROAIR HFA/PROVENTIL HFA/VENTOLIN HFA) 108 (90 Base) MCG/ACT inhaler, Inhale 2 puffs into the lungs every 6 hours as needed for shortness of breath / dyspnea, Disp: 18 g, Rfl: 1     amLODIPine (NORVASC) 10 MG tablet, Take 1 tablet (10 mg) by mouth daily for raynaud's phenomenon (Patient not taking: Reported on 2022), Disp: 30 tablet, Rfl: 4     azaTHIOprine (IMURAN) 50 MG tablet, Take 1 tablet (50 mg) by mouth daily, Disp: 90 tablet, Rfl: 2     cyclobenzaprine (FLEXERIL) 10 MG tablet, Take 1 tablet (10 mg) by mouth 3 times daily as needed for muscle spasms, Disp: 30 tablet, Rfl: 2     hydroxychloroquine (PLAQUENIL) 200 MG tablet, Take 1 tablet (200 mg) by mouth daily . Yearly eye exam including 10-2 VF and SD-OCT required, Disp: 90  tablet, Rfl: 2     hydrOXYzine (ATARAX) 25 MG tablet, Take 1 tablet (25 mg) by mouth every 6 hours as needed for anxiety, Disp: 90 tablet, Rfl: 6     levonorgestrel (MIRENA) 20 MCG/24HR IUD, 1 each by Intrauterine route once, Disp: , Rfl:      LORazepam (ATIVAN) 0.5 MG tablet, Take 1 tablet (0.5 mg) by mouth every 8 hours as needed for anxiety, Disp: 15 tablet, Rfl: 0     traZODone (DESYREL) 50 MG tablet, Take 0.5 tablets (25 mg) by mouth nightly as needed for sleep, Disp: 30 tablet, Rfl: 0     vitamin D3 (CHOLECALCIFEROL) 50 mcg (2000 units) tablet, Take 1 tablet (50 mcg) by mouth daily, Disp: 90 tablet, Rfl: 2    Allergy:   Allergies   Allergen Reactions     Fluconazole Rash       Social History:   History   Smoking Status     Former Smoker     Packs/day: 0.50     Years: 2.50     Types: Cigarettes     Quit date: 2005   Smokeless Tobacco     Never Used      Social History     Tobacco Use     Smoking status: Former Smoker     Packs/day: 0.50     Years: 2.50     Pack years: 1.25     Types: Cigarettes     Quit date: 2005     Years since quittin.8     Smokeless tobacco: Never Used   Substance Use Topics     Alcohol use: Yes     Alcohol/week: 0.0 standard drinks     Comment: rarely - 1 monthly     Drug use: No        Family History:   Family History   Problem Relation Age of Onset     Heart Disease Maternal Grandfather         Bypass, Heart Disease     Respiratory Maternal Grandfather         asthma     Macular Degeneration Maternal Grandfather      Hypertension Paternal Grandmother      Cancer Paternal Grandmother         lymph nodes     Cataracts Paternal Grandmother      C.A.D. Paternal Grandfather      Heart Disease Maternal Uncle         MI's      Alcohol/Drug Maternal Uncle      Respiratory Other         cousin on mothers side, asthma     Alcohol/Drug Other         bother great grandparents on mother's side     Diabetes No family hx of      Breast Cancer No family hx of      Cancer - colorectal No  family hx of        Physical Examination:  There were no vitals filed for this visit.  There is no height or weight on file to calculate BMI.    Well appearing, well nourished  Alert and oriented x 3, cooperative with exam     Left wrist  Skin intact  5x5mm mass palpable over the dorsal wrist in the region of the SL ligament  TTP over mass  No TTP over TFCC  Motor Exam: Intact TU/OK/x2-3. 5/5 1st DOI and thumb abduction  Sensory Exam: Sensation intact to light touch in FDWS (radial), volar IF (median), volar SF (ulnar)  Vascular Exam: 2+ radial pulse, < 2 sec capillary refill    Imaging/Studies:  XR (3 views) of the left wrist was obtained 9/6/2022.  I reviewed the images and report independently with the patient.  The imaging study shows no fracture/dislocation.  Well maintained joint space.    MRI of the left wrist performed 9/12/2022 shows:   1. Between the external markers, approximately 4 x 4 by 3 mm (mediolateral by proximal distal by dorsal palmar) cystic-appearing mass intimate with an distal part of the dorsal component of scapholunate interosseous ligament, most consistent with ganglion/synovial cyst.    a. No high grade tear of underlying scapholunate interosseous ligament suspected.  2. Query partial tear central portion of triangular fibrocartilage disc proper.    Assessment: Aleida Weinberg is a 37 year old female with left wrist ganglion cyst.    Plan:   I had a detailed discussion with the patient regarding my clinical findings, diagnosis, and treatment plan. I reviewed the treatment options for her ganglion cyst (observation, aspiration, mass excision) as well as the risks and benefits of each.  At this time, since she has had persistent symptoms, she would like to pursue operative intervention.  I reviewed with the patient the treatment course with regard to operative intervention including surgical plan, post-operative pain management, follow-up frequency, and rehabilitation plan.  The  patient understands that there are risks associated with operative treatment including but not limited to infection, bleeding, nerve injury, permanent numbness, recurrence, post-operative stiffness, surgical scar, CRPS, anesthetic complications, etc.  We also discussed that there is a possibility that the surgery will not be successful and will require repeat surgery. The patient understands and agrees to the treatment plan.  All questions answered.       Plan for operative intervention in the form of left wrist dorsal ganglion cyst excision.     Next Visit:    Follow-up: 2 weeks after surgery.     Imaging: None.    Plan: Wound check. Begin OT pending wound healing.    MARK ANTHONY HINOJOSA MD

## 2022-09-12 ENCOUNTER — ANCILLARY PROCEDURE (OUTPATIENT)
Dept: MRI IMAGING | Facility: CLINIC | Age: 37
End: 2022-09-12
Attending: FAMILY MEDICINE
Payer: COMMERCIAL

## 2022-09-12 DIAGNOSIS — M67.432 GANGLION CYST OF DORSUM OF LEFT WRIST: ICD-10-CM

## 2022-09-12 PROCEDURE — 73221 MRI JOINT UPR EXTREM W/O DYE: CPT | Mod: LT | Performed by: RADIOLOGY

## 2022-09-14 ENCOUNTER — PRE VISIT (OUTPATIENT)
Dept: ORTHOPEDICS | Facility: CLINIC | Age: 37
End: 2022-09-14

## 2022-09-14 ENCOUNTER — OFFICE VISIT (OUTPATIENT)
Dept: ORTHOPEDICS | Facility: CLINIC | Age: 37
End: 2022-09-14
Payer: COMMERCIAL

## 2022-09-14 DIAGNOSIS — M67.432 GANGLION CYST OF DORSUM OF LEFT WRIST: ICD-10-CM

## 2022-09-14 PROCEDURE — 99204 OFFICE O/P NEW MOD 45 MIN: CPT | Performed by: STUDENT IN AN ORGANIZED HEALTH CARE EDUCATION/TRAINING PROGRAM

## 2022-09-14 NOTE — NURSING NOTE
Teaching Flowsheet   Relevant Diagnosis: Ganglion cyst of dorsum of left wrist  Teaching Topic: Excision, ganglion cyst of left wrist    CSC under MAC with Regional Block anesthesia with Dr Jayde Martinez.  Taking Plaquenil, Dx Lupus, autoimmune condition.  Informed patient OK to continue taking medication.    Person(s) involved in teaching:   Patient     Motivation Level:  Asks Questions: Yes  Eager to Learn: Yes  Cooperative: Yes  Receptive (willing/able to accept information): Yes  Any cultural factors/Buddhism beliefs that may influence understanding or compliance? No    Patient demonstrates understanding of the following:  Reason for the appointment, diagnosis and treatment plan: Yes  Knowledge of proper use of medications and conditions for which they are ordered (with special attention to potential side effects or drug interactions): Yes  Which situations necessitate calling provider and whom to contact: Yes    Teaching Concerns Addressed:   Proper use and care of  (medical equip, care aids, etc.): Yes  Nutritional needs and diet plan: Yes  Pain management techniques: Yes  Wound Care: Yes  How and/when to access community resources: Yes     Instructional Materials Used/Given: Preoperative surgery packet, antibacterial Chlorhexidine soap. Stop Light Tool reviewed, patient verbalized understanding, had no immediate questions. Meenu Stevenson RN

## 2022-09-14 NOTE — LETTER
2022         RE: Aleida Weinberg  [address] [facility] [address]      Chief Complaint: No chief complaint on file.    Diagnosis: left wrist mass, consistent with dorsal ganglion cyst  Treatment: none    History of Present Illness: Aleida Weinberg is a 37 year old LHD female presenting for evaluation of left wrist mass.  The mass has been present for over 1 year, but in the last few months it has become more bothersome.  She has pain with activities, and it sometimes is bothersome at work.  She has not had any overlying skin changes.  She thinks the mass is growing slightly.  She denies numbness or tingling in her fingers, but sometimes at night has paresthesias over the ulnar border of her hand.    Clinical documentation by Dr. Byrne on 2022 was reviewed.    Occupation: works for [employer]    Past Medical History:   Past Medical History:   Diagnosis Date     Arthritis      Lupus erythematosus      Mild intermittent asthma      Other kyphoscoliosis and scoliosis     upper back curvature and disc degeneration in lower back.       Past Surgical History:   Past Surgical History:   Procedure Laterality Date      SECTION       SURGICAL HISTORY OF -       PE Tubes       Medications:   Current Outpatient Medications:      albuterol (PROAIR HFA/PROVENTIL HFA/VENTOLIN HFA) 108 (90 Base) MCG/ACT inhaler, Inhale 2 puffs into the lungs every 6 hours as needed for shortness of breath / dyspnea, Disp: 18 g, Rfl: 1     amLODIPine (NORVASC) 10 MG tablet, Take 1 tablet (10 mg) by mouth daily for raynaud's phenomenon (Patient not taking: Reported on 2022), Disp: 30 tablet, Rfl: 4     azaTHIOprine (IMURAN) 50 MG tablet, Take 1 tablet (50 mg) by mouth daily, Disp: 90 tablet, Rfl: 2     cyclobenzaprine (FLEXERIL) 10 MG tablet, Take 1 tablet (10 mg) by mouth 3 times daily as needed for muscle spasms, Disp: 30 tablet, Rfl: 2     hydroxychloroquine (PLAQUENIL) 200 MG tablet,  Take 1 tablet (200 mg) by mouth daily . Yearly eye exam including 10-2 VF and SD-OCT required, Disp: 90 tablet, Rfl: 2     hydrOXYzine (ATARAX) 25 MG tablet, Take 1 tablet (25 mg) by mouth every 6 hours as needed for anxiety, Disp: 90 tablet, Rfl: 6     levonorgestrel (MIRENA) 20 MCG/24HR IUD, 1 each by Intrauterine route once, Disp: , Rfl:      LORazepam (ATIVAN) 0.5 MG tablet, Take 1 tablet (0.5 mg) by mouth every 8 hours as needed for anxiety, Disp: 15 tablet, Rfl: 0     traZODone (DESYREL) 50 MG tablet, Take 0.5 tablets (25 mg) by mouth nightly as needed for sleep, Disp: 30 tablet, Rfl: 0     vitamin D3 (CHOLECALCIFEROL) 50 mcg (2000 units) tablet, Take 1 tablet (50 mcg) by mouth daily, Disp: 90 tablet, Rfl: 2    Allergy:   Allergies   Allergen Reactions     Fluconazole Rash       Social History:   History   Smoking Status     Former Smoker     Packs/day: 0.50     Years: 2.50     Types: Cigarettes     Quit date: 2005   Smokeless Tobacco     Never Used      Social History     Tobacco Use     Smoking status: Former Smoker     Packs/day: 0.50     Years: 2.50     Pack years: 1.25     Types: Cigarettes     Quit date: 2005     Years since quittin.8     Smokeless tobacco: Never Used   Substance Use Topics     Alcohol use: Yes     Alcohol/week: 0.0 standard drinks     Comment: rarely - 1 monthly     Drug use: No        Family History:   Family History   Problem Relation Age of Onset     Heart Disease Maternal Grandfather         Bypass, Heart Disease     Respiratory Maternal Grandfather         asthma     Macular Degeneration Maternal Grandfather      Hypertension Paternal Grandmother      Cancer Paternal Grandmother         lymph nodes     Cataracts Paternal Grandmother      C.A.D. Paternal Grandfather      Heart Disease Maternal Uncle         MI's      Alcohol/Drug Maternal Uncle      Respiratory Other         cousin on mothers side, asthma     Alcohol/Drug Other         bother great grandparents on  mother's side     Diabetes No family hx of      Breast Cancer No family hx of      Cancer - colorectal No family hx of        Physical Examination:  There were no vitals filed for this visit.  There is no height or weight on file to calculate BMI.    Well appearing, well nourished  Alert and oriented x 3, cooperative with exam     Left wrist  Skin intact  5x5mm mass palpable over the dorsal wrist in the region of the SL ligament  TTP over mass  No TTP over TFCC  Motor Exam: Intact TU/OK/x2-3. 5/5 1st DOI and thumb abduction  Sensory Exam: Sensation intact to light touch in FDWS (radial), volar IF (median), volar SF (ulnar)  Vascular Exam: 2+ radial pulse, < 2 sec capillary refill    Imaging/Studies:  XR (3 views) of the left wrist was obtained 9/6/2022.  I reviewed the images and report independently with the patient.  The imaging study shows no fracture/dislocation.  Well maintained joint space.    MRI of the left wrist performed 9/12/2022 shows:   1. Between the external markers, approximately 4 x 4 by 3 mm (mediolateral by proximal distal by dorsal palmar) cystic-appearing mass intimate with an distal part of the dorsal component of scapholunate interosseous ligament, most consistent with ganglion/synovial cyst.    a. No high grade tear of underlying scapholunate interosseous ligament suspected.  2. Query partial tear central portion of triangular fibrocartilage disc proper.    Assessment: Aleida Weinberg is a 37 year old female with left wrist ganglion cyst.    Plan:   I had a detailed discussion with the patient regarding my clinical findings, diagnosis, and treatment plan. I reviewed the treatment options for her ganglion cyst (observation, aspiration, mass excision) as well as the risks and benefits of each.  At this time, since she has had persistent symptoms, she would like to pursue operative intervention.  I reviewed with the patient the treatment course with regard to operative intervention  including surgical plan, post-operative pain management, follow-up frequency, and rehabilitation plan.  The patient understands that there are risks associated with operative treatment including but not limited to infection, bleeding, nerve injury, permanent numbness, recurrence, post-operative stiffness, surgical scar, CRPS, anesthetic complications, etc.  We also discussed that there is a possibility that the surgery will not be successful and will require repeat surgery. The patient understands and agrees to the treatment plan.  All questions answered.       Plan for operative intervention in the form of left wrist dorsal ganglion cyst excision.     Next Visit:    Follow-up: 2 weeks after surgery.     Imaging: None.    Plan: Wound check. Begin OT pending wound healing.    MARK ANTHONY HINOJOSA MD

## 2022-09-14 NOTE — LETTER
2022         RE: Aleida Weinberg  [address] [facility] [address]        Dear Colleague,    Thank you for referring your patient, Aleida Weinberg, to the [facility] [place]. Please see a copy of my visit note below.    Chief Complaint: No chief complaint on file.    Diagnosis: left wrist mass, consistent with dorsal ganglion cyst  Treatment: none    History of Present Illness: Aleida Weinberg is a 37 year old LHD female presenting for evaluation of left wrist mass.  The mass has been present for over 1 year, but in the last few months it has become more bothersome.  She has pain with activities, and it sometimes is bothersome at work.  She has not had any overlying skin changes.  She thinks the mass is growing slightly.  She denies numbness or tingling in her fingers, but sometimes at night has paresthesias over the ulnar border of her hand.    Clinical documentation by Dr. Byrne on 2022 was reviewed.    Occupation: works for [employer]    Past Medical History:   Past Medical History:   Diagnosis Date     Arthritis      Lupus erythematosus      Mild intermittent asthma      Other kyphoscoliosis and scoliosis     upper back curvature and disc degeneration in lower back.       Past Surgical History:   Past Surgical History:   Procedure Laterality Date      SECTION       SURGICAL HISTORY OF -       PE Tubes       Medications:   Current Outpatient Medications:      albuterol (PROAIR HFA/PROVENTIL HFA/VENTOLIN HFA) 108 (90 Base) MCG/ACT inhaler, Inhale 2 puffs into the lungs every 6 hours as needed for shortness of breath / dyspnea, Disp: 18 g, Rfl: 1     amLODIPine (NORVASC) 10 MG tablet, Take 1 tablet (10 mg) by mouth daily for raynaud's phenomenon (Patient not taking: Reported on 2022), Disp: 30 tablet, Rfl: 4     azaTHIOprine (IMURAN) 50 MG tablet, Take 1 tablet (50 mg) by mouth daily, Disp: 90 tablet, Rfl: 2      cyclobenzaprine (FLEXERIL) 10 MG tablet, Take 1 tablet (10 mg) by mouth 3 times daily as needed for muscle spasms, Disp: 30 tablet, Rfl: 2     hydroxychloroquine (PLAQUENIL) 200 MG tablet, Take 1 tablet (200 mg) by mouth daily . Yearly eye exam including 10-2 VF and SD-OCT required, Disp: 90 tablet, Rfl: 2     hydrOXYzine (ATARAX) 25 MG tablet, Take 1 tablet (25 mg) by mouth every 6 hours as needed for anxiety, Disp: 90 tablet, Rfl: 6     levonorgestrel (MIRENA) 20 MCG/24HR IUD, 1 each by Intrauterine route once, Disp: , Rfl:      LORazepam (ATIVAN) 0.5 MG tablet, Take 1 tablet (0.5 mg) by mouth every 8 hours as needed for anxiety, Disp: 15 tablet, Rfl: 0     traZODone (DESYREL) 50 MG tablet, Take 0.5 tablets (25 mg) by mouth nightly as needed for sleep, Disp: 30 tablet, Rfl: 0     vitamin D3 (CHOLECALCIFEROL) 50 mcg (2000 units) tablet, Take 1 tablet (50 mcg) by mouth daily, Disp: 90 tablet, Rfl: 2    Allergy:   Allergies   Allergen Reactions     Fluconazole Rash       Social History:   History   Smoking Status     Former Smoker     Packs/day: 0.50     Years: 2.50     Types: Cigarettes     Quit date: 2005   Smokeless Tobacco     Never Used      Social History     Tobacco Use     Smoking status: Former Smoker     Packs/day: 0.50     Years: 2.50     Pack years: 1.25     Types: Cigarettes     Quit date: 2005     Years since quittin.8     Smokeless tobacco: Never Used   Substance Use Topics     Alcohol use: Yes     Alcohol/week: 0.0 standard drinks     Comment: rarely - 1 monthly     Drug use: No        Family History:   Family History   Problem Relation Age of Onset     Heart Disease Maternal Grandfather         Bypass, Heart Disease     Respiratory Maternal Grandfather         asthma     Macular Degeneration Maternal Grandfather      Hypertension Paternal Grandmother      Cancer Paternal Grandmother         lymph nodes     Cataracts Paternal Grandmother      C.A.D. Paternal Grandfather      Heart  Disease Maternal Uncle         MI's      Alcohol/Drug Maternal Uncle      Respiratory Other         cousin on mothers side, asthma     Alcohol/Drug Other         bother great grandparents on mother's side     Diabetes No family hx of      Breast Cancer No family hx of      Cancer - colorectal No family hx of        Physical Examination:  There were no vitals filed for this visit.  There is no height or weight on file to calculate BMI.    Well appearing, well nourished  Alert and oriented x 3, cooperative with exam     Left wrist  Skin intact  5x5mm mass palpable over the dorsal wrist in the region of the SL ligament  TTP over mass  No TTP over TFCC  Motor Exam: Intact TU/OK/x2-3. 5/5 1st DOI and thumb abduction  Sensory Exam: Sensation intact to light touch in FDWS (radial), volar IF (median), volar SF (ulnar)  Vascular Exam: 2+ radial pulse, < 2 sec capillary refill    Imaging/Studies:  XR (3 views) of the left wrist was obtained 9/6/2022.  I reviewed the images and report independently with the patient.  The imaging study shows no fracture/dislocation.  Well maintained joint space.    MRI of the left wrist performed 9/12/2022 shows:   1. Between the external markers, approximately 4 x 4 by 3 mm (mediolateral by proximal distal by dorsal palmar) cystic-appearing mass intimate with an distal part of the dorsal component of scapholunate interosseous ligament, most consistent with ganglion/synovial cyst.    a. No high grade tear of underlying scapholunate interosseous ligament suspected.  2. Query partial tear central portion of triangular fibrocartilage disc proper.    Assessment: Aleida Weinberg is a 37 year old female with left wrist ganglion cyst.    Plan:   I had a detailed discussion with the patient regarding my clinical findings, diagnosis, and treatment plan. I reviewed the treatment options for her ganglion cyst (observation, aspiration, mass excision) as well as the risks and benefits of each.  At  this time, since she has had persistent symptoms, she would like to pursue operative intervention.  I reviewed with the patient the treatment course with regard to operative intervention including surgical plan, post-operative pain management, follow-up frequency, and rehabilitation plan.  The patient understands that there are risks associated with operative treatment including but not limited to infection, bleeding, nerve injury, permanent numbness, recurrence, post-operative stiffness, surgical scar, CRPS, anesthetic complications, etc.  We also discussed that there is a possibility that the surgery will not be successful and will require repeat surgery. The patient understands and agrees to the treatment plan.  All questions answered.       Plan for operative intervention in the form of left wrist dorsal ganglion cyst excision.     Next Visit:    Follow-up: 2 weeks after surgery.     Imaging: None.    Plan: Wound check. Begin OT pending wound healing.    MARK ANTHONY HINOJOSA MD

## 2022-09-14 NOTE — NURSING NOTE
Reason For Visit:   Chief Complaint   Patient presents with     Consult     Left wrist ganglion cyst       Primary MD: Sayra Chirinos  Ref. MD: Dr. Byrne    Age: 37 year old    ?  No      There were no vitals taken for this visit.      Pain Assessment  Patient Currently in Pain: No (Left wrist pain with use throughout the day and especially at night)    Hand Dominance Evaluation  Hand Dominance: Left          QuickDASH Assessment  QuickDASH Main 11/11/2014   1. Open a tight or new jar. 3   2. Do heavy household chores (e.g., wash walls, floors). 1   3. Carry a shopping bag or briefcase. 2   4. Wash your back. 1   5. Use a knife to cut food. 1   6. Recreational activities in which you take some force or impact through your arm, shoulder or hand (e.g., golf, hammering, tennis, etc.). 1   7. During the past week, to what extent has your arm, shoulder or hand problem interfered with your normal social activities with family, friends, neighbours or groups? 2   8. During the past week, were you limited in your work or other regular daily activities as a result of your arm, shoulder or hand problem? 2   9. Arm, shoulder or hand pain. 2   10. Tingling (pins and needles) in your arm,shoulder or hand. 3   11. During the past week, how much difficulty have you had sleeping because of the pain in your arm, shoulder or hand? ([place] number) 1   SUM: 19          Current Outpatient Medications   Medication Sig Dispense Refill     albuterol (PROAIR HFA/PROVENTIL HFA/VENTOLIN HFA) 108 (90 Base) MCG/ACT inhaler Inhale 2 puffs into the lungs every 6 hours as needed for shortness of breath / dyspnea 18 g 1     amLODIPine (NORVASC) 10 MG tablet Take 1 tablet (10 mg) by mouth daily for raynaud's phenomenon (Patient not taking: Reported on 4/13/2022) 30 tablet 4     azaTHIOprine (IMURAN) 50 MG tablet Take 1 tablet (50 mg) by mouth daily 90 tablet 2     cyclobenzaprine (FLEXERIL) 10 MG tablet Take 1 tablet (10 mg) by  mouth 3 times daily as needed for muscle spasms 30 tablet 2     hydroxychloroquine (PLAQUENIL) 200 MG tablet Take 1 tablet (200 mg) by mouth daily . Yearly eye exam including 10-2 VF and SD-OCT required 90 tablet 2     hydrOXYzine (ATARAX) 25 MG tablet Take 1 tablet (25 mg) by mouth every 6 hours as needed for anxiety 90 tablet 6     levonorgestrel (MIRENA) 20 MCG/24HR IUD 1 each by Intrauterine route once       LORazepam (ATIVAN) 0.5 MG tablet Take 1 tablet (0.5 mg) by mouth every 8 hours as needed for anxiety 15 tablet 0     traZODone (DESYREL) 50 MG tablet Take 0.5 tablets (25 mg) by mouth nightly as needed for sleep 30 tablet 0     vitamin D3 (CHOLECALCIFEROL) 50 mcg (2000 units) tablet Take 1 tablet (50 mcg) by mouth daily 90 tablet 2       Allergies   Allergen Reactions     Fluconazole Rash       Berry Stein, AEMT

## 2022-09-16 ENCOUNTER — DOCUMENTATION ONLY (OUTPATIENT)
Dept: ORTHOPEDICS | Facility: CLINIC | Age: 37
End: 2022-09-16

## 2022-09-16 PROBLEM — M67.432 GANGLION CYST OF DORSUM OF LEFT WRIST: Status: ACTIVE | Noted: 2022-09-16

## 2022-09-16 NOTE — PROGRESS NOTES
Patient is scheduled for surgery with Dr. Martinez    Spoke with: Aleida    Date of Surgery: 11/10/22    Location: [facility]    Informed patient they will need an adult  Yes    H&P: Scheduled with PCP    Pre-procedure COVID-19 Test: Patient will complete at home    Additional imaging/appointments: POP made    Surgery packet: Received     Additional comments: N/A

## 2022-10-03 ENCOUNTER — IMMUNIZATION (OUTPATIENT)
Dept: FAMILY MEDICINE | Facility: CLINIC | Age: 37
End: 2022-10-03
Payer: COMMERCIAL

## 2022-10-03 PROCEDURE — 91313 COVID-19,PF,MODERNA BIVALENT: CPT

## 2022-10-03 PROCEDURE — 0134A COVID-19,PF,MODERNA BIVALENT: CPT

## 2022-10-09 ENCOUNTER — HEALTH MAINTENANCE LETTER (OUTPATIENT)
Age: 37
End: 2022-10-09

## 2022-10-14 ENCOUNTER — LAB (OUTPATIENT)
Dept: LAB | Facility: CLINIC | Age: 37
End: 2022-10-14
Payer: COMMERCIAL

## 2022-10-14 DIAGNOSIS — M32.19 OTHER SYSTEMIC LUPUS ERYTHEMATOSUS WITH OTHER ORGAN INVOLVEMENT (H): ICD-10-CM

## 2022-10-14 DIAGNOSIS — Z79.899 HIGH RISK MEDICATIONS (NOT ANTICOAGULANTS) LONG-TERM USE: ICD-10-CM

## 2022-10-14 LAB
ALBUMIN MFR UR ELPH: 7.6 MG/DL
ALBUMIN UR-MCNC: NEGATIVE MG/DL
APPEARANCE UR: CLEAR
BASOPHILS # BLD AUTO: 0 10E3/UL (ref 0–0.2)
BASOPHILS NFR BLD AUTO: 1 %
BILIRUB UR QL STRIP: NEGATIVE
COLOR UR AUTO: YELLOW
CREAT UR-MCNC: 184 MG/DL
CRP SERPL-MCNC: <3 MG/L
EOSINOPHIL # BLD AUTO: 0.1 10E3/UL (ref 0–0.7)
EOSINOPHIL NFR BLD AUTO: 2 %
ERYTHROCYTE [DISTWIDTH] IN BLOOD BY AUTOMATED COUNT: 11.4 % (ref 10–15)
ERYTHROCYTE [SEDIMENTATION RATE] IN BLOOD BY WESTERGREN METHOD: 6 MM/HR (ref 0–20)
GLUCOSE UR STRIP-MCNC: NEGATIVE MG/DL
HCT VFR BLD AUTO: 40.9 % (ref 35–47)
HGB BLD-MCNC: 14.5 G/DL (ref 11.7–15.7)
HGB UR QL STRIP: NEGATIVE
KETONES UR STRIP-MCNC: NEGATIVE MG/DL
LEUKOCYTE ESTERASE UR QL STRIP: NEGATIVE
LYMPHOCYTES # BLD AUTO: 0.9 10E3/UL (ref 0.8–5.3)
LYMPHOCYTES NFR BLD AUTO: 22 %
MCH RBC QN AUTO: 33 PG (ref 26.5–33)
MCHC RBC AUTO-ENTMCNC: 35.5 G/DL (ref 31.5–36.5)
MCV RBC AUTO: 93 FL (ref 78–100)
MONOCYTES # BLD AUTO: 0.7 10E3/UL (ref 0–1.3)
MONOCYTES NFR BLD AUTO: 16 %
NEUTROPHILS # BLD AUTO: 2.5 10E3/UL (ref 1.6–8.3)
NEUTROPHILS NFR BLD AUTO: 60 %
NITRATE UR QL: NEGATIVE
PH UR STRIP: 6 [PH] (ref 5–7)
PLATELET # BLD AUTO: 261 10E3/UL (ref 150–450)
PROT/CREAT 24H UR: 0.04 MG/MG CR (ref 0–0.2)
RBC # BLD AUTO: 4.4 10E6/UL (ref 3.8–5.2)
SP GR UR STRIP: >=1.03 (ref 1–1.03)
UROBILINOGEN UR STRIP-ACNC: 0.2 E.U./DL
WBC # BLD AUTO: 4.2 10E3/UL (ref 4–11)

## 2022-10-14 PROCEDURE — 86160 COMPLEMENT ANTIGEN: CPT | Mod: 59

## 2022-10-14 PROCEDURE — 81003 URINALYSIS AUTO W/O SCOPE: CPT

## 2022-10-14 PROCEDURE — 80053 COMPREHEN METABOLIC PANEL: CPT

## 2022-10-14 PROCEDURE — 36415 COLL VENOUS BLD VENIPUNCTURE: CPT

## 2022-10-14 PROCEDURE — 82550 ASSAY OF CK (CPK): CPT

## 2022-10-14 PROCEDURE — 86140 C-REACTIVE PROTEIN: CPT

## 2022-10-14 PROCEDURE — 84156 ASSAY OF PROTEIN URINE: CPT

## 2022-10-14 PROCEDURE — 86225 DNA ANTIBODY NATIVE: CPT

## 2022-10-14 PROCEDURE — 85652 RBC SED RATE AUTOMATED: CPT

## 2022-10-14 PROCEDURE — 86160 COMPLEMENT ANTIGEN: CPT

## 2022-10-14 PROCEDURE — 85025 COMPLETE CBC W/AUTO DIFF WBC: CPT

## 2022-10-16 LAB
ALBUMIN SERPL-MCNC: 4.1 G/DL (ref 3.4–5)
ALP SERPL-CCNC: 42 U/L (ref 40–150)
ALT SERPL W P-5'-P-CCNC: 18 U/L (ref 0–50)
ANION GAP SERPL CALCULATED.3IONS-SCNC: 7 MMOL/L (ref 3–14)
AST SERPL W P-5'-P-CCNC: 17 U/L (ref 0–45)
BILIRUB SERPL-MCNC: 0.3 MG/DL (ref 0.2–1.3)
BUN SERPL-MCNC: 11 MG/DL (ref 7–30)
CALCIUM SERPL-MCNC: 8.6 MG/DL (ref 8.5–10.1)
CHLORIDE BLD-SCNC: 109 MMOL/L (ref 94–109)
CK SERPL-CCNC: 55 U/L (ref 30–225)
CO2 SERPL-SCNC: 22 MMOL/L (ref 20–32)
CREAT SERPL-MCNC: 0.65 MG/DL (ref 0.52–1.04)
GFR SERPL CREATININE-BSD FRML MDRD: >90 ML/MIN/1.73M2
GLUCOSE BLD-MCNC: 93 MG/DL (ref 70–99)
POTASSIUM BLD-SCNC: 4 MMOL/L (ref 3.4–5.3)
PROT SERPL-MCNC: 7 G/DL (ref 6.8–8.8)
SODIUM SERPL-SCNC: 138 MMOL/L (ref 133–144)

## 2022-10-17 ENCOUNTER — OFFICE VISIT (OUTPATIENT)
Dept: RHEUMATOLOGY | Facility: CLINIC | Age: 37
End: 2022-10-17
Payer: COMMERCIAL

## 2022-10-17 VITALS
WEIGHT: 157.2 LBS | SYSTOLIC BLOOD PRESSURE: 117 MMHG | HEART RATE: 83 BPM | OXYGEN SATURATION: 95 % | DIASTOLIC BLOOD PRESSURE: 81 MMHG | BODY MASS INDEX: 24.62 KG/M2

## 2022-10-17 DIAGNOSIS — M32.19 OTHER SYSTEMIC LUPUS ERYTHEMATOSUS WITH OTHER ORGAN INVOLVEMENT (H): Primary | ICD-10-CM

## 2022-10-17 DIAGNOSIS — E55.9 VITAMIN D DEFICIENCY: ICD-10-CM

## 2022-10-17 DIAGNOSIS — I73.00 RAYNAUD'S PHENOMENON WITHOUT GANGRENE: ICD-10-CM

## 2022-10-17 LAB
C3 SERPL-MCNC: 96 MG/DL (ref 81–157)
C4 SERPL-MCNC: 19 MG/DL (ref 13–39)
DSDNA AB SER-ACNC: 1.6 IU/ML

## 2022-10-17 PROCEDURE — 99214 OFFICE O/P EST MOD 30 MIN: CPT | Performed by: INTERNAL MEDICINE

## 2022-10-17 RX ORDER — CHOLECALCIFEROL (VITAMIN D3) 50 MCG
50 TABLET ORAL DAILY
Qty: 90 TABLET | Refills: 2 | Status: SHIPPED | OUTPATIENT
Start: 2022-10-17 | End: 2023-10-26

## 2022-10-17 RX ORDER — AZATHIOPRINE 50 MG/1
50 TABLET ORAL DAILY
Qty: 90 TABLET | Refills: 2 | Status: SHIPPED | OUTPATIENT
Start: 2022-10-17 | End: 2023-04-11

## 2022-10-17 RX ORDER — HYDROXYCHLOROQUINE SULFATE 200 MG/1
200 TABLET, FILM COATED ORAL DAILY
Qty: 90 TABLET | Refills: 2 | Status: SHIPPED | OUTPATIENT
Start: 2022-10-17 | End: 2023-04-11

## 2022-10-17 NOTE — PATIENT INSTRUCTIONS
RHEUMATOLOGY    Dr. Lavon Light    [facility]  [address] [place] [address]  [place] [address]  Phone number: [phone]  Fax number: [phone]    You may schedule your FLU shot by calling [phone] or if you would like to get your shot at a [facility] pharmacy you may schedule online at [email or web address].    Thank you for choosing [facility]!    Carissa Frye CMA Rheumatology

## 2022-10-17 NOTE — PROGRESS NOTES
Rheumatology Clinic Visit      Aleida Weinberg MRN# [id number]   YOB: 1985 Age: 37 year old      Date of visit: 10/17/22   PCP: Sayra Chirinos     Chief Complaint   Patient presents with:  Systemic Lupus Erythematous: Doing good, no major concerns    Assessment and Plan     1. Systemic lupus erythematosus (CHANELL >1:50927 speckled, RNP+, Sm+, Scl-70+, hypocomplementemia, photosensitivity, malar rash, arthritis, fatigue, Raynaud's phenomenon):  Dx'd 4/2016. Scl-70+; she lacks features of scleroderma except for raynaud's; no myopathy based on labs/symptoms. 5/11/2018 echo without evidence of pulmonary hypertension. Previously on MTX 20mg SQ weekly (GI upset with PO doses above 15mg, partially effective) and SSZ (LFT elevations).  Currently on AZA 50mg daily and HCQ 200mg daily (patient self reduced previously from 300mg daily on average based on preference of an easier regimen and continued to do well). TPMT normal on 8/21/2017.  Doing well at this time.  Chronic illness, stable.    - Continue hydroxychloroquine 200mg daily (last eye exam was okay on 1/28/2022 at [facility])  - Continue azathioprine 50mg daily   - Labs in 3 months: CBC, CMP, ESR, CRP, UA, Uprotein:creatinine    High risk medication requiring intensive toxicity monitoring at least quarterly: labs ordered include CBC, Creatinine, Hepatic panel to monitor for cytopenia and hepatotoxicity; checking creatinine as it affects clearance of medication.       2. Raynaud's Phenomenon: Cold avoidance was insufficient for controlling her symptoms in the past; then did well on amlodipine, but not using now and doing okay. Amlodipine PRN.   Chronic illness, stable.    - Amlodipine 10mg daily during colder months, as needed    3.  Low vitamin D: Currently on vitamin D 2000 units daily.    - Continue vitamin D 2000 units daily  - Lab in 3 months: Vitamin D    4.  Secondary Sjogren's syndrome: Mild dry and dry mouth that are treated  infrequently with artificial tears and frequent sips of water. Dentist follow-up every 6 months     5.  Facial rash, history: Seeing dermatology at [facility].  Reportedly due to SLE and rosacea from derm perspective, and improved with topical creams from her dermatologist for rosacea.  Not present today.    6.  Mild medial joint line tenderness of the knees, and mild pes anserine bursitis: she says that it is only symptomatic when examined.  May use topical Voltaren gel as needed.    7.  Perioperative DMARD management for left ganglion cyst surgery/removal: Hold azathioprine for 3-5 days before and after surgery    8.  Vaccinations:   - Influenza: encouraged yearly vaccination  - [id number]: up to date  - [id number]: up to date  - Shingrix: Up to date  - COVID-19: Up to date     Total minutes spent in evaluation with patient, documentation, , and review of pertinent studies and chart notes: 16    Ms. Weinberg verbalized agreement with and understanding of the rational for the diagnosis and treatment plan.  All questions were answered to best of my ability and the patient's satisfaction. Ms. Weinberg was advised to contact the clinic with any questions that may arise after the clinic visit.      Thank you for involving me in the care of the patient    Return to clinic: 3-4 months      HPI   Aleida Weinberg is a 37 year old female with medical history significant for intermittent asthma, anxiety, migraines, vitamin D deficiency, and systemic lupus erythematosus who presents for follow-up of SLE.     Today, 10/17/2022: Currently doing well.  Tolerating medications well.  This past summer she had a blotchy rash on her neck that was pruritic but resolved within 1 day.  Some bilateral knee pain only with palpation, not generally with activity there is no swelling of her knees.  Sometimes with lateral and medial ankle pain, around the malleoli; no swelling and does not limit her daily activities.  Has not been  using amlodipine because she does not find that it is required at this time for Raynaud's management.  Planning to have left wrist ganglion cyst removed surgically    Denies fevers, chills, nausea, vomiting, constipation, diarrhea. No abdominal pain. No chest pain/pressure, palpitations, or shortness of breath. No oral or nasal sores.  No rash currently.    Tobacco: quit smoking in   EtOH: Once monthly  Drugs: None  Occupation: Works at [employer], a lot of typing per patient    ROS   12 point review of system was completed and negative except as noted in the HPI     Active Problem List     Patient Active Problem List   Diagnosis     Other kyphoscoliosis and scoliosis     Hypertrophic and atrophic condition of skin     Viral warts     CARDIOVASCULAR SCREENING; LDL GOAL LESS THAN 160     Intermittent asthma     Anxiety     Intractable menstrual migraine without status migrainosus     Vitamin D deficiency     Inflammatory polyarthropathy (H)     Chronic back pain     Raynaud's phenomenon without gangrene     Systemic lupus erythematosus (H)     High risk medications (not anticoagulants) long-term use (Plaquenil and AZA)     Dry eyes, bilateral     Disorder of connective tissue (H)     Ganglion cyst of dorsum of left wrist     Past Medical History     Past Medical History:   Diagnosis Date     Arthritis      Lupus erythematosus      Mild intermittent asthma      Other kyphoscoliosis and scoliosis     upper back curvature and disc degeneration in lower back.     Past Surgical History     Past Surgical History:   Procedure Laterality Date      SECTION  2006     SURGICAL HISTORY OF -       PE Tubes     Allergy     Allergies   Allergen Reactions     Fluconazole Rash     Current Medication List     Current Outpatient Medications   Medication Sig     albuterol (PROAIR HFA/PROVENTIL HFA/VENTOLIN HFA) 108 (90 Base) MCG/ACT inhaler Inhale 2 puffs into the lungs every 6 hours as needed for shortness of breath /  dyspnea     azaTHIOprine (IMURAN) 50 MG tablet Take 1 tablet (50 mg) by mouth daily     cyclobenzaprine (FLEXERIL) 10 MG tablet Take 1 tablet (10 mg) by mouth 3 times daily as needed for muscle spasms     hydroxychloroquine (PLAQUENIL) 200 MG tablet Take 1 tablet (200 mg) by mouth daily . Yearly eye exam including 10-2 VF and SD-OCT required     hydrOXYzine (ATARAX) 25 MG tablet Take 1 tablet (25 mg) by mouth every 6 hours as needed for anxiety     levonorgestrel (MIRENA) 20 MCG/24HR IUD 1 each by Intrauterine route once     LORazepam (ATIVAN) 0.5 MG tablet Take 1 tablet (0.5 mg) by mouth every 8 hours as needed for anxiety     traZODone (DESYREL) 50 MG tablet Take 0.5 tablets (25 mg) by mouth nightly as needed for sleep     vitamin D3 (CHOLECALCIFEROL) 50 mcg (2000 units) tablet Take 1 tablet (50 mcg) by mouth daily     amLODIPine (NORVASC) 10 MG tablet Take 1 tablet (10 mg) by mouth daily for raynaud's phenomenon (Patient not taking: No sig reported)     No current facility-administered medications for this visit.         Social History   See HPI    Family History     Family History   Problem Relation Age of Onset     Heart Disease Maternal Grandfather         Bypass, Heart Disease     Respiratory Maternal Grandfather         asthma     Macular Degeneration Maternal Grandfather      Hypertension Paternal Grandmother      Cancer Paternal Grandmother         lymph nodes     Cataracts Paternal Grandmother      C.A.D. Paternal Grandfather      Heart Disease Maternal Uncle         MI's      Alcohol/Drug Maternal Uncle      Respiratory Other         cousin on mothers side, asthma     Alcohol/Drug Other         bother great grandparents on mother's side     Diabetes No family hx of      Breast Cancer No family hx of      Cancer - colorectal No family hx of      Denies family history of autoimmune disease    Physical Exam     Temp Readings from Last 3 Encounters:   10/15/21 98.2  F (36.8  C) (Oral)   02/09/21 97.9  F  "(36.6  C) (Oral)   12/10/20 98.1  F (36.7  C) (Oral)     BP Readings from Last 5 Encounters:   10/17/22 117/81   07/20/22 112/79   04/13/22 129/76   02/16/22 108/76   10/15/21 108/72     Pulse Readings from Last 1 Encounters:   10/17/22 83     Resp Readings from Last 1 Encounters:   02/09/21 20     Estimated body mass index is 24.62 kg/m  as calculated from the following:    Height as of 2/16/22: 1.702 m (5' 7\").    Weight as of this encounter: 71.3 kg (157 lb 3.2 oz).    GEN: NAD. Healthy appearing adult.   HEENT:  Anicteric, noninjected sclera. No obvious external lesions of the ear and nose. Hearing intact.  CV: S1, S2. RRR. No m/r/g  PULM: No increased work of breathing. CTA bilaterally   MSK: MCPs, PIPs, DIPs without swelling or tenderness to palpation.  Wrists without swelling or tenderness to palpation.  Elbows without swelling, increased warmth, or overlying erythema.  Shoulders without swelling or tenderness to palpation.  Knees without swelling, increased warmth, or overlying erythema; mild medial joint line tenderness and tender to palpation over the pes anserine bursae.  Ankles without swelling; mildly tender to palpation posterior and inferior to the lateral and medial malleolus; mild ankle eversion when standing.  MTPs without swelling or tenderness to palpation.  SKIN: No rash or jaundice seen.  No evidence of current or previous ischemic insults to the fingertips by visual inspection  PSYCH: Alert. Appropriate.      Labs / Imaging (select studies)     RF/CCP  Recent Labs   Lab Test 04/22/16  0902 04/01/16  0910   CCPIGG 1  --    RHF  --  <20     CHANELL  Recent Labs   Lab Test 04/22/16  0902 04/01/16  0910   VALERIE  --  12.4*   ANAIGG >1:69128  Reference range: <1:40  (Note)  Speckled pattern.  INTERPRETIVE INFORMATION: CHANELL by IFA, IgG  Anti-nuclear antibodies (CHANELL) are seen in a variety of  systemic rheumatic diseases and are determined by indirect  fluorescence assay (IFA) using HEp-2 substrate with " an  IgG-specific conjugate. CHANELL titers less than or equal to  1:80 have variable relevance while titers greater than or  equal to 1:160 are considered clinically significant. These  antibodies may precede clinical disease onset; however,  healthy individuals and those with advanced age have been  reported to be positive for CHANELL. When observed, one of the  five basic patterns is reported: homogeneous,  peripheral/rim, speckled, centromere, or nucleolar. If  cytoplasmic fluorescence is observed, it is noted. IFA  methodology is subjective and has occasionally been shown  to lack sensitivity for anti-SSA/Ro antibodies.  Negative results do not necessarily rule out the presence  of SSc. If clinical suspicion remains, consi vicki further  testing for U3-RNP, PM/Scl, or Th/To antibodies associated  with SSc.  Performed by [facility],  [address] [place] [id number] [phone]  [email or web address], Twin Rodriguez MD, Lab. Director    --      RNP/Sm/SSA/SSB  Recent Labs   Lab Test 01/12/18  0828 04/22/16  0902   RNPIGG  --  >8.0  Positive   Antibody index (AI) values reflect qualitative changes in antibody   concentration that cannot be directly associated with clinical condition or   disease state.  *   SMIGG  --  1.5*   SSAIGG  --  <0.2  Negative   Antibody index (AI) values reflect qualitative changes in antibody   concentration that cannot be directly associated with clinical condition or   disease state.     SSBIGG  --  <0.2  Negative   Antibody index (AI) values reflect qualitative changes in antibody   concentration that cannot be directly associated with clinical condition or   disease state.     SCLIGG  --  3.1*   TREPAB Negative  --      dsDNA  Recent Labs   Lab Test 12/15/21  1516 02/10/21  1627 11/05/20  1628 07/23/20  1636 04/02/20  1541 07/11/19  1529 11/29/18  1616 08/23/18  1637 04/26/18  1648   DNA 1.1 1 1 1 2 2 2 2 2     C3/C4  Recent Labs   Lab Test 12/15/21  1516 05/18/21  1606 02/10/21  1627  11/05/20  1628 07/23/20  1636 04/02/20  1541 10/18/19  1542 07/11/19  1529 11/29/18  1616   [id number] 87 84 89 89 92 90 74* 75* 82   B2SAMZE 17 18 17 17 18 20 14* 14* 13*     Send-out Labs  Recent Labs   Lab Test 04/21/17  0813   SRESLT SEE NOTE  (Note)  Test name                    Result Flag  Units  RefIntvl  ------------------------------------------------------------  Thiopurine Methyltransferase                                23.2 L      U/mL 24.0-44.0  Sample slightly hemolyzed. Please interpret the results  with caution.  INTERPRETIVE INFORMATION: Thiopurine Methyltransferase, RBC  Normal TPMT activity:  24.0-44.0 U/mL................Individuals are predicted to  be at low risk of bone marrow toxicity (myelosuppression)  as a consequence of standard thiopurine therapy; no dose  adjustment is recommended.  Intermediate TPMT activity:  17.0-23.9 U/mL................Individuals are predicted to  be at intermediate risk of bone marrow toxicity  (myelosuppression) as a consequence of standard thiopurine  therapy; a dose reduction and therapeutic drug management  is recommended.  Low TPMT activity:  less than 17.0 U/mL...........Individuals are predicted to  be at high risk of bone marrow toxicity (myelosuppression)   as a consequence of standard thiopurine dosing. It is  recommended to avoid the use of thiopurine drugs.  High TPMT activity:  greater than 44.0 U/mL........Individuals are not predicted  to be at risk for bone marrow toxicity (myelosuppression)  as a consequence of standard thiopurine dosing, but may be  at risk for therapeutic failure due to excessive  inactivation of thiopurine drugs. Individuals may require  higher than the normal standard dose. Therapeutic drug  management is recommended.  The TPMT, RBC assay is used as a screen to detect  individuals with low and intermediate TPMT activity who may  be at risk for myelosuppression when exposed to standard  doses of thiopurines, including  azathioprine (Imuran) and  6-mercaptopurine (Purinethol). TPMT is the primary  metabolic route for inactivation of thiopurine drugs in the  bone marrow. When TPMT activity is low, it is predicted  that proportionately more 6-mercaptopurine can be converted  into the cytotoxic 6-thioguanine nucle otides that  accumulate in the bone marrow causing excessive toxicity.  The activity of TPMT is measured by the nanomoles of  6-methylmercaptopurine (inactive metabolite) produced per 1  mL of packed red blood cells, (U/mL).    TPMT phenotype testing does not replace the need for  clinical monitoring of patients treated with thiopurine  drugs. Genotype for TPMT cannot be inferred from TPMT  activity (phenotype). Phenotype testing should not be  requested for patients currently treated with thiopurine  drugs. Current TPMT phenotype may not reflect future TPMT  phenotype, particularly in patients who received blood  transfusion within 30-60 days of testing.  TPMT enzyme  activity can be inhibited by several drugs such as:  naproxen (Aleve), ibuprofen (Advil, Motrin), ketoprofen  (Orudis), furosemide (Lasix), sulfasalazine (Azulfidine),  mesalamine (Asacol), olsalazine (Dipentum), mefenamic acid  (Ponstel), thiazide diuretics, and benzoic acid inhibitors.  TPMT inhibitors may contribute t o falsely low results;  patients should abstain from these drugs for at least 48  hours prior to TPMT testing. Falsely low results may also  occur as a result of inappropriate specimen handling and  hemolysis.  Test developed and characteristics determined by [facility]. See Compliance Statement B: www.[facility].com/CS  Performed by [facility],  [address] [place] [id number] [phone]  [email or web address], Lionel Ferreira MD, Lab. Director     STSTNM Thiopurine Methyltransferase, RBC   STSTCD 92,066   SSPTYP Whole blood, EDTA anticoagulant     Antiphospholipid Antibodies  Recent Labs   Lab Test 04/22/16  0902   B2GPG 0.9   B2GPM  <0.9  Negative     CARG <15.0  Interpretation:  Negative     JOANN <12.5  Interpretation:  Negative     LUPINT Negative  (Note)  COMMENTS:  The INR is normal.  APTT ratio is normal.  DRVVT Screen ratio is normal.  Thrombin time is normal.  NEGATIVE TEST; A LUPUS ANTICOAGULANT WAS NOT DETECTED IN THIS  SPECIMEN WITHIN THE LIMITS OF THE TESTING REPERTOIRE.  If the clinical picture is strongly suggestive of an antiphospholipid  syndrome, recommend anticardiolipin and beta-2-glycoprotein (IgG and  IgM) antibody tests.  Isabella Olson M.D.  [phone]  4/25/2016    INR =  1.05    Reference range: 0.86-1.14  Thrombin Time= 15.4    Reference range: 13.0-19.0 sec    APTT:       Ratio  Patient  =  0.92  1:2 Mix  =  N/A  Reference:  Negative: Less than or equal to 1.16  Positive: Greater than or equal to 1.17     DILUTE LISA VIPER VENOM TEST:  Screen Ratio = 0.95   Normal is less than 1.21         CBC  Recent Labs   Lab Test 10/14/22  0714 07/14/22  1507 04/11/22  1441 09/01/21  1625 05/18/21  1606 02/10/21  1627 11/05/20  1628   WBC 4.2 5.1 4.9   < > 3.9* 4.2 3.3*   RBC 4.40 4.08 4.38   < > 4.26 4.33 3.95   HGB 14.5 13.4 14.4   < > 14.2 14.2 13.1   HCT 40.9 37.4 40.9   < > 40.3 40.8 37.2   MCV 93 92 93   < > 95 94 94   RDW 11.4 11.4 11.8   < > 11.7 11.8 11.4    239 246   < > 225 212 202   MCH 33.0 32.8 32.9   < > 33.3* 32.8 33.2*   MCHC 35.5 35.8 35.2   < > 35.2 34.8 35.2   NEUTROPHIL 60 69 70   < > 64.3 67.8 60.7   LYMPH 22 17 17   < > 20.2 18.0 23.1   MONOCYTE 16 14 12   < > 14.0 13.0 14.4   EOSINOPHIL 2 1 1   < > 1.0 0.7 1.2   BASOPHIL 1 0 0   < > 0.5 0.5 0.6   ANEU  --   --   --   --  2.5 2.9 2.0   ALYM  --   --   --   --  0.8 0.8 0.8   AMANDA  --   --   --   --  0.6 0.6 0.5   AEOS  --   --   --   --  0.0 0.0 0.0   ABAS  --   --   --   --  0.0 0.0 0.0   ANEUTAUTO 2.5 3.5 3.4   < >  --   --   --    ALYMPAUTO 0.9 0.9 0.8   < >  --   --   --    AMONOAUTO 0.7 0.7 0.6   < >  --   --   --    AEOSAUTO 0.1 0.0 0.0    < >  --   --   --    ABSBASO 0.0 0.0 0.0   < >  --   --   --     < > = values in this interval not displayed.     CMP  Recent Labs   Lab Test 10/14/22  0714 07/14/22  1507 04/11/22  1441 02/09/22  0701 12/15/21  1516 09/01/21  1625 09/01/21  1625 05/18/21  1606 02/10/21  1627 01/21/21  0716 11/05/20  1628 07/23/20  1636    139 137  --  137   < > 136 140 136  --  138 138   POTASSIUM 4.0 4.2 3.7  --  3.9   < > 3.8 3.7 4.0  --  3.8 4.1   CHLORIDE 109 108 108  --  108   < > 103 107 106  --  107 106   CO2 22 27 26  --  24   < > 28 26 24  --  25 26   ANIONGAP 7 4 3  --  5   < > 5 7 6  --  6 6   GLC 93 106* 86 83 97  --  76 63* 75   < > 92 79   BUN 11 11 12  --  11   < > 9 11 10  --  13 11   CR 0.65 0.63 0.74  --  0.62   < > 0.70 0.76 0.74  --  0.68 0.68   GFRESTIMATED >90 >90 >90  --  >90   < > >90 >90 >90  --  >90 >90   GFRESTBLACK  --   --   --   --   --   --   --  >90 >90  --  >90 >90   SRINIVAS 8.6 8.9 9.3  --  8.8   < > 9.2 9.1 9.8  --  8.8 9.2   BILITOTAL 0.3 0.4 0.5  --  0.5   < > 0.5 0.7 0.5  --  0.4 0.4   ALBUMIN 4.1 4.1 4.5  --  3.8   < > 4.3 4.4 4.5  --  4.1 4.6   PROTTOTAL 7.0 6.8 7.4  --  7.0   < > 7.6 7.7 7.6  --  7.0 8.0   ALKPHOS 42 38* 42  --  36*   < > 43 41 38*  --  39* 44   AST 17 14 15  --  15   < > 16 19 15  --  17 20   ALT 18 17 20  --  21   < > 21 25 23  --  23 29    < > = values in this interval not displayed.     Calcium/VitaminD  Recent Labs   Lab Test 10/14/22  0714 07/14/22  1507 04/11/22  1441 11/05/20  1628 07/23/20  1636 04/13/17  1650 02/20/17  1640 01/20/17  0828 10/26/16  0919   SRINIVAS 8.6 8.9 9.3   < > 9.2   < >  --    < > 9.3   VITDT  --   --   --   --  30  --  33  --  23    < > = values in this interval not displayed.     ESR/CRP  Recent Labs   Lab Test 10/14/22  0714 07/14/22  1507 04/11/22  1441   SED 6 8 8   CRP <3.00 <3.00 <2.9     CK/Aldolase  Recent Labs   Lab Test 10/14/22  0714 12/15/21  1516 02/10/21  1627 07/22/16  0916 04/22/16  0902   CKT 55 60 60   < > 45   ALDOLASE  --    --   --   --  4.5    < > = values in this interval not displayed.     TSH/T4  Recent Labs   Lab Test 04/01/16  0910   TSH 1.57     Lipid Panel  Recent Labs   Lab Test 02/09/22  0701 10/15/21  0937 01/21/21  0716 02/09/17  0721 07/10/15  0814   CHOL 158 163 155   < > 149   TRIG 50 41 38   < > 45   HDL 50 60 56   < > 48*   LDL 98 95 91   < > 92   VLDL  --   --   --   --  9   CHOLHDLRATIO  --   --   --   --  3.1   NHDL 108 103 99   < >  --     < > = values in this interval not displayed.     Hepatitis B  Recent Labs   Lab Test 04/22/16  0902   HBCAB Nonreactive   HEPBANG Nonreactive     Hepatitis C  Recent Labs   Lab Test 10/26/18  0836 01/12/18  0828 04/22/16  0902   HCVAB Nonreactive Nonreactive Nonreactive   Assay performance characteristics have not been established for newborns,   infants, and children       Lyme ab screening  Recent Labs   Lab Test 04/01/16  0910   LYMEGM 0.12     HIV Screening  Recent Labs   Lab Test 10/26/18  0836 01/12/18  0828 04/22/16  0902   HIAGAB Nonreactive Nonreactive Nonreactive   HIV-1 p24 Ag & HIV-1/HIV-2 Ab Not Detected       UA  Recent Labs   Lab Test 10/14/22  0714 07/14/22  1507 12/15/21  1509 09/01/21  1629 05/18/21  1617 08/23/18  1638 04/26/18  1648 01/18/18  1640 01/12/18  0836 12/28/17  0145 04/13/17  1650 01/20/17  0827   COLOR Yellow Yellow Yellow   < > Yellow   < > Yellow Yellow Yellow Yellow   < > Yellow   APPEARANCE Clear Clear Clear   < > Clear   < > Clear Clear Clear Clear   < > Clear   URINEGLC Negative Negative Negative   < > Negative   < > Negative Negative Negative Negative   < > Negative   URINEBILI Negative Negative Negative   < > Negative   < > Negative Negative Negative Moderate*   < > Negative   SG >=1.030 1.020 1.010   < > 1.010   < > 1.010 1.020 1.025 >1.030   < > >1.030   URINEPH 6.0 5.5 6.5   < > 6.0   < > 7.0 7.0 6.5 6.0   < > 6.0   PROTEIN Negative Negative Negative   < > Negative   < > Negative Negative Trace* 100*   < > Trace*   UROBILINOGEN 0.2 0.2  0.2   < > 0.2   < > 0.2 0.2 0.2 4.0*   < > 0.2   NITRITE Negative Negative Negative   < > Negative   < > Negative Negative Negative Positive*   < > Negative   UBLD Negative Negative Negative   < > Trace*   < > Negative Negative Negative Negative   < > Negative   LEUKEST Negative Negative Negative   < > Negative   < > Negative Negative Negative Negative   < > Negative   WBCU  --   --   --   --  0 - 5  --  0 - 5 O - 2 O - 2 O - 2   < > O - 2   RBCU  --   --   --   --  O - 2  --  O - 2 O - 2 O - 2 O - 2   < > O - 2   SQUAMOUSEPI  --   --   --   --  Few  --   --  Few Moderate* Moderate*   < > Few   BACTERIA  --   --   --   --  Rare*  --   --   --  Moderate* Moderate*   < > Few*   MUCOUS  --   --   --   --   --   --   --   --  Present* Present*  --  Present*    < > = values in this interval not displayed.     Urine Microscopic  Recent Labs   Lab Test 05/18/21  1617 04/26/18  1648 01/18/18  1640 01/12/18  0836 12/28/17  0145 04/13/17  1650 01/20/17  0827   WBCU 0 - 5 0 - 5 O - 2 O - 2 O - 2   < > O - 2   RBCU O - 2 O - 2 O - 2 O - 2 O - 2   < > O - 2   SQUAMOUSEPI Few  --  Few Moderate* Moderate*   < > Few   BACTERIA Rare*  --   --  Moderate* Moderate*   < > Few*   MUCOUS  --   --   --  Present* Present*  --  Present*    < > = values in this interval not displayed.     Urine Protein  Recent Labs   Lab Test 10/14/22  0714 07/14/22  1507 04/11/22  1441 12/15/21  1516 09/01/21  1629   GHUTP 7.6 6.9  --   --   --    UTP  --   --  0.07 <0.05 <0.05   GHUTPG 0.04 0.09  --   --   --    UTPG  --   --  0.11  --   --    UCRR 184.0 76.5 66 25 23       Immunization History     Immunization History   Administered Date(s) Administered     COVID-19,PF,Moderna BIVALENT Booster 10/03/2022     COVID-19,PF,Moderna Booster 03/04/2022     COVID-19,PF,Pfizer (12+ Yrs) 03/16/2021, 04/06/2021, 08/30/2021     DT (PEDS <7y) 08/22/1997     Flu, Unspecified 10/21/2015     HPV 02/25/2010, 02/04/2011     HPV Quadrivalent 02/25/2010, 02/04/2011     HepB  06/13/1999, 08/20/1999, 02/05/2000     HepB-Adult 07/13/1999, 08/20/1999, 02/05/2000     Historical DTP/aP 1986, 1985, 1986, 01/15/1988, 06/15/1991, 08/22/1997     Historical Hepb 07/13/1999, 08/20/1999     Influenza (IIV3) PF 10/19/2010, 11/07/2011, 09/23/2012     Influenza Vaccine IM > 6 months Valent IIV4 (Alfuria,Fluzone) 10/11/2016, 10/13/2017, 10/26/2018, 10/17/2019, 10/16/2020, 10/15/2021     MMR 05/15/1987, 09/15/1991     Mantoux Tuberculin Skin Test 07/15/1987, 01/19/2005     Meningococcal (Menactra ) 08/09/2004     Meningococcal (Menomune ) 08/09/2004     OPV, trivalent, live 1986, 1986, 1985, 01/15/1988, 09/15/1991     OPV, unspecified 1986, 1986, 1986, 01/15/1988, 09/15/1991     Pneumo Conj 13-V (2010&after) 05/04/2018     Pneumococcal 23 valent 08/30/2018     TDAP Vaccine (Adacel) 01/21/2009, 02/25/2010, 08/17/2020     Tdap (Adacel,Boostrix) 01/20/2009     Zoster vaccine recombinant adjuvanted (SHINGRIX) 12/13/2018, 03/04/2019          Chart documentation done in part with Dragon Voice recognition Software. Although reviewed after completion, some word and grammatical error may remain.    Lavon Light MD

## 2022-10-27 ENCOUNTER — OFFICE VISIT (OUTPATIENT)
Dept: FAMILY MEDICINE | Facility: CLINIC | Age: 37
End: 2022-10-27
Payer: COMMERCIAL

## 2022-10-27 VITALS
SYSTOLIC BLOOD PRESSURE: 116 MMHG | HEART RATE: 88 BPM | BODY MASS INDEX: 24.66 KG/M2 | HEIGHT: 67 IN | OXYGEN SATURATION: 97 % | WEIGHT: 157.1 LBS | TEMPERATURE: 98.1 F | RESPIRATION RATE: 16 BRPM | DIASTOLIC BLOOD PRESSURE: 68 MMHG

## 2022-10-27 DIAGNOSIS — M06.4 INFLAMMATORY POLYARTHROPATHY (H): ICD-10-CM

## 2022-10-27 DIAGNOSIS — Z01.818 PREOP GENERAL PHYSICAL EXAM: Primary | ICD-10-CM

## 2022-10-27 DIAGNOSIS — J45.20 MILD INTERMITTENT ASTHMA WITHOUT COMPLICATION: ICD-10-CM

## 2022-10-27 DIAGNOSIS — M67.432 GANGLION CYST OF DORSUM OF LEFT WRIST: ICD-10-CM

## 2022-10-27 DIAGNOSIS — M32.19 OTHER SYSTEMIC LUPUS ERYTHEMATOSUS WITH OTHER ORGAN INVOLVEMENT (H): ICD-10-CM

## 2022-10-27 PROCEDURE — 99213 OFFICE O/P EST LOW 20 MIN: CPT | Performed by: FAMILY MEDICINE

## 2022-10-27 ASSESSMENT — ASTHMA QUESTIONNAIRES
ACT_TOTALSCORE: 24
QUESTION_5 LAST FOUR WEEKS HOW WOULD YOU RATE YOUR ASTHMA CONTROL: COMPLETELY CONTROLLED
QUESTION_2 LAST FOUR WEEKS HOW OFTEN HAVE YOU HAD SHORTNESS OF BREATH: NOT AT ALL
ACT_TOTALSCORE: 24
QUESTION_3 LAST FOUR WEEKS HOW OFTEN DID YOUR ASTHMA SYMPTOMS (WHEEZING, COUGHING, SHORTNESS OF BREATH, CHEST TIGHTNESS OR PAIN) WAKE YOU UP AT NIGHT OR EARLIER THAN USUAL IN THE MORNING: NOT AT ALL
QUESTION_1 LAST FOUR WEEKS HOW MUCH OF THE TIME DID YOUR ASTHMA KEEP YOU FROM GETTING AS MUCH DONE AT WORK, SCHOOL OR AT HOME: NONE OF THE TIME
QUESTION_4 LAST FOUR WEEKS HOW OFTEN HAVE YOU USED YOUR RESCUE INHALER OR NEBULIZER MEDICATION (SUCH AS ALBUTEROL): ONCE A WEEK OR LESS

## 2022-10-27 ASSESSMENT — PAIN SCALES - GENERAL: PAINLEVEL: NO PAIN (0)

## 2022-10-27 NOTE — PATIENT INSTRUCTIONS
Preparing for Your Surgery  Getting started  A nurse will call you to review your health history and instructions. They will give you an arrival time based on your scheduled surgery time. Please be ready to share:    Your doctor's clinic name and phone number    Your medical, surgical and anesthesia history    A list of allergies and sensitivities    A list of medicines, including herbal treatments and over-the-counter drugs    Whether the patient has a legal guardian (ask how to send us the papers in advance)  Please tell us if you're pregnant--or if there's any chance you might be pregnant. Some surgeries may injure a fetus (unborn baby), so they require a pregnancy test. Surgeries that are safe for a fetus don't always need a test, and you can choose whether to have one.   If you have a child who's having surgery, please ask for a copy of Preparing for Your Child's Surgery.    Preparing for surgery    Within 10 to 30 days of surgery: Have a pre-op exam (sometimes called an H&P, or History and Physical). This can be done at a clinic or pre-operative center.  ? If you're having a , you may not need this exam. Talk to your care team.    At your pre-op exam, talk to your care team about all medicines you take. If you need to stop any medicines before surgery, ask when to start taking them again.  ? We do this for your safety. Many medicines can make you bleed too much during surgery. Some change how well surgery (anesthesia) drugs work.    Call your insurance company to let them know you're having surgery. (If you don't have insurance, call [phone].)    Call your clinic if there's any change in your health. This includes signs of a cold or flu (sore throat, runny nose, cough, rash, fever). It also includes a scrape or scratch near the surgery site.    If you have questions on the day of surgery, call your hospital or surgery center.  COVID testing  You may need to be tested for COVID-19 before having  surgery. If so, we will give you instructions (or click here).  Eating and drinking guidelines  For your safety: Unless your surgeon tells you otherwise, follow the guidelines below.    Eat and drink as usual until 8 hours before surgery. After that, no food or milk.    Drink clear liquids until 2 hours before surgery. These are liquids you can see through, like water, Gatorade and Propel Water. You may also have black coffee and tea (no cream or milk).    Nothing by mouth within 2 hours of surgery. This includes gum, candy and breath mints.    If you drink alcohol: Stop drinking it the night before surgery.    If your care team tells you to take medicine on the morning of surgery, it's okay to take it with a sip of water.  Preventing infection    Shower or bathe the night before and morning of your surgery. Follow the instructions your clinic gave you. (If no instructions, use regular soap.)    Don't shave or clip hair near your surgery site. We'll remove the hair if needed.    Don't smoke or vape the morning of surgery. You may chew nicotine gum up to 2 hours before surgery. A nicotine patch is okay.  ? Note: Some surgeries require you to completely quit smoking and nicotine. Check with your surgeon.    Your care team will make every effort to keep you safe from infection. We will:  ? Clean our hands often with soap and water (or an alcohol-based hand rub).  ? Clean the skin at your surgery site with a special soap that kills germs.  ? Give you a special gown to keep you warm. (Cold raises the risk of infection.)  ? Wear special hair covers, masks, gowns and gloves during surgery.  ? Give antibiotic medicine, if prescribed. Not all surgeries need antibiotics.  What to bring on the day of surgery    Photo ID and insurance card    Copy of your health care directive, if you have one    Glasses and hearing aides (bring cases)  ? You can't wear contacts during surgery    Inhaler and eye drops, if you use them (tell us  about these when you arrive)    CPAP machine or breathing device, if you use them    A few personal items, if spending the night    If you have . . .  ? A pacemaker, ICD (cardiac defibrillator) or other implant: Bring the ID card.  ? An implanted stimulator: Bring the remote control.  ? A legal guardian: Bring a copy of the certified (court-stamped) guardianship papers.  Please remove any jewelry, including body piercings. Leave jewelry and other valuables at home.  If you're going home the day of surgery    You must have a responsible adult drive you home. They should stay with you overnight as well.    If you don't have someone to stay with you, and you aren't safe to go home alone, we may keep you overnight. Insurance often won't pay for this.  After surgery  If it's hard to control your pain or you need more pain medicine, please call your surgeon's office.  Questions?   If you have any questions for your care team, list them here: _________________________________________________________________________________________________________________________________________________________________________ ____________________________________ ____________________________________ ____________________________________  For informational purposes only. Not to replace the advice of your health care provider. Copyright   2003, 2019 [facility]. All rights reserved. Clinically reviewed by Kristin Benito MD. [employer] [id number] - REV 07/22.

## 2022-10-27 NOTE — PROGRESS NOTES
[address] [address]  Phone: [phone]  Primary Provider: Sayra Chirinos  Pre-op Performing Provider: MIHAI ALONZO      PREOPERATIVE EVALUATION:  Today's date: 10/27/2022    Aleida Weinberg is a 37 year old female who presents for a preoperative evaluation.    Surgical Information:  Surgery/Procedure: EXCISION, GANGLION CYST, WRIST LEFT  Surgery Location: [facility]  Surgeon: Jayde Martinez MD  Surgery Date: 11/10/22  Time of Surgery: 7:15am  Where patient plans to recover: At home with family  Fax number for surgical facility: Note does not need to be faxed, will be available electronically in Epic.    Type of Anesthesia Anticipated: MAC with Block    Assessment & Plan     The proposed surgical procedure is considered LOW risk.    Preop general physical exam      Mild intermittent asthma without complication  The current medical regimen is effective;  continue present plan and medications.      Ganglion cyst of dorsum of left wrist  Surgical removal plan    Inflammatory polyarthropathy (H)  Holding Imuran 3 days prior and after that procedure per rheumatology okay to continue Plaquenil    Other systemic lupus erythematosus with other organ involvement (H)               Risks and Recommendations:  The patient has the following additional risks and recommendations for perioperative complications:  Pulmonary:    - Incentive spirometry post-op    Medication Instructions:  Patient is to take all scheduled medications on the day of surgery  Holding Imuran 3 days before and after the procedure 956}    RECOMMENDATION:  APPROVAL GIVEN to proceed with proposed procedure, without further diagnostic evaluation.        20 minutes spent on the date of the encounter doing chart review, history and exam, documentation and further activities per the note        Subjective     HPI related to upcoming  procedure: The patient is seen in orthopedic surgeon for ganglion cyst on the dorsum of her left wrist.  Surgical excision was recommended.    Preop Questions 10/27/2022   1. Have you ever had a heart attack or stroke? No   2. Have you ever had surgery on your heart or blood vessels, such as a stent placement, a coronary artery bypass, or surgery on an artery in your head, neck, heart, or legs? No   3. Do you have chest pain with activity? No   4. Do you have a history of  heart failure? No   5. Do you currently have a cold, bronchitis or symptoms of other infection? No   6. Do you have a cough, shortness of breath, or wheezing? No   7. Do you or anyone in your family have previous history of blood clots? No   8. Do you or does anyone in your family have a serious bleeding problem such as prolonged bleeding following surgeries or cuts? No   9. Have you ever had problems with anemia or been told to take iron pills? No   10. Have you had any abnormal blood loss such as black, tarry or bloody stools, or abnormal vaginal bleeding? No   11. Have you ever had a blood transfusion? No   12. Are you willing to have a blood transfusion if it is medically needed before, during, or after your surgery? Yes   13. Have you or any of your relatives ever had problems with anesthesia? No   14. Do you have sleep apnea, excessive snoring or daytime drowsiness? No   15. Do you have any artifical heart valves or other implanted medical devices like a pacemaker, defibrillator, or continuous glucose monitor? No   16. Do you have artificial joints? No   17. Are you allergic to latex? No   18. Is there any chance that you may be pregnant? No       Health Care Directive:  Patient does not have a Health Care Directive or Living Will: Discussed advance care planning with patient; information given to patient to review.    Preoperative Review of :   reviewed - no record of controlled substances prescribed.      Status of Chronic  Conditions:  ASTHMA - Patient has a longstanding history of moderate-severe Asthma . Patient has been doing well overall noting NO SYMPTOMS and continues on medication regimen consisting of albuterol without adverse reactions or side effects.     Patient has lupus and inflammatory arthritis and is under the care of a rheumatologist.  She takes Imuran and Plaquenil.    Review of Systems  CONSTITUTIONAL: NEGATIVE for fever, chills, change in weight  ENT/MOUTH: NEGATIVE for ear, mouth and throat problems  RESP: NEGATIVE for significant cough or SOB  CV: NEGATIVE for chest pain, palpitations or peripheral edema    Patient Active Problem List    Diagnosis Date Noted     Ganglion cyst of dorsum of left wrist 09/16/2022     Priority: Medium     Added automatically from request for surgery [id number]       High risk medications (not anticoagulants) long-term use (Plaquenil and AZA) 05/20/2016     Priority: Medium     Dry eyes, bilateral 05/20/2016     Priority: Medium     Disorder of connective tissue (H) 05/18/2016     Priority: Medium     Systemic lupus erythematosus (H) 04/27/2016     Priority: Medium     Inflammatory polyarthropathy (H) 04/22/2016     Priority: Medium     Chronic back pain 04/22/2016     Priority: Medium     Raynaud's phenomenon without gangrene 04/22/2016     Priority: Medium     Vitamin D deficiency 04/04/2016     Priority: Medium     Intractable menstrual migraine without status migrainosus 11/17/2015     Priority: Medium     Anxiety 10/16/2015     Priority: Medium     Intermittent asthma 10/01/2012     Priority: Medium     Laughing, exercise, cold weather    Formatting of this note might be different from the original.  Overview:   Laughing, exercise, cold weather       CARDIOVASCULAR SCREENING; LDL GOAL LESS THAN 160 06/11/2010     Priority: Medium     Viral warts 05/30/2006     Priority: Medium     Warts in upper, inner thighs, groin.   Problem list name updated by automated process. Provider to  review    Formatting of this note might be different from the original.  Overview:   Warts in upper, inner thighs, groin.   Problem list name updated by automated process. Provider to review       Hypertrophic and atrophic condition of skin 2006     Priority: Medium     Problem list name updated by automated process. Provider to review       Other kyphoscoliosis and scoliosis 2004     Priority: Medium     lower back disc degeneration.  sees spine clinic at [facility], suggested consult mid pregnancy, earlier if needed.     Formatting of this note might be different from the original.  Overview:   lower back disc degeneration.  sees spine clinic at [facility], suggested consult mid pregnancy, earlier if needed.        Past Medical History:   Diagnosis Date     Arthritis      Lupus erythematosus      Mild intermittent asthma      Other kyphoscoliosis and scoliosis     upper back curvature and disc degeneration in lower back.     Past Surgical History:   Procedure Laterality Date      SECTION       SURGICAL HISTORY OF -       PE Tubes     Current Outpatient Medications   Medication Sig Dispense Refill     albuterol (PROAIR HFA/PROVENTIL HFA/VENTOLIN HFA) 108 (90 Base) MCG/ACT inhaler Inhale 2 puffs into the lungs every 6 hours as needed for shortness of breath / dyspnea 18 g 1     azaTHIOprine (IMURAN) 50 MG tablet Take 1 tablet (50 mg) by mouth daily 90 tablet 2     cyclobenzaprine (FLEXERIL) 10 MG tablet Take 1 tablet (10 mg) by mouth 3 times daily as needed for muscle spasms 30 tablet 2     hydroxychloroquine (PLAQUENIL) 200 MG tablet Take 1 tablet (200 mg) by mouth daily . Yearly eye exam including 10-2 VF and SD-OCT required 90 tablet 2     hydrOXYzine (ATARAX) 25 MG tablet Take 1 tablet (25 mg) by mouth every 6 hours as needed for anxiety 90 tablet 6     levonorgestrel (MIRENA) 20 MCG/24HR IUD 1 each by Intrauterine route once       LORazepam (ATIVAN) 0.5 MG tablet Take 1 tablet (0.5 mg) by mouth  "every 8 hours as needed for anxiety 15 tablet 0     traZODone (DESYREL) 50 MG tablet Take 0.5 tablets (25 mg) by mouth nightly as needed for sleep 30 tablet 0     vitamin D3 (CHOLECALCIFEROL) 50 mcg (2000 units) tablet Take 1 tablet (50 mcg) by mouth daily 90 tablet 2     amLODIPine (NORVASC) 10 MG tablet Take 1 tablet (10 mg) by mouth daily for raynaud's phenomenon (Patient not taking: Reported on 2022) 30 tablet 4       Allergies   Allergen Reactions     Fluconazole Rash        Social History     Tobacco Use     Smoking status: Former     Packs/day: 0.50     Years: 2.50     Pack years: 1.25     Types: Cigarettes     Quit date: 2005     Years since quittin.9     Smokeless tobacco: Never   Substance Use Topics     Alcohol use: Yes     Alcohol/week: 0.0 standard drinks     Comment: rarely - 1 monthly       History   Drug Use No         Objective     /68 (BP Location: Right arm, Patient Position: Sitting, Cuff Size: Adult Regular)   Pulse 88   Temp 98.1  F (36.7  C) (Oral)   Resp 16   Ht 1.702 m (5' 7\")   Wt 71.3 kg (157 lb 1.6 oz)   SpO2 97%   BMI 24.61 kg/m      Physical Exam  GENERAL APPEARANCE: healthy, alert and no distress  HENT: ear canals and TM's normal and nose and mouth without ulcers or lesions  RESP: lungs clear to auscultation - no rales, rhonchi or wheezes  CV: regular rate and rhythm, normal S1 S2, no S3 or S4 and no murmur, click or rub   ABDOMEN: soft, nontender, no HSM or masses and bowel sounds normal  NEURO: Normal strength and tone, sensory exam grossly normal, mentation intact and speech normal    Recent Labs   Lab Test 10/14/22  0714 22  1507   HGB 14.5 13.4    239    139   POTASSIUM 4.0 4.2   CR 0.65 0.63        Diagnostics:  No labs were ordered during this visit.   No EKG required, no history of coronary heart disease, significant arrhythmia, peripheral arterial disease or other structural heart disease.    Revised Cardiac Risk Index " (RCRI):  The patient has the following serious cardiovascular risks for perioperative complications:   - No serious cardiac risks = 0 points     RCRI Interpretation: 0 points: Class I (very low risk - 0.4% complication rate)           Signed Electronically by: Latoya Miranda DO  Copy of this evaluation report is provided to requesting physician.

## 2022-11-08 ENCOUNTER — ANESTHESIA EVENT (OUTPATIENT)
Dept: SURGERY | Facility: AMBULATORY SURGERY CENTER | Age: 37
End: 2022-11-08
Payer: COMMERCIAL

## 2022-11-10 ENCOUNTER — HOSPITAL ENCOUNTER (OUTPATIENT)
Facility: AMBULATORY SURGERY CENTER | Age: 37
Discharge: HOME | End: 2022-11-10
Attending: STUDENT IN AN ORGANIZED HEALTH CARE EDUCATION/TRAINING PROGRAM
Payer: COMMERCIAL

## 2022-11-10 ENCOUNTER — ANESTHESIA (OUTPATIENT)
Dept: SURGERY | Facility: AMBULATORY SURGERY CENTER | Age: 37
End: 2022-11-10
Payer: COMMERCIAL

## 2022-11-10 VITALS
OXYGEN SATURATION: 99 % | RESPIRATION RATE: 18 BRPM | WEIGHT: 157 LBS | HEART RATE: 64 BPM | DIASTOLIC BLOOD PRESSURE: 78 MMHG | BODY MASS INDEX: 24.64 KG/M2 | HEIGHT: 67 IN | SYSTOLIC BLOOD PRESSURE: 118 MMHG | TEMPERATURE: 97.1 F

## 2022-11-10 DIAGNOSIS — M67.432 GANGLION CYST OF DORSUM OF LEFT WRIST: ICD-10-CM

## 2022-11-10 LAB
HCG UR QL: NEGATIVE
INTERNAL QC OK POCT: NORMAL
POCT KIT EXPIRATION DATE: 324
POCT KIT LOT NUMBER: NORMAL

## 2022-11-10 PROCEDURE — 25111 REMOVE WRIST TENDON LESION: CPT | Mod: LT

## 2022-11-10 PROCEDURE — 25111 REMOVE WRIST TENDON LESION: CPT | Mod: LT | Performed by: STUDENT IN AN ORGANIZED HEALTH CARE EDUCATION/TRAINING PROGRAM

## 2022-11-10 PROCEDURE — 88304 TISSUE EXAM BY PATHOLOGIST: CPT | Mod: TC | Performed by: STUDENT IN AN ORGANIZED HEALTH CARE EDUCATION/TRAINING PROGRAM

## 2022-11-10 PROCEDURE — 81025 URINE PREGNANCY TEST: CPT | Performed by: PATHOLOGY

## 2022-11-10 RX ORDER — ONDANSETRON 4 MG/1
4 TABLET, ORALLY DISINTEGRATING ORAL EVERY 30 MIN PRN
Status: DISCONTINUED | OUTPATIENT
Start: 2022-11-10 | End: 2022-11-12 | Stop reason: HOSPADM

## 2022-11-10 RX ORDER — FLUMAZENIL 0.1 MG/ML
0.2 INJECTION, SOLUTION INTRAVENOUS
Status: DISCONTINUED | OUTPATIENT
Start: 2022-11-10 | End: 2022-11-12 | Stop reason: HOSPADM

## 2022-11-10 RX ORDER — FENTANYL CITRATE 50 UG/ML
25 INJECTION, SOLUTION INTRAMUSCULAR; INTRAVENOUS EVERY 5 MIN PRN
Status: DISCONTINUED | OUTPATIENT
Start: 2022-11-10 | End: 2022-11-12 | Stop reason: HOSPADM

## 2022-11-10 RX ORDER — OXYCODONE HYDROCHLORIDE 5 MG/1
5 TABLET ORAL EVERY 4 HOURS PRN
Status: DISCONTINUED | OUTPATIENT
Start: 2022-11-10 | End: 2022-11-12 | Stop reason: HOSPADM

## 2022-11-10 RX ORDER — NALOXONE HYDROCHLORIDE 0.4 MG/ML
0.2 INJECTION, SOLUTION INTRAMUSCULAR; INTRAVENOUS; SUBCUTANEOUS
Status: DISCONTINUED | OUTPATIENT
Start: 2022-11-10 | End: 2022-11-12 | Stop reason: HOSPADM

## 2022-11-10 RX ORDER — CEFAZOLIN SODIUM 2 G/50ML
2 SOLUTION INTRAVENOUS
Status: COMPLETED | OUTPATIENT
Start: 2022-11-10 | End: 2022-11-10

## 2022-11-10 RX ORDER — PROPOFOL 10 MG/ML
INJECTION, EMULSION INTRAVENOUS PRN
Status: DISCONTINUED | OUTPATIENT
Start: 2022-11-10 | End: 2022-11-10

## 2022-11-10 RX ORDER — FENTANYL CITRATE 50 UG/ML
25-50 INJECTION, SOLUTION INTRAMUSCULAR; INTRAVENOUS
Status: DISCONTINUED | OUTPATIENT
Start: 2022-11-10 | End: 2022-11-12 | Stop reason: HOSPADM

## 2022-11-10 RX ORDER — MEPERIDINE HYDROCHLORIDE 25 MG/ML
12.5 INJECTION INTRAMUSCULAR; INTRAVENOUS; SUBCUTANEOUS
Status: DISCONTINUED | OUTPATIENT
Start: 2022-11-10 | End: 2022-11-12 | Stop reason: HOSPADM

## 2022-11-10 RX ORDER — ONDANSETRON 2 MG/ML
INJECTION INTRAMUSCULAR; INTRAVENOUS PRN
Status: DISCONTINUED | OUTPATIENT
Start: 2022-11-10 | End: 2022-11-10

## 2022-11-10 RX ORDER — CEFAZOLIN SODIUM 2 G/50ML
2 SOLUTION INTRAVENOUS SEE ADMIN INSTRUCTIONS
Status: DISCONTINUED | OUTPATIENT
Start: 2022-11-10 | End: 2022-11-12 | Stop reason: HOSPADM

## 2022-11-10 RX ORDER — NALOXONE HYDROCHLORIDE 0.4 MG/ML
0.4 INJECTION, SOLUTION INTRAMUSCULAR; INTRAVENOUS; SUBCUTANEOUS
Status: DISCONTINUED | OUTPATIENT
Start: 2022-11-10 | End: 2022-11-12 | Stop reason: HOSPADM

## 2022-11-10 RX ORDER — SODIUM CHLORIDE, SODIUM LACTATE, POTASSIUM CHLORIDE, CALCIUM CHLORIDE 600; 310; 30; 20 MG/100ML; MG/100ML; MG/100ML; MG/100ML
INJECTION, SOLUTION INTRAVENOUS CONTINUOUS
Status: DISCONTINUED | OUTPATIENT
Start: 2022-11-10 | End: 2022-11-12 | Stop reason: HOSPADM

## 2022-11-10 RX ORDER — SODIUM CHLORIDE, SODIUM LACTATE, POTASSIUM CHLORIDE, CALCIUM CHLORIDE 600; 310; 30; 20 MG/100ML; MG/100ML; MG/100ML; MG/100ML
INJECTION, SOLUTION INTRAVENOUS CONTINUOUS PRN
Status: DISCONTINUED | OUTPATIENT
Start: 2022-11-10 | End: 2022-11-10

## 2022-11-10 RX ORDER — PROPOFOL 10 MG/ML
INJECTION, EMULSION INTRAVENOUS CONTINUOUS PRN
Status: DISCONTINUED | OUTPATIENT
Start: 2022-11-10 | End: 2022-11-10

## 2022-11-10 RX ORDER — ONDANSETRON 2 MG/ML
4 INJECTION INTRAMUSCULAR; INTRAVENOUS EVERY 30 MIN PRN
Status: DISCONTINUED | OUTPATIENT
Start: 2022-11-10 | End: 2022-11-12 | Stop reason: HOSPADM

## 2022-11-10 RX ORDER — HYDROMORPHONE HYDROCHLORIDE 1 MG/ML
0.2 INJECTION, SOLUTION INTRAMUSCULAR; INTRAVENOUS; SUBCUTANEOUS EVERY 5 MIN PRN
Status: DISCONTINUED | OUTPATIENT
Start: 2022-11-10 | End: 2022-11-12 | Stop reason: HOSPADM

## 2022-11-10 RX ORDER — OXYCODONE HYDROCHLORIDE 5 MG/1
5 TABLET ORAL EVERY 6 HOURS PRN
Qty: 12 TABLET | Refills: 0 | Status: SHIPPED | OUTPATIENT
Start: 2022-11-10 | End: 2022-11-13

## 2022-11-10 RX ORDER — LIDOCAINE HYDROCHLORIDE 20 MG/ML
INJECTION, SOLUTION INFILTRATION; PERINEURAL PRN
Status: DISCONTINUED | OUTPATIENT
Start: 2022-11-10 | End: 2022-11-10

## 2022-11-10 RX ORDER — BUPIVACAINE HYDROCHLORIDE 5 MG/ML
INJECTION, SOLUTION EPIDURAL; INTRACAUDAL
Status: DISCONTINUED | OUTPATIENT
Start: 2022-11-10 | End: 2022-11-10

## 2022-11-10 RX ORDER — FENTANYL CITRATE 50 UG/ML
25 INJECTION, SOLUTION INTRAMUSCULAR; INTRAVENOUS
Status: DISCONTINUED | OUTPATIENT
Start: 2022-11-10 | End: 2022-11-12 | Stop reason: HOSPADM

## 2022-11-10 RX ADMIN — FENTANYL CITRATE 50 MCG: 50 INJECTION, SOLUTION INTRAMUSCULAR; INTRAVENOUS at 06:51

## 2022-11-10 RX ADMIN — SODIUM CHLORIDE, SODIUM LACTATE, POTASSIUM CHLORIDE, CALCIUM CHLORIDE: 600; 310; 30; 20 INJECTION, SOLUTION INTRAVENOUS at 07:13

## 2022-11-10 RX ADMIN — PROPOFOL 50 MG: 10 INJECTION, EMULSION INTRAVENOUS at 07:22

## 2022-11-10 RX ADMIN — PROPOFOL 200 MCG/KG/MIN: 10 INJECTION, EMULSION INTRAVENOUS at 07:18

## 2022-11-10 RX ADMIN — CEFAZOLIN SODIUM 2 G: 2 SOLUTION INTRAVENOUS at 07:13

## 2022-11-10 RX ADMIN — BUPIVACAINE HYDROCHLORIDE 25 ML: 5 INJECTION, SOLUTION EPIDURAL; INTRACAUDAL at 06:45

## 2022-11-10 RX ADMIN — ONDANSETRON 4 MG: 2 INJECTION INTRAMUSCULAR; INTRAVENOUS at 07:17

## 2022-11-10 RX ADMIN — LIDOCAINE HYDROCHLORIDE 60 MG: 20 INJECTION, SOLUTION INFILTRATION; PERINEURAL at 07:18

## 2022-11-10 NOTE — DISCHARGE INSTRUCTIONS
Patient Name: Aleida Weinberg  MRN: [id number]  : 1985  Date of Surgery: 11/10/2022  Surgery: ganglion cyst excision    CONTACT US AFTER SURGERY   If you do not have a postoperative appointment, please call the office the day after surgery to schedule a follow-up appointment in 10-14 days.    DRESSING   The operative site will be bandaged and/or splinted following your surgery.     Leave your dressing in place until your follow up visit.   Please keep your dressing clean and dry at all times.     You may shower tomorrow with your dressing covered with a plastic bag and sealed with tape to make it watertight.  A plastic trash bag works well, or a commercial cast protective bag can be purchased at most drug stores.    Do not make holes in your splint/cast.  Do not stick objects down it to scratch. To reduce irritation under the splint/cast, you can blow cool air from a fan or hair drying under cast.    DIET   Following surgery, start with clear liquids. Once you tolerate clear liquids, your diet may be progressed to your normal diet as tolerated.  Do not try to eat too much too soon.  This may result in nausea due to the narcotic pain medication and anesthesia. If you feel nauseated, try clear liquids and crackers only.    Smoking can affect your healing and increase the risk of complications.  I strongly recommend that you do not smoke after your surgery.    Do not drink any alcohol (beer, wine, or spirits) for one week after surgery or while taking narcotic pain medicines.    MEDICATIONS   Pain medicine should be taken only  as needed  per your doctor's instructions to help control your pain. Initially, you may take the pills on regular 4 to 6 hour intervals without missing any doses. However, as your pain improves, you will be able to decrease the frequency and amount.     If a regional nerve block has been used for your anesthesia, it is common to experience numbness and tingling in the arm for  twelve hours or longer after surgery.  In order to get  in-front of  the pain, take your narcotic pain medicine as soon as you start to feel the nerve block wear off.   You need to be as comfortable as possible, but also understand that no amount of pain medication, ice, elevation, and rest will completely eliminate pain after surgery. Non-steroidal anti-inflammatory over the counter medicines (Motrin, Advil, Aleve, Naprosyn, etc.) may be used to supplement your prescription narcotic pain medication. Because the pain prescription you were given contains acetaminophen (Tylenol), you should not take Tylenol or any medication containing acetaminophen (Tylenol) while you are taking the pain prescription.   Occasionally pain medicines may cause nausea if taken on an empty stomach. Narcotic pain medicines can also cause drowsiness, lightheadedness, itching and constipation.  Over the counter stool softeners and increased fluid intake can help relieve constipation.    Unless otherwise specified, you can resume taking all of the medicines you were taking routinely prior to surgery.    ACTIVITIES   WEIGHT BEARING STATUS: Non-Weight Bearing (NWB)   Keep your hand/wrist at or above the level of your heart at all times for the next 3 days. This can be accomplished by using the foam pillow provided or other firm pillows.  A sling will not hold your hand/wrist above your heart and therefore is inadequate, it also may cause elbow and/or shoulder stiffness.   Use your hand as much as possible within the limits of your dressing and splint to decrease swelling and improve your overall surgical results UNLESS tendon, nerve, or phalangeal fracture repair was performed.   Move all joints of the extremity that are not immobilized (shoulder, elbow, forearm, wrist, digits) to minimize stiffness.  Hand exercises can be performed a few times daily unless otherwise specified.    Avoid all activities which may re-injure your hand or finger  such as lifting objects heavier that a book, or rigorous physical activity.    You should not drive a car with your hand/arm in a splint or while taking narcotic pain medicines.    WHEN TO CONTACT YOUR DOCTOR   If you have a persistent temperature of 101.5 degrees Fahrenheit or greater.   If you develop any signs of wound infection- Increasing redness, swelling, pus-like drainage.   If you have uncontrollable nausea/vomiting post operatively.   If your fingers appear blue or cold.   If you have persistent bleeding through your dressing.   If you have progressively increased numbness or pain.   If your dressing feels too tight and painful.      [facility]  Home Care Following Anesthesia  For 24 hours after surgery:  Get plenty of rest.  A responsible adult must stay with you for at least 24 hours after you leave the surgery center.  Do not drive or use heavy equipment.  If you have weakness or tingling, don't drive or use heavy equipment until this feeling goes away.   Do not drink alcohol.   Avoid strenuous or risky activities.  Ask for help when climbing stairs.  You may feel lightheaded.  IF so, sit for a few minutes before standing.  Have someone help you get up.   If you have nausea (feel sick to your stomach): Drink only clear liquids such as apple juice, ginger ale, broth or 7-Up.  Rest may also help.  Be sure to drink enough fluids.  Move to a regular diet as you feel able.   You may have a slight fever.  Call the doctor if your fever is over 100 F (37.7 C) (taken under the tongue) or lasts longer than 24 hours.  You may have a dry mouth, a sore throat, muscle aches or trouble sleeping. These should go away after 24 hours.  Do not make important or legal decisions.   It is recommended to avoid smoking.               Tips for taking pain medications  To get the best pain relief possible, remember these points:  Take pain medications as directed, before pain becomes  severe.  Pain medication can upset your stomach: taking it with food may help.  Constipation is a common side effect of pain medication. Drink plenty of  fluids.  Eat foods high in fiber. Take a stool softener if recommended by your doctor or pharmacist.  Do not drink alcohol, drive or operate machinery while taking pain medications.  Ask about other ways to control pain, such as with heat, ice or relaxation.    Tylenol/Acetaminophen Consumption  To help encourage the safe use of acetaminophen, the makers of TYLENOL  have lowered the maximum daily dose for single-ingredient Extra Strength TYLENOL  (acetaminophen) products sold in the [place] from 8 pills per day (4,000 mg) to 6 pills per day (3,000 mg). The dosing interval has also changed from 2 pills every 4-6 hours to 2 pills every 6 hours.  If you feel your pain relief is insufficient, you may take Tylenol/Acetaminophen in addition to your narcotic pain medication.   Be careful not to exceed 3,000 mg of Tylenol/Acetaminophen in a 24 hour period from all sources.  If you are taking extra strength Tylenol/acetaminophen (500 mg), the maximum dose is 6 tablets in 24 hours.  If you are taking regular strength acetaminophen (325 mg), the maximum dose is 9 tablets in 24 hours.    Call a doctor for any of the following:  Signs of infection (fever, growing tenderness at the surgery site, a large amount of drainage or bleeding, severe pain, foul-smelling drainage, redness, swelling).  It has been over 8 to 10 hours since surgery and you are still not able to urinate (pass water).  Headache for over 24 hours.  Numbness, tingling or weakness the day after surgery (if you had spinal anesthesia).  Signs of Covid-19 infection (temperature over 100 degrees, shortness of breath, cough, loss of taste/smell, generalized body aches, persistent headache, chills, sore throat, nausea/vomiting/diarrhea)  Your doctor is:       Dr. Jayde Martinez, Orthopaedics: [phone]                Or dial [phone] and ask for the resident on call for:  Orthopaedics  For emergency care, call the:  [facility] Emergency Department: [phone] (TTY for hearing impaired: [phone])

## 2022-11-10 NOTE — ANESTHESIA PROCEDURE NOTES
Brachial plexus Procedure Note    Pre-Procedure   Staff -        Anesthesiologist:  Bill Bloom MD       Performed By: anesthesiologist       Location: pre-op       Procedure Start/Stop Times: 11/10/2022 6:45 AM and 11/10/2022 6:55 AM       Pre-Anesthestic Checklist: patient identified, IV checked, site marked, risks and benefits discussed, informed consent, monitors and equipment checked, pre-op evaluation, at physician/surgeon's request and post-op pain management  Timeout:       Correct Patient: Yes        Correct Procedure: Yes        Correct Site: Yes        Correct Position: Yes        Correct Laterality: Yes        Site Marked: Yes  Procedure Documentation  Procedure: Brachial plexus       Laterality: left       Patient Position: sitting       Patient Prep/Sterile Barriers: sterile gloves, mask       Skin prep: Chloraprep (supraclavicular approach).       Needle Type: short bevel       Needle Gauge: 21.        Needle Length (millimeters): 100        Ultrasound guided       1. Ultrasound was used to identify targeted nerve, plexus, vascular marker, or fascial plane and place a needle adjacent to it in real-time.       2. Ultrasound was used to visualize the spread of anesthetic in close proximity to the above referenced structure.       3. A permanent image is entered into the patient's record.       4. The visualized anatomic structures appeared normal.       5. There were no apparent abnormal pathologic findings.    Assessment/Narrative         The placement was negative for: blood aspirated, painful injection and site bleeding       Paresthesias: No.       Bolus given via needle..        Secured via.        Insertion/Infusion Method: Single Shot       Complications: none       Injection made incrementally with aspirations every 5 mL.    Medication(s) Administered   Bupivacaine 0.5% PF (Infiltration) - Infiltration   25 mL - 11/10/2022 6:45:00 AM  Medication Administration Time: 11/10/2022 6:45  "AM      FOR [facility] ([facility]) ONLY:   Pain Team Contact information: please page the Pain Team Via [employer]. Search \"Pain\". During daytime hours, please page the attending first. At night please page the resident first.    "

## 2022-11-10 NOTE — H&P
Diagnosis: left wrist mass, consistent with dorsal ganglion cyst  Treatment: none     History of Present Illness: Aleida Weinberg is a 37 year old LHD female presenting for evaluation of left wrist mass.  The mass has been present for over 1 year, but in the last few months it has become more bothersome.  She has pain with activities, and it sometimes is bothersome at work.  She has not had any overlying skin changes.  She thinks the mass is growing slightly.  She denies numbness or tingling in her fingers, but sometimes at night has paresthesias over the ulnar border of her hand.     Clinical documentation by Dr. Byrne on 2022 was reviewed.     Occupation: works for [employer]     Past Medical History:   Past Medical History        Past Medical History:   Diagnosis Date     Arthritis       Lupus erythematosus       Mild intermittent asthma       Other kyphoscoliosis and scoliosis       upper back curvature and disc degeneration in lower back.            Past Surgical History:   Past Surgical History         Past Surgical History:   Procedure Laterality Date      SECTION        SURGICAL HISTORY OF -          PE Tubes            Medications:   Current Outpatient Medications:      albuterol (PROAIR HFA/PROVENTIL HFA/VENTOLIN HFA) 108 (90 Base) MCG/ACT inhaler, Inhale 2 puffs into the lungs every 6 hours as needed for shortness of breath / dyspnea, Disp: 18 g, Rfl: 1     amLODIPine (NORVASC) 10 MG tablet, Take 1 tablet (10 mg) by mouth daily for raynaud's phenomenon (Patient not taking: Reported on 2022), Disp: 30 tablet, Rfl: 4     azaTHIOprine (IMURAN) 50 MG tablet, Take 1 tablet (50 mg) by mouth daily, Disp: 90 tablet, Rfl: 2     cyclobenzaprine (FLEXERIL) 10 MG tablet, Take 1 tablet (10 mg) by mouth 3 times daily as needed for muscle spasms, Disp: 30 tablet, Rfl: 2     hydroxychloroquine (PLAQUENIL) 200 MG tablet, Take 1 tablet (200 mg) by mouth daily . Yearly eye exam including 10-2  VF and SD-OCT required, Disp: 90 tablet, Rfl: 2     hydrOXYzine (ATARAX) 25 MG tablet, Take 1 tablet (25 mg) by mouth every 6 hours as needed for anxiety, Disp: 90 tablet, Rfl: 6     levonorgestrel (MIRENA) 20 MCG/24HR IUD, 1 each by Intrauterine route once, Disp: , Rfl:      LORazepam (ATIVAN) 0.5 MG tablet, Take 1 tablet (0.5 mg) by mouth every 8 hours as needed for anxiety, Disp: 15 tablet, Rfl: 0     traZODone (DESYREL) 50 MG tablet, Take 0.5 tablets (25 mg) by mouth nightly as needed for sleep, Disp: 30 tablet, Rfl: 0     vitamin D3 (CHOLECALCIFEROL) 50 mcg (2000 units) tablet, Take 1 tablet (50 mcg) by mouth daily, Disp: 90 tablet, Rfl: 2     Allergy:        Allergies   Allergen Reactions     Fluconazole Rash         Social History:        History   Smoking Status     Former Smoker     Packs/day: 0.50     Years: 2.50     Types: Cigarettes     Quit date: 2005   Smokeless Tobacco     Never Used      Social History            Tobacco Use     Smoking status: Former Smoker       Packs/day: 0.50       Years: 2.50       Pack years: 1.25       Types: Cigarettes       Quit date: 2005       Years since quittin.8     Smokeless tobacco: Never Used   Substance Use Topics     Alcohol use: Yes       Alcohol/week: 0.0 standard drinks       Comment: rarely - 1 monthly     Drug use: No         Family History:   Family History         Family History   Problem Relation Age of Onset     Heart Disease Maternal Grandfather           Bypass, Heart Disease     Respiratory Maternal Grandfather           asthma     Macular Degeneration Maternal Grandfather       Hypertension Paternal Grandmother       Cancer Paternal Grandmother           lymph nodes     Cataracts Paternal Grandmother       C.A.D. Paternal Grandfather       Heart Disease Maternal Uncle           MI's      Alcohol/Drug Maternal Uncle       Respiratory Other           cousin on mothers side, asthma     Alcohol/Drug Other           bother great  grandparents on mother's side     Diabetes No family hx of       Breast Cancer No family hx of       Cancer - colorectal No family hx of              Physical Examination:  Vitals   There were no vitals filed for this visit.     There is no height or weight on file to calculate BMI.     Well appearing, well nourished  Alert and oriented x 3, cooperative with exam      Left wrist  Skin intact  5x5mm mass palpable over the dorsal wrist in the region of the SL ligament  TTP over mass  No TTP over TFCC  Motor Exam: Intact TU/OK/x2-3. 5/5 1st DOI and thumb abduction  Sensory Exam: Sensation intact to light touch in FDWS (radial), volar IF (median), volar SF (ulnar)  Vascular Exam: 2+ radial pulse, < 2 sec capillary refill     Imaging/Studies:  XR (3 views) of the left wrist was obtained 9/6/2022.  I reviewed the images and report independently with the patient.  The imaging study shows no fracture/dislocation.  Well maintained joint space.     MRI of the left wrist performed 9/12/2022 shows:   1. Between the external markers, approximately 4 x 4 by 3 mm (mediolateral by proximal distal by dorsal palmar) cystic-appearing mass intimate with an distal part of the dorsal component of scapholunate interosseous ligament, most consistent with ganglion/synovial cyst.    a. No high grade tear of underlying scapholunate interosseous ligament suspected.  2. Query partial tear central portion of triangular fibrocartilage disc proper.     Assessment: Aleida Weinberg is a 37 year old female with left wrist ganglion cyst.     Plan:   I had a detailed discussion with the patient regarding my clinical findings, diagnosis, and treatment plan. I reviewed the treatment options for her ganglion cyst (observation, aspiration, mass excision) as well as the risks and benefits of each.  At this time, since she has had persistent symptoms, she would like to pursue operative intervention.  I reviewed with the patient the treatment course  with regard to operative intervention including surgical plan, post-operative pain management, follow-up frequency, and rehabilitation plan.  The patient understands that there are risks associated with operative treatment including but not limited to infection, bleeding, nerve injury, permanent numbness, recurrence, post-operative stiffness, surgical scar, CRPS, anesthetic complications, etc.  We also discussed that there is a possibility that the surgery will not be successful and will require repeat surgery. The patient understands and agrees to the treatment plan.  All questions answered.                  Plan for operative intervention in the form of left wrist dorsal ganglion cyst excision.     Next Visit:              Follow-up: 2 weeks after surgery.               Imaging: None.              Plan: Wound check. Begin OT pending wound healing.     MARK ANTHONY HINOJOSA MD

## 2022-11-10 NOTE — PROGRESS NOTES
Patient received left side Brachial Plexus nerve block  without Exparel.  Fentanyl 50mcg and Versed 1mg given. Tolerated procedure well.

## 2022-11-10 NOTE — ANESTHESIA POSTPROCEDURE EVALUATION
Patient: Aleida Weinberg    Procedure: Procedure(s):  EXCISION, GANGLION CYST, WRIST LEFT       Anesthesia Type:  MAC    Note:  Disposition: Outpatient   Postop Pain Control: Uneventful            Sign Out: Well controlled pain   PONV: No   Neuro/Psych: Uneventful            Sign Out: Acceptable/Baseline neuro status   Airway/Respiratory: Uneventful            Sign Out: Acceptable/Baseline resp. status   CV/Hemodynamics: Uneventful            Sign Out: Acceptable CV status   Other NRE: NONE   DID A NON-ROUTINE EVENT OCCUR? No           Last vitals:  Vitals Value Taken Time   /78 11/10/22 0840   Temp 36.2  C (97.1  F) 11/10/22 0840   Pulse 64 11/10/22 0759   Resp 18 11/10/22 0840   SpO2 99 % 11/10/22 0840       Electronically Signed By: Bill Bloom MD  November 10, 2022  12:52 PM

## 2022-11-10 NOTE — OP NOTE
OPERATIVE REPORT    PATIENT NAME: Aleida Weinberg  MRN: [id number]    DATE: 11/10/2022    PREOPERATIVE DIAGNOSIS:   1. Left wrist dorsal ganglion cyst.     POSTOPERATIVE DIAGNOSIS:   1. Left wrist dorsal ganglion cyst.     OPERATION:   1. Left wrist dorsal ganglion cyst excision. [id number]    SURGEON: Jayde Martinez MD    STAFF: Circulator: Fiorella Benitez RN; Tru Carter RN  Scrub Person: Sue Dean MA    ANESTHESIA: Regional block and IV sedation    IMPLANTS: * No implants in log *    SPECIMEN:   ID Type Source Tests Collected by Time Destination   1 : Left wrist mass  Tissue Wrist, Left SURGICAL PATHOLOGY EXAM Jayde Martinez MD 11/10/2022  7:32 AM        ESTIMATED BLOOD LOSS: < 5 mL.    FLUIDS: See anesthesia records.     URINE OUTPUT:  Not applicable.  Alcaraz was not placed.    TOURNIQUET TIME: 19 minutes.    COMPLICATION: None.     INDICATIONS: Aleida Weinberg is a 37 year old female who developed a painful/symptomatic left wrist dorsal ganglion cyst. The patient did not respond to conservative management and was therefore indicated for operative intervention. The risks, benefits, and alternatives were discussed with the patient.  The patient verbalized understanding of the treatment plan and an informed consent was signed.     DESCRIPTION OF PROCEDURE: The patient was taken to the operating room and placed in supine position on the operating table. Anesthesia was administered by the Department of Anesthesia followed by administration of antibiotics. A high arm  tourniquet was applied to the left upper extremity, which was then prepped and draped in the usual sterile fashion.  A timeout was performed with all OR staff.  The patient name, MRN, operative extremity, procedure, allergies, antibiotics, DVT prophylaxis/SCDs, and fire precautions/plan were reviewed, and all were in agreement. The extremity was exsanguinated with an Esmarch bandage and the tourniquet was inflated to 250 mmHg.    A  transverse incision was made over the dorsal wrist within a dorsal wrist crease overlying the mass.  The skin and the subcutaneous tissue were sharply incised.  Dissection was carried down to the extensor retinaculum and extensor tendons.  The most distal aspect of the extensor retinaculum was longitudinally incised to allow for complete visualization of the mass. The second, third, and fourth compartment extensor tendons were retracted.  Mild tenosynovitis was identified. The ganglion was identified between the EPL/ECRB and EDC tendons.  The ganglion was  from adjacent tissue with care taken to retract and protect the extensor tendons.  Dissection was carried down to the level of the stalk, which appeared to originate from the scapholunate interval.  The ganglion was resected and sent to pathology.  The scapholunate ligament was intact.  The origin of the stalk was cauterized.  The tourniquet was let down.  Hemostasis was achieved.  The wound was irrigated.  Closure was performed with 3-0 and 4-0 monocryl for deep dermal and subcuticular layers, respectively.  Sterile dressings and a splint were applied.  Capillary refill was intact < 2 seconds.      The patient was aroused from anesthesia and taken to the recovery room in stable condition.      All counts were correct at the end of the case.       There were no complications.    POSTOPERATIVE PLAN: splint removal and wound check at 2 weeks, followed by initiation of range of motion exercises    MARK ANTHONY HINOJOSA MD

## 2022-11-10 NOTE — ANESTHESIA PREPROCEDURE EVALUATION
Anesthesia Pre-Procedure Evaluation    Patient: Aleida Weinberg   MRN: [id number] : 1985        Procedure : Procedure(s):  EXCISION, GANGLION CYST, WRIST LEFT          Past Medical History:   Diagnosis Date     Arthritis      Lupus erythematosus      Mild intermittent asthma      Other kyphoscoliosis and scoliosis     upper back curvature and disc degeneration in lower back.      Past Surgical History:   Procedure Laterality Date      SECTION  2006     SURGICAL HISTORY OF -       PE Tubes      Allergies   Allergen Reactions     Fluconazole Rash      Social History     Tobacco Use     Smoking status: Former     Packs/day: 0.50     Years: 2.50     Pack years: 1.25     Types: Cigarettes     Quit date: 2005     Years since quittin.0     Smokeless tobacco: Never   Substance Use Topics     Alcohol use: Yes     Alcohol/week: 0.0 standard drinks     Comment: rarely - 1 monthly      Wt Readings from Last 1 Encounters:   11/10/22 71.2 kg (157 lb)        Anesthesia Evaluation            ROS/MED HX  ENT/Pulmonary:     (+) asthma     Neurologic:       Cardiovascular:       METS/Exercise Tolerance:     Hematologic:       Musculoskeletal:   (+) arthritis,     GI/Hepatic:       Renal/Genitourinary:       Endo:       Psychiatric/Substance Use:       Infectious Disease:       Malignancy:       Other: Comment: Lupus           Physical Exam    Airway  airway exam normal      Mallampati: II   TM distance: > 3 FB   Neck ROM: full   Mouth opening: > 3 cm    Respiratory Devices and Support         Dental  no notable dental history         Cardiovascular          Rhythm and rate: regular and normal     Pulmonary   pulmonary exam normal        breath sounds clear to auscultation           OUTSIDE LABS:  CBC:   Lab Results   Component Value Date    WBC 4.2 10/14/2022    WBC 5.1 2022    HGB 14.5 10/14/2022    HGB 13.4 2022    HCT 40.9 10/14/2022    HCT 37.4 2022     10/14/2022    PLT  239 07/14/2022     BMP:   Lab Results   Component Value Date     10/14/2022     07/14/2022    POTASSIUM 4.0 10/14/2022    POTASSIUM 4.2 07/14/2022    CHLORIDE 109 10/14/2022    CHLORIDE 108 07/14/2022    CO2 22 10/14/2022    CO2 27 07/14/2022    BUN 11 10/14/2022    BUN 11 07/14/2022    CR 0.65 10/14/2022    CR 0.63 07/14/2022    GLC 93 10/14/2022     (H) 07/14/2022     COAGS: No results found for: PTT, INR, FIBR  POC:   Lab Results   Component Value Date    HCG Negative 11/10/2022    HCGS Negative 08/31/2016     HEPATIC:   Lab Results   Component Value Date    ALBUMIN 4.1 10/14/2022    PROTTOTAL 7.0 10/14/2022    ALT 18 10/14/2022    AST 17 10/14/2022    ALKPHOS 42 10/14/2022    BILITOTAL 0.3 10/14/2022     OTHER:   Lab Results   Component Value Date    SRINIVAS 8.6 10/14/2022    LIPASE 112 01/29/2016    TSH 1.57 04/01/2016    CRP <3.00 10/14/2022    SED 6 10/14/2022       Anesthesia Plan    ASA Status:  2   NPO Status:  NPO Appropriate    Anesthesia Type: MAC.     - Reason for MAC: immobility needed, straight local not clinically adequate   Induction: Intravenous.   Maintenance: TIVA.        Consents    Anesthesia Plan(s) and associated risks, benefits, and realistic alternatives discussed. Questions answered and patient/representative(s) expressed understanding.    - Discussed:     - Discussed with:  Patient      - Extended Intubation/Ventilatory Support Discussed: No.      - Patient is DNR/DNI Status: No    Use of blood products discussed: No .     Postoperative Care    Pain management: IV analgesics, Oral pain medications, Multi-modal analgesia.   PONV prophylaxis: Ondansetron (or other 5HT-3), Background Propofol Infusion     Comments:                Bill Bloom MD

## 2022-11-10 NOTE — ANESTHESIA CARE TRANSFER NOTE
Patient: Aleida Weinberg    Procedure: Procedure(s):  EXCISION, GANGLION CYST, WRIST LEFT       Diagnosis: Ganglion cyst of dorsum of left wrist [M67.432]  Diagnosis Additional Information: No value filed.    Anesthesia Type:   MAC     Note:    Oropharynx: oropharynx clear of all foreign objects and spontaneously breathing  Level of Consciousness: awake  Oxygen Supplementation: room air    Independent Airway: airway patency satisfactory and stable  Dentition: dentition unchanged  Vital Signs Stable: post-procedure vital signs reviewed and stable  Report to RN Given: handoff report given  Patient transferred to: Phase II    Handoff Report: Identifed the Patient, Identified the Reponsible Provider, Reviewed the pertinent medical history, Discussed the surgical course, Reviewed Intra-OP anesthesia mangement and issues during anesthesia, Set expectations for post-procedure period and Allowed opportunity for questions and acknowledgement of understanding      Vitals:  Vitals Value Taken Time   BP     Temp     Pulse     Resp     SpO2         Electronically Signed By: FARZANA Carmichael CRNA  November 10, 2022  7:57 AM

## 2022-11-14 LAB
PATH REPORT.COMMENTS IMP SPEC: NORMAL
PATH REPORT.COMMENTS IMP SPEC: NORMAL
PATH REPORT.FINAL DX SPEC: NORMAL
PATH REPORT.GROSS SPEC: NORMAL
PATH REPORT.MICROSCOPIC SPEC OTHER STN: NORMAL
PATH REPORT.RELEVANT HX SPEC: NORMAL
PHOTO IMAGE: NORMAL

## 2022-11-14 PROCEDURE — 88304 TISSUE EXAM BY PATHOLOGIST: CPT | Mod: 26 | Performed by: PATHOLOGY

## 2022-11-22 ENCOUNTER — OFFICE VISIT (OUTPATIENT)
Dept: ORTHOPEDICS | Facility: CLINIC | Age: 37
End: 2022-11-22
Payer: COMMERCIAL

## 2022-11-22 ENCOUNTER — THERAPY VISIT (OUTPATIENT)
Dept: OCCUPATIONAL THERAPY | Facility: CLINIC | Age: 37
End: 2022-11-22
Payer: COMMERCIAL

## 2022-11-22 DIAGNOSIS — M67.432 GANGLION CYST OF DORSUM OF LEFT WRIST: Primary | ICD-10-CM

## 2022-11-22 PROCEDURE — 99024 POSTOP FOLLOW-UP VISIT: CPT | Performed by: STUDENT IN AN ORGANIZED HEALTH CARE EDUCATION/TRAINING PROGRAM

## 2022-11-22 PROCEDURE — 97110 THERAPEUTIC EXERCISES: CPT | Mod: GO | Performed by: OCCUPATIONAL THERAPIST

## 2022-11-22 PROCEDURE — 97165 OT EVAL LOW COMPLEX 30 MIN: CPT | Mod: GO | Performed by: OCCUPATIONAL THERAPIST

## 2022-11-22 NOTE — PROGRESS NOTES
Hand Therapy Initial Evaluation    Current Date:  2022    Diagnosis: Left wrist dorsal ganglion cyst.   DOS: 11/10/2022  Procedure:  Left wrist dorsal ganglion cyst excision.  Post:  1w 5d    Precautions: ROM exercises    Subjective:  Aleida Weinberg is a 37 year old female.    Patient reports symptoms of the left wrist which occurred due to gradual onset. Since onset symptoms are Unchanged    Past Medical History:   Diagnosis Date     Arthritis      Lupus erythematosus      Mild intermittent asthma      Other kyphoscoliosis and scoliosis     upper back curvature and disc degeneration in lower back.     Past Surgical History:   Procedure Laterality Date      SECTION  2006     EXCISE GANGLION WRIST Left 11/10/2022    Procedure: EXCISION, GANGLION CYST, WRIST LEFT;  Surgeon: Jayde Martinez MD;  Location: [facility] OR     SURGICAL HISTORY OF -       PE Tubes     Current occupation  at [employer]  Job Tasks: Computer Work, Prolonged Sitting, Repetitive Tasks    Occupational Profile Information:  Left hand dominant  Prior functional level:  no limitations  Patient reports symptoms of pain and stiffness/loss of motion  Special tests:  see chart.    Previous treatment: none  Barriers include:none  Mobility: No difficulty  Transportation: drives  Currently working in normal job without restrictions    Functional Outcome Measure:   Upper Extremity Functional Index Score:  SCORE:   Column Totals: /80: 34   (A lower score indicates greater disability.)    Objective:  Pain Level (Scale 0-10)   2022   At Rest 0/10   With Use NT     Pain Description  Date 2022   Location wrist and hand   Pain Quality Dull and Sharp   Frequency intermittent     Pain is worst  daytime   Exacerbated by  unknown, recently out of the cast   Relieved by rest   Progression Unchanged     Edema  Mild    Sensation   WNL throughout all nerve distributions; per patient report    ROM  Pain Report: - none  + mild     ++ moderate    +++ severe   Wrist 11/22/2022 11/22/2022   AROM (PROM) R L   Extension 64 47   Flexion 57 16   RD 23 11   UD 38 23   Supination 82 83   Pronation WNL WNL     Strength  Deferred    Assessment:  Patient presents with symptoms consistent with diagnosis of left wrist  Dorsal ganglion cyst,  with surgical  intervention.     Patient's limitations or Problem List includes:  Pain, Decreased ROM/motion, Increased edema, Weakness, Sensory disturbance and Adherent scarring of the left wrist and hand which interferes with the patient's ability to perform Self Care Tasks (dressing, eating, bathing, hygiene/toileting), Work Tasks, Sleep Patterns, Recreational Activities, Household Chores and Driving  as compared to previous level of function.    Rehab Potential:  Excellent - Return to full activity, no limitations    Patient will benefit from skilled Occupational Therapy to increase ROM and overall strength and decrease pain, edema and adherence of scarring to return to previous activity level and resume normal daily tasks and to reach their rehab potential.    Barriers to Learning:  No barrier    Communication Issues:  Patient appears to be able to clearly communicate and understand verbal and written communication and follow directions correctly.    Chart Review: Chart Review, Brief history including review of medical and/or therapy records relating to the presenting problem and Simple history review with patient    Identified Performance Deficits: bathing/showering, toileting, dressing, feeding, functional mobility, personal device care, hygiene and grooming, care of others, driving and community mobility, health management and maintenance, home establishment and management, meal preparation and cleanup, shopping, sleep, work and leisure activities    Assessment of Occupational Performance:  5 or more Performance Deficits    Clinical Decision Making (Complexity): Low complexity    Treatment Explanation:  The  following has been discussed with the patient:  RX ordered/plan of care  Anticipated outcomes  Possible risks and side effects    Plan:  Frequency:  2 X a month, once daily  Duration:  for 2 months    Treatment Plan:   Modalities:  US, Fluidotherapy and Paraffin  Therapeutic Exercise:  AROM, AAROM, PROM, Tendon Gliding, Blocking, Reverse Blocking, Place and Hold, Contract Relax, Extensor Tracking, Isotonics, Isometrics and Stabilization  Neuromuscular re-education:  Nerve Gliding, Coordination/Dexterity, Sensory re-education, Desensitization, Kinesthetic Training, Proprioceptive Training, Posture, Kinesiotaping, Strain Counter Strain, Isometrics, Stabilization  Manual Techniques:  Coordination/Dexterity, Joint mobilization, Scar mobilization, Friction massage, Myofascial release, Manual edema mobilization  Orthotic Fabrication:  Static  Self Care:  Self Care Tasks, Ergonomic Considerations and Work Tasks    Discharge Plan:  Achieve all LTG.  Independent in home treatment program.  Reach maximal therapeutic benefit.    Home Exercise Program:  Wrist AROM E/F/RD/UD  Table top AROM bending, progressing to tendon gliding    Next Visit:  Check motion  Progress as tolerated  Scar massage

## 2022-11-22 NOTE — LETTER
11/22/2022         RE: Aleida Weinberg  [address] [facility] [address]        Dear Colleague,    Thank you for referring your patient, Aleida Weinberg, to the [facility] [place]. Please see a copy of my visit note below.    Chief Complaint: No chief complaint on file.    Diagnosis: left wrist mass, consistent with dorsal ganglion cyst  Treatment:   11/10/2022: left wrist ganglion cyst excision    History of Present Illness: Aleida Weinberg is a 37 year old LHD female presenting for evaluation of left wrist mass.  The mass has been present for over 1 year, but in the last few months it has become more bothersome.  She has pain with activities, and it sometimes is bothersome at work.  She has not had any overlying skin changes.  She thinks the mass is growing slightly.  She denies numbness or tingling in her fingers, but sometimes at night has paresthesias over the ulnar border of her hand.    Interval 11/22/2022: presents for postoperative visit.      Physical Examination:  There were no vitals filed for this visit.  There is no height or weight on file to calculate BMI.    Well appearing, well nourished  Alert and oriented x 3, cooperative with exam     Left wrist  Incision healing well, no evidence of infection  Flexion 30 degrees, extension 45 degrees  Motor Exam: Intact TU/OK/x2-3. 5/5 1st DOI and thumb abduction  Sensory Exam: Sensation intact to light touch in FDWS (radial), volar IF (median), volar SF (ulnar)  Vascular Exam: 2+ radial pulse, < 2 sec capillary refill    Imaging/Studies:  Pathology 11/10/2022:   A. Left wrist mass, excision:  - Ganglion cyst  - Negative for malignancy    XR (3 views) of the left wrist was obtained 9/6/2022.  I reviewed the images and report independently with the patient.  The imaging study shows no fracture/dislocation.  Well maintained joint space.    MRI of the left wrist performed 9/12/2022 shows:   1.  Between the external markers, approximately 4 x 4 by 3 mm (mediolateral by proximal distal by dorsal palmar) cystic-appearing mass intimate with an distal part of the dorsal component of scapholunate interosseous ligament, most consistent with ganglion/synovial cyst.    a. No high grade tear of underlying scapholunate interosseous ligament suspected.  2. Query partial tear central portion of triangular fibrocartilage disc proper.    Assessment: Aleida Weinberg is a 37 year old female with left wrist ganglion cyst.    Plan:   I had a discussion with the patient regarding my clinical findings, diagnosis, and treatment plan. We reviewed the post-operative plan, frequency of follow-up, rehabilitation, and expected outcomes.  All questions answered.      PWB (< 5 lbs) left upper extremity.  Increase as tolerated    OK to wash hands/shower.  Do not submerge incision until 4 weeks from the date of surgery.    Reviewed digit/wrist/forearm ROM exercises. Hand therapy today for ROM exercises    Next Visit:    Follow-up: 4 week(s).    Imaging: None.    Plan: check ROM.      MARK ANTHONY HINOJOSA MD

## 2022-11-22 NOTE — PROGRESS NOTES
Chief Complaint: No chief complaint on file.    Diagnosis: left wrist mass, consistent with dorsal ganglion cyst  Treatment:   11/10/2022: left wrist ganglion cyst excision    History of Present Illness: Aleida Weinberg is a 37 year old LHD female presenting for evaluation of left wrist mass.  The mass has been present for over 1 year, but in the last few months it has become more bothersome.  She has pain with activities, and it sometimes is bothersome at work.  She has not had any overlying skin changes.  She thinks the mass is growing slightly.  She denies numbness or tingling in her fingers, but sometimes at night has paresthesias over the ulnar border of her hand.    Interval 11/22/2022: presents for postoperative visit.      Physical Examination:  There were no vitals filed for this visit.  There is no height or weight on file to calculate BMI.    Well appearing, well nourished  Alert and oriented x 3, cooperative with exam     Left wrist  Incision healing well, no evidence of infection  Flexion 30 degrees, extension 45 degrees  Motor Exam: Intact TU/OK/x2-3. 5/5 1st DOI and thumb abduction  Sensory Exam: Sensation intact to light touch in FDWS (radial), volar IF (median), volar SF (ulnar)  Vascular Exam: 2+ radial pulse, < 2 sec capillary refill    Imaging/Studies:  Pathology 11/10/2022:   A. Left wrist mass, excision:  - Ganglion cyst  - Negative for malignancy    XR (3 views) of the left wrist was obtained 9/6/2022.  I reviewed the images and report independently with the patient.  The imaging study shows no fracture/dislocation.  Well maintained joint space.    MRI of the left wrist performed 9/12/2022 shows:   1. Between the external markers, approximately 4 x 4 by 3 mm (mediolateral by proximal distal by dorsal palmar) cystic-appearing mass intimate with an distal part of the dorsal component of scapholunate interosseous ligament, most consistent with ganglion/synovial cyst.    a. No high grade  tear of underlying scapholunate interosseous ligament suspected.  2. Query partial tear central portion of triangular fibrocartilage disc proper.    Assessment: Aleida Weinberg is a 37 year old female with left wrist ganglion cyst.    Plan:   I had a discussion with the patient regarding my clinical findings, diagnosis, and treatment plan. We reviewed the post-operative plan, frequency of follow-up, rehabilitation, and expected outcomes.  All questions answered.      PWB (< 5 lbs) left upper extremity.  Increase as tolerated    OK to wash hands/shower.  Do not submerge incision until 4 weeks from the date of surgery.    Reviewed digit/wrist/forearm ROM exercises. Hand therapy today for ROM exercises    Next Visit:    Follow-up: 4 week(s).    Imaging: None.    Plan: check ROM.      MARK ANTHONY HINOJOSA MD

## 2022-11-22 NOTE — NURSING NOTE
Reason For Visit:   Chief Complaint   Patient presents with     Surgical Followup     Post op Left wrist Ganglion Cyst Excision  DOS: 11/10/22         Primary MD: Sayra Chirinos  Ref. MD: Jose    Age: 37 year old    ?  No      There were no vitals taken for this visit.      Pain Assessment  Patient Currently in Pain: Yes  0-10 Pain Scale: 3  Primary Pain Location: Wrist (left)  Pain Descriptors: Intermittent, Sharp, Numbness    Hand Dominance Evaluation  Hand Dominance: Left          QuickDASH Assessment  QuickDASH Main 11/11/2014   1. Open a tight or new jar. 3   2. Do heavy household chores (e.g., wash walls, floors). 1   3. Carry a shopping bag or briefcase. 2   4. Wash your back. 1   5. Use a knife to cut food. 1   6. Recreational activities in which you take some force or impact through your arm, shoulder or hand (e.g., golf, hammering, tennis, etc.). 1   7. During the past week, to what extent has your arm, shoulder or hand problem interfered with your normal social activities with family, friends, neighbours or groups? 2   8. During the past week, were you limited in your work or other regular daily activities as a result of your arm, shoulder or hand problem? 2   9. Arm, shoulder or hand pain. 2   10. Tingling (pins and needles) in your arm,shoulder or hand. 3   11. During the past week, how much difficulty have you had sleeping because of the pain in your arm, shoulder or hand? ([place] number) 1   SUM: 19          Current Outpatient Medications   Medication Sig Dispense Refill     albuterol (PROAIR HFA/PROVENTIL HFA/VENTOLIN HFA) 108 (90 Base) MCG/ACT inhaler Inhale 2 puffs into the lungs every 6 hours as needed for shortness of breath / dyspnea 18 g 1     azaTHIOprine (IMURAN) 50 MG tablet Take 1 tablet (50 mg) by mouth daily 90 tablet 2     cyclobenzaprine (FLEXERIL) 10 MG tablet Take 1 tablet (10 mg) by mouth 3 times daily as needed for muscle spasms 30 tablet 2      hydroxychloroquine (PLAQUENIL) 200 MG tablet Take 1 tablet (200 mg) by mouth daily . Yearly eye exam including 10-2 VF and SD-OCT required 90 tablet 2     hydrOXYzine (ATARAX) 25 MG tablet Take 1 tablet (25 mg) by mouth every 6 hours as needed for anxiety 90 tablet 6     levonorgestrel (MIRENA) 20 MCG/24HR IUD 1 each by Intrauterine route once       LORazepam (ATIVAN) 0.5 MG tablet Take 1 tablet (0.5 mg) by mouth every 8 hours as needed for anxiety 15 tablet 0     traZODone (DESYREL) 50 MG tablet Take 0.5 tablets (25 mg) by mouth nightly as needed for sleep 30 tablet 0     vitamin D3 (CHOLECALCIFEROL) 50 mcg (2000 units) tablet Take 1 tablet (50 mcg) by mouth daily 90 tablet 2       Allergies   Allergen Reactions     Fluconazole Rash       TYLER,GUADALUPE, ATC

## 2022-11-26 ENCOUNTER — HEALTH MAINTENANCE LETTER (OUTPATIENT)
Age: 37
End: 2022-11-26

## 2022-12-12 ENCOUNTER — OFFICE VISIT (OUTPATIENT)
Dept: FAMILY MEDICINE | Facility: CLINIC | Age: 37
End: 2022-12-12
Payer: COMMERCIAL

## 2022-12-12 VITALS
SYSTOLIC BLOOD PRESSURE: 113 MMHG | WEIGHT: 154.2 LBS | HEIGHT: 67 IN | HEART RATE: 78 BPM | DIASTOLIC BLOOD PRESSURE: 76 MMHG | TEMPERATURE: 98.3 F | BODY MASS INDEX: 24.2 KG/M2 | OXYGEN SATURATION: 99 % | RESPIRATION RATE: 16 BRPM

## 2022-12-12 DIAGNOSIS — M54.41 ACUTE MIDLINE LOW BACK PAIN WITH RIGHT-SIDED SCIATICA: ICD-10-CM

## 2022-12-12 DIAGNOSIS — Z00.00 ROUTINE GENERAL MEDICAL EXAMINATION AT A HEALTH CARE FACILITY: Primary | ICD-10-CM

## 2022-12-12 DIAGNOSIS — Z79.899 HIGH RISK MEDICATIONS (NOT ANTICOAGULANTS) LONG-TERM USE: ICD-10-CM

## 2022-12-12 DIAGNOSIS — F41.9 ANXIETY: ICD-10-CM

## 2022-12-12 DIAGNOSIS — M32.19 OTHER SYSTEMIC LUPUS ERYTHEMATOSUS WITH OTHER ORGAN INVOLVEMENT (H): ICD-10-CM

## 2022-12-12 PROCEDURE — 90471 IMMUNIZATION ADMIN: CPT

## 2022-12-12 PROCEDURE — 99395 PREV VISIT EST AGE 18-39: CPT | Mod: 25

## 2022-12-12 PROCEDURE — 90686 IIV4 VACC NO PRSV 0.5 ML IM: CPT

## 2022-12-12 PROCEDURE — 99214 OFFICE O/P EST MOD 30 MIN: CPT | Mod: 25

## 2022-12-12 RX ORDER — HYDROXYZINE HYDROCHLORIDE 25 MG/1
25 TABLET, FILM COATED ORAL EVERY 6 HOURS PRN
Qty: 90 TABLET | Refills: 6 | Status: SHIPPED | OUTPATIENT
Start: 2022-12-12

## 2022-12-12 RX ORDER — CYCLOBENZAPRINE HCL 10 MG
10 TABLET ORAL 3 TIMES DAILY PRN
Qty: 30 TABLET | Refills: 3 | Status: SHIPPED | OUTPATIENT
Start: 2022-12-12 | End: 2024-04-22

## 2022-12-12 RX ORDER — LORAZEPAM 0.5 MG/1
0.5 TABLET ORAL EVERY 8 HOURS PRN
Qty: 15 TABLET | Refills: 0 | Status: SHIPPED | OUTPATIENT
Start: 2022-12-12 | End: 2023-11-22

## 2022-12-12 ASSESSMENT — ENCOUNTER SYMPTOMS
SHORTNESS OF BREATH: 0
NERVOUS/ANXIOUS: 0
NAUSEA: 0
COUGH: 0
JOINT SWELLING: 0
PALPITATIONS: 0
SORE THROAT: 0
FEVER: 0
WEAKNESS: 0
HEADACHES: 0
MYALGIAS: 0
HEMATOCHEZIA: 0
DYSURIA: 0
ARTHRALGIAS: 0
FREQUENCY: 0
HEARTBURN: 0
PARESTHESIAS: 0
DIZZINESS: 0
DIARRHEA: 0
EYE PAIN: 0
CONSTIPATION: 0
CHILLS: 0
HEMATURIA: 0
ABDOMINAL PAIN: 0

## 2022-12-12 ASSESSMENT — PAIN SCALES - GENERAL: PAINLEVEL: NO PAIN (0)

## 2022-12-12 NOTE — PROGRESS NOTES
SUBJECTIVE:   CC: Aleida is an 37 year old who presents for preventive health visit.     Patient has been advised of split billing requirements and indicates understanding: Yes  Healthy Habits:     Getting at least 3 servings of Calcium per day:  Yes    Bi-annual eye exam:  Yes    Dental care twice a year:  Yes    Sleep apnea or symptoms of sleep apnea:  None    Diet:  Regular (no restrictions)    Frequency of exercise:  2-3 days/week    Duration of exercise:  30-45 minutes    Taking medications regularly:  Yes    Medication side effects:  None    PHQ-2 Total Score: 0    Additional concerns today:  Yes    Discuss when to get IUD removed. Had it placed by Dr. Gardner (previously known as Dr. Roque) on 2016.     Screening risks - mom had recurrent/numerous polyps, was recommended to start colonoscopies at 40. Non-smoker. No alcohol abuse history.     + immune suppression for SLE, on meds since dx in . Follows with Dr. Light at [place].           Today's PHQ-2 Score:   PHQ-2 (  [employer]) 2022   Q1: Little interest or pleasure in doing things 0   Q2: Feeling down, depressed or hopeless 0   PHQ-2 Score 0   PHQ-2 Total Score (12-17 Years)- Positive if 3 or more points; Administer PHQ-A if positive -   Q1: Little interest or pleasure in doing things Not at all   Q2: Feeling down, depressed or hopeless Not at all   PHQ-2 Score 0     Social History     Tobacco Use     Smoking status: Former     Packs/day: 0.50     Years: 2.50     Pack years: 1.25     Types: Cigarettes     Quit date: 2005     Years since quittin.0     Smokeless tobacco: Never   Substance Use Topics     Alcohol use: Yes     Alcohol/week: 0.0 standard drinks     Comment: rarely - 1 monthly     If you drink alcohol do you typically have >3 drinks per day or >7 drinks per week? No  Alcohol Use 2022   Prescreen: >3 drinks/day or >7 drinks/week? No   Prescreen: >3 drinks/day or >7 drinks/week? -       Reviewed  orders with patient.  Reviewed health maintenance and updated orders accordingly - Yes  BP Readings from Last 3 Encounters:   22 113/76   11/10/22 118/78   10/27/22 116/68    Wt Readings from Last 3 Encounters:   22 69.9 kg (154 lb 3.2 oz)   11/10/22 71.2 kg (157 lb)   10/27/22 71.3 kg (157 lb 1.6 oz)         Patient Active Problem List   Diagnosis     Other kyphoscoliosis and scoliosis     Hypertrophic and atrophic condition of skin     Viral warts     CARDIOVASCULAR SCREENING; LDL GOAL LESS THAN 160     Intermittent asthma     Anxiety     Intractable menstrual migraine without status migrainosus     Vitamin D deficiency     Inflammatory polyarthropathy (H)     Chronic back pain     Raynaud's phenomenon without gangrene     Systemic lupus erythematosus (H)     High risk medications (not anticoagulants) long-term use (Plaquenil and AZA)     Dry eyes, bilateral     Disorder of connective tissue (H)     Ganglion cyst of dorsum of left wrist     Past Surgical History:   Procedure Laterality Date      SECTION       EXCISE GANGLION WRIST Left 11/10/2022    Procedure: EXCISION, GANGLION CYST, WRIST LEFT;  Surgeon: Jayde Martinez MD;  [place] [facility] [place]     SURGICAL HISTORY OF -       PE Tubes       Social History     Tobacco Use     Smoking status: Former     Packs/day: 0.50     Years: 2.50     Pack years: 1.25     Types: Cigarettes     Quit date: 2005     Years since quittin.0     Smokeless tobacco: Never   Substance Use Topics     Alcohol use: Yes     Alcohol/week: 0.0 standard drinks     Comment: rarely - 1 monthly     Family History   Problem Relation Age of Onset     Heart Disease Maternal Grandfather         Bypass, Heart Disease     Respiratory Maternal Grandfather         asthma     Macular Degeneration Maternal Grandfather      Hypertension Paternal Grandmother      Cancer Paternal Grandmother         lymph nodes     Cataracts Paternal Grandmother      C.A.D. Paternal  Grandfather      Heart Disease Maternal Uncle         MI's      Alcohol/Drug Maternal Uncle      Respiratory Other         cousin on mothers side, asthma     Alcohol/Drug Other         bother great grandparents on mother's side     Diabetes No family hx of      Breast Cancer No family hx of      Cancer - colorectal No family hx of          Current Outpatient Medications   Medication Sig Dispense Refill     albuterol (PROAIR HFA/PROVENTIL HFA/VENTOLIN HFA) 108 (90 Base) MCG/ACT inhaler Inhale 2 puffs into the lungs every 6 hours as needed for shortness of breath / dyspnea 18 g 1     azaTHIOprine (IMURAN) 50 MG tablet Take 1 tablet (50 mg) by mouth daily 90 tablet 2     cyclobenzaprine (FLEXERIL) 10 MG tablet Take 1 tablet (10 mg) by mouth 3 times daily as needed for muscle spasms 30 tablet 3     hydroxychloroquine (PLAQUENIL) 200 MG tablet Take 1 tablet (200 mg) by mouth daily . Yearly eye exam including 10-2 VF and SD-OCT required 90 tablet 2     hydrOXYzine (ATARAX) 25 MG tablet Take 1 tablet (25 mg) by mouth every 6 hours as needed for anxiety 90 tablet 6     levonorgestrel (MIRENA) 20 MCG/24HR IUD 1 each by Intrauterine route once       LORazepam (ATIVAN) 0.5 MG tablet Take 1 tablet (0.5 mg) by mouth every 8 hours as needed for anxiety 15 tablet 0     traZODone (DESYREL) 50 MG tablet Take 0.5 tablets (25 mg) by mouth nightly as needed for sleep 30 tablet 0     vitamin D3 (CHOLECALCIFEROL) 50 mcg (2000 units) tablet Take 1 tablet (50 mcg) by mouth daily 90 tablet 2     Allergies   Allergen Reactions     Fluconazole Rash     Recent Labs   Lab Test 10/14/22  0714 07/14/22  1507 04/11/22  1441 02/09/22  0701 12/15/21  1516 10/15/21  0937 09/01/21  1625 05/18/21  1606 02/10/21  1627 01/21/21  0716 05/26/16  1648 04/01/16  0910   LDL  --   --   --  98  --  95  --   --   --  91   < >  --    HDL  --   --   --  50  --  60  --   --   --  56   < >  --    TRIG  --   --   --  50  --  41  --   --   --  38   < >  --    ALT 18  17 20  --    < >  --    < > 25 23  --    < >  --    CR 0.65 0.63 0.74  --    < >  --    < > 0.76 0.74  --    < >  --    GFRESTIMATED >90 >90 >90  --    < >  --    < > >90 >90  --    < >  --    GFRESTBLACK  --   --   --   --   --   --   --  >90 >90  --    < >  --    POTASSIUM 4.0 4.2 3.7  --    < >  --    < > 3.7 4.0  --    < >  --    TSH  --   --   --   --   --   --   --   --   --   --   --  1.57    < > = values in this interval not displayed.        Breast Cancer Screening:    Breast CA Risk Assessment (FHS-7) 10/15/2021   Do you have a family history of breast, colon, or ovarian cancer? No / Unknown     Patient under 40 years of age: Routine Mammogram Screening not recommended.   Pertinent mammograms are reviewed under the imaging tab.    History of abnormal Pap smear: hx of HPV during pregnancy.   PAP / HPV Latest Ref Rng & Units 10/15/2021 10/7/2016 2013   PAP   Negative for Intraepithelial Lesion or Malignancy (NILM) - -   PAP (Historical) - - NIL NIL   HPV16 Negative Negative Negative -   HPV18 Negative Negative Negative -   HRHPV Negative Negative Negative -     Reviewed and updated as needed this visit by clinical staff   Tobacco  Allergies  Meds            Reviewed and updated as needed this visit by Provider                 OB History    Para Term  AB Living   1 1 1 0 0 1   SAB IAB Ectopic Multiple Live Births   0 0 0 0 0      # Outcome Date GA Lbr Joshua/2nd Weight Sex Delivery Anes PTL Lv   1 Term                Review of Systems   Constitutional: Negative for chills and fever.   HENT: Negative for congestion, ear pain, hearing loss and sore throat.    Eyes: Negative for pain and visual disturbance.   Respiratory: Negative for cough and shortness of breath.    Cardiovascular: Negative for chest pain, palpitations and peripheral edema.   Gastrointestinal: Negative for abdominal pain, constipation, diarrhea, heartburn, hematochezia and nausea.   Genitourinary: Negative for dysuria,  "frequency, genital sores, hematuria and urgency.   Musculoskeletal: Negative for arthralgias, joint swelling and myalgias.   Skin: Negative for rash.   Neurological: Negative for dizziness, weakness, headaches and paresthesias.   Psychiatric/Behavioral: Negative for mood changes. The patient is not nervous/anxious.           OBJECTIVE:   /76 (BP Location: Left arm, Patient Position: Chair, Cuff Size: Adult Regular)   Pulse 78   Temp 98.3  F (36.8  C) (Oral)   Resp 16   Ht 1.702 m (5' 7\")   Wt 69.9 kg (154 lb 3.2 oz)   SpO2 99%   BMI 24.15 kg/m    Physical Exam  GENERAL: healthy, alert and no distress  EYES: Eyes grossly normal to inspection, PERRL and conjunctivae and sclerae normal  HENT: ear canals and TM's normal, nose and mouth without ulcers or lesions  NECK: no adenopathy, no asymmetry, masses, or scars and thyroid normal to palpation  RESP: lungs clear to auscultation - no rales, rhonchi or wheezes  BREAST: deferred, no cocnerns.  CV: regular rate and rhythm, normal S1 S2, no S3 or S4, no murmur, click or rub, no peripheral edema and peripheral pulses strong  ABDOMEN: soft, nontender, no hepatosplenomegaly, no masses and bowel sounds normal  MS: no gross musculoskeletal defects noted, no edema  SKIN: no suspicious lesions or rashes  NEURO: Normal strength and tone, mentation intact and speech normal  PSYCH: mentation appears normal, affect normal/bright    Diagnostic Test Results:  Labs reviewed in Epic  No results found for this or any previous visit (from the past 24 hour(s)).    ASSESSMENT/PLAN:   Aleida was seen today for physical.    Diagnoses and all orders for this visit:    Routine general medical examination at a health care facility  -     Lipid Profile (Chol, Trig, HDL, LDL calc); Future  -     Glucose; Future    Other systemic lupus erythematosus with other organ involvement (H)  High risk medications (not anticoagulants) long-term use (Plaquenil and AZA)  Chronic/stable.. Follows " with rheumatology for meds and routine lab monitoring.     Anxiety  Chronic/stable. Takes ativan PRN, usually for travel.   -     hydrOXYzine (ATARAX) 25 MG tablet; Take 1 tablet (25 mg) by mouth every 6 hours as needed for anxiety  -     LORazepam (ATIVAN) 0.5 MG tablet; Take 1 tablet (0.5 mg) by mouth every 8 hours as needed for anxiety    Acute midline low back pain with right-sided sciatica  Chronic. Stable. Joint pain r/t SLE. Flexeril used occasionally with flares. Refilled.  -     cyclobenzaprine (FLEXERIL) 10 MG tablet; Take 1 tablet (10 mg) by mouth 3 times daily as needed for muscle spasms    Other orders  -     INFLUENZA VACCINE IM > 6 MONTHS VALENT IIV4 (AFLURIA/FLUZONE)    Discussed IUD removal. Patient has traumatic experience with significant pain and short LOC when it was placed. Pt will schedule visit with provider to discuss removal and replacement options.      Patient has been advised of split billing requirements and indicates understanding: Yes      COUNSELING:  Reviewed preventive health counseling, as reflected in patient instructions        She reports that she quit smoking about 17 years ago. Her smoking use included cigarettes. She has a 1.25 pack-year smoking history. She has never used smokeless tobacco.      FARZANA Hutton CNP  [facility]

## 2022-12-19 NOTE — PROGRESS NOTES
Chief Complaint: No chief complaint on file.    Diagnosis: left wrist mass, consistent with dorsal ganglion cyst  Treatment:   11/10/2022: left wrist ganglion cyst excision    History of Present Illness: Aleida Weinberg is a 37 year old LHD female presenting for evaluation of left wrist mass.  The mass has been present for over 1 year, but in the last few months it has become more bothersome.  She has pain with activities, and it sometimes is bothersome at work.  She has not had any overlying skin changes.  She thinks the mass is growing slightly.  She denies numbness or tingling in her fingers, but sometimes at night has paresthesias over the ulnar border of her hand.    Interval 11/22/2022: presents for postoperative visit.    Interval 12/20/2022: presents for 2nd post op visit. Reports pain and range of motion are improving, she does not feel recurrence of the cyst    Physical Examination:  There were no vitals filed for this visit.  There is no height or weight on file to calculate BMI.    Well appearing, well nourished  Alert and oriented x 3, cooperative with exam     Left wrist  Incision healing well, no evidence of infection  Flexion 30 degrees, extension 70 degrees, full pronation/supination  Motor Exam: Intact TU/OK/x2-3. 5/5 1st DOI and thumb abduction  Sensory Exam: Sensation intact to light touch in FDWS (radial), volar IF (median), volar SF (ulnar)  Vascular Exam: 2+ radial pulse, < 2 sec capillary refill    Imaging/Studies:  Pathology 11/10/2022:   A. Left wrist mass, excision:  - Ganglion cyst  - Negative for malignancy    XR (3 views) of the left wrist was obtained 9/6/2022.  I reviewed the images and report independently with the patient.  The imaging study shows no fracture/dislocation.  Well maintained joint space.    MRI of the left wrist performed 9/12/2022 shows:   1. Between the external markers, approximately 4 x 4 by 3 mm (mediolateral by proximal distal by dorsal palmar)  cystic-appearing mass intimate with an distal part of the dorsal component of scapholunate interosseous ligament, most consistent with ganglion/synovial cyst.    a. No high grade tear of underlying scapholunate interosseous ligament suspected.  2. Query partial tear central portion of triangular fibrocartilage disc proper.    Assessment: Aleida Weinberg is a 37 year old female with left wrist ganglion cyst.    Plan:   I had a discussion with the patient regarding my clinical findings, diagnosis, and treatment plan. We reviewed the post-operative plan, frequency of follow-up, rehabilitation, and expected outcomes.  All questions answered.      WBAT left upper extremity.  Increase as tolerated    Reviewed digit/wrist/forearm ROM exercises. Hand therapy for ROM exercises    Next Visit:    Follow-up: as needed      MARK ANTHONY HINOJOSA MD   No/Not applicable

## 2022-12-20 ENCOUNTER — THERAPY VISIT (OUTPATIENT)
Dept: OCCUPATIONAL THERAPY | Facility: CLINIC | Age: 37
End: 2022-12-20
Payer: COMMERCIAL

## 2022-12-20 ENCOUNTER — OFFICE VISIT (OUTPATIENT)
Dept: ORTHOPEDICS | Facility: CLINIC | Age: 37
End: 2022-12-20
Payer: COMMERCIAL

## 2022-12-20 DIAGNOSIS — M67.432 GANGLION CYST OF DORSUM OF LEFT WRIST: Primary | ICD-10-CM

## 2022-12-20 PROCEDURE — 97140 MANUAL THERAPY 1/> REGIONS: CPT | Mod: GO | Performed by: OCCUPATIONAL THERAPIST

## 2022-12-20 PROCEDURE — 97110 THERAPEUTIC EXERCISES: CPT | Mod: GO | Performed by: OCCUPATIONAL THERAPIST

## 2022-12-20 PROCEDURE — 99024 POSTOP FOLLOW-UP VISIT: CPT | Performed by: STUDENT IN AN ORGANIZED HEALTH CARE EDUCATION/TRAINING PROGRAM

## 2022-12-20 NOTE — NURSING NOTE
Reason For Visit:   Chief Complaint   Patient presents with     RECHECK     6 week post op Left wrist Ganglion Cyst excision  DOS:  11/10/22       Primary MD: Sayra Chriinos  Ref. MD: Jose    Age: 37 year old    ?  No      There were no vitals taken for this visit.      Pain Assessment  Patient Currently in Pain: No (no pain unless certain movments are made.)    Hand Dominance Evaluation  Hand Dominance: Left          QuickDASH Assessment  QuickDASH Main 11/11/2014   1. Open a tight or new jar. 3   2. Do heavy household chores (e.g., wash walls, floors). 1   3. Carry a shopping bag or briefcase. 2   4. Wash your back. 1   5. Use a knife to cut food. 1   6. Recreational activities in which you take some force or impact through your arm, shoulder or hand (e.g., golf, hammering, tennis, etc.). 1   7. During the past week, to what extent has your arm, shoulder or hand problem interfered with your normal social activities with family, friends, neighbours or groups? 2   8. During the past week, were you limited in your work or other regular daily activities as a result of your arm, shoulder or hand problem? 2   9. Arm, shoulder or hand pain. 2   10. Tingling (pins and needles) in your arm,shoulder or hand. 3   11. During the past week, how much difficulty have you had sleeping because of the pain in your arm, shoulder or hand? ([place] number) 1   SUM: 19          Current Outpatient Medications   Medication Sig Dispense Refill     albuterol (PROAIR HFA/PROVENTIL HFA/VENTOLIN HFA) 108 (90 Base) MCG/ACT inhaler Inhale 2 puffs into the lungs every 6 hours as needed for shortness of breath / dyspnea 18 g 1     azaTHIOprine (IMURAN) 50 MG tablet Take 1 tablet (50 mg) by mouth daily 90 tablet 2     cyclobenzaprine (FLEXERIL) 10 MG tablet Take 1 tablet (10 mg) by mouth 3 times daily as needed for muscle spasms 30 tablet 3     hydroxychloroquine (PLAQUENIL) 200 MG tablet Take 1 tablet (200 mg) by mouth daily  . Yearly eye exam including 10-2 VF and SD-OCT required 90 tablet 2     hydrOXYzine (ATARAX) 25 MG tablet Take 1 tablet (25 mg) by mouth every 6 hours as needed for anxiety 90 tablet 6     levonorgestrel (MIRENA) 20 MCG/24HR IUD 1 each by Intrauterine route once       LORazepam (ATIVAN) 0.5 MG tablet Take 1 tablet (0.5 mg) by mouth every 8 hours as needed for anxiety 15 tablet 0     traZODone (DESYREL) 50 MG tablet Take 0.5 tablets (25 mg) by mouth nightly as needed for sleep 30 tablet 0     vitamin D3 (CHOLECALCIFEROL) 50 mcg (2000 units) tablet Take 1 tablet (50 mcg) by mouth daily 90 tablet 2       Allergies   Allergen Reactions     Fluconazole Rash       GUADALUPE SCOTT, ATC

## 2022-12-20 NOTE — PROGRESS NOTES
SOAP note objective information for 2022.    Diagnosis: Left wrist dorsal ganglion cyst.   DOS: 11/10/2022  Procedure:  Left wrist dorsal ganglion cyst excision.  Post:  5w 5d    Precautions: ROM exercises    Subjective:  Aleida Weinberg is a 37 year old female.    Patient reports symptoms of the left wrist which occurred due to gradual onset. Since onset symptoms are Unchanged    Past Medical History:   Diagnosis Date     Arthritis      Lupus erythematosus      Mild intermittent asthma      Other kyphoscoliosis and scoliosis     upper back curvature and disc degeneration in lower back.     Past Surgical History:   Procedure Laterality Date      SECTION  2006     EXCISE GANGLION WRIST Left 11/10/2022    Procedure: EXCISION, GANGLION CYST, WRIST LEFT;  Surgeon: Jayde Martinez MD;  Location: [facility] OR     SURGICAL HISTORY OF -       PE Tubes     Current occupation  at [employer]  Job Tasks: Computer Work, Prolonged Sitting, Repetitive Tasks    Occupational Profile Information:  Left hand dominant  Prior functional level:  no limitations  Patient reports symptoms of pain and stiffness/loss of motion  Special tests:  see chart.    Previous treatment: none  Barriers include:none  Mobility: No difficulty  Transportation: drives  Currently working in normal job without restrictions    Functional Outcome Measure:   Upper Extremity Functional Index Score:  SCORE:   Column Totals: /80: 34   (A lower score indicates greater disability.)    Objective:  Pain Level (Scale 0-10)   2022   At Rest 0/10   With Use NT     Pain Description  Date 2022   Location wrist and hand   Pain Quality Dull and Sharp   Frequency intermittent     Pain is worst  daytime   Exacerbated by  unknown, recently out of the cast   Relieved by rest   Progression Unchanged     Edema  Mild    Sensation   WNL throughout all nerve distributions; per patient report    ROM  Pain Report: - none  + mild    ++ moderate     +++ severe   Wrist 11/22/2022 11/22/2022 12/20/22   AROM (PROM) R L L   Extension 64 47 66 pre  68 post   Flexion 57 16 32 pre  40 post   RD 23 11 19 pre/post   UD 38 23 29 pre  37 post   Supination 82 83 WNL   Pronation WNL WNL WNL     Strength  Deferred    Please refer to the daily flowsheet for treatment today, total treatment time and time spent performing 1:1 timed codes.    Home Exercise Program:  Warmth  Scar massage  Tennis ball massage to extensors  Wrist PROM E/F  Wrist AROM E/F/RD/UD

## 2022-12-20 NOTE — LETTER
12/20/2022         RE: Aleida Weinberg  [address] [address]        Dear Colleague,    Thank you for referring your patient, Aleida Weinberg, to the [facility] [place]. Please see a copy of my visit note below.    Chief Complaint: No chief complaint on file.    Diagnosis: left wrist mass, consistent with dorsal ganglion cyst  Treatment:   11/10/2022: left wrist ganglion cyst excision    History of Present Illness: Aleida Weinberg is a 37 year old LHD female presenting for evaluation of left wrist mass.  The mass has been present for over 1 year, but in the last few months it has become more bothersome.  She has pain with activities, and it sometimes is bothersome at work.  She has not had any overlying skin changes.  She thinks the mass is growing slightly.  She denies numbness or tingling in her fingers, but sometimes at night has paresthesias over the ulnar border of her hand.    Interval 11/22/2022: presents for postoperative visit.    Interval 12/20/2022: presents for 2nd post op visit. Reports pain and range of motion are improving, she does not feel recurrence of the cyst    Physical Examination:  There were no vitals filed for this visit.  There is no height or weight on file to calculate BMI.    Well appearing, well nourished  Alert and oriented x 3, cooperative with exam     Left wrist  Incision healing well, no evidence of infection  Flexion 30 degrees, extension 70 degrees, full pronation/supination  Motor Exam: Intact TU/OK/x2-3. 5/5 1st DOI and thumb abduction  Sensory Exam: Sensation intact to light touch in FDWS (radial), volar IF (median), volar SF (ulnar)  Vascular Exam: 2+ radial pulse, < 2 sec capillary refill    Imaging/Studies:  Pathology 11/10/2022:   A. Left wrist mass, excision:  - Ganglion cyst  - Negative for malignancy    XR (3 views) of the left wrist was obtained 9/6/2022.  I reviewed the images and report  independently with the patient.  The imaging study shows no fracture/dislocation.  Well maintained joint space.    MRI of the left wrist performed 9/12/2022 shows:   1. Between the external markers, approximately 4 x 4 by 3 mm (mediolateral by proximal distal by dorsal palmar) cystic-appearing mass intimate with an distal part of the dorsal component of scapholunate interosseous ligament, most consistent with ganglion/synovial cyst.    a. No high grade tear of underlying scapholunate interosseous ligament suspected.  2. Query partial tear central portion of triangular fibrocartilage disc proper.    Assessment: Aleida Weinberg is a 37 year old female with left wrist ganglion cyst.    Plan:   I had a discussion with the patient regarding my clinical findings, diagnosis, and treatment plan. We reviewed the post-operative plan, frequency of follow-up, rehabilitation, and expected outcomes.  All questions answered.      WBAT left upper extremity.  Increase as tolerated    Reviewed digit/wrist/forearm ROM exercises. Hand therapy for ROM exercises    Next Visit:    Follow-up: as needed      MARK ANTHONY HINOJOSA MD

## 2023-02-10 ENCOUNTER — LAB (OUTPATIENT)
Dept: LAB | Facility: CLINIC | Age: 38
End: 2023-02-10
Payer: COMMERCIAL

## 2023-02-10 DIAGNOSIS — Z00.00 ROUTINE GENERAL MEDICAL EXAMINATION AT A HEALTH CARE FACILITY: ICD-10-CM

## 2023-02-10 LAB
CHOLEST SERPL-MCNC: 159 MG/DL
FASTING STATUS PATIENT QL REPORTED: YES
GLUCOSE SERPL-MCNC: 90 MG/DL (ref 70–99)
HDLC SERPL-MCNC: 55 MG/DL
LDLC SERPL CALC-MCNC: 97 MG/DL
NONHDLC SERPL-MCNC: 104 MG/DL
TRIGL SERPL-MCNC: 37 MG/DL

## 2023-02-10 PROCEDURE — 36415 COLL VENOUS BLD VENIPUNCTURE: CPT

## 2023-02-10 PROCEDURE — 82947 ASSAY GLUCOSE BLOOD QUANT: CPT

## 2023-02-10 PROCEDURE — 80061 LIPID PANEL: CPT

## 2023-03-27 ENCOUNTER — APPOINTMENT (OUTPATIENT)
Dept: LAB | Facility: CLINIC | Age: 38
End: 2023-03-27
Payer: COMMERCIAL

## 2023-03-31 ENCOUNTER — LAB (OUTPATIENT)
Dept: LAB | Facility: CLINIC | Age: 38
End: 2023-03-31
Payer: COMMERCIAL

## 2023-03-31 DIAGNOSIS — M32.19 OTHER SYSTEMIC LUPUS ERYTHEMATOSUS WITH OTHER ORGAN INVOLVEMENT (H): ICD-10-CM

## 2023-03-31 DIAGNOSIS — E55.9 VITAMIN D DEFICIENCY: ICD-10-CM

## 2023-03-31 LAB
ALBUMIN MFR UR ELPH: <6 MG/DL (ref 1–14)
ALBUMIN SERPL BCG-MCNC: 4.8 G/DL (ref 3.5–5.2)
ALBUMIN UR-MCNC: NEGATIVE MG/DL
ALP SERPL-CCNC: 44 U/L (ref 35–104)
ALT SERPL W P-5'-P-CCNC: 17 U/L (ref 10–35)
ANION GAP SERPL CALCULATED.3IONS-SCNC: 11 MMOL/L (ref 7–15)
APPEARANCE UR: CLEAR
AST SERPL W P-5'-P-CCNC: 23 U/L (ref 10–35)
BASOPHILS # BLD AUTO: 0 10E3/UL (ref 0–0.2)
BASOPHILS NFR BLD AUTO: 0 %
BILIRUB SERPL-MCNC: 0.5 MG/DL
BILIRUB UR QL STRIP: NEGATIVE
BUN SERPL-MCNC: 11.6 MG/DL (ref 6–20)
CALCIUM SERPL-MCNC: 9.7 MG/DL (ref 8.6–10)
CHLORIDE SERPL-SCNC: 103 MMOL/L (ref 98–107)
COLOR UR AUTO: YELLOW
CREAT SERPL-MCNC: 0.67 MG/DL (ref 0.51–0.95)
CREAT UR-MCNC: 55.6 MG/DL
CRP SERPL-MCNC: <3 MG/L
DEPRECATED HCO3 PLAS-SCNC: 25 MMOL/L (ref 22–29)
EOSINOPHIL # BLD AUTO: 0.1 10E3/UL (ref 0–0.7)
EOSINOPHIL NFR BLD AUTO: 1 %
ERYTHROCYTE [DISTWIDTH] IN BLOOD BY AUTOMATED COUNT: 11.7 % (ref 10–15)
ERYTHROCYTE [SEDIMENTATION RATE] IN BLOOD BY WESTERGREN METHOD: 8 MM/HR (ref 0–20)
GFR SERPL CREATININE-BSD FRML MDRD: >90 ML/MIN/1.73M2
GLUCOSE SERPL-MCNC: 101 MG/DL (ref 70–99)
GLUCOSE UR STRIP-MCNC: NEGATIVE MG/DL
HCT VFR BLD AUTO: 41.9 % (ref 35–47)
HGB BLD-MCNC: 14.8 G/DL (ref 11.7–15.7)
HGB UR QL STRIP: NEGATIVE
KETONES UR STRIP-MCNC: NEGATIVE MG/DL
LEUKOCYTE ESTERASE UR QL STRIP: NEGATIVE
LYMPHOCYTES # BLD AUTO: 0.9 10E3/UL (ref 0.8–5.3)
LYMPHOCYTES NFR BLD AUTO: 22 %
MCH RBC QN AUTO: 33 PG (ref 26.5–33)
MCHC RBC AUTO-ENTMCNC: 35.3 G/DL (ref 31.5–36.5)
MCV RBC AUTO: 93 FL (ref 78–100)
MONOCYTES # BLD AUTO: 0.4 10E3/UL (ref 0–1.3)
MONOCYTES NFR BLD AUTO: 11 %
NEUTROPHILS # BLD AUTO: 2.8 10E3/UL (ref 1.6–8.3)
NEUTROPHILS NFR BLD AUTO: 66 %
NITRATE UR QL: NEGATIVE
PH UR STRIP: 5.5 [PH] (ref 5–7)
PLATELET # BLD AUTO: 232 10E3/UL (ref 150–450)
POTASSIUM SERPL-SCNC: 3.6 MMOL/L (ref 3.4–5.3)
PROT SERPL-MCNC: 7.2 G/DL (ref 6.4–8.3)
PROT/CREAT 24H UR: NORMAL MG/G{CREAT}
RBC # BLD AUTO: 4.49 10E6/UL (ref 3.8–5.2)
SODIUM SERPL-SCNC: 139 MMOL/L (ref 136–145)
SP GR UR STRIP: 1.01 (ref 1–1.03)
UROBILINOGEN UR STRIP-ACNC: 0.2 E.U./DL
WBC # BLD AUTO: 4.2 10E3/UL (ref 4–11)

## 2023-03-31 PROCEDURE — 82306 VITAMIN D 25 HYDROXY: CPT

## 2023-03-31 PROCEDURE — 84156 ASSAY OF PROTEIN URINE: CPT

## 2023-03-31 PROCEDURE — 86140 C-REACTIVE PROTEIN: CPT

## 2023-03-31 PROCEDURE — 36415 COLL VENOUS BLD VENIPUNCTURE: CPT

## 2023-03-31 PROCEDURE — 85652 RBC SED RATE AUTOMATED: CPT

## 2023-03-31 PROCEDURE — 80053 COMPREHEN METABOLIC PANEL: CPT

## 2023-03-31 PROCEDURE — 85025 COMPLETE CBC W/AUTO DIFF WBC: CPT

## 2023-03-31 PROCEDURE — 81003 URINALYSIS AUTO W/O SCOPE: CPT

## 2023-04-03 LAB — DEPRECATED CALCIDIOL+CALCIFEROL SERPL-MC: 50 UG/L (ref 20–75)

## 2023-04-07 ENCOUNTER — TELEPHONE (OUTPATIENT)
Dept: OPTOMETRY | Facility: CLINIC | Age: 38
End: 2023-04-07
Payer: COMMERCIAL

## 2023-04-07 NOTE — TELEPHONE ENCOUNTER
Left voicemail informing patient that her insurance covers a diabetic/plaquenil eye exam. But if she does want glasses, there may be a charge for refraction. If she does NOT want refraction, I advised her to let the technician know before she sees the Dr. Nery Anderson on 4/7/2023 at 10:36 AM

## 2023-04-11 ENCOUNTER — OFFICE VISIT (OUTPATIENT)
Dept: RHEUMATOLOGY | Facility: CLINIC | Age: 38
End: 2023-04-11
Payer: COMMERCIAL

## 2023-04-11 VITALS
HEART RATE: 83 BPM | OXYGEN SATURATION: 96 % | WEIGHT: 155 LBS | SYSTOLIC BLOOD PRESSURE: 108 MMHG | DIASTOLIC BLOOD PRESSURE: 79 MMHG | BODY MASS INDEX: 24.28 KG/M2

## 2023-04-11 DIAGNOSIS — Z79.899 HIGH RISK MEDICATIONS (NOT ANTICOAGULANTS) LONG-TERM USE: ICD-10-CM

## 2023-04-11 DIAGNOSIS — M32.19 OTHER SYSTEMIC LUPUS ERYTHEMATOSUS WITH OTHER ORGAN INVOLVEMENT (H): Primary | ICD-10-CM

## 2023-04-11 PROCEDURE — 99214 OFFICE O/P EST MOD 30 MIN: CPT | Performed by: INTERNAL MEDICINE

## 2023-04-11 RX ORDER — AZATHIOPRINE 50 MG/1
50 TABLET ORAL DAILY
Qty: 90 TABLET | Refills: 2 | Status: SHIPPED | OUTPATIENT
Start: 2023-04-11 | End: 2023-10-26

## 2023-04-11 RX ORDER — HYDROXYCHLOROQUINE SULFATE 200 MG/1
200 TABLET, FILM COATED ORAL DAILY
Qty: 90 TABLET | Refills: 2 | Status: SHIPPED | OUTPATIENT
Start: 2023-04-11 | End: 2023-10-26

## 2023-04-11 NOTE — PROGRESS NOTES
Rheumatology Clinic Visit      Aleida Weinberg MRN# [id number]   YOB: 1985 Age: 38 year old      Date of visit: 4/11/23   PCP: Sayra Chirinos     Chief Complaint   Patient presents with:  Systemic lupus erythematosus : Doing good    Assessment and Plan     1. Systemic lupus erythematosus (CHANELL >[id number] speckled, RNP+, Sm+, Scl-70+, hypocomplementemia, photosensitivity, malar rash, arthritis, fatigue, Raynaud's phenomenon):  Dx'd 4/2016. Scl-70+; she lacks features of scleroderma except for raynaud's; no myopathy based on labs/symptoms. 5/11/2018 echo without evidence of pulmonary hypertension. Previously on MTX 20mg SQ weekly (GI upset with PO doses above 15mg, partially effective) and SSZ (LFT elevations).  Currently on AZA 50mg daily and HCQ 200mg daily (patient self reduced previously from 300mg daily on average based on preference of an easier regimen and continued to do well). TPMT normal on 8/21/2017.  Doing well at this time.  Chronic illness, stable.    - Continue hydroxychloroquine 200mg daily (last eye exam was okay on 1/28/2022 at [facility]; she plans to have her next eye exam at [facility])  - Continue azathioprine 50mg daily   - Labs in 3 months: CBC, CMP, ESR, CRP, C3, C4, dsDNA, UA, Uprotein:creatinine    High risk medication requiring intensive toxicity monitoring at least quarterly: labs ordered include CBC, Creatinine, Hepatic panel to monitor for cytopenia and hepatotoxicity; checking creatinine as it affects clearance of medication.       2. Raynaud's Phenomenon: Cold avoidance was insufficient for controlling her symptoms in the past; then did well on amlodipine, but not using now and doing okay. Amlodipine PRN.   Chronic illness, stable.    - Amlodipine 10mg daily during colder months, as needed    3.  Low vitamin D: Currently on vitamin D 2000 units daily.    - Continue vitamin D 2000 units daily  - Lab in 3 months: Vitamin D    4.  Secondary Sjogren's  syndrome: Mild dry and dry mouth that are treated infrequently with artificial tears and frequent sips of water. Dentist follow-up every 6 months     5.  Facial rash: Was following with dermatology at [facility].  Reportedly due to SLE and rosacea from derm perspective, and improved with topical creams from her dermatologist for rosacea.  Active today, involving the nasolabial folds.  She plans to establish with dermatology at [facility].     6.  Mild medial joint line tenderness of the knees, and mild pes anserine bursitis: she says that it is only symptomatic when examined.  May use topical Voltaren gel as needed.    7.  Vaccinations:   - Influenza: encouraged yearly vaccination  - [id number]: up to date  - [id number]: up to date  - Shingrix: Up to date  - COVID-19: Up to date     Total minutes spent in evaluation with patient, documentation, , and review of pertinent studies and chart notes: 16    Ms. Weinberg verbalized agreement with and understanding of the rational for the diagnosis and treatment plan.  All questions were answered to best of my ability and the patient's satisfaction. Ms. Weinberg was advised to contact the clinic with any questions that may arise after the clinic visit.      Thank you for involving me in the care of the patient    Return to clinic: 3-4 months      HPI   Aleida Weinberg is a 38 year old female with medical history significant for intermittent asthma, anxiety, migraines, vitamin D deficiency, and systemic lupus erythematosus who presents for follow-up of SLE.     Today, 4/11/2023: Successful surgery for ganglion cyst of the left wrist.  Medications are effective.  Rash on her face and no longer following with her cosmetic dermatologist but planning to see a dermatologist at [facility]. Overall doing well with regard to lupus.  Planning to see ophthalmology at [facility] for hydroxychloroquine screening exam.     Denies fevers, chills, nausea,  vomiting, constipation, diarrhea. No abdominal pain. No chest pain/pressure, palpitations, or shortness of breath. No oral or nasal sores.  No rash currently.    Tobacco: quit smoking in   EtOH: Once monthly  Drugs: None  Occupation: Works at [employer], a lot of typing per patient    ROS   12 point review of system was completed and negative except as noted in the HPI     Active Problem List     Patient Active Problem List   Diagnosis     Other kyphoscoliosis and scoliosis     Hypertrophic and atrophic condition of skin     Viral warts     CARDIOVASCULAR SCREENING; LDL GOAL LESS THAN 160     Intermittent asthma     Anxiety     Intractable menstrual migraine without status migrainosus     Vitamin D deficiency     Inflammatory polyarthropathy (H)     Chronic back pain     Raynaud's phenomenon without gangrene     Systemic lupus erythematosus (H)     High risk medications (not anticoagulants) long-term use (Plaquenil and AZA)     Dry eyes, bilateral     Disorder of connective tissue (H)     Ganglion cyst of dorsum of left wrist     Past Medical History     Past Medical History:   Diagnosis Date     Arthritis      Lupus erythematosus      Mild intermittent asthma      Other kyphoscoliosis and scoliosis     upper back curvature and disc degeneration in lower back.     Past Surgical History     Past Surgical History:   Procedure Laterality Date      SECTION       EXCISE GANGLION WRIST Left 11/10/2022    Procedure: EXCISION, GANGLION CYST, WRIST LEFT;  Surgeon: Jayde Martinez MD;  Location: [facility] OR     SURGICAL HISTORY OF -       PE Tubes     Allergy     Allergies   Allergen Reactions     Fluconazole Rash     Current Medication List     Current Outpatient Medications   Medication Sig     albuterol (PROAIR HFA/PROVENTIL HFA/VENTOLIN HFA) 108 (90 Base) MCG/ACT inhaler Inhale 2 puffs into the lungs every 6 hours as needed for shortness of breath / dyspnea     azaTHIOprine (IMURAN) 50 MG tablet Take 1  tablet (50 mg) by mouth daily     cyclobenzaprine (FLEXERIL) 10 MG tablet Take 1 tablet (10 mg) by mouth 3 times daily as needed for muscle spasms     hydroxychloroquine (PLAQUENIL) 200 MG tablet Take 1 tablet (200 mg) by mouth daily . Yearly eye exam including 10-2 VF and SD-OCT required     hydrOXYzine (ATARAX) 25 MG tablet Take 1 tablet (25 mg) by mouth every 6 hours as needed for anxiety     levonorgestrel (MIRENA) 20 MCG/24HR IUD 1 each by Intrauterine route once     LORazepam (ATIVAN) 0.5 MG tablet Take 1 tablet (0.5 mg) by mouth every 8 hours as needed for anxiety     traZODone (DESYREL) 50 MG tablet Take 0.5 tablets (25 mg) by mouth nightly as needed for sleep     vitamin D3 (CHOLECALCIFEROL) 50 mcg (2000 units) tablet Take 1 tablet (50 mcg) by mouth daily     No current facility-administered medications for this visit.         Social History   See HPI    Family History     Family History   Problem Relation Age of Onset     Heart Disease Maternal Grandfather         Bypass, Heart Disease     Respiratory Maternal Grandfather         asthma     Macular Degeneration Maternal Grandfather      Hypertension Paternal Grandmother      Cancer Paternal Grandmother         lymph nodes     Cataracts Paternal Grandmother      C.A.D. Paternal Grandfather      Heart Disease Maternal Uncle         MI's      Alcohol/Drug Maternal Uncle      Respiratory Other         cousin on mothers side, asthma     Alcohol/Drug Other         bother great grandparents on mother's side     Diabetes No family hx of      Breast Cancer No family hx of      Cancer - colorectal No family hx of      Denies family history of autoimmune disease    Physical Exam     Temp Readings from Last 3 Encounters:   12/12/22 98.3  F (36.8  C) (Oral)   11/10/22 97.1  F (36.2  C) (Temporal)   10/27/22 98.1  F (36.7  C) (Oral)     BP Readings from Last 5 Encounters:   04/11/23 108/79   12/12/22 113/76   11/10/22 118/78   10/27/22 116/68   10/17/22 117/81     Pulse  "Readings from Last 1 Encounters:   04/11/23 83     Resp Readings from Last 1 Encounters:   12/12/22 16     Estimated body mass index is 24.28 kg/m  as calculated from the following:    Height as of 12/12/22: 1.702 m (5' 7\").    Weight as of this encounter: 70.3 kg (155 lb).    GEN: NAD. Healthy appearing adult.   HEENT:  Anicteric, noninjected sclera. No obvious external lesions of the ear and nose. Hearing intact.  CV: S1, S2. RRR. No m/r/g  PULM: No increased work of breathing. CTA bilaterally   MSK: MCPs, PIPs, DIPs without swelling or tenderness to palpation.  Wrists without swelling or tenderness to palpation.  Elbows without swelling, increased warmth, or overlying erythema.  Shoulders without swelling or tenderness to palpation.  Knees without swelling, increased warmth, or overlying erythema; mild medial joint line tenderness and tender to palpation over the pes anserine bursae.  Ankles without swelling; mildly tender to palpation posterior and inferior to the lateral and medial malleolus; mild ankle eversion when standing.  MTPs without swelling or tenderness to palpation.  SKIN: No rash or jaundice seen.  No evidence of current or previous ischemic insults to the fingertips by visual inspection  PSYCH: Alert. Appropriate.      Labs / Imaging (select studies)     RF/CCP  Recent Labs   Lab Test 04/22/16  0902 04/01/16  0910   CCPIGG 1  --    RHF  --  <20     CHANELL  Recent Labs   Lab Test 04/22/16  0902 04/01/16  0910   VALERIE  --  12.4*   ANAIGG >1:81321  Reference range: <1:40  (Note)  Speckled pattern.  INTERPRETIVE INFORMATION: CHANELL by IFA, IgG  Anti-nuclear antibodies (CHANELL) are seen in a variety of  systemic rheumatic diseases and are determined by indirect  fluorescence assay (IFA) using HEp-2 substrate with an  IgG-specific conjugate. CHANELL titers less than or equal to  1:80 have variable relevance while titers greater than or  equal to 1:160 are considered clinically significant. These  antibodies may " precede clinical disease onset; however,  healthy individuals and those with advanced age have been  reported to be positive for CHANELL. When observed, one of the  five basic patterns is reported: homogeneous,  peripheral/rim, speckled, centromere, or nucleolar. If  cytoplasmic fluorescence is observed, it is noted. IFA  methodology is subjective and has occasionally been shown  to lack sensitivity for anti-SSA/Ro antibodies.  Negative results do not necessarily rule out the presence  of SSc. If clinical suspicion remains, consi vicki further  testing for U3-RNP, PM/Scl, or Th/To antibodies associated  with SSc.  Performed by [facility],  [address] [place] [address] [phone]  [email or web address], Twin Rodriguez MD, Lab. Director    --      RNP/Sm/SSA/SSB  Recent Labs   Lab Test 01/12/18  0828 04/22/16  0902   RNPIGG  --  >8.0  Positive   Antibody index (AI) values reflect qualitative changes in antibody   concentration that cannot be directly associated with clinical condition or   disease state.  *   SMIGG  --  1.5*   SSAIGG  --  <0.2  Negative   Antibody index (AI) values reflect qualitative changes in antibody   concentration that cannot be directly associated with clinical condition or   disease state.     SSBIGG  --  <0.2  Negative   Antibody index (AI) values reflect qualitative changes in antibody   concentration that cannot be directly associated with clinical condition or   disease state.     SCLIGG  --  3.1*   TREPAB Negative  --      dsDNA  Recent Labs   Lab Test 10/14/22  0714 12/15/21  1516 02/10/21  1627 11/05/20  1628 07/23/20  1636 04/02/20  1541 07/11/19  1529 11/29/18  1616 08/23/18  1637   DNA 1.6 1.1 1 1 1 2 2 2 2     C3/C4  Recent Labs   Lab Test 10/14/22  0714 12/15/21  1516 05/18/21  1606 02/10/21  1627 11/05/20  1628 07/23/20  1636 04/02/20  1541 10/18/19  1542 07/11/19  1529   H1GPJSY 96 87 84 89 89 92 90 74* 75*   [id number] 19 17 18 17 17 18 20 14* 14*     Send-out Labs  Recent Labs    Lab Test 04/21/17  0813   [facility] SEE NOTE  (Note)  Test name                    Result Flag  Units  RefIntvl  ------------------------------------------------------------  Thiopurine Methyltransferase                                23.2 L      U/mL 24.0-44.0  Sample slightly hemolyzed. Please interpret the results  with caution.  INTERPRETIVE INFORMATION: Thiopurine Methyltransferase, RBC  Normal TPMT activity:  24.0-44.0 U/mL................Individuals are predicted to  be at low risk of bone marrow toxicity (myelosuppression)  as a consequence of standard thiopurine therapy; no dose  adjustment is recommended.  Intermediate TPMT activity:  17.0-23.9 U/mL................Individuals are predicted to  be at intermediate risk of bone marrow toxicity  (myelosuppression) as a consequence of standard thiopurine  therapy; a dose reduction and therapeutic drug management  is recommended.  Low TPMT activity:  less than 17.0 U/mL...........Individuals are predicted to  be at high risk of bone marrow toxicity (myelosuppression)   as a consequence of standard thiopurine dosing. It is  recommended to avoid the use of thiopurine drugs.  High TPMT activity:  greater than 44.0 U/mL........Individuals are not predicted  to be at risk for bone marrow toxicity (myelosuppression)  as a consequence of standard thiopurine dosing, but may be  at risk for therapeutic failure due to excessive  inactivation of thiopurine drugs. Individuals may require  higher than the normal standard dose. Therapeutic drug  management is recommended.  The TPMT, RBC assay is used as a screen to detect  individuals with low and intermediate TPMT activity who may  be at risk for myelosuppression when exposed to standard  doses of thiopurines, including azathioprine (Imuran) and  6-mercaptopurine (Purinethol). TPMT is the primary  metabolic route for inactivation of thiopurine drugs in the  bone marrow. When TPMT activity is low, it is predicted  that  proportionately more 6-mercaptopurine can be converted  into the cytotoxic 6-thioguanine nucle otides that  accumulate in the bone marrow causing excessive toxicity.  The activity of TPMT is measured by the nanomoles of  6-methylmercaptopurine (inactive metabolite) produced per 1  mL of packed red blood cells, (U/mL).    TPMT phenotype testing does not replace the need for  clinical monitoring of patients treated with thiopurine  drugs. Genotype for TPMT cannot be inferred from TPMT  activity (phenotype). Phenotype testing should not be  requested for patients currently treated with thiopurine  drugs. Current TPMT phenotype may not reflect future TPMT  phenotype, particularly in patients who received blood  transfusion within 30-60 days of testing.  TPMT enzyme  activity can be inhibited by several drugs such as:  naproxen (Aleve), ibuprofen (Advil, Motrin), ketoprofen  (Orudis), furosemide (Lasix), sulfasalazine (Azulfidine),  mesalamine (Asacol), olsalazine (Dipentum), mefenamic acid  (Ponstel), thiazide diuretics, and benzoic acid inhibitors.  TPMT inhibitors may contribute t o falsely low results;  patients should abstain from these drugs for at least 48  hours prior to TPMT testing. Falsely low results may also  occur as a result of inappropriate specimen handling and  hemolysis.  Test developed and characteristics determined by [facility]. See Compliance Statement B: www.[email or web address].com/CS  Performed by [facility],  [address] [place] [id number] [phone]  [email or web address], Lionel Ferreira MD, Lab. Director     STSTNM Thiopurine Methyltransferase, RBC   STSTCD 92,066   SSPTYP Whole blood, EDTA anticoagulant     Antiphospholipid Antibodies  Recent Labs   Lab Test 04/22/16  0902   B2GPG 0.9   B2GPM <0.9  Negative     CARG <15.0  Interpretation:  Negative     JOANN <12.5  Interpretation:  Negative     LUPINT Negative  (Note)  COMMENTS:  The INR is normal.  APTT ratio is normal.  DRVVT Screen ratio is  normal.  Thrombin time is normal.  NEGATIVE TEST; A LUPUS ANTICOAGULANT WAS NOT DETECTED IN THIS  SPECIMEN WITHIN THE LIMITS OF THE TESTING REPERTOIRE.  If the clinical picture is strongly suggestive of an antiphospholipid  syndrome, recommend anticardiolipin and beta-2-glycoprotein (IgG and  IgM) antibody tests.  Isabella Olson M.D.  [phone]  4/25/2016    INR =  1.05    Reference range: 0.86-1.14  Thrombin Time= 15.4    Reference range: 13.0-19.0 sec    APTT:       Ratio  Patient  =  0.92  1:2 Mix  =  N/A  Reference:  Negative: Less than or equal to 1.16  Positive: Greater than or equal to 1.17     DILUTE LISA VIPER VENOM TEST:  Screen Ratio = 0.95   Normal is less than 1.21         CBC  Recent Labs   Lab Test 03/31/23  1426 10/14/22  0714 07/14/22  1507 09/01/21  1625 05/18/21  1606 02/10/21  1627 11/05/20  1628   WBC 4.2 4.2 5.1   < > 3.9* 4.2 3.3*   RBC 4.49 4.40 4.08   < > 4.26 4.33 3.95   HGB 14.8 14.5 13.4   < > 14.2 14.2 13.1   HCT 41.9 40.9 37.4   < > 40.3 40.8 37.2   MCV 93 93 92   < > 95 94 94   RDW 11.7 11.4 11.4   < > 11.7 11.8 11.4    261 239   < > 225 212 202   MCH 33.0 33.0 32.8   < > 33.3* 32.8 33.2*   MCHC 35.3 35.5 35.8   < > 35.2 34.8 35.2   NEUTROPHIL 66 60 69   < > 64.3 67.8 60.7   LYMPH 22 22 17   < > 20.2 18.0 23.1   MONOCYTE 11 16 14   < > 14.0 13.0 14.4   EOSINOPHIL 1 2 1   < > 1.0 0.7 1.2   BASOPHIL 0 1 0   < > 0.5 0.5 0.6   ANEU  --   --   --   --  2.5 2.9 2.0   ALYM  --   --   --   --  0.8 0.8 0.8   AMANDA  --   --   --   --  0.6 0.6 0.5   AEOS  --   --   --   --  0.0 0.0 0.0   ABAS  --   --   --   --  0.0 0.0 0.0   ANEUTAUTO 2.8 2.5 3.5   < >  --   --   --    ALYMPAUTO 0.9 0.9 0.9   < >  --   --   --    AMONOAUTO 0.4 0.7 0.7   < >  --   --   --    AEOSAUTO 0.1 0.1 0.0   < >  --   --   --    ABSBASO 0.0 0.0 0.0   < >  --   --   --     < > = values in this interval not displayed.     CMP  Recent Labs   Lab Test 03/31/23  1426 02/10/23  0814 10/14/22  0714 07/14/22  1507  04/11/22  1441 02/09/22  0701 09/01/21  1625 05/18/21  1606 02/10/21  1627 01/21/21  0716 11/05/20  1628 07/23/20  1636     --  138 139 137  --    < > 140 136  --  138 138   POTASSIUM 3.6  --  4.0 4.2 3.7  --    < > 3.7 4.0  --  3.8 4.1   CHLORIDE 103  --  109 108 108  --    < > 107 106  --  107 106   CO2 25  --  22 27 26  --    < > 26 24  --  25 26   ANIONGAP 11  --  7 4 3  --    < > 7 6  --  6 6   * 90 93 106* 86 83   < > 63* 75   < > 92 79   BUN 11.6  --  11 11 12  --    < > 11 10  --  13 11   CR 0.67  --  0.65 0.63 0.74  --    < > 0.76 0.74  --  0.68 0.68   GFRESTIMATED >90  --  >90 >90 >90  --    < > >90 >90  --  >90 >90   GFRESTBLACK  --   --   --   --   --   --   --  >90 >90  --  >90 >90   SRINIVAS 9.7  --  8.6 8.9 9.3  --    < > 9.1 9.8  --  8.8 9.2   BILITOTAL 0.5  --  0.3 0.4 0.5  --    < > 0.7 0.5  --  0.4 0.4   ALBUMIN 4.8  --  4.1 4.1 4.5  --    < > 4.4 4.5  --  4.1 4.6   PROTTOTAL 7.2  --  7.0 6.8 7.4  --    < > 7.7 7.6  --  7.0 8.0   ALKPHOS 44  --  42 38* 42  --    < > 41 38*  --  39* 44   AST 23  --  17 14 15  --    < > 19 15  --  17 20   ALT 17  --  18 17 20  --    < > 25 23  --  23 29    < > = values in this interval not displayed.     Calcium/VitaminD  Recent Labs   Lab Test 03/31/23  1426 10/14/22  0714 07/14/22  1507 11/05/20  1628 07/23/20  1636 04/13/17  1650 02/20/17  1640   SRINIVAS 9.7 8.6 8.9   < > 9.2   < >  --    VITDT 50  --   --   --  30  --  33    < > = values in this interval not displayed.     ESR/CRP  Recent Labs   Lab Test 03/31/23  1426 10/14/22  0714 07/14/22  1507 04/11/22  1441 12/15/21  1516 09/01/21  1625   SED 8 6 8 8 9 9   CRP  --   --   --  <2.9 <2.9 5.6   CRPI <3.00 <3.00 <3.00  --   --   --      CK/Aldolase  Recent Labs   Lab Test 10/14/22  0714 12/15/21  1516 02/10/21  1627 07/22/16  0916 04/22/16  0902   CKT 55 60 60   < > 45   ALDOLASE  --   --   --   --  4.5    < > = values in this interval not displayed.     TSH/T4  Recent Labs   Lab Test 04/01/16  0910   TSH  1.57     Lipid Panel  Recent Labs   Lab Test 02/10/23  0814 02/09/22  0701 10/15/21  0937 02/09/17  0721 07/10/15  0814   CHOL 159 158 163   < > 149   TRIG 37 50 41   < > 45   HDL 55 50 60   < > 48*   LDL 97 98 95   < > 92   VLDL  --   --   --   --  9   CHOLHDLRATIO  --   --   --   --  3.1   NHDL 104 108 103   < >  --     < > = values in this interval not displayed.     Hepatitis B  Recent Labs   Lab Test 04/22/16  0902   HBCAB Nonreactive   HEPBANG Nonreactive     Hepatitis C  Recent Labs   Lab Test 10/26/18  0836 01/12/18  0828 04/22/16  0902   HCVAB Nonreactive Nonreactive Nonreactive   Assay performance characteristics have not been established for newborns,   infants, and children       Lyme ab screening  Recent Labs   Lab Test 04/01/16  0910   LYMEGM 0.12     HIV Screening  Recent Labs   Lab Test 10/26/18  0836 01/12/18  0828 04/22/16  0902   HIAGAB Nonreactive Nonreactive Nonreactive   HIV-1 p24 Ag & HIV-1/HIV-2 Ab Not Detected       UA  Recent Labs   Lab Test 03/31/23  1426 10/14/22  0714 07/14/22  1507 09/01/21  1629 05/18/21  1617 08/23/18  1638 04/26/18  1648 01/18/18  1640 01/12/18  0836 12/28/17  0145 04/13/17  1650 01/20/17  0827   COLOR Yellow Yellow Yellow   < > Yellow   < > Yellow Yellow Yellow Yellow   < > Yellow   APPEARANCE Clear Clear Clear   < > Clear   < > Clear Clear Clear Clear   < > Clear   URINEGLC Negative Negative Negative   < > Negative   < > Negative Negative Negative Negative   < > Negative   URINEBILI Negative Negative Negative   < > Negative   < > Negative Negative Negative Moderate*   < > Negative   SG 1.015 >=1.030 1.020   < > 1.010   < > 1.010 1.020 1.025 >1.030   < > >1.030   URINEPH 5.5 6.0 5.5   < > 6.0   < > 7.0 7.0 6.5 6.0   < > 6.0   PROTEIN Negative Negative Negative   < > Negative   < > Negative Negative Trace* 100*   < > Trace*   UROBILINOGEN 0.2 0.2 0.2   < > 0.2   < > 0.2 0.2 0.2 4.0*   < > 0.2   NITRITE Negative Negative Negative   < > Negative   < > Negative Negative  Negative Positive*   < > Negative   UBLD Negative Negative Negative   < > Trace*   < > Negative Negative Negative Negative   < > Negative   LEUKEST Negative Negative Negative   < > Negative   < > Negative Negative Negative Negative   < > Negative   WBCU  --   --   --   --  0 - 5  --  0 - 5 O - 2 O - 2 O - 2   < > O - 2   RBCU  --   --   --   --  O - 2  --  O - 2 O - 2 O - 2 O - 2   < > O - 2   SQUAMOUSEPI  --   --   --   --  Few  --   --  Few Moderate* Moderate*   < > Few   BACTERIA  --   --   --   --  Rare*  --   --   --  Moderate* Moderate*   < > Few*   MUCOUS  --   --   --   --   --   --   --   --  Present* Present*  --  Present*    < > = values in this interval not displayed.     Urine Microscopic  Recent Labs   Lab Test 05/18/21  1617 04/26/18  1648 01/18/18  1640 01/12/18  0836 12/28/17  0145 04/13/17  1650 01/20/17  0827   WBCU 0 - 5 0 - 5 O - 2 O - 2 O - 2   < > O - 2   RBCU O - 2 O - 2 O - 2 O - 2 O - 2   < > O - 2   SQUAMOUSEPI Few  --  Few Moderate* Moderate*   < > Few   BACTERIA Rare*  --   --  Moderate* Moderate*   < > Few*   MUCOUS  --   --   --  Present* Present*  --  Present*    < > = values in this interval not displayed.     Urine Protein  GHUTP and UTP= Urine protein (random), GHUTPG and UTPG = urine protein:creatinine ratio (random), UCRR = urine creatinine (random)  Recent Labs   Lab Test 03/31/23  1426 10/14/22  0714 07/14/22  1507 04/11/22  1441 12/15/21  1516 09/01/21  1629   GHUTP <6.0 7.6 6.9  --   --   --    UTP  --   --   --  0.07 <0.05 <0.05   GHUTPG  --  0.04 0.09  --   --   --    UTPG  --   --   --  0.11  --   --    UCRR 55.6 184.0 76.5 66 25 23     Immunization History     Immunization History   Administered Date(s) Administered     COVID-19 Vaccine 12+ (Pfizer) 03/16/2021, 04/06/2021, 08/30/2021     COVID-19 Vaccine Bivalent Booster 18+ (Moderna) 10/03/2022     COVID-19,PF,Moderna Booster 03/04/2022     DT (PEDS <7y) 08/22/1997     Flu, Unspecified 10/21/2015     HPV 02/25/2010,  02/04/2011     HPV Quadrivalent 02/25/2010, 02/04/2011     HepB 06/13/1999, 08/20/1999, 02/05/2000     Hepatitis B, Adult 07/13/1999, 08/20/1999, 02/05/2000     Historical DTP/aP 1985, 1985, 1985, 01/15/1988, 06/15/1991, 08/22/1997     Historical Hepb 07/13/1999, 08/20/1999     Influenza (IIV3) PF 10/19/2010, 11/07/2011, 09/23/2012     Influenza Vaccine >6 months (Alfuria,Fluzone) 10/11/2016, 10/13/2017, 10/26/2018, 10/17/2019, 10/16/2020, 10/15/2021, 12/12/2022     MMR 05/15/1987, 09/15/1991     [place] Tuberculin Skin Test 07/15/1987, 01/19/2005     Meningococcal (Menomune ) 08/09/2004     Meningococcal ACWY (Menactra ) 08/09/2004     OPV, trivalent, live 1986, 1986, 1986, 01/15/1988, 09/15/1991     OPV, unspecified 1986, 1986, 1986, 01/15/1988, 09/15/1991     Pneumo Conj 13-V (2010&after) 05/04/2018     Pneumococcal 23 valent 08/30/2018     TDAP (Adacel,Boostrix) 01/20/2009     TDAP Vaccine (Adacel) 01/21/2009, 02/25/2010, 08/17/2020     Zoster recombinant adjuvanted (SHINGRIX) 12/13/2018, 03/04/2019          Chart documentation done in part with Dragon Voice recognition Software. Although reviewed after completion, some word and grammatical error may remain.    Lavon Light MD

## 2023-04-11 NOTE — PATIENT INSTRUCTIONS
RHEUMATOLOGY    Dr. Lavon Light    [facility]  [address] [place] [id number]  Phone number: [phone]  Fax number: [phone]      Thank you for choosing [facility]!

## 2023-05-08 ENCOUNTER — MYC MEDICAL ADVICE (OUTPATIENT)
Dept: FAMILY MEDICINE | Facility: CLINIC | Age: 38
End: 2023-05-08

## 2023-05-08 NOTE — TELEPHONE ENCOUNTER
Refaxed biometric form because date of testing was left off the first time form was faxed.    LALITHA Martinez  [facility]

## 2023-07-18 ENCOUNTER — LAB (OUTPATIENT)
Dept: LAB | Facility: CLINIC | Age: 38
End: 2023-07-18
Payer: COMMERCIAL

## 2023-07-18 DIAGNOSIS — M32.19 OTHER SYSTEMIC LUPUS ERYTHEMATOSUS WITH OTHER ORGAN INVOLVEMENT (H): ICD-10-CM

## 2023-07-18 DIAGNOSIS — Z79.899 HIGH RISK MEDICATIONS (NOT ANTICOAGULANTS) LONG-TERM USE: ICD-10-CM

## 2023-07-18 LAB
ALBUMIN MFR UR ELPH: <6 MG/DL
ALBUMIN SERPL BCG-MCNC: 4.9 G/DL (ref 3.5–5.2)
ALBUMIN UR-MCNC: NEGATIVE MG/DL
ALP SERPL-CCNC: 42 U/L (ref 35–104)
ALT SERPL W P-5'-P-CCNC: 13 U/L (ref 0–50)
ANION GAP SERPL CALCULATED.3IONS-SCNC: 10 MMOL/L (ref 7–15)
APPEARANCE UR: CLEAR
AST SERPL W P-5'-P-CCNC: 22 U/L (ref 0–45)
BASOPHILS # BLD AUTO: 0 10E3/UL (ref 0–0.2)
BASOPHILS NFR BLD AUTO: 0 %
BILIRUB SERPL-MCNC: 0.4 MG/DL
BILIRUB UR QL STRIP: NEGATIVE
BUN SERPL-MCNC: 11.4 MG/DL (ref 6–20)
CALCIUM SERPL-MCNC: 9.3 MG/DL (ref 8.6–10)
CHLORIDE SERPL-SCNC: 103 MMOL/L (ref 98–107)
COLOR UR AUTO: YELLOW
CREAT SERPL-MCNC: 0.64 MG/DL (ref 0.51–0.95)
CREAT UR-MCNC: 12.5 MG/DL
CRP SERPL-MCNC: <3 MG/L
DEPRECATED HCO3 PLAS-SCNC: 24 MMOL/L (ref 22–29)
EOSINOPHIL # BLD AUTO: 0 10E3/UL (ref 0–0.7)
EOSINOPHIL NFR BLD AUTO: 1 %
ERYTHROCYTE [DISTWIDTH] IN BLOOD BY AUTOMATED COUNT: 10.8 % (ref 10–15)
ERYTHROCYTE [SEDIMENTATION RATE] IN BLOOD BY WESTERGREN METHOD: 5 MM/HR (ref 0–20)
GFR SERPL CREATININE-BSD FRML MDRD: >90 ML/MIN/1.73M2
GLUCOSE SERPL-MCNC: 81 MG/DL (ref 70–99)
GLUCOSE UR STRIP-MCNC: NEGATIVE MG/DL
HCT VFR BLD AUTO: 40 % (ref 35–47)
HGB BLD-MCNC: 14 G/DL (ref 11.7–15.7)
HGB UR QL STRIP: NEGATIVE
IMM GRANULOCYTES # BLD: 0 10E3/UL
IMM GRANULOCYTES NFR BLD: 0 %
KETONES UR STRIP-MCNC: NEGATIVE MG/DL
LEUKOCYTE ESTERASE UR QL STRIP: NEGATIVE
LYMPHOCYTES # BLD AUTO: 0.8 10E3/UL (ref 0.8–5.3)
LYMPHOCYTES NFR BLD AUTO: 17 %
MCH RBC QN AUTO: 32.7 PG (ref 26.5–33)
MCHC RBC AUTO-ENTMCNC: 35 G/DL (ref 31.5–36.5)
MCV RBC AUTO: 94 FL (ref 78–100)
MONOCYTES # BLD AUTO: 0.6 10E3/UL (ref 0–1.3)
MONOCYTES NFR BLD AUTO: 12 %
NEUTROPHILS # BLD AUTO: 3.3 10E3/UL (ref 1.6–8.3)
NEUTROPHILS NFR BLD AUTO: 71 %
NITRATE UR QL: NEGATIVE
PH UR STRIP: 6 [PH] (ref 5–7)
PLATELET # BLD AUTO: 228 10E3/UL (ref 150–450)
POTASSIUM SERPL-SCNC: 4.2 MMOL/L (ref 3.4–5.3)
PROT SERPL-MCNC: 7.1 G/DL (ref 6.4–8.3)
PROT/CREAT 24H UR: NORMAL MG/G{CREAT}
RBC # BLD AUTO: 4.28 10E6/UL (ref 3.8–5.2)
SODIUM SERPL-SCNC: 137 MMOL/L (ref 136–145)
SP GR UR STRIP: <=1.005 (ref 1–1.03)
UROBILINOGEN UR STRIP-ACNC: 0.2 E.U./DL
WBC # BLD AUTO: 4.7 10E3/UL (ref 4–11)

## 2023-07-18 PROCEDURE — 80053 COMPREHEN METABOLIC PANEL: CPT

## 2023-07-18 PROCEDURE — 86160 COMPLEMENT ANTIGEN: CPT

## 2023-07-18 PROCEDURE — 86225 DNA ANTIBODY NATIVE: CPT

## 2023-07-18 PROCEDURE — 85652 RBC SED RATE AUTOMATED: CPT

## 2023-07-18 PROCEDURE — 84156 ASSAY OF PROTEIN URINE: CPT

## 2023-07-18 PROCEDURE — 36415 COLL VENOUS BLD VENIPUNCTURE: CPT

## 2023-07-18 PROCEDURE — 86160 COMPLEMENT ANTIGEN: CPT | Mod: 59

## 2023-07-18 PROCEDURE — 81003 URINALYSIS AUTO W/O SCOPE: CPT

## 2023-07-18 PROCEDURE — 85025 COMPLETE CBC W/AUTO DIFF WBC: CPT

## 2023-07-18 PROCEDURE — 86140 C-REACTIVE PROTEIN: CPT

## 2023-07-19 LAB
C3 SERPL-MCNC: 95 MG/DL (ref 81–157)
C4 SERPL-MCNC: 18 MG/DL (ref 13–39)
DSDNA AB SER-ACNC: 1.7 IU/ML

## 2023-07-25 ENCOUNTER — OFFICE VISIT (OUTPATIENT)
Dept: RHEUMATOLOGY | Facility: CLINIC | Age: 38
End: 2023-07-25
Payer: COMMERCIAL

## 2023-07-25 VITALS
BODY MASS INDEX: 24.68 KG/M2 | HEART RATE: 85 BPM | OXYGEN SATURATION: 97 % | DIASTOLIC BLOOD PRESSURE: 72 MMHG | WEIGHT: 157.6 LBS | SYSTOLIC BLOOD PRESSURE: 114 MMHG

## 2023-07-25 DIAGNOSIS — M32.19 OTHER SYSTEMIC LUPUS ERYTHEMATOSUS WITH OTHER ORGAN INVOLVEMENT (H): Primary | ICD-10-CM

## 2023-07-25 DIAGNOSIS — Z71.84 TRAVEL ADVICE ENCOUNTER: ICD-10-CM

## 2023-07-25 DIAGNOSIS — Z79.899 HIGH RISK MEDICATIONS (NOT ANTICOAGULANTS) LONG-TERM USE: ICD-10-CM

## 2023-07-25 PROCEDURE — 99214 OFFICE O/P EST MOD 30 MIN: CPT | Performed by: INTERNAL MEDICINE

## 2023-07-25 NOTE — NURSING NOTE
RAPID3 (0-30) Cumulative Score  13.8          RAPID3 Weighted Score (divide #4 by 3 and that is the weighted score)  4.6

## 2023-07-25 NOTE — PATIENT INSTRUCTIONS
RHEUMATOLOGY    [facility]  [address] [place] [address]  [place] [address]    Phone number: [phone]  Fax number: [phone]    If you need a medication refill, please contact us as you may need lab work and/or a follow up visit prior to your refill.      Thank you for choosing [facility]!    Carissa Frye CMA Rheumatology

## 2023-07-25 NOTE — PROGRESS NOTES
Rheumatology Clinic Visit      Aleida Weinberg MRN# [id number]   YOB: 1985 Age: 38 year old      Date of visit: 7/25/23   PCP: Sayra Chirinos     Chief Complaint   Patient presents with:  Systemic lupus erythematosus    Assessment and Plan     1. Systemic lupus erythematosus (CHANELL >1:85808 speckled, RNP+, Sm+, Scl-70+, hypocomplementemia, photosensitivity, malar rash, arthritis, fatigue, Raynaud's phenomenon):  Dx'd 4/2016. Scl-70+; she lacks features of scleroderma except for raynaud's; no myopathy based on labs/symptoms. 5/11/2018 echo without evidence of pulmonary hypertension. Previously on MTX 20mg SQ weekly (GI upset with PO doses above 15mg, partially effective) and SSZ (LFT elevations).  Currently on AZA 50mg daily and HCQ 200mg daily (patient self reduced previously from 300mg daily on average based on preference of an easier regimen and continued to do well). TPMT normal on 8/21/2017.  Doing well at this time.  Chronic illness, stable.    - Continue hydroxychloroquine 200mg daily (last eye exam was okay on 1/28/2022 at [facility]; she plans to have her next eye exam at [facility])  - Continue azathioprine 50mg daily   - Labs in 3 months: CBC, CMP, ESR, CRP, UA, Uprotein:creatinine    High risk medication requiring intensive toxicity monitoring at least quarterly      2. Raynaud's Phenomenon: Cold avoidance was insufficient for controlling her symptoms in the past; then did well on amlodipine, but not using now and doing okay. Amlodipine PRN.   Chronic illness, stable.    - Amlodipine 10mg daily during colder months, as needed    3.  Low vitamin D: Currently on vitamin D 2000 units daily.    - Continue vitamin D 2000 units daily    4.  Secondary Sjogren's syndrome: Mild dry and dry mouth that are treated infrequently with artificial tears and frequent sips of water. Dentist follow-up every 6 months     5.  Facial rash: Was following with dermatology at [facility].   Reportedly due to SLE and rosacea from derm perspective, and improved with topical creams from her dermatologist for rosacea.  Active today, involving the nasolabial folds.  She plans to establish with dermatology at [facility].     6.  Mild medial joint line tenderness of the knees, and mild pes anserine bursitis: she says that it is only symptomatic when examined.  May use topical Voltaren gel as needed.    7.  Vaccinations:   - Influenza: encouraged yearly vaccination  - [id number]: up to date  - [id number]: up to date  - Shingrix: Up to date  - COVID-19: advised updating, and to hold azathioprine for 2 weeks afterward     8.  Plans to travel to [facility] for work in November 2023: She would like to be seen by the travel clinic.  - Travel clinic referral; advised that she call to schedule.     Total minutes spent in evaluation with patient, documentation, , and review of pertinent studies and chart notes: 18    Ms. Weinberg verbalized agreement with and understanding of the rational for the diagnosis and treatment plan.  All questions were answered to best of my ability and the patient's satisfaction. Ms. Weinberg was advised to contact the clinic with any questions that may arise after the clinic visit.      Thank you for involving me in the care of the patient    Return to clinic: 3 months      HPI   Aleida Digna Weinberg is a 38 year old female with medical history significant for intermittent asthma, anxiety, migraines, vitamin D deficiency, ganglion cyst removal of the left wrist, and systemic lupus erythematosus who presents for follow-up of SLE.     Today, 7/25/2023: Currently doing well with regard to lupus.  No joint pain or swelling.  No morning stiffness or gelling phenomenon.  No active Raynaud's phenomenon symptoms.  Planning to see dermatology for facial rash/rosacea.  Planning to have her eye exam updated with ophthalmology in 2 days.  Planning to travel to [facility] in November 2023 and  would like to see the travel clinic.  Planning to get an updated COVID-vaccine prior to traveling to [place].    Denies fevers, chills, nausea, vomiting, constipation, diarrhea. No abdominal pain. No chest pain/pressure, palpitations, or shortness of breath. No oral or nasal sores.  No rash currently.    Tobacco: quit smoking in   EtOH: Once monthly  Drugs: None  Occupation: Works at [employer], a lot of typing per patient    ROS   12 point review of system was completed and negative except as noted in the HPI     Active Problem List     Patient Active Problem List   Diagnosis    Other kyphoscoliosis and scoliosis    Hypertrophic and atrophic condition of skin    Viral warts    CARDIOVASCULAR SCREENING; LDL GOAL LESS THAN 160    Intermittent asthma    Anxiety    Intractable menstrual migraine without status migrainosus    Vitamin D deficiency    Inflammatory polyarthropathy (H)    Chronic back pain    Raynaud's phenomenon without gangrene    Systemic lupus erythematosus (H)    High risk medications (not anticoagulants) long-term use (Plaquenil and AZA)    Dry eyes, bilateral    Disorder of connective tissue (H)    Ganglion cyst of dorsum of left wrist     Past Medical History     Past Medical History:   Diagnosis Date    Arthritis     Lupus erythematosus     Mild intermittent asthma     Other kyphoscoliosis and scoliosis     upper back curvature and disc degeneration in lower back.     Past Surgical History     Past Surgical History:   Procedure Laterality Date     SECTION      EXCISE GANGLION WRIST Left 11/10/2022    Procedure: EXCISION, GANGLION CYST, WRIST LEFT;  Surgeon: Jayde Martinez MD;  [place] [facility] [place]    SURGICAL HISTORY OF -       PE Tubes     Allergy     Allergies   Allergen Reactions    Fluconazole Rash     Current Medication List     Current Outpatient Medications   Medication Sig    albuterol (PROAIR HFA/PROVENTIL HFA/VENTOLIN HFA) 108 (90 Base) MCG/ACT inhaler Inhale 2 puffs into  the lungs every 6 hours as needed for shortness of breath / dyspnea    azaTHIOprine (IMURAN) 50 MG tablet Take 1 tablet (50 mg) by mouth daily    cyclobenzaprine (FLEXERIL) 10 MG tablet Take 1 tablet (10 mg) by mouth 3 times daily as needed for muscle spasms    hydroxychloroquine (PLAQUENIL) 200 MG tablet Take 1 tablet (200 mg) by mouth daily . Yearly eye exam including 10-2 VF and SD-OCT required    hydrOXYzine (ATARAX) 25 MG tablet Take 1 tablet (25 mg) by mouth every 6 hours as needed for anxiety    levonorgestrel (MIRENA) 20 MCG/24HR IUD 1 each by Intrauterine route once    LORazepam (ATIVAN) 0.5 MG tablet Take 1 tablet (0.5 mg) by mouth every 8 hours as needed for anxiety    traZODone (DESYREL) 50 MG tablet Take 0.5 tablets (25 mg) by mouth nightly as needed for sleep    vitamin D3 (CHOLECALCIFEROL) 50 mcg (2000 units) tablet Take 1 tablet (50 mcg) by mouth daily     No current facility-administered medications for this visit.         Social History   See HPI    Family History     Family History   Problem Relation Age of Onset    Heart Disease Maternal Grandfather         Bypass, Heart Disease    Respiratory Maternal Grandfather         asthma    Macular Degeneration Maternal Grandfather     Hypertension Paternal Grandmother     Cancer Paternal Grandmother         lymph nodes    Cataracts Paternal Grandmother     C.A.D. Paternal Grandfather     Heart Disease Maternal Uncle         MI's     Alcohol/Drug Maternal Uncle     Respiratory Other         cousin on mothers side, asthma    Alcohol/Drug Other         bother great grandparents on mother's side    Diabetes No family hx of     Breast Cancer No family hx of     Cancer - colorectal No family hx of      Denies family history of autoimmune disease    Physical Exam     Temp Readings from Last 3 Encounters:   05/08/23 97.4  F (36.3  C) (Tympanic)   12/12/22 98.3  F (36.8  C) (Oral)   11/10/22 97.1  F (36.2  C) (Temporal)     BP Readings from Last 5 Encounters:  "  07/25/23 114/72   05/08/23 102/72   04/11/23 108/79   12/12/22 113/76   11/10/22 118/78     Pulse Readings from Last 1 Encounters:   07/25/23 85     Resp Readings from Last 1 Encounters:   05/08/23 28     Estimated body mass index is 24.68 kg/m  as calculated from the following:    Height as of 5/8/23: 1.702 m (5' 7\").    Weight as of this encounter: 71.5 kg (157 lb 9.6 oz).    GEN: NAD. Healthy appearing adult.   HEENT:  Anicteric, noninjected sclera. No obvious external lesions of the ear and nose. Hearing intact.  CV: S1, S2. RRR. No m/r/g  PULM: No increased work of breathing. CTA bilaterally   MSK: MCPs, PIPs, DIPs without swelling or tenderness to palpation.  Wrists without swelling or tenderness to palpation.  Elbows without swelling, increased warmth, or overlying erythema.  Shoulders without swelling or tenderness to palpation.  Knees without swelling, increased warmth, or overlying erythema; mild medial joint line tenderness and tender to palpation over the pes anserine bursae.  Ankles and MTPs without swelling or tenderness to palpation.  SKIN: Mild facial erythema on both cheeks that does not extend over the nose and does involve the nasolabial folds.  No evidence of current or previous ischemic insults to the fingertips by visual inspection  PSYCH: Alert. Appropriate.      Labs / Imaging (select studies)     RF/CCP  Recent Labs   Lab Test 04/22/16  0902 04/01/16  0910   CCPIGG 1  --    RHF  --  <20     CHANELL  Recent Labs   Lab Test 04/22/16  0902 04/01/16  0910   VALERIE  --  12.4*   ANAIGG >1:39959  Reference range: <1:40  (Note)  Speckled pattern.  INTERPRETIVE INFORMATION: CHANELL by IFA, IgG  Anti-nuclear antibodies (CHANELL) are seen in a variety of  systemic rheumatic diseases and are determined by indirect  fluorescence assay (IFA) using HEp-2 substrate with an  IgG-specific conjugate. CHANELL titers less than or equal to  1:80 have variable relevance while titers greater than or  equal to 1:160 are considered " clinically significant. These  antibodies may precede clinical disease onset; however,  healthy individuals and those with advanced age have been  reported to be positive for CHANELL. When observed, one of the  five basic patterns is reported: homogeneous,  peripheral/rim, speckled, centromere, or nucleolar. If  cytoplasmic fluorescence is observed, it is noted. IFA  methodology is subjective and has occasionally been shown  to lack sensitivity for anti-SSA/Ro antibodies.  Negative results do not necessarily rule out the presence  of SSc. If clinical suspicion remains, consi vicki further  testing for U3-RNP, PM/Scl, or Th/To antibodies associated  with SSc.  Performed by [facility],  [address] [place] [id number] [phone]  [email or web address], Twin Rodriguez MD, Lab. Director    --      RNP/Sm/SSA/SSB  Recent Labs   Lab Test 01/12/18  0828 04/22/16  0902   RNPIGG  --  >8.0  Positive   Antibody index (AI) values reflect qualitative changes in antibody   concentration that cannot be directly associated with clinical condition or   disease state.  *   SMIGG  --  1.5*   SSAIGG  --  <0.2  Negative   Antibody index (AI) values reflect qualitative changes in antibody   concentration that cannot be directly associated with clinical condition or   disease state.     SSBIGG  --  <0.2  Negative   Antibody index (AI) values reflect qualitative changes in antibody   concentration that cannot be directly associated with clinical condition or   disease state.     SCLIGG  --  3.1*   TREPAB Negative  --      dsDNA  Recent Labs   Lab Test 07/18/23  1510 10/14/22  0714 12/15/21  1516 02/10/21  1627 11/05/20  1628 07/23/20  1636 04/02/20  1541 07/11/19  1529 11/29/18  1616   DNA 1.7 1.6 1.1 1 1 1 2 2 2     C3/C4  Recent Labs   Lab Test 07/18/23  1510 10/14/22  0714 12/15/21  1516 05/18/21  1606 02/10/21  1627 11/05/20  1628 07/23/20  1636 04/02/20  1541 10/18/19  1542   [id number] 95 96 87 84 89 89 92 90 74*   [id number] 18 19 17 18 17  17 18 20 14*       Send-out Labs  Recent Labs   Lab Test 04/21/17  0813   SRESLT SEE NOTE  (Note)  Test name                    Result Flag  Units  RefIntvl  ------------------------------------------------------------  Thiopurine Methyltransferase                                23.2 L      U/mL 24.0-44.0  Sample slightly hemolyzed. Please interpret the results  with caution.  INTERPRETIVE INFORMATION: Thiopurine Methyltransferase, RBC  Normal TPMT activity:  24.0-44.0 U/mL................Individuals are predicted to  be at low risk of bone marrow toxicity (myelosuppression)  as a consequence of standard thiopurine therapy; no dose  adjustment is recommended.  Intermediate TPMT activity:  17.0-23.9 U/mL................Individuals are predicted to  be at intermediate risk of bone marrow toxicity  (myelosuppression) as a consequence of standard thiopurine  therapy; a dose reduction and therapeutic drug management  is recommended.  Low TPMT activity:  less than 17.0 U/mL...........Individuals are predicted to  be at high risk of bone marrow toxicity (myelosuppression)   as a consequence of standard thiopurine dosing. It is  recommended to avoid the use of thiopurine drugs.  High TPMT activity:  greater than 44.0 U/mL........Individuals are not predicted  to be at risk for bone marrow toxicity (myelosuppression)  as a consequence of standard thiopurine dosing, but may be  at risk for therapeutic failure due to excessive  inactivation of thiopurine drugs. Individuals may require  higher than the normal standard dose. Therapeutic drug  management is recommended.  The TPMT, RBC assay is used as a screen to detect  individuals with low and intermediate TPMT activity who may  be at risk for myelosuppression when exposed to standard  doses of thiopurines, including azathioprine (Imuran) and  6-mercaptopurine (Purinethol). TPMT is the primary  metabolic route for inactivation of thiopurine drugs in the  bone marrow. When TPMT  activity is low, it is predicted  that proportionately more 6-mercaptopurine can be converted  into the cytotoxic 6-thioguanine nucle otides that  accumulate in the bone marrow causing excessive toxicity.  The activity of TPMT is measured by the nanomoles of  6-methylmercaptopurine (inactive metabolite) produced per 1  mL of packed red blood cells, (U/mL).    TPMT phenotype testing does not replace the need for  clinical monitoring of patients treated with thiopurine  drugs. Genotype for TPMT cannot be inferred from TPMT  activity (phenotype). Phenotype testing should not be  requested for patients currently treated with thiopurine  drugs. Current TPMT phenotype may not reflect future TPMT  phenotype, particularly in patients who received blood  transfusion within 30-60 days of testing.  TPMT enzyme  activity can be inhibited by several drugs such as:  naproxen (Aleve), ibuprofen (Advil, Motrin), ketoprofen  (Orudis), furosemide (Lasix), sulfasalazine (Azulfidine),  mesalamine (Asacol), olsalazine (Dipentum), mefenamic acid  (Ponstel), thiazide diuretics, and benzoic acid inhibitors.  TPMT inhibitors may contribute t o falsely low results;  patients should abstain from these drugs for at least 48  hours prior to TPMT testing. Falsely low results may also  occur as a result of inappropriate specimen handling and  hemolysis.  Test developed and characteristics determined by [facility]. See Compliance Statement B: www.[email or web address].com/CS  Performed by [facility],  [address] [place] [id number] [phone]  [email or web address], Lionel Ferreira MD, Lab. Director     STSTNM Thiopurine Methyltransferase, RBC   STSTCD 92,066   SSPTYP Whole blood, EDTA anticoagulant     Antiphospholipid Antibodies  Recent Labs   Lab Test 04/22/16  0902   B2GPG 0.9   B2GPM <0.9  Negative     CARG <15.0  Interpretation:  Negative     JOANN <12.5  Interpretation:  Negative     LUPINT Negative  (Note)  COMMENTS:  The INR is normal.  APTT  ratio is normal.  DRVVT Screen ratio is normal.  Thrombin time is normal.  NEGATIVE TEST; A LUPUS ANTICOAGULANT WAS NOT DETECTED IN THIS  SPECIMEN WITHIN THE LIMITS OF THE TESTING REPERTOIRE.  If the clinical picture is strongly suggestive of an antiphospholipid  syndrome, recommend anticardiolipin and beta-2-glycoprotein (IgG and  IgM) antibody tests.  Isabella Olson M.D.  [phone]  4/25/2016    INR =  1.05    Reference range: 0.86-1.14  Thrombin Time= 15.4    Reference range: 13.0-19.0 sec    APTT:       Ratio  Patient  =  0.92  1:2 Mix  =  N/A  Reference:  Negative: Less than or equal to 1.16  Positive: Greater than or equal to 1.17     DILUTE LISA VIPER VENOM TEST:  Screen Ratio = 0.95   Normal is less than 1.21           CBC  Recent Labs   Lab Test 07/18/23  1510 03/31/23  1426 10/14/22  0714 09/01/21  1625 05/18/21  1606 02/10/21  1627 11/05/20  1628   WBC 4.7 4.2 4.2   < > 3.9* 4.2 3.3*   RBC 4.28 4.49 4.40   < > 4.26 4.33 3.95   HGB 14.0 14.8 14.5   < > 14.2 14.2 13.1   HCT 40.0 41.9 40.9   < > 40.3 40.8 37.2   MCV 94 93 93   < > 95 94 94   RDW 10.8 11.7 11.4   < > 11.7 11.8 11.4    232 261   < > 225 212 202   MCH 32.7 33.0 33.0   < > 33.3* 32.8 33.2*   MCHC 35.0 35.3 35.5   < > 35.2 34.8 35.2   NEUTROPHIL 71 66 60   < > 64.3 67.8 60.7   LYMPH 17 22 22   < > 20.2 18.0 23.1   MONOCYTE 12 11 16   < > 14.0 13.0 14.4   EOSINOPHIL 1 1 2   < > 1.0 0.7 1.2   BASOPHIL 0 0 1   < > 0.5 0.5 0.6   ANEU  --   --   --   --  2.5 2.9 2.0   ALYM  --   --   --   --  0.8 0.8 0.8   AMANDA  --   --   --   --  0.6 0.6 0.5   AEOS  --   --   --   --  0.0 0.0 0.0   ABAS  --   --   --   --  0.0 0.0 0.0   ANEUTAUTO 3.3 2.8 2.5   < >  --   --   --    ALYMPAUTO 0.8 0.9 0.9   < >  --   --   --    AMONOAUTO 0.6 0.4 0.7   < >  --   --   --    AEOSAUTO 0.0 0.1 0.1   < >  --   --   --    ABSBASO 0.0 0.0 0.0   < >  --   --   --     < > = values in this interval not displayed.     CMP  Recent Labs   Lab Test 07/18/23  0130  03/31/23  1426 02/10/23  0814 10/14/22  0714 07/14/22  1507 04/11/22  1441 09/01/21  1625 05/18/21  1606 02/10/21  1627 01/21/21  0716 11/05/20  1628 07/23/20  1636    139  --  138 139 137   < > 140 136  --  138 138   POTASSIUM 4.2 3.6  --  4.0 4.2 3.7   < > 3.7 4.0  --  3.8 4.1   CHLORIDE 103 103  --  109 108 108   < > 107 106  --  107 106   CO2 24 25  --  22 27 26   < > 26 24  --  25 26   ANIONGAP 10 11  --  7 4 3   < > 7 6  --  6 6   GLC 81 101* 90 93 106* 86   < > 63* 75   < > 92 79   BUN 11.4 11.6  --  11 11 12   < > 11 10  --  13 11   CR 0.64 0.67  --  0.65 0.63 0.74   < > 0.76 0.74  --  0.68 0.68   GFRESTIMATED >90 >90  --  >90 >90 >90   < > >90 >90  --  >90 >90   GFRESTBLACK  --   --   --   --   --   --   --  >90 >90  --  >90 >90   SRINIVAS 9.3 9.7  --  8.6 8.9 9.3   < > 9.1 9.8  --  8.8 9.2   BILITOTAL 0.4 0.5  --  0.3 0.4 0.5   < > 0.7 0.5  --  0.4 0.4   ALBUMIN 4.9 4.8  --  4.1 4.1 4.5   < > 4.4 4.5  --  4.1 4.6   PROTTOTAL 7.1 7.2  --  7.0 6.8 7.4   < > 7.7 7.6  --  7.0 8.0   ALKPHOS 42 44  --  42 38* 42   < > 41 38*  --  39* 44   AST 22 23  --  17 14 15   < > 19 15  --  17 20   ALT 13 17  --  18 17 20   < > 25 23  --  23 29    < > = values in this interval not displayed.     Calcium/VitaminD  Recent Labs   Lab Test 07/18/23  1510 03/31/23  1426 10/14/22  0714 11/05/20  1628 07/23/20  1636 04/13/17  1650 02/20/17  1640   SRINIVAS 9.3 9.7 8.6   < > 9.2   < >  --    VITDT  --  50  --   --  30  --  33    < > = values in this interval not displayed.     ESR/CRP  Recent Labs   Lab Test 07/18/23  1510 03/31/23  1426 10/14/22  0714 07/14/22  1507 04/11/22  1441 12/15/21  1516 09/01/21  1625   SED 5 8 6   < > 8 9 9   CRP  --   --   --   --  <2.9 <2.9 5.6   CRPI <3.00 <3.00 <3.00   < >  --   --   --     < > = values in this interval not displayed.     CK/Aldolase  Recent Labs   Lab Test 10/14/22  0714 12/15/21  1516 02/10/21  1627 07/22/16  0916 04/22/16  0902   CKT 55 60 60   < > 45   ALDOLASE  --   --   --   --   4.5    < > = values in this interval not displayed.     TSH/T4  Recent Labs   Lab Test 04/01/16  0910   TSH 1.57     Lipid Panel  Recent Labs   Lab Test 02/10/23  0814 02/09/22  0701 10/15/21  0937 02/09/17  0721 07/10/15  0814   CHOL 159 158 163   < > 149   TRIG 37 50 41   < > 45   HDL 55 50 60   < > 48*   LDL 97 98 95   < > 92   VLDL  --   --   --   --  9   CHOLHDLRATIO  --   --   --   --  3.1   NHDL 104 108 103   < >  --     < > = values in this interval not displayed.     Hepatitis B  Recent Labs   Lab Test 04/22/16  0902   HBCAB Nonreactive   HEPBANG Nonreactive     Hepatitis C  Recent Labs   Lab Test 10/26/18  0836 01/12/18  0828 04/22/16  0902   HCVAB Nonreactive Nonreactive Nonreactive   Assay performance characteristics have not been established for newborns,   infants, and children       Lyme ab screening  Recent Labs   Lab Test 04/01/16  0910   LYMEGM 0.12     HIV Screening  Recent Labs   Lab Test 10/26/18  0836 01/12/18  0828 04/22/16  0902   HIAGAB Nonreactive Nonreactive Nonreactive   HIV-1 p24 Ag & HIV-1/HIV-2 Ab Not Detected       UA  Recent Labs   Lab Test 07/18/23  1510 03/31/23  1426 10/14/22  0714 09/01/21  1629 05/18/21  1617 08/23/18  1638 04/26/18  1648 01/18/18  1640 01/12/18  0836 12/28/17  0145 04/13/17  1650 01/20/17  0827   COLOR Yellow Yellow Yellow   < > Yellow   < > Yellow Yellow Yellow Yellow   < > Yellow   APPEARANCE Clear Clear Clear   < > Clear   < > Clear Clear Clear Clear   < > Clear   URINEGLC Negative Negative Negative   < > Negative   < > Negative Negative Negative Negative   < > Negative   URINEBILI Negative Negative Negative   < > Negative   < > Negative Negative Negative Moderate*   < > Negative   SG <=1.005 1.015 >=1.030   < > 1.010   < > 1.010 1.020 1.025 >1.030   < > >1.030   URINEPH 6.0 5.5 6.0   < > 6.0   < > 7.0 7.0 6.5 6.0   < > 6.0   PROTEIN Negative Negative Negative   < > Negative   < > Negative Negative Trace* 100*   < > Trace*   UROBILINOGEN 0.2 0.2 0.2   < >  0.2   < > 0.2 0.2 0.2 4.0*   < > 0.2   NITRITE Negative Negative Negative   < > Negative   < > Negative Negative Negative Positive*   < > Negative   UBLD Negative Negative Negative   < > Trace*   < > Negative Negative Negative Negative   < > Negative   LEUKEST Negative Negative Negative   < > Negative   < > Negative Negative Negative Negative   < > Negative   WBCU  --   --   --   --  0 - 5  --  0 - 5 O - 2 O - 2 O - 2   < > O - 2   RBCU  --   --   --   --  O - 2  --  O - 2 O - 2 O - 2 O - 2   < > O - 2   SQUAMOUSEPI  --   --   --   --  Few  --   --  Few Moderate* Moderate*   < > Few   BACTERIA  --   --   --   --  Rare*  --   --   --  Moderate* Moderate*   < > Few*   MUCOUS  --   --   --   --   --   --   --   --  Present* Present*  --  Present*    < > = values in this interval not displayed.     Urine Microscopic  Recent Labs   Lab Test 05/18/21  1617 04/26/18  1648 01/18/18  1640 01/12/18  0836 12/28/17  0145 04/13/17  1650 01/20/17  0827   WBCU 0 - 5 0 - 5 O - 2 O - 2 O - 2   < > O - 2   RBCU O - 2 O - 2 O - 2 O - 2 O - 2   < > O - 2   SQUAMOUSEPI Few  --  Few Moderate* Moderate*   < > Few   BACTERIA Rare*  --   --  Moderate* Moderate*   < > Few*   MUCOUS  --   --   --  Present* Present*  --  Present*    < > = values in this interval not displayed.     Urine Protein  GHUTP and UTP= Urine protein (random), GHUTPG and UTPG = urine protein:creatinine ratio (random), UCRR = urine creatinine (random)  Recent Labs   Lab Test 07/18/23  1510 03/31/23  1426 10/14/22  0714 07/14/22  1507 07/14/22  1507 04/11/22  1441 12/15/21  1516 09/01/21  1629   GHUTP <6.0 <6.0 7.6   < > 6.9  --   --   --    UTP  --   --   --   --   --  0.07 <0.05 <0.05   GHUTPG  --   --  0.04  --  0.09  --   --   --    UTPG  --   --   --   --   --  0.11  --   --    UCRR 12.5 55.6 184.0   < > 76.5 66 25 23    < > = values in this interval not displayed.     Immunization History     Immunization History   Administered Date(s) Administered    COVID-19  Bivalent 18+ (Moderna) 10/03/2022    COVID-19 MONOVALENT 12+ (Pfizer) 03/16/2021, 04/06/2021, 08/30/2021    COVID-19 Monovalent Booster 18+ (Moderna) 03/04/2022    DT (PEDS <7y) 08/22/1997    Flu, Unspecified 10/21/2015    HPV 02/25/2010, 02/04/2011    HPV Quadrivalent 02/25/2010, 02/04/2011    HepB 06/13/1999, 08/20/1999, 02/05/2000    HepB, Unspecified 06/13/1999, 07/13/1999, 08/20/1999, 02/05/2000    Hepatitis B, Adult 07/13/1999, 08/20/1999, 02/05/2000    Historical DTP/aP 1986, 1986, 1986, 01/15/1988, 06/15/1991, 08/22/1997    Historical Hepb 07/13/1999, 08/20/1999    Influenza (IIV3) PF 10/19/2010, 11/07/2011, 09/23/2012    Influenza Vaccine >6 months (Alfuria,Fluzone) 10/11/2016, 10/13/2017, 10/26/2018, 10/17/2019, 10/16/2020, 10/15/2021, 12/12/2022    MMR 05/15/1987, 09/15/1991    Mantoux Tuberculin Skin Test 07/15/1987, 01/19/2005    Meningococcal (Menomune ) 08/09/2004    Meningococcal ACWY (Menactra ) 08/09/2004    OPV, trivalent, live 1986, 1986, 1986, 01/15/1988, 09/15/1991    OPV, unspecified 1986, 1986, 1986, 01/15/1988, 09/15/1991    Pneumo Conj 13-V (2010&after) 05/04/2018    Pneumococcal 23 valent 08/30/2018    TDAP (Adacel,Boostrix) 01/20/2009    TDAP Vaccine (Adacel) 01/21/2009, 02/25/2010, 08/17/2020    Zoster recombinant adjuvanted (SHINGRIX) 12/13/2018, 03/04/2019          Chart documentation done in part with Dragon Voice recognition Software. Although reviewed after completion, some word and grammatical error may remain.    Lavon Light MD

## 2023-07-27 ENCOUNTER — OFFICE VISIT (OUTPATIENT)
Dept: OPHTHALMOLOGY | Facility: CLINIC | Age: 38
End: 2023-07-27
Payer: COMMERCIAL

## 2023-07-27 DIAGNOSIS — M32.19 OTHER SYSTEMIC LUPUS ERYTHEMATOSUS WITH OTHER ORGAN INVOLVEMENT (H): ICD-10-CM

## 2023-07-27 DIAGNOSIS — Z79.899 HIGH RISK MEDICATION USE: Primary | ICD-10-CM

## 2023-07-27 PROCEDURE — 92014 COMPRE OPH EXAM EST PT 1/>: CPT | Performed by: STUDENT IN AN ORGANIZED HEALTH CARE EDUCATION/TRAINING PROGRAM

## 2023-07-27 PROCEDURE — 92134 CPTRZ OPH DX IMG PST SGM RTA: CPT | Performed by: STUDENT IN AN ORGANIZED HEALTH CARE EDUCATION/TRAINING PROGRAM

## 2023-07-27 PROCEDURE — 92083 EXTENDED VISUAL FIELD XM: CPT | Performed by: STUDENT IN AN ORGANIZED HEALTH CARE EDUCATION/TRAINING PROGRAM

## 2023-07-27 ASSESSMENT — REFRACTION_WEARINGRX
OS_SPHERE: -3.25
OD_AXIS: 100
OD_CYLINDER: +0.50
OD_SPHERE: -2.75
OS_CYLINDER: +0.50
OS_AXIS: 082

## 2023-07-27 ASSESSMENT — VISUAL ACUITY
OD_CC: 20/20
CORRECTION_TYPE: GLASSES
OS_CC: 20/25
METHOD: SNELLEN - LINEAR

## 2023-07-27 ASSESSMENT — SLIT LAMP EXAM - LIDS
COMMENTS: NORMAL
COMMENTS: NORMAL

## 2023-07-27 ASSESSMENT — CUP TO DISC RATIO
OS_RATIO: 0.3
OD_RATIO: 0.3

## 2023-07-27 ASSESSMENT — EXTERNAL EXAM - LEFT EYE: OS_EXAM: NORMAL

## 2023-07-27 ASSESSMENT — EXTERNAL EXAM - RIGHT EYE: OD_EXAM: NORMAL

## 2023-07-27 NOTE — LETTER
7/27/2023         RE: Aleida Weinberg  [address] [address]        Dear Colleague,    Thank you for referring your patient, Aleida Weinberg, to the [facility]. Please see a copy of my visit note below.     Current Eye Medications:  none     Subjective:  referred by Dr. Light for high-risk medication use - overall vision has been stable.  Eyes have been itching lately, believes due to environmental allergies.    Gets routine eye exam at [facility].    Hydroxychloroquine 200mg - orally every day for Lupus.      Objective:  See Ophthalmology Exam.       Assessment:  Aleida Weinberg is a 38 year old female who presents with:   Encounter Diagnoses   Name Primary?     High risk medication use Plaquenil since 2015. 8 years now.     Macular SD-OCT within normal limits both eyes.    Inman visual field (HVF) 10-2 within normal limits both eyes.    No signs of Plaquenil retinal toxicity at present.        Other systemic lupus erythematosus with other organ involvement (H)        Plan:  Normal Plaquenil eye tests today.    May try Zaditor twice a day as needed for eye itchiness (over-the-counter eyedrop).    Anne Frias MD  [phone]        Again, thank you for allowing me to participate in the care of your patient.        Sincerely,        Anne Frias MD

## 2023-07-27 NOTE — PROGRESS NOTES
Current Eye Medications:  none     Subjective:  referred by Dr. Light for high-risk medication use - overall vision has been stable.  Eyes have been itching lately, believes due to environmental allergies.    Gets routine eye exam at [facility].    Hydroxychloroquine 200mg - orally every day for Lupus.      Objective:  See Ophthalmology Exam.       Assessment:  Aleida Weinberg is a 38 year old female who presents with:   Encounter Diagnoses   Name Primary?    High risk medication use Plaquenil since 2015. 8 years now.     Macular SD-OCT within normal limits both eyes.    Inman visual field (HVF) 10-2 within normal limits both eyes.    No signs of Plaquenil retinal toxicity at present.       Other systemic lupus erythematosus with other organ involvement (H)        Plan:  Normal Plaquenil eye tests today.    May try Zaditor twice a day as needed for eye itchiness (over-the-counter eyedrop).    Anne Frias MD  [phone]

## 2023-07-27 NOTE — PATIENT INSTRUCTIONS
Normal Plaquenil eye tests today.    May try Zaditor twice a day as needed for eye itchiness (over-the-counter eyedrop).    Anne Frias MD  [phone]

## 2023-08-09 ENCOUNTER — OFFICE VISIT (OUTPATIENT)
Dept: DERMATOLOGY | Facility: CLINIC | Age: 38
End: 2023-08-09
Payer: COMMERCIAL

## 2023-08-09 DIAGNOSIS — L93.2 CUTANEOUS LUPUS ERYTHEMATOSUS: ICD-10-CM

## 2023-08-09 DIAGNOSIS — L71.9 ACNE ROSACEA: Primary | ICD-10-CM

## 2023-08-09 DIAGNOSIS — L20.9 ATOPIC DERMATITIS, UNSPECIFIED TYPE: ICD-10-CM

## 2023-08-09 PROCEDURE — 99203 OFFICE O/P NEW LOW 30 MIN: CPT | Performed by: PHYSICIAN ASSISTANT

## 2023-08-09 RX ORDER — PIMECROLIMUS 10 MG/G
CREAM TOPICAL
Qty: 30 G | Refills: 4 | Status: SHIPPED | OUTPATIENT
Start: 2023-08-09 | End: 2024-08-07

## 2023-08-09 NOTE — LETTER
2023         RE: Aleida Weinberg  [address] [facility] [address] [facility] [address]        Dear Colleague,    Thank you for referring your patient, Aleida Weinberg, to the [facility]. Please see a copy of my visit note below.    Aleida Weinberg is an extremely pleasant 38 year old year old female patient here today for acne rosacea/lupus. She notes acne rosacea developed about 1-2 years ago. Has used metronidazole cream in the past but not sure if it helped but has been a while since she tried this. She notes that she uses sensitive skin care products since her skin is sensitive. She prefers to avoid oral antibiotics.  Patient has no other skin complaints today.  Remainder of the HPI, Meds, PMH, Allergies, FH, and SH was reviewed in chart.   Past Medical History:   Diagnosis Date     Arthritis      Lupus erythematosus      Mild intermittent asthma      Other kyphoscoliosis and scoliosis     upper back curvature and disc degeneration in lower back.       Past Surgical History:   Procedure Laterality Date      SECTION       EXCISE GANGLION WRIST Left 11/10/2022    Procedure: EXCISION, GANGLION CYST, WRIST LEFT;  Surgeon: aJyde Martinez MD;  Location: [facility] OR     SURGICAL HISTORY OF -       PE Tubes        Family History   Problem Relation Age of Onset     Heart Disease Maternal Grandfather         Bypass, Heart Disease     Respiratory Maternal Grandfather         asthma     Macular Degeneration Maternal Grandfather      Hypertension Paternal Grandmother      Cancer Paternal Grandmother         lymph nodes     Cataracts Paternal Grandmother      C.A.D. Paternal Grandfather      Heart Disease Maternal Uncle         MI's      Alcohol/Drug Maternal Uncle      Respiratory Other         cousin on mothers side, asthma     Alcohol/Drug Other         bother great grandparents on mother's side     Diabetes No family hx of      Breast Cancer No family hx of       Cancer - colorectal No family hx of        Social History     Socioeconomic History     Marital status: Single     Spouse name: Not on file     Number of children: Not on file     Years of education: Not on file     Highest education level: Not on file   Occupational History     Not on file   Tobacco Use     Smoking status: Former     Packs/day: 0.50     Years: 2.50     Pack years: 1.25     Types: Cigarettes     Quit date: 2005     Years since quittin.7     Passive exposure: Never     Smokeless tobacco: Never   Vaping Use     Vaping Use: Never used   Substance and Sexual Activity     Alcohol use: Yes     Alcohol/week: 0.0 standard drinks of alcohol     Comment: rarely - 1 monthly     Drug use: No     Sexual activity: Yes     Partners: Male     Birth control/protection: I.U.D.   Other Topics Concern     Parent/sibling w/ CABG, MI or angioplasty before 65F 55M? No   Social History Narrative    Caffeine intake/servings daily - 1    Calcium intake/servings daily - 2-3    Exercise 0 times weekly     Sunscreen used - Yes    Seatbelts used - Yes    Guns stored in the home - No    Self Breast Exam - Yes    Pap test up to date -  Yes    Eye exam up to date -  Yes    Dental exam up to date -  Yes    DEXA scan up to date -  Not Applicable    Flex Sig/Colonoscopy up to date -  Not Applicable    Mammography up to date -  Not Applicable    Immunizations reviewed and up to date - Yes    Abuse: Current or Past (Physical, Sexual or Emotional) - No    Do you feel safe in your environment - Yes    Do you cope well with stress - Yes    Do you suffer from insomnia - No    Last updated by: Nani Heredia  2007         Social Determinants of Health     Financial Resource Strain: Not on file   Food Insecurity: Not on file   Transportation Needs: Not on file   Physical Activity: Not on file   Stress: Not on file   Social Connections: Not on file   Intimate Partner Violence: Not on file   Housing Stability: Not on file        Outpatient Encounter Medications as of 8/9/2023   Medication Sig Dispense Refill     pimecrolimus (ELIDEL) 1 % external cream Apply once to twice daily to face. 30 g 4     albuterol (PROAIR HFA/PROVENTIL HFA/VENTOLIN HFA) 108 (90 Base) MCG/ACT inhaler Inhale 2 puffs into the lungs every 6 hours as needed for shortness of breath / dyspnea 18 g 1     azaTHIOprine (IMURAN) 50 MG tablet Take 1 tablet (50 mg) by mouth daily 90 tablet 2     cyclobenzaprine (FLEXERIL) 10 MG tablet Take 1 tablet (10 mg) by mouth 3 times daily as needed for muscle spasms 30 tablet 3     hydroxychloroquine (PLAQUENIL) 200 MG tablet Take 1 tablet (200 mg) by mouth daily . Yearly eye exam including 10-2 VF and SD-OCT required 90 tablet 2     hydrOXYzine (ATARAX) 25 MG tablet Take 1 tablet (25 mg) by mouth every 6 hours as needed for anxiety 90 tablet 6     levonorgestrel (MIRENA) 20 MCG/24HR IUD 1 each by Intrauterine route once       LORazepam (ATIVAN) 0.5 MG tablet Take 1 tablet (0.5 mg) by mouth every 8 hours as needed for anxiety 15 tablet 0     traZODone (DESYREL) 50 MG tablet Take 0.5 tablets (25 mg) by mouth nightly as needed for sleep 30 tablet 0     vitamin D3 (CHOLECALCIFEROL) 50 mcg (2000 units) tablet Take 1 tablet (50 mcg) by mouth daily 90 tablet 2     No facility-administered encounter medications on file as of 8/9/2023.             O:   NAD, WDWN, Alert & Oriented, Mood & Affect wnl, Vitals stable   Here today alone   There were no vitals taken for this visit.   General appearance normal   Vitals stable   Alert, oriented and in no acute distress      Redness inflammatory papules with background erythema involving NLF on cheeks, nose, and glabella, perioral       Eyes: Conjunctivae/lids:Normal     ENT: Lips: normal    MSK:Normal    Pulm: Breathing Normal    Neuro/Psych: Orientation:Alert and Orientedx3 ; Mood/Affect:normal   A/P:  1. Acne rosacea with lupus erythematous in a setting of atopic dermatitis   It was a pleasure  speaking to Aleida Weinberg today  Apply elidel cream twice daily to affected area twice daily, will mostly help with acne rosacea, lupus, and atopic dermatitis.   Continue sensitive skin care regimen.   Can also add in the ordinary azelaic suspension.   Pictures taken, recheck in 3 months.       Again, thank you for allowing me to participate in the care of your patient.        Sincerely,        Shante Bautista PA-C

## 2023-08-09 NOTE — PROGRESS NOTES
Aleida Weinberg is an extremely pleasant 38 year old year old female patient here today for acne rosacea/lupus. She notes acne rosacea developed about 1-2 years ago. Has used metronidazole cream in the past but not sure if it helped but has been a while since she tried this. She notes that she uses sensitive skin care products since her skin is sensitive. She prefers to avoid oral antibiotics.  Patient has no other skin complaints today.  Remainder of the HPI, Meds, PMH, Allergies, FH, and SH was reviewed in chart.   Past Medical History:   Diagnosis Date    Arthritis     Lupus erythematosus     Mild intermittent asthma     Other kyphoscoliosis and scoliosis     upper back curvature and disc degeneration in lower back.       Past Surgical History:   Procedure Laterality Date     SECTION      EXCISE GANGLION WRIST Left 11/10/2022    Procedure: EXCISION, GANGLION CYST, WRIST LEFT;  Surgeon: Jayde Martinez MD;  Location: [facility] OR    SURGICAL HISTORY OF -       PE Tubes        Family History   Problem Relation Age of Onset    Heart Disease Maternal Grandfather         Bypass, Heart Disease    Respiratory Maternal Grandfather         asthma    Macular Degeneration Maternal Grandfather     Hypertension Paternal Grandmother     Cancer Paternal Grandmother         lymph nodes    Cataracts Paternal Grandmother     C.A.D. Paternal Grandfather     Heart Disease Maternal Uncle         MI's     Alcohol/Drug Maternal Uncle     Respiratory Other         cousin on mothers side, asthma    Alcohol/Drug Other         bother great grandparents on mother's side    Diabetes No family hx of     Breast Cancer No family hx of     Cancer - colorectal No family hx of        Social History     Socioeconomic History    Marital status: Single     Spouse name: Not on file    Number of children: Not on file    Years of education: Not on file    Highest education level: Not on file   Occupational History    Not on file    Tobacco Use    Smoking status: Former     Packs/day: 0.50     Years: 2.50     Pack years: 1.25     Types: Cigarettes     Quit date: 2005     Years since quittin.7     Passive exposure: Never    Smokeless tobacco: Never   Vaping Use    Vaping Use: Never used   Substance and Sexual Activity    Alcohol use: Yes     Alcohol/week: 0.0 standard drinks of alcohol     Comment: rarely - 1 monthly    Drug use: No    Sexual activity: Yes     Partners: Male     Birth control/protection: I.U.D.   Other Topics Concern    Parent/sibling w/ CABG, MI or angioplasty before 65F 55M? No   Social History Narrative    Caffeine intake/servings daily - 1    Calcium intake/servings daily - 2-3    Exercise 0 times weekly     Sunscreen used - Yes    Seatbelts used - Yes    Guns stored in the home - No    Self Breast Exam - Yes    Pap test up to date -  Yes    Eye exam up to date -  Yes    Dental exam up to date -  Yes    DEXA scan up to date -  Not Applicable    Flex Sig/Colonoscopy up to date -  Not Applicable    Mammography up to date -  Not Applicable    Immunizations reviewed and up to date - Yes    Abuse: Current or Past (Physical, Sexual or Emotional) - No    Do you feel safe in your environment - Yes    Do you cope well with stress - Yes    Do you suffer from insomnia - No    Last updated by: Nani Heredia  2007         Social Determinants of Health     Financial Resource Strain: Not on file   Food Insecurity: Not on file   Transportation Needs: Not on file   Physical Activity: Not on file   Stress: Not on file   Social Connections: Not on file   Intimate Partner Violence: Not on file   Housing Stability: Not on file       Outpatient Encounter Medications as of 2023   Medication Sig Dispense Refill    pimecrolimus (ELIDEL) 1 % external cream Apply once to twice daily to face. 30 g 4    albuterol (PROAIR HFA/PROVENTIL HFA/VENTOLIN HFA) 108 (90 Base) MCG/ACT inhaler Inhale 2 puffs into the lungs every 6 hours as  needed for shortness of breath / dyspnea 18 g 1    azaTHIOprine (IMURAN) 50 MG tablet Take 1 tablet (50 mg) by mouth daily 90 tablet 2    cyclobenzaprine (FLEXERIL) 10 MG tablet Take 1 tablet (10 mg) by mouth 3 times daily as needed for muscle spasms 30 tablet 3    hydroxychloroquine (PLAQUENIL) 200 MG tablet Take 1 tablet (200 mg) by mouth daily . Yearly eye exam including 10-2 VF and SD-OCT required 90 tablet 2    hydrOXYzine (ATARAX) 25 MG tablet Take 1 tablet (25 mg) by mouth every 6 hours as needed for anxiety 90 tablet 6    levonorgestrel (MIRENA) 20 MCG/24HR IUD 1 each by Intrauterine route once      LORazepam (ATIVAN) 0.5 MG tablet Take 1 tablet (0.5 mg) by mouth every 8 hours as needed for anxiety 15 tablet 0    traZODone (DESYREL) 50 MG tablet Take 0.5 tablets (25 mg) by mouth nightly as needed for sleep 30 tablet 0    vitamin D3 (CHOLECALCIFEROL) 50 mcg (2000 units) tablet Take 1 tablet (50 mcg) by mouth daily 90 tablet 2     No facility-administered encounter medications on file as of 8/9/2023.             O:   NAD, WDWN, Alert & Oriented, Mood & Affect wnl, Vitals stable   Here today alone   There were no vitals taken for this visit.   General appearance normal   Vitals stable   Alert, oriented and in no acute distress      Redness inflammatory papules with background erythema involving NLF on cheeks, nose, and glabella, perioral       Eyes: Conjunctivae/lids:Normal     ENT: Lips: normal    MSK:Normal    Pulm: Breathing Normal    Neuro/Psych: Orientation:Alert and Orientedx3 ; Mood/Affect:normal   A/P:  1. Acne rosacea with lupus erythematous in a setting of atopic dermatitis   It was a pleasure speaking to Aleida Weinberg today  Apply elidel cream twice daily to affected area twice daily, will mostly help with acne rosacea, lupus, and atopic dermatitis.   Continue sensitive skin care regimen.   Can also add in the ordinary azelaic suspension.   Pictures taken, recheck in 3 months.

## 2023-09-11 ENCOUNTER — OFFICE VISIT (OUTPATIENT)
Dept: FAMILY MEDICINE | Facility: CLINIC | Age: 38
End: 2023-09-11
Payer: COMMERCIAL

## 2023-09-11 VITALS
TEMPERATURE: 97.9 F | WEIGHT: 162.8 LBS | BODY MASS INDEX: 24.67 KG/M2 | HEIGHT: 68 IN | HEART RATE: 73 BPM | RESPIRATION RATE: 16 BRPM | OXYGEN SATURATION: 96 % | SYSTOLIC BLOOD PRESSURE: 100 MMHG | DIASTOLIC BLOOD PRESSURE: 67 MMHG

## 2023-09-11 DIAGNOSIS — M32.19 OTHER SYSTEMIC LUPUS ERYTHEMATOSUS WITH OTHER ORGAN INVOLVEMENT (H): ICD-10-CM

## 2023-09-11 DIAGNOSIS — Z71.84 TRAVEL ADVICE ENCOUNTER: Primary | ICD-10-CM

## 2023-09-11 PROCEDURE — 90632 HEPA VACCINE ADULT IM: CPT | Mod: GA | Performed by: NURSE PRACTITIONER

## 2023-09-11 PROCEDURE — 99402 PREV MED CNSL INDIV APPRX 30: CPT | Mod: 25 | Performed by: NURSE PRACTITIONER

## 2023-09-11 PROCEDURE — 90691 TYPHOID VACCINE IM: CPT | Mod: GA | Performed by: NURSE PRACTITIONER

## 2023-09-11 PROCEDURE — 90471 IMMUNIZATION ADMIN: CPT | Mod: GA | Performed by: NURSE PRACTITIONER

## 2023-09-11 PROCEDURE — 90472 IMMUNIZATION ADMIN EACH ADD: CPT | Mod: GA | Performed by: NURSE PRACTITIONER

## 2023-09-11 RX ORDER — ATOVAQUONE AND PROGUANIL HYDROCHLORIDE 250; 100 MG/1; MG/1
1 TABLET, FILM COATED ORAL DAILY
Qty: 15 TABLET | Refills: 0 | Status: SHIPPED | OUTPATIENT
Start: 2023-09-11

## 2023-09-11 RX ORDER — CIPROFLOXACIN 500 MG/1
500 TABLET, FILM COATED ORAL 2 TIMES DAILY
Qty: 6 TABLET | Refills: 0 | Status: SHIPPED | OUTPATIENT
Start: 2023-09-11 | End: 2024-09-11

## 2023-09-11 ASSESSMENT — PAIN SCALES - GENERAL: PAINLEVEL: NO PAIN (0)

## 2023-09-11 NOTE — NURSING NOTE
Prior to immunization administration, verified patients identity using patient s name and date of birth. Please see Immunization Activity for additional information.     Screening Questionnaire for Adult Immunization    Are you sick today?   No   Do you have allergies to medications, food, a vaccine component or latex?   No   Have you ever had a serious reaction after receiving a vaccination?   No   Do you have a long-term health problem with heart, lung, kidney, or metabolic disease (e.g., diabetes), asthma, a blood disorder, no spleen, complement component deficiency, a cochlear implant, or a spinal fluid leak?  Are you on long-term aspirin therapy?   No   Do you have cancer, leukemia, HIV/AIDS, or any other immune system problem?   No   Do you have a parent, brother, or sister with an immune system problem?   No   In the past 3 months, have you taken medications that affect  your immune system, such as prednisone, other steroids, or anticancer drugs; drugs for the treatment of rheumatoid arthritis, Crohn s disease, or psoriasis; or have you had radiation treatments?   No   Have you had a seizure, or a brain or other nervous system problem?   No   During the past year, have you received a transfusion of blood or blood    products, or been given immune (gamma) globulin or antiviral drug?   No   For women: Are you pregnant or is there a chance you could become       pregnant during the next month?   No   Have you received any vaccinations in the past 4 weeks?   No     Immunization questionnaire answers were all negative.      Patient instructed to remain in clinic for 15 minutes afterwards, and to report any adverse reactions.     Screening performed by Adrianna Alston on 9/11/2023 at 3:42 PM.

## 2023-09-11 NOTE — PROGRESS NOTES
"Nurse Note ( Pre-Travel Consult)    Itinerary:  [facility]     Departure Date: 11/04 /2023    Return Date: 11/12/2023    Length of Trip 6 days     Reason for Travel: Business         Urban or rural: urban    Accommodations: Hotel        IMMUNIZATION HISTORY  Have you received any immunizations within the past 4 weeks?  No  Have you ever fainted from having your blood drawn or from an injection?  No  Have you ever had a fever reaction to vaccination?  Yes  Have you ever had any bad reaction or side effect from any vaccination?  No  Have you ever had hepatitis A or B vaccine?  Yes  Do you live (or work closely) with anyone who has AIDS, an AIDS-like condition, any other immune disorder or who is on chemotherapy for cancer?  No  Do you have a family history of immunodeficiency?  No  Have you received any injection of immune globulin or any blood products during the past 12 months?  No    Patient roomed by Gely Weinberg is a 38 year old female seen today alone for counsultation for international travel.   Patient will be departing in  2 month(s) and  traveling with co-worker(s).      Patient itinerary :  will be in the urban region of  [facility] and [facility] for 2-3 d which risk for Malaria, Dengue Fever, food borne illnesses, and motor vehicle accidents. exposure.      Patient's activities will include business.    Patient's country of birth is [place]    Special medical concerns: Anxiety/depression and Lupus  Pre-travel questionnaire was completed by patient and reviewed by provider.     Vitals: /67 (BP Location: Left arm, Patient Position: Sitting, Cuff Size: Adult Large)   Pulse 73   Temp 97.9  F (36.6  C) (Temporal)   Resp 16   Ht 1.715 m (5' 7.5\")   Wt 73.8 kg (162 lb 12.8 oz)   SpO2 96%   BMI 25.12 kg/m    BMI= Body mass index is 25.12 kg/m .    EXAM:  General:  Well-nourished, well-developed in no acute distress.  Appears to be stated age, interacts appropriately and " expresses understanding of information given to patient.    Current Outpatient Medications   Medication Sig Dispense Refill    albuterol (PROAIR HFA/PROVENTIL HFA/VENTOLIN HFA) 108 (90 Base) MCG/ACT inhaler Inhale 2 puffs into the lungs every 6 hours as needed for shortness of breath / dyspnea 18 g 1    azaTHIOprine (IMURAN) 50 MG tablet Take 1 tablet (50 mg) by mouth daily 90 tablet 2    cyclobenzaprine (FLEXERIL) 10 MG tablet Take 1 tablet (10 mg) by mouth 3 times daily as needed for muscle spasms 30 tablet 3    hydroxychloroquine (PLAQUENIL) 200 MG tablet Take 1 tablet (200 mg) by mouth daily . Yearly eye exam including 10-2 VF and SD-OCT required 90 tablet 2    hydrOXYzine (ATARAX) 25 MG tablet Take 1 tablet (25 mg) by mouth every 6 hours as needed for anxiety 90 tablet 6    levonorgestrel (MIRENA) 20 MCG/24HR IUD 1 each by Intrauterine route once      LORazepam (ATIVAN) 0.5 MG tablet Take 1 tablet (0.5 mg) by mouth every 8 hours as needed for anxiety 15 tablet 0    pimecrolimus (ELIDEL) 1 % external cream Apply once to twice daily to face. 30 g 4    traZODone (DESYREL) 50 MG tablet Take 0.5 tablets (25 mg) by mouth nightly as needed for sleep 30 tablet 0    vitamin D3 (CHOLECALCIFEROL) 50 mcg (2000 units) tablet Take 1 tablet (50 mcg) by mouth daily 90 tablet 2     Patient Active Problem List   Diagnosis    Other kyphoscoliosis and scoliosis    Hypertrophic and atrophic condition of skin    Viral warts    CARDIOVASCULAR SCREENING; LDL GOAL LESS THAN 160    Intermittent asthma    Anxiety    Intractable menstrual migraine without status migrainosus    Vitamin D deficiency    Inflammatory polyarthropathy (H)    Chronic back pain    Raynaud's phenomenon without gangrene    Systemic lupus erythematosus (H)    High risk medications (not anticoagulants) long-term use (Plaquenil and AZA)    Dry eyes, bilateral    Disorder of connective tissue (H)    Ganglion cyst of dorsum of left wrist     Allergies   Allergen  Reactions    Fluconazole Rash         Immunizations discussed include:   Covid 19: vaccine is not available  Hepatitis A:  Ordered/given today, risks, benefits and side effects reviewed  Hepatitis B: Up to date  Influenza: deferred  Typhoid: Ordered/given today, risks, benefits and side effects reviewed  Rabies: Declined  reviewed managment of a animal bite or scratch (washing wound, seek medical care within 24 hours for post exposure prophylaxis )  Yellow Fever: Not indicated  Japanese Encephalitis: Not indicated  Meningococcus: Not indicated  Tetanus/Diphtheria: Up to date  Measles/Mumps/Rubella: Up to date  Cholera: Not needed  Polio: Up to date  Pneumococcal: Up to date  Varicella: Immune by disease history per patient report  Shingrix: Up to date  HPV:  Not indicated     TB: low risk     Altitude Exposure on this trip: no  Past tolerance to Altitude: na    ASSESSMENT/PLAN:  Aleida was seen today for travel clinic.    Diagnoses and all orders for this visit:    Travel advice encounter  -     HEPATITIS A 19+ (HAVRIX/VAQTA)  -     TYPHOID VACCINE, IM  -     atovaquone-proguanil (MALARONE) 250-100 MG tablet; Take 1 tablet by mouth daily Start 2 days before exposure to Malaria and continue daily till  7 days after exposure.  -     ciprofloxacin (CIPRO) 500 MG tablet; Take 1 tablet (500 mg) by mouth 2 times daily Take 1 tablet two times a day for up to 3 days for severe diarrhea during travel.    Other systemic lupus erythematosus with other organ involvement (H)  -     HEPATITIS A 19+ (HAVRIX/VAQTA)  -     TYPHOID VACCINE, IM  -     atovaquone-proguanil (MALARONE) 250-100 MG tablet; Take 1 tablet by mouth daily Start 2 days before exposure to Malaria and continue daily till  7 days after exposure.  -     ciprofloxacin (CIPRO) 500 MG tablet; Take 1 tablet (500 mg) by mouth 2 times daily Take 1 tablet two times a day for up to 3 days for severe diarrhea during travel.      I have reviewed general recommendations for  safe travel   including: food/water precautions, insect precautions,   roadway safety. Educational materials and [employer] report provided.    Malaraia prophylaxis recommended: Malarone  Cipro        Evacuation insurance advised and resources were provided to patient.    Total visit time 30 minutes  with over 50% of time spent counseling patient and shared decision making as detailed above.    Grace Zapata CNP  Certificate in Travel Health

## 2023-09-11 NOTE — PATIENT INSTRUCTIONS
Thank you for visiting the [facility] : [phone]  Today September 11, 2023 you received the    Hepatitis A Vaccine - Please return on 3/9/24 or later for your 2nd and final dose.    Typhoid - injectable. This vaccine is valid for two years.     Follow up vaccine appointments can be made as a NURSE ONLY visit at the [facility], (BE PREPARED TO WAIT, ) or at designated [facility].    If you are receiving the Rabies vaccines series, it is important that you follow the exact schedule ordered.     Pre-travel     We recommend that you purchase Medical Evacuation Insurance prior to your departure.  Https://wwwnc.cdc.gov/travel/page/insurance    [place] your travel plans with the [place] Department of State through STEP ( Smart Traveler Enrollment Program ) https://step.[place].gov.  STEP is a free service to allow [place] citizens and nationals traveling and living abroad to enroll their trip with the nearest [place] Embassy or Consulate.    Animal Exposure: Avoid all mammals even if they look healthy.  If there is a bite, scratch or even a lick, wash area immediately with soap and water for 15 minutes and seek medical care within 24 hours for evaluation of Rabies post exposure treatment.  Contact your Medical Evacuation Insurance.    Repiratory illness prevention strategies ( Covid and Influenza ) CDC recommendations:  Consider wearing a mask in crowded or poorly ventilated indoor areas, including on public transportation and in transportation hubs.  Hand washing: frequent, thorough handwashing with soap and water for 20 seconds. Use an alcohol-based hand  with at least 60% alcohol if soap and water are not readily available. Avoid touching face, nose, eyes, mouth unless you have done appropriate hand washing as above.  VACCINES: Staying up to date on COVID-19 vaccines significantly lowers the risk of getting very sick, being hospitalized, or dying from COVID-19. CDC  recommends that everyone stay up to date on their COVID-19 vaccines, especially people with weakened immune systems.    Travel Covid 19 Testing:  updated 12/06/2021  International travelers: Pre-travel:  See country specific Embassy websites or airline websites.    Post travel: CDC recommends getting tested 3-5 days after your trip     Post-travel illness:  Contact your provider or [facility] if you develop a fever, rash, cough, diarrhea or other symptoms for up to 1 year after travel.  Inform your healthcare provider when and where you traveled to.    Please call the [facility] with any questions [phone]  Or send your provider a 'My Chart' note.

## 2023-10-09 ENCOUNTER — IMMUNIZATION (OUTPATIENT)
Dept: FAMILY MEDICINE | Facility: CLINIC | Age: 38
End: 2023-10-09
Payer: COMMERCIAL

## 2023-10-09 DIAGNOSIS — Z23 NEED FOR PROPHYLACTIC VACCINATION AND INOCULATION AGAINST INFLUENZA: Primary | ICD-10-CM

## 2023-10-09 PROCEDURE — 90686 IIV4 VACC NO PRSV 0.5 ML IM: CPT

## 2023-10-09 PROCEDURE — 90471 IMMUNIZATION ADMIN: CPT

## 2023-10-17 ENCOUNTER — LAB (OUTPATIENT)
Dept: LAB | Facility: CLINIC | Age: 38
End: 2023-10-17
Payer: COMMERCIAL

## 2023-10-17 DIAGNOSIS — M32.19 OTHER SYSTEMIC LUPUS ERYTHEMATOSUS WITH OTHER ORGAN INVOLVEMENT (H): ICD-10-CM

## 2023-10-17 DIAGNOSIS — Z79.899 HIGH RISK MEDICATIONS (NOT ANTICOAGULANTS) LONG-TERM USE: ICD-10-CM

## 2023-10-17 LAB
ALBUMIN MFR UR ELPH: <6 MG/DL
ALBUMIN SERPL BCG-MCNC: 4.9 G/DL (ref 3.5–5.2)
ALBUMIN UR-MCNC: NEGATIVE MG/DL
ALP SERPL-CCNC: 46 U/L (ref 35–104)
ALT SERPL W P-5'-P-CCNC: 14 U/L (ref 0–50)
ANION GAP SERPL CALCULATED.3IONS-SCNC: 10 MMOL/L (ref 7–15)
APPEARANCE UR: CLEAR
AST SERPL W P-5'-P-CCNC: 21 U/L (ref 0–45)
BASO+EOS+MONOS # BLD AUTO: NORMAL 10*3/UL
BASO+EOS+MONOS NFR BLD AUTO: NORMAL %
BASOPHILS # BLD AUTO: 0 10E3/UL (ref 0–0.2)
BASOPHILS NFR BLD AUTO: 0 %
BILIRUB SERPL-MCNC: 0.4 MG/DL
BILIRUB UR QL STRIP: NEGATIVE
BUN SERPL-MCNC: 11 MG/DL (ref 6–20)
CALCIUM SERPL-MCNC: 9.4 MG/DL (ref 8.6–10)
CHLORIDE SERPL-SCNC: 102 MMOL/L (ref 98–107)
COLOR UR AUTO: YELLOW
CREAT SERPL-MCNC: 0.72 MG/DL (ref 0.51–0.95)
CREAT UR-MCNC: 43.8 MG/DL
DEPRECATED HCO3 PLAS-SCNC: 26 MMOL/L (ref 22–29)
EGFRCR SERPLBLD CKD-EPI 2021: >90 ML/MIN/1.73M2
EOSINOPHIL # BLD AUTO: 0 10E3/UL (ref 0–0.7)
EOSINOPHIL NFR BLD AUTO: 1 %
ERYTHROCYTE [DISTWIDTH] IN BLOOD BY AUTOMATED COUNT: 10.7 % (ref 10–15)
GLUCOSE SERPL-MCNC: 76 MG/DL (ref 70–99)
GLUCOSE UR STRIP-MCNC: NEGATIVE MG/DL
HCT VFR BLD AUTO: 41.1 % (ref 35–47)
HGB BLD-MCNC: 14.2 G/DL (ref 11.7–15.7)
HGB UR QL STRIP: NEGATIVE
IMM GRANULOCYTES # BLD: 0 10E3/UL
IMM GRANULOCYTES NFR BLD: 0 %
KETONES UR STRIP-MCNC: NEGATIVE MG/DL
LEUKOCYTE ESTERASE UR QL STRIP: NEGATIVE
LYMPHOCYTES # BLD AUTO: 0.9 10E3/UL (ref 0.8–5.3)
LYMPHOCYTES NFR BLD AUTO: 18 %
MCH RBC QN AUTO: 32.1 PG (ref 26.5–33)
MCHC RBC AUTO-ENTMCNC: 34.5 G/DL (ref 31.5–36.5)
MCV RBC AUTO: 93 FL (ref 78–100)
MONOCYTES # BLD AUTO: 0.5 10E3/UL (ref 0–1.3)
MONOCYTES NFR BLD AUTO: 10 %
NEUTROPHILS # BLD AUTO: 3.4 10E3/UL (ref 1.6–8.3)
NEUTROPHILS NFR BLD AUTO: 71 %
NITRATE UR QL: NEGATIVE
PH UR STRIP: 7.5 [PH] (ref 5–7)
PLATELET # BLD AUTO: 252 10E3/UL (ref 150–450)
POTASSIUM SERPL-SCNC: 3.7 MMOL/L (ref 3.4–5.3)
PROT SERPL-MCNC: 7.3 G/DL (ref 6.4–8.3)
PROT/CREAT 24H UR: NORMAL MG/G{CREAT}
RBC # BLD AUTO: 4.42 10E6/UL (ref 3.8–5.2)
SODIUM SERPL-SCNC: 138 MMOL/L (ref 135–145)
SP GR UR STRIP: 1.01 (ref 1–1.03)
UROBILINOGEN UR STRIP-ACNC: 0.2 E.U./DL
WBC # BLD AUTO: 4.8 10E3/UL (ref 4–11)

## 2023-10-17 PROCEDURE — 81003 URINALYSIS AUTO W/O SCOPE: CPT

## 2023-10-17 PROCEDURE — 80053 COMPREHEN METABOLIC PANEL: CPT

## 2023-10-17 PROCEDURE — 36415 COLL VENOUS BLD VENIPUNCTURE: CPT

## 2023-10-17 PROCEDURE — 84156 ASSAY OF PROTEIN URINE: CPT

## 2023-10-17 PROCEDURE — 85025 COMPLETE CBC W/AUTO DIFF WBC: CPT

## 2023-10-26 ENCOUNTER — OFFICE VISIT (OUTPATIENT)
Dept: RHEUMATOLOGY | Facility: CLINIC | Age: 38
End: 2023-10-26
Payer: COMMERCIAL

## 2023-10-26 ENCOUNTER — IMMUNIZATION (OUTPATIENT)
Dept: FAMILY MEDICINE | Facility: CLINIC | Age: 38
End: 2023-10-26
Payer: COMMERCIAL

## 2023-10-26 VITALS
SYSTOLIC BLOOD PRESSURE: 108 MMHG | OXYGEN SATURATION: 95 % | HEART RATE: 93 BPM | WEIGHT: 158.2 LBS | RESPIRATION RATE: 16 BRPM | DIASTOLIC BLOOD PRESSURE: 79 MMHG | BODY MASS INDEX: 24.41 KG/M2

## 2023-10-26 DIAGNOSIS — E55.9 VITAMIN D DEFICIENCY: ICD-10-CM

## 2023-10-26 DIAGNOSIS — Z79.899 HIGH RISK MEDICATIONS (NOT ANTICOAGULANTS) LONG-TERM USE: ICD-10-CM

## 2023-10-26 DIAGNOSIS — M32.19 OTHER SYSTEMIC LUPUS ERYTHEMATOSUS WITH OTHER ORGAN INVOLVEMENT (H): Primary | ICD-10-CM

## 2023-10-26 DIAGNOSIS — M32.19 OTHER SYSTEMIC LUPUS ERYTHEMATOSUS WITH OTHER ORGAN INVOLVEMENT (H): ICD-10-CM

## 2023-10-26 DIAGNOSIS — Z23 ENCOUNTER FOR IMMUNIZATION: Primary | ICD-10-CM

## 2023-10-26 PROCEDURE — 99214 OFFICE O/P EST MOD 30 MIN: CPT | Performed by: INTERNAL MEDICINE

## 2023-10-26 PROCEDURE — 91320 SARSCV2 VAC 30MCG TRS-SUC IM: CPT

## 2023-10-26 PROCEDURE — 99207 PR NO CHARGE NURSE ONLY: CPT

## 2023-10-26 PROCEDURE — 90480 ADMN SARSCOV2 VAC 1/ONLY CMP: CPT

## 2023-10-26 RX ORDER — HYDROXYCHLOROQUINE SULFATE 200 MG/1
200 TABLET, FILM COATED ORAL DAILY
Qty: 90 TABLET | Refills: 2 | Status: SHIPPED | OUTPATIENT
Start: 2023-10-26 | End: 2024-05-29

## 2023-10-26 RX ORDER — AZATHIOPRINE 50 MG/1
50 TABLET ORAL DAILY
Qty: 90 TABLET | Refills: 2 | Status: SHIPPED | OUTPATIENT
Start: 2023-10-26 | End: 2024-05-29

## 2023-10-26 NOTE — PATIENT INSTRUCTIONS
RHEUMATOLOGY    [facility]  [address] [place] [address]  [place] [address]    Phone number: [phone]  Fax number: [phone]    If you need a medication refill, please contact us as you may need lab work and/or a follow up visit prior to your refill.      Thank you for choosing [facility]!    Carissa Frye CMA Rheumatology

## 2023-10-26 NOTE — PROGRESS NOTES
Rheumatology Clinic Visit      Aleida Weinberg MRN# [id number]   YOB: 1985 Age: 38 year old      Date of visit: 10/26/23   PCP: Sayra Chirinos  Dermatology: Shante Reid     Chief Complaint   Patient presents with:  Systemic lupus erythematosus    Assessment and Plan     1. Systemic lupus erythematosus (CHANELL >1:[id number] speckled, RNP+, Sm+, Scl-70+, hypocomplementemia, photosensitivity, malar rash, arthritis, fatigue, Raynaud's phenomenon):  Dx'd 4/2016. Scl-70+; she lacks features of scleroderma except for raynaud's; no myopathy based on labs/symptoms. 5/11/2018 echo without evidence of pulmonary hypertension. Previously on MTX 20mg SQ weekly (GI upset with PO doses above 15mg, partially effective) and SSZ (LFT elevations).  Currently on AZA 50mg daily and HCQ 200mg daily (patient self reduced previously from 300mg daily on average based on preference of an easier regimen and continued to do well). TPMT normal on 8/21/2017.  Doing well at this time except for mild ache at the right second-fifth PIPs where there was no clear synovial swelling and joint symptoms were not clearly inflammatory in nature.  We discussed increasing hydroxychloroquine to 300 mg daily on average or increasing azathioprine to 100 mg daily but she would prefer to remain on her current regimen for now and this is reasonable.  Reassess in 3 months..  Chronic illness, stable.    - Continue hydroxychloroquine 200mg daily (last eye exam was okay on 1/28/2022 at [facility]; she plans to have her next eye exam at [facility])  - Continue azathioprine 50mg daily   - Labs in 3 months: CBC, CMP, ESR, CRP, UA, Uprotein:creatinine    High risk medication requiring intensive toxicity monitoring at least quarterly      2. Raynaud's Phenomenon: Cold avoidance was insufficient for controlling her symptoms in the past; then did well on amlodipine previously but has not required for some time now.  She will notify me if  she would like to restart amlodipine.  Note that she was previously on amlodipine 10 mg daily during colder months but has not required it in most recent singer.    3.  Low vitamin D: Currently on vitamin D 2000 units daily.    - Continue vitamin D 2000 units daily    4.  Secondary Sjogren's syndrome: Mild dry and dry mouth that are treated infrequently with artificial tears and frequent sips of water. Dentist follow-up every 6 months     5.  Facial rash: Was following with dermatology at [facility].  Reportedly due to SLE and rosacea from derm perspective, and improved with topical creams from her dermatologist for rosacea.  Active today, involving the nasolabial folds.  She will continue following with dermatology at [facility] where she has been treated for acne rosacea in the setting of atopic dermatitis    6.  Mild medial joint line tenderness of the knees, and mild pes anserine bursitis: she says that it is only symptomatic when examined.  May use topical Voltaren gel as needed.    7.  Vaccinations:   - Influenza: encouraged yearly vaccination  - COVID-19: Advised keeping updated, and to hold azathioprine for 1-2 weeks afterward     Total minutes spent in evaluation with patient, documentation, , and review of pertinent studies and chart notes: 14    Ms. Weinberg verbalized agreement with and understanding of the rational for the diagnosis and treatment plan.  All questions were answered to best of my ability and the patient's satisfaction. Ms. Weinberg was advised to contact the clinic with any questions that may arise after the clinic visit.      Thank you for involving me in the care of the patient    Return to clinic: 3 months      HPI   Aleida Digna Weinberg is a 38 year old female with medical history significant for intermittent asthma, anxiety, migraines, vitamin D deficiency, ganglion cyst removal of the left wrist, and systemic lupus erythematosus who presents for follow-up of SLE.      7/25/2023: Currently doing well with regard to lupus.  No joint pain or swelling.  No morning stiffness or gelling phenomenon.  No active Raynaud's phenomenon symptoms.  Planning to see dermatology for facial rash/rosacea.  Planning to have her eye exam updated with ophthalmology in 2 days.  Planning to travel to [facility] in November 2023 and would like to see the travel clinic.  Planning to get an updated COVID-vaccine prior to traveling to [facility].    Today, 10/26/2023: Mild ache of the right second-fifth PIPs that is worse with activity and improves with rest, sometimes aches in the morning, and is associated with morning stiffness for less than 20 minutes.  No other joint pain.  Still with facial rash that is being treated as acne rosacea by dermatology at [facility]; she states that she has difficulty finding medications that she tolerates well because she is very sensitive to medications, and continues to follow with dermatology.  Otherwise doing well.  Planning to travel to [facility] soon and has been to the trauma clinic where vaccinations were given.  Planning to get the COVID-19 vaccination later today.    Denies fevers, chills, nausea, vomiting, constipation, diarrhea. No abdominal pain. No chest pain/pressure, palpitations, or shortness of breath. No oral or nasal sores.  No rash currently.    Tobacco: quit smoking in 2005  EtOH: Once monthly  Drugs: None  Occupation: Works at [employer], a lot of typing per patient    ROS   12 point review of system was completed and negative except as noted in the HPI     Active Problem List     Patient Active Problem List   Diagnosis    Other kyphoscoliosis and scoliosis    Hypertrophic and atrophic condition of skin    Viral warts    CARDIOVASCULAR SCREENING; LDL GOAL LESS THAN 160    Intermittent asthma    Anxiety    Intractable menstrual migraine without status migrainosus    Vitamin D deficiency    Inflammatory polyarthropathy (H)    Chronic back pain     Raynaud's phenomenon without gangrene    Systemic lupus erythematosus (H)    High risk medications (not anticoagulants) long-term use (Plaquenil and AZA)    Dry eyes, bilateral    Disorder of connective tissue (H24)    Ganglion cyst of dorsum of left wrist     Past Medical History     Past Medical History:   Diagnosis Date    Arthritis     Lupus erythematosus     Mild intermittent asthma     Other kyphoscoliosis and scoliosis     upper back curvature and disc degeneration in lower back.     Past Surgical History     Past Surgical History:   Procedure Laterality Date     SECTION  2006    EXCISE GANGLION WRIST Left 11/10/2022    Procedure: EXCISION, GANGLION CYST, WRIST LEFT;  Surgeon: Jayde Martinez MD;  [place] [facility] [place]    SURGICAL HISTORY OF -       PE Tubes     Allergy     Allergies   Allergen Reactions    Fluconazole Rash     Current Medication List     Current Outpatient Medications   Medication Sig    albuterol (PROAIR HFA/PROVENTIL HFA/VENTOLIN HFA) 108 (90 Base) MCG/ACT inhaler Inhale 2 puffs into the lungs every 6 hours as needed for shortness of breath / dyspnea    atovaquone-proguanil (MALARONE) 250-100 MG tablet Take 1 tablet by mouth daily Start 2 days before exposure to Malaria and continue daily till  7 days after exposure.    azaTHIOprine (IMURAN) 50 MG tablet Take 1 tablet (50 mg) by mouth daily    ciprofloxacin (CIPRO) 500 MG tablet Take 1 tablet (500 mg) by mouth 2 times daily Take 1 tablet two times a day for up to 3 days for severe diarrhea during travel.    cyclobenzaprine (FLEXERIL) 10 MG tablet Take 1 tablet (10 mg) by mouth 3 times daily as needed for muscle spasms    hydroxychloroquine (PLAQUENIL) 200 MG tablet Take 1 tablet (200 mg) by mouth daily . Yearly eye exam including 10-2 VF and SD-OCT required    hydrOXYzine (ATARAX) 25 MG tablet Take 1 tablet (25 mg) by mouth every 6 hours as needed for anxiety    levonorgestrel (MIRENA) 20 MCG/24HR IUD 1 each by Intrauterine route  "once    LORazepam (ATIVAN) 0.5 MG tablet Take 1 tablet (0.5 mg) by mouth every 8 hours as needed for anxiety    pimecrolimus (ELIDEL) 1 % external cream Apply once to twice daily to face.    traZODone (DESYREL) 50 MG tablet Take 0.5 tablets (25 mg) by mouth nightly as needed for sleep    vitamin D3 (CHOLECALCIFEROL) 50 mcg (2000 units) tablet Take 1 tablet (50 mcg) by mouth daily     No current facility-administered medications for this visit.         Social History   See HPI    Family History     Family History   Problem Relation Age of Onset    Heart Disease Maternal Grandfather         Bypass, Heart Disease    Respiratory Maternal Grandfather         asthma    Macular Degeneration Maternal Grandfather     Hypertension Paternal Grandmother     Cancer Paternal Grandmother         lymph nodes    Cataracts Paternal Grandmother     C.A.D. Paternal Grandfather     Heart Disease Maternal Uncle         MI's     Alcohol/Drug Maternal Uncle     Respiratory Other         cousin on mothers side, asthma    Alcohol/Drug Other         bother great grandparents on mother's side    Diabetes No family hx of     Breast Cancer No family hx of     Cancer - colorectal No family hx of      Denies family history of autoimmune disease    Physical Exam     Temp Readings from Last 3 Encounters:   09/11/23 97.9  F (36.6  C) (Temporal)   05/08/23 97.4  F (36.3  C) (Tympanic)   12/12/22 98.3  F (36.8  C) (Oral)     BP Readings from Last 5 Encounters:   10/26/23 108/79   09/11/23 100/67   07/25/23 114/72   05/08/23 102/72   04/11/23 108/79     Pulse Readings from Last 1 Encounters:   10/26/23 93     Resp Readings from Last 1 Encounters:   10/26/23 16     Estimated body mass index is 24.41 kg/m  as calculated from the following:    Height as of 9/11/23: 1.715 m (5' 7.5\").    Weight as of this encounter: 71.8 kg (158 lb 3.2 oz).    GEN: NAD. Healthy appearing adult.   HEENT:  Anicteric, noninjected sclera. No obvious external lesions of the ear " and nose. Hearing intact.  CV: S1, S2. RRR. No m/r/g  PULM: No increased work of breathing. CTA bilaterally   MSK: MCPs, PIPs, DIPs without swelling or tenderness to palpation.  Wrists without swelling or tenderness to palpation.  Elbows without swelling, increased warmth, or overlying erythema.  Shoulders without swelling or tenderness to palpation.  Knees without swelling, increased warmth, or overlying erythema; mild medial joint line tenderness and tender to palpation over the pes anserine bursae.  Ankles and MTPs without swelling or tenderness to palpation.  SKIN: Mild facial erythema on both cheeks that does not extend over the nose and does involve the nasolabial folds.  No evidence of current or previous ischemic insults to the fingertips by visual inspection  PSYCH: Alert. Appropriate.      Labs / Imaging (select studies)     RF/CCP  Recent Labs   Lab Test 04/22/16  0902 04/01/16  0910   CCPIGG 1  --    RHF  --  <20     CHANELL  Recent Labs   Lab Test 04/22/16  0902 04/01/16  0910   VALERIE  --  12.4*   ANAIGG >1:87877  Reference range: <1:40  (Note)  Speckled pattern.  INTERPRETIVE INFORMATION: CHANELL by IFA, IgG  Anti-nuclear antibodies (CHANELL) are seen in a variety of  systemic rheumatic diseases and are determined by indirect  fluorescence assay (IFA) using HEp-2 substrate with an  IgG-specific conjugate. CHANELL titers less than or equal to  1:80 have variable relevance while titers greater than or  equal to 1:160 are considered clinically significant. These  antibodies may precede clinical disease onset; however,  healthy individuals and those with advanced age have been  reported to be positive for CHANELL. When observed, one of the  five basic patterns is reported: homogeneous,  peripheral/rim, speckled, centromere, or nucleolar. If  cytoplasmic fluorescence is observed, it is noted. IFA  methodology is subjective and has occasionally been shown  to lack sensitivity for anti-SSA/Ro antibodies.  Negative results do not  necessarily rule out the presence  of SSc. If clinical suspicion remains, consi vicki further  testing for U3-RNP, PM/Scl, or Th/To antibodies associated  with SSc.  Performed by [employer],  [facility] [place] [address] [phone]  [email or web address], Twin Rodriguez MD, Lab. Director    --      RNP/Sm/SSA/SSB  Recent Labs   Lab Test 01/12/18  0828 04/22/16  0902   RNPIGG  --  >8.0  Positive   Antibody index (AI) values reflect qualitative changes in antibody   concentration that cannot be directly associated with clinical condition or   disease state.  *   SMIGG  --  1.5*   SSAIGG  --  <0.2  Negative   Antibody index (AI) values reflect qualitative changes in antibody   concentration that cannot be directly associated with clinical condition or   disease state.     SSBIGG  --  <0.2  Negative   Antibody index (AI) values reflect qualitative changes in antibody   concentration that cannot be directly associated with clinical condition or   disease state.     SCLIGG  --  3.1*   TREPAB Negative  --      dsDNA  Recent Labs   Lab Test 07/18/23  1510 10/14/22  0714 12/15/21  1516 02/10/21  1627 11/05/20  1628 07/23/20  1636 04/02/20  1541 07/11/19  1529 11/29/18  1616   DNA 1.7 1.6 1.1 1 1 1 2 2 2     C3/C4  Recent Labs   Lab Test 07/18/23  1510 10/14/22  0714 12/15/21  1516 05/18/21  1606 02/10/21  1627 11/05/20  1628 07/23/20  1636 04/02/20  1541 10/18/19  1542   O9ZQBRL 95 96 87 84 89 89 92 90 74*   [id number] 18 19 17 18 17 17 18 20 14*     Send-out Labs  Recent Labs   Lab Test 04/21/17  0813   SRESLT SEE NOTE  (Note)  Test name                    Result Flag  Units  RefIntvl  ------------------------------------------------------------  Thiopurine Methyltransferase                                23.2 L      U/mL 24.0-44.0  Sample slightly hemolyzed. Please interpret the results  with caution.  INTERPRETIVE INFORMATION: Thiopurine Methyltransferase, RBC  Normal TPMT activity:  24.0-44.0  U/mL................Individuals are predicted to  be at low risk of bone marrow toxicity (myelosuppression)  as a consequence of standard thiopurine therapy; no dose  adjustment is recommended.  Intermediate TPMT activity:  17.0-23.9 U/mL................Individuals are predicted to  be at intermediate risk of bone marrow toxicity  (myelosuppression) as a consequence of standard thiopurine  therapy; a dose reduction and therapeutic drug management  is recommended.  Low TPMT activity:  less than 17.0 U/mL...........Individuals are predicted to  be at high risk of bone marrow toxicity (myelosuppression)   as a consequence of standard thiopurine dosing. It is  recommended to avoid the use of thiopurine drugs.  High TPMT activity:  greater than 44.0 U/mL........Individuals are not predicted  to be at risk for bone marrow toxicity (myelosuppression)  as a consequence of standard thiopurine dosing, but may be  at risk for therapeutic failure due to excessive  inactivation of thiopurine drugs. Individuals may require  higher than the normal standard dose. Therapeutic drug  management is recommended.  The TPMT, RBC assay is used as a screen to detect  individuals with low and intermediate TPMT activity who may  be at risk for myelosuppression when exposed to standard  doses of thiopurines, including azathioprine (Imuran) and  6-mercaptopurine (Purinethol). TPMT is the primary  metabolic route for inactivation of thiopurine drugs in the  bone marrow. When TPMT activity is low, it is predicted  that proportionately more 6-mercaptopurine can be converted  into the cytotoxic 6-thioguanine nucle otides that  accumulate in the bone marrow causing excessive toxicity.  The activity of TPMT is measured by the nanomoles of  6-methylmercaptopurine (inactive metabolite) produced per 1  mL of packed red blood cells, (U/mL).    TPMT phenotype testing does not replace the need for  clinical monitoring of patients treated with  thiopurine  drugs. Genotype for TPMT cannot be inferred from TPMT  activity (phenotype). Phenotype testing should not be  requested for patients currently treated with thiopurine  drugs. Current TPMT phenotype may not reflect future TPMT  phenotype, particularly in patients who received blood  transfusion within 30-60 days of testing.  TPMT enzyme  activity can be inhibited by several drugs such as:  naproxen (Aleve), ibuprofen (Advil, Motrin), ketoprofen  (Orudis), furosemide (Lasix), sulfasalazine (Azulfidine),  mesalamine (Asacol), olsalazine (Dipentum), mefenamic acid  (Ponstel), thiazide diuretics, and benzoic acid inhibitors.  TPMT inhibitors may contribute t o falsely low results;  patients should abstain from these drugs for at least 48  hours prior to TPMT testing. Falsely low results may also  occur as a result of inappropriate specimen handling and  hemolysis.  Test developed and characteristics determined by [employer]. See Compliance Statement B: www.[employer].com/CS  Performed by [employer],  [address] [place] [id number] [phone]  [email or web address], Lionel Ferreira MD, Lab. Director     STSTNM Thiopurine Methyltransferase, RBC   STSTCD 92,066   SSPTYP Whole blood, EDTA anticoagulant     Antiphospholipid Antibodies  Recent Labs   Lab Test 04/22/16  0902   B2GPG 0.9   B2GPM <0.9  Negative     CARG <15.0  Interpretation:  Negative     JOANN <12.5  Interpretation:  Negative     LUPINT Negative  (Note)  COMMENTS:  The INR is normal.  APTT ratio is normal.  DRVVT Screen ratio is normal.  Thrombin time is normal.  NEGATIVE TEST; A LUPUS ANTICOAGULANT WAS NOT DETECTED IN THIS  SPECIMEN WITHIN THE LIMITS OF THE TESTING REPERTOIRE.  If the clinical picture is strongly suggestive of an antiphospholipid  syndrome, recommend anticardiolipin and beta-2-glycoprotein (IgG and  IgM) antibody tests.  Isabella Olson M.D.  [phone]  4/25/2016    INR =  1.05    Reference range: 0.86-1.14  Thrombin  Time= 15.4    Reference range: 13.0-19.0 sec    APTT:       Ratio  Patient  =  0.92  1:2 Mix  =  N/A  Reference:  Negative: Less than or equal to 1.16  Positive: Greater than or equal to 1.17     DILUTE LISA VIPER VENOM TEST:  Screen Ratio = 0.95   Normal is less than 1.21         CBC  Recent Labs   Lab Test 10/17/23  1541 07/18/23  1510 03/31/23  1426 09/01/21  1625 05/18/21  1606 02/10/21  1627 11/05/20  1628   WBC 4.8 4.7 4.2   < > 3.9* 4.2 3.3*   RBC 4.42 4.28 4.49   < > 4.26 4.33 3.95   HGB 14.2 14.0 14.8   < > 14.2 14.2 13.1   HCT 41.1 40.0 41.9   < > 40.3 40.8 37.2   MCV 93 94 93   < > 95 94 94   RDW 10.7 10.8 11.7   < > 11.7 11.8 11.4    228 232   < > 225 212 202   MCH 32.1 32.7 33.0   < > 33.3* 32.8 33.2*   MCHC 34.5 35.0 35.3   < > 35.2 34.8 35.2   NEUTROPHIL 71 71 66   < > 64.3 67.8 60.7   LYMPH 18 17 22   < > 20.2 18.0 23.1   MONOCYTE 10 12 11   < > 14.0 13.0 14.4   EOSINOPHIL 1 1 1   < > 1.0 0.7 1.2   BASOPHIL 0 0 0   < > 0.5 0.5 0.6   ANEU  --   --   --   --  2.5 2.9 2.0   ALYM  --   --   --   --  0.8 0.8 0.8   AMANDA  --   --   --   --  0.6 0.6 0.5   AEOS  --   --   --   --  0.0 0.0 0.0   ABAS  --   --   --   --  0.0 0.0 0.0   ANEUTAUTO 3.4 3.3 2.8   < >  --   --   --    ALYMPAUTO 0.9 0.8 0.9   < >  --   --   --    AMONOAUTO 0.5 0.6 0.4   < >  --   --   --    AEOSAUTO 0.0 0.0 0.1   < >  --   --   --    ABSBASO 0.0 0.0 0.0   < >  --   --   --     < > = values in this interval not displayed.     CMP  Recent Labs   Lab Test 10/17/23  1541 07/18/23  1510 03/31/23  1426 02/10/23  0814 10/14/22  0714 07/14/22  1507 09/01/21  1625 05/18/21  1606 02/10/21  1627 01/21/21  0716 11/05/20  1628 07/23/20  1636    137 139  --  138 139   < > 140 136  --  138 138   POTASSIUM 3.7 4.2 3.6  --  4.0 4.2   < > 3.7 4.0  --  3.8 4.1   CHLORIDE 102 103 103  --  109 108   < > 107 106  --  107 106   CO2 26 24 25  --  22 27   < > 26 24  --  25 26   ANIONGAP 10 10 11  --  7 4   < > 7 6  --  6 6   GLC 76 81 101* 90  93 106*   < > 63* 75   < > 92 79   BUN 11.0 11.4 11.6  --  11 11   < > 11 10  --  13 11   CR 0.72 0.64 0.67  --  0.65 0.63   < > 0.76 0.74  --  0.68 0.68   GFRESTIMATED >90 >90 >90  --  >90 >90   < > >90 >90  --  >90 >90   GFRESTBLACK  --   --   --   --   --   --   --  >90 >90  --  >90 >90   SRINIVAS 9.4 9.3 9.7  --  8.6 8.9   < > 9.1 9.8  --  8.8 9.2   BILITOTAL 0.4 0.4 0.5  --  0.3 0.4   < > 0.7 0.5  --  0.4 0.4   ALBUMIN 4.9 4.9 4.8  --  4.1 4.1   < > 4.4 4.5  --  4.1 4.6   PROTTOTAL 7.3 7.1 7.2  --  7.0 6.8   < > 7.7 7.6  --  7.0 8.0   ALKPHOS 46 42 44  --  42 38*   < > 41 38*  --  39* 44   AST 21 22 23  --  17 14   < > 19 15  --  17 20   ALT 14 13 17  --  18 17   < > 25 23  --  23 29    < > = values in this interval not displayed.     Calcium/VitaminD  Recent Labs   Lab Test 10/17/23  1541 07/18/23  1510 03/31/23  1426 11/05/20  1628 07/23/20  1636 04/13/17  1650 02/20/17  1640   SRINIVAS 9.4 9.3 9.7   < > 9.2   < >  --    VITDT  --   --  50  --  30  --  33    < > = values in this interval not displayed.     ESR/CRP  Recent Labs   Lab Test 07/18/23  1510 03/31/23  1426 10/14/22  0714 07/14/22  1507 04/11/22  1441 12/15/21  1516 09/01/21  1625   SED 5 8 6   < > 8 9 9   CRP  --   --   --   --  <2.9 <2.9 5.6   CRPI <3.00 <3.00 <3.00   < >  --   --   --     < > = values in this interval not displayed.     CK/Aldolase  Recent Labs   Lab Test 10/14/22  0714 12/15/21  1516 02/10/21  1627 07/22/16  0916 04/22/16  0902   CKT 55 60 60   < > 45   ALDOLASE  --   --   --   --  4.5    < > = values in this interval not displayed.     TSH/T4  Recent Labs   Lab Test 04/01/16  0910   TSH 1.57     Lipid Panel  Recent Labs   Lab Test 02/10/23  0814 02/09/22  0701 10/15/21  0937   CHOL 159 158 163   TRIG 37 50 41   HDL 55 50 60   LDL 97 98 95   NHDL 104 108 103     Hepatitis B  Recent Labs   Lab Test 04/22/16  0902   HBCAB Nonreactive   HEPBANG Nonreactive     Hepatitis C  Recent Labs   Lab Test 10/26/18  0836 01/12/18  0828 04/22/16  0902    HCVAB Nonreactive Nonreactive Nonreactive   Assay performance characteristics have not been established for newborns,   infants, and children       Lyme ab screening  Recent Labs   Lab Test 04/01/16  0910   LYMEGM 0.12     HIV Screening  Recent Labs   Lab Test 10/26/18  0836 01/12/18  0828 04/22/16  0902   HIAGAB Nonreactive Nonreactive Nonreactive   HIV-1 p24 Ag & HIV-1/HIV-2 Ab Not Detected       UA  Recent Labs   Lab Test 10/17/23  1549 07/18/23  1510 03/31/23  1426 09/01/21  1629 05/18/21  1617 08/23/18  1638 04/26/18  1648 01/18/18  1640 01/12/18  0836 12/28/17  0145 04/13/17  1650 01/20/17  0827   COLOR Yellow Yellow Yellow   < > Yellow   < > Yellow Yellow Yellow Yellow   < > Yellow   APPEARANCE Clear Clear Clear   < > Clear   < > Clear Clear Clear Clear   < > Clear   URINEGLC Negative Negative Negative   < > Negative   < > Negative Negative Negative Negative   < > Negative   URINEBILI Negative Negative Negative   < > Negative   < > Negative Negative Negative Moderate*   < > Negative   SG 1.015 <=1.005 1.015   < > 1.010   < > 1.010 1.020 1.025 >1.030   < > >1.030   URINEPH 7.5* 6.0 5.5   < > 6.0   < > 7.0 7.0 6.5 6.0   < > 6.0   PROTEIN Negative Negative Negative   < > Negative   < > Negative Negative Trace* 100*   < > Trace*   UROBILINOGEN 0.2 0.2 0.2   < > 0.2   < > 0.2 0.2 0.2 4.0*   < > 0.2   NITRITE Negative Negative Negative   < > Negative   < > Negative Negative Negative Positive*   < > Negative   UBLD Negative Negative Negative   < > Trace*   < > Negative Negative Negative Negative   < > Negative   LEUKEST Negative Negative Negative   < > Negative   < > Negative Negative Negative Negative   < > Negative   WBCU  --   --   --   --  0 - 5  --  0 - 5 O - 2 O - 2 O - 2   < > O - 2   RBCU  --   --   --   --  O - 2  --  O - 2 O - 2 O - 2 O - 2   < > O - 2   SQUAMOUSEPI  --   --   --   --  Few  --   --  Few Moderate* Moderate*   < > Few   BACTERIA  --   --   --   --  Rare*  --   --   --  Moderate* Moderate*    < > Few*   MUCOUS  --   --   --   --   --   --   --   --  Present* Present*  --  Present*    < > = values in this interval not displayed.     Urine Microscopic  Recent Labs   Lab Test 05/18/21  1617 04/26/18  1648 01/18/18  1640 01/12/18  0836 12/28/17  0145 04/13/17  1650 01/20/17  0827   WBCU 0 - 5 0 - 5 O - 2 O - 2 O - 2   < > O - 2   RBCU O - 2 O - 2 O - 2 O - 2 O - 2   < > O - 2   SQUAMOUSEPI Few  --  Few Moderate* Moderate*   < > Few   BACTERIA Rare*  --   --  Moderate* Moderate*   < > Few*   MUCOUS  --   --   --  Present* Present*  --  Present*    < > = values in this interval not displayed.     Urine Protein  GHUTP and UTP= Urine protein (random), GHUTPG and UTPG = urine protein:creatinine ratio (random), UCRR = urine creatinine (random)  Recent Labs   Lab Test 10/17/23  1549 07/18/23  1510 03/31/23  1426 10/14/22  0714 07/14/22  1507 07/14/22  1507 04/11/22  1441 12/15/21  1516 09/01/21  1629   GHUTP <6.0 <6.0 <6.0 7.6   < > 6.9  --   --   --    UTP  --   --   --   --   --   --  0.07 <0.05 <0.05   GHUTPG  --   --   --  0.04  --  0.09  --   --   --    UTPG  --   --   --   --   --   --  0.11  --   --    UCRR 43.8 12.5 55.6 184.0   < > 76.5 66 25 23    < > = values in this interval not displayed.       Immunization History     Immunization History   Administered Date(s) Administered    COVID-19 Bivalent 18+ (Moderna) 10/03/2022    COVID-19 MONOVALENT 12+ (Pfizer) 03/16/2021, 04/06/2021, 08/30/2021    COVID-19 Monovalent Booster 18+ (Moderna) 03/04/2022    DT (PEDS <7y) 08/22/1997    Flu, Unspecified 10/21/2015    HPV 02/25/2010, 02/04/2011    HPV Quadrivalent 02/25/2010, 02/04/2011    HepB 06/13/1999, 08/20/1999, 02/05/2000    HepB, Unspecified 06/13/1999, 07/13/1999, 08/20/1999, 02/05/2000    Hepatitis A (ADULT 19+) 09/11/2023    Hepatitis B, Adult 07/13/1999, 08/20/1999, 02/05/2000    Historical DTP/aP 1986, 1986, 1986, 01/15/1988, 06/15/1991, 08/22/1997    Historical Hepb 07/13/1999,  08/20/1999    Influenza (IIV3) PF 10/19/2010, 11/07/2011, 09/23/2012    Influenza Vaccine >6 months (Alfuria,Fluzone) 10/11/2016, 10/13/2017, 10/26/2018, 10/17/2019, 10/16/2020, 10/15/2021, 12/12/2022, 10/09/2023    MMR 05/15/1987, 09/15/1991    Mantoux Tuberculin Skin Test 07/15/1987, 01/19/2005    Meningococcal (Menomune ) 08/09/2004    Meningococcal ACWY (Menactra ) 08/09/2004    OPV, trivalent, live 1986, 1986, 1986, 01/15/1988, 09/15/1991    OPV, unspecified 1986, 1986, 1986, 01/15/1988, 09/15/1991    Pneumo Conj 13-V (2010&after) 05/04/2018    Pneumococcal 23 valent 08/30/2018    TDAP (Adacel,Boostrix) 01/20/2009    TDAP Vaccine (Adacel) 01/21/2009, 02/25/2010, 08/17/2020    Typhoid IM 09/11/2023    Zoster recombinant adjuvanted (SHINGRIX) 12/13/2018, 03/04/2019          Chart documentation done in part with Dragon Voice recognition Software. Although reviewed after completion, some word and grammatical error may remain.    Lavon Light MD

## 2023-10-26 NOTE — NURSING NOTE
RAPID3 (0-30) Cumulative Score  12.3          RAPID3 Weighted Score (divide #4 by 3 and that is the weighted score)  4.1

## 2023-10-26 NOTE — TELEPHONE ENCOUNTER
Medication:   Vitamin D3  Last written on:   10/17/2023  Quantity:   90    Refills:   2    Last office visit:   10/26/2023  Next office visit:   02/01/2024  Last labs:   10/17/2023

## 2023-10-30 RX ORDER — CHOLECALCIFEROL (VITAMIN D3) 50 MCG
50 TABLET ORAL DAILY
Qty: 90 TABLET | Refills: 2 | Status: SHIPPED | OUTPATIENT
Start: 2023-10-30

## 2023-10-30 NOTE — TELEPHONE ENCOUNTER
"Requested Prescriptions   Pending Prescriptions Disp Refills    vitamin D3 (CHOLECALCIFEROL) 50 mcg (2000 units) tablet 90 tablet 2     Sig: Take 1 tablet (50 mcg) by mouth daily       Vitamin Supplements (Adult) Protocol Passed - 10/26/2023  3:41 PM        Passed - High dose Vitamin D not ordered        Passed - Recent (12 mo) or future (30 days) visit within the authorizing provider's specialty     Patient has had an office visit with the authorizing provider or a provider within the authorizing providers department within the previous 12 mos or has a future within next 30 days. See \"Patient Info\" tab in inbasket, or \"Choose Columns\" in Meds & Orders section of the refill encounter.              Passed - Medication is active on med list           RN refilled medication per [facility] [place] Protocol.     Gi CARTAGENA, Specialty RN 10/30/2023 3:57 PM    "

## 2023-11-15 ENCOUNTER — OFFICE VISIT (OUTPATIENT)
Dept: DERMATOLOGY | Facility: CLINIC | Age: 38
End: 2023-11-15
Payer: COMMERCIAL

## 2023-11-15 DIAGNOSIS — B36.0 TV (TINEA VERSICOLOR): ICD-10-CM

## 2023-11-15 DIAGNOSIS — L71.9 ACNE ROSACEA: Primary | ICD-10-CM

## 2023-11-15 PROCEDURE — 99213 OFFICE O/P EST LOW 20 MIN: CPT | Performed by: PHYSICIAN ASSISTANT

## 2023-11-15 RX ORDER — AZELAIC ACID 0.15 G/G
GEL TOPICAL
Qty: 50 G | Refills: 4 | Status: SHIPPED | OUTPATIENT
Start: 2023-11-15 | End: 2024-08-07

## 2023-11-15 RX ORDER — KETOCONAZOLE 20 MG/G
CREAM TOPICAL
Qty: 30 G | Refills: 4 | Status: SHIPPED | OUTPATIENT
Start: 2023-11-15 | End: 2024-08-07

## 2023-11-15 NOTE — LETTER
11/15/2023         RE: Aleida Weinberg  [address] [address]        Dear Colleague,    Thank you for referring your patient, Aleida Weinberg, to the [facility]. Please see a copy of my visit note below.    Aleida Weinberg is an extremely pleasant 38 year old year old female patient here today for recheck acne rosacea in a setting of lupus. She did not tolerate elidel. No improvements with metocream in the past. She does feel azelaic 10% OTC did help with out side effects. She would like to try increased dosage. She also notes rash on chest that looks similar to rash that her daughter had. Patient has no other skin complaints today.  Remainder of the HPI, Meds, PMH, Allergies, FH, and SH was reviewed in chart.    Past Medical History:   Diagnosis Date     Arthritis      Lupus erythematosus      Mild intermittent asthma      Other kyphoscoliosis and scoliosis     upper back curvature and disc degeneration in lower back.       Past Surgical History:   Procedure Laterality Date      SECTION       EXCISE GANGLION WRIST Left 11/10/2022    Procedure: EXCISION, GANGLION CYST, WRIST LEFT;  Surgeon: Jayde Martinez MD;  Location: [facility] OR     SURGICAL HISTORY OF -       PE Tubes        Family History   Problem Relation Age of Onset     Heart Disease Maternal Grandfather         Bypass, Heart Disease     Respiratory Maternal Grandfather         asthma     Macular Degeneration Maternal Grandfather      Hypertension Paternal Grandmother      Cancer Paternal Grandmother         lymph nodes     Cataracts Paternal Grandmother      C.A.D. Paternal Grandfather      Heart Disease Maternal Uncle         MI's      Alcohol/Drug Maternal Uncle      Respiratory Other         cousin on mothers side, asthma     Alcohol/Drug Other         bother great grandparents on mother's side     Diabetes No family hx of      Breast Cancer No family hx of      Cancer  - colorectal No family hx of        Social History     Socioeconomic History     Marital status: Single     Spouse name: Not on file     Number of children: Not on file     Years of education: Not on file     Highest education level: Not on file   Occupational History     Not on file   Tobacco Use     Smoking status: Former     Packs/day: 0.50     Years: 2.50     Additional pack years: 0.00     Total pack years: 1.25     Types: Cigarettes     Quit date: 2005     Years since quittin.0     Passive exposure: Never     Smokeless tobacco: Never   Vaping Use     Vaping Use: Never used   Substance and Sexual Activity     Alcohol use: Yes     Alcohol/week: 0.0 standard drinks of alcohol     Comment: rarely - 1 monthly     Drug use: No     Sexual activity: Yes     Partners: Male     Birth control/protection: I.U.D.   Other Topics Concern     Parent/sibling w/ CABG, MI or angioplasty before 65F 55M? No   Social History Narrative    Caffeine intake/servings daily - 1    Calcium intake/servings daily - 2-3    Exercise 0 times weekly     Sunscreen used - Yes    Seatbelts used - Yes    Guns stored in the home - No    Self Breast Exam - Yes    Pap test up to date -  Yes    Eye exam up to date -  Yes    Dental exam up to date -  Yes    DEXA scan up to date -  Not Applicable    Flex Sig/Colonoscopy up to date -  Not Applicable    Mammography up to date -  Not Applicable    Immunizations reviewed and up to date - Yes    Abuse: Current or Past (Physical, Sexual or Emotional) - No    Do you feel safe in your environment - Yes    Do you cope well with stress - Yes    Do you suffer from insomnia - No    Last updated by: Nani Heredia  2007         Social Determinants of Health     Financial Resource Strain: Not on file   Food Insecurity: Not on file   Transportation Needs: Not on file   Physical Activity: Not on file   Stress: Not on file   Social Connections: Not on file   Interpersonal Safety: Not on file   Housing  Stability: Not on file       Outpatient Encounter Medications as of 11/15/2023   Medication Sig Dispense Refill     atovaquone-proguanil (MALARONE) 250-100 MG tablet Take 1 tablet by mouth daily Start 2 days before exposure to Malaria and continue daily till  7 days after exposure. 15 tablet 0     azelaic acid (FINACIA) 15 % external gel Apply pea sized amount at bedtime. 50 g 4     ketoconazole (NIZORAL) 2 % external cream Apply twice daily to rash for next 2-3 weeks. 30 g 4     albuterol (PROAIR HFA/PROVENTIL HFA/VENTOLIN HFA) 108 (90 Base) MCG/ACT inhaler Inhale 2 puffs into the lungs every 6 hours as needed for shortness of breath / dyspnea 18 g 1     azaTHIOprine (IMURAN) 50 MG tablet Take 1 tablet (50 mg) by mouth daily 90 tablet 2     ciprofloxacin (CIPRO) 500 MG tablet Take 1 tablet (500 mg) by mouth 2 times daily Take 1 tablet two times a day for up to 3 days for severe diarrhea during travel. 6 tablet 0     cyclobenzaprine (FLEXERIL) 10 MG tablet Take 1 tablet (10 mg) by mouth 3 times daily as needed for muscle spasms 30 tablet 3     hydroxychloroquine (PLAQUENIL) 200 MG tablet Take 1 tablet (200 mg) by mouth daily . Yearly eye exam including 10-2 VF and SD-OCT required 90 tablet 2     hydrOXYzine (ATARAX) 25 MG tablet Take 1 tablet (25 mg) by mouth every 6 hours as needed for anxiety 90 tablet 6     levonorgestrel (MIRENA) 20 MCG/24HR IUD 1 each by Intrauterine route once       LORazepam (ATIVAN) 0.5 MG tablet Take 1 tablet (0.5 mg) by mouth every 8 hours as needed for anxiety 15 tablet 0     pimecrolimus (ELIDEL) 1 % external cream Apply once to twice daily to face. (Patient not taking: Reported on 11/15/2023) 30 g 4     traZODone (DESYREL) 50 MG tablet Take 0.5 tablets (25 mg) by mouth nightly as needed for sleep 30 tablet 0     vitamin D3 (CHOLECALCIFEROL) 50 mcg (2000 units) tablet Take 1 tablet (50 mcg) by mouth daily 90 tablet 2     No facility-administered encounter medications on file as of  11/15/2023.             O:   NAD, WDWN, Alert & Oriented, Mood & Affect wnl, Vitals stable   Here today alone   There were no vitals taken for this visit.   General appearance normal   Vitals stable   Alert, oriented and in no acute distress       Redness inflammatory papules with background erythema involving NLF on cheeks, nose, and glabella, perioral    Mild pink slightly thin plaque on chest       Eyes: Conjunctivae/lids:Normal     ENT: Lips: normal    MSK:Normal    Pulm: Breathing Normal    Lymph Nodes: No Head and Neck Lymphadenopathy     Neuro/Psych: Orientation:Alert and Orientedx3 ; Mood/Affect:normal     A/P:  1. Acne Rosacea  Has tolerated azelaic acid 10%, would like to try prescription strength 15%.   Discussed main side effect is dryness.   Continue moisturizers sunscreen. She is using it cosmetics redness moisturizing cream.   If not covered I can send to compounding pharmacy.   2. Tinea versicolor   Apply ketoconazole cream twice daily for 2-3 weeks.   Recheck in 3-4 months.       Again, thank you for allowing me to participate in the care of your patient.        Sincerely,        Shante Bautista PA-C

## 2023-11-16 NOTE — PROGRESS NOTES
Aleida Weinberg is an extremely pleasant 38 year old year old female patient here today for recheck acne rosacea in a setting of lupus. She did not tolerate elidel. No improvements with metocream in the past. She does feel azelaic 10% OTC did help with out side effects. She would like to try increased dosage. She also notes rash on chest that looks similar to rash that her daughter had. Patient has no other skin complaints today.  Remainder of the HPI, Meds, PMH, Allergies, FH, and SH was reviewed in chart.    Past Medical History:   Diagnosis Date    Arthritis     Lupus erythematosus     Mild intermittent asthma     Other kyphoscoliosis and scoliosis     upper back curvature and disc degeneration in lower back.       Past Surgical History:   Procedure Laterality Date     SECTION      EXCISE GANGLION WRIST Left 11/10/2022    Procedure: EXCISION, GANGLION CYST, WRIST LEFT;  Surgeon: Jayde Martinez MD;  Location: [facility] OR    SURGICAL HISTORY OF -       PE Tubes        Family History   Problem Relation Age of Onset    Heart Disease Maternal Grandfather         Bypass, Heart Disease    Respiratory Maternal Grandfather         asthma    Macular Degeneration Maternal Grandfather     Hypertension Paternal Grandmother     Cancer Paternal Grandmother         lymph nodes    Cataracts Paternal Grandmother     C.A.D. Paternal Grandfather     Heart Disease Maternal Uncle         MI's     Alcohol/Drug Maternal Uncle     Respiratory Other         cousin on mothers side, asthma    Alcohol/Drug Other         bother great grandparents on mother's side    Diabetes No family hx of     Breast Cancer No family hx of     Cancer - colorectal No family hx of        Social History     Socioeconomic History    Marital status: Single     Spouse name: Not on file    Number of children: Not on file    Years of education: Not on file    Highest education level: Not on file   Occupational History    Not on file   Tobacco Use     Smoking status: Former     Packs/day: 0.50     Years: 2.50     Additional pack years: 0.00     Total pack years: 1.25     Types: Cigarettes     Quit date: 2005     Years since quittin.0     Passive exposure: Never    Smokeless tobacco: Never   Vaping Use    Vaping Use: Never used   Substance and Sexual Activity    Alcohol use: Yes     Alcohol/week: 0.0 standard drinks of alcohol     Comment: rarely - 1 monthly    Drug use: No    Sexual activity: Yes     Partners: Male     Birth control/protection: I.U.D.   Other Topics Concern    Parent/sibling w/ CABG, MI or angioplasty before 65F 55M? No   Social History Narrative    Caffeine intake/servings daily - 1    Calcium intake/servings daily - 2-3    Exercise 0 times weekly     Sunscreen used - Yes    Seatbelts used - Yes    Guns stored in the home - No    Self Breast Exam - Yes    Pap test up to date -  Yes    Eye exam up to date -  Yes    Dental exam up to date -  Yes    DEXA scan up to date -  Not Applicable    Flex Sig/Colonoscopy up to date -  Not Applicable    Mammography up to date -  Not Applicable    Immunizations reviewed and up to date - Yes    Abuse: Current or Past (Physical, Sexual or Emotional) - No    Do you feel safe in your environment - Yes    Do you cope well with stress - Yes    Do you suffer from insomnia - No    Last updated by: Nani Heredia  2007         Social Determinants of Health     Financial Resource Strain: Not on file   Food Insecurity: Not on file   Transportation Needs: Not on file   Physical Activity: Not on file   Stress: Not on file   Social Connections: Not on file   Interpersonal Safety: Not on file   Housing Stability: Not on file       Outpatient Encounter Medications as of 11/15/2023   Medication Sig Dispense Refill    atovaquone-proguanil (MALARONE) 250-100 MG tablet Take 1 tablet by mouth daily Start 2 days before exposure to Malaria and continue daily till  7 days after exposure. 15 tablet 0    azelaic acid  (FINACIA) 15 % external gel Apply pea sized amount at bedtime. 50 g 4    ketoconazole (NIZORAL) 2 % external cream Apply twice daily to rash for next 2-3 weeks. 30 g 4    albuterol (PROAIR HFA/PROVENTIL HFA/VENTOLIN HFA) 108 (90 Base) MCG/ACT inhaler Inhale 2 puffs into the lungs every 6 hours as needed for shortness of breath / dyspnea 18 g 1    azaTHIOprine (IMURAN) 50 MG tablet Take 1 tablet (50 mg) by mouth daily 90 tablet 2    ciprofloxacin (CIPRO) 500 MG tablet Take 1 tablet (500 mg) by mouth 2 times daily Take 1 tablet two times a day for up to 3 days for severe diarrhea during travel. 6 tablet 0    cyclobenzaprine (FLEXERIL) 10 MG tablet Take 1 tablet (10 mg) by mouth 3 times daily as needed for muscle spasms 30 tablet 3    hydroxychloroquine (PLAQUENIL) 200 MG tablet Take 1 tablet (200 mg) by mouth daily . Yearly eye exam including 10-2 VF and SD-OCT required 90 tablet 2    hydrOXYzine (ATARAX) 25 MG tablet Take 1 tablet (25 mg) by mouth every 6 hours as needed for anxiety 90 tablet 6    levonorgestrel (MIRENA) 20 MCG/24HR IUD 1 each by Intrauterine route once      LORazepam (ATIVAN) 0.5 MG tablet Take 1 tablet (0.5 mg) by mouth every 8 hours as needed for anxiety 15 tablet 0    pimecrolimus (ELIDEL) 1 % external cream Apply once to twice daily to face. (Patient not taking: Reported on 11/15/2023) 30 g 4    traZODone (DESYREL) 50 MG tablet Take 0.5 tablets (25 mg) by mouth nightly as needed for sleep 30 tablet 0    vitamin D3 (CHOLECALCIFEROL) 50 mcg (2000 units) tablet Take 1 tablet (50 mcg) by mouth daily 90 tablet 2     No facility-administered encounter medications on file as of 11/15/2023.             O:   NAD, WDWN, Alert & Oriented, Mood & Affect wnl, Vitals stable   Here today alone   There were no vitals taken for this visit.   General appearance normal   Vitals stable   Alert, oriented and in no acute distress       Redness inflammatory papules with background erythema involving NLF on cheeks,  nose, and glabella, perioral    Mild pink slightly thin plaque on chest       Eyes: Conjunctivae/lids:Normal     ENT: Lips: normal    MSK:Normal    Pulm: Breathing Normal    Lymph Nodes: No Head and Neck Lymphadenopathy     Neuro/Psych: Orientation:Alert and Orientedx3 ; Mood/Affect:normal     A/P:  1. Acne Rosacea  Has tolerated azelaic acid 10%, would like to try prescription strength 15%.   Discussed main side effect is dryness.   Continue moisturizers sunscreen. She is using it cosmetics redness moisturizing cream.   If not covered I can send to compounding pharmacy.   2. Tinea versicolor   Apply ketoconazole cream twice daily for 2-3 weeks.   Recheck in 3-4 months.

## 2023-11-22 ENCOUNTER — PATIENT OUTREACH (OUTPATIENT)
Dept: CARE COORDINATION | Facility: CLINIC | Age: 38
End: 2023-11-22

## 2023-11-22 ENCOUNTER — ANCILLARY PROCEDURE (OUTPATIENT)
Dept: GENERAL RADIOLOGY | Facility: CLINIC | Age: 38
End: 2023-11-22
Payer: COMMERCIAL

## 2023-11-22 ENCOUNTER — MYC MEDICAL ADVICE (OUTPATIENT)
Dept: FAMILY MEDICINE | Facility: CLINIC | Age: 38
End: 2023-11-22

## 2023-11-22 ENCOUNTER — OFFICE VISIT (OUTPATIENT)
Dept: FAMILY MEDICINE | Facility: CLINIC | Age: 38
End: 2023-11-22
Payer: COMMERCIAL

## 2023-11-22 VITALS
OXYGEN SATURATION: 96 % | SYSTOLIC BLOOD PRESSURE: 116 MMHG | DIASTOLIC BLOOD PRESSURE: 77 MMHG | TEMPERATURE: 98.6 F | HEIGHT: 68 IN | BODY MASS INDEX: 24.41 KG/M2 | HEART RATE: 85 BPM | RESPIRATION RATE: 22 BRPM

## 2023-11-22 DIAGNOSIS — G89.29 CHRONIC BILATERAL THORACIC BACK PAIN: ICD-10-CM

## 2023-11-22 DIAGNOSIS — M54.41 ACUTE MIDLINE LOW BACK PAIN WITH RIGHT-SIDED SCIATICA: Primary | ICD-10-CM

## 2023-11-22 DIAGNOSIS — M06.4 INFLAMMATORY POLYARTHROPATHY (H): ICD-10-CM

## 2023-11-22 DIAGNOSIS — F41.9 ANXIETY: ICD-10-CM

## 2023-11-22 DIAGNOSIS — M54.41 ACUTE MIDLINE LOW BACK PAIN WITH RIGHT-SIDED SCIATICA: ICD-10-CM

## 2023-11-22 DIAGNOSIS — M54.6 CHRONIC BILATERAL THORACIC BACK PAIN: ICD-10-CM

## 2023-11-22 PROCEDURE — 73521 X-RAY EXAM HIPS BI 2 VIEWS: CPT | Mod: TC | Performed by: RADIOLOGY

## 2023-11-22 PROCEDURE — 99215 OFFICE O/P EST HI 40 MIN: CPT

## 2023-11-22 RX ORDER — LORAZEPAM 0.5 MG/1
0.5 TABLET ORAL EVERY 8 HOURS PRN
Qty: 15 TABLET | Refills: 0 | Status: SHIPPED | OUTPATIENT
Start: 2023-11-22

## 2023-11-22 ASSESSMENT — ASTHMA QUESTIONNAIRES: ACT_TOTALSCORE: 25

## 2023-11-22 NOTE — PROGRESS NOTES
"  Assessment & Plan     Chronic bilateral thoracic back pain  Acute midline low back pain with right-sided sciatica  Acute on chronic back pain with new characteristics including bilateral hips. Prior PT - pt has home exercise/stretching program. Discussed NSAIDs/possible short course of steroids, holding off on this since PMH of autoimmune disease.   No prior eval by spine clinic/pain specialist. Last MRI 2016, repeating since pain is worsening and pt does have autoimmune disease with inflammatory polyarthropathy.   - Spine  Referral  - MR Lumbar Spine w/o Contrast  - MR Thoracic Spine w/o Contrast  - XR Pelvis w 1 View Each Hip      Inflammatory polyarthropathy (H)  Chronic joint pain and swelling, follows with rheumatology, on hydroxychloroquine and azathioprine. ? Possible autoimmune ankylosing spondylitis Pt has not discussed her spine/back pain with rheumatology provider yet. I advised sending [employer] message or contacting nurse line.   - Spine  Referral  - MR Lumbar Spine w/o Contrast  - MR Thoracic Spine w/o Contrast  - XR Pelvis w 1 View Each Hip    Anxiety  Chronic, stable, slightly increased lately due to pain. Refilled prn lorazepam.   - LORazepam (ATIVAN) 0.5 MG tablet  Dispense: 15 tablet; Refill: 0      Ordering of each unique test  Prescription drug management         BMI:   Estimated body mass index is 24.41 kg/m  as calculated from the following:    Height as of this encounter: 1.715 m (5' 7.5\").    Weight as of 10/26/23: 71.8 kg (158 lb 3.2 oz).       See Patient Instructions  40 minutes spent on the date of the encounter doing chart review, history and exam, documentation and further activities as noted above    FARZANA [facility]    Diana Shin is a 38 year old, presenting for the following health issues:  Recheck Medication        11/22/2023     6:52 AM   Additional Questions   Roomed by Hraini HUMPHREYS MA       History of " Present Illness       Back Pain:  She presents for follow up of back pain. Patient's back pain is a chronic problem.  Location of back pain:  Right lower back, left lower back, right buttock, left buttock, right hip and left hip  Description of back pain: burning, sharp, shooting and stabbing  Back pain spreads: right buttocks, left buttocks, right thigh, left thigh, right knee, left knee, right foot and left foot    Since patient first noticed back pain, pain is: rapidly worsening  Does back pain interfere with her job:  No       She eats 2-3 servings of fruits and vegetables daily.She consumes 1 sweetened beverage(s) daily.She exercises with enough effort to increase her heart rate 20 to 29 minutes per day.  She exercises with enough effort to increase her heart rate 3 or less days per week.   She is taking medications regularly.    Back pain - radiates to L and R hips, in the past it radiated to right.   Worst - 6/10, never 0 in the past few weeks, 4/10 most of the day in significant pain  Feels like resting HR is higher, generally  - PT in the past, has home exercise plan and does exercises at home.  - flexeril more consistently, ice helps, uses ibuprofen a couple days/week  - PMH lupus    Anxiety Follow-Up  How are you doing with your anxiety since your last visit? No change  Are you having other symptoms that might be associated with anxiety? No  Have you had a significant life event? No   Are you feeling depressed? No  Do you have any concerns with your use of alcohol or other drugs? No  - Hydroxyzine a few times/month.   - Ativan, occasionally.     Social History     Tobacco Use    Smoking status: Former     Packs/day: 0.50     Years: 2.50     Additional pack years: 0.00     Total pack years: 1.25     Types: Cigarettes     Quit date: 2005     Years since quittin.0     Passive exposure: Never    Smokeless tobacco: Never   Vaping Use    Vaping Use: Never used   Substance Use Topics    Alcohol use:  "Yes     Alcohol/week: 0.0 standard drinks of alcohol     Comment: rarely - 1 monthly    Drug use: No         5/31/2019     3:05 PM 1/17/2020     8:44 AM 10/15/2021     8:50 AM   TONE-7 SCORE   Total Score 3 4 6         5/31/2019     3:05 PM 1/17/2020     8:44 AM 10/15/2021     8:50 AM   PHQ   PHQ-9 Total Score 0 0 0   Q9: Thoughts of better off dead/self-harm past 2 weeks Not at all Not at all Not at all         10/15/2021     8:50 AM   Last PHQ-9   1.  Little interest or pleasure in doing things 0   2.  Feeling down, depressed, or hopeless 0   3.  Trouble falling or staying asleep, or sleeping too much 0   4.  Feeling tired or having little energy 0   5.  Poor appetite or overeating 0   6.  Feeling bad about yourself 0   7.  Trouble concentrating 0   8.  Moving slowly or restless 0   Q9: Thoughts of better off dead/self-harm past 2 weeks 0   PHQ-9 Total Score 0         10/15/2021     8:50 AM   TONE-7    1. Feeling nervous, anxious, or on edge 1   2. Not being able to stop or control worrying 0   3. Worrying too much about different things 1   4. Trouble relaxing 1   5. Being so restless that it is hard to sit still 1   6. Becoming easily annoyed or irritable 1   7. Feeling afraid, as if something awful might happen 1   TONE-7 Total Score 6           Review of Systems   Constitutional, HEENT, cardiovascular, pulmonary, gi and gu systems are negative, except as otherwise noted.      Objective    /77 (BP Location: Right arm, Patient Position: Chair, Cuff Size: Adult Regular)   Pulse 85   Temp 98.6  F (37  C) (Oral)   Resp 22   Ht 1.715 m (5' 7.5\")   SpO2 96%   BMI 24.41 kg/m    Body mass index is 24.41 kg/m .  Physical Exam   GENERAL: healthy, alert and no distress  NECK: no adenopathy, no asymmetry, masses, or scars and thyroid normal to palpation  CV/RESP: unlabored, even. No cough, wheezing. Peripheral pulses normal.   MS: no gross musculoskeletal defects noted, no edema + generalized tenderness bilateral " hips, +generalized tenderness in lumbar spine.   SKIN: no suspicious lesions or rashes  NEURO: Normal strength and tone, mentation intact and speech normal  PSYCH: mentation appears normal, affect normal/bright    No results found for this or any previous visit (from the past 24 hour(s)).

## 2023-11-22 NOTE — PATIENT INSTRUCTIONS
Schedule MRI  Schedule with spine specialist    Message your rheumatologist to see if increasing these medications may help symptoms.

## 2023-12-06 ENCOUNTER — PATIENT OUTREACH (OUTPATIENT)
Dept: CARE COORDINATION | Facility: CLINIC | Age: 38
End: 2023-12-06
Payer: COMMERCIAL

## 2023-12-06 ENCOUNTER — MYC MEDICAL ADVICE (OUTPATIENT)
Dept: FAMILY MEDICINE | Facility: CLINIC | Age: 38
End: 2023-12-06
Payer: COMMERCIAL

## 2023-12-06 NOTE — TELEPHONE ENCOUNTER
Replied to pt via [employer].   Completed peer to peer with Dr. Schmitt with [facility].    Approval number: [id number]  Case number [id number]

## 2023-12-13 ENCOUNTER — TELEPHONE (OUTPATIENT)
Dept: ANESTHESIOLOGY | Facility: CLINIC | Age: 38
End: 2023-12-13

## 2023-12-13 ENCOUNTER — HOSPITAL ENCOUNTER (OUTPATIENT)
Dept: MRI IMAGING | Facility: CLINIC | Age: 38
Discharge: HOME | End: 2023-12-13
Payer: COMMERCIAL

## 2023-12-13 DIAGNOSIS — M54.41 ACUTE MIDLINE LOW BACK PAIN WITH RIGHT-SIDED SCIATICA: ICD-10-CM

## 2023-12-13 DIAGNOSIS — M54.6 CHRONIC BILATERAL THORACIC BACK PAIN: ICD-10-CM

## 2023-12-13 DIAGNOSIS — M06.4 INFLAMMATORY POLYARTHROPATHY (H): ICD-10-CM

## 2023-12-13 DIAGNOSIS — G89.29 CHRONIC BILATERAL THORACIC BACK PAIN: ICD-10-CM

## 2023-12-13 PROCEDURE — 72146 MRI CHEST SPINE W/O DYE: CPT

## 2023-12-13 PROCEDURE — 72146 MRI CHEST SPINE W/O DYE: CPT | Mod: 26 | Performed by: RADIOLOGY

## 2023-12-13 PROCEDURE — 72148 MRI LUMBAR SPINE W/O DYE: CPT | Mod: 26 | Performed by: RADIOLOGY

## 2023-12-13 PROCEDURE — 72148 MRI LUMBAR SPINE W/O DYE: CPT

## 2023-12-13 NOTE — TELEPHONE ENCOUNTER
I called the patient she is aware of her appointment with Dr Hernandez at 8:00 to arrive 15-20 minutes prior

## 2023-12-14 ENCOUNTER — TELEPHONE (OUTPATIENT)
Dept: PALLIATIVE MEDICINE | Facility: CLINIC | Age: 38
End: 2023-12-14

## 2023-12-14 ENCOUNTER — OFFICE VISIT (OUTPATIENT)
Dept: ANESTHESIOLOGY | Facility: CLINIC | Age: 38
End: 2023-12-14
Payer: COMMERCIAL

## 2023-12-14 VITALS — HEART RATE: 76 BPM | SYSTOLIC BLOOD PRESSURE: 99 MMHG | DIASTOLIC BLOOD PRESSURE: 68 MMHG | OXYGEN SATURATION: 99 %

## 2023-12-14 DIAGNOSIS — M54.41 ACUTE MIDLINE LOW BACK PAIN WITH RIGHT-SIDED SCIATICA: ICD-10-CM

## 2023-12-14 DIAGNOSIS — M46.1 SACROILIITIS (H): Primary | ICD-10-CM

## 2023-12-14 DIAGNOSIS — M06.4 INFLAMMATORY POLYARTHROPATHY (H): ICD-10-CM

## 2023-12-14 PROCEDURE — 99204 OFFICE O/P NEW MOD 45 MIN: CPT | Mod: GC | Performed by: ANESTHESIOLOGY

## 2023-12-14 ASSESSMENT — PAIN SCALES - GENERAL: PAINLEVEL: MILD PAIN (3)

## 2023-12-14 NOTE — TELEPHONE ENCOUNTER
Phoned the patient to schedule injection procedure with dr. Hernandez. Patient stated she wants to hold of for injection now. Patient wants to do physical therapy first.

## 2023-12-14 NOTE — PATIENT INSTRUCTIONS
Referrals:    Physical Therapy Referral placed-   If you have not heard from the scheduling office within 2 business days, please call [phone] for all locations       Procedures:    Call to schedule your procedure: [phone] option #2    Bilateral SI Joint Injection    Your pre-procedure instructions are below, please call our clinic if you have any questions.        Recommended Follow up:      Follow up 6 weeks after procedure.        Please call [phone] to schedule your clinic appointment if you don't already have an appointment scheduled.        To speak with a nurse, schedule/reschedule/cancel a clinic appointment, or request a medication refill call: [phone]    You can also reach us by [employer].: [email or web address]       Procedure Information:     Please call [phone] option #2 to schedule, reschedule, or cancel your procedure appointment.   Phones are answered Monday - Friday from 08:00 - 4:30pm.  Leave a voicemail with your name, birth date, and phone number if no one is available to take your call.        You no longer need to test for COVID- 19 prior to your procedure/surgery, unless your physician specifically requests that you test. If you experience COVID symptoms or have tested positive for COVID-19 within 14 days of your scheduled surgery or procedure, please update our office right away and your procedure may have to be postponed.       The procedure center staff will call you several days before the procedure to review important information that you will need to know for the day of the procedure.     Please contact the clinic if you have further questions about this information [phone].        Information related to Scheduling and Pre-Procedure Instructions:    If you must reschedule your procedure more than two times, you must follow up in clinic before rescheduling again.    Preparing for your procedure    CAUTION - FAILURE TO FOLLOW THESE  PRE-PROCEDURE INSTRUCTIONS WILL RESULT IN YOUR PROCEDURE BEING RESCHEDULED.    Your Procedure: Bilateral SI Joint Injection      Pregnancy  If you are pregnant, or think you may be pregnant, please notify our staff. This may or may not affect the ability to perform the procedure.       You must have a  take you home after your procedure. Transportation by taxi or para-transit is okay as long as you have a responsible adult accompany you. You must provide your 's full name and contact number at time of check in.     Fasting Protocol Please have nothing to eat or drink 1 hour prior to arrival.   Medications If you take any medications, DO NOT STOP. Take your medications as usual the day of your procedure with a sip of water AT LEAST 2 HOURS PRIOR TO ARRIVAL.    Antibiotics If you are currently taking antibiotics, you must complete the entire dose 7 days prior to your scheduled procedure. You must be clear of any signs or symptoms of infection. If you begin antibiotics, please contact our clinic for instructions.     Fever, Chills, or Rash If you experience a fever of higher than 100 degrees, chills, rash, or open wounds during the one week before your procedure, please call the clinic to see if you may proceed with your procedure.      Medication Hold List  **Patients under Cardiology/Neurology care should consult their provider prior to the pain procedure to verify pre-procedure medication instructions. The information below contains general guidelines.**      Blood Thinners If you are taking daily ASPIRIN, PLAVIX, OR OTHER BLOOD THINNERS SUCH AS COUMADIN/WARFARIN, we will need your prescribing doctor to sign a release permitting you to stop these medications. Once approved by your prescribing doctor - STOP ALL BLOOD THINNERS BASED ON THE TIME TABLE BELOW PRIOR TO YOUR PROCEDURE. If you have been instructed to stop WARFARIN(COUMADIN), you must have an INR lab drawn the day before your procedure.  Your INR must be within normal limits before we can perform your injection. MEDICATIONS CAN BE RESTARTED AFTER YOUR PROCEDURE.    24 HOUR HOLD  Lovenox (enoxaparin)  Agrylin (Anagrelide)    3 DAY HOLD  Xarelto (rivaroxaban)    5 DAY HOLD  Coumadin (Warfarin)  Brilinta (ticagrelor) 7 DAY HOLD  Anacin, Bufferin, Ecotrin, Excedrin, Aggrenox (Aspirin)  Pradexa (Dabigatran)  Elmiron (Pentosan)  Plavix (Clopidogrel Bisulfate)  Pletal (Cilostazol)    10 DAY HOLD  Effient (Prasugel)    14 DAY HOLD  Ticlid (ticlopidine)        Non-steroidal Anti-inflammatories (NSAIDs) DO NOT TAKE any non-steroidal anti-inflammatory medications (NSAIDs) listed on the table below. MEDICATIONS CAN BE RESTARTED AFTER YOUR PROCEDURE. Celebrex is OK to take and does not need to be discontinued.     Medications to stop:  1 DAY HOLD  Advil, Motrin (Ibuprofen)  Voltaren (Diclofenac)  Toradol (Ketorolac)    3 DAY HOLD  Arthrotec (diciofenac sodium/misoprostol)  Clinoril (Sulindac)  Indocin (Indomethacin)  Lodine (Etodolac)  Vicoprofen (Hydrocodone and Ibuprofen)  Apixaban (Eliquis)    4 DAY HOLD  Mobic (Meloxicam)  Naprosyn (Naproxen)   7 DAY HOLD  Aleve (Naproxen sodium)  Darvon compound (contains aspirin)  Norgesic Forte (contains aspirin)  Oruvall (Ketoprofen)  Percodan (contains aspirin)  Relafen (Nabumetone)  Salsalate  Trilisate  Vitamin E (more than 400 mg per day)  Any medication containing aspirin    14 DAY HOLD  Daypro (Oxaprozin)  Feldene (Piroxicam)            To speak with a nurse, schedule/reschedule/cancel a clinic appointment, or request a medication refill call: [phone]    You can also reach us by [employer]: [email or web address]

## 2023-12-14 NOTE — LETTER
12/14/2023       RE: Aleida Weinberg  [address] [address]       Dear Colleague,    Thank you for referring your patient, Aleida Weinberg, to the [facility]ENSIVE [facility]NAG[facility] [place] at [facility]. Please see a copy of my visit note below.                          [facility] Consultation    Date of visit: 12/13/2023    Reason for consultation:    Aleida Weinberg is a 38 year old female who is seen in consultation today at the request of her provider, Jamal Álvarez, FARZANA GOINS .    Primary Care Provider is Jamal Álvarez.    Patient Supplied Answers To the [facility] Pain Questionnaire       No data to display                Chief Complaint:    Chief Complaint   Patient presents with    Consult     Acute midline low back pain       Pain history:  Aleida Weinberg is a 38 year old female who first started having problems with pain in low back earlier this fall. She notes chronic back pain stemming from her known thoracic scoliosis, but reports she noticed acute worsening of the low back pain on a flight to [place]. She reports sitting makes the pain worse, and laying flat improves the pain. Endorses radiation of the pain down the right leg into the lateral calf muscle with occasional involvement of the foot when driving. She has been taking ibuprofen intermittently and has increased her flexeril intake in the evenings without improvement of her pain. She regularly sees a chiropractor who improves her thoracic back pain, but has not been able to relieve her low back pain.    Pain rating: intensity ranges from 2/10 to 7/10, and Averages 4/10 on a 0-10 scale.  Aggravating factors include: Sitting  Relieving factors include: Laying flat  Any bowel or bladder incontinence: none    Current treatments include:  Ibuprofen prn  Flexeril prn    Previous medication treatments  included:  N/a    Other treatments have included:  Aleida Weinberg has not been seen at a pain clinic in the past.   PT: Previous PT for unrelated pain, never for low back pain  Acupuncture: No  TENs Unit: No  Injections: No    Past Medical History:  Past Medical History:   Diagnosis Date    Arthritis     Lupus erythematosus     Mild intermittent asthma     Other kyphoscoliosis and scoliosis     upper back curvature and disc degeneration in lower back.     Patient Active Problem List    Diagnosis Date Noted    Ganglion cyst of dorsum of left wrist 09/16/2022     Priority: Medium     Added automatically from request for surgery [id number]      High risk medications (not anticoagulants) long-term use (Plaquenil and AZA) 05/20/2016     Priority: Medium    Dry eyes, bilateral 05/20/2016     Priority: Medium    Disorder of connective tissue (H24) 05/18/2016     Priority: Medium    Systemic lupus erythematosus (H) 04/27/2016     Priority: Medium    Inflammatory polyarthropathy (H) 04/22/2016     Priority: Medium    Chronic back pain 04/22/2016     Priority: Medium    Raynaud's phenomenon without gangrene 04/22/2016     Priority: Medium    Vitamin D deficiency 04/04/2016     Priority: Medium    Intractable menstrual migraine without status migrainosus 11/17/2015     Priority: Medium    Anxiety 10/16/2015     Priority: Medium    Intermittent asthma 10/01/2012     Priority: Medium     Laughing, exercise, cold weather    Formatting of this note might be different from the original.  Overview:   Laughing, exercise, cold weather      CARDIOVASCULAR SCREENING; LDL GOAL LESS THAN 160 06/11/2010     Priority: Medium    Viral warts 05/30/2006     Priority: Medium     Warts in upper, inner thighs, groin.   Problem list name updated by automated process. Provider to review    Formatting of this note might be different from the original.  Overview:   Warts in upper, inner thighs, groin.   Problem list name updated by automated  process. Provider to review      Hypertrophic and atrophic condition of skin 2006     Priority: Medium     Problem list name updated by automated process. Provider to review      Other kyphoscoliosis and scoliosis 2004     Priority: Medium     lower back disc degeneration.  sees spine clinic at [facility], suggested consult mid pregnancy, earlier if needed.     Formatting of this note might be different from the original.  Overview:   lower back disc degeneration.  sees spine clinic at [facility], suggested consult mid pregnancy, earlier if needed.         Past Surgical History:  Past Surgical History:   Procedure Laterality Date     SECTION      EXCISE GANGLION WRIST Left 11/10/2022    Procedure: EXCISION, GANGLION CYST, WRIST LEFT;  Surgeon: Jayde Martinez MD;  [place] [facility] [place]    SURGICAL HISTORY OF -       PE Tubes     Medications:  Current Outpatient Medications   Medication Sig Dispense Refill    albuterol (PROAIR HFA/PROVENTIL HFA/VENTOLIN HFA) 108 (90 Base) MCG/ACT inhaler Inhale 2 puffs into the lungs every 6 hours as needed for shortness of breath / dyspnea 18 g 1    atovaquone-proguanil (MALARONE) 250-100 MG tablet Take 1 tablet by mouth daily Start 2 days before exposure to Malaria and continue daily till  7 days after exposure. 15 tablet 0    azaTHIOprine (IMURAN) 50 MG tablet Take 1 tablet (50 mg) by mouth daily 90 tablet 2    azelaic acid (FINACIA) 15 % external gel Apply pea sized amount at bedtime. 50 g 4    ciprofloxacin (CIPRO) 500 MG tablet Take 1 tablet (500 mg) by mouth 2 times daily Take 1 tablet two times a day for up to 3 days for severe diarrhea during travel. 6 tablet 0    cyclobenzaprine (FLEXERIL) 10 MG tablet Take 1 tablet (10 mg) by mouth 3 times daily as needed for muscle spasms 30 tablet 3    hydroxychloroquine (PLAQUENIL) 200 MG tablet Take 1 tablet (200 mg) by mouth daily . Yearly eye exam including 10-2 VF and SD-OCT required 90 tablet 2    hydrOXYzine (ATARAX)  25 MG tablet Take 1 tablet (25 mg) by mouth every 6 hours as needed for anxiety 90 tablet 6    ketoconazole (NIZORAL) 2 % external cream Apply twice daily to rash for next 2-3 weeks. 30 g 4    levonorgestrel (MIRENA) 20 MCG/24HR IUD 1 each by Intrauterine route once      LORazepam (ATIVAN) 0.5 MG tablet Take 1 tablet (0.5 mg) by mouth every 8 hours as needed for anxiety 15 tablet 0    pimecrolimus (ELIDEL) 1 % external cream Apply once to twice daily to face. 30 g 4    traZODone (DESYREL) 50 MG tablet Take 0.5 tablets (25 mg) by mouth nightly as needed for sleep 30 tablet 0    vitamin D3 (CHOLECALCIFEROL) 50 mcg (2000 units) tablet Take 1 tablet (50 mcg) by mouth daily 90 tablet 2     Allergies:     Allergies   Allergen Reactions    Fluconazole Rash     Social History:  Occupation/Schooling: Works for [employer]    Family history:  Family History   Problem Relation Age of Onset    Heart Disease Maternal Grandfather         Bypass, Heart Disease    Respiratory Maternal Grandfather         asthma    Macular Degeneration Maternal Grandfather     Hypertension Paternal Grandmother     Cancer Paternal Grandmother         lymph nodes    Cataracts Paternal Grandmother     C.A.D. Paternal Grandfather     Heart Disease Maternal Uncle         MI's     Alcohol/Drug Maternal Uncle     Respiratory Other         cousin on mothers side, asthma    Alcohol/Drug Other         bother great grandparents on mother's side    Diabetes No family hx of     Breast Cancer No family hx of     Cancer - colorectal No family hx of        Review of Systems:    POSTIVE IN BOLD  GENERAL: fever/chills, fatigue, general unwell feeling, weight gain/loss.  HEAD/EYES:  headache, dizziness, or vision changes.    EARS/NOSE/THROAT:  Nosebleeds, hearing loss, sinus infection, earache, tinnitus.  IMMUNE:  Allergies, cancer, immune deficiency, or infections.  SKIN:  Urticaria, rash, hives  HEME/Lymphatic:   anemia, easy bruising, easy  "bleeding.  RESPIRATORY:  cough, wheezing, or shortness of breath  CARDIOVASCULAR/Circulation:  Extremity edema, syncope, hypertension, tachycardia, or angina.  GASTROINTESTINAL:  abdominal pain, nausea/emesis, diarrhea, constipation,  hematochezia, or melena.  ENDOCRINE:  Diabetes, steroid use,  thyroid disease or osteoporosis.  MUSCULOSKELETAL: neck pain, back pain, arthralgia, arthritis, or gout.  GENITOURINARY:  frequency, urgency, dysuria, difficulty voiding, hematuria or incontinence.  NEUROLOGIC:  weakness, numbness, paresthesias, seizure, tremor, stroke or memory loss.  PSYCHIATRIC:  depression, anxiety, stress, suicidal thoughts or mood swings.     Physical Exam:  Vitals:    12/14/23 0759   BP: 99/68   BP Location: Right arm   Patient Position: Chair   Cuff Size: Adult Regular   Pulse: 76   SpO2: 99%     Exam:  Constitutional: healthy, alert, and no distress  Head: normocephalic. Atraumatic.  Eyes: no redness or jaundice noted  ENT: MMM.   Cardiovascular: RRR   Respiratory: non-labored breathing  Gastrointestinal: non-distended  : deferred  Skin: no skin lesions or rashes noted on exposed areas  Psychiatric: mentation appears normal and affect normal/bright    Musculoskeletal exam:  Gait/Station/Posture: Uneven shoulders in the setting of known scoliosis, Gait normal    Thoracic spine: Levoscoliosis    Right SI joint tenderness  Normal 5/5 strength in BLE  Normal sensation to light touch in the L3-S1 distributions bilaterally     PRANEETH: positive bilaterally   Sacral Thrust: positive bilaterally  Debbie's Finger: positive bilaterally  Gaenslen's: positive bilaterally    Diagnostic tests:  MRI of Thoracic and Lumbar spine was completed on 12/13/23 showing:  \"Impression:   1. No definite abnormality within the thoracic spinal cord or vertebra. Stable left convex thoracic scoliosis.  2. There is no significant foraminal or spinal canal narrowing at any level in the lumbar region. Mild lumbar spondylosis. " "\"    Personally reviewed imaging      Assessment:  Acute midline low back pain, likely myofascial  Inflammatory polyarthropathy (H)  Sacroiliitis (H24)    Aleida Weinberg is a 38 year old female who presents with the complaints of bilateral low back pain, worse on the right due to the above diagnoses based on her history, imaging, and physical exam.     I have summarized the patient s past medical history, discussed their clinical findings and the potential differential diagnosis with the patient. I have discussed multi-disciplinary pain management options with the patient as pertaining to their case. The pain management options we discussed included, but were not limited to, the recommendations below.  I also discussed with patient the risks, benefits and alternatives to each pain management option. All of the patient s questions and concerns were answered.     Plan:  Diagnosis reviewed, treatment option addressed, and risk/benefits discussed.  Self-care instructions given.  I am recommending a multidisciplinary treatment plan to help this patient better manage her pain.      Physical Therapy: Referral placed  Pain Psychologist to address issues of relaxation, behavioral change, coping style, and other factors important to improvement: Not indicated  Diagnostic Studies: Not indicated  Medication Management: Encouraged regular NSAID use not to exceed daily max  Further procedures recommended: SI injection order placed  Other treatments: not indicated  Urine toxicology screen: not indicated   Recommendations/follow-up for PCP:  none  Release of information: none  Follow up: 6-8 weeks s/p SI injections or PRN      Francesco Borden, MS3  [facility]    I saw and examined the patient with the medical student. I have reviewed and agree with the student's note and plan of care and made changes and corrections directly to the body of the note.    TIME SPENT:  BY STUDENT ALONE 20 MIN  BY MYSELF AND " MEDICAL STUDENT 30 MIN             Again, thank you for allowing me to participate in the care of your patient.      Sincerely,    Luly Hernandez MD

## 2023-12-14 NOTE — PROGRESS NOTES
[facility] Consultation    Date of visit: 12/13/2023    Reason for consultation:    Aleida Weinberg is a 38 year old female who is seen in consultation today at the request of her provider, Jamal Álvarez APRN CNP .    Primary Care Provider is Jamal Álvarez.    Patient Supplied Answers To the [facility] Pain Questionnaire       No data to display                Chief Complaint:    Chief Complaint   Patient presents with    Consult     Acute midline low back pain       Pain history:  Aleida Weinberg is a 38 year old female who first started having problems with pain in low back earlier this fall. She notes chronic back pain stemming from her known thoracic scoliosis, but reports she noticed acute worsening of the low back pain on a flight to [facility]. She reports sitting makes the pain worse, and laying flat improves the pain. Endorses radiation of the pain down the right leg into the lateral calf muscle with occasional involvement of the foot when driving. She has been taking ibuprofen intermittently and has increased her flexeril intake in the evenings without improvement of her pain. She regularly sees a chiropractor who improves her thoracic back pain, but has not been able to relieve her low back pain.    Pain rating: intensity ranges from 2/10 to 7/10, and Averages 4/10 on a 0-10 scale.  Aggravating factors include: Sitting  Relieving factors include: Laying flat  Any bowel or bladder incontinence: none    Current treatments include:  Ibuprofen prn  Flexeril prn    Previous medication treatments included:  N/a    Other treatments have included:  Aleida Weinberg has not been seen at a pain clinic in the past.   PT: Previous PT for unrelated pain, never for low back pain  Acupuncture: No  TENs Unit: No  Injections: No    Past Medical History:  Past Medical History:   Diagnosis Date    Arthritis     Lupus erythematosus     Mild intermittent asthma     Other  kyphoscoliosis and scoliosis     upper back curvature and disc degeneration in lower back.     Patient Active Problem List    Diagnosis Date Noted    Ganglion cyst of dorsum of left wrist 09/16/2022     Priority: Medium     Added automatically from request for surgery [id number]      High risk medications (not anticoagulants) long-term use (Plaquenil and AZA) 05/20/2016     Priority: Medium    Dry eyes, bilateral 05/20/2016     Priority: Medium    Disorder of connective tissue (H24) 05/18/2016     Priority: Medium    Systemic lupus erythematosus (H) 04/27/2016     Priority: Medium    Inflammatory polyarthropathy (H) 04/22/2016     Priority: Medium    Chronic back pain 04/22/2016     Priority: Medium    Raynaud's phenomenon without gangrene 04/22/2016     Priority: Medium    Vitamin D deficiency 04/04/2016     Priority: Medium    Intractable menstrual migraine without status migrainosus 11/17/2015     Priority: Medium    Anxiety 10/16/2015     Priority: Medium    Intermittent asthma 10/01/2012     Priority: Medium     Laughing, exercise, cold weather    Formatting of this note might be different from the original.  Overview:   Laughing, exercise, cold weather      CARDIOVASCULAR SCREENING; LDL GOAL LESS THAN 160 06/11/2010     Priority: Medium    Viral warts 05/30/2006     Priority: Medium     Warts in upper, inner thighs, groin.   Problem list name updated by automated process. Provider to review    Formatting of this note might be different from the original.  Overview:   Warts in upper, inner thighs, groin.   Problem list name updated by automated process. Provider to review      Hypertrophic and atrophic condition of skin 05/19/2006     Priority: Medium     Problem list name updated by automated process. Provider to review      Other kyphoscoliosis and scoliosis 08/09/2004     Priority: Medium     lower back disc degeneration.  sees spine clinic at [facility], suggested consult mid pregnancy, earlier if needed.      Formatting of this note might be different from the original.  Overview:   lower back disc degeneration.  sees spine clinic at [facility], suggested consult mid pregnancy, earlier if needed.         Past Surgical History:  Past Surgical History:   Procedure Laterality Date     SECTION      EXCISE GANGLION WRIST Left 11/10/2022    Procedure: EXCISION, GANGLION CYST, WRIST LEFT;  Surgeon: Jayde Martinez MD;  Location: [facility] OR    SURGICAL HISTORY OF -       PE Tubes     Medications:  Current Outpatient Medications   Medication Sig Dispense Refill    albuterol (PROAIR HFA/PROVENTIL HFA/VENTOLIN HFA) 108 (90 Base) MCG/ACT inhaler Inhale 2 puffs into the lungs every 6 hours as needed for shortness of breath / dyspnea 18 g 1    atovaquone-proguanil (MALARONE) 250-100 MG tablet Take 1 tablet by mouth daily Start 2 days before exposure to Malaria and continue daily till  7 days after exposure. 15 tablet 0    azaTHIOprine (IMURAN) 50 MG tablet Take 1 tablet (50 mg) by mouth daily 90 tablet 2    azelaic acid (FINACIA) 15 % external gel Apply pea sized amount at bedtime. 50 g 4    ciprofloxacin (CIPRO) 500 MG tablet Take 1 tablet (500 mg) by mouth 2 times daily Take 1 tablet two times a day for up to 3 days for severe diarrhea during travel. 6 tablet 0    cyclobenzaprine (FLEXERIL) 10 MG tablet Take 1 tablet (10 mg) by mouth 3 times daily as needed for muscle spasms 30 tablet 3    hydroxychloroquine (PLAQUENIL) 200 MG tablet Take 1 tablet (200 mg) by mouth daily . Yearly eye exam including 10-2 VF and SD-OCT required 90 tablet 2    hydrOXYzine (ATARAX) 25 MG tablet Take 1 tablet (25 mg) by mouth every 6 hours as needed for anxiety 90 tablet 6    ketoconazole (NIZORAL) 2 % external cream Apply twice daily to rash for next 2-3 weeks. 30 g 4    levonorgestrel (MIRENA) 20 MCG/24HR IUD 1 each by Intrauterine route once      LORazepam (ATIVAN) 0.5 MG tablet Take 1 tablet (0.5 mg) by mouth every 8 hours as needed for  anxiety 15 tablet 0    pimecrolimus (ELIDEL) 1 % external cream Apply once to twice daily to face. 30 g 4    traZODone (DESYREL) 50 MG tablet Take 0.5 tablets (25 mg) by mouth nightly as needed for sleep 30 tablet 0    vitamin D3 (CHOLECALCIFEROL) 50 mcg (2000 units) tablet Take 1 tablet (50 mcg) by mouth daily 90 tablet 2     Allergies:     Allergies   Allergen Reactions    Fluconazole Rash     Social History:  Occupation/Schooling: Works for [employer] in [employer]    Family history:  Family History   Problem Relation Age of Onset    Heart Disease Maternal Grandfather         Bypass, Heart Disease    Respiratory Maternal Grandfather         asthma    Macular Degeneration Maternal Grandfather     Hypertension Paternal Grandmother     Cancer Paternal Grandmother         lymph nodes    Cataracts Paternal Grandmother     C.A.D. Paternal Grandfather     Heart Disease Maternal Uncle         MI's     Alcohol/Drug Maternal Uncle     Respiratory Other         cousin on mothers side, asthma    Alcohol/Drug Other         bother great grandparents on mother's side    Diabetes No family hx of     Breast Cancer No family hx of     Cancer - colorectal No family hx of        Review of Systems:    POSTIVE IN BOLD  GENERAL: fever/chills, fatigue, general unwell feeling, weight gain/loss.  HEAD/EYES:  headache, dizziness, or vision changes.    EARS/NOSE/THROAT:  Nosebleeds, hearing loss, sinus infection, earache, tinnitus.  IMMUNE:  Allergies, cancer, immune deficiency, or infections.  SKIN:  Urticaria, rash, hives  HEME/Lymphatic:   anemia, easy bruising, easy bleeding.  RESPIRATORY:  cough, wheezing, or shortness of breath  CARDIOVASCULAR/Circulation:  Extremity edema, syncope, hypertension, tachycardia, or angina.  GASTROINTESTINAL:  abdominal pain, nausea/emesis, diarrhea, constipation,  hematochezia, or melena.  ENDOCRINE:  Diabetes, steroid use,  thyroid disease or osteoporosis.  MUSCULOSKELETAL: neck pain, back pain, arthralgia,  "arthritis, or gout.  GENITOURINARY:  frequency, urgency, dysuria, difficulty voiding, hematuria or incontinence.  NEUROLOGIC:  weakness, numbness, paresthesias, seizure, tremor, stroke or memory loss.  PSYCHIATRIC:  depression, anxiety, stress, suicidal thoughts or mood swings.     Physical Exam:  Vitals:    12/14/23 0759   BP: 99/68   BP Location: Right arm   Patient Position: Chair   Cuff Size: Adult Regular   Pulse: 76   SpO2: 99%     Exam:  Constitutional: healthy, alert, and no distress  Head: normocephalic. Atraumatic.  Eyes: no redness or jaundice noted  ENT: MMM.   Cardiovascular: RRR   Respiratory: non-labored breathing  Gastrointestinal: non-distended  : deferred  Skin: no skin lesions or rashes noted on exposed areas  Psychiatric: mentation appears normal and affect normal/bright    Musculoskeletal exam:  Gait/Station/Posture: Uneven shoulders in the setting of known scoliosis, Gait normal    Thoracic spine: Levoscoliosis    Right SI joint tenderness  Normal 5/5 strength in BLE  Normal sensation to light touch in the L3-S1 distributions bilaterally     PRANEETH: positive bilaterally   Sacral Thrust: positive bilaterally  Debbie's Finger: positive bilaterally  Gaenslen's: positive bilaterally    Diagnostic tests:  MRI of Thoracic and Lumbar spine was completed on 12/13/23 showing:  \"Impression:   1. No definite abnormality within the thoracic spinal cord or vertebra. Stable left convex thoracic scoliosis.  2. There is no significant foraminal or spinal canal narrowing at any level in the lumbar region. Mild lumbar spondylosis. \"    Personally reviewed imaging      Assessment:  Acute midline low back pain, likely myofascial  Inflammatory polyarthropathy (H)  Sacroiliitis (H24)    Aleida Weinberg is a 38 year old female who presents with the complaints of bilateral low back pain, worse on the right due to the above diagnoses based on her history, imaging, and physical exam.     I have summarized the " patient s past medical history, discussed their clinical findings and the potential differential diagnosis with the patient. I have discussed multi-disciplinary pain management options with the patient as pertaining to their case. The pain management options we discussed included, but were not limited to, the recommendations below.  I also discussed with patient the risks, benefits and alternatives to each pain management option. All of the patient s questions and concerns were answered.     Plan:  Diagnosis reviewed, treatment option addressed, and risk/benefits discussed.  Self-care instructions given.  I am recommending a multidisciplinary treatment plan to help this patient better manage her pain.      Physical Therapy: Referral placed  Pain Psychologist to address issues of relaxation, behavioral change, coping style, and other factors important to improvement: Not indicated  Diagnostic Studies: Not indicated  Medication Management: Encouraged regular NSAID use not to exceed daily max  Further procedures recommended: SI injection order placed  Other treatments: not indicated  Urine toxicology screen: not indicated   Recommendations/follow-up for PCP:  none  Release of information: none  Follow up: 6-8 weeks s/p SI injections or PRN      Francesco Borden, MS3  [facility]    I saw and examined the patient with the medical student. I have reviewed and agree with the student's note and plan of care and made changes and corrections directly to the body of the note.    TIME SPENT:  BY STUDENT ALONE 20 MIN  BY MYSELF AND MEDICAL STUDENT 30 MIN        Luly Hernandez MD  Pain Medicine, Department of Anesthesiology  , [facility]

## 2023-12-14 NOTE — NURSING NOTE
Patient presents with:  Consult: Acute midline low back pain      Mild Pain (3)         What medications are you using for pain? Flexeril, ibuprofen     (New patients only) Have you been seen by another pain clinic/ provider? no    (Return Patients only) What refills are you needing today? no    Expectations: amy Xie, EMT

## 2023-12-19 ENCOUNTER — TELEPHONE (OUTPATIENT)
Dept: FAMILY MEDICINE | Facility: CLINIC | Age: 38
End: 2023-12-19
Payer: COMMERCIAL

## 2023-12-19 NOTE — TELEPHONE ENCOUNTER
Forms received from  [email or web address]/ [employer]- Connective Tissue Disorder form for Jamal Álvarez CNP.  Forms placed in provider 'sign me' folder.  Please fax forms to [phone] after completion.    LALITHA Martinez  [facility]

## 2023-12-21 NOTE — TELEPHONE ENCOUNTER
Huddled with Jamal Álvarez and she will need a virtual appointment with patient to complete form or patient could give form to her rheumatologist to have them complete.    Called patient and left a detailed voicemail message and relayed Jamal Álvarez message.    Placed form in [employer] completed box.    LALITHA Martinez  [facility]

## 2024-01-03 ENCOUNTER — VIRTUAL VISIT (OUTPATIENT)
Dept: FAMILY MEDICINE | Facility: CLINIC | Age: 39
End: 2024-01-03
Payer: COMMERCIAL

## 2024-01-03 DIAGNOSIS — M06.4 INFLAMMATORY POLYARTHROPATHY (H): Primary | ICD-10-CM

## 2024-01-03 DIAGNOSIS — M32.19 OTHER SYSTEMIC LUPUS ERYTHEMATOSUS WITH OTHER ORGAN INVOLVEMENT (H): ICD-10-CM

## 2024-01-03 PROCEDURE — 99213 OFFICE O/P EST LOW 20 MIN: CPT | Mod: 95

## 2024-01-03 NOTE — TELEPHONE ENCOUNTER
Filled out forms with patient during virtual visit 1/3/23.     Please print imaging results listed on pg 4, to be sent with form.   Echocardiogram 05/2018  Xray results from 04/2016   MRI Thoracic and Lumbar spine (12/23)    Please add Dr. Lavon marquez (rheumatology) contact information on pg 5/6.     Then scan copy to chart and fax per form instructions.

## 2024-01-03 NOTE — PATIENT INSTRUCTIONS
No changes to your plan today.   We filled out life insurance form.   I will send form to  to print imaging results to be faxed with the form and provider Dr. Phelan info.     Thanks    Jamal

## 2024-01-03 NOTE — PROGRESS NOTES
"Aleida is a 39 year old who is being evaluated via a billable video visit.      How would you like to obtain your AVS? MyChart  If the video visit is dropped, the invitation should be resent by: Text to cell phone: [phone]  Will anyone else be joining your video visit? No          Assessment & Plan     Inflammatory polyarthropathy (H)  Chronic, currently exacerbated due to recent activity at home. Has pending referral for SI joint steroid injection, she is uncertain on next best steps. I advised her to call the pain mgmt clinic to speak with a nurse to get her questions about the injections answered.     Other systemic lupus erythematosus with other organ involvement (H)  Chronic. Follows with rheumatology. Filled out forms for [facility] life insurance, placed form in  bin for next steps. Will need prior imaging noted in form printed and faxed with completed forms.                BMI:   Estimated body mass index is 24.41 kg/m  as calculated from the following:    Height as of 11/22/23: 1.715 m (5' 7.5\").    Weight as of 10/26/23: 71.8 kg (158 lb 3.2 oz).       See Patient Instructions    FARZANA Hutton CNP  [facility]    Diana Shin is a 39 year old, presenting for the following health issues:  No chief complaint on file.        1/3/2024     1:06 PM   Additional Questions   Roomed by aneudy rodriguez ma   Accompanied by self         1/3/2024     1:06 PM   Patient Reported Additional Medications   Patient reports taking the following new medications none       HPI       Per patient discuss her life insurance document form.               Review of Systems   Constitutional, HEENT, cardiovascular, pulmonary, gi and gu systems are negative, except as otherwise noted.      Objective           Vitals:  No vitals were obtained today due to virtual visit.    Physical Exam   GENERAL: Healthy, alert and no distress  EYES: Eyes grossly normal to inspection.  No " discharge or erythema, or obvious scleral/conjunctival abnormalities.  RESP: No audible wheeze, cough, or visible cyanosis.  No visible retractions or increased work of breathing.    SKIN: Visible skin clear. No significant rash, abnormal pigmentation or lesions.  NEURO: Cranial nerves grossly intact.  Mentation and speech appropriate for age.  PSYCH: Mentation appears normal, affect normal/bright, judgement and insight intact, normal speech and appearance well-groomed.    Lab on 10/17/2023   Component Date Value Ref Range Status    Color Urine 10/17/2023 Yellow  Colorless, Straw, Light Yellow, Yellow Final    Appearance Urine 10/17/2023 Clear  Clear Final    Glucose Urine 10/17/2023 Negative  Negative mg/dL Final    Bilirubin Urine 10/17/2023 Negative  Negative Final    Ketones Urine 10/17/2023 Negative  Negative mg/dL Final    Specific Gravity Urine 10/17/2023 1.015  1.003 - 1.035 Final    Blood Urine 10/17/2023 Negative  Negative Final    pH Urine 10/17/2023 7.5 (H)  5.0 - 7.0 Final    Protein Albumin Urine 10/17/2023 Negative  Negative mg/dL Final    Urobilinogen Urine 10/17/2023 0.2  0.2, 1.0 E.U./dL Final    Nitrite Urine 10/17/2023 Negative  Negative Final    Leukocyte Esterase Urine 10/17/2023 Negative  Negative Final    Sodium 10/17/2023 138  135 - 145 mmol/L Final    Reference intervals for this test were updated on 09/26/2023 to more accurately reflect our healthy population. There may be differences in the flagging of prior results with similar values performed with this method. Interpretation of those prior results can be made in the context of the updated reference intervals.     Potassium 10/17/2023 3.7  3.4 - 5.3 mmol/L Final    Carbon Dioxide (CO2) 10/17/2023 26  22 - 29 mmol/L Final    Anion Gap 10/17/2023 10  7 - 15 mmol/L Final    Urea Nitrogen 10/17/2023 11.0  6.0 - 20.0 mg/dL Final    Creatinine 10/17/2023 0.72  0.51 - 0.95 mg/dL Final    GFR Estimate 10/17/2023 >90  >60 mL/min/1.73m2 Final     Calcium 10/17/2023 9.4  8.6 - 10.0 mg/dL Final    Chloride 10/17/2023 102  98 - 107 mmol/L Final    Glucose 10/17/2023 76  70 - 99 mg/dL Final    Alkaline Phosphatase 10/17/2023 46  35 - 104 U/L Final    AST 10/17/2023 21  0 - 45 U/L Final    Reference intervals for this test were updated on 6/12/2023 to more accurately reflect our healthy population. There may be differences in the flagging of prior results with similar values performed with this method. Interpretation of those prior results can be made in the context of the updated reference intervals.    ALT 10/17/2023 14  0 - 50 U/L Final    Reference intervals for this test were updated on 6/12/2023 to more accurately reflect our healthy population. There may be differences in the flagging of prior results with similar values performed with this method. Interpretation of those prior results can be made in the context of the updated reference intervals.      Protein Total 10/17/2023 7.3  6.4 - 8.3 g/dL Final    Albumin 10/17/2023 4.9  3.5 - 5.2 g/dL Final    Bilirubin Total 10/17/2023 0.4  <=1.2 mg/dL Final    Total Protein Urine mg/dL 10/17/2023 <6.0    mg/dL Final    The reference ranges have not been established in urine protein. The results should be integrated into the clinical context for interpretation.    Total Protein Urine mg/mg Creat 10/17/2023    Final    Unable to calculate, urine creatinine or protein is outside the detectable limits.    Creatinine Urine mg/dL 10/17/2023 43.8  mg/dL Final    The reference ranges have not been established in urine creatinine. The results should be integrated into the clinical context for interpretation.    WBC Count 10/17/2023 4.8  4.0 - 11.0 10e3/uL Final    RBC Count 10/17/2023 4.42  3.80 - 5.20 10e6/uL Final    Hemoglobin 10/17/2023 14.2  11.7 - 15.7 g/dL Final    Hematocrit 10/17/2023 41.1  35.0 - 47.0 % Final    MCV 10/17/2023 93  78 - 100 fL Final    MCH 10/17/2023 32.1  26.5 - 33.0 pg Final    MCHC 10/17/2023  34.5  31.5 - 36.5 g/dL Final    RDW 10/17/2023 10.7  10.0 - 15.0 % Final    Platelet Count 10/17/2023 252  150 - 450 10e3/uL Final    % Neutrophils 10/17/2023 71  % Final    % Lymphocytes 10/17/2023 18  % Final    % Monocytes 10/17/2023 10  % Final    % Eosinophils 10/17/2023 1  % Final    % Basophils 10/17/2023 0  % Final    % Immature Granulocytes 10/17/2023 0  % Final    Absolute Neutrophils 10/17/2023 3.4  1.6 - 8.3 10e3/uL Final    Absolute Lymphocytes 10/17/2023 0.9  0.8 - 5.3 10e3/uL Final    Absolute Monocytes 10/17/2023 0.5  0.0 - 1.3 10e3/uL Final    Absolute Eosinophils 10/17/2023 0.0  0.0 - 0.7 10e3/uL Final    Absolute Basophils 10/17/2023 0.0  0.0 - 0.2 10e3/uL Final    Absolute Immature Granulocytes 10/17/2023 0.0  <=0.4 10e3/uL Final               Video-Visit Details    Type of service:  Video Visit     Originating Location (pt. Location): Home    Distant Location (provider location):  On-site  Platform used for Video Visit: Bahman

## 2024-01-25 ENCOUNTER — LAB (OUTPATIENT)
Dept: LAB | Facility: CLINIC | Age: 39
End: 2024-01-25
Payer: COMMERCIAL

## 2024-01-25 DIAGNOSIS — Z79.899 HIGH RISK MEDICATIONS (NOT ANTICOAGULANTS) LONG-TERM USE: ICD-10-CM

## 2024-01-25 DIAGNOSIS — M32.19 OTHER SYSTEMIC LUPUS ERYTHEMATOSUS WITH OTHER ORGAN INVOLVEMENT (H): ICD-10-CM

## 2024-01-25 LAB
ALBUMIN MFR UR ELPH: <6 MG/DL
ALBUMIN SERPL BCG-MCNC: 4.9 G/DL (ref 3.5–5.2)
ALBUMIN UR-MCNC: NEGATIVE MG/DL
ALP SERPL-CCNC: 42 U/L (ref 40–150)
ALT SERPL W P-5'-P-CCNC: 17 U/L (ref 0–50)
ANION GAP SERPL CALCULATED.3IONS-SCNC: 10 MMOL/L (ref 7–15)
APPEARANCE UR: CLEAR
AST SERPL W P-5'-P-CCNC: 21 U/L (ref 0–45)
BASOPHILS # BLD AUTO: 0 10E3/UL (ref 0–0.2)
BASOPHILS NFR BLD AUTO: 0 %
BILIRUB SERPL-MCNC: 0.3 MG/DL
BILIRUB UR QL STRIP: NEGATIVE
BUN SERPL-MCNC: 16 MG/DL (ref 6–20)
CALCIUM SERPL-MCNC: 9.7 MG/DL (ref 8.6–10)
CHLORIDE SERPL-SCNC: 101 MMOL/L (ref 98–107)
COLOR UR AUTO: YELLOW
CREAT SERPL-MCNC: 0.69 MG/DL (ref 0.51–0.95)
CREAT UR-MCNC: 23.3 MG/DL
CRP SERPL-MCNC: <3 MG/L
DEPRECATED HCO3 PLAS-SCNC: 25 MMOL/L (ref 22–29)
EGFRCR SERPLBLD CKD-EPI 2021: >90 ML/MIN/1.73M2
EOSINOPHIL # BLD AUTO: 0 10E3/UL (ref 0–0.7)
EOSINOPHIL NFR BLD AUTO: 1 %
ERYTHROCYTE [DISTWIDTH] IN BLOOD BY AUTOMATED COUNT: 11.2 % (ref 10–15)
ERYTHROCYTE [SEDIMENTATION RATE] IN BLOOD BY WESTERGREN METHOD: 7 MM/HR (ref 0–20)
GLUCOSE SERPL-MCNC: 83 MG/DL (ref 70–99)
GLUCOSE UR STRIP-MCNC: NEGATIVE MG/DL
HCT VFR BLD AUTO: 40.8 % (ref 35–47)
HGB BLD-MCNC: 14.1 G/DL (ref 11.7–15.7)
HGB UR QL STRIP: NEGATIVE
IMM GRANULOCYTES # BLD: 0 10E3/UL
IMM GRANULOCYTES NFR BLD: 0 %
KETONES UR STRIP-MCNC: NEGATIVE MG/DL
LEUKOCYTE ESTERASE UR QL STRIP: NEGATIVE
LYMPHOCYTES # BLD AUTO: 0.8 10E3/UL (ref 0.8–5.3)
LYMPHOCYTES NFR BLD AUTO: 23 %
MCH RBC QN AUTO: 31.8 PG (ref 26.5–33)
MCHC RBC AUTO-ENTMCNC: 34.6 G/DL (ref 31.5–36.5)
MCV RBC AUTO: 92 FL (ref 78–100)
MONOCYTES # BLD AUTO: 0.5 10E3/UL (ref 0–1.3)
MONOCYTES NFR BLD AUTO: 14 %
NEUTROPHILS # BLD AUTO: 2.2 10E3/UL (ref 1.6–8.3)
NEUTROPHILS NFR BLD AUTO: 63 %
NITRATE UR QL: NEGATIVE
PH UR STRIP: 6 [PH] (ref 5–7)
PLATELET # BLD AUTO: 252 10E3/UL (ref 150–450)
POTASSIUM SERPL-SCNC: 3.9 MMOL/L (ref 3.4–5.3)
PROT SERPL-MCNC: 7.7 G/DL (ref 6.4–8.3)
PROT/CREAT 24H UR: NORMAL MG/G{CREAT}
RBC # BLD AUTO: 4.43 10E6/UL (ref 3.8–5.2)
SODIUM SERPL-SCNC: 136 MMOL/L (ref 135–145)
SP GR UR STRIP: 1.01 (ref 1–1.03)
UROBILINOGEN UR STRIP-ACNC: 0.2 E.U./DL
WBC # BLD AUTO: 3.6 10E3/UL (ref 4–11)

## 2024-01-25 PROCEDURE — 36415 COLL VENOUS BLD VENIPUNCTURE: CPT

## 2024-01-25 PROCEDURE — 85652 RBC SED RATE AUTOMATED: CPT

## 2024-01-25 PROCEDURE — 86140 C-REACTIVE PROTEIN: CPT

## 2024-01-25 PROCEDURE — 85025 COMPLETE CBC W/AUTO DIFF WBC: CPT

## 2024-01-25 PROCEDURE — 80053 COMPREHEN METABOLIC PANEL: CPT

## 2024-01-25 PROCEDURE — 81003 URINALYSIS AUTO W/O SCOPE: CPT

## 2024-01-25 PROCEDURE — 84156 ASSAY OF PROTEIN URINE: CPT

## 2024-02-01 ENCOUNTER — OFFICE VISIT (OUTPATIENT)
Dept: RHEUMATOLOGY | Facility: CLINIC | Age: 39
End: 2024-02-01
Payer: COMMERCIAL

## 2024-02-01 VITALS
RESPIRATION RATE: 16 BRPM | DIASTOLIC BLOOD PRESSURE: 87 MMHG | HEIGHT: 68 IN | HEART RATE: 93 BPM | OXYGEN SATURATION: 98 % | SYSTOLIC BLOOD PRESSURE: 114 MMHG | WEIGHT: 158 LBS | BODY MASS INDEX: 23.95 KG/M2

## 2024-02-01 DIAGNOSIS — M70.62 TROCHANTERIC BURSITIS OF BOTH HIPS: ICD-10-CM

## 2024-02-01 DIAGNOSIS — M70.61 TROCHANTERIC BURSITIS OF BOTH HIPS: ICD-10-CM

## 2024-02-01 DIAGNOSIS — M54.41 MIDLINE LOW BACK PAIN WITH RIGHT-SIDED SCIATICA, UNSPECIFIED CHRONICITY: ICD-10-CM

## 2024-02-01 DIAGNOSIS — Z79.899 HIGH RISK MEDICATIONS (NOT ANTICOAGULANTS) LONG-TERM USE: ICD-10-CM

## 2024-02-01 DIAGNOSIS — M32.19 OTHER SYSTEMIC LUPUS ERYTHEMATOSUS WITH OTHER ORGAN INVOLVEMENT (H): Primary | ICD-10-CM

## 2024-02-01 PROCEDURE — 99214 OFFICE O/P EST MOD 30 MIN: CPT | Performed by: INTERNAL MEDICINE

## 2024-02-01 PROCEDURE — G2211 COMPLEX E/M VISIT ADD ON: HCPCS | Performed by: INTERNAL MEDICINE

## 2024-02-01 RX ORDER — METHYLPREDNISOLONE 4 MG
TABLET, DOSE PACK ORAL
Qty: 21 TABLET | Refills: 0 | Status: SHIPPED | OUTPATIENT
Start: 2024-02-01

## 2024-02-01 ASSESSMENT — PAIN SCALES - GENERAL: PAINLEVEL: NO PAIN (0)

## 2024-02-01 NOTE — PROGRESS NOTES
Rheumatology Clinic Visit      Aleida Weinberg MRN# [id number]   YOB: 1985 Age: 39 year old      Date of visit: 2/01/24   PCP: Sayra Chirinos  Dermatology: Shante Reid     Chief Complaint   Patient presents with:  RECHECK: Systemic lupus erythematosus      Assessment and Plan     1. Systemic lupus erythematosus (CHANELL >1:[id number] speckled, RNP+, Sm+, Scl-70+, hypocomplementemia, photosensitivity, malar rash, arthritis, fatigue, Raynaud's phenomenon):  Dx'd 4/2016. Scl-70+; she lacks features of scleroderma except for raynaud's; no myopathy based on labs/symptoms. 5/11/2018 echo without evidence of pulmonary hypertension. Previously on MTX 20mg SQ weekly (GI upset with PO doses above 15mg, partially effective) and SSZ (LFT elevations).  Currently on AZA 50mg daily and HCQ 200mg daily (patient self reduced previously from 300mg daily on average based on preference of an easier regimen and continued to do well). TPMT normal on 8/21/2017.  Doing well with regard to lupus.  Chronic illness, stable.    - Continue hydroxychloroquine 200mg daily (last eye exam was okay on 7/27/2023 by Dr. Frias)  - Continue azathioprine 50mg daily   - Labs in 3 months: CBC, CMP, ESR, CRP, UA, Uprotein:creatinine    High risk medication requiring intensive toxicity monitoring at least quarterly      2. Raynaud's Phenomenon: Cold avoidance was insufficient for controlling her symptoms in the past; then did well on amlodipine previously but has not required for some time now.  She will notify me if she would like to restart amlodipine.  Note that she was previously on amlodipine 10 mg daily during colder months but has not required it in most recent singer.    3.  Low vitamin D: Currently on vitamin D 2000 units daily.    - Continue vitamin D 2000 units daily    4.  Secondary Sjogren's syndrome: Mild dry and dry mouth that are treated infrequently with artificial tears and frequent sips of water. Dentist follow-up  every 6 months     5.  Facial rash: Was following with dermatology at [facility].  Reportedly due to SLE and rosacea from derm perspective, and improved with topical creams from her dermatologist for rosacea.  Active today, involving the nasolabial folds.  She will continue following with dermatology at [facility] where she has been treated for acne rosacea in the setting of atopic dermatitis    6.  Mild medial joint line tenderness of the knees, and mild pes anserine bursitis: she says that it is only symptomatic when examined.  May use topical Voltaren gel as needed.    7.  Vaccinations:   - Influenza: encouraged yearly vaccination  - COVID-19: Advised keeping updated, and to hold azathioprine for 1-2 weeks afterward    8.  Midline low back pain radiating to the right leg: Has seen a spine specialist who recommended SI joint steroid injection and she is working on getting this approved/scheduled.  However, also having lumbar spine pain, degenerative in nature, and radiates to the right leg.  May benefit from a Medrol Dosepak and she would like to try this now.  If this and physical therapy do not resolve her symptoms then she is to proceed with the SI joint joint injection at the direction of her spine specialist/pain clinic.   - Start Medrol Dosepak  - Start home physical therapy exercises for the low back; I directed her to the handout available online from the [employer]; she declined formal physical therapy referral today, and she notes that she already received a physical therapy referral for low back pain from another provider    # Methylprednisolone risks and Benefits: The risks and benefits of methylprednisolone were discussed in detail and the patient verbalized understanding.  The risks discussed include, but are not limited to, weight gain, fluid retention, impaired wound healing, hyperglycemia, adrenal suppression, GI upset, peptic ulcer, hepatotoxicity, aseptic necrosis  of the femoral and humeral heads, osteoporosis, myopathy, tendon rupture (particularly Achilles tendon), ocular changes including an increased intraocular pressure.  I encouraged reviewing the package insert and asking any questions about the medication.      9.  Bilateral trochanteric bursitis: Suspect related to altered gait due to low back pain.  Reviewed the diagnosis treatment options.  Address low back pain.     Total minutes spent in evaluation with patient, documentation, , and review of pertinent studies and chart notes: 20  The longitudinal plan of care for the rheumatology problem(s) were addressed during this visit.  Due to added complexity of care, we will continue to support the patient and the subsequent management of this condition with ongoing continuity of care.       Ms. Weinberg verbalized agreement with and understanding of the rational for the diagnosis and treatment plan.  All questions were answered to best of my ability and the patient's satisfaction. Ms. Weinberg was advised to contact the clinic with any questions that may arise after the clinic visit.      Thank you for involving me in the care of the patient    Return to clinic: 3 months      HPI   Aleida Digna Weinberg is a 39 year old female with medical history significant for intermittent asthma, anxiety, migraines, vitamin D deficiency, ganglion cyst removal of the left wrist, and systemic lupus erythematosus who presents for follow-up of SLE.     7/25/2023: Currently doing well with regard to lupus.  No joint pain or swelling.  No morning stiffness or gelling phenomenon.  No active Raynaud's phenomenon symptoms.  Planning to see dermatology for facial rash/rosacea.  Planning to have her eye exam updated with ophthalmology in 2 days.  Planning to travel to [facility] in November 2023 and would like to see the travel clinic.  Planning to get an updated COVID-vaccine prior to traveling to [facility].    10/26/2023: Mild ache of the  right second-fifth PIPs that is worse with activity and improves with rest, sometimes aches in the morning, and is associated with morning stiffness for less than 20 minutes.  No other joint pain.  Still with facial rash that is being treated as acne rosacea by dermatology at [facility]; she states that she has difficulty finding medications that she tolerates well because she is very sensitive to medications, and continues to follow with dermatology.  Otherwise doing well.  Planning to travel to [facility] soon and has been to the trauma clinic where vaccinations were given.  Planning to get the COVID-19 vaccination later today.    Today, 2/1/2024: Doing well except for low back pain that radiates to the right leg, started while she was traveling to [facility] and has improved some but has not resolved.  She saw spine specialist who is advised steroid injection of the SI joint and she is planning to have this completed but is having difficulty scheduling at the moment.  Has not gone to formal physical therapy and does not wish to go to formal physical therapy but states that she will do exercises on her own at home.  Questions if a Medrol Dosepak would be helpful.      Denies fevers, chills, nausea, vomiting, constipation, diarrhea. No abdominal pain. No chest pain/pressure, palpitations, or shortness of breath. No oral or nasal sores.  No rash currently.    Tobacco: quit smoking in 2005  EtOH: Once monthly  Drugs: None  Occupation: Works at [employer], a lot of typing per patient    ROS   12 point review of system was completed and negative except as noted in the HPI     Active Problem List     Patient Active Problem List   Diagnosis    Other kyphoscoliosis and scoliosis    Hypertrophic and atrophic condition of skin    Viral warts    CARDIOVASCULAR SCREENING; LDL GOAL LESS THAN 160    Intermittent asthma    Anxiety    Intractable menstrual migraine without status migrainosus    Vitamin D deficiency    Inflammatory  polyarthropathy (H)    Chronic back pain    Raynaud's phenomenon without gangrene    Systemic lupus erythematosus (H)    High risk medications (not anticoagulants) long-term use (Plaquenil and AZA)    Dry eyes, bilateral    Disorder of connective tissue (H24)    Ganglion cyst of dorsum of left wrist     Past Medical History     Past Medical History:   Diagnosis Date    Arthritis     Lupus erythematosus     Mild intermittent asthma     Other kyphoscoliosis and scoliosis     upper back curvature and disc degeneration in lower back.     Past Surgical History     Past Surgical History:   Procedure Laterality Date     SECTION      EXCISE GANGLION WRIST Left 11/10/2022    Procedure: EXCISION, GANGLION CYST, WRIST LEFT;  Surgeon: Jayde Martinez MD;  Location: [facility] OR    SURGICAL HISTORY OF -       PE Tubes     Allergy     Allergies   Allergen Reactions    Fluconazole Rash     Current Medication List     Current Outpatient Medications   Medication Sig    albuterol (PROAIR HFA/PROVENTIL HFA/VENTOLIN HFA) 108 (90 Base) MCG/ACT inhaler Inhale 2 puffs into the lungs every 6 hours as needed for shortness of breath / dyspnea    atovaquone-proguanil (MALARONE) 250-100 MG tablet Take 1 tablet by mouth daily Start 2 days before exposure to Malaria and continue daily till  7 days after exposure.    azaTHIOprine (IMURAN) 50 MG tablet Take 1 tablet (50 mg) by mouth daily    azelaic acid (FINACIA) 15 % external gel Apply pea sized amount at bedtime.    ciprofloxacin (CIPRO) 500 MG tablet Take 1 tablet (500 mg) by mouth 2 times daily Take 1 tablet two times a day for up to 3 days for severe diarrhea during travel.    cyclobenzaprine (FLEXERIL) 10 MG tablet Take 1 tablet (10 mg) by mouth 3 times daily as needed for muscle spasms    hydroxychloroquine (PLAQUENIL) 200 MG tablet Take 1 tablet (200 mg) by mouth daily . Yearly eye exam including 10-2 VF and SD-OCT required    hydrOXYzine (ATARAX) 25 MG tablet Take 1 tablet (25  mg) by mouth every 6 hours as needed for anxiety    ketoconazole (NIZORAL) 2 % external cream Apply twice daily to rash for next 2-3 weeks.    levonorgestrel (MIRENA) 20 MCG/24HR IUD 1 each by Intrauterine route once    LORazepam (ATIVAN) 0.5 MG tablet Take 1 tablet (0.5 mg) by mouth every 8 hours as needed for anxiety    methylPREDNISolone (MEDROL DOSEPAK) 4 MG tablet therapy pack Take per package instructions. Take once a day by mouth    pimecrolimus (ELIDEL) 1 % external cream Apply once to twice daily to face.    traZODone (DESYREL) 50 MG tablet Take 0.5 tablets (25 mg) by mouth nightly as needed for sleep    vitamin D3 (CHOLECALCIFEROL) 50 mcg (2000 units) tablet Take 1 tablet (50 mcg) by mouth daily     No current facility-administered medications for this visit.         Social History   See HPI    Family History     Family History   Problem Relation Age of Onset    Heart Disease Maternal Grandfather         Bypass, Heart Disease    Respiratory Maternal Grandfather         asthma    Macular Degeneration Maternal Grandfather     Hypertension Paternal Grandmother     Cancer Paternal Grandmother         lymph nodes    Cataracts Paternal Grandmother     C.A.D. Paternal Grandfather     Heart Disease Maternal Uncle         MI's     Alcohol/Drug Maternal Uncle     Respiratory Other         cousin on mothers side, asthma    Alcohol/Drug Other         bother great grandparents on mother's side    Diabetes No family hx of     Breast Cancer No family hx of     Cancer - colorectal No family hx of      Denies family history of autoimmune disease    Physical Exam     Temp Readings from Last 3 Encounters:   11/22/23 98.6  F (37  C) (Oral)   09/11/23 97.9  F (36.6  C) (Temporal)   05/08/23 97.4  F (36.3  C) (Tympanic)     BP Readings from Last 5 Encounters:   02/01/24 114/87   12/14/23 99/68   11/22/23 116/77   10/26/23 108/79   09/11/23 100/67     Pulse Readings from Last 1 Encounters:   02/01/24 93     Resp Readings from  "Last 1 Encounters:   02/01/24 16     Estimated body mass index is 24.38 kg/m  as calculated from the following:    Height as of this encounter: 1.715 m (5' 7.5\").    Weight as of this encounter: 71.7 kg (158 lb).    GEN: NAD. Healthy appearing adult.   HEENT:  Anicteric, noninjected sclera. No obvious external lesions of the ear and nose. Hearing intact.  CV: S1, S2. RRR. No m/r/g  PULM: No increased work of breathing. CTA bilaterally   MSK: MCPs, PIPs, DIPs without swelling or tenderness to palpation.  Wrists without swelling or tenderness to palpation.  Elbows without swelling, increased warmth, or overlying erythema.  Shoulders without swelling or tenderness to palpation.  Knees without swelling, increased warmth, or overlying erythema; mild medial joint line tenderness and tender to palpation over the pes anserine bursae.  Ankles and MTPs without swelling or tenderness to palpation.  Lumbar spine and paraspinal muscles of the lumbar spine tender to palpation.  Negative straight leg test bilaterally; no pain with internal/external rotation of either hip.  Mildly tender to palpation over the trochanteric bursae.  SKIN: Mild facial erythema on both cheeks that does not extend over the nose and does involve the nasolabial folds.  No evidence of current or previous ischemic insults to the fingertips by visual inspection  PSYCH: Alert. Appropriate.      Labs / Imaging (select studies)     RF/CCP  Recent Labs   Lab Test 04/22/16  0902 04/01/16  0910   CCPIGG 1  --    RHF  --  <20     CHANELL  Recent Labs   Lab Test 04/22/16  0902 04/01/16  0910   VALERIE  --  12.4*   ANAIGG >1:45041  Reference range: <1:40  (Note)  Speckled pattern.  INTERPRETIVE INFORMATION: CHANELL by IFA, IgG  Anti-nuclear antibodies (CHANELL) are seen in a variety of  systemic rheumatic diseases and are determined by indirect  fluorescence assay (IFA) using HEp-2 substrate with an  IgG-specific conjugate. CHANELL titers less than or equal to  1:80 have variable " relevance while titers greater than or  equal to 1:160 are considered clinically significant. These  antibodies may precede clinical disease onset; however,  healthy individuals and those with advanced age have been  reported to be positive for CHANELL. When observed, one of the  five basic patterns is reported: homogeneous,  peripheral/rim, speckled, centromere, or nucleolar. If  cytoplasmic fluorescence is observed, it is noted. IFA  methodology is subjective and has occasionally been shown  to lack sensitivity for anti-SSA/Ro antibodies.  Negative results do not necessarily rule out the presence  of SSc. If clinical suspicion remains, consi vicki further  testing for U3-RNP, PM/Scl, or Th/To antibodies associated  with SSc.  Performed by [email or web address],  [address] [place] [id number] [phone]  [email or web address], Twin Rodriguez MD, Lab. Director    --      RNP/Sm/SSA/SSB  Recent Labs   Lab Test 01/12/18  0828 04/22/16  0902   RNPIGG  --  >8.0  Positive   Antibody index (AI) values reflect qualitative changes in antibody   concentration that cannot be directly associated with clinical condition or   disease state.  *   SMIGG  --  1.5*   SSAIGG  --  <0.2  Negative   Antibody index (AI) values reflect qualitative changes in antibody   concentration that cannot be directly associated with clinical condition or   disease state.     SSBIGG  --  <0.2  Negative   Antibody index (AI) values reflect qualitative changes in antibody   concentration that cannot be directly associated with clinical condition or   disease state.     SCLIGG  --  3.1*   TREPAB Negative  --      dsDNA  Recent Labs   Lab Test 07/18/23  1510 10/14/22  0714 12/15/21  1516 02/10/21  1627 11/05/20  1628 07/23/20  1636 04/02/20  1541 07/11/19  1529 11/29/18  1616   DNA 1.7 1.6 1.1 1 1 1 2 2 2     C3/C4  Recent Labs   Lab Test 07/18/23  1510 10/14/22  0714 12/15/21  1516 05/18/21  1606 02/10/21  1627 11/05/20  1628 07/23/20  1636 04/02/20  1541  10/18/19  1542   [id number] 95 96 87 84 89 89 92 90 74*   [id number] 18 19 17 18 17 17 18 20 14*     Send-out Labs  Recent Labs   Lab Test 04/21/17  0813   [facility]  (Note[facility]  Test name                    Result Flag  Units  RefIntvl  ------------------------------------------------------------  Thiopurine Methyltransferase                                23.2 L      U/mL 24.0-44.0  Sample slightly hemolyzed. Please interpret the results  with caution.  INTERPRETIVE INFORMATION: Thiopurine Methyltransferase, RBC  Normal TPMT activity:  24.0-44.0 U/mL................Individuals are predicted to  be at low risk of bone marrow toxicity (myelosuppression)  as a consequence of standard thiopurine therapy; no dose  adjustment is recommended.  Intermediate TPMT activity:  17.0-23.9 U/mL................Individuals are predicted to  be at intermediate risk of bone marrow toxicity  (myelosuppression) as a consequence of standard thiopurine  therapy; a dose reduction and therapeutic drug management  is recommended.  Low TPMT activity:  less than 17.0 U/mL...........Individuals are predicted to  be at high risk of bone marrow toxicity (myelosuppression)   as a consequence of standard thiopurine dosing. It is  recommended to avoid the use of thiopurine drugs.  High TPMT activity:  greater than 44.0 U/mL........Individuals are not predicted  to be at risk for bone marrow toxicity (myelosuppression)  as a consequence of standard thiopurine dosing, but may be  at risk for therapeutic failure due to excessive  inactivation of thiopurine drugs. Individuals may require  higher than the normal standard dose. Therapeutic drug  management is recommended.  The TPMT, RBC assay is used as a screen to detect  individuals with low and intermediate TPMT activity who may  be at risk for myelosuppression when exposed to standard  doses of thiopurines, including azathioprine (Imuran) and  6-mercaptopurine (Purinethol). TPMT is the  primary  metabolic route for inactivation of thiopurine drugs in the  bone marrow. When TPMT activity is low, it is predicted  that proportionately more 6-mercaptopurine can be converted  into the cytotoxic 6-thioguanine nucle otides that  accumulate in the bone marrow causing excessive toxicity.  The activity of TPMT is measured by the nanomoles of  6-methylmercaptopurine (inactive metabolite) produced per 1  mL of packed red blood cells, (U/mL).    TPMT phenotype testing does not replace the need for  clinical monitoring of patients treated with thiopurine  drugs. Genotype for TPMT cannot be inferred from TPMT  activity (phenotype). Phenotype testing should not be  requested for patients currently treated with thiopurine  drugs. Current TPMT phenotype may not reflect future TPMT  phenotype, particularly in patients who received blood  transfusion within 30-60 days of testing.  TPMT enzyme  activity can be inhibited by several drugs such as:  naproxen (Aleve), ibuprofen (Advil, Motrin), ketoprofen  (Orudis), furosemide (Lasix), sulfasalazine (Azulfidine),  mesalamine (Asacol), olsalazine (Dipentum), mefenamic acid  (Ponstel), thiazide diuretics, and benzoic acid inhibitors.  TPMT inhibitors may contribute t o falsely low results;  patients should abstain from these drugs for at least 48  hours prior to TPMT testing. Falsely low results may also  occur as a result of inappropriate specimen handling and  hemolysis.  Test developed and characteristics determined by [facility]. See Compliance Statement B: www.[email or web address].[email or web address]  Performed by [facility],  [address] [place] [id number] [phone]  [email or web address], Lionel Ferreira MD, Lab. Director     STSTNM Thiopurine Methyltransferase, RBC   STSTCD 92,066   SSPTYP Whole blood, EDTA anticoagulant     Antiphospholipid Antibodies  Recent Labs   Lab Test 04/22/16  0902   B2GPG 0.9   B2GPM <0.9  Negative     CARG <15.0  Interpretation:  Negative     JOANN  <12.5  Interpretation:  Negative     LUPINT Negative  (Note)  COMMENTS:  The INR is normal.  APTT ratio is normal.  DRVVT Screen ratio is normal.  Thrombin time is normal.  NEGATIVE TEST; A LUPUS ANTICOAGULANT WAS NOT DETECTED IN THIS  SPECIMEN WITHIN THE LIMITS OF THE TESTING REPERTOIRE.  If the clinical picture is strongly suggestive of an antiphospholipid  syndrome, recommend anticardiolipin and beta-2-glycoprotein (IgG and  IgM) antibody tests.  Isabella Olson M.D.  [phone]  4/25/2016    INR =  1.05    Reference range: 0.86-1.14  Thrombin Time= 15.4    Reference range: 13.0-19.0 sec    APTT:       Ratio  Patient  =  0.92  1:2 Mix  =  N/A  Reference:  Negative: Less than or equal to 1.16  Positive: Greater than or equal to 1.17     DILUTE LISA VIPER VENOM TEST:  Screen Ratio = 0.95   Normal is less than 1.21         CBC  Recent Labs   Lab Test 01/25/24  1548 10/17/23  1541 07/18/23  1510 09/01/21  1625 05/18/21  1606 02/10/21  1627 11/05/20  1628   WBC 3.6* 4.8 4.7   < > 3.9* 4.2 3.3*   RBC 4.43 4.42 4.28   < > 4.26 4.33 3.95   HGB 14.1 14.2 14.0   < > 14.2 14.2 13.1   HCT 40.8 41.1 40.0   < > 40.3 40.8 37.2   MCV 92 93 94   < > 95 94 94   RDW 11.2 10.7 10.8   < > 11.7 11.8 11.4    252 228   < > 225 212 202   MCH 31.8 32.1 32.7   < > 33.3* 32.8 33.2*   MCHC 34.6 34.5 35.0   < > 35.2 34.8 35.2   NEUTROPHIL 63 71 71   < > 64.3 67.8 60.7   LYMPH 23 18 17   < > 20.2 18.0 23.1   MONOCYTE 14 10 12   < > 14.0 13.0 14.4   EOSINOPHIL 1 1 1   < > 1.0 0.7 1.2   BASOPHIL 0 0 0   < > 0.5 0.5 0.6   ANEU  --   --   --   --  2.5 2.9 2.0   ALYM  --   --   --   --  0.8 0.8 0.8   AMANDA  --   --   --   --  0.6 0.6 0.5   AEOS  --   --   --   --  0.0 0.0 0.0   ABAS  --   --   --   --  0.0 0.0 0.0   ANEUTAUTO 2.2 3.4 3.3   < >  --   --   --    ALYMPAUTO 0.8 0.9 0.8   < >  --   --   --    AMONOAUTO 0.5 0.5 0.6   < >  --   --   --    AEOSAUTO 0.0 0.0 0.0   < >  --   --   --    ABSBASO 0.0 0.0 0.0   < >  --   --   --      < > = values in this interval not displayed.     CMP  Recent Labs   Lab Test 01/25/24  1548 10/17/23  1541 07/18/23  1510 03/31/23  1426 02/10/23  0814 10/14/22  0714 09/01/21  1625 05/18/21  1606 02/10/21  1627 01/21/21  0716 11/05/20  1628 07/23/20  1636    138 137 139  --  138   < > 140 136  --  138 138   POTASSIUM 3.9 3.7 4.2 3.6  --  4.0   < > 3.7 4.0  --  3.8 4.1   CHLORIDE 101 102 103 103  --  109   < > 107 106  --  107 106   CO2 25 26 24 25  --  22   < > 26 24  --  25 26   ANIONGAP 10 10 10 11  --  7   < > 7 6  --  6 6   GLC 83 76 81 101* 90 93   < > 63* 75   < > 92 79   BUN 16.0 11.0 11.4 11.6  --  11   < > 11 10  --  13 11   CR 0.69 0.72 0.64 0.67  --  0.65   < > 0.76 0.74  --  0.68 0.68   GFRESTIMATED >90 >90 >90 >90  --  >90   < > >90 >90  --  >90 >90   GFRESTBLACK  --   --   --   --   --   --   --  >90 >90  --  >90 >90   SRINIVAS 9.7 9.4 9.3 9.7  --  8.6   < > 9.1 9.8  --  8.8 9.2   BILITOTAL 0.3 0.4 0.4 0.5  --  0.3   < > 0.7 0.5  --  0.4 0.4   ALBUMIN 4.9 4.9 4.9 4.8  --  4.1   < > 4.4 4.5  --  4.1 4.6   PROTTOTAL 7.7 7.3 7.1 7.2  --  7.0   < > 7.7 7.6  --  7.0 8.0   ALKPHOS 42 46 42 44  --  42   < > 41 38*  --  39* 44   AST 21 21 22 23  --  17   < > 19 15  --  17 20   ALT 17 14 13 17  --  18   < > 25 23  --  23 29    < > = values in this interval not displayed.     Calcium/VitaminD  Recent Labs   Lab Test 01/25/24  1548 10/17/23  1541 07/18/23  1510 03/31/23  1426 11/05/20  1628 07/23/20  1636 04/13/17  1650 02/20/17  1640   SRINIVAS 9.7 9.4 9.3 9.7   < > 9.2   < >  --    VITDT  --   --   --  50  --  30  --  33    < > = values in this interval not displayed.     ESR/CRP  Recent Labs   Lab Test 01/25/24  1548 07/18/23  1510 03/31/23  1426 07/14/22  1507 04/11/22  1441 12/15/21  1516 09/01/21  1625   SED 7 5 8   < > 8 9 9   CRP  --   --   --   --  <2.9 <2.9 5.6   CRPI <3.00 <3.00 <3.00   < >  --   --   --     < > = values in this interval not displayed.     CK/Aldolase  Recent Labs   Lab Test  10/14/22  0714 12/15/21  1516 02/10/21  1627 07/22/16  0916 04/22/16  0902   CKT 55 60 60   < > 45   ALDOLASE  --   --   --   --  4.5    < > = values in this interval not displayed.     TSH/T4  Recent Labs   Lab Test 04/01/16  0910   TSH 1.57     Lipid Panel  Recent Labs   Lab Test 02/10/23  0814 02/09/22  0701 10/15/21  0937   CHOL 159 158 163   TRIG 37 50 41   HDL 55 50 60   LDL 97 98 95   NHDL 104 108 103     Hepatitis B  Recent Labs   Lab Test 04/22/16  0902   HBCAB Nonreactive   HEPBANG Nonreactive     Hepatitis C  Recent Labs   Lab Test 10/26/18  0836 01/12/18  0828 04/22/16  0902   HCVAB Nonreactive Nonreactive Nonreactive   Assay performance characteristics have not been established for newborns,   infants, and children       Lyme ab screening  Recent Labs   Lab Test 04/01/16  0910   LYMEGM 0.12     HIV Screening  Recent Labs   Lab Test 10/26/18  0836 01/12/18  0828 04/22/16  0902   HIAGAB Nonreactive Nonreactive Nonreactive   HIV-1 p24 Ag & HIV-1/HIV-2 Ab Not Detected       UA  Recent Labs   Lab Test 01/25/24  1551 10/17/23  1549 07/18/23  1510 09/01/21  1629 05/18/21  1617 08/23/18  1638 04/26/18  1648 01/18/18  1640 01/12/18  0836 12/28/17  0145 04/13/17  1650 01/20/17  0827   COLOR Yellow Yellow Yellow   < > Yellow   < > Yellow Yellow Yellow Yellow   < > Yellow   APPEARANCE Clear Clear Clear   < > Clear   < > Clear Clear Clear Clear   < > Clear   URINEGLC Negative Negative Negative   < > Negative   < > Negative Negative Negative Negative   < > Negative   URINEBILI Negative Negative Negative   < > Negative   < > Negative Negative Negative Moderate*   < > Negative   SG 1.010 1.015 <=1.005   < > 1.010   < > 1.010 1.020 1.025 >1.030   < > >1.030   URINEPH 6.0 7.5* 6.0   < > 6.0   < > 7.0 7.0 6.5 6.0   < > 6.0   PROTEIN Negative Negative Negative   < > Negative   < > Negative Negative Trace* 100*   < > Trace*   UROBILINOGEN 0.2 0.2 0.2   < > 0.2   < > 0.2 0.2 0.2 4.0*   < > 0.2   NITRITE Negative Negative  Negative   < > Negative   < > Negative Negative Negative Positive*   < > Negative   UBLD Negative Negative Negative   < > Trace*   < > Negative Negative Negative Negative   < > Negative   LEUKEST Negative Negative Negative   < > Negative   < > Negative Negative Negative Negative   < > Negative   WBCU  --   --   --   --  0 - 5  --  0 - 5 O - 2 O - 2 O - 2   < > O - 2   RBCU  --   --   --   --  O - 2  --  O - 2 O - 2 O - 2 O - 2   < > O - 2   SQUAMOUSEPI  --   --   --   --  Few  --   --  Few Moderate* Moderate*   < > Few   BACTERIA  --   --   --   --  Rare*  --   --   --  Moderate* Moderate*   < > Few*   MUCOUS  --   --   --   --   --   --   --   --  Present* Present*  --  Present*    < > = values in this interval not displayed.     Urine Microscopic  Recent Labs   Lab Test 05/18/21  1617 04/26/18  1648 01/18/18  1640 01/12/18  0836 12/28/17  0145 04/13/17  1650 01/20/17  0827   WBCU 0 - 5 0 - 5 O - 2 O - 2 O - 2   < > O - 2   RBCU O - 2 O - 2 O - 2 O - 2 O - 2   < > O - 2   SQUAMOUSEPI Few  --  Few Moderate* Moderate*   < > Few   BACTERIA Rare*  --   --  Moderate* Moderate*   < > Few*   MUCOUS  --   --   --  Present* Present*  --  Present*    < > = values in this interval not displayed.     Urine Protein  GHUTP and UTP= Urine protein (random), GHUTPG and UTPG = urine protein:creatinine ratio (random), UCRR = urine creatinine (random)  Recent Labs   Lab Test 01/25/24  1551 10/17/23  1549 07/18/23  1510 03/31/23  1426 10/14/22  0714 07/14/22  1507 07/14/22  1507 04/11/22  1441 12/15/21  1516 09/01/21  1629   GHUTP <6.0 <6.0 <6.0   < > 7.6   < > 6.9  --   --   --    UTP  --   --   --   --   --   --   --  0.07 <0.05 <0.05   GHUTPG  --   --   --   --  0.04  --  0.09  --   --   --    UTPG  --   --   --   --   --   --   --  0.11  --   --    UCRR 23.3 43.8 12.5   < > 184.0   < > 76.5 66 25 23    < > = values in this interval not displayed.     Immunization History     Immunization History   Administered Date(s) Administered     COVID-19 12+ (2023-24) (Pfizer) 10/26/2023    COVID-19 Bivalent 18+ (Moderna) 10/03/2022    COVID-19 MONOVALENT 12+ (Pfizer) 03/16/2021, 04/06/2021, 08/30/2021    COVID-19 Monovalent Booster 18+ (Moderna) 03/04/2022    DT (PEDS <7y) 08/22/1997    Flu, Unspecified 10/21/2015    HPV 02/25/2010, 02/04/2011    HPV Quadrivalent 02/25/2010, 02/04/2011    HepB 06/13/1999, 08/20/1999, 02/05/2000    HepB, Unspecified 06/13/1999, 07/13/1999, 08/20/1999, 02/05/2000    Hepatitis A (ADULT 19+) 09/11/2023    Hepatitis B, Adult 07/13/1999, 08/20/1999, 02/05/2000    Historical DTP/aP 1986, 1986, 1986, 01/15/1988, 06/15/1991, 08/22/1997    Historical Hepb 07/13/1999, 08/20/1999    Influenza (IIV3) PF 10/19/2010, 11/07/2011, 09/23/2012    Influenza Vaccine >6 months,quad, PF 10/11/2016, 10/13/2017, 10/26/2018, 10/17/2019, 10/16/2020, 10/15/2021, 12/12/2022, 10/09/2023    MMR 05/15/1987, 09/15/1991    Mantoux Tuberculin Skin Test 07/15/1987, 01/19/2005    Meningococcal (Menomune ) 08/09/2004    Meningococcal ACWY (Menactra ) 08/09/2004    OPV, trivalent, live 1986, 1986, 1986, 01/15/1988, 09/15/1991    OPV, unspecified 1986, 1986, 1986, 01/15/1988, 09/15/1991    Pneumo Conj 13-V (2010&after) 05/04/2018    Pneumococcal 23 valent 08/30/2018    TDAP (Adacel,Boostrix) 01/20/2009    TDAP Vaccine (Adacel) 01/21/2009, 02/25/2010, 08/17/2020    Typhoid IM 09/11/2023    Zoster recombinant adjuvanted (SHINGRIX) 12/13/2018, 03/04/2019          Chart documentation done in part with Dragon Voice recognition Software. Although reviewed after completion, some word and grammatical error may remain.    Lavon Light MD

## 2024-02-01 NOTE — NURSING NOTE
RAPID3 (0-30) Cumulative Score  17.0          RAPID3 Weighted Score (divide #4 by 3 and that is the weighted score)  5.6

## 2024-02-09 NOTE — TELEPHONE ENCOUNTER
Called and spoke with patient.   She had form completed by Milagro Bryson.   Shredding current form.     Vikki Bliss MA     pt will increase sitting and standing static and dynamic balance by full grade for safety with ambulation, ADLs and transfers x6 weeks

## 2024-03-16 ENCOUNTER — HEALTH MAINTENANCE LETTER (OUTPATIENT)
Age: 39
End: 2024-03-16

## 2024-04-05 NOTE — PATIENT INSTRUCTIONS
Patient Education       Proper skin care from [facility]:    -Eliminate harsh soaps as they strip the natural oils from the skin, often resulting in dry itchy skin ( i.e. Dial, Zest, [place])  -Use mild soaps such as Cetaphil or Dove Sensitive Skin in the shower. You do not need to use soap on arms, legs, and trunk every time you shower unless visibly soiled.   -Avoid hot or cold showers.  -After showering, lightly dry off and apply moisturizing within 2-3 minutes. This will help trap moisture in the skin.   -Aggressive use of a moisturizer at least 1-2 times a day to the entire body (including -Vanicream, Cetaphil, Aquaphor or Cerave) and moisturize hands after every washing.  -We recommend using moisturizers that come in a tub that needs to be scooped out, not a pump. This has more of an oil base. It will hold moisture in your skin much better than a water base moisturizer. The above recommended are non-pore clogging.      Wear a sunscreen with at least SPF 30 on your face, ears, neck and V of the chest daily. Wear sunscreen on other areas of the body if those areas are exposed to the sun throughout the day. Sunscreens can contain physical and/or chemical blockers. Physical blockers are less likely to clog pores, these include zinc oxide and titanium dioxide. Reapply every two hour and after swimming.     Sunscreen examples: https://www.ewg.org/sunscreen/    UV radiation  UVA radiation remains constant throughout the day and throughout the year. It is a longer wavelength than UVB and therefore penetrates deeper into the skin leading to immediate and delayed tanning, photoaging, and skin cancer. 70-80% of UVA and UVB radiation occurs between the hours of 10am-2pm.  UVB radiation  UVB radiation causes the most harmful effects and is more significant during the summer months. However, snow and ice can reflect UVB radiation leading to skin damage during the winter months as well. UVB radiation is  responsible for tanning, burning, inflammation, delayed erythema (pinkness), pigmentation (brown spots), and skin cancer.     I recommend self monthly full body exams and yearly full body exams with a dermatology provider. If you develop a new or changing lesion please follow up for examination. Most skin cancers are pink and scaly or pink and pearly. However, we do see blue/brown/black skin cancers.  Consider the ABCDEs of melanoma when giving yourself your monthly full body exam ( don't forget the groin, buttocks, feet, toes, etc). A-asymmetry, B-borders, C-color, D-diameter, E-elevation or evolving. If you see any of these changes please follow up in clinic. If you cannot see your back I recommend purchasing a hand held mirror to use with a larger wall mirror.       Checking for Skin Cancer  You can find cancer early by checking your skin each month. There are 3 kinds of skin cancer. They are melanoma, basal cell carcinoma, and squamous cell carcinoma. Doing monthly skin checks is the best way to find new marks or skin changes. Follow the instructions below for checking your skin.   The ABCDEs of checking moles for melanoma   Check your moles or growths for signs of melanoma using ABCDE:   Asymmetry: the sides of the mole or growth don t match  Border: the edges are ragged, notched, or blurred  Color: the color within the mole or growth varies  Diameter: the mole or growth is larger than 6 mm (size of a pencil eraser)  Evolving: the size, shape, or color of the mole or growth is changing (evolving is not shown in the images below)    Checking for other types of skin cancer  Basal cell carcinoma or squamous cell carcinoma have symptoms such as:     A spot or mole that looks different from all other marks on your skin  Changes in how an area feels, such as itching, tenderness, or pain  Changes in the skin's surface, such as oozing, bleeding, or scaliness  A sore that does not heal  New swelling or redness beyond  the border of a mole    Who s at risk?  Anyone can get skin cancer. But you are at greater risk if you have:   Fair skin, light-colored hair, or light-colored eyes  Many moles or abnormal moles on your skin  A history of sunburns from sunlight or tanning beds  A family history of skin cancer  A history of exposure to radiation or chemicals  A weakened immune system  If you have had skin cancer in the past, you are at risk for recurring skin cancer.   How to check your skin  Do your monthly skin checkups in front of a full-length mirror. Check all parts of your body, including your:   Head (ears, face, neck, and scalp)  Torso (front, back, and sides)  Arms (tops, undersides, upper, and lower armpits)  Hands (palms, backs, and fingers, including under the nails)  Buttocks and genitals  Legs (front, back, and sides)  Feet (tops, soles, toes, including under the nails, and between toes)  If you have a lot of moles, take digital photos of them each month. Make sure to take photos both up close and from a distance. These can help you see if any moles change over time.   Most skin changes are not cancer. But if you see any changes in your skin, call your doctor right away. Only he or she can diagnose a problem. If you have skin cancer, seeing your doctor can be the first step toward getting the treatment that could save your life.   [employer] last reviewed this educational content on 4/1/2019 2000-2020 [employer]. [address] [place] [address], [place] [address]. All rights reserved. This information is not intended as a substitute for professional medical care. Always follow your healthcare professional's instructions.       When should I call my doctor?  If you are worsening or not improving, please, contact us or seek urgent care as noted below.     Who should I call with questions (adults)?  [facility] (adult and pediatric): [phone]  [facility]  [place] (adult): [phone]  [facility] ([place], [place], [place] and [place]) [phone]  For urgent needs outside of business hours call the [facility] at [phone] and ask for the dermatology resident on call to be paged  If this is a medical emergency and you are unable to reach an ER, Call 911      If you need a prescription refill, please contact your pharmacy. Refills are approved or denied by our Physicians during normal business hours, Monday through Fridays    Per office policy, refills will not be granted if you have not been seen within the past year (or sooner depending on your condition)

## 2024-04-10 ENCOUNTER — OFFICE VISIT (OUTPATIENT)
Dept: DERMATOLOGY | Facility: CLINIC | Age: 39
End: 2024-04-10
Payer: COMMERCIAL

## 2024-04-10 DIAGNOSIS — L71.9 ACNE ROSACEA: Primary | ICD-10-CM

## 2024-04-10 PROCEDURE — 99213 OFFICE O/P EST LOW 20 MIN: CPT | Performed by: PHYSICIAN ASSISTANT

## 2024-04-10 NOTE — LETTER
4/10/2024         RE: Aleida Weinberg  [address] [facility] [address]        Dear Colleague,    Thank you for referring your patient, Aleida Weinberg, to the [facility]. Please see a copy of my visit note below.    Aleida Weinberg is an extremely pleasant 38 year old year old female patient here today for recheck acne rosacea in a setting of lupus. She has failed topical metronidazole and elidel. LOV she was prescribed azelaic acid 15% only mild improvement seen. She would like to try triple cream from compounding pharmacy. . Patient has no other skin complaints today.  Remainder of the HPI, Meds, PMH, Allergies, FH, and SH was reviewed in chart.    Past Medical History:   Diagnosis Date     Arthritis      Lupus erythematosus      Mild intermittent asthma      Other kyphoscoliosis and scoliosis     upper back curvature and disc degeneration in lower back.       Past Surgical History:   Procedure Laterality Date      SECTION  2006     EXCISE GANGLION WRIST Left 11/10/2022    Procedure: EXCISION, GANGLION CYST, WRIST LEFT;  Surgeon: Jayde Martinez MD;  [place] [facility] [place]     SURGICAL HISTORY OF -       PE Tubes        Family History   Problem Relation Age of Onset     Heart Disease Maternal Grandfather         Bypass, Heart Disease     Respiratory Maternal Grandfather         asthma     Macular Degeneration Maternal Grandfather      Hypertension Paternal Grandmother      Cancer Paternal Grandmother         lymph nodes     Cataracts Paternal Grandmother      C.A.D. Paternal Grandfather      Heart Disease Maternal Uncle         MI's      Alcohol/Drug Maternal Uncle      Respiratory Other         cousin on mothers side, asthma     Alcohol/Drug Other         bother great grandparents on mother's side     Diabetes No family hx of      Breast Cancer No family hx of      Cancer - colorectal No family hx of        Social History     Socioeconomic  History     Marital status: Single     Spouse name: Not on file     Number of children: Not on file     Years of education: Not on file     Highest education level: Not on file   Occupational History     Not on file   Tobacco Use     Smoking status: Former     Current packs/day: 0.00     Average packs/day: 0.5 packs/day for 2.5 years (1.2 ttl pk-yrs)     Types: Cigarettes     Start date: 2003     Quit date: 2005     Years since quittin.4     Passive exposure: Never     Smokeless tobacco: Never   Vaping Use     Vaping status: Never Used   Substance and Sexual Activity     Alcohol use: Yes     Alcohol/week: 0.0 standard drinks of alcohol     Comment: rarely - 1 monthly     Drug use: No     Sexual activity: Yes     Partners: Male     Birth control/protection: I.U.D.   Other Topics Concern     Parent/sibling w/ CABG, MI or angioplasty before 65F 55M? No   Social History Narrative    Caffeine intake/servings daily - 1    Calcium intake/servings daily - 2-3    Exercise 0 times weekly     Sunscreen used - Yes    Seatbelts used - Yes    Guns stored in the home - No    Self Breast Exam - Yes    Pap test up to date -  Yes    Eye exam up to date -  Yes    Dental exam up to date -  Yes    DEXA scan up to date -  Not Applicable    Flex Sig/Colonoscopy up to date -  Not Applicable    Mammography up to date -  Not Applicable    Immunizations reviewed and up to date - Yes    Abuse: Current or Past (Physical, Sexual or Emotional) - No    Do you feel safe in your environment - Yes    Do you cope well with stress - Yes    Do you suffer from insomnia - No    Last updated by: Nani Heredia  2007         Social Determinants of Health     Financial Resource Strain: Low Risk  (2023)    Financial Resource Strain      Within the past 12 months, have you or your family members you live with been unable to get utilities (heat, electricity) when it was really needed?: No   Food Insecurity: Low Risk  (2023)     Food Insecurity      Within the past 12 months, did you worry that your food would run out before you got money to buy more?: No      Within the past 12 months, did the food you bought just not last and you didn t have money to get more?: No   Transportation Needs: Low Risk  (11/22/2023)    Transportation Needs      Within the past 12 months, has lack of transportation kept you from medical appointments, getting your medicines, non-medical meetings or appointments, work, or from getting things that you need?: No   Physical Activity: Not on file   Stress: Not on file   Social Connections: Not on file   Interpersonal Safety: Not on file   Housing Stability: Low Risk  (11/22/2023)    Housing Stability      Do you have housing? : Yes      Are you worried about losing your housing?: No       Outpatient Encounter Medications as of 4/10/2024   Medication Sig Dispense Refill     azelaic acid (FINACIA) 15 % external gel Apply pea sized amount at bedtime. 50 g 4     albuterol (PROAIR HFA/PROVENTIL HFA/VENTOLIN HFA) 108 (90 Base) MCG/ACT inhaler Inhale 2 puffs into the lungs every 6 hours as needed for shortness of breath / dyspnea 18 g 1     atovaquone-proguanil (MALARONE) 250-100 MG tablet Take 1 tablet by mouth daily Start 2 days before exposure to Malaria and continue daily till  7 days after exposure. 15 tablet 0     azaTHIOprine (IMURAN) 50 MG tablet Take 1 tablet (50 mg) by mouth daily 90 tablet 2     ciprofloxacin (CIPRO) 500 MG tablet Take 1 tablet (500 mg) by mouth 2 times daily Take 1 tablet two times a day for up to 3 days for severe diarrhea during travel. 6 tablet 0     cyclobenzaprine (FLEXERIL) 10 MG tablet Take 1 tablet (10 mg) by mouth 3 times daily as needed for muscle spasms 30 tablet 3     hydroxychloroquine (PLAQUENIL) 200 MG tablet Take 1 tablet (200 mg) by mouth daily . Yearly eye exam including 10-2 VF and SD-OCT required 90 tablet 2     hydrOXYzine (ATARAX) 25 MG tablet Take 1 tablet (25 mg) by mouth  every 6 hours as needed for anxiety 90 tablet 6     ketoconazole (NIZORAL) 2 % external cream Apply twice daily to rash for next 2-3 weeks. 30 g 4     levonorgestrel (MIRENA) 20 MCG/24HR IUD 1 each by Intrauterine route once       LORazepam (ATIVAN) 0.5 MG tablet Take 1 tablet (0.5 mg) by mouth every 8 hours as needed for anxiety 15 tablet 0     methylPREDNISolone (MEDROL DOSEPAK) 4 MG tablet therapy pack Take per package instructions. Take once a day by mouth 21 tablet 0     pimecrolimus (ELIDEL) 1 % external cream Apply once to twice daily to face. (Patient not taking: Reported on 4/10/2024) 30 g 4     traZODone (DESYREL) 50 MG tablet Take 0.5 tablets (25 mg) by mouth nightly as needed for sleep 30 tablet 0     vitamin D3 (CHOLECALCIFEROL) 50 mcg (2000 units) tablet Take 1 tablet (50 mcg) by mouth daily 90 tablet 2     No facility-administered encounter medications on file as of 4/10/2024.             O:   NAD, WDWN, Alert & Oriented, Mood & Affect wnl, Vitals stable   Here today alone   There were no vitals taken for this visit.   General appearance normal   Vitals stable   Alert, oriented and in no acute distress       Redness inflammatory papules with background erythema involving NLF on cheeks, nose, and glabella, perioral       Eyes: Conjunctivae/lids:Normal     ENT: Lips: normal    MSK:Normal    Pulm: Breathing Normal    Lymph Nodes: No Head and Neck Lymphadenopathy     Neuro/Psych: Orientation:Alert and Orientedx3 ; Mood/Affect:normal     A/P:  1. Acne Rosacea  Sent in triple cream compounded medication with azelaic,metronidazole and ivermectin.   Discussed main side effect is dryness.   Continue moisturizers sunscreen. She is using it cosmetics redness moisturizing cream.   Return in 3-4 months.       Again, thank you for allowing me to participate in the care of your patient.        Sincerely,        Shante Bautista PA-C

## 2024-04-10 NOTE — PROGRESS NOTES
Aleida Weinberg is an extremely pleasant 38 year old year old female patient here today for recheck acne rosacea in a setting of lupus. She has failed topical metronidazole and elidel. LOV she was prescribed azelaic acid 15% only mild improvement seen. She would like to try triple cream from compounding pharmacy. . Patient has no other skin complaints today.  Remainder of the HPI, Meds, PMH, Allergies, FH, and SH was reviewed in chart.    Past Medical History:   Diagnosis Date    Arthritis     Lupus erythematosus     Mild intermittent asthma     Other kyphoscoliosis and scoliosis     upper back curvature and disc degeneration in lower back.       Past Surgical History:   Procedure Laterality Date     SECTION      EXCISE GANGLION WRIST Left 11/10/2022    Procedure: EXCISION, GANGLION CYST, WRIST LEFT;  Surgeon: Jayde Martinez MD;  Location: [facility] [place]    SURGICAL HISTORY OF -       PE Tubes        Family History   Problem Relation Age of Onset    Heart Disease Maternal Grandfather         Bypass, Heart Disease    Respiratory Maternal Grandfather         asthma    Macular Degeneration Maternal Grandfather     Hypertension Paternal Grandmother     Cancer Paternal Grandmother         lymph nodes    Cataracts Paternal Grandmother     C.A.D. Paternal Grandfather     Heart Disease Maternal Uncle         MI's     Alcohol/Drug Maternal Uncle     Respiratory Other         cousin on mothers side, asthma    Alcohol/Drug Other         bother great grandparents on mother's side    Diabetes No family hx of     Breast Cancer No family hx of     Cancer - colorectal No family hx of        Social History     Socioeconomic History    Marital status: Single     Spouse name: Not on file    Number of children: Not on file    Years of education: Not on file    Highest education level: Not on file   Occupational History    Not on file   Tobacco Use    Smoking status: Former     Current packs/day: 0.00     Average  packs/day: 0.5 packs/day for 2.5 years (1.2 ttl pk-yrs)     Types: Cigarettes     Start date: 2003     Quit date: 2005     Years since quittin.4     Passive exposure: Never    Smokeless tobacco: Never   Vaping Use    Vaping status: Never Used   Substance and Sexual Activity    Alcohol use: Yes     Alcohol/week: 0.0 standard drinks of alcohol     Comment: rarely - 1 monthly    Drug use: No    Sexual activity: Yes     Partners: Male     Birth control/protection: I.U.D.   Other Topics Concern    Parent/sibling w/ CABG, MI or angioplasty before 65F 55M? No   Social History Narrative    Caffeine intake/servings daily - 1    Calcium intake/servings daily - 2-3    Exercise 0 times weekly     Sunscreen used - Yes    Seatbelts used - Yes    Guns stored in the home - No    Self Breast Exam - Yes    Pap test up to date -  Yes    Eye exam up to date -  Yes    Dental exam up to date -  Yes    DEXA scan up to date -  Not Applicable    Flex Sig/Colonoscopy up to date -  Not Applicable    Mammography up to date -  Not Applicable    Immunizations reviewed and up to date - Yes    Abuse: Current or Past (Physical, Sexual or Emotional) - No    Do you feel safe in your environment - Yes    Do you cope well with stress - Yes    Do you suffer from insomnia - No    Last updated by: Nani Heredia  2007         Social Determinants of Health     Financial Resource Strain: Low Risk  (2023)    Financial Resource Strain     Within the past 12 months, have you or your family members you live with been unable to get utilities (heat, electricity) when it was really needed?: No   Food Insecurity: Low Risk  (2023)    Food Insecurity     Within the past 12 months, did you worry that your food would run out before you got money to buy more?: No     Within the past 12 months, did the food you bought just not last and you didn t have money to get more?: No   Transportation Needs: Low Risk  (2023)    Transportation  Needs     Within the past 12 months, has lack of transportation kept you from medical appointments, getting your medicines, non-medical meetings or appointments, work, or from getting things that you need?: No   Physical Activity: Not on file   Stress: Not on file   Social Connections: Not on file   Interpersonal Safety: Not on file   Housing Stability: Low Risk  (11/22/2023)    Housing Stability     Do you have housing? : Yes     Are you worried about losing your housing?: No       Outpatient Encounter Medications as of 4/10/2024   Medication Sig Dispense Refill    azelaic acid (FINACIA) 15 % external gel Apply pea sized amount at bedtime. 50 g 4    albuterol (PROAIR HFA/PROVENTIL HFA/VENTOLIN HFA) 108 (90 Base) MCG/ACT inhaler Inhale 2 puffs into the lungs every 6 hours as needed for shortness of breath / dyspnea 18 g 1    atovaquone-proguanil (MALARONE) 250-100 MG tablet Take 1 tablet by mouth daily Start 2 days before exposure to Malaria and continue daily till  7 days after exposure. 15 tablet 0    azaTHIOprine (IMURAN) 50 MG tablet Take 1 tablet (50 mg) by mouth daily 90 tablet 2    ciprofloxacin (CIPRO) 500 MG tablet Take 1 tablet (500 mg) by mouth 2 times daily Take 1 tablet two times a day for up to 3 days for severe diarrhea during travel. 6 tablet 0    cyclobenzaprine (FLEXERIL) 10 MG tablet Take 1 tablet (10 mg) by mouth 3 times daily as needed for muscle spasms 30 tablet 3    hydroxychloroquine (PLAQUENIL) 200 MG tablet Take 1 tablet (200 mg) by mouth daily . Yearly eye exam including 10-2 VF and SD-OCT required 90 tablet 2    hydrOXYzine (ATARAX) 25 MG tablet Take 1 tablet (25 mg) by mouth every 6 hours as needed for anxiety 90 tablet 6    ketoconazole (NIZORAL) 2 % external cream Apply twice daily to rash for next 2-3 weeks. 30 g 4    levonorgestrel (MIRENA) 20 MCG/24HR IUD 1 each by Intrauterine route once      LORazepam (ATIVAN) 0.5 MG tablet Take 1 tablet (0.5 mg) by mouth every 8 hours as needed  for anxiety 15 tablet 0    methylPREDNISolone (MEDROL DOSEPAK) 4 MG tablet therapy pack Take per package instructions. Take once a day by mouth 21 tablet 0    pimecrolimus (ELIDEL) 1 % external cream Apply once to twice daily to face. (Patient not taking: Reported on 4/10/2024) 30 g 4    traZODone (DESYREL) 50 MG tablet Take 0.5 tablets (25 mg) by mouth nightly as needed for sleep 30 tablet 0    vitamin D3 (CHOLECALCIFEROL) 50 mcg (2000 units) tablet Take 1 tablet (50 mcg) by mouth daily 90 tablet 2     No facility-administered encounter medications on file as of 4/10/2024.             O:   NAD, WDWN, Alert & Oriented, Mood & Affect wnl, Vitals stable   Here today alone   There were no vitals taken for this visit.   General appearance normal   Vitals stable   Alert, oriented and in no acute distress       Redness inflammatory papules with background erythema involving NLF on cheeks, nose, and glabella, perioral       Eyes: Conjunctivae/lids:Normal     ENT: Lips: normal    MSK:Normal    Pulm: Breathing Normal    Lymph Nodes: No Head and Neck Lymphadenopathy     Neuro/Psych: Orientation:Alert and Orientedx3 ; Mood/Affect:normal     A/P:  1. Acne Rosacea  Sent in triple cream compounded medication with azelaic,metronidazole and ivermectin.   Discussed main side effect is dryness.   Continue moisturizers sunscreen. She is using it cosmetics redness moisturizing cream.   Return in 3-4 months.

## 2024-04-22 DIAGNOSIS — M54.41 ACUTE MIDLINE LOW BACK PAIN WITH RIGHT-SIDED SCIATICA: ICD-10-CM

## 2024-04-22 RX ORDER — CYCLOBENZAPRINE HCL 10 MG
10 TABLET ORAL 3 TIMES DAILY PRN
Qty: 30 TABLET | Refills: 2 | Status: SHIPPED | OUTPATIENT
Start: 2024-04-22

## 2024-05-01 ENCOUNTER — LAB (OUTPATIENT)
Dept: LAB | Facility: CLINIC | Age: 39
End: 2024-05-01
Payer: COMMERCIAL

## 2024-05-01 DIAGNOSIS — Z13.220 SCREENING FOR HYPERLIPIDEMIA: Primary | ICD-10-CM

## 2024-05-01 DIAGNOSIS — M32.19 OTHER SYSTEMIC LUPUS ERYTHEMATOSUS WITH OTHER ORGAN INVOLVEMENT (H): ICD-10-CM

## 2024-05-01 DIAGNOSIS — Z79.899 HIGH RISK MEDICATIONS (NOT ANTICOAGULANTS) LONG-TERM USE: ICD-10-CM

## 2024-05-01 LAB
ALBUMIN UR-MCNC: NEGATIVE MG/DL
APPEARANCE UR: CLEAR
BASOPHILS # BLD AUTO: 0 10E3/UL (ref 0–0.2)
BASOPHILS NFR BLD AUTO: 1 %
BILIRUB UR QL STRIP: NEGATIVE
COLOR UR AUTO: YELLOW
EOSINOPHIL # BLD AUTO: 0 10E3/UL (ref 0–0.7)
EOSINOPHIL NFR BLD AUTO: 1 %
ERYTHROCYTE [DISTWIDTH] IN BLOOD BY AUTOMATED COUNT: 11 % (ref 10–15)
ERYTHROCYTE [SEDIMENTATION RATE] IN BLOOD BY WESTERGREN METHOD: 8 MM/HR (ref 0–20)
GLUCOSE UR STRIP-MCNC: NEGATIVE MG/DL
HCT VFR BLD AUTO: 40.4 % (ref 35–47)
HGB BLD-MCNC: 14.1 G/DL (ref 11.7–15.7)
HGB UR QL STRIP: NEGATIVE
IMM GRANULOCYTES # BLD: 0 10E3/UL
IMM GRANULOCYTES NFR BLD: 0 %
KETONES UR STRIP-MCNC: NEGATIVE MG/DL
LEUKOCYTE ESTERASE UR QL STRIP: NEGATIVE
LYMPHOCYTES # BLD AUTO: 0.9 10E3/UL (ref 0.8–5.3)
LYMPHOCYTES NFR BLD AUTO: 23 %
MCH RBC QN AUTO: 32.6 PG (ref 26.5–33)
MCHC RBC AUTO-ENTMCNC: 34.9 G/DL (ref 31.5–36.5)
MCV RBC AUTO: 93 FL (ref 78–100)
MONOCYTES # BLD AUTO: 0.4 10E3/UL (ref 0–1.3)
MONOCYTES NFR BLD AUTO: 11 %
NEUTROPHILS # BLD AUTO: 2.6 10E3/UL (ref 1.6–8.3)
NEUTROPHILS NFR BLD AUTO: 65 %
NITRATE UR QL: NEGATIVE
PH UR STRIP: 6.5 [PH] (ref 5–7)
PLATELET # BLD AUTO: 227 10E3/UL (ref 150–450)
RBC # BLD AUTO: 4.33 10E6/UL (ref 3.8–5.2)
SP GR UR STRIP: 1.01 (ref 1–1.03)
UROBILINOGEN UR STRIP-ACNC: 0.2 E.U./DL
WBC # BLD AUTO: 4 10E3/UL (ref 4–11)

## 2024-05-01 PROCEDURE — 86140 C-REACTIVE PROTEIN: CPT

## 2024-05-01 PROCEDURE — 36415 COLL VENOUS BLD VENIPUNCTURE: CPT

## 2024-05-01 PROCEDURE — 80053 COMPREHEN METABOLIC PANEL: CPT

## 2024-05-01 PROCEDURE — 81003 URINALYSIS AUTO W/O SCOPE: CPT

## 2024-05-01 PROCEDURE — 84156 ASSAY OF PROTEIN URINE: CPT

## 2024-05-01 PROCEDURE — 85025 COMPLETE CBC W/AUTO DIFF WBC: CPT

## 2024-05-01 PROCEDURE — 85652 RBC SED RATE AUTOMATED: CPT

## 2024-05-02 LAB
ALBUMIN MFR UR ELPH: <6 MG/DL
ALBUMIN SERPL BCG-MCNC: 4.8 G/DL (ref 3.5–5.2)
ALP SERPL-CCNC: 41 U/L (ref 40–150)
ALT SERPL W P-5'-P-CCNC: 16 U/L (ref 0–50)
ANION GAP SERPL CALCULATED.3IONS-SCNC: 4 MMOL/L (ref 7–15)
AST SERPL W P-5'-P-CCNC: 20 U/L (ref 0–45)
BILIRUB SERPL-MCNC: 0.4 MG/DL
BUN SERPL-MCNC: 17.1 MG/DL (ref 6–20)
CALCIUM SERPL-MCNC: 9.7 MG/DL (ref 8.6–10)
CHLORIDE SERPL-SCNC: 103 MMOL/L (ref 98–107)
CREAT SERPL-MCNC: 0.8 MG/DL (ref 0.51–0.95)
CREAT UR-MCNC: 59.2 MG/DL
CRP SERPL-MCNC: <3 MG/L
DEPRECATED HCO3 PLAS-SCNC: 31 MMOL/L (ref 22–29)
EGFRCR SERPLBLD CKD-EPI 2021: >90 ML/MIN/1.73M2
GLUCOSE SERPL-MCNC: 114 MG/DL (ref 70–99)
POTASSIUM SERPL-SCNC: 4.3 MMOL/L (ref 3.4–5.3)
PROT SERPL-MCNC: 7.3 G/DL (ref 6.4–8.3)
PROT/CREAT 24H UR: NORMAL MG/G{CREAT}
SODIUM SERPL-SCNC: 138 MMOL/L (ref 135–145)

## 2024-05-23 ENCOUNTER — PATIENT OUTREACH (OUTPATIENT)
Dept: FAMILY MEDICINE | Facility: CLINIC | Age: 39
End: 2024-05-23
Payer: COMMERCIAL

## 2024-05-23 NOTE — LETTER
June 18, 2024      Aleida Weinberg  [address]  [place] [address]      Your healthcare team cares about your health. To provide you with the best care, we have reviewed your chart and based on our findings, we see that you are due to:     PREVENTATIVE VISIT: Physical    If you have already completed these items, please contact the clinic via phone or Mychart so your care team can review and update your records.  Thank you for choosing [facility] for your healthcare needs. For any questions, concerns, or to schedule an appointment please contact the clinic.     Healthy Regards,    Your [facility] Care Team

## 2024-05-23 NOTE — TELEPHONE ENCOUNTER
Patient Quality Outreach    Patient is due for the following:   Asthma  -  ACT needed  Physical Preventive Adult Physical      Topic Date Due    HPV Vaccine (3 - Risk 3-dose series) 06/04/2011    Pneumococcal Vaccine (3 of 3 - PPSV23 or PCV20) 08/30/2023    COVID-19 Vaccine (7 - 2023-24 season) 12/21/2023       Next Steps:   Schedule a Adult Preventative    Type of outreach:    Sent MoPowered message.      Questions for provider review:    None           Alek Anderson MA  Chart routed to Care Team.

## 2024-05-29 ENCOUNTER — OFFICE VISIT (OUTPATIENT)
Dept: RHEUMATOLOGY | Facility: CLINIC | Age: 39
End: 2024-05-29
Payer: COMMERCIAL

## 2024-05-29 VITALS
WEIGHT: 157 LBS | OXYGEN SATURATION: 96 % | SYSTOLIC BLOOD PRESSURE: 111 MMHG | BODY MASS INDEX: 24.23 KG/M2 | RESPIRATION RATE: 16 BRPM | HEART RATE: 81 BPM | DIASTOLIC BLOOD PRESSURE: 75 MMHG

## 2024-05-29 DIAGNOSIS — M32.19 OTHER SYSTEMIC LUPUS ERYTHEMATOSUS WITH OTHER ORGAN INVOLVEMENT (H): Primary | ICD-10-CM

## 2024-05-29 DIAGNOSIS — Z79.899 HIGH RISK MEDICATIONS (NOT ANTICOAGULANTS) LONG-TERM USE: ICD-10-CM

## 2024-05-29 DIAGNOSIS — Z79.899 LONG-TERM USE OF HYDROXYCHLOROQUINE: ICD-10-CM

## 2024-05-29 PROCEDURE — 99214 OFFICE O/P EST MOD 30 MIN: CPT | Performed by: INTERNAL MEDICINE

## 2024-05-29 PROCEDURE — G2211 COMPLEX E/M VISIT ADD ON: HCPCS | Performed by: INTERNAL MEDICINE

## 2024-05-29 RX ORDER — AZATHIOPRINE 50 MG/1
50 TABLET ORAL DAILY
Qty: 90 TABLET | Refills: 2 | Status: SHIPPED | OUTPATIENT
Start: 2024-05-29 | End: 2024-09-11

## 2024-05-29 RX ORDER — HYDROXYCHLOROQUINE SULFATE 200 MG/1
200 TABLET, FILM COATED ORAL DAILY
Qty: 90 TABLET | Refills: 2 | Status: SHIPPED | OUTPATIENT
Start: 2024-05-29 | End: 2024-09-11

## 2024-05-29 NOTE — PATIENT INSTRUCTIONS
RHEUMATOLOGY    [facility] [place]  [address] [place] [address]  [place] [address]    Phone number: [phone]  Fax number: [phone]    If you need a medication refill, please contact us as you may need lab work and/or a follow up visit prior to your refill.      Thank you for choosing [facility]!    Carissa Frye CMA Rheumatology

## 2024-05-29 NOTE — PROGRESS NOTES
Rheumatology Clinic Visit      Aleida Weinberg MRN# [id number]   YOB: 1985 Age: 39 year old      Date of visit: 5/29/24   PCP: Sayra Chirinos  Dermatology: Shante Reid     Chief Complaint   Patient presents with:  Systemic lupus erythematosus    Assessment and Plan     1. Systemic lupus erythematosus (CHANELL >1:11158 speckled, RNP+, Sm+, Scl-70+, hypocomplementemia, photosensitivity, malar rash, arthritis, fatigue, Raynaud's phenomenon):  Dx'd 4/2016. Scl-70+; she lacks features of scleroderma except for raynaud's; no myopathy based on labs/symptoms. 5/11/2018 echo without evidence of pulmonary hypertension. Previously on MTX 20mg SQ weekly (GI upset with PO doses above 15mg, partially effective) and SSZ (LFT elevations).  Currently on AZA 50mg daily and HCQ 200mg daily (patient self reduced previously from 300mg daily on average based on preference of an easier regimen and continued to do well). TPMT normal on 8/21/2017.  Doing well with regard to lupus.  Chronic illness, stable.    - Continue hydroxychloroquine 200mg daily (last eye exam was okay on 7/27/2023 by Dr. Frias)  - Ophthalmology referral for hydroxychloroquine toxicity monitoring  (referral given because she is moving to [place] and she may establish with ophthalmology closer to her new home)  - Continue azathioprine 50mg daily   - Labs in 3 months: CBC, CMP, ESR, CRP, C3, C4, dsDNA, UA, Uprotein:creatinine    High risk medication requiring intensive toxicity monitoring at least quarterly      2. Raynaud's Phenomenon: Cold avoidance was insufficient for controlling her symptoms in the past; then did well on amlodipine previously but has not required for some time now.  She will notify me if she would like to restart amlodipine.  Note that she was previously on amlodipine 10 mg daily during colder months but has not required it in most recent singer.    3.  Low vitamin D: Currently on vitamin D 2000 units daily.     - Continue vitamin D 2000 units daily    4.  Secondary Sjogren's syndrome: Mild dry and dry mouth that are treated infrequently with artificial tears and frequent sips of water. Dentist follow-up every 6 months     5.  Facial rash: Was following with dermatology at [facility].  Reportedly due to SLE and rosacea from derm perspective, and improved with topical creams from her dermatologist for rosacea.  Active today, involving the nasolabial folds.  She will continue following with dermatology at [facility] where she has been treated for acne rosacea in the setting of atopic dermatitis    6.  Mild medial joint line tenderness of the knees, and mild pes anserine bursitis: she says that it is only symptomatic when examined.  May use topical Voltaren gel as needed.    7.  Vaccinations:   - Influenza: encouraged yearly vaccination  - COVID-19: Advised keeping updated, and to hold azathioprine for 1-2 weeks afterward    8.  History of midline low back pain radiating to the right leg: Has seen a spine specialist who recommended SI joint steroid injection previously but she did not proceed with this.  She started doing physical therapy exercises on her own and symptoms have resolved.  Continue physical therapy exercises.    9.  Hyperglycemia: She plans to repeat a fasting glucose with labs at her work.      Total minutes spent in evaluation with patient, documentation, , and review of pertinent studies and chart notes: 16  The longitudinal plan of care for the rheumatology problem(s) were addressed during this visit.  Due to added complexity of care, we will continue to support the patient and the subsequent management of this condition with ongoing continuity of care.       Ms. Weinberg verbalized agreement with and understanding of the rational for the diagnosis and treatment plan.  All questions were answered to best of my ability and the patient's satisfaction. Ms. Weinberg was advised to contact the clinic  with any questions that may arise after the clinic visit.      Thank you for involving me in the care of the patient    Return to clinic: 3 months      HPI   Aleida Digna Weinberg is a 39 year old female with medical history significant for intermittent asthma, anxiety, migraines, vitamin D deficiency, ganglion cyst removal of the left wrist, and systemic lupus erythematosus who presents for follow-up of SLE.     7/25/2023: Currently doing well with regard to lupus.  No joint pain or swelling.  No morning stiffness or gelling phenomenon.  No active Raynaud's phenomenon symptoms.  Planning to see dermatology for facial rash/rosacea.  Planning to have her eye exam updated with ophthalmology in 2 days.  Planning to travel to [facility] in November 2023 and would like to see the travel clinic.  Planning to get an updated COVID-vaccine prior to traveling to [facility].    10/26/2023: Mild ache of the right second-fifth PIPs that is worse with activity and improves with rest, sometimes aches in the morning, and is associated with morning stiffness for less than 20 minutes.  No other joint pain.  Still with facial rash that is being treated as acne rosacea by dermatology at [facility]; she states that she has difficulty finding medications that she tolerates well because she is very sensitive to medications, and continues to follow with dermatology.  Otherwise doing well.  Planning to travel to [facility] soon and has been to the trauma clinic where vaccinations were given.  Planning to get the COVID-19 vaccination later today.    2/1/2024: Doing well except for low back pain that radiates to the right leg, started while she was traveling to [facility] and has improved some but has not resolved.  She saw spine specialist who is advised steroid injection of the SI joint and she is planning to have this completed but is having difficulty scheduling at the moment.  Has not gone to formal physical therapy and does not wish to go to  formal physical therapy but states that she will do exercises on her own at home.  Questions if a Medrol Dosepak would be helpful.      Today, 2024: low back pain resolved with home physical therapy exercises.  Planning to recheck fasting glucose at her work.  Currently without joint pain.  No morning stiffness.  Occasionally has a nonpruritic 1-2 inch diameter raised rash that resolves within hours; suspects exposure to something but has not been able to identify it and no active rash currently.      Denies fevers, chills, nausea, vomiting, constipation, diarrhea. No abdominal pain. No chest pain/pressure, palpitations, or shortness of breath. No oral or nasal sores.  No rash currently.    Tobacco: quit smoking in   EtOH: Once monthly  Drugs: None  Occupation: Works at [employer], a lot of typing per patient    ROS   12 point review of system was completed and negative except as noted in the HPI     Active Problem List     Patient Active Problem List   Diagnosis    Other kyphoscoliosis and scoliosis    Hypertrophic and atrophic condition of skin    Viral warts    CARDIOVASCULAR SCREENING; LDL GOAL LESS THAN 160    Intermittent asthma    Anxiety    Intractable menstrual migraine without status migrainosus    Vitamin D deficiency    Inflammatory polyarthropathy (H)    Chronic back pain    Raynaud's phenomenon without gangrene    Systemic lupus erythematosus (H)    High risk medications (not anticoagulants) long-term use (Plaquenil and AZA)    Dry eyes, bilateral    Disorder of connective tissue (H24)    Ganglion cyst of dorsum of left wrist     Past Medical History     Past Medical History:   Diagnosis Date    Arthritis     Lupus erythematosus     Mild intermittent asthma     Other kyphoscoliosis and scoliosis     upper back curvature and disc degeneration in lower back.     Past Surgical History     Past Surgical History:   Procedure Laterality Date     SECTION  2006    EXCISE GANGLION WRIST Left  11/10/2022    Procedure: EXCISION, GANGLION CYST, WRIST LEFT;  Surgeon: Jayde Martinez MD;  Location: [facility] OR    SURGICAL HISTORY OF -       PE Tubes     Allergy     Allergies   Allergen Reactions    Fluconazole Rash     Current Medication List     Current Outpatient Medications   Medication Sig Dispense Refill    albuterol (PROAIR HFA/PROVENTIL HFA/VENTOLIN HFA) 108 (90 Base) MCG/ACT inhaler Inhale 2 puffs into the lungs every 6 hours as needed for shortness of breath / dyspnea 18 g 1    atovaquone-proguanil (MALARONE) 250-100 MG tablet Take 1 tablet by mouth daily Start 2 days before exposure to Malaria and continue daily till  7 days after exposure. 15 tablet 0    azaTHIOprine (IMURAN) 50 MG tablet Take 1 tablet (50 mg) by mouth daily 90 tablet 2    azelaic acid (FINACIA) 15 % external gel Apply pea sized amount at bedtime. 50 g 4    ciprofloxacin (CIPRO) 500 MG tablet Take 1 tablet (500 mg) by mouth 2 times daily Take 1 tablet two times a day for up to 3 days for severe diarrhea during travel. 6 tablet 0    COMPOUNDED NON-CONTROLLED SUBSTANCE (CMPD RX) - PHARMACY TO MIX COMPOUNDED MEDICATION Dispense: Azelaic 15%/Ivermectin 1%/Metronidazole 1% : apply once to twice daily. 30 g 11    cyclobenzaprine (FLEXERIL) 10 MG tablet TAKE 1 TABLET BY MOUTH 3 TIMES A DAY AS NEEDED FOR MUSCLE SPASMS 30 tablet 2    hydroxychloroquine (PLAQUENIL) 200 MG tablet Take 1 tablet (200 mg) by mouth daily . Yearly eye exam including 10-2 VF and SD-OCT required 90 tablet 2    hydrOXYzine (ATARAX) 25 MG tablet Take 1 tablet (25 mg) by mouth every 6 hours as needed for anxiety 90 tablet 6    ketoconazole (NIZORAL) 2 % external cream Apply twice daily to rash for next 2-3 weeks. 30 g 4    levonorgestrel (MIRENA) 20 MCG/24HR IUD 1 each by Intrauterine route once      LORazepam (ATIVAN) 0.5 MG tablet Take 1 tablet (0.5 mg) by mouth every 8 hours as needed for anxiety 15 tablet 0    methylPREDNISolone (MEDROL DOSEPAK) 4 MG tablet therapy  "pack Take per package instructions. Take once a day by mouth 21 tablet 0    pimecrolimus (ELIDEL) 1 % external cream Apply once to twice daily to face. (Patient not taking: Reported on 4/10/2024) 30 g 4    traZODone (DESYREL) 50 MG tablet Take 0.5 tablets (25 mg) by mouth nightly as needed for sleep 30 tablet 0    vitamin D3 (CHOLECALCIFEROL) 50 mcg (2000 units) tablet Take 1 tablet (50 mcg) by mouth daily 90 tablet 2     No current facility-administered medications for this visit.         Social History   See HPI    Family History     Family History   Problem Relation Age of Onset    Heart Disease Maternal Grandfather         Bypass, Heart Disease    Respiratory Maternal Grandfather         asthma    Macular Degeneration Maternal Grandfather     Hypertension Paternal Grandmother     Cancer Paternal Grandmother         lymph nodes    Cataracts Paternal Grandmother     C.A.D. Paternal Grandfather     Heart Disease Maternal Uncle         MI's     Alcohol/Drug Maternal Uncle     Respiratory Other         cousin on mothers side, asthma    Alcohol/Drug Other         bother great grandparents on mother's side    Diabetes No family hx of     Breast Cancer No family hx of     Cancer - colorectal No family hx of      Denies family history of autoimmune disease    Physical Exam     Temp Readings from Last 3 Encounters:   11/22/23 98.6  F (37  C) (Oral)   09/11/23 97.9  F (36.6  C) (Temporal)   05/08/23 97.4  F (36.3  C) (Tympanic)     BP Readings from Last 5 Encounters:   02/01/24 114/87   12/14/23 99/68   11/22/23 116/77   10/26/23 108/79   09/11/23 100/67     Pulse Readings from Last 1 Encounters:   02/01/24 93     Resp Readings from Last 1 Encounters:   02/01/24 16     Estimated body mass index is 24.38 kg/m  as calculated from the following:    Height as of 2/1/24: 1.715 m (5' 7.5\").    Weight as of 2/1/24: 71.7 kg (158 lb).      GEN: NAD. Healthy appearing adult.   HEENT:  Anicteric, noninjected sclera. No obvious " external lesions of the ear and nose. Hearing intact.  CV: S1, S2. RRR. No m/r/g  PULM: No increased work of breathing. CTA bilaterally   MSK: MCPs, PIPs, DIPs without swelling or tenderness to palpation.  Wrists without swelling or tenderness to palpation.  Elbows and shoulders without swelling or tenderness to palpation.   Knees, ankles, and MTPs without swelling or tenderness to palpation.    SKIN: No rash or jaundice seen  PSYCH: Alert. Appropriate.       Labs / Imaging (select studies)     RF/CCP  Recent Labs   Lab Test 04/22/16  0902 04/01/16  0910   CCPIGG 1  --    RHF  --  <20     CHANELL  Recent Labs   Lab Test 04/22/16  0902 04/01/16 0910   VALERIE  --  12.4*   ANAIGG >1:96269  Reference range: <1:40  (Note)  Speckled pattern.  INTERPRETIVE INFORMATION: CHANELL by IFA, IgG  Anti-nuclear antibodies (CHANELL) are seen in a variety of  systemic rheumatic diseases and are determined by indirect  fluorescence assay (IFA) using HEp-2 substrate with an  IgG-specific conjugate. CHANELL titers less than or equal to  1:80 have variable relevance while titers greater than or  equal to 1:160 are considered clinically significant. These  antibodies may precede clinical disease onset; however,  healthy individuals and those with advanced age have been  reported to be positive for CHANELL. When observed, one of the  five basic patterns is reported: homogeneous,  peripheral/rim, speckled, centromere, or nucleolar. If  cytoplasmic fluorescence is observed, it is noted. IFA  methodology is subjective and has occasionally been shown  to lack sensitivity for anti-SSA/Ro antibodies.  Negative results do not necessarily rule out the presence  of SSc. If clinical suspicion remains, consi vicki further  testing for U3-RNP, PM/Scl, or Th/To antibodies associated  with SSc.  Performed by [facility],  [address] [place] [id number] [phone]  [email or web address] Twin Rodriguez MD, Lab. Director    --      RNP/Sm/SSA/SSB  Recent Labs   Lab Test  01/12/18  0828 04/22/16  0902   RNPIGG  --  >8.0  Positive   Antibody index (AI) values reflect qualitative changes in antibody   concentration that cannot be directly associated with clinical condition or   disease state.  *   SMIGG  --  1.5*   SSAIGG  --  <0.2  Negative   Antibody index (AI) values reflect qualitative changes in antibody   concentration that cannot be directly associated with clinical condition or   disease state.     SSBIGG  --  <0.2  Negative   Antibody index (AI) values reflect qualitative changes in antibody   concentration that cannot be directly associated with clinical condition or   disease state.     SCLIGG  --  3.1*   TREPAB Negative  --      dsDNA  Recent Labs   Lab Test 07/18/23  1510 10/14/22  0714 12/15/21  1516 02/10/21  1627 11/05/20  1628 07/23/20  1636 04/02/20  1541 07/11/19  1529 11/29/18  1616   DNA 1.7 1.6 1.1 1 1 1 2 2 2     C3/C4  Recent Labs   Lab Test 07/18/23  1510 10/14/22  0714 12/15/21  1516 05/18/21  1606 02/10/21  1627 11/05/20  1628 07/23/20  1636 04/02/20  1541 10/18/19  1542   Y4OBVZN 95 96 87 84 89 89 92 90 74*   [id number] 18 19 17 18 17 17 18 20 14*     Send-out Labs  Recent Labs   Lab Test 04/21/17  0813   SRESLT SEE NOTE  (Note)  Test name                    Result Flag  Units  RefIntvl  ------------------------------------------------------------  Thiopurine Methyltransferase                                23.2 L      U/mL 24.0-44.0  Sample slightly hemolyzed. Please interpret the results  with caution.  INTERPRETIVE INFORMATION: Thiopurine Methyltransferase, RBC  Normal TPMT activity:  24.0-44.0 U/mL................Individuals are predicted to  be at low risk of bone marrow toxicity (myelosuppression)  as a consequence of standard thiopurine therapy; no dose  adjustment is recommended.  Intermediate TPMT activity:  17.0-23.9 U/mL................Individuals are predicted to  be at intermediate risk of bone marrow toxicity  (myelosuppression) as a consequence  of standard thiopurine  therapy; a dose reduction and therapeutic drug management  is recommended.  Low TPMT activity:  less than 17.0 U/mL...........Individuals are predicted to  be at high risk of bone marrow toxicity (myelosuppression)   as a consequence of standard thiopurine dosing. It is  recommended to avoid the use of thiopurine drugs.  High TPMT activity:  greater than 44.0 U/mL........Individuals are not predicted  to be at risk for bone marrow toxicity (myelosuppression)  as a consequence of standard thiopurine dosing, but may be  at risk for therapeutic failure due to excessive  inactivation of thiopurine drugs. Individuals may require  higher than the normal standard dose. Therapeutic drug  management is recommended.  The TPMT, RBC assay is used as a screen to detect  individuals with low and intermediate TPMT activity who may  be at risk for myelosuppression when exposed to standard  doses of thiopurines, including azathioprine (Imuran) and  6-mercaptopurine (Purinethol). TPMT is the primary  metabolic route for inactivation of thiopurine drugs in the  bone marrow. When TPMT activity is low, it is predicted  that proportionately more 6-mercaptopurine can be converted  into the cytotoxic 6-thioguanine nucle otides that  accumulate in the bone marrow causing excessive toxicity.  The activity of TPMT is measured by the nanomoles of  6-methylmercaptopurine (inactive metabolite) produced per 1  mL of packed red blood cells, (U/mL).    TPMT phenotype testing does not replace the need for  clinical monitoring of patients treated with thiopurine  drugs. Genotype for TPMT cannot be inferred from TPMT  activity (phenotype). Phenotype testing should not be  requested for patients currently treated with thiopurine  drugs. Current TPMT phenotype may not reflect future TPMT  phenotype, particularly in patients who received blood  transfusion within 30-60 days of testing.  TPMT enzyme  activity can be inhibited by  several drugs such as:  naproxen (Aleve), ibuprofen (Advil, Motrin), ketoprofen  (Orudis), furosemide (Lasix), sulfasalazine (Azulfidine),  mesalamine (Asacol), olsalazine (Dipentum), mefenamic acid  (Ponstel), thiazide diuretics, and benzoic acid inhibitors.  TPMT inhibitors may contribute t o falsely low results;  patients should abstain from these drugs for at least 48  hours prior to TPMT testing. Falsely low results may also  occur as a result of inappropriate specimen handling and  hemolysis.  Test developed and characteristics determined by [facility]. See Compliance Statement B: www.[facility].com/CS  Performed by [facility],  [address] [place] [id number] [phone]  [email or web address], Lionel Ferreira MD, Lab. Director     STSTNM Thiopurine Methyltransferase, RBC   STSTCD 92,066   SSPTYP Whole blood, EDTA anticoagulant     Antiphospholipid Antibodies  Recent Labs   Lab Test 04/22/16  0902   B2GPG 0.9   B2GPM <0.9  Negative     CARG <15.0  Interpretation:  Negative     JOANN <12.5  Interpretation:  Negative     LUPINT Negative  (Note)  COMMENTS:  The INR is normal.  APTT ratio is normal.  DRVVT Screen ratio is normal.  Thrombin time is normal.  NEGATIVE TEST; A LUPUS ANTICOAGULANT WAS NOT DETECTED IN THIS  SPECIMEN WITHIN THE LIMITS OF THE TESTING REPERTOIRE.  If the clinical picture is strongly suggestive of an antiphospholipid  syndrome, recommend anticardiolipin and beta-2-glycoprotein (IgG and  IgM) antibody tests.  Isabella Olson M.D.  [phone]  4/25/2016    INR =  1.05    Reference range: 0.86-1.14  Thrombin Time= 15.4    Reference range: 13.0-19.0 sec    APTT:       Ratio  Patient  =  0.92  1:2 Mix  =  N/A  Reference:  Negative: Less than or equal to 1.16  Positive: Greater than or equal to 1.17     DILUTE LISA VIPER VENOM TEST:  Screen Ratio = 0.95   Normal is less than 1.21         CBC  Recent Labs   Lab Test 05/01/24  1548 01/25/24  1548 10/17/23  1541 09/01/21  162  05/18/21  1606 02/10/21  1627 11/05/20  1628   WBC 4.0 3.6* 4.8   < > 3.9* 4.2 3.3*   RBC 4.33 4.43 4.42   < > 4.26 4.33 3.95   HGB 14.1 14.1 14.2   < > 14.2 14.2 13.1   HCT 40.4 40.8 41.1   < > 40.3 40.8 37.2   MCV 93 92 93   < > 95 94 94   RDW 11.0 11.2 10.7   < > 11.7 11.8 11.4    252 252   < > 225 212 202   MCH 32.6 31.8 32.1   < > 33.3* 32.8 33.2*   MCHC 34.9 34.6 34.5   < > 35.2 34.8 35.2   NEUTROPHIL 65 63 71   < > 64.3 67.8 60.7   LYMPH 23 23 18   < > 20.2 18.0 23.1   MONOCYTE 11 14 10   < > 14.0 13.0 14.4   EOSINOPHIL 1 1 1   < > 1.0 0.7 1.2   BASOPHIL 1 0 0   < > 0.5 0.5 0.6   ANEU  --   --   --   --  2.5 2.9 2.0   ALYM  --   --   --   --  0.8 0.8 0.8   AMANDA  --   --   --   --  0.6 0.6 0.5   AEOS  --   --   --   --  0.0 0.0 0.0   ABAS  --   --   --   --  0.0 0.0 0.0   ANEUTAUTO 2.6 2.2 3.4   < >  --   --   --    ALYMPAUTO 0.9 0.8 0.9   < >  --   --   --    AMONOAUTO 0.4 0.5 0.5   < >  --   --   --    AEOSAUTO 0.0 0.0 0.0   < >  --   --   --    ABSBASO 0.0 0.0 0.0   < >  --   --   --     < > = values in this interval not displayed.     CMP  Recent Labs   Lab Test 05/01/24  1548 01/25/24  1548 10/17/23  1541 07/18/23  1510 03/31/23  1426 02/10/23  0814 10/14/22  0714 09/01/21  1625 05/18/21  1606 02/10/21  1627 01/21/21  0716 11/05/20  1628 07/23/20  1636    136 138 137 139  --   --    < > 140 136  --  138 138   POTASSIUM 4.3 3.9 3.7 4.2 3.6  --    < >   < > 3.7 4.0  --  3.8 4.1   CHLORIDE 103 101 102 103 103  --    < >   < > 107 106  --  107 106   CO2 31* 25 26 24 25  --    < >   < > 26 24  --  25 26   ANIONGAP 4* 10 10 10 11  --    < >   < > 7 6  --  6 6   * 83 76 81 101* 90  --    < > 63* 75   < > 92 79   BUN 17.1 16.0 11.0 11.4 11.6  --    < >   < > 11 10  --  13 11   CR 0.80 0.69 0.72 0.64 0.67  --   --    < > 0.76 0.74  --  0.68 0.68   GFRESTIMATED >90 >90 >90 >90 >90  --   --    < > >90 >90  --  >90 >90   GFRESTBLACK  --   --   --   --   --   --   --   --  >90 >90  --  >90 >90    SRINIVAS 9.7 9.7 9.4 9.3 9.7  --   --    < > 9.1 9.8  --  8.8 9.2   BILITOTAL 0.4 0.3 0.4 0.4 0.5  --   --    < > 0.7 0.5  --  0.4 0.4   ALBUMIN 4.8 4.9 4.9 4.9 4.8  --    < >   < > 4.4 4.5  --  4.1 4.6   PROTTOTAL 7.3 7.7 7.3 7.1 7.2  --   --    < > 7.7 7.6  --  7.0 8.0   ALKPHOS 41 42 46 42 44  --   --    < > 41 38*  --  39* 44   AST 20 21 21 22 23  --   --    < > 19 15  --  17 20   ALT 16 17 14 13 17  --   --    < > 25 23  --  23 29    < > = values in this interval not displayed.     Calcium/VitaminD  Recent Labs   Lab Test 05/01/24  1548 01/25/24  1548 10/17/23  1541 07/18/23  1510 03/31/23  1426 11/05/20  1628 07/23/20  1636 04/13/17  1650 02/20/17  1640   SRINIVAS 9.7 9.7 9.4   < > 9.7   < > 9.2   < >  --    VITDT  --   --   --   --  50  --  30  --  33    < > = values in this interval not displayed.     ESR/CRP  Recent Labs   Lab Test 05/01/24  1548 01/25/24  1548 07/18/23  1510 07/14/22  1507 04/11/22  1441 12/15/21  1516 09/01/21  1625   SED 8 7 5   < > 8 9 9   CRP  --   --   --   --  <2.9 <2.9 5.6   CRPI <3.00 <3.00 <3.00   < >  --   --   --     < > = values in this interval not displayed.     CK/Aldolase  Recent Labs   Lab Test 10/14/22  0714 12/15/21  1516 02/10/21  1627 07/22/16  0916 04/22/16  0902   CKT 55 60 60   < > 45   ALDOLASE  --   --   --   --  4.5    < > = values in this interval not displayed.     TSH/T4  Recent Labs   Lab Test 04/01/16  0910   TSH 1.57     Lipid Panel  Recent Labs   Lab Test 02/10/23  0814 02/09/22  0701 10/15/21  0937   CHOL 159 158 163   TRIG 37 50 41   HDL 55 50 60   LDL 97 98 95   NHDL 104 108 103     Hepatitis B  Recent Labs   Lab Test 04/22/16  0902   HBCAB Nonreactive   HEPBANG Nonreactive     Hepatitis C  Recent Labs   Lab Test 10/26/18  0836 01/12/18  0828 04/22/16  0902   HCVAB Nonreactive Nonreactive Nonreactive   Assay performance characteristics have not been established for newborns,   infants, and children       Lyme ab screening  Recent Labs   Lab Test 04/01/16  0910    LYMEGM 0.12     HIV Screening  Recent Labs   Lab Test 10/26/18  0836 01/12/18  0828 04/22/16  0902   HIAGAB Nonreactive Nonreactive Nonreactive   HIV-1 p24 Ag & HIV-1/HIV-2 Ab Not Detected       UA  Recent Labs   Lab Test 05/01/24  1604 01/25/24  1551 10/17/23  1549 09/01/21  1629 05/18/21  1617 08/23/18  1638 04/26/18  1648 01/18/18  1640 01/12/18  0836 12/28/17  0145 04/13/17  1650 01/20/17  0827   COLOR Yellow Yellow Yellow   < > Yellow   < > Yellow Yellow Yellow Yellow   < > Yellow   APPEARANCE Clear Clear Clear   < > Clear   < > Clear Clear Clear Clear   < > Clear   URINEGLC Negative Negative Negative   < > Negative   < > Negative Negative Negative Negative   < > Negative   URINEBILI Negative Negative Negative   < > Negative   < > Negative Negative Negative Moderate*   < > Negative   SG 1.015 1.010 1.015   < > 1.010   < > 1.010 1.020 1.025 >1.030   < > >1.030   URINEPH 6.5 6.0 7.5*   < > 6.0   < > 7.0 7.0 6.5 6.0   < > 6.0   PROTEIN Negative Negative Negative   < > Negative   < > Negative Negative Trace* 100*   < > Trace*   UROBILINOGEN 0.2 0.2 0.2   < > 0.2   < > 0.2 0.2 0.2 4.0*   < > 0.2   NITRITE Negative Negative Negative   < > Negative   < > Negative Negative Negative Positive*   < > Negative   UBLD Negative Negative Negative   < > Trace*   < > Negative Negative Negative Negative   < > Negative   LEUKEST Negative Negative Negative   < > Negative   < > Negative Negative Negative Negative   < > Negative   WBCU  --   --   --   --  0 - 5  --  0 - 5 O - 2 O - 2 O - 2   < > O - 2   RBCU  --   --   --   --  O - 2  --  O - 2 O - 2 O - 2 O - 2   < > O - 2   SQUAMOUSEPI  --   --   --   --  Few  --   --  Few Moderate* Moderate*   < > Few   BACTERIA  --   --   --   --  Rare*  --   --   --  Moderate* Moderate*   < > Few*   MUCOUS  --   --   --   --   --   --   --   --  Present* Present*  --  Present*    < > = values in this interval not displayed.     Urine Microscopic  Recent Labs   Lab Test 05/18/21  3176  04/26/18  1648 01/18/18  1640 01/12/18  0836 12/28/17  0145 04/13/17  1650 01/20/17  0827   WBCU 0 - 5 0 - 5 O - 2 O - 2 O - 2   < > O - 2   RBCU O - 2 O - 2 O - 2 O - 2 O - 2   < > O - 2   SQUAMOUSEPI Few  --  Few Moderate* Moderate*   < > Few   BACTERIA Rare*  --   --  Moderate* Moderate*   < > Few*   MUCOUS  --   --   --  Present* Present*  --  Present*    < > = values in this interval not displayed.     Urine Protein  GHUTP and UTP= Urine protein (random), GHUTPG and UTPG = urine protein:creatinine ratio (random), UCRR = urine creatinine (random)  Recent Labs   Lab Test 05/01/24  1604 01/25/24  1551 10/17/23  1549 03/31/23  1426 10/14/22  0714 07/14/22  1507 07/14/22  1507 04/11/22  1441 12/15/21  1516 09/01/21  1629   GHUTP <6.0 <6.0 <6.0   < > 7.6   < > 6.9  --   --   --    UTP  --   --   --   --   --   --   --  0.07 <0.05 <0.05   GHUTPG  --   --   --   --  0.04  --  0.09  --   --   --    UTPG  --   --   --   --   --   --   --  0.11  --   --    UCRR 59.2 23.3 43.8   < > 184.0   < > 76.5 66 25 23    < > = values in this interval not displayed.     Immunization History     Immunization History   Administered Date(s) Administered    COVID-19 12+ (2023-24) (Pfizer) 10/26/2023    COVID-19 Bivalent 18+ (Moderna) 10/03/2022    COVID-19 MONOVALENT 12+ (Pfizer) 03/16/2021, 04/06/2021, 08/30/2021    COVID-19 Monovalent Booster 18+ (Moderna) 03/04/2022    DT (PEDS <7y) 08/22/1997    Flu, Unspecified 10/21/2015    HPV 02/25/2010, 02/04/2011    HPV Quadrivalent 02/25/2010, 02/04/2011    HepB 06/13/1999, 08/20/1999, 02/05/2000    HepB, Unspecified 06/13/1999, 07/13/1999, 08/20/1999, 02/05/2000    Hepatitis A (ADULT 19+) 09/11/2023    Hepatitis B, Adult 07/13/1999, 08/20/1999, 02/05/2000    Historical DTP/aP 1986, 1986, 1986, 01/15/1988, 06/15/1991, 08/22/1997    Historical Hepb 07/13/1999, 08/20/1999    Influenza (IIV3) PF 10/19/2010, 11/07/2011, 09/23/2012    Influenza Vaccine >6 months,quad, PF  10/11/2016, 10/13/2017, 10/26/2018, 10/17/2019, 10/16/2020, 10/15/2021, 12/12/2022, 10/09/2023    MMR 05/15/1987, 09/15/1991    Mantoux Tuberculin Skin Test 07/15/1987, 01/19/2005    Meningococcal (Menomune ) 08/09/2004    Meningococcal ACWY (Menactra ) 08/09/2004    OPV, trivalent, live 1986, 1986, 1986, 01/15/1988, 09/15/1991    OPV, unspecified 1986, 1986, 1986, 01/15/1988, 09/15/1991    Pneumo Conj 13-V (2010&after) 05/04/2018    Pneumococcal 23 valent 08/30/2018    TDAP (Adacel,Boostrix) 01/20/2009    TDAP Vaccine (Adacel) 01/21/2009, 02/25/2010, 08/17/2020    Typhoid IM 09/11/2023    Zoster recombinant adjuvanted (SHINGRIX) 12/13/2018, 03/04/2019          Chart documentation done in part with Dragon Voice recognition Software. Although reviewed after completion, some word and grammatical error may remain.    Lavon Light MD

## 2024-06-03 ENCOUNTER — MYC MEDICAL ADVICE (OUTPATIENT)
Dept: FAMILY MEDICINE | Facility: CLINIC | Age: 39
End: 2024-06-03
Payer: COMMERCIAL

## 2024-06-09 ENCOUNTER — PATIENT OUTREACH (OUTPATIENT)
Dept: CARE COORDINATION | Facility: CLINIC | Age: 39
End: 2024-06-09
Payer: COMMERCIAL

## 2024-06-13 NOTE — TELEPHONE ENCOUNTER
RN replied to patient via [employer]. See message for details.     Hernesto Manley RN, BSN, PHN  [facility]: [place]

## 2024-06-13 NOTE — TELEPHONE ENCOUNTER
Routing My Chart message to PCP    Patient would like glucose retested fasting.    RN pended Glucose  lab if agreeable    Hernesto Manley RN, BSN, PHN  [facility]

## 2024-06-18 NOTE — TELEPHONE ENCOUNTER
Patient Quality Outreach    Patient is due for the following:   Asthma  -  ACT needed  Physical Preventive Adult Physical      Topic Date Due    HPV Vaccine (3 - Risk 3-dose series) 06/04/2011    Pneumococcal Vaccine (3 of 3 - PPSV23 or PCV20) 08/30/2023    COVID-19 Vaccine (7 - 2023-24 season) 12/21/2023       Next Steps:   Schedule a Adult Preventative    Type of outreach:    Sent letter.    Next Steps:  Reach out today via Letter.    Max number of attempts reached: Yes. Will try again in 90 days if patient still on fail list.    Questions for provider review:    None           Alek Anderson MA

## 2024-06-24 NOTE — TELEPHONE ENCOUNTER
Sent pt [employer] message. Would like visit to discuss. Phone, video, or in person whatever works best for pt.

## 2024-07-10 ENCOUNTER — VIRTUAL VISIT (OUTPATIENT)
Dept: FAMILY MEDICINE | Facility: CLINIC | Age: 39
End: 2024-07-10
Payer: COMMERCIAL

## 2024-07-10 DIAGNOSIS — Z13.1 SCREENING FOR DIABETES MELLITUS: Primary | ICD-10-CM

## 2024-07-10 PROCEDURE — 99213 OFFICE O/P EST LOW 20 MIN: CPT | Mod: 95

## 2024-07-10 ASSESSMENT — ASTHMA QUESTIONNAIRES
ACT_TOTALSCORE: 25
ACT_TOTALSCORE: 25
QUESTION_1 LAST FOUR WEEKS HOW MUCH OF THE TIME DID YOUR ASTHMA KEEP YOU FROM GETTING AS MUCH DONE AT WORK, SCHOOL OR AT HOME: NONE OF THE TIME
QUESTION_2 LAST FOUR WEEKS HOW OFTEN HAVE YOU HAD SHORTNESS OF BREATH: NOT AT ALL
QUESTION_4 LAST FOUR WEEKS HOW OFTEN HAVE YOU USED YOUR RESCUE INHALER OR NEBULIZER MEDICATION (SUCH AS ALBUTEROL): NOT AT ALL
QUESTION_5 LAST FOUR WEEKS HOW WOULD YOU RATE YOUR ASTHMA CONTROL: COMPLETELY CONTROLLED
QUESTION_3 LAST FOUR WEEKS HOW OFTEN DID YOUR ASTHMA SYMPTOMS (WHEEZING, COUGHING, SHORTNESS OF BREATH, CHEST TIGHTNESS OR PAIN) WAKE YOU UP AT NIGHT OR EARLIER THAN USUAL IN THE MORNING: NOT AT ALL

## 2024-07-10 NOTE — PROGRESS NOTES
Aleida is a 39 year old who is being evaluated via a billable video visit.    How would you like to obtain your AVS? MyChart  If the video visit is dropped, the invitation should be resent by: Text to cell phone: [phone]  Will anyone else be joining your video visit? No      Assessment & Plan     Screening for diabetes mellitus  Patient would like to repeat her glucose test while fasting. Last completed on routine labs from her Rheumatologist was 113, but this was not a fasting sample.   - Glucose              See Patient Instructions    Subjective   Aleida is a 39 year old, presenting for the following health issues:  Chronic Disease Management      7/10/2024     1:15 PM   Additional Questions   Roomed by LYNDA     History of Present Illness       Reason for visit:  Glucose test    She eats 2-3 servings of fruits and vegetables daily.She consumes 1 sweetened beverage(s) daily.She exercises with enough effort to increase her heart rate 10 to 19 minutes per day.  She exercises with enough effort to increase her heart rate 4 days per week.   She is taking medications regularly.       Requesting glucose recheck. 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   Needs screening for work    Planning recheck of lipids in upcoming months, she wants glucose checked.     No significant family history of DM    No urinary symptoms or recurrent yeast infections.   No blurry vision or dizziness.  No changes in weight.     Wt Readings from Last 5 Encounters:   05/29/24 71.2 kg (157 lb)   02/01/24 71.7 kg (158 lb)   10/26/23 71.8 kg (158 lb 3.2 oz)   09/11/23 73.8 kg (162 lb 12.8 oz)   07/25/23 71.5 kg (157 lb 9.6 oz)      She does have chronic lupus                Review of Systems  Constitutional, HEENT, cardiovascular, pulmonary, gi and gu systems are negative, except as otherwise noted.      Objective            Vitals:  No vitals were obtained today due to virtual visit.    Physical Exam   GENERAL: alert and no distress  EYES: Eyes grossly normal to inspection.  No discharge or erythema, or obvious scleral/conjunctival abnormalities.  RESP: No audible wheeze, cough, or visible cyanosis.    SKIN: Visible skin clear. No significant rash, abnormal pigmentation or lesions.  NEURO: Cranial nerves grossly intact.  Mentation and speech appropriate for age.  PSYCH: Appropriate affect, tone, and pace of words    No results found for this or any previous visit (from the past 24 hour(s)).      Video-Visit Details  Joined the call at 7/10/2024, 3:40:16 pm.  Left the call at 7/10/2024, 3:53:57 pm.  Type of service:  Video Visit   Originating Location (pt. Location): Home    Distant Location (provider location):  On-site  Platform used for Video Visit: Bahman  Signed Electronically by: FARZANA Hutton CNP

## 2024-07-19 ENCOUNTER — LAB (OUTPATIENT)
Dept: LAB | Facility: CLINIC | Age: 39
End: 2024-07-19
Payer: COMMERCIAL

## 2024-07-19 DIAGNOSIS — Z13.1 SCREENING FOR DIABETES MELLITUS: ICD-10-CM

## 2024-07-19 DIAGNOSIS — Z13.220 SCREENING FOR HYPERLIPIDEMIA: ICD-10-CM

## 2024-07-19 LAB
CHOLEST SERPL-MCNC: 109 MG/DL
FASTING STATUS PATIENT QL REPORTED: YES
FASTING STATUS PATIENT QL REPORTED: YES
GLUCOSE SERPL-MCNC: 91 MG/DL (ref 70–99)
HDLC SERPL-MCNC: 36 MG/DL
LDLC SERPL CALC-MCNC: 66 MG/DL
NONHDLC SERPL-MCNC: 73 MG/DL
TRIGL SERPL-MCNC: 34 MG/DL

## 2024-07-19 PROCEDURE — 36415 COLL VENOUS BLD VENIPUNCTURE: CPT

## 2024-07-19 PROCEDURE — 82947 ASSAY GLUCOSE BLOOD QUANT: CPT

## 2024-07-19 PROCEDURE — 80061 LIPID PANEL: CPT

## 2024-08-07 ENCOUNTER — OFFICE VISIT (OUTPATIENT)
Dept: DERMATOLOGY | Facility: CLINIC | Age: 39
End: 2024-08-07
Attending: PHYSICIAN ASSISTANT
Payer: COMMERCIAL

## 2024-08-07 DIAGNOSIS — L71.9 ACNE ROSACEA: Primary | ICD-10-CM

## 2024-08-07 PROCEDURE — 99213 OFFICE O/P EST LOW 20 MIN: CPT | Performed by: PHYSICIAN ASSISTANT

## 2024-08-07 NOTE — LETTER
2024      Aleida Weinberg  [address] [facility] [address]      Dear Colleague,    Thank you for referring your patient, Aleida Weinberg, to the [facility]. Please see a copy of my visit note below.    Aledia Weinberg is a pleasant 39 year old year old female patient here today for recheck acne rosacea in a setting of lupus. She has tried triple cream from compounding and notes 75% improvement. Happy with results. Patient has no other skin complaints today.  Remainder of the HPI, Meds, PMH, Allergies, FH, and SH was reviewed in chart.    Past Medical History:   Diagnosis Date     Arthritis      Lupus erythematosus      Mild intermittent asthma      Other kyphoscoliosis and scoliosis     upper back curvature and disc degeneration in lower back.       Past Surgical History:   Procedure Laterality Date      SECTION       EXCISE GANGLION WRIST Left 11/10/2022    Procedure: EXCISION, GANGLION CYST, WRIST LEFT;  Surgeon: Jayde Martinez MD;  Location: [facility] OR     SURGICAL HISTORY OF -       PE Tubes        Family History   Problem Relation Age of Onset     Heart Disease Maternal Grandfather         Bypass, Heart Disease     Respiratory Maternal Grandfather         asthma     Macular Degeneration Maternal Grandfather      Hypertension Paternal Grandmother      Cancer Paternal Grandmother         lymph nodes     Cataracts Paternal Grandmother      C.A.D. Paternal Grandfather      Heart Disease Maternal Uncle         MI's      Alcohol/Drug Maternal Uncle      Respiratory Other         cousin on mothers side, asthma     Alcohol/Drug Other         bother great grandparents on mother's side     Diabetes No family hx of      Breast Cancer No family hx of      Cancer - colorectal No family hx of        Social History     Socioeconomic History     Marital status: Single     Spouse name: Not on file     Number of children: Not on file     Years of education: Not on  file     Highest education level: Not on file   Occupational History     Not on file   Tobacco Use     Smoking status: Former     Current packs/day: 0.00     Average packs/day: 0.5 packs/day for 2.5 years (1.2 ttl pk-yrs)     Types: Cigarettes     Start date: 2003     Quit date: 2005     Years since quittin.7     Passive exposure: Never     Smokeless tobacco: Never   Vaping Use     Vaping status: Never Used   Substance and Sexual Activity     Alcohol use: Yes     Alcohol/week: 0.0 standard drinks of alcohol     Comment: rarely - 1 monthly     Drug use: No     Sexual activity: Yes     Partners: Male     Birth control/protection: I.U.D.   Other Topics Concern     Parent/sibling w/ CABG, MI or angioplasty before 65F 55M? No   Social History Narrative    Caffeine intake/servings daily - 1    Calcium intake/servings daily - 2-3    Exercise 0 times weekly     Sunscreen used - Yes    Seatbelts used - Yes    Guns stored in the home - No    Self Breast Exam - Yes    Pap test up to date -  Yes    Eye exam up to date -  Yes    Dental exam up to date -  Yes    DEXA scan up to date -  Not Applicable    Flex Sig/Colonoscopy up to date -  Not Applicable    Mammography up to date -  Not Applicable    Immunizations reviewed and up to date - Yes    Abuse: Current or Past (Physical, Sexual or Emotional) - No    Do you feel safe in your environment - Yes    Do you cope well with stress - Yes    Do you suffer from insomnia - No    Last updated by: Nani Heredia  2007         Social Determinants of Health     Financial Resource Strain: Low Risk  (2023)    Financial Resource Strain      Within the past 12 months, have you or your family members you live with been unable to get utilities (heat, electricity) when it was really needed?: No   Food Insecurity: Low Risk  (2023)    Food Insecurity      Within the past 12 months, did you worry that your food would run out before you got money to buy more?: No       Within the past 12 months, did the food you bought just not last and you didn t have money to get more?: No   Transportation Needs: Low Risk  (11/22/2023)    Transportation Needs      Within the past 12 months, has lack of transportation kept you from medical appointments, getting your medicines, non-medical meetings or appointments, work, or from getting things that you need?: No   Physical Activity: Not on file   Stress: Not on file   Social Connections: Not on file   Interpersonal Safety: Not on file   Housing Stability: Low Risk  (11/22/2023)    Housing Stability      Do you have housing? : Yes      Are you worried about losing your housing?: No       Outpatient Encounter Medications as of 8/7/2024   Medication Sig Dispense Refill     COMPOUNDED NON-CONTROLLED SUBSTANCE (CMPD RX) - PHARMACY TO MIX COMPOUNDED MEDICATION Dispense: Azelaic 15%/Ivermectin 1%/Metronidazole 1% : apply once to twice daily. 30 g 11     albuterol (PROAIR HFA/PROVENTIL HFA/VENTOLIN HFA) 108 (90 Base) MCG/ACT inhaler Inhale 2 puffs into the lungs every 6 hours as needed for shortness of breath / dyspnea 18 g 1     atovaquone-proguanil (MALARONE) 250-100 MG tablet Take 1 tablet by mouth daily Start 2 days before exposure to Malaria and continue daily till  7 days after exposure. 15 tablet 0     azaTHIOprine (IMURAN) 50 MG tablet Take 1 tablet (50 mg) by mouth daily 90 tablet 2     ciprofloxacin (CIPRO) 500 MG tablet Take 1 tablet (500 mg) by mouth 2 times daily Take 1 tablet two times a day for up to 3 days for severe diarrhea during travel. 6 tablet 0     cyclobenzaprine (FLEXERIL) 10 MG tablet TAKE 1 TABLET BY MOUTH 3 TIMES A DAY AS NEEDED FOR MUSCLE SPASMS 30 tablet 2     hydroxychloroquine (PLAQUENIL) 200 MG tablet Take 1 tablet (200 mg) by mouth daily . Yearly eye exam including 10-2 VF and SD-OCT required 90 tablet 2     hydrOXYzine (ATARAX) 25 MG tablet Take 1 tablet (25 mg) by mouth every 6 hours as needed for anxiety 90 tablet 6      levonorgestrel (MIRENA) 20 MCG/24HR IUD 1 each by Intrauterine route once       LORazepam (ATIVAN) 0.5 MG tablet Take 1 tablet (0.5 mg) by mouth every 8 hours as needed for anxiety 15 tablet 0     methylPREDNISolone (MEDROL DOSEPAK) 4 MG tablet therapy pack Take per package instructions. Take once a day by mouth 21 tablet 0     traZODone (DESYREL) 50 MG tablet Take 0.5 tablets (25 mg) by mouth nightly as needed for sleep 30 tablet 0     vitamin D3 (CHOLECALCIFEROL) 50 mcg (2000 units) tablet Take 1 tablet (50 mcg) by mouth daily 90 tablet 2     [DISCONTINUED] azelaic acid (FINACIA) 15 % external gel Apply pea sized amount at bedtime. (Patient not taking: Reported on 8/7/2024) 50 g 4     [DISCONTINUED] ketoconazole (NIZORAL) 2 % external cream Apply twice daily to rash for next 2-3 weeks. 30 g 4     [DISCONTINUED] pimecrolimus (ELIDEL) 1 % external cream Apply once to twice daily to face. (Patient not taking: Reported on 8/7/2024) 30 g 4     No facility-administered encounter medications on file as of 8/7/2024.             O:   NAD, WDWN, Alert & Oriented, Mood & Affect wnl, Vitals stable   Here today alone   LMP  (LMP Unknown)    General appearance normal   Vitals stable   Alert, oriented and in no acute distress      Mild background erythema involving NLF on cheeks, nose, and glabella, perioral       Eyes: Conjunctivae/lids:Normal     ENT: Lips: normal    MSK:Normal    Pulm: Breathing Normal    Neuro/Psych: Orientation:Alert and Orientedx3 ; Mood/Affect:normal     A/P:  1. Acne Rosacea  Continue  triple cream compounded medication with azelaic,metronidazole and ivermectin.   Discussed main side effect is dryness.   Continue moisturizers sunscreen. She is using it cosmetics redness moisturizing cream.   Return in one year or sooner if needed.       Again, thank you for allowing me to participate in the care of your patient.        Sincerely,        Shante Bautista PA-C

## 2024-08-07 NOTE — PROGRESS NOTES
Aleida Weinberg is a pleasant 39 year old year old female patient here today for recheck acne rosacea in a setting of lupus. She has tried triple cream from compounding and notes 75% improvement. Happy with results. Patient has no other skin complaints today.  Remainder of the HPI, Meds, PMH, Allergies, FH, and SH was reviewed in chart.    Past Medical History:   Diagnosis Date    Arthritis     Lupus erythematosus     Mild intermittent asthma     Other kyphoscoliosis and scoliosis     upper back curvature and disc degeneration in lower back.       Past Surgical History:   Procedure Laterality Date     SECTION      EXCISE GANGLION WRIST Left 11/10/2022    Procedure: EXCISION, GANGLION CYST, WRIST LEFT;  Surgeon: Jayde Martinez MD;  Location: [facility] OR    SURGICAL HISTORY OF -       PE Tubes        Family History   Problem Relation Age of Onset    Heart Disease Maternal Grandfather         Bypass, Heart Disease    Respiratory Maternal Grandfather         asthma    Macular Degeneration Maternal Grandfather     Hypertension Paternal Grandmother     Cancer Paternal Grandmother         lymph nodes    Cataracts Paternal Grandmother     C.A.D. Paternal Grandfather     Heart Disease Maternal Uncle         MI's     Alcohol/Drug Maternal Uncle     Respiratory Other         cousin on mothers side, asthma    Alcohol/Drug Other         bother great grandparents on mother's side    Diabetes No family hx of     Breast Cancer No family hx of     Cancer - colorectal No family hx of        Social History     Socioeconomic History    Marital status: Single     Spouse name: Not on file    Number of children: Not on file    Years of education: Not on file    Highest education level: Not on file   Occupational History    Not on file   Tobacco Use    Smoking status: Former     Current packs/day: 0.00     Average packs/day: 0.5 packs/day for 2.5 years (1.2 ttl pk-yrs)     Types: Cigarettes     Start date: 2003      Quit date: 2005     Years since quittin.7     Passive exposure: Never    Smokeless tobacco: Never   Vaping Use    Vaping status: Never Used   Substance and Sexual Activity    Alcohol use: Yes     Alcohol/week: 0.0 standard drinks of alcohol     Comment: rarely - 1 monthly    Drug use: No    Sexual activity: Yes     Partners: Male     Birth control/protection: I.U.D.   Other Topics Concern    Parent/sibling w/ CABG, MI or angioplasty before 65F 55M? No   Social History Narrative    Caffeine intake/servings daily - 1    Calcium intake/servings daily - 2-3    Exercise 0 times weekly     Sunscreen used - Yes    Seatbelts used - Yes    Guns stored in the home - No    Self Breast Exam - Yes    Pap test up to date -  Yes    Eye exam up to date -  Yes    Dental exam up to date -  Yes    DEXA scan up to date -  Not Applicable    Flex Sig/Colonoscopy up to date -  Not Applicable    Mammography up to date -  Not Applicable    Immunizations reviewed and up to date - Yes    Abuse: Current or Past (Physical, Sexual or Emotional) - No    Do you feel safe in your environment - Yes    Do you cope well with stress - Yes    Do you suffer from insomnia - No    Last updated by: Nani Heredia  2007         Social Determinants of Health     Financial Resource Strain: Low Risk  (2023)    Financial Resource Strain     Within the past 12 months, have you or your family members you live with been unable to get utilities (heat, electricity) when it was really needed?: No   Food Insecurity: Low Risk  (2023)    Food Insecurity     Within the past 12 months, did you worry that your food would run out before you got money to buy more?: No     Within the past 12 months, did the food you bought just not last and you didn t have money to get more?: No   Transportation Needs: Low Risk  (2023)    Transportation Needs     Within the past 12 months, has lack of transportation kept you from medical appointments, getting  your medicines, non-medical meetings or appointments, work, or from getting things that you need?: No   Physical Activity: Not on file   Stress: Not on file   Social Connections: Not on file   Interpersonal Safety: Not on file   Housing Stability: Low Risk  (11/22/2023)    Housing Stability     Do you have housing? : Yes     Are you worried about losing your housing?: No       Outpatient Encounter Medications as of 8/7/2024   Medication Sig Dispense Refill    COMPOUNDED NON-CONTROLLED SUBSTANCE (CMPD RX) - PHARMACY TO MIX COMPOUNDED MEDICATION Dispense: Azelaic 15%/Ivermectin 1%/Metronidazole 1% : apply once to twice daily. 30 g 11    albuterol (PROAIR HFA/PROVENTIL HFA/VENTOLIN HFA) 108 (90 Base) MCG/ACT inhaler Inhale 2 puffs into the lungs every 6 hours as needed for shortness of breath / dyspnea 18 g 1    atovaquone-proguanil (MALARONE) 250-100 MG tablet Take 1 tablet by mouth daily Start 2 days before exposure to Malaria and continue daily till  7 days after exposure. 15 tablet 0    azaTHIOprine (IMURAN) 50 MG tablet Take 1 tablet (50 mg) by mouth daily 90 tablet 2    ciprofloxacin (CIPRO) 500 MG tablet Take 1 tablet (500 mg) by mouth 2 times daily Take 1 tablet two times a day for up to 3 days for severe diarrhea during travel. 6 tablet 0    cyclobenzaprine (FLEXERIL) 10 MG tablet TAKE 1 TABLET BY MOUTH 3 TIMES A DAY AS NEEDED FOR MUSCLE SPASMS 30 tablet 2    hydroxychloroquine (PLAQUENIL) 200 MG tablet Take 1 tablet (200 mg) by mouth daily . Yearly eye exam including 10-2 VF and SD-OCT required 90 tablet 2    hydrOXYzine (ATARAX) 25 MG tablet Take 1 tablet (25 mg) by mouth every 6 hours as needed for anxiety 90 tablet 6    levonorgestrel (MIRENA) 20 MCG/24HR IUD 1 each by Intrauterine route once      LORazepam (ATIVAN) 0.5 MG tablet Take 1 tablet (0.5 mg) by mouth every 8 hours as needed for anxiety 15 tablet 0    methylPREDNISolone (MEDROL DOSEPAK) 4 MG tablet therapy pack Take per package instructions. Take  once a day by mouth 21 tablet 0    traZODone (DESYREL) 50 MG tablet Take 0.5 tablets (25 mg) by mouth nightly as needed for sleep 30 tablet 0    vitamin D3 (CHOLECALCIFEROL) 50 mcg (2000 units) tablet Take 1 tablet (50 mcg) by mouth daily 90 tablet 2    [DISCONTINUED] azelaic acid (FINACIA) 15 % external gel Apply pea sized amount at bedtime. (Patient not taking: Reported on 8/7/2024) 50 g 4    [DISCONTINUED] ketoconazole (NIZORAL) 2 % external cream Apply twice daily to rash for next 2-3 weeks. 30 g 4    [DISCONTINUED] pimecrolimus (ELIDEL) 1 % external cream Apply once to twice daily to face. (Patient not taking: Reported on 8/7/2024) 30 g 4     No facility-administered encounter medications on file as of 8/7/2024.             O:   NAD, WDWN, Alert & Oriented, Mood & Affect wnl, Vitals stable   Here today alone   LMP  (LMP Unknown)    General appearance normal   Vitals stable   Alert, oriented and in no acute distress      Mild background erythema involving NLF on cheeks, nose, and glabella, perioral       Eyes: Conjunctivae/lids:Normal     ENT: Lips: normal    MSK:Normal    Pulm: Breathing Normal    Neuro/Psych: Orientation:Alert and Orientedx3 ; Mood/Affect:normal     A/P:  1. Acne Rosacea  Continue  triple cream compounded medication with azelaic,metronidazole and ivermectin.   Discussed main side effect is dryness.   Continue moisturizers sunscreen. She is using it cosmetics redness moisturizing cream.   Return in one year or sooner if needed.

## 2024-08-19 ENCOUNTER — MYC MEDICAL ADVICE (OUTPATIENT)
Dept: RHEUMATOLOGY | Facility: CLINIC | Age: 39
End: 2024-08-19
Payer: COMMERCIAL

## 2024-09-06 ENCOUNTER — LAB (OUTPATIENT)
Dept: LAB | Facility: CLINIC | Age: 39
End: 2024-09-06
Payer: COMMERCIAL

## 2024-09-06 DIAGNOSIS — M32.19 OTHER SYSTEMIC LUPUS ERYTHEMATOSUS WITH OTHER ORGAN INVOLVEMENT (H): ICD-10-CM

## 2024-09-06 DIAGNOSIS — Z79.899 HIGH RISK MEDICATIONS (NOT ANTICOAGULANTS) LONG-TERM USE: ICD-10-CM

## 2024-09-06 LAB
BASOPHILS # BLD AUTO: 0 10E3/UL (ref 0–0.2)
BASOPHILS NFR BLD AUTO: 1 %
EOSINOPHIL # BLD AUTO: 0 10E3/UL (ref 0–0.7)
EOSINOPHIL NFR BLD AUTO: 1 %
ERYTHROCYTE [DISTWIDTH] IN BLOOD BY AUTOMATED COUNT: 11.5 % (ref 10–15)
ERYTHROCYTE [SEDIMENTATION RATE] IN BLOOD BY WESTERGREN METHOD: 9 MM/HR (ref 0–20)
HCT VFR BLD AUTO: 38.4 % (ref 35–47)
HGB BLD-MCNC: 13.2 G/DL (ref 11.7–15.7)
IMM GRANULOCYTES # BLD: 0 10E3/UL
IMM GRANULOCYTES NFR BLD: 0 %
LYMPHOCYTES # BLD AUTO: 1.1 10E3/UL (ref 0.8–5.3)
LYMPHOCYTES NFR BLD AUTO: 24 %
MCH RBC QN AUTO: 32.5 PG (ref 26.5–33)
MCHC RBC AUTO-ENTMCNC: 34.4 G/DL (ref 31.5–36.5)
MCV RBC AUTO: 95 FL (ref 78–100)
MONOCYTES # BLD AUTO: 0.4 10E3/UL (ref 0–1.3)
MONOCYTES NFR BLD AUTO: 10 %
NEUTROPHILS # BLD AUTO: 2.8 10E3/UL (ref 1.6–8.3)
NEUTROPHILS NFR BLD AUTO: 65 %
PLATELET # BLD AUTO: 240 10E3/UL (ref 150–450)
RBC # BLD AUTO: 4.06 10E6/UL (ref 3.8–5.2)
WBC # BLD AUTO: 4.3 10E3/UL (ref 4–11)

## 2024-09-06 PROCEDURE — 86140 C-REACTIVE PROTEIN: CPT

## 2024-09-06 PROCEDURE — 86160 COMPLEMENT ANTIGEN: CPT

## 2024-09-06 PROCEDURE — 36415 COLL VENOUS BLD VENIPUNCTURE: CPT

## 2024-09-06 PROCEDURE — 85652 RBC SED RATE AUTOMATED: CPT

## 2024-09-06 PROCEDURE — 85025 COMPLETE CBC W/AUTO DIFF WBC: CPT

## 2024-09-06 PROCEDURE — 80053 COMPREHEN METABOLIC PANEL: CPT

## 2024-09-06 PROCEDURE — 84156 ASSAY OF PROTEIN URINE: CPT

## 2024-09-06 PROCEDURE — 86225 DNA ANTIBODY NATIVE: CPT

## 2024-09-07 LAB
ALBUMIN MFR UR ELPH: 6.6 MG/DL
ALBUMIN SERPL BCG-MCNC: 4.3 G/DL (ref 3.5–5.2)
ALP SERPL-CCNC: 42 U/L (ref 40–150)
ALT SERPL W P-5'-P-CCNC: 16 U/L (ref 0–50)
ANION GAP SERPL CALCULATED.3IONS-SCNC: 9 MMOL/L (ref 7–15)
AST SERPL W P-5'-P-CCNC: 22 U/L (ref 0–45)
BILIRUB SERPL-MCNC: 0.2 MG/DL
BUN SERPL-MCNC: 11.4 MG/DL (ref 6–20)
CALCIUM SERPL-MCNC: 8.9 MG/DL (ref 8.8–10.4)
CHLORIDE SERPL-SCNC: 107 MMOL/L (ref 98–107)
CREAT SERPL-MCNC: 0.73 MG/DL (ref 0.51–0.95)
CREAT UR-MCNC: 81 MG/DL
CRP SERPL-MCNC: <3 MG/L
EGFRCR SERPLBLD CKD-EPI 2021: >90 ML/MIN/1.73M2
GLUCOSE SERPL-MCNC: 89 MG/DL (ref 70–99)
HCO3 SERPL-SCNC: 24 MMOL/L (ref 22–29)
POTASSIUM SERPL-SCNC: 4.1 MMOL/L (ref 3.4–5.3)
PROT SERPL-MCNC: 6.6 G/DL (ref 6.4–8.3)
PROT/CREAT 24H UR: 0.08 MG/MG CR (ref 0–0.2)
SODIUM SERPL-SCNC: 140 MMOL/L (ref 135–145)

## 2024-09-09 LAB
C3 SERPL-MCNC: 93 MG/DL (ref 81–157)
C4 SERPL-MCNC: 17 MG/DL (ref 13–39)
DSDNA AB SER-ACNC: 1.2 IU/ML

## 2024-09-11 ENCOUNTER — OFFICE VISIT (OUTPATIENT)
Dept: RHEUMATOLOGY | Facility: CLINIC | Age: 39
End: 2024-09-11
Payer: COMMERCIAL

## 2024-09-11 VITALS
BODY MASS INDEX: 24.75 KG/M2 | OXYGEN SATURATION: 96 % | WEIGHT: 160.4 LBS | DIASTOLIC BLOOD PRESSURE: 68 MMHG | RESPIRATION RATE: 16 BRPM | HEART RATE: 77 BPM | SYSTOLIC BLOOD PRESSURE: 110 MMHG

## 2024-09-11 DIAGNOSIS — Z79.899 HIGH RISK MEDICATIONS (NOT ANTICOAGULANTS) LONG-TERM USE: ICD-10-CM

## 2024-09-11 DIAGNOSIS — M32.19 OTHER SYSTEMIC LUPUS ERYTHEMATOSUS WITH OTHER ORGAN INVOLVEMENT (H): Primary | ICD-10-CM

## 2024-09-11 PROCEDURE — G2211 COMPLEX E/M VISIT ADD ON: HCPCS | Performed by: INTERNAL MEDICINE

## 2024-09-11 PROCEDURE — 99214 OFFICE O/P EST MOD 30 MIN: CPT | Performed by: INTERNAL MEDICINE

## 2024-09-11 RX ORDER — HYDROXYCHLOROQUINE SULFATE 200 MG/1
200 TABLET, FILM COATED ORAL DAILY
Qty: 90 TABLET | Refills: 2 | Status: SHIPPED | OUTPATIENT
Start: 2024-09-11

## 2024-09-11 RX ORDER — AZATHIOPRINE 50 MG/1
50 TABLET ORAL DAILY
Qty: 90 TABLET | Refills: 2 | Status: SHIPPED | OUTPATIENT
Start: 2024-09-11

## 2024-09-11 NOTE — PROGRESS NOTES
Rheumatology Clinic Visit      Aleida Acuña (formerly Lenard) MRN# [id number]   YOB: 1985 Age: 39 year old      Date of visit: 9/11/24   PCP: Sayra Chirinos  Dermatology: Shante Reid     Chief Complaint   Patient presents with:  Systemic lupus erythematosus    Assessment and Plan     1. Systemic lupus erythematosus (CHANELL >[id number] speckled, RNP+, Sm+, Scl-70+, hypocomplementemia, photosensitivity, malar rash, arthritis, fatigue, Raynaud's phenomenon):  Dx'd 4/2016. Scl-70+; she lacks features of scleroderma except for raynaud's; no myopathy based on labs/symptoms. 5/11/2018 echo without evidence of pulmonary hypertension. Previously on MTX 20mg SQ weekly (GI upset with PO doses above 15mg, partially effective) and SSZ (LFT elevations).  Currently on AZA 50mg daily and HCQ 200mg daily (patient self reduced previously from 300mg daily on average based on preference of an easier regimen and continued to do well). TPMT normal on 8/21/2017.  Doing well with regard to lupus.  Chronic illness, stable.    - Continue hydroxychloroquine 200mg daily (last eye exam was okay on 7/27/2023 by Dr. Frias; has an upcoming eye exam at [facility] per patient)  - Continue azathioprine 50mg daily   - Labs in 3 months: CBC, CMP, UA, Uprotein:creatinine  - Labs in 6 months: CBC, CMP, ESR, CRP, UA, Uprotein:creatinine    High risk medication requiring intensive toxicity monitoring at least quarterly      2. Raynaud's Phenomenon: Cold avoidance was insufficient for controlling her symptoms in the past; then did well on amlodipine previously but has not required for some time now.  She will notify me if she would like to restart amlodipine.  Note that she was previously on amlodipine 10 mg daily during colder months but has not required it in most recent singer.    3.  Low vitamin D: Currently on vitamin D 2000 units daily.    - Continue vitamin D 2000 units daily    4.  Secondary Sjogren's syndrome:  Mild dry and dry mouth that are treated infrequently with artificial tears and frequent sips of water. Dentist follow-up every 6 months     5.  Facial rash: Was following with dermatology at [facility].  Reportedly due to SLE and rosacea from derm perspective, and improved with topical creams from her dermatologist for rosacea.  Active today, involving the nasolabial folds.  She will continue following with dermatology at [facility] where she has been treated for acne rosacea in the setting of atopic dermatitis    6.  Mild medial joint line tenderness of the knees, and mild pes anserine bursitis: she says that it is only symptomatic when examined.  May use topical Voltaren gel as needed.    7.  Vaccinations:   - Influenza: encouraged yearly vaccination  - COVID-19: Advised keeping updated, and to hold azathioprine for 1-2 weeks afterward    8.  History of midline low back pain radiating to the right leg: Has seen a spine specialist who recommended SI joint steroid injection previously but she did not proceed with this.  She started doing physical therapy exercises on her own and symptoms have resolved.  Continue physical therapy exercises.     Total minutes spent in evaluation with patient, documentation, , and review of pertinent studies and chart notes: 14  The longitudinal plan of care for the rheumatology problem(s) were addressed during this visit.  Due to added complexity of care, we will continue to support the patient and the subsequent management of this condition with ongoing continuity of care.       Aleida verbalized agreement with and understanding of the rational for the diagnosis and treatment plan.  All questions were answered to best of my ability and the patient's satisfaction.Aleida  was advised to contact the clinic with any questions that may arise after the clinic visit.      Thank you for involving me in the care of the patient    Return to clinic: 6 months    HPI   Aleida  Digna Acuña is a 39 year old female with medical history significant for intermittent asthma, anxiety, migraines, vitamin D deficiency, ganglion cyst removal of the left wrist, and systemic lupus erythematosus who presents for follow-up of SLE.     7/25/2023: Currently doing well with regard to lupus.  No joint pain or swelling.  No morning stiffness or gelling phenomenon.  No active Raynaud's phenomenon symptoms.  Planning to see dermatology for facial rash/rosacea.  Planning to have her eye exam updated with ophthalmology in 2 days.  Planning to travel to [place] in November 2023 and would like to see the travel clinic.  Planning to get an updated COVID-vaccine prior to traveling to [place].    10/26/2023: Mild ache of the right second-fifth PIPs that is worse with activity and improves with rest, sometimes aches in the morning, and is associated with morning stiffness for less than 20 minutes.  No other joint pain.  Still with facial rash that is being treated as acne rosacea by dermatology at [facility]; she states that she has difficulty finding medications that she tolerates well because she is very sensitive to medications, and continues to follow with dermatology.  Otherwise doing well.  Planning to travel to [place] soon and has been to the trauma clinic where vaccinations were given.  Planning to get the COVID-19 vaccination later today.    2/1/2024: Doing well except for low back pain that radiates to the right leg, started while she was traveling to [place] and has improved some but has not resolved.  She saw spine specialist who is advised steroid injection of the SI joint and she is planning to have this completed but is having difficulty scheduling at the moment.  Has not gone to formal physical therapy and does not wish to go to formal physical therapy but states that she will do exercises on her own at home.  Questions if a Medrol Dosepak would be helpful.      5/29/2024: low back pain resolved  with home physical therapy exercises.  Planning to recheck fasting glucose at her work.  Currently without joint pain.  No morning stiffness.  Occasionally has a nonpruritic 1-2 inch diameter raised rash that resolves within hours; suspects exposure to something but has not been able to identify it and no active rash currently.      Today, 9/11/2024: Currently doing well.  No joint pain or swelling.  Morning stiffness for about 10 minutes.  No gelling phenomenon.  Rash on the face is improved with topical treatments from dermatology.  Had COVID-19 infection in late June with loss of taste and smell, initially presenting with a sore throat; all COVID-19 symptoms have resolved.  Overall feeling well at this time.    Denies fevers, chills, nausea, vomiting, constipation, diarrhea. No abdominal pain. No chest pain/pressure, palpitations, or shortness of breath. No oral or nasal sores.  No rash currently.    Tobacco: quit smoking in 2005  EtOH: Once monthly  Drugs: None  Occupation: Works at [employer], a lot of typing per patient    ROS   12 point review of system was completed and negative except as noted in the HPI     Active Problem List     Patient Active Problem List   Diagnosis    Other kyphoscoliosis and scoliosis    Hypertrophic and atrophic condition of skin    Viral warts    CARDIOVASCULAR SCREENING; LDL GOAL LESS THAN 160    Intermittent asthma    Anxiety    Intractable menstrual migraine without status migrainosus    Vitamin D deficiency    Inflammatory polyarthropathy (H)    Chronic back pain    Raynaud's phenomenon without gangrene    Systemic lupus erythematosus (H)    High risk medications (not anticoagulants) long-term use (Plaquenil and AZA)    Dry eyes, bilateral    Disorder of connective tissue (H24)    Ganglion cyst of dorsum of left wrist     Past Medical History     Past Medical History:   Diagnosis Date    Arthritis     Lupus erythematosus     Mild intermittent asthma     Other kyphoscoliosis  and scoliosis     upper back curvature and disc degeneration in lower back.     Past Surgical History     Past Surgical History:   Procedure Laterality Date     SECTION  2006    EXCISE GANGLION WRIST Left 11/10/2022    Procedure: EXCISION, GANGLION CYST, WRIST LEFT;  Surgeon: Jayde Martinez MD;  [place] [facility] [place]    SURGICAL HISTORY OF -       PE Tubes     Allergy     Allergies   Allergen Reactions    Fluconazole Rash     Current Medication List     Current Outpatient Medications   Medication Sig Dispense Refill    albuterol (PROAIR HFA/PROVENTIL HFA/VENTOLIN HFA) 108 (90 Base) MCG/ACT inhaler Inhale 2 puffs into the lungs every 6 hours as needed for shortness of breath / dyspnea 18 g 1    atovaquone-proguanil (MALARONE) 250-100 MG tablet Take 1 tablet by mouth daily Start 2 days before exposure to Malaria and continue daily till  7 days after exposure. 15 tablet 0    azaTHIOprine (IMURAN) 50 MG tablet Take 1 tablet (50 mg) by mouth daily 90 tablet 2    COMPOUNDED NON-CONTROLLED SUBSTANCE (CMPD RX) - PHARMACY TO MIX COMPOUNDED MEDICATION Dispense: Azelaic 15%/Ivermectin 1%/Metronidazole 1% : apply once to twice daily. 30 g 11    cyclobenzaprine (FLEXERIL) 10 MG tablet TAKE 1 TABLET BY MOUTH 3 TIMES A DAY AS NEEDED FOR MUSCLE SPASMS 30 tablet 2    hydroxychloroquine (PLAQUENIL) 200 MG tablet Take 1 tablet (200 mg) by mouth daily . Yearly eye exam including 10-2 VF and SD-OCT required 90 tablet 2    hydrOXYzine (ATARAX) 25 MG tablet Take 1 tablet (25 mg) by mouth every 6 hours as needed for anxiety 90 tablet 6    levonorgestrel (MIRENA) 20 MCG/24HR IUD 1 each by Intrauterine route once      LORazepam (ATIVAN) 0.5 MG tablet Take 1 tablet (0.5 mg) by mouth every 8 hours as needed for anxiety 15 tablet 0    methylPREDNISolone (MEDROL DOSEPAK) 4 MG tablet therapy pack Take per package instructions. Take once a day by mouth 21 tablet 0    traZODone (DESYREL) 50 MG tablet Take 0.5 tablets (25 mg) by mouth nightly  "as needed for sleep 30 tablet 0    vitamin D3 (CHOLECALCIFEROL) 50 mcg (2000 units) tablet Take 1 tablet (50 mcg) by mouth daily 90 tablet 2    ciprofloxacin (CIPRO) 500 MG tablet Take 1 tablet (500 mg) by mouth 2 times daily Take 1 tablet two times a day for up to 3 days for severe diarrhea during travel. 6 tablet 0     No current facility-administered medications for this visit.         Social History   See HPI    Family History     Family History   Problem Relation Age of Onset    Heart Disease Maternal Grandfather         Bypass, Heart Disease    Respiratory Maternal Grandfather         asthma    Macular Degeneration Maternal Grandfather     Hypertension Paternal Grandmother     Cancer Paternal Grandmother         lymph nodes    Cataracts Paternal Grandmother     C.A.D. Paternal Grandfather     Heart Disease Maternal Uncle         MI's     Alcohol/Drug Maternal Uncle     Respiratory Other         cousin on mothers side, asthma    Alcohol/Drug Other         bother great grandparents on mother's side    Diabetes No family hx of     Breast Cancer No family hx of     Cancer - colorectal No family hx of      Denies family history of autoimmune disease    Physical Exam     Temp Readings from Last 3 Encounters:   11/22/23 98.6  F (37  C) (Oral)   09/11/23 97.9  F (36.6  C) (Temporal)   05/08/23 97.4  F (36.3  C) (Tympanic)     BP Readings from Last 5 Encounters:   09/11/24 110/68   05/29/24 111/75   02/01/24 114/87   12/14/23 99/68   11/22/23 116/77     Pulse Readings from Last 1 Encounters:   09/11/24 77     Resp Readings from Last 1 Encounters:   09/11/24 16     Estimated body mass index is 24.75 kg/m  as calculated from the following:    Height as of 2/1/24: 1.715 m (5' 7.5\").    Weight as of this encounter: 72.8 kg (160 lb 6.4 oz).      GEN: NAD. Healthy appearing adult.   HEENT:  Anicteric, noninjected sclera. No obvious external lesions of the ear and nose. Hearing intact.  CV: S1, S2. RRR. No m/r/g  PULM: No " increased work of breathing. CTA bilaterally   MSK: MCPs, PIPs, DIPs without swelling or tenderness to palpation.  Wrists without swelling or tenderness to palpation.  Elbows and shoulders without swelling or tenderness to palpation.   Knees, ankles, and MTPs without swelling or tenderness to palpation.    SKIN: No rash or jaundice seen  PSYCH: Alert. Appropriate.       Labs / Imaging (select studies)     RNP/Sm/SSA/SSB  Recent Labs   Lab Test 01/12/18  0828   TREPAB Negative     dsDNA  Recent Labs   Lab Test 09/06/24  1511 07/18/23  1510 10/14/22  0714 12/15/21  1516 02/10/21  1627 11/05/20  1628 07/23/20  1636 04/02/20  1541 07/11/19  1529   DNA 1.2 1.7 1.6 1.1 1 1 1 2 2     C3/C4  Recent Labs   Lab Test 09/06/24  1511 07/18/23  1510 10/14/22  0714 12/15/21  1516 05/18/21  1606 02/10/21  1627 11/05/20  1628 07/23/20  1636 04/02/20  1541   W5TLMDN 93 95 96 87 84 89 89 92 90   W6SWOZB 17 18 19 17 18 17 17 18 20     Send-out Labs  Recent Labs   Lab Test 04/21/17  0813   SRESLT SEE NOTE  (Note)  Test name                    Result Flag  Units  RefIntvl  ------------------------------------------------------------  Thiopurine Methyltransferase                                23.2 L      U/mL 24.0-44.0  Sample slightly hemolyzed. Please interpret the results  with caution.  INTERPRETIVE INFORMATION: Thiopurine Methyltransferase, RBC  Normal TPMT activity:  24.0-44.0 U/mL................Individuals are predicted to  be at low risk of bone marrow toxicity (myelosuppression)  as a consequence of standard thiopurine therapy; no dose  adjustment is recommended.  Intermediate TPMT activity:  17.0-23.9 U/mL................Individuals are predicted to  be at intermediate risk of bone marrow toxicity  (myelosuppression) as a consequence of standard thiopurine  therapy; a dose reduction and therapeutic drug management  is recommended.  Low TPMT activity:  less than 17.0 U/mL...........Individuals are predicted to  be at high risk  of bone marrow toxicity (myelosuppression)   as a consequence of standard thiopurine dosing. It is  recommended to avoid the use of thiopurine drugs.  High TPMT activity:  greater than 44.0 U/mL........Individuals are not predicted  to be at risk for bone marrow toxicity (myelosuppression)  as a consequence of standard thiopurine dosing, but may be  at risk for therapeutic failure due to excessive  inactivation of thiopurine drugs. Individuals may require  higher than the normal standard dose. Therapeutic drug  management is recommended.  The TPMT, RBC assay is used as a screen to detect  individuals with low and intermediate TPMT activity who may  be at risk for myelosuppression when exposed to standard  doses of thiopurines, including azathioprine (Imuran) and  6-mercaptopurine (Purinethol). TPMT is the primary  metabolic route for inactivation of thiopurine drugs in the  bone marrow. When TPMT activity is low, it is predicted  that proportionately more 6-mercaptopurine can be converted  into the cytotoxic 6-thioguanine nucle otides that  accumulate in the bone marrow causing excessive toxicity.  The activity of TPMT is measured by the nanomoles of  6-methylmercaptopurine (inactive metabolite) produced per 1  mL of packed red blood cells, (U/mL).    TPMT phenotype testing does not replace the need for  clinical monitoring of patients treated with thiopurine  drugs. Genotype for TPMT cannot be inferred from TPMT  activity (phenotype). Phenotype testing should not be  requested for patients currently treated with thiopurine  drugs. Current TPMT phenotype may not reflect future TPMT  phenotype, particularly in patients who received blood  transfusion within 30-60 days of testing.  TPMT enzyme  activity can be inhibited by several drugs such as:  naproxen (Aleve), ibuprofen (Advil, Motrin), ketoprofen  (Orudis), furosemide (Lasix), sulfasalazine (Azulfidine),  mesalamine (Asacol), olsalazine (Dipentum), mefenamic  acid  (Ponstel), thiazide diuretics, and benzoic acid inhibitors.  TPMT inhibitors may contribute t o falsely low results;  patients should abstain from these drugs for at least 48  hours prior to TPMT testing. Falsely low results may also  occur as a result of inappropriate specimen handling and  hemolysis.  Test developed and characteristics determined by [facility]. See Compliance Statement B: www.[facility].com/CS  Performed by [facility],  [address] [facility] [place] [address] [phone]  www.[employer], Lionel Ferreira MD, Lab. Director     STSTNM Thiopurine Methyltransferase, RBC   STSTCD 92,066   SSPTYP Whole blood, EDTA anticoagulant     CBC  Recent Labs   Lab Test 09/06/24  1511 05/01/24  1548 01/25/24  1548 09/01/21  1625 05/18/21  1606 02/10/21  1627 11/05/20  1628   WBC 4.3 4.0 3.6*   < > 3.9* 4.2 3.3*   RBC 4.06 4.33 4.43   < > 4.26 4.33 3.95   HGB 13.2 14.1 14.1   < > 14.2 14.2 13.1   HCT 38.4 40.4 40.8   < > 40.3 40.8 37.2   MCV 95 93 92   < > 95 94 94   RDW 11.5 11.0 11.2   < > 11.7 11.8 11.4    227 252   < > 225 212 202   MCH 32.5 32.6 31.8   < > 33.3* 32.8 33.2*   MCHC 34.4 34.9 34.6   < > 35.2 34.8 35.2   NEUTROPHIL 65 65 63   < > 64.3 67.8 60.7   LYMPH 24 23 23   < > 20.2 18.0 23.1   MONOCYTE 10 11 14   < > 14.0 13.0 14.4   EOSINOPHIL 1 1 1   < > 1.0 0.7 1.2   BASOPHIL 1 1 0   < > 0.5 0.5 0.6   ANEU  --   --   --   --  2.5 2.9 2.0   ALYM  --   --   --   --  0.8 0.8 0.8   AMANDA  --   --   --   --  0.6 0.6 0.5   AEOS  --   --   --   --  0.0 0.0 0.0   ABAS  --   --   --   --  0.0 0.0 0.0   ANEUTAUTO 2.8 2.6 2.2   < >  --   --   --    ALYMPAUTO 1.1 0.9 0.8   < >  --   --   --    AMONOAUTO 0.4 0.4 0.5   < >  --   --   --    AEOSAUTO 0.0 0.0 0.0   < >  --   --   --    ABSBASO 0.0 0.0 0.0   < >  --   --   --     < > = values in this interval not displayed.     CMP  Recent Labs   Lab Test 09/06/24  1511 07/19/24  0909 05/01/24  1548 01/25/24  1548 10/17/23  1541 07/18/23  1510 09/01/21  1625  05/18/21  1606 02/10/21  1627 01/21/21  0716 11/05/20  1628 07/23/20  1636     --  138 136 138 137   < > 140 136  --  138 138   POTASSIUM 4.1  --  4.3 3.9 3.7 4.2   < > 3.7 4.0  --  3.8 4.1   CHLORIDE 107  --  103 101 102 103   < > 107 106  --  107 106   CO2 24  --  31* 25 26 24   < > 26 24  --  25 26   ANIONGAP 9  --  4* 10 10 10   < > 7 6  --  6 6   GLC 89 91 114* 83 76 81   < > 63* 75   < > 92 79   BUN 11.4  --  17.1 16.0 11.0 11.4   < > 11 10  --  13 11   CR 0.73  --  0.80 0.69 0.72 0.64   < > 0.76 0.74  --  0.68 0.68   GFRESTIMATED >90  --  >90 >90 >90 >90   < > >90 >90  --  >90 >90   GFRESTBLACK  --   --   --   --   --   --   --  >90 >90  --  >90 >90   SRINIVAS 8.9  --  9.7 9.7 9.4 9.3   < > 9.1 9.8  --  8.8 9.2   BILITOTAL 0.2  --  0.4 0.3 0.4 0.4   < > 0.7 0.5  --  0.4 0.4   ALBUMIN 4.3  --  4.8 4.9 4.9 4.9   < > 4.4 4.5  --  4.1 4.6   PROTTOTAL 6.6  --  7.3 7.7 7.3 7.1   < > 7.7 7.6  --  7.0 8.0   ALKPHOS 42  --  41 42 46 42   < > 41 38*  --  39* 44   AST 22  --  20 21 21 22   < > 19 15  --  17 20   ALT 16  --  16 17 14 13   < > 25 23  --  23 29    < > = values in this interval not displayed.     Calcium/VitaminD  Recent Labs   Lab Test 09/06/24  1511 05/01/24  1548 01/25/24  1548 07/18/23  1510 03/31/23  1426 11/05/20  1628 07/23/20  1636 04/13/17  1650 02/20/17  1640   SRINIVAS 8.9 9.7 9.7   < > 9.7   < > 9.2   < >  --    VITDT  --   --   --   --  50  --  30  --  33    < > = values in this interval not displayed.     ESR/CRP  Recent Labs   Lab Test 09/06/24  1511 05/01/24  1548 01/25/24  1548 07/14/22  1507 04/11/22  1441 12/15/21  1516 09/01/21  1625   SED 9 8 7   < > 8 9 9   CRP  --   --   --   --  <2.9 <2.9 5.6   CRPI <3.00 <3.00 <3.00   < >  --   --   --     < > = values in this interval not displayed.     CK/Aldolase  Recent Labs   Lab Test 10/14/22  0714 12/15/21  1516 02/10/21  1627   CKT 55 60 60     Lipid Panel  Recent Labs   Lab Test 07/19/24  0909 02/10/23  0814 02/09/22  0701   CHOL 109 159 158    TRIG 34 37 50   HDL 36* 55 50   LDL 66 97 98   NHDL 73 104 108     Hepatitis C  Recent Labs   Lab Test 10/26/18  0836 01/12/18  0828   HCVAB Nonreactive Nonreactive       HIV Screening  Recent Labs   Lab Test 10/26/18  0836 01/12/18  0828   HIAGAB Nonreactive Nonreactive     UA  Recent Labs   Lab Test 05/01/24  1604 01/25/24  1551 10/17/23  1549 09/01/21  1629 05/18/21  1617 08/23/18  1638 04/26/18  1648 01/18/18  1640 01/12/18  0836 12/28/17  0145 04/13/17  1650 01/20/17  0827   COLOR Yellow Yellow Yellow   < > Yellow   < > Yellow Yellow Yellow Yellow   < > Yellow   APPEARANCE Clear Clear Clear   < > Clear   < > Clear Clear Clear Clear   < > Clear   URINEGLC Negative Negative Negative   < > Negative   < > Negative Negative Negative Negative   < > Negative   URINEBILI Negative Negative Negative   < > Negative   < > Negative Negative Negative Moderate*   < > Negative   SG 1.015 1.010 1.015   < > 1.010   < > 1.010 1.020 1.025 >1.030   < > >1.030   URINEPH 6.5 6.0 7.5*   < > 6.0   < > 7.0 7.0 6.5 6.0   < > 6.0   PROTEIN Negative Negative Negative   < > Negative   < > Negative Negative Trace* 100*   < > Trace*   UROBILINOGEN 0.2 0.2 0.2   < > 0.2   < > 0.2 0.2 0.2 4.0*   < > 0.2   NITRITE Negative Negative Negative   < > Negative   < > Negative Negative Negative Positive*   < > Negative   UBLD Negative Negative Negative   < > Trace*   < > Negative Negative Negative Negative   < > Negative   LEUKEST Negative Negative Negative   < > Negative   < > Negative Negative Negative Negative   < > Negative   WBCU  --   --   --   --  0 - 5  --  0 - 5 O - 2 O - 2 O - 2   < > O - 2   RBCU  --   --   --   --  O - 2  --  O - 2 O - 2 O - 2 O - 2   < > O - 2   SQUAMOUSEPI  --   --   --   --  Few  --   --  Few Moderate* Moderate*   < > Few   BACTERIA  --   --   --   --  Rare*  --   --   --  Moderate* Moderate*   < > Few*   MUCOUS  --   --   --   --   --   --   --   --  Present* Present*  --  Present*    < > = values in this interval not  displayed.     Urine Microscopic  Recent Labs   Lab Test 05/18/21  1617 04/26/18  1648 01/18/18  1640 01/12/18  0836 12/28/17  0145 04/13/17  1650 01/20/17  0827   WBCU 0 - 5 0 - 5 O - 2 O - 2 O - 2   < > O - 2   RBCU O - 2 O - 2 O - 2 O - 2 O - 2   < > O - 2   SQUAMOUSEPI Few  --  Few Moderate* Moderate*   < > Few   BACTERIA Rare*  --   --  Moderate* Moderate*   < > Few*   MUCOUS  --   --   --  Present* Present*  --  Present*    < > = values in this interval not displayed.     Urine Protein  GHUTP and UTP= Urine protein (random), GHUTPG and UTPG = urine protein:creatinine ratio (random), UCRR = urine creatinine (random)  Recent Labs   Lab Test 09/06/24  1514 05/01/24  1604 01/25/24  1551 03/31/23  1426 10/14/22  0714 07/14/22  1507 07/14/22  1507 04/11/22  1441 12/15/21  1516 09/01/21  1629   GHUTP 6.6 <6.0 <6.0   < > 7.6   < > 6.9  --   --   --    UTP  --   --   --   --   --   --   --  0.07 <0.05 <0.05   GHUTPG 0.08  --   --   --  0.04  --  0.09  --   --   --    UTPG  --   --   --   --   --   --   --  0.11  --   --    UCRR 81.0 59.2 23.3   < > 184.0   < > 76.5 66 25 23    < > = values in this interval not displayed.     Immunization History     Immunization History   Administered Date(s) Administered    COVID-19 12+ (Pfizer) 10/26/2023    COVID-19 Bivalent 18+ (Moderna) 10/03/2022    COVID-19 MONOVALENT 12+ (Pfizer) 03/16/2021, 04/06/2021, 08/30/2021    COVID-19 Monovalent Booster 18+ (Moderna) 03/04/2022    DT (PEDS <7y) 08/22/1997    Flu, Unspecified 10/21/2015    HPV 02/25/2010, 02/04/2011    HPV Quadrivalent 02/25/2010, 02/04/2011    HepB 06/13/1999, 08/20/1999, 02/05/2000    HepB, Unspecified 06/13/1999, 07/13/1999, 08/20/1999, 02/05/2000    Hepatitis A (ADULT 19+) 09/11/2023    Hepatitis B, Adult 07/13/1999, 08/20/1999, 02/05/2000    Historical DTP/aP 1986, 1986, 1986, 01/15/1988, 06/15/1991, 08/22/1997    Historical Hepb 07/13/1999, 08/20/1999    Influenza (IIV3) PF 10/19/2010, 11/07/2011,  09/23/2012    Influenza Vaccine >6 months,quad, PF 10/11/2016, 10/13/2017, 10/26/2018, 10/17/2019, 10/16/2020, 10/15/2021, 12/12/2022, 10/09/2023    MMR 05/15/1987, 09/15/1991    Mantoux Tuberculin Skin Test 07/15/1987, 01/19/2005    Meningococcal (Menomune ) 08/09/2004    Meningococcal ACWY (Menactra ) 08/09/2004    OPV, trivalent, live 1986, 1986, 1986, 01/15/1988, 09/15/1991    OPV, unspecified 1986, 1986, 1986, 01/15/1988, 09/15/1991    Pneumo Conj 13-V (2010&after) 05/04/2018    Pneumococcal 23 valent 08/30/2018    TDAP (Adacel,Boostrix) 01/20/2009    TDAP Vaccine (Adacel) 01/21/2009, 02/25/2010, 08/17/2020    Typhoid IM 09/11/2023    Zoster recombinant adjuvanted (SHINGRIX) 12/13/2018, 03/04/2019          Chart documentation done in part with Dragon Voice recognition Software. Although reviewed after completion, some word and grammatical error may remain.    Lavon Light MD

## 2024-09-11 NOTE — NURSING NOTE
RAPID3 (0-30) Cumulative Score  8.5          RAPID3 Weighted Score (divide #4 by 3 and that is the weighted score)  2.8

## 2024-09-11 NOTE — PATIENT INSTRUCTIONS
RHEUMATOLOGY    [facility]  [address] [place] [address]  [place] [address]    Phone number: [phone]  Fax number: [phone]    If you need a medication refill, please contact us as you may need lab work and/or a follow up visit prior to your refill.      Thank you for choosing [facility]!    Carissa Frye CMA Rheumatology

## 2024-10-17 ENCOUNTER — TRANSFERRED RECORDS (OUTPATIENT)
Dept: HEALTH INFORMATION MANAGEMENT | Facility: CLINIC | Age: 39
End: 2024-10-17

## 2024-11-21 ENCOUNTER — IMMUNIZATION (OUTPATIENT)
Dept: PEDIATRICS | Facility: CLINIC | Age: 39
End: 2024-11-21
Payer: COMMERCIAL

## 2024-11-21 VITALS — TEMPERATURE: 98.8 F

## 2024-11-21 DIAGNOSIS — Z23 NEED FOR PROPHYLACTIC VACCINATION AND INOCULATION AGAINST INFLUENZA: Primary | ICD-10-CM

## 2024-11-21 NOTE — PROGRESS NOTES
Prior to immunization administration, verified patients identity using patient s name and date of birth. Please see Immunization Activity for additional information.     Screening Questionnaire for Adult Immunization    Are you sick today?   No   Do you have allergies to medications, food, a vaccine component or latex?   No   Have you ever had a serious reaction after receiving a vaccination?   No   Do you have a long-term health problem with heart, lung, kidney, or metabolic disease (e.g., diabetes), asthma, a blood disorder, no spleen, complement component deficiency, a cochlear implant, or a spinal fluid leak?  Are you on long-term aspirin therapy?   No   Do you have cancer, leukemia, HIV/AIDS, or any other immune system problem?   No   Do you have a parent, brother, or sister with an immune system problem?   No   In the past 3 months, have you taken medications that affect  your immune system, such as prednisone, other steroids, or anticancer drugs; drugs for the treatment of rheumatoid arthritis, Crohn s disease, or psoriasis; or have you had radiation treatments?   No   Have you had a seizure, or a brain or other nervous system problem?   No   During the past year, have you received a transfusion of blood or blood    products, or been given immune (gamma) globulin or antiviral drug?   No   For women: Are you pregnant or is there a chance you could become       pregnant during the next month?   No   Have you received any vaccinations in the past 4 weeks?   No     Immunization questionnaire answers were all negative.    I have reviewed the following standing orders:   This patient is due and qualifies for the Influenza vaccine.    Click here for Influenza Vaccine Standing Order    I have reviewed the vaccines inclusion and exclusion criteria; No concerns regarding eligibility.     Patient instructed to remain in clinic for 15 minutes afterwards, and to report any adverse reactions.     Screening performed by  Kelsi Lal LPN on 11/21/2024 at 2:52 PM.

## 2024-12-03 ENCOUNTER — LAB (OUTPATIENT)
Dept: LAB | Facility: CLINIC | Age: 39
End: 2024-12-03
Payer: COMMERCIAL

## 2024-12-03 DIAGNOSIS — M32.19 OTHER SYSTEMIC LUPUS ERYTHEMATOSUS WITH OTHER ORGAN INVOLVEMENT (H): ICD-10-CM

## 2024-12-03 DIAGNOSIS — Z79.899 HIGH RISK MEDICATIONS (NOT ANTICOAGULANTS) LONG-TERM USE: ICD-10-CM

## 2024-12-03 LAB
ALBUMIN UR-MCNC: NEGATIVE MG/DL
APPEARANCE UR: CLEAR
BASOPHILS # BLD AUTO: 0 10E3/UL (ref 0–0.2)
BASOPHILS NFR BLD AUTO: 0 %
BILIRUB UR QL STRIP: NEGATIVE
COLOR UR AUTO: YELLOW
EOSINOPHIL # BLD AUTO: 0 10E3/UL (ref 0–0.7)
EOSINOPHIL NFR BLD AUTO: 0 %
ERYTHROCYTE [DISTWIDTH] IN BLOOD BY AUTOMATED COUNT: 11.3 % (ref 10–15)
GLUCOSE UR STRIP-MCNC: NEGATIVE MG/DL
HCT VFR BLD AUTO: 39.9 % (ref 35–47)
HGB BLD-MCNC: 13.5 G/DL (ref 11.7–15.7)
HGB UR QL STRIP: NEGATIVE
IMM GRANULOCYTES # BLD: 0 10E3/UL
IMM GRANULOCYTES NFR BLD: 0 %
KETONES UR STRIP-MCNC: NEGATIVE MG/DL
LEUKOCYTE ESTERASE UR QL STRIP: NEGATIVE
LYMPHOCYTES # BLD AUTO: 0.9 10E3/UL (ref 0.8–5.3)
LYMPHOCYTES NFR BLD AUTO: 18 %
MCH RBC QN AUTO: 32.1 PG (ref 26.5–33)
MCHC RBC AUTO-ENTMCNC: 33.8 G/DL (ref 31.5–36.5)
MCV RBC AUTO: 95 FL (ref 78–100)
MONOCYTES # BLD AUTO: 0.5 10E3/UL (ref 0–1.3)
MONOCYTES NFR BLD AUTO: 11 %
NEUTROPHILS # BLD AUTO: 3.6 10E3/UL (ref 1.6–8.3)
NEUTROPHILS NFR BLD AUTO: 71 %
NITRATE UR QL: NEGATIVE
PH UR STRIP: 7 [PH] (ref 5–7)
PLATELET # BLD AUTO: 245 10E3/UL (ref 150–450)
RBC # BLD AUTO: 4.2 10E6/UL (ref 3.8–5.2)
SP GR UR STRIP: 1.01 (ref 1–1.03)
UROBILINOGEN UR STRIP-ACNC: 0.2 E.U./DL
WBC # BLD AUTO: 5 10E3/UL (ref 4–11)

## 2024-12-03 PROCEDURE — 84156 ASSAY OF PROTEIN URINE: CPT

## 2024-12-03 PROCEDURE — 80053 COMPREHEN METABOLIC PANEL: CPT

## 2024-12-03 PROCEDURE — 36415 COLL VENOUS BLD VENIPUNCTURE: CPT

## 2024-12-03 PROCEDURE — 85025 COMPLETE CBC W/AUTO DIFF WBC: CPT

## 2024-12-03 PROCEDURE — 81003 URINALYSIS AUTO W/O SCOPE: CPT

## 2024-12-04 LAB
ALBUMIN MFR UR ELPH: <6 MG/DL
ALBUMIN SERPL BCG-MCNC: 4.5 G/DL (ref 3.5–5.2)
ALP SERPL-CCNC: 40 U/L (ref 40–150)
ALT SERPL W P-5'-P-CCNC: 17 U/L (ref 0–50)
ANION GAP SERPL CALCULATED.3IONS-SCNC: 12 MMOL/L (ref 7–15)
AST SERPL W P-5'-P-CCNC: 23 U/L (ref 0–45)
BILIRUB SERPL-MCNC: 0.4 MG/DL
BUN SERPL-MCNC: 14.8 MG/DL (ref 6–20)
CALCIUM SERPL-MCNC: 9.5 MG/DL (ref 8.8–10.4)
CHLORIDE SERPL-SCNC: 102 MMOL/L (ref 98–107)
CREAT SERPL-MCNC: 0.7 MG/DL (ref 0.51–0.95)
CREAT UR-MCNC: 28.7 MG/DL
EGFRCR SERPLBLD CKD-EPI 2021: >90 ML/MIN/1.73M2
GLUCOSE SERPL-MCNC: 76 MG/DL (ref 70–99)
HCO3 SERPL-SCNC: 24 MMOL/L (ref 22–29)
POTASSIUM SERPL-SCNC: 4 MMOL/L (ref 3.4–5.3)
PROT SERPL-MCNC: 7 G/DL (ref 6.4–8.3)
PROT/CREAT 24H UR: NORMAL MG/G{CREAT}
SODIUM SERPL-SCNC: 138 MMOL/L (ref 135–145)

## 2025-03-06 ENCOUNTER — LAB (OUTPATIENT)
Dept: LAB | Facility: CLINIC | Age: 40
End: 2025-03-06
Payer: COMMERCIAL

## 2025-03-06 DIAGNOSIS — Z79.899 HIGH RISK MEDICATIONS (NOT ANTICOAGULANTS) LONG-TERM USE: ICD-10-CM

## 2025-03-06 DIAGNOSIS — M32.19 OTHER SYSTEMIC LUPUS ERYTHEMATOSUS WITH OTHER ORGAN INVOLVEMENT (H): ICD-10-CM

## 2025-03-06 LAB
ALBUMIN UR-MCNC: NEGATIVE MG/DL
APPEARANCE UR: CLEAR
BASOPHILS # BLD AUTO: 0 10E3/UL (ref 0–0.2)
BASOPHILS NFR BLD AUTO: 1 %
BILIRUB UR QL STRIP: NEGATIVE
COLOR UR AUTO: YELLOW
EOSINOPHIL # BLD AUTO: 0.1 10E3/UL (ref 0–0.7)
EOSINOPHIL NFR BLD AUTO: 1 %
ERYTHROCYTE [DISTWIDTH] IN BLOOD BY AUTOMATED COUNT: 11.2 % (ref 10–15)
ERYTHROCYTE [SEDIMENTATION RATE] IN BLOOD BY WESTERGREN METHOD: 9 MM/HR (ref 0–20)
GLUCOSE UR STRIP-MCNC: NEGATIVE MG/DL
HCT VFR BLD AUTO: 43.3 % (ref 35–47)
HGB BLD-MCNC: 15.3 G/DL (ref 11.7–15.7)
HGB UR QL STRIP: NEGATIVE
IMM GRANULOCYTES # BLD: 0 10E3/UL
IMM GRANULOCYTES NFR BLD: 0 %
KETONES UR STRIP-MCNC: ABNORMAL MG/DL
LEUKOCYTE ESTERASE UR QL STRIP: NEGATIVE
LYMPHOCYTES # BLD AUTO: 0.9 10E3/UL (ref 0.8–5.3)
LYMPHOCYTES NFR BLD AUTO: 21 %
MCH RBC QN AUTO: 32.7 PG (ref 26.5–33)
MCHC RBC AUTO-ENTMCNC: 35.3 G/DL (ref 31.5–36.5)
MCV RBC AUTO: 93 FL (ref 78–100)
MONOCYTES # BLD AUTO: 0.6 10E3/UL (ref 0–1.3)
MONOCYTES NFR BLD AUTO: 13 %
NEUTROPHILS # BLD AUTO: 2.7 10E3/UL (ref 1.6–8.3)
NEUTROPHILS NFR BLD AUTO: 64 %
NITRATE UR QL: NEGATIVE
PH UR STRIP: 5.5 [PH] (ref 5–7)
PLATELET # BLD AUTO: 299 10E3/UL (ref 150–450)
RBC # BLD AUTO: 4.68 10E6/UL (ref 3.8–5.2)
SP GR UR STRIP: <=1.005 (ref 1–1.03)
UROBILINOGEN UR STRIP-ACNC: 0.2 E.U./DL
WBC # BLD AUTO: 4.3 10E3/UL (ref 4–11)

## 2025-03-12 ENCOUNTER — OFFICE VISIT (OUTPATIENT)
Dept: RHEUMATOLOGY | Facility: CLINIC | Age: 40
End: 2025-03-12
Payer: COMMERCIAL

## 2025-03-12 VITALS
OXYGEN SATURATION: 96 % | WEIGHT: 159.4 LBS | TEMPERATURE: 97.4 F | HEIGHT: 68 IN | DIASTOLIC BLOOD PRESSURE: 78 MMHG | SYSTOLIC BLOOD PRESSURE: 114 MMHG | BODY MASS INDEX: 24.16 KG/M2 | HEART RATE: 84 BPM

## 2025-03-12 DIAGNOSIS — Z79.899 HIGH RISK MEDICATIONS (NOT ANTICOAGULANTS) LONG-TERM USE: ICD-10-CM

## 2025-03-12 DIAGNOSIS — M32.19 OTHER SYSTEMIC LUPUS ERYTHEMATOSUS WITH OTHER ORGAN INVOLVEMENT (H): Primary | ICD-10-CM

## 2025-03-12 PROCEDURE — 3074F SYST BP LT 130 MM HG: CPT | Performed by: INTERNAL MEDICINE

## 2025-03-12 PROCEDURE — 1125F AMNT PAIN NOTED PAIN PRSNT: CPT | Performed by: INTERNAL MEDICINE

## 2025-03-12 PROCEDURE — 3078F DIAST BP <80 MM HG: CPT | Performed by: INTERNAL MEDICINE

## 2025-03-12 PROCEDURE — 99214 OFFICE O/P EST MOD 30 MIN: CPT | Performed by: INTERNAL MEDICINE

## 2025-03-12 PROCEDURE — G2211 COMPLEX E/M VISIT ADD ON: HCPCS | Performed by: INTERNAL MEDICINE

## 2025-03-12 RX ORDER — HYDROXYCHLOROQUINE SULFATE 200 MG/1
200 TABLET, FILM COATED ORAL DAILY
Qty: 90 TABLET | Refills: 2 | Status: SHIPPED | OUTPATIENT
Start: 2025-03-12

## 2025-03-12 RX ORDER — AZATHIOPRINE 50 MG/1
50 TABLET ORAL DAILY
Qty: 90 TABLET | Refills: 2 | Status: SHIPPED | OUTPATIENT
Start: 2025-03-12

## 2025-03-12 ASSESSMENT — PAIN SCALES - GENERAL: PAINLEVEL_OUTOF10: MILD PAIN (3)

## 2025-03-12 NOTE — PROGRESS NOTES
Faxed an LINE to [facility] in [place] at [phone]. Need Hydroxychloroquine exam. Fax confirmed, LIEN sent to abstracting.  Willow Paez Certified Medical Assistant

## 2025-03-12 NOTE — PROGRESS NOTES
Rheumatology Clinic Visit      Aleida Acuña (formerly Lenard) MRN# [id number]   YOB: 1985 Age: 40 year old      Date of visit: 3/12/25   PCP: Sayra Chirinos  Dermatology: Shante Reid     Chief Complaint   Patient presents with:  RECHECK: Systemic lupus erythematosus. Things have been going good. No issues or concerns.     Assessment and Plan     1. Systemic lupus erythematosus (CHANELL >[id number] speckled, RNP+, Sm+, Scl-70+, hypocomplementemia, photosensitivity, malar rash, arthritis, fatigue, Raynaud's phenomenon):  Dx'd 4/2016. Scl-70+; she lacks features of scleroderma except for raynaud's; no myopathy based on labs/symptoms. 5/11/2018 echo without evidence of pulmonary hypertension. Previously on MTX 20mg SQ weekly (GI upset with PO doses above 15mg, partially effective) and SSZ (LFT elevations).  Currently on AZA 50mg daily and HCQ 200mg daily (patient self reduced previously from 300mg daily on average based on preference of an easier regimen and continued to do well). TPMT normal on 8/21/2017.  Doing well with regard to lupus.  Chronic illness, stable.    - Continue hydroxychloroquine 200mg daily (last eye exam was okay on 7/27/2023 by Dr. Frias; reportedly had an eye exam in 2024 at [facility] so HUGO Daugherty, will request the records)  - Continue azathioprine 50mg daily   - Labs in 3 months: CBC, CMP, UA, Uprotein:creatinine  - Labs in 6 months: CBC, CMP, ESR, CRP, C3, C4, dsDNA, UA, Uprotein:creatinine    High risk medication requiring intensive toxicity monitoring at least quarterly      2. Raynaud's Phenomenon: Cold avoidance was insufficient for controlling her symptoms in the past; then did well on amlodipine previously but has not required for some time now.  She will notify me if she would like to restart amlodipine.  Note that she was previously on amlodipine 10 mg daily during colder months but has not required it in most recent singer.    3.  Low vitamin D:  Currently on vitamin D 2000 units daily.    - Continue vitamin D 2000 units daily    4.  Secondary Sjogren's syndrome: Mild dry and dry mouth that are treated infrequently with artificial tears and frequent sips of water. Dentist follow-up every 6 months     5.  Facial rash: Was following with dermatology at [facility].  Reportedly due to SLE and rosacea from derm perspective, and improved with topical creams from her dermatologist for rosacea.  Active today, involving the nasolabial folds.  She will continue following with dermatology at [facility] where she has been treated for acne rosacea in the setting of atopic dermatitis    6.  Mild medial joint line tenderness of the knees, and mild pes anserine bursitis: she says that it is only symptomatic when examined.  May use topical Voltaren gel as needed.    7.  Vaccinations:   - Influenza: encouraged yearly vaccination  - COVID-19: Advised keeping updated, and to hold azathioprine for 1-2 weeks afterward    8.  History of midline low back pain radiating to the right leg: Has seen a spine specialist who recommended SI joint steroid injection previously but she did not proceed with this.  She started doing physical therapy exercises on her own and symptoms have resolved.  Continue physical therapy exercises.     Total minutes spent in evaluation with patient, documentation, , and review of pertinent studies and chart notes: 16  The longitudinal plan of care for the rheumatology problem(s) were addressed during this visit.  Due to added complexity of care, we will continue to support the patient and the subsequent management of this condition with ongoing continuity of care.       Aleida verbalized agreement with and understanding of the rational for the diagnosis and treatment plan.  All questions were answered to best of my ability and the patient's satisfaction.Aleida  was advised to contact the clinic with any questions that may arise after the  clinic visit.      Thank you for involving me in the care of the patient    Return to clinic: 6 months    HPI   Aleida Digna Acuña is a 40 year old female with medical history significant for intermittent asthma, anxiety, migraines, vitamin D deficiency, ganglion cyst removal of the left wrist, and systemic lupus erythematosus who presents for follow-up of SLE.     7/25/2023: Currently doing well with regard to lupus.  No joint pain or swelling.  No morning stiffness or gelling phenomenon.  No active Raynaud's phenomenon symptoms.  Planning to see dermatology for facial rash/rosacea.  Planning to have her eye exam updated with ophthalmology in 2 days.  Planning to travel to [facility] in November 2023 and would like to see the travel clinic.  Planning to get an updated COVID-vaccine prior to traveling to [facility].    10/26/2023: Mild ache of the right second-fifth PIPs that is worse with activity and improves with rest, sometimes aches in the morning, and is associated with morning stiffness for less than 20 minutes.  No other joint pain.  Still with facial rash that is being treated as acne rosacea by dermatology at [facility]; she states that she has difficulty finding medications that she tolerates well because she is very sensitive to medications, and continues to follow with dermatology.  Otherwise doing well.  Planning to travel to [facility] soon and has been to the trauma clinic where vaccinations were given.  Planning to get the COVID-19 vaccination later today.    2/1/2024: Doing well except for low back pain that radiates to the right leg, started while she was traveling to [facility] and has improved some but has not resolved.  She saw spine specialist who is advised steroid injection of the SI joint and she is planning to have this completed but is having difficulty scheduling at the moment.  Has not gone to formal physical therapy and does not wish to go to formal physical therapy but states that she will  do exercises on her own at home.  Questions if a Medrol Dosepak would be helpful.      5/29/2024: low back pain resolved with home physical therapy exercises.  Planning to recheck fasting glucose at her work.  Currently without joint pain.  No morning stiffness.  Occasionally has a nonpruritic 1-2 inch diameter raised rash that resolves within hours; suspects exposure to something but has not been able to identify it and no active rash currently.      9/11/2024: Currently doing well.  No joint pain or swelling.  Morning stiffness for about 10 minutes.  No gelling phenomenon.  Rash on the face is improved with topical treatments from dermatology.  Had COVID-19 infection in late June with loss of taste and smell, initially presenting with a sore throat; all COVID-19 symptoms have resolved.  Overall feeling well at this time.    Today, 3/12/2025: Doing well.  No joint pain/swelling/stiffness.  Does notice mild joint ache if the weather is changing quickly.  Rash on the face is improved with topical treatments from dermatology, and she notes that it is better when she uses the topical treatments regularly.  No oral or nasal sores.  Raynaud's phenomenon controlled with cold avoidance.      Denies fevers, chills, nausea, vomiting, constipation, diarrhea. No abdominal pain. No chest pain/pressure, palpitations, or shortness of breath. No oral or nasal sores.  No rash currently.    Tobacco: quit smoking in 2005  EtOH: Once monthly  Drugs: None  Occupation: Works at [employer], a lot of typing per patient    ROS   12 point review of system was completed and negative except as noted in the HPI     Active Problem List     Patient Active Problem List   Diagnosis    Other kyphoscoliosis and scoliosis    Hypertrophic and atrophic condition of skin    Viral warts    CARDIOVASCULAR SCREENING; LDL GOAL LESS THAN 160    Intermittent asthma    Anxiety    Intractable menstrual migraine without status migrainosus    Vitamin D  deficiency    Inflammatory polyarthropathy (H)    Chronic back pain    Raynaud's phenomenon without gangrene    Systemic lupus erythematosus (H)    High risk medications (not anticoagulants) long-term use (Plaquenil and AZA)    Dry eyes, bilateral    Disorder of connective tissue    Ganglion cyst of dorsum of left wrist     Past Medical History     Past Medical History:   Diagnosis Date    Arthritis     Lupus erythematosus     Mild intermittent asthma     Other kyphoscoliosis and scoliosis     upper back curvature and disc degeneration in lower back.     Past Surgical History     Past Surgical History:   Procedure Laterality Date     SECTION      EXCISE GANGLION WRIST Left 11/10/2022    Procedure: EXCISION, GANGLION CYST, WRIST LEFT;  Surgeon: Jayde Martinez MD;  Location: [facility] OR    SURGICAL HISTORY OF -       PE Tubes     Allergy     Allergies   Allergen Reactions    Fluconazole Rash     Current Medication List     Current Outpatient Medications   Medication Sig Dispense Refill    albuterol (PROAIR HFA/PROVENTIL HFA/VENTOLIN HFA) 108 (90 Base) MCG/ACT inhaler Inhale 2 puffs into the lungs every 6 hours as needed for shortness of breath / dyspnea 18 g 1    azaTHIOprine (IMURAN) 50 MG tablet Take 1 tablet (50 mg) by mouth daily. 90 tablet 2    COMPOUNDED NON-CONTROLLED SUBSTANCE (CMPD RX) - PHARMACY TO MIX COMPOUNDED MEDICATION Dispense: Azelaic 15%/Ivermectin 1%/Metronidazole 1% : apply once to twice daily. 30 g 11    cyclobenzaprine (FLEXERIL) 10 MG tablet TAKE 1 TABLET BY MOUTH 3 TIMES A DAY AS NEEDED FOR MUSCLE SPASMS 30 tablet 2    hydroxychloroquine (PLAQUENIL) 200 MG tablet Take 1 tablet (200 mg) by mouth daily. . Yearly eye exam including 10-2 VF and SD-OCT required 90 tablet 2    hydrOXYzine (ATARAX) 25 MG tablet Take 1 tablet (25 mg) by mouth every 6 hours as needed for anxiety 90 tablet 6    levonorgestrel (MIRENA) 20 MCG/24HR IUD 1 each by Intrauterine route once      LORazepam (ATIVAN)  0.5 MG tablet Take 1 tablet (0.5 mg) by mouth every 8 hours as needed for anxiety 15 tablet 0    vitamin D3 (CHOLECALCIFEROL) 50 mcg (2000 units) tablet Take 1 tablet (50 mcg) by mouth daily 90 tablet 2    atovaquone-proguanil (MALARONE) 250-100 MG tablet Take 1 tablet by mouth daily Start 2 days before exposure to Malaria and continue daily till  7 days after exposure. (Patient not taking: Reported on 3/12/2025) 15 tablet 0    traZODone (DESYREL) 50 MG tablet Take 0.5 tablets (25 mg) by mouth nightly as needed for sleep (Patient not taking: Reported on 3/12/2025) 30 tablet 0     No current facility-administered medications for this visit.         Social History   See HPI    Family History     Family History   Problem Relation Age of Onset    Heart Disease Maternal Grandfather         Bypass, Heart Disease    Respiratory Maternal Grandfather         asthma    Macular Degeneration Maternal Grandfather     Hypertension Paternal Grandmother     Cancer Paternal Grandmother         lymph nodes    Cataracts Paternal Grandmother     C.A.D. Paternal Grandfather     Heart Disease Maternal Uncle         MI's     Alcohol/Drug Maternal Uncle     Respiratory Other         cousin on mothers side, asthma    Alcohol/Drug Other         bother great grandparents on mother's side    Diabetes No family hx of     Breast Cancer No family hx of     Cancer - colorectal No family hx of      Denies family history of autoimmune disease    Physical Exam     Temp Readings from Last 3 Encounters:   03/12/25 97.4  F (36.3  C) (Oral)   11/21/24 98.8  F (37.1  C) (Temporal)   11/22/23 98.6  F (37  C) (Oral)     BP Readings from Last 5 Encounters:   03/12/25 114/78   09/11/24 110/68   05/29/24 111/75   02/01/24 114/87   12/14/23 99/68     Pulse Readings from Last 1 Encounters:   03/12/25 84     Resp Readings from Last 1 Encounters:   09/11/24 16     Estimated body mass index is 24.6 kg/m  as calculated from the following:    Height as of this  "encounter: 1.715 m (5' 7.5\").    Weight as of this encounter: 72.3 kg (159 lb 6.4 oz).      GEN: NAD. Healthy appearing adult.   HEENT:  Anicteric, noninjected sclera. No obvious external lesions of the ear and nose. Hearing intact.  CV: S1, S2. RRR. No m/r/g  PULM: No increased work of breathing. CTA bilaterally   MSK: MCPs, PIPs, DIPs without swelling or tenderness to palpation.  Wrists without swelling or tenderness to palpation.  Elbows and shoulders without swelling or tenderness to palpation.   Knees, ankles, and MTPs without swelling or tenderness to palpation.    SKIN: No rash or jaundice seen  PSYCH: Alert. Appropriate.       Labs / Imaging (select studies)     RNP/Sm/SSA/SSB  Recent Labs   Lab Test 01/12/18  0828   TREPAB Negative     dsDNA  Recent Labs   Lab Test 09/06/24  1511 07/18/23  1510 10/14/22  0714 12/15/21  1516 02/10/21  1627 11/05/20  1628 07/23/20  1636 04/02/20  1541 07/11/19  1529   DNA 1.2 1.7 1.6 1.1 1 1 1 2 2     C3/C4  Recent Labs   Lab Test 09/06/24  1511 07/18/23  1510 10/14/22  0714 12/15/21  1516 05/18/21  1606 02/10/21  1627 11/05/20  1628 07/23/20  1636 04/02/20  1541   U4FZVVO 93 95 96 87 84 89 89 92 90   N3FBMIU 17 18 19 17 18 17 17 18 20     Send-out Labs  Recent Labs   Lab Test 04/21/17  0813   SRESLT SEE NOTE  (Note)  Test name                    Result Flag  Units  RefIntvl  ------------------------------------------------------------  Thiopurine Methyltransferase                                23.2 L      U/mL 24.0-44.0  Sample slightly hemolyzed. Please interpret the results  with caution.  INTERPRETIVE INFORMATION: Thiopurine Methyltransferase, RBC  Normal TPMT activity:  24.0-44.0 U/mL................Individuals are predicted to  be at low risk of bone marrow toxicity (myelosuppression)  as a consequence of standard thiopurine therapy; no dose  adjustment is recommended.  Intermediate TPMT activity:  17.0-23.9 U/mL................Individuals are predicted to  be at " intermediate risk of bone marrow toxicity  (myelosuppression) as a consequence of standard thiopurine  therapy; a dose reduction and therapeutic drug management  is recommended.  Low TPMT activity:  less than 17.0 U/mL...........Individuals are predicted to  be at high risk of bone marrow toxicity (myelosuppression)   as a consequence of standard thiopurine dosing. It is  recommended to avoid the use of thiopurine drugs.  High TPMT activity:  greater than 44.0 U/mL........Individuals are not predicted  to be at risk for bone marrow toxicity (myelosuppression)  as a consequence of standard thiopurine dosing, but may be  at risk for therapeutic failure due to excessive  inactivation of thiopurine drugs. Individuals may require  higher than the normal standard dose. Therapeutic drug  management is recommended.  The TPMT, RBC assay is used as a screen to detect  individuals with low and intermediate TPMT activity who may  be at risk for myelosuppression when exposed to standard  doses of thiopurines, including azathioprine (Imuran) and  6-mercaptopurine (Purinethol). TPMT is the primary  metabolic route for inactivation of thiopurine drugs in the  bone marrow. When TPMT activity is low, it is predicted  that proportionately more 6-mercaptopurine can be converted  into the cytotoxic 6-thioguanine nucle otides that  accumulate in the bone marrow causing excessive toxicity.  The activity of TPMT is measured by the nanomoles of  6-methylmercaptopurine (inactive metabolite) produced per 1  mL of packed red blood cells, (U/mL).    TPMT phenotype testing does not replace the need for  clinical monitoring of patients treated with thiopurine  drugs. Genotype for TPMT cannot be inferred from TPMT  activity (phenotype). Phenotype testing should not be  requested for patients currently treated with thiopurine  drugs. Current TPMT phenotype may not reflect future TPMT  phenotype, particularly in patients who received  blood  transfusion within 30-60 days of testing.  TPMT enzyme  activity can be inhibited by several drugs such as:  naproxen (Aleve), ibuprofen (Advil, Motrin), ketoprofen  (Orudis), furosemide (Lasix), sulfasalazine (Azulfidine),  mesalamine (Asacol), olsalazine (Dipentum), mefenamic acid  (Ponstel), thiazide diuretics, and benzoic acid inhibitors.  TPMT inhibitors may contribute t o falsely low results;  patients should abstain from these drugs for at least 48  hours prior to TPMT testing. Falsely low results may also  occur as a result of inappropriate specimen handling and  hemolysis.  Test developed and characteristics determined by [facility]. See Compliance Statement B: www.[facility].com/CS  Performed by [facility],  [address] [place] [id number] [phone]  [email or web address], Lionel Ferreira MD, Lab. Director     STSTNM Thiopurine Methyltransferase, RBC   STSTCD 92,066   SSPTYP Whole blood, EDTA anticoagulant     CBC  Recent Labs   Lab Test 03/06/25  1621 12/03/24  1607 09/06/24  1511 09/01/21  1625 05/18/21  1606 02/10/21  1627 11/05/20  1628   WBC 4.3 5.0 4.3   < > 3.9* 4.2 3.3*   RBC 4.68 4.20 4.06   < > 4.26 4.33 3.95   HGB 15.3 13.5 13.2   < > 14.2 14.2 13.1   HCT 43.3 39.9 38.4   < > 40.3 40.8 37.2   MCV 93 95 95   < > 95 94 94   RDW 11.2 11.3 11.5   < > 11.7 11.8 11.4    245 240   < > 225 212 202   MCH 32.7 32.1 32.5   < > 33.3* 32.8 33.2*   MCHC 35.3 33.8 34.4   < > 35.2 34.8 35.2   NEUTROPHIL 64 71 65   < > 64.3 67.8 60.7   LYMPH 21 18 24   < > 20.2 18.0 23.1   MONOCYTE 13 11 10   < > 14.0 13.0 14.4   EOSINOPHIL 1 0 1   < > 1.0 0.7 1.2   BASOPHIL 1 0 1   < > 0.5 0.5 0.6   ANEU  --   --   --   --  2.5 2.9 2.0   ALYM  --   --   --   --  0.8 0.8 0.8   AMANDA  --   --   --   --  0.6 0.6 0.5   AEOS  --   --   --   --  0.0 0.0 0.0   ABAS  --   --   --   --  0.0 0.0 0.0   ANEUTAUTO 2.7 3.6 2.8   < >  --   --   --    ALYMPAUTO 0.9 0.9 1.1   < >  --   --   --    AMONOAUTO 0.6 0.5 0.4   < >   --   --   --    AEOSAUTO 0.1 0.0 0.0   < >  --   --   --    ABSBASO 0.0 0.0 0.0   < >  --   --   --     < > = values in this interval not displayed.     CMP  Recent Labs   Lab Test 03/06/25  1621 12/03/24  1607 09/06/24  1511 07/19/24  0909 05/01/24  1548 01/25/24  1548 09/01/21  1625 05/18/21  1606 02/10/21  1627 01/21/21  0716 11/05/20  1628 07/23/20  1636    138 140  --  138 136   < > 140 136  --  138 138   POTASSIUM 4.3 4.0 4.1  --  4.3 3.9   < > 3.7 4.0  --  3.8 4.1   CHLORIDE 101 102 107  --  103 101   < > 107 106  --  107 106   CO2 25 24 24  --  31* 25   < > 26 24  --  25 26   ANIONGAP 12 12 9  --  4* 10   < > 7 6  --  6 6   GLC 79 76 89 91 114* 83   < > 63* 75   < > 92 79   BUN 17.8 14.8 11.4  --  17.1 16.0   < > 11 10  --  13 11   CR 0.69 0.70 0.73  --  0.80 0.69   < > 0.76 0.74  --  0.68 0.68   GFRESTIMATED >90 >90 >90  --  >90 >90   < > >90 >90  --  >90 >90   GFRESTBLACK  --   --   --   --   --   --   --  >90 >90  --  >90 >90   SRINIVAS 10.2 9.5 8.9  --  9.7 9.7   < > 9.1 9.8  --  8.8 9.2   BILITOTAL 0.3 0.4 0.2  --  0.4 0.3   < > 0.7 0.5  --  0.4 0.4   ALBUMIN 4.7 4.5 4.3  --  4.8 4.9   < > 4.4 4.5  --  4.1 4.6   PROTTOTAL 7.4 7.0 6.6  --  7.3 7.7   < > 7.7 7.6  --  7.0 8.0   ALKPHOS 45 40 42  --  41 42   < > 41 38*  --  39* 44   AST 26 23 22  --  20 21   < > 19 15  --  17 20   ALT 18 17 16  --  16 17   < > 25 23  --  23 29    < > = values in this interval not displayed.     Calcium/VitaminD  Recent Labs   Lab Test 03/06/25  1621 12/03/24  1607 09/06/24  1511 07/18/23  1510 03/31/23  1426 11/05/20  1628 07/23/20  1636 04/13/17  1650 02/20/17  1640   SRINIVAS 10.2 9.5 8.9   < > 9.7   < > 9.2   < >  --    VITDT  --   --   --   --  50  --  30  --  33    < > = values in this interval not displayed.     ESR/CRP  Recent Labs   Lab Test 03/06/25  1621 09/06/24  1511 05/01/24  1548 07/14/22  1507 04/11/22  1441 12/15/21  1516 09/01/21  1625   SED 9 9 8   < > 8 9 9   CRP  --   --   --   --  <2.9 <2.9 5.6   CRPI <3.00  <3.00 <3.00   < >  --   --   --     < > = values in this interval not displayed.     CK/Aldolase  Recent Labs   Lab Test 10/14/22  0714 12/15/21  1516 02/10/21  1627   CKT 55 60 60     Lipid Panel  Recent Labs   Lab Test 07/19/24  0909 02/10/23  0814 02/09/22  0701   CHOL 109 159 158   TRIG 34 37 50   HDL 36* 55 50   LDL 66 97 98   NHDL 73 104 108     Hepatitis C  Recent Labs   Lab Test 10/26/18  0836 01/12/18  0828   HCVAB Nonreactive Nonreactive     HIV Screening  Recent Labs   Lab Test 10/26/18  0836 01/12/18  0828   HIAGAB Nonreactive Nonreactive     UA  Recent Labs   Lab Test 03/06/25  1644 12/03/24  1631 05/01/24  1604 09/01/21  1629 05/18/21  1617 08/23/18  1638 04/26/18  1648 01/18/18  1640 01/12/18  0836 12/28/17  0145 04/13/17  1650 01/20/17  0827   COLOR Yellow Yellow Yellow   < > Yellow   < > Yellow Yellow Yellow Yellow   < > Yellow   APPEARANCE Clear Clear Clear   < > Clear   < > Clear Clear Clear Clear   < > Clear   URINEGLC Negative Negative Negative   < > Negative   < > Negative Negative Negative Negative   < > Negative   URINEBILI Negative Negative Negative   < > Negative   < > Negative Negative Negative Moderate*   < > Negative   SG <=1.005 1.010 1.015   < > 1.010   < > 1.010 1.020 1.025 >1.030   < > >1.030   URINEPH 5.5 7.0 6.5   < > 6.0   < > 7.0 7.0 6.5 6.0   < > 6.0   PROTEIN Negative Negative Negative   < > Negative   < > Negative Negative Trace* 100*   < > Trace*   UROBILINOGEN 0.2 0.2 0.2   < > 0.2   < > 0.2 0.2 0.2 4.0*   < > 0.2   NITRITE Negative Negative Negative   < > Negative   < > Negative Negative Negative Positive*   < > Negative   UBLD Negative Negative Negative   < > Trace*   < > Negative Negative Negative Negative   < > Negative   LEUKEST Negative Negative Negative   < > Negative   < > Negative Negative Negative Negative   < > Negative   WBCU  --   --   --   --  0 - 5  --  0 - 5 O - 2 O - 2 O - 2   < > O - 2   RBCU  --   --   --   --  O - 2  --  O - 2 O - 2 O - 2 O - 2   < > O -  2   SQUAMOUSEPI  --   --   --   --  Few  --   --  Few Moderate* Moderate*   < > Few   BACTERIA  --   --   --   --  Rare*  --   --   --  Moderate* Moderate*   < > Few*   MUCOUS  --   --   --   --   --   --   --   --  Present* Present*  --  Present*    < > = values in this interval not displayed.     Urine Microscopic  Recent Labs   Lab Test 05/18/21  1617 04/26/18  1648 01/18/18  1640 01/12/18  0836 12/28/17  0145 04/13/17  1650 01/20/17  0827   WBCU 0 - 5 0 - 5 O - 2 O - 2 O - 2   < > O - 2   RBCU O - 2 O - 2 O - 2 O - 2 O - 2   < > O - 2   SQUAMOUSEPI Few  --  Few Moderate* Moderate*   < > Few   BACTERIA Rare*  --   --  Moderate* Moderate*   < > Few*   MUCOUS  --   --   --  Present* Present*  --  Present*    < > = values in this interval not displayed.     Urine Protein  GHUTP and UTP= Urine protein (random), GHUTPG and UTPG = urine protein:creatinine ratio (random), UCRR = urine creatinine (random)  Recent Labs   Lab Test 03/06/25  1644 12/03/24  1631 09/06/24  1514 03/31/23  1426 10/14/22  0714 07/14/22  1507 07/14/22  1507 04/11/22  1441 12/15/21  1516 09/01/21  1629   GHUTP <6.0 <6.0 6.6   < > 7.6   < > 6.9  --   --   --    UTP  --   --   --   --   --   --   --  0.07 <0.05 <0.05   GHUTPG  --   --  0.08  --  0.04  --  0.09  --   --   --    UTPG  --   --   --   --   --   --   --  0.11  --   --    UCRR 39.1 28.7 81.0   < > 184.0   < > 76.5 66 25 23    < > = values in this interval not displayed.     Immunization History     Immunization History   Administered Date(s) Administered    COVID-19 12+ (Pfizer) 10/26/2023    COVID-19 Bivalent 18+ (Moderna) 10/03/2022    COVID-19 MONOVALENT 12+ (Pfizer) 03/16/2021, 04/06/2021, 08/30/2021    COVID-19 Monovalent Booster 18+ (Moderna) 03/04/2022    DT (PEDS <7y) 08/22/1997    Flu, Unspecified 10/21/2015    HPV 02/25/2010, 02/04/2011    HPV Quadrivalent 02/25/2010, 02/04/2011    HepB 06/13/1999, 08/20/1999, 02/05/2000    HepB, Unspecified 06/13/1999, 07/13/1999, 08/20/1999,  02/05/2000    Hepatitis A (ADULT 19+) 09/11/2023    Hepatitis B, Adult 07/13/1999, 08/20/1999, 02/05/2000    Historical DTP/aP 1986, 1986, 1986, 01/15/1988, 06/15/1991, 08/22/1997    Historical Hepb 07/13/1999, 08/20/1999    Influenza (IIV3) PF 10/19/2010, 11/07/2011, 09/23/2012    Influenza Vaccine >6 months,quad, PF 10/11/2016, 10/13/2017, 10/26/2018, 10/17/2019, 10/16/2020, 10/15/2021, 12/12/2022, 10/09/2023    Influenza, Split Virus, Trivalent, Pf (Fluzone\Fluarix) 11/21/2024    MMR 05/15/1987, 09/15/1991    Mantoux Tuberculin Skin Test 07/15/1987, 01/19/2005    Meningococcal (Menomune ) 08/09/2004    Meningococcal ACWY (Menactra ) 08/09/2004    OPV, trivalent, live 1986, 1986, 1986, 01/15/1988, 09/15/1991    OPV, unspecified 1986, 1986, 1986, 01/15/1988, 09/15/1991    Pneumo Conj 13-V (2010&after) 05/04/2018    Pneumococcal 23 valent 08/30/2018    TDAP (Adacel,Boostrix) 01/20/2009    TDAP Vaccine (Adacel) 01/21/2009, 02/25/2010, 08/17/2020    Typhoid IM 09/11/2023    Zoster recombinant adjuvanted (SHINGRIX) 12/13/2018, 03/04/2019          Chart documentation done in part with Dragon Voice recognition Software. Although reviewed after completion, some word and grammatical error may remain.    Lavon Light MD

## 2025-03-12 NOTE — NURSING NOTE
RAPID3 (0-30) Cumulative Score  8.7          RAPID3 Weighted Score (divide #4 by 3 and that is the weighted score)  2.9     Willow Paez Certified Medical Assistant

## 2025-03-13 ENCOUNTER — OFFICE VISIT (OUTPATIENT)
Dept: FAMILY MEDICINE | Facility: CLINIC | Age: 40
End: 2025-03-13
Payer: COMMERCIAL

## 2025-03-13 VITALS
TEMPERATURE: 97.7 F | OXYGEN SATURATION: 95 % | BODY MASS INDEX: 24.64 KG/M2 | RESPIRATION RATE: 16 BRPM | DIASTOLIC BLOOD PRESSURE: 66 MMHG | SYSTOLIC BLOOD PRESSURE: 104 MMHG | WEIGHT: 157 LBS | HEIGHT: 67 IN | HEART RATE: 79 BPM

## 2025-03-13 DIAGNOSIS — M54.41 ACUTE MIDLINE LOW BACK PAIN WITH RIGHT-SIDED SCIATICA: ICD-10-CM

## 2025-03-13 DIAGNOSIS — J45.20 INTERMITTENT ASTHMA, UNCOMPLICATED: ICD-10-CM

## 2025-03-13 DIAGNOSIS — Z00.00 ROUTINE GENERAL MEDICAL EXAMINATION AT A HEALTH CARE FACILITY: Primary | ICD-10-CM

## 2025-03-13 DIAGNOSIS — M32.19 OTHER SYSTEMIC LUPUS ERYTHEMATOSUS WITH OTHER ORGAN INVOLVEMENT (H): ICD-10-CM

## 2025-03-13 DIAGNOSIS — F41.9 ANXIETY: ICD-10-CM

## 2025-03-13 RX ORDER — CYCLOBENZAPRINE HCL 10 MG
10 TABLET ORAL 3 TIMES DAILY PRN
Qty: 30 TABLET | Refills: 2 | Status: SHIPPED | OUTPATIENT
Start: 2025-03-13

## 2025-03-13 RX ORDER — ALBUTEROL SULFATE 90 UG/1
2 INHALANT RESPIRATORY (INHALATION) EVERY 6 HOURS PRN
Qty: 18 G | Refills: 1 | Status: SHIPPED | OUTPATIENT
Start: 2025-03-13

## 2025-03-13 RX ORDER — LORAZEPAM 0.5 MG/1
0.5 TABLET ORAL EVERY 8 HOURS PRN
Qty: 15 TABLET | Refills: 0 | Status: SHIPPED | OUTPATIENT
Start: 2025-03-13

## 2025-03-13 SDOH — HEALTH STABILITY: PHYSICAL HEALTH: ON AVERAGE, HOW MANY DAYS PER WEEK DO YOU ENGAGE IN MODERATE TO STRENUOUS EXERCISE (LIKE A BRISK WALK)?: 7 DAYS

## 2025-03-13 ASSESSMENT — ASTHMA QUESTIONNAIRES
QUESTION_1 LAST FOUR WEEKS HOW MUCH OF THE TIME DID YOUR ASTHMA KEEP YOU FROM GETTING AS MUCH DONE AT WORK, SCHOOL OR AT HOME: NONE OF THE TIME
QUESTION_5 LAST FOUR WEEKS HOW WOULD YOU RATE YOUR ASTHMA CONTROL: COMPLETELY CONTROLLED
QUESTION_3 LAST FOUR WEEKS HOW OFTEN DID YOUR ASTHMA SYMPTOMS (WHEEZING, COUGHING, SHORTNESS OF BREATH, CHEST TIGHTNESS OR PAIN) WAKE YOU UP AT NIGHT OR EARLIER THAN USUAL IN THE MORNING: NOT AT ALL
ACT_TOTALSCORE: 24
QUESTION_2 LAST FOUR WEEKS HOW OFTEN HAVE YOU HAD SHORTNESS OF BREATH: NOT AT ALL
QUESTION_4 LAST FOUR WEEKS HOW OFTEN HAVE YOU USED YOUR RESCUE INHALER OR NEBULIZER MEDICATION (SUCH AS ALBUTEROL): ONCE A WEEK OR LESS
ACT_TOTALSCORE: 24

## 2025-03-13 ASSESSMENT — SOCIAL DETERMINANTS OF HEALTH (SDOH): HOW OFTEN DO YOU GET TOGETHER WITH FRIENDS OR RELATIVES?: TWICE A WEEK

## 2025-03-13 NOTE — PATIENT INSTRUCTIONS
Appointment scheduled with Dr. Rubio.    ODILIA to Dr. Sol.   Increase healthy fat in your diet  - add omega fatty acid supplement    Consider Prevnar 20 (pneumonia vaccine)  Consider HPV dose #3    Patient Education   Preventive Care Advice   This is general advice given by our system to help you stay healthy. However, your care team may have specific advice just for you. Please talk to your care team about your preventive care needs.  Nutrition  Eat 5 or more servings of fruits and vegetables each day.  Try wheat bread, brown rice and whole grain pasta (instead of white bread, rice, and pasta).  Get enough calcium and vitamin D. Check the label on foods and aim for 100% of the RDA (recommended daily allowance).  Lifestyle  Exercise at least 150 minutes each week  (30 minutes a day, 5 days a week).  Do muscle strengthening activities 2 days a week. These help control your weight and prevent disease.  No smoking.  Wear sunscreen to prevent skin cancer.  Have a dental exam and cleaning every 6 months.  Yearly exams  See your health care team every year to talk about:  Any changes in your health.  Any medicines your care team has prescribed.  Preventive care, family planning, and ways to prevent chronic diseases.  Shots (vaccines)   HPV shots (up to age 26), if you've never had them before.  Hepatitis B shots (up to age 59), if you've never had them before.  COVID-19 shot: Get this shot when it's due.  Flu shot: Get a flu shot every year.  Tetanus shot: Get a tetanus shot every 10 years.  Pneumococcal, hepatitis A, and RSV shots: Ask your care team if you need these based on your risk.  Shingles shot (for age 50 and up)  General health tests  Diabetes screening:  Starting at age 35, Get screened for diabetes at least every 3 years.  If you are younger than age 35, ask your care team if you should be screened for diabetes.  Cholesterol test: At age 39, start having a cholesterol test every 5 years, or more often if advised.  Bone density scan (DEXA): At age 50, ask your care team  if you should have this scan for osteoporosis (brittle bones).  Hepatitis C: Get tested at least once in your life.  STIs (sexually transmitted infections)  Before age 24: Ask your care team if you should be screened for STIs.  After age 24: Get screened for STIs if you're at risk. You are at risk for STIs (including HIV) if:  You are sexually active with more than one person.  You don't use condoms every time.  You or a partner was diagnosed with a sexually transmitted infection.  If you are at risk for HIV, ask about PrEP medicine to prevent HIV.  Get tested for HIV at least once in your life, whether you are at risk for HIV or not.  Cancer screening tests  Cervical cancer screening: If you have a cervix, begin getting regular cervical cancer screening tests starting at age 21.  Breast cancer scan (mammogram): If you've ever had breasts, begin having regular mammograms starting at age 40. This is a scan to check for breast cancer.  Colon cancer screening: It is important to start screening for colon cancer at age 45.  Have a colonoscopy test every 10 years (or more often if you're at risk) Or, ask your provider about stool tests like a FIT test every year or Cologuard test every 3 years.  To learn more about your testing options, visit:   .  For help making a decision, visit:   https://bit.ly/rb78423.  Prostate cancer screening test: If you have a prostate, ask your care team if a prostate cancer screening test (PSA) at age 55 is right for you.  Lung cancer screening: If you are a current or former smoker ages 50 to 80, ask your care team if ongoing lung cancer screenings are right for you.  For informational purposes only. Not to replace the advice of your health care provider. Copyright   2023 [employer]. All rights reserved. Clinically reviewed by the [facility]. [employer] 968295 - REV 01/24.  Eating Healthy Foods: Care Instructions  With every meal, you can make  "healthy food choices. Try to eat a variety of fruits, vegetables, whole grains, lean proteins, and low-fat dairy products. This can help you get the right balance of nutrients, including vitamins and minerals. Small changes add up over time. You can start by adding one healthy food to your meals each day.    Try to make half your plate fruits and vegetables, one-fourth whole grains, and one-fourth lean proteins. Try including dairy with your meals.   Eat more fruits and vegetables. Try to have them with most meals and snacks.   Foods for healthy eating        Fruits   These can be fresh, frozen, canned, or dried.  Try to choose whole fruit rather than fruit juice.  Eat a variety of colors.        Vegetables   These can be fresh, frozen, canned, or dried.  Beans, peas, and lentils count too.        Whole grains   Choose whole-grain breads, cereals, and noodles.  Try brown rice.        Lean proteins   These can include lean meat, poultry, fish, and eggs.  You can also have tofu, beans, peas, lentils, nuts, and seeds.        Dairy   Try milk, yogurt, and cheese.  Choose low-fat or fat-free when you can.  If you need to, use lactose-free milk or fortified plant-based milk products, such as soy milk.        Water   Drink water when you're thirsty.  Limit sugar-sweetened drinks, including soda, fruit drinks, and sports drinks.  Where can you learn more?  Go to https://www.[employer].net/patiented  Enter T756 in the search box to learn more about \"Eating Healthy Foods: Care Instructions.\"  Current as of: September 20, 2023  Content Version: 14.3    2024 [employer].   Care instructions adapted under license by your healthcare professional. If you have questions about a medical condition or this instruction, always ask your healthcare professional. [employer] disclaims any warranty or liability for your use of this information.       "

## 2025-03-13 NOTE — PROGRESS NOTES
Preventive Care Visit  [facility]  FARZANA Hutton CNP, Family Medicine  Mar 13, 2025      Assessment & Plan     Routine general medical examination at a health care facility  Repeat in 1 year.     Acute midline low back pain with right-sided sciatica  Chronic, significant improvement from last year. Manages with avoiding certain activities and prn flexeril refilled for the year.   - cyclobenzaprine (FLEXERIL) 10 MG tablet  Dispense: 30 tablet; Refill: 2    Anxiety  Chronic, well controlled. Prn med filled.  - LORazepam (ATIVAN) 0.5 MG tablet  Dispense: 15 tablet; Refill: 0    Intermittent asthma, uncomplicated  Chronic, well controlled.   - albuterol (PROAIR HFA/PROVENTIL HFA/VENTOLIN HFA) 108 (90 Base) MCG/ACT inhaler  Dispense: 18 g; Refill: 1    Other systemic lupus erythematosus with other organ involvement (H)  Chronic, follows with rheumatology. On immune suppressing medications. We discussed keeping up with routine cancer screenings.       Patient has been advised of split billing requirements and indicates understanding: Yes        Counseling  Appropriate preventive services were addressed with this patient via screening, questionnaire, or discussion as appropriate for fall prevention, nutrition, physical activity, Tobacco-use cessation, social engagement, weight loss and cognition.  Checklist reviewing preventive services available has been given to the patient.  Reviewed patient's diet, addressing concerns and/or questions.       See Patient Instructions    Diana Shin is a 40 year old, presenting for the following:  Physical        3/13/2025     6:55 AM   Additional Questions   Accompanied by na         3/13/2025     6:55 AM   Patient Reported Additional Medications   Patient reports taking the following new medications none          HPI    Home exercise program for back pain. Goes to chiropractor, thought to be piriformis syndrome. Much improved.  - prn flexeril a  few times per month    Dtr graduated from  last spring. At [employer].    Working full time, travels a few times per year, in office 3 days per week.     Lupus - goes to Dr. Kuldeep Vigil - takes a few times per year, mostly with travel.     Advance Care Planning  Patient does not have a Health Care Directive: Discussed advance care planning with patient; however, patient declined at this time.      3/13/2025   General Health   How would you rate your overall physical health? Good   Feel stress (tense, anxious, or unable to sleep) Only a little   (!) STRESS CONCERN      3/13/2025   Nutrition   Three or more servings of calcium each day? Yes   Diet: Regular (no restrictions)   How many servings of fruit and vegetables per day? 4 or more   How many sweetened beverages each day? 0-1         3/13/2025   Exercise   Days per week of moderate/strenous exercise 7 days         3/13/2025   Social Factors   Frequency of gathering with friends or relatives Twice a week   Worry food won't last until get money to buy more No   Food not last or not have enough money for food? No   Do you have housing? (Housing is defined as stable permanent housing and does not include staying ouside in a car, in a tent, in an abandoned building, in an overnight shelter, or couch-surfing.) Yes   Are you worried about losing your housing? No   Lack of transportation? No   Unable to get utilities (heat,electricity)? No         3/13/2025   Dental   Dentist two times every year? Yes           Today's PHQ-2 Score:       3/13/2025     6:46 AM   PHQ-2 ( 1999 [employer])   Q1: Little interest or pleasure in doing things 0   Q2: Feeling down, depressed or hopeless 0   PHQ-2 Score 0    Q1: Little interest or pleasure in doing things Not at all   Q2: Feeling down, depressed or hopeless Not at all   PHQ-2 Score 0       Patient-reported           3/13/2025   Substance Use   Alcohol more than 3/day or more than 7/wk No   Do you use any other  substances recreationally? No     Social History     Tobacco Use    Smoking status: Former     Current packs/day: 0.00     Average packs/day: 0.5 packs/day for 2.5 years (1.2 ttl pk-yrs)     Types: Cigarettes     Start date: 2003     Quit date: 2005     Years since quittin.3     Passive exposure: Never    Smokeless tobacco: Never   Vaping Use    Vaping status: Never Used   Substance Use Topics    Alcohol use: Yes     Alcohol/week: 0.0 standard drinks of alcohol     Comment: rarely - 1 monthly    Drug use: No          Mammogram Screening - Mammogram every 1-2 years updated in Health Maintenance based on mutual decision making        3/13/2025   STI Screening   New sexual partner(s) since last STI/HIV test? No     History of abnormal Pap smear: No - age 30- 64 PAP with HPV every 5 years recommended        Latest Ref Rng & Units 10/15/2021     9:23 AM 10/7/2016     8:00 AM 10/7/2016    12:00 AM   PAP / HPV   PAP  Negative for Intraepithelial Lesion or Malignancy (NILM)      PAP (Historical)    NIL    HPV 16 DNA Negative Negative  Negative     HPV 18 DNA Negative Negative  Negative     Other HR HPV Negative Negative  Negative             3/13/2025   Contraception/Family Planning   Questions about contraception or family planning No        Reviewed and updated as needed this visit by Provider                    Past Medical History:   Diagnosis Date    Arthritis     Lupus erythematosus     Mild intermittent asthma     Other kyphoscoliosis and scoliosis     upper back curvature and disc degeneration in lower back.     Past Surgical History:   Procedure Laterality Date     SECTION      EXCISE GANGLION WRIST Left 11/10/2022    Procedure: EXCISION, GANGLION CYST, WRIST LEFT;  Surgeon: Jayde Martinez MD;  Location: [facility] [place] OR    SURGICAL HISTORY OF -       PE Tubes     OB History    Para Term  AB Living   1 1 1 0 0 1   SAB IAB Ectopic Multiple Live Births   0 0 0 0 0      # Outcome  Date GA Lbr Joshua/2nd Weight Sex Type Anes PTL Lv   1 Term              Lab work is in process  Labs reviewed in EPIC  BP Readings from Last 3 Encounters:   25 104/66   25 114/78   24 110/68    Wt Readings from Last 3 Encounters:   25 71.2 kg (157 lb)   25 72.3 kg (159 lb 6.4 oz)   24 72.8 kg (160 lb 6.4 oz)                  Patient Active Problem List   Diagnosis    Other kyphoscoliosis and scoliosis    Hypertrophic and atrophic condition of skin    Viral warts    CARDIOVASCULAR SCREENING; LDL GOAL LESS THAN 160    Intermittent asthma    Anxiety    Intractable menstrual migraine without status migrainosus    Vitamin D deficiency    Inflammatory polyarthropathy (H)    Chronic back pain    Raynaud's phenomenon without gangrene    Systemic lupus erythematosus (H)    High risk medications (not anticoagulants) long-term use (Plaquenil and AZA)    Dry eyes, bilateral    Disorder of connective tissue    Ganglion cyst of dorsum of left wrist     Past Surgical History:   Procedure Laterality Date     SECTION      EXCISE GANGLION WRIST Left 11/10/2022    Procedure: EXCISION, GANGLION CYST, WRIST LEFT;  Surgeon: Jayde Martinez MD;  Location: [facility] [place]    SURGICAL HISTORY OF -       PE Tubes       Social History     Tobacco Use    Smoking status: Former     Current packs/day: 0.00     Average packs/day: 0.5 packs/day for 2.5 years (1.2 ttl pk-yrs)     Types: Cigarettes     Start date: 2003     Quit date: 2005     Years since quittin.3     Passive exposure: Never    Smokeless tobacco: Never   Substance Use Topics    Alcohol use: Yes     Alcohol/week: 0.0 standard drinks of alcohol     Comment: rarely - 1 monthly     Family History   Problem Relation Age of Onset    Heart Disease Maternal Grandfather         Bypass, Heart Disease    Respiratory Maternal Grandfather         asthma    Macular Degeneration Maternal Grandfather     Hypertension Paternal Grandmother      Cancer Paternal Grandmother         lymph nodes    Cataracts Paternal Grandmother     JOAKYLER. Paternal Grandfather     Heart Disease Maternal Uncle         MI's     Alcohol/Drug Maternal Uncle     Respiratory Other         cousin on mothers side, asthma    Alcohol/Drug Other         bother great grandparents on mother's side    Diabetes No family hx of     Breast Cancer No family hx of     Cancer - colorectal No family hx of          Current Outpatient Medications   Medication Sig Dispense Refill    albuterol (PROAIR HFA/PROVENTIL HFA/VENTOLIN HFA) 108 (90 Base) MCG/ACT inhaler Inhale 2 puffs into the lungs every 6 hours as needed for shortness of breath. 18 g 1    cyclobenzaprine (FLEXERIL) 10 MG tablet Take 1 tablet (10 mg) by mouth 3 times daily as needed for muscle spasms. 30 tablet 2    LORazepam (ATIVAN) 0.5 MG tablet Take 1 tablet (0.5 mg) by mouth every 8 hours as needed for anxiety. 15 tablet 0    azaTHIOprine (IMURAN) 50 MG tablet Take 1 tablet (50 mg) by mouth daily. 90 tablet 2    COMPOUNDED NON-CONTROLLED SUBSTANCE (CMPD RX) - PHARMACY TO MIX COMPOUNDED MEDICATION Dispense: Azelaic 15%/Ivermectin 1%/Metronidazole 1% : apply once to twice daily. 30 g 11    hydroxychloroquine (PLAQUENIL) 200 MG tablet Take 1 tablet (200 mg) by mouth daily. Yearly eye exam including 10-2 VF and SD-OCT required 90 tablet 2    hydrOXYzine (ATARAX) 25 MG tablet Take 1 tablet (25 mg) by mouth every 6 hours as needed for anxiety 90 tablet 6    levonorgestrel (MIRENA) 20 MCG/24HR IUD 1 each by Intrauterine route once      vitamin D3 (CHOLECALCIFEROL) 50 mcg (2000 units) tablet Take 1 tablet (50 mcg) by mouth daily 90 tablet 2     Allergies   Allergen Reactions    Fluconazole Rash     Recent Labs   Lab Test 03/06/25  1621 12/03/24  1607 09/06/24  1511 07/19/24  0909 03/31/23  1426 02/10/23  0814 04/11/22  1441 02/09/22  0701 09/01/21  1625 05/18/21  1606 02/10/21  1627   LDL  --   --   --  66  --  97  --  98   < >  --   --    HDL   "--   --   --  36*  --  55  --  50   < >  --   --    TRIG  --   --   --  34  --  37  --  50   < >  --   --    ALT 18 17 16  --    < >  --    < >  --    < > 25 23   CR 0.69 0.70 0.73  --    < >  --    < >  --    < > 0.76 0.74   GFRESTIMATED >90 >90 >90  --    < >  --    < >  --    < > >90 >90   GFRESTBLACK  --   --   --   --   --   --   --   --   --  >90 >90   POTASSIUM 4.3 4.0 4.1  --    < >  --    < >  --    < > 3.7 4.0    < > = values in this interval not displayed.               Review of Systems  Constitutional, HEENT, cardiovascular, pulmonary, gi and gu systems are negative, except as otherwise noted.     Objective    Exam  /66   Pulse 79   Temp 97.7  F (36.5  C) (Oral)   Resp 16   Ht 1.71 m (5' 7.32\")   Wt 71.2 kg (157 lb)   SpO2 95%   BMI 24.35 kg/m     Estimated body mass index is 24.35 kg/m  as calculated from the following:    Height as of this encounter: 1.71 m (5' 7.32\").    Weight as of this encounter: 71.2 kg (157 lb).    Physical Exam  GENERAL: alert and no distress  EYES: Eyes grossly normal to inspection, PERRL and conjunctivae and sclerae normal  HENT: ear canals and TM's normal, nose and mouth without ulcers or lesions  NECK: no adenopathy, no asymmetry, masses, or scars  RESP: lungs clear to auscultation - no rales, rhonchi or wheezes  CV: regular rate and rhythm, normal S1 S2, no S3 or S4, no murmur, click or rub, no peripheral edema  ABDOMEN: soft, nontender, no hepatosplenomegaly, no masses and bowel sounds normal  MS: no gross musculoskeletal defects noted, no edema  SKIN: no suspicious lesions or rashes  NEURO: Normal strength and tone, mentation intact and speech normal  PSYCH: mentation appears normal, affect normal/bright  LYMPH: no cervical, supraclavicular, axillary, or inguinal adenopathy        Signed Electronically by: FARZANA Hutton CNP    "

## 2025-03-13 NOTE — LETTER
My Asthma Action Plan    Name: Aleida Cardoso   YOB: 1985  Date: 3/13/2025   My doctor: FARZANA Hutton CNP   My clinic: [facility]        My Control Medicine: None  My Rescue Medicine: SHORT ACTING BETA  AGONIST  Albuterol (Proair/Ventolin/Proventil HFA) 2-4 puffs EVERY 4 HOURS as needed. Use a spacer if recommended by your provider.  My Oral Steroid Medicine: NONE My Asthma Severity:   Mild Persistent  Know your asthma triggers: cold air and allergens  None            GREEN ZONE   Good Control  I feel good  No cough or wheeze  Can work, sleep and play without asthma symptoms       Take your asthma control medicine every day.     If exercise triggers your asthma, take your rescue medication  15 minutes before exercise or sports, and  During exercise if you have asthma symptoms  Spacer to use with inhaler: If you have a spacer, make sure to use it with your inhaler             YELLOW ZONE Getting Worse  I have ANY of these:  I do not feel good  Cough or wheeze  Chest feels tight  Wake up at night   Keep taking your Green Zone medications  Start taking your rescue medicine:  every 20 minutes for up to 1 hour. Then every 4 hours for 24-48 hours.  If you stay in the Yellow Zone for more than 12-24 hours, contact your doctor.  If you do not return to the Green Zone in 12-24 hours or you get worse, start taking your oral steroid medicine if prescribed by your provider.           RED ZONE Medical Alert - Get Help  I have ANY of these:  I feel awful  Medicine is not helping  Breathing getting harder  Trouble walking or talking  Nose opens wide to breathe       Take your rescue medicine NOW  If your provider has prescribed an oral steroid medicine, start taking it NOW  Call your doctor NOW  If you are still in the Red Zone after 20 minutes and you have not reached your doctor:  Take your rescue medicine again and  Call 911 or go to the emergency room right away    See your  regular doctor within 2 weeks of an Emergency Room or Urgent Care visit for follow-up treatment.          Annual Reminders:  Meet with Asthma Educator,  Flu Shot in the Fall, consider Pneumonia Vaccination for patients with asthma (aged 19 and older).    Pharmacy:    [facility] - [place], [place] - [address] [facility] [facility]/PHARMACY #[id number] - CLOSED - [place] [facility] [place] [address] [facility] [place] [address] [facility] - [facility] [address] [facility] [place] [address] [address] [employer] [address]    Electronically signed by FARZANA Hutton CNP   Date: 03/13/25                      Asthma Triggers  How To Control Things That Make Your Asthma Worse    Triggers are things that make your asthma worse.  Look at the list below to help you find your triggers and what you can do about them.  You can help prevent asthma flare-ups by staying away from your triggers.      Trigger                                                          What you can do   Cigarette Smoke  Tobacco smoke can make asthma worse. Do not allow smoking in your home, car or around you.  Be sure no one smokes at a child s day care or school.  If you smoke, ask your health care provider for ways to help you quit.  Ask family members to quit too.  Ask your health care provider for a referral to Quit Plan to help you quit smoking, or call 9-985-107-PLAN.     Colds, Flu, Bronchitis  These are common triggers of asthma. Wash your hands often.  Don t touch your eyes, nose or mouth.  Get a flu shot every year.     Dust Mites  These are tiny bugs that live in cloth or carpet. They are too small to see. Wash sheets and blankets in hot water every week.   Encase pillows and mattress in dust mite proof covers.  Avoid having carpet if you can. If you have carpet, vacuum weekly.   Use a dust mask and HEPA vacuum.   Pollen and Outdoor Mold  Some people are allergic to trees, grass, or  weed pollen, or molds. Try to keep your windows closed.  Limit time out doors when pollen count is high.   Ask you health care provider about taking medicine during allergy season.     Animal Dander  Some people are allergic to skin flakes, urine or saliva from pets with fur or feathers. Keep pets with fur or feathers out of your home.    If you can t keep the pet outdoors, then keep the pet out of your bedroom.  Keep the bedroom door closed.  Keep pets off cloth furniture and away from stuffed toys.     Mice, Rats, and Cockroaches   Some people are allergic to the waste from these pests.   Cover food and garbage.  Clean up spills and food crumbs.  Store grease in the refrigerator.   Keep food out of the bedroom.   Indoor Mold  This can be a trigger if your home has high moisture. Fix leaking faucets, pipes, or other sources of water.   Clean moldy surfaces.  Dehumidify basement if it is damp and smelly.   Smoke, Strong Odors, and Sprays  These can reduce air quality. Stay away from strong odors and sprays, such as perfume, powder, hair spray, paints, smoke incense, paint, cleaning products, candles and new carpet.   Exercise or Sports  Some people with asthma have this trigger. Be active!  Ask your doctor about taking medicine before sports or exercise to prevent symptoms.    Warm up for 5-10 minutes before and after sports or exercise.     Other Triggers of Asthma  Cold air:  Cover your nose and mouth with a scarf.  Sometimes laughing or crying can be a trigger.  Some medicines and food can trigger asthma.

## 2025-05-07 DIAGNOSIS — L71.9 ACNE ROSACEA: ICD-10-CM

## 2025-05-07 NOTE — TELEPHONE ENCOUNTER
Routing refill request to provider for review/approval because:  Drug not on the [facility] refill protocol   Patient scheduled with Veronica in August, but has not been seen by her yet.     Lilliana ACOSTA RN BSN  [facility]  [phone]

## 2025-05-07 NOTE — TELEPHONE ENCOUNTER
Last Written Prescription Date:  4/10/2024  Last Fill Quantity:30gm ,  # refills:  11  Last office visit: 8/7/2024 ; last virtual visit: Visit date not found with prescribing provider:     Future Office Visit:  8/13/25 with Veronica Gotti PA-C          Requested Prescriptions   Pending Prescriptions Disp Refills    COMPOUNDED NON-CONTROLLED SUBSTANCE (CMPD RX) - PHARMACY TO MIX COMPOUNDED MEDICATION 30 g 11     Sig: Dispense: Azelaic 15%/Ivermectin 1%/Metronidazole 1% : apply once to twice daily.       There is no refill protocol information for this order

## 2025-05-10 DIAGNOSIS — J45.20 INTERMITTENT ASTHMA, UNCOMPLICATED: ICD-10-CM

## 2025-05-12 RX ORDER — ALBUTEROL SULFATE 90 UG/1
2 INHALANT RESPIRATORY (INHALATION) EVERY 6 HOURS PRN
Qty: 6.7 G | Refills: 0 | Status: SHIPPED | OUTPATIENT
Start: 2025-05-12

## 2025-06-24 ENCOUNTER — LAB (OUTPATIENT)
Dept: LAB | Facility: CLINIC | Age: 40
End: 2025-06-24
Payer: COMMERCIAL

## 2025-06-24 DIAGNOSIS — M32.19 OTHER SYSTEMIC LUPUS ERYTHEMATOSUS WITH OTHER ORGAN INVOLVEMENT (H): ICD-10-CM

## 2025-06-24 DIAGNOSIS — Z13.220 SCREENING FOR HYPERLIPIDEMIA: ICD-10-CM

## 2025-06-24 DIAGNOSIS — Z79.899 HIGH RISK MEDICATIONS (NOT ANTICOAGULANTS) LONG-TERM USE: ICD-10-CM

## 2025-06-24 LAB
BASOPHILS # BLD AUTO: 0 10E3/UL (ref 0–0.2)
BASOPHILS NFR BLD AUTO: 1 %
EOSINOPHIL # BLD AUTO: 0 10E3/UL (ref 0–0.7)
EOSINOPHIL NFR BLD AUTO: 1 %
ERYTHROCYTE [DISTWIDTH] IN BLOOD BY AUTOMATED COUNT: 11 % (ref 10–15)
HCT VFR BLD AUTO: 41.3 % (ref 35–47)
HGB BLD-MCNC: 14.2 G/DL (ref 11.7–15.7)
IMM GRANULOCYTES # BLD: 0 10E3/UL
IMM GRANULOCYTES NFR BLD: 0 %
LYMPHOCYTES # BLD AUTO: 0.9 10E3/UL (ref 0.8–5.3)
LYMPHOCYTES NFR BLD AUTO: 22 %
MCH RBC QN AUTO: 32.6 PG (ref 26.5–33)
MCHC RBC AUTO-ENTMCNC: 34.4 G/DL (ref 31.5–36.5)
MCV RBC AUTO: 95 FL (ref 78–100)
MONOCYTES # BLD AUTO: 0.5 10E3/UL (ref 0–1.3)
MONOCYTES NFR BLD AUTO: 12 %
NEUTROPHILS # BLD AUTO: 2.5 10E3/UL (ref 1.6–8.3)
NEUTROPHILS NFR BLD AUTO: 64 %
PLATELET # BLD AUTO: 252 10E3/UL (ref 150–450)
RBC # BLD AUTO: 4.36 10E6/UL (ref 3.8–5.2)
WBC # BLD AUTO: 3.9 10E3/UL (ref 4–11)

## 2025-06-24 PROCEDURE — 84156 ASSAY OF PROTEIN URINE: CPT

## 2025-06-24 PROCEDURE — 80061 LIPID PANEL: CPT

## 2025-06-24 PROCEDURE — 80053 COMPREHEN METABOLIC PANEL: CPT

## 2025-06-24 PROCEDURE — 36415 COLL VENOUS BLD VENIPUNCTURE: CPT

## 2025-06-24 PROCEDURE — 85025 COMPLETE CBC W/AUTO DIFF WBC: CPT

## 2025-06-25 LAB
ALBUMIN MFR UR ELPH: <6 MG/DL
ALBUMIN SERPL BCG-MCNC: 4.6 G/DL (ref 3.5–5.2)
ALP SERPL-CCNC: 46 U/L (ref 40–150)
ALT SERPL W P-5'-P-CCNC: 20 U/L (ref 0–50)
ANION GAP SERPL CALCULATED.3IONS-SCNC: 11 MMOL/L (ref 7–15)
AST SERPL W P-5'-P-CCNC: 25 U/L (ref 0–45)
BILIRUB SERPL-MCNC: 0.4 MG/DL
BUN SERPL-MCNC: 22.8 MG/DL (ref 6–20)
CALCIUM SERPL-MCNC: 9.6 MG/DL (ref 8.8–10.4)
CHLORIDE SERPL-SCNC: 101 MMOL/L (ref 98–107)
CHOLEST SERPL-MCNC: 179 MG/DL
CREAT SERPL-MCNC: 0.71 MG/DL (ref 0.51–0.95)
CREAT UR-MCNC: 14.4 MG/DL
EGFRCR SERPLBLD CKD-EPI 2021: >90 ML/MIN/1.73M2
FASTING STATUS PATIENT QL REPORTED: NO
FASTING STATUS PATIENT QL REPORTED: NO
GLUCOSE SERPL-MCNC: 82 MG/DL (ref 70–99)
HCO3 SERPL-SCNC: 24 MMOL/L (ref 22–29)
HDLC SERPL-MCNC: 58 MG/DL
LDLC SERPL CALC-MCNC: 110 MG/DL
NONHDLC SERPL-MCNC: 121 MG/DL
POTASSIUM SERPL-SCNC: 4.2 MMOL/L (ref 3.4–5.3)
PROT SERPL-MCNC: 7.3 G/DL (ref 6.4–8.3)
PROT/CREAT 24H UR: NORMAL MG/G{CREAT}
SODIUM SERPL-SCNC: 136 MMOL/L (ref 135–145)
TRIGL SERPL-MCNC: 57 MG/DL

## 2025-07-03 ENCOUNTER — RESULTS FOLLOW-UP (OUTPATIENT)
Dept: FAMILY MEDICINE | Facility: CLINIC | Age: 40
End: 2025-07-03

## 2025-08-13 ENCOUNTER — OFFICE VISIT (OUTPATIENT)
Dept: DERMATOLOGY | Facility: CLINIC | Age: 40
End: 2025-08-13
Attending: PHYSICIAN ASSISTANT
Payer: COMMERCIAL

## 2025-08-13 DIAGNOSIS — L81.4 LENTIGINES: Primary | ICD-10-CM

## 2025-08-13 DIAGNOSIS — L71.9 ACNE ROSACEA: ICD-10-CM

## (undated) DEVICE — COVER CAMERA IN-LIGHT DISP LT-C02

## (undated) DEVICE — SU VICRYL 3-0 SH 27" UND J416H

## (undated) DEVICE — SOL NACL 0.9% IRRIG 500ML BOTTLE 2F7123

## (undated) DEVICE — SUCTION MANIFOLD NEPTUNE 2 SYS 1 PORT 702-025-000

## (undated) DEVICE — SU MONOCRYL 4-0 PS-2 27" UND Y426H

## (undated) DEVICE — PREP CHLORHEXIDINE 4% 4OZ (HIBICLENS) 57504

## (undated) DEVICE — LINEN ORTHO PACK 5446

## (undated) DEVICE — GLOVE PROTEXIS BLUE W/NEU-THERA 6.0  2D73EB60

## (undated) DEVICE — PACK HAND CUSTOM ASC

## (undated) DEVICE — DRSG STERI STRIP 1/2X4" R1547

## (undated) DEVICE — PREP SKIN SCRUB TRAY 4461A

## (undated) DEVICE — DRAPE STERI TOWEL LG 1010

## (undated) DEVICE — SOL WATER IRRIG 500ML BOTTLE 2F7113

## (undated) RX ORDER — ONDANSETRON 2 MG/ML
INJECTION INTRAMUSCULAR; INTRAVENOUS
Status: DISPENSED
Start: 2022-11-10

## (undated) RX ORDER — BUPIVACAINE HYDROCHLORIDE 2.5 MG/ML
INJECTION, SOLUTION EPIDURAL; INFILTRATION; INTRACAUDAL
Status: DISPENSED
Start: 2022-11-10

## (undated) RX ORDER — PROPOFOL 10 MG/ML
INJECTION, EMULSION INTRAVENOUS
Status: DISPENSED
Start: 2022-11-10

## (undated) RX ORDER — LIDOCAINE HYDROCHLORIDE AND EPINEPHRINE 10; 10 MG/ML; UG/ML
INJECTION, SOLUTION INFILTRATION; PERINEURAL
Status: DISPENSED
Start: 2022-11-10

## (undated) RX ORDER — BUPIVACAINE HYDROCHLORIDE AND EPINEPHRINE 5; 5 MG/ML; UG/ML
INJECTION, SOLUTION EPIDURAL; INTRACAUDAL; PERINEURAL
Status: DISPENSED
Start: 2022-11-10

## (undated) RX ORDER — LIDOCAINE HYDROCHLORIDE 20 MG/ML
INJECTION, SOLUTION EPIDURAL; INFILTRATION; INTRACAUDAL; PERINEURAL
Status: DISPENSED
Start: 2022-11-10

## (undated) RX ORDER — FENTANYL CITRATE 50 UG/ML
INJECTION, SOLUTION INTRAMUSCULAR; INTRAVENOUS
Status: DISPENSED
Start: 2022-11-10

## (undated) RX ORDER — BUPIVACAINE HYDROCHLORIDE 5 MG/ML
INJECTION, SOLUTION EPIDURAL; INTRACAUDAL
Status: DISPENSED
Start: 2022-11-10

## (undated) RX ORDER — CEFAZOLIN SODIUM 2 G/50ML
SOLUTION INTRAVENOUS
Status: DISPENSED
Start: 2022-11-10